# Patient Record
Sex: MALE | Race: WHITE | NOT HISPANIC OR LATINO | Employment: OTHER | URBAN - METROPOLITAN AREA
[De-identification: names, ages, dates, MRNs, and addresses within clinical notes are randomized per-mention and may not be internally consistent; named-entity substitution may affect disease eponyms.]

---

## 2017-04-27 ENCOUNTER — HOSPITAL ENCOUNTER (OUTPATIENT)
Dept: LAB | Facility: MEDICAL CENTER | Age: 62
End: 2017-04-27
Attending: FAMILY MEDICINE
Payer: COMMERCIAL

## 2017-04-27 DIAGNOSIS — E78.2 MIXED HYPERLIPIDEMIA: ICD-10-CM

## 2017-04-27 LAB
ALBUMIN SERPL BCP-MCNC: 4.3 G/DL (ref 3.2–4.9)
ALBUMIN/GLOB SERPL: 1.7 G/DL
ALP SERPL-CCNC: 72 U/L (ref 30–99)
ALT SERPL-CCNC: 24 U/L (ref 2–50)
ANION GAP SERPL CALC-SCNC: 7 MMOL/L (ref 0–11.9)
AST SERPL-CCNC: 21 U/L (ref 12–45)
BILIRUB SERPL-MCNC: 1.1 MG/DL (ref 0.1–1.5)
BUN SERPL-MCNC: 21 MG/DL (ref 8–22)
CALCIUM SERPL-MCNC: 9.2 MG/DL (ref 8.5–10.5)
CHLORIDE SERPL-SCNC: 108 MMOL/L (ref 96–112)
CHOLEST SERPL-MCNC: 247 MG/DL (ref 100–199)
CO2 SERPL-SCNC: 23 MMOL/L (ref 20–33)
CREAT SERPL-MCNC: 0.99 MG/DL (ref 0.5–1.4)
GFR SERPL CREATININE-BSD FRML MDRD: >60 ML/MIN/1.73 M 2
GLOBULIN SER CALC-MCNC: 2.6 G/DL (ref 1.9–3.5)
GLUCOSE SERPL-MCNC: 98 MG/DL (ref 65–99)
HDLC SERPL-MCNC: 45 MG/DL
LDLC SERPL CALC-MCNC: 174 MG/DL
POTASSIUM SERPL-SCNC: 4.1 MMOL/L (ref 3.6–5.5)
PROT SERPL-MCNC: 6.9 G/DL (ref 6–8.2)
SODIUM SERPL-SCNC: 138 MMOL/L (ref 135–145)
TRIGL SERPL-MCNC: 141 MG/DL (ref 0–149)

## 2017-04-27 PROCEDURE — 80053 COMPREHEN METABOLIC PANEL: CPT

## 2017-04-27 PROCEDURE — 36415 COLL VENOUS BLD VENIPUNCTURE: CPT

## 2017-04-27 PROCEDURE — 80061 LIPID PANEL: CPT

## 2017-04-28 ENCOUNTER — TELEPHONE (OUTPATIENT)
Dept: MEDICAL GROUP | Facility: PHYSICIAN GROUP | Age: 62
End: 2017-04-28

## 2017-04-28 NOTE — TELEPHONE ENCOUNTER
Phone Number Called: [phone] (home)     Message: Unable to LVM - mail box full.     Left Message for patient to call back: no

## 2017-04-28 NOTE — TELEPHONE ENCOUNTER
----- Message from Nikole Iniguez M.D. sent at 4/27/2017  8:16 PM PDT -----  Please let patient know that his cholesterol is slightly elevated, but improved from his last blood draw. We can discuss it in-depth at his follow-up appointment.  Nikole Iniguez M.D.

## 2017-05-02 ENCOUNTER — OFFICE VISIT (OUTPATIENT)
Dept: CARDIOLOGY | Facility: MEDICAL CENTER | Age: 62
End: 2017-05-02
Payer: COMMERCIAL

## 2017-05-02 ENCOUNTER — TELEPHONE (OUTPATIENT)
Dept: CARDIOLOGY | Facility: MEDICAL CENTER | Age: 62
End: 2017-05-02

## 2017-05-02 VITALS
HEIGHT: 66 IN | WEIGHT: 193 LBS | SYSTOLIC BLOOD PRESSURE: 120 MMHG | HEART RATE: 78 BPM | OXYGEN SATURATION: 96 % | DIASTOLIC BLOOD PRESSURE: 80 MMHG | BODY MASS INDEX: 31.02 KG/M2

## 2017-05-02 DIAGNOSIS — Z95.3 S/P AORTIC VALVE REPLACEMENT WITH BIOPROSTHETIC VALVE: ICD-10-CM

## 2017-05-02 DIAGNOSIS — Z95.1 POSTSURGICAL AORTOCORONARY BYPASS STATUS: ICD-10-CM

## 2017-05-02 DIAGNOSIS — I25.718 ATHEROSCLEROSIS OF AUTOLOGOUS VEIN CORONARY ARTERY BYPASS GRAFT WITH OTHER FORMS OF ANGINA PECTORIS (HCC): Chronic | ICD-10-CM

## 2017-05-02 DIAGNOSIS — Z78.9 STATIN INTOLERANCE: ICD-10-CM

## 2017-05-02 DIAGNOSIS — E78.2 MIXED HYPERLIPIDEMIA: ICD-10-CM

## 2017-05-02 DIAGNOSIS — I25.708 CORONARY ARTERY DISEASE OF BYPASS GRAFT OF NATIVE HEART WITH STABLE ANGINA PECTORIS (HCC): Primary | ICD-10-CM

## 2017-05-02 DIAGNOSIS — Z95.1 HX OF CABG: ICD-10-CM

## 2017-05-02 PROCEDURE — G8432 DEP SCR NOT DOC, RNG: HCPCS | Performed by: INTERNAL MEDICINE

## 2017-05-02 PROCEDURE — G8419 CALC BMI OUT NRM PARAM NOF/U: HCPCS | Performed by: INTERNAL MEDICINE

## 2017-05-02 PROCEDURE — 99214 OFFICE O/P EST MOD 30 MIN: CPT | Performed by: INTERNAL MEDICINE

## 2017-05-02 PROCEDURE — G8598 ASA/ANTIPLAT THER USED: HCPCS | Performed by: INTERNAL MEDICINE

## 2017-05-02 PROCEDURE — 3017F COLORECTAL CA SCREEN DOC REV: CPT | Mod: 8P | Performed by: INTERNAL MEDICINE

## 2017-05-02 PROCEDURE — 1036F TOBACCO NON-USER: CPT | Performed by: INTERNAL MEDICINE

## 2017-05-02 ASSESSMENT — ENCOUNTER SYMPTOMS
CLAUDICATION: 0
PALPITATIONS: 0
ORTHOPNEA: 0
PND: 0

## 2017-05-02 NOTE — PROGRESS NOTES
Subjective:   Abimael Hamilton is a 61 -year-old man with a history of 5 vessel CABG in 1998, and re-do 3-V CABG in 12/2005 as well as bioprosthetic AVR at that time. He has gout, hypertension, dyslipidemia, obesity, and intolerance to statins.    Again, he is doing very well from a cardiovascular perspective with very infrequent episodes of angina in conjunction with a high level of moderate intensity physical activity at the gym. He tells me that he infrequently has midsternal chest discomfort and requires a single nitroglycerin with immediate relief of his pain. He is doing well with his medications, and has no complaints.    He reviews with me again his severe myalgias with multiple statins, and is absolutely against reinitiation of one. He is also quite leery about the possibility of a PCSK-9 inhibitor for the same reason. He asked for some patient information today.    Past Medical History   Diagnosis Date   • Coronary atherosclerosis of autologous vein bypass graft 11/14/2011   • Benign hypertensive heart disease without heart failure 11/14/2011   • Obesity 11/14/2011   • Muscle cramps 10/4/2011   • Status post cardiac revascularization with bypass aortocoronary anastomosis of five coronary vessels 7/26/2016     5-V CABG done in 1998 (done in [place] at [facility]), patent LIMA to LAD, occluded SVG-OM, occluded SVG-RCA by cardiac catheterization 11/15/2007 with other vein grafts not identified at that time, poor targets for revascularization and declined repeat CABG in 2007 by [facility]. No ischemic was identified by MPI 11/17/07 with an EF of 50%.     • Statin intolerance 7/26/2016   • S/P aortic valve replacement with bioprosthetic valve 7/26/2016     #23 Pe[place]aloza Magna AVR (bioprosthetic)    • History of pancreatitis    • Gout    • Postsurgical aortocoronary bypass status 7/26/2016     Status post redo 3-V CABG in [place] 12/2015: SVG-acute marginal, SVG sequential to LAD, and OM      Past Surgical History    Procedure Laterality Date   • Multiple coronary artery bypass     • Laminotomy       Diskectomy L4-L5, L5-S1     Family History   Problem Relation Age of Onset   • Heart Attack Father      MI and CABG, unknown age   • Cancer Mother      Breast   • Heart Disease Brother    • Stroke Neg Hx    • Diabetes Neg Hx    • Lung Disease Neg Hx      History   Smoking status   • Former Smoker   • Quit date: 01/01/1992   Smokeless tobacco   • Never Used     Allergies   Allergen Reactions   • Gemfibrozil    • Lipitor [Atorvastatin Calcium]    • Tricor      Outpatient Encounter Prescriptions as of 5/2/2017   Medication Sig Dispense Refill   • nitroglycerin (NITROSTAT) 0.4 MG SL Tab Place 1 Tab under tongue as needed for Chest Pain. 25 Tab 0   • magnesium oxide (MAG-OX) 400 MG Tab Take 400 mg by mouth every day.     • aspirin 81 MG EC tablet Take 1 Tab by mouth every day. 30 Tab 11   • clopidogrel (PLAVIX) 75 MG Tab Take 1 Tab by mouth every day. 30 Tab 11   • lisinopril (PRINIVIL) 5 MG Tab Take 1 Tab by mouth every day. 30 Tab 11   • metoprolol (LOPRESSOR) 50 MG Tab Take 1 Tab by mouth 2 times a day. (Patient taking differently: Take 50 mg by mouth every day.) 60 Tab 11   • Red Yeast Rice 600 MG Cap Take 1 Cap by mouth every day. 90 Cap 3   • allopurinol (ZYLOPRIM) 100 MG TABS Take 1 Tab by mouth every day. 90 Tab 3   • Glucosamine-Chondroit-Vit C-Mn (GLUCOSAMINE CHONDROITIN COMPLX) CAPS Take 2 Caps by mouth every day.     • [DISCONTINUED] neomycin sulf/polymyx B sulf/HC soln (CORTISPORIN HC SOL) 3.5-90439-9 Solution Place 3 Drops in ear 3 times a day. Administer drops to both ears. (Patient not taking: Reported on 5/2/2017) 1 Bottle 0   • [DISCONTINUED] ciprofloxacin/dexamethasone (CIPRODEX) 0.3-0.1 % Suspension Place 4 Drops in ear 2 times a day. (Patient not taking: Reported on 5/2/2017) 1 Bottle 0   • [DISCONTINUED] indomethacin (INDOCIN) 50 MG Cap Take 1 Cap by mouth 2 times a day as needed. (Patient not taking: Reported on  "5/2/2017) 30 Cap 3   • Omega-3 Fatty Acids (OMEGA-3 FISH OIL PO) Take  by mouth.       No facility-administered encounter medications on file as of 5/2/2017.     Review of Systems   Cardiovascular: Positive for chest pain (infrequent angina). Negative for palpitations, orthopnea, claudication, leg swelling and PND.   Musculoskeletal: Positive for joint pain (with gout).   All other systems reviewed and are negative.       Objective:   /80 mmHg  Pulse 78  Ht 1.676 m (5' 6\")  Wt 87.544 kg (193 lb)  BMI 31.17 kg/m2  SpO2 96%    Physical Exam   Constitutional: He is oriented to person, place, and time. He appears well-developed and well-nourished. No distress.   Pleasant, reasonably forward, obese, middle-aged man in no distress   HENT:   Head: Normocephalic and atraumatic.   Eyes: Conjunctivae and EOM are normal. Pupils are equal, round, and reactive to light. No scleral icterus.   Neck: Neck supple. No JVD present. No tracheal deviation present.   Cardiovascular: Normal rate, regular rhythm, S2 normal and intact distal pulses.  Exam reveals no gallop and no friction rub.    Murmur heard.   Crescendo decrescendo systolic murmur is present with a grade of 2/6   Pulses:       Dorsalis pedis pulses are 2+ on the right side, and 2+ on the left side.       No carotid bruits   Pulmonary/Chest: Effort normal and breath sounds normal. No stridor. No respiratory distress. He has no wheezes. He has no rales.   Abdominal: Soft. Bowel sounds are normal. He exhibits no distension.   Musculoskeletal: He exhibits no edema.   Neurological: He is alert and oriented to person, place, and time.   Skin: Skin is warm and dry. No rash noted. He is not diaphoretic. No erythema. No pallor.   Tattoos noted on his forearms including Nicole    Psychiatric: He has a normal mood and affect. Judgment and thought content normal.   Vitals reviewed.       7/29/2016 4/27/2017    Cholesterol,Tot 271 (H) 247 (H)   Triglycerides 433 (H) 141 " "  HDL 28 (L) 45   LDL Comment 174 (H)     Echocardiogram, 8/4/2016:  \"CONCLUSIONS  Normal left ventricular size and systolic function, EF 60%.  Mild concentric left ventricular hypertrophy.  Mild mitral regurgitation.  Normally functioning bovine bioprosthetic aortic valve.   Normal right sided pressures.  No prior studies for comparison\"    Assessment:     1. Coronary artery disease of bypass graft of native heart with stable angina pectoris (CMS-[facility])     2. S/P aortic valve replacement with bioprosthetic valve     3. Postsurgical aortocoronary bypass status     4. Mixed hyperlipidemia     5. Statin intolerance         Medical Decision Making:  Today's Assessment / Status / Plan:     Medical Decision Making:    He is doing well today from a cardiovascular perspective. His blood pressure is 120/80 mmHg on metoprolol tartrate, and lisinopril. He remains on aspirin and Plavix as well as red yeast rice and fish oil supplementation. Despite his lipid treatment, his LDL is still extremely high in the setting of 2 prior bypass surgeries at 174 mg/dL just about a week ago at this point. I spoke with him again about PCSK-9 inhibitors, and highly recommended that he initiate one. He is willing to try it, and our office will get that approved. I encouraged him in continued weight loss and lifestyle modification, which she is doing wonderfully with at the present time.    Alejandro Fuentes MD  Cardiologist, [facility] [place]     "

## 2017-05-02 NOTE — MR AVS SNAPSHOT
"Abimael Hamilton   2017 1:00 PM   Office Visit   MRN: [id number]    Department:  [facility]   Dept Phone:  [phone]    Description:  Male : 1955   Provider:  Alejandro Fuentes M.D.           Reason for Visit     Follow-Up           Allergies as of 2017     Allergen Noted Reactions    Gemfibrozil 2008       Lipitor [Atorvastatin Calcium] 2008       Tricor 2008         You were diagnosed with     Coronary artery disease of bypass graft of native heart with stable angina pectoris (CMS-[facility])   [4887035]  -  Primary     S/P aortic valve replacement with bioprosthetic valve   [288193]       Postsurgical aortocoronary bypass status   [V45.81.ICD-9-CM]       Mixed hyperlipidemia   [272.2.ICD-9-CM]       Statin intolerance   [109065]         Vital Signs     Blood Pressure Pulse Height Weight Body Mass Index Oxygen Saturation    120/80 mmHg 78 1.676 m (5' 6\") 87.544 kg (193 lb) 31.17 kg/m2 96%    Smoking Status                   Former Smoker           Basic Information     Date Of Birth Sex Race Ethnicity Preferred Language    1955 Male White Non- English      Your appointments     May 03, 2017  1:00 PM   Established Patient with Nikole Iniguez M.D.   [facility] [place] (--)    [address]  [address]  [place] [id number] [phone]           You will be receiving a confirmation call a few days before your appointment from our automated call confirmation system.            2017  1:30 PM   FOLLOW UP with Alejandro Fuentes M.D.   [facility] (--)    [address] [place] [address] [address]  [place] [address] [phone]              Problem List              ICD-10-CM Priority Class Noted - Resolved    CAD (coronary artery disease) I25.10   2012 - Present    Hyperlipidemia E78.5   2012 - Present    Coronary atherosclerosis of autologous vein bypass graft (Chronic) I25.810 High  2011 - Present    Benign " hypertensive heart disease without heart failure (Chronic) I11.9 High  11/14/2011 - Present    Obesity (Chronic) E66.9 High  11/14/2011 - Present    Status post cardiac revascularization with bypass aortocoronary anastomosis of five coronary vessels Z95.1   7/26/2016 - Present    Postsurgical aortocoronary bypass status Z95.1   7/26/2016 - Present    Statin intolerance Z78.9   7/26/2016 - Present    S/P aortic valve replacement with bioprosthetic valve Z95.4   7/26/2016 - Present    Gout M10.9   Unknown - Present    History of pancreatitis Z87.19   Unknown - Present    Prediabetes R73.03   9/15/2016 - Present    Enlarging skin lesion L98.9   9/15/2016 - Present      Health Maintenance        Date Due Completion Dates    COLONOSCOPY 12/5/2006 ---    IMM ZOSTER VACCINE 12/5/2016 ---    IMM DTaP/Tdap/Td Vaccine (1 - Tdap) 11/15/2017 (Originally 12/5/1975) ---            Current Immunizations     No immunizations on file.      Below and/or attached are the medications your provider expects you to take. Review all of your home medications and newly ordered medications with your provider and/or pharmacist. Follow medication instructions as directed by your provider and/or pharmacist. Please keep your medication list with you and share with your provider. Update the information when medications are discontinued, doses are changed, or new medications (including over-the-counter products) are added; and carry medication information at all times in the event of emergency situations     Allergies:  GEMFIBROZIL - (reactions not documented)     LIPITOR - (reactions not documented)     TRICOR - (reactions not documented)               Medications  Valid as of: May 02, 2017 -  1:15 PM    Generic Name Brand Name Tablet Size Instructions for use    Allopurinol (Tab) ZYLOPRIM 100 MG Take 1 Tab by mouth every day.        Aspirin (Tablet Delayed Response) aspirin 81 MG Take 1 Tab by mouth every day.        Clopidogrel Bisulfate (Tab)  PLAVIX 75 MG Take 1 Tab by mouth every day.        Glucosamine-Chondroit-Vit C-Mn (Cap) GLUCOSAMINE CHONDROITIN COMPLX  Take 2 Caps by mouth every day.        Lisinopril (Tab) PRINIVIL 5 MG Take 1 Tab by mouth every day.        Magnesium Oxide (Tab) MAG- MG Take 400 mg by mouth every day.        Metoprolol Tartrate (Tab) LOPRESSOR 50 MG Take 1 Tab by mouth 2 times a day.        Nitroglycerin (SL Tab) NITROSTAT 0.4 MG Place 1 Tab under tongue as needed for Chest Pain.        Omega-3 Fatty Acids   Take  by mouth.        Red Yeast Rice Extract (Cap) Red Yeast Rice 600 MG Take 1 Cap by mouth every day.        .                 Medicines prescribed today were sent to:     [id number] [address]    Phone: [phone] Fax: [phone]    Open 24 Hours?: No      Medication refill instructions:       If your prescription bottle indicates you have medication refills left, it is not necessary to call your provider’s office. Please contact your pharmacy and they will refill your medication.    If your prescription bottle indicates you do not have any refills left, you may request refills at any time through one of the following ways: The online [employer] system (except Urgent Care), by calling your provider’s office, or by asking your pharmacy to contact your provider’s office with a refill request. Medication refills are processed only during regular business hours and may not be available until the next business day. Your provider may request additional information or to have a follow-up visit with you prior to refilling your medication.   *Please Note: Medication refills are assigned a new Rx number when refilled electronically. Your pharmacy may indicate that no refills were authorized even though a new prescription for the same medication is available at the pharmacy. Please request the medicine by name with the pharmacy before  contacting your provider for a refill.           Baitianshi Access Code: [email or web address]  Expires: 5/8/2017 11:26 AM    Baitianshi  A secure, online tool to manage your health information     [employer]’s Baitianshi® is a secure, online tool that connects you to your personalized health information from the privacy of your home -- day or night - making it very easy for you to manage your healthcare. Once the activation process is completed, you can even access your medical information using the Baitianshi riri, which is available for free in the Apple Riri store or Google Play store.     Baitianshi provides the following levels of access (as shown below):   My Chart Features   [facility] Primary Care Doctor [facility]  Specialists [facility]  Urgent  Care Non-[facility]  Primary Care  Doctor   Email your healthcare team securely and privately 24/7 X X X    Manage appointments: schedule your next appointment; view details of past/upcoming appointments X      Request prescription refills. X      View recent personal medical records, including lab and immunizations X X X X   View health record, including health history, allergies, medications X X X X   Read reports about your outpatient visits, procedures, consult and ER notes X X X X   See your discharge summary, which is a recap of your hospital and/or ER visit that includes your diagnosis, lab results, and care plan. X X       How to register for Baitianshi:  1. Go to  [email or web address].  2. Click on the Sign Up Now box, which takes you to the New Member Sign Up page. You will need to provide the following information:  a. Enter your Baitianshi Access Code exactly as it appears at the top of this page. (You will not need to use this code after you’ve completed the sign-up process. If you do not sign up before the expiration date, you must request a new code.)   b. Enter your date of birth.   c. Enter your home email address.   d. Click Submit, and follow the next screen’s  instructions.  3. Create a [employer] ID. This will be your [employer] login ID and cannot be changed, so think of one that is secure and easy to remember.  4. Create a [employer] password. You can change your password at any time.  5. Enter your Password Reset Question and Answer. This can be used at a later time if you forget your password.   6. Enter your e-mail address. This allows you to receive e-mail notifications when new information is available in [employer].  7. Click Sign Up. You can now view your health information.    For assistance activating your [employer] account, call [phone]

## 2017-05-02 NOTE — TELEPHONE ENCOUNTER
Prescription for Repatha 140 mg SQ every 14 days sent to [facility].    Repatha Ready Privacy Notice and Authorization form mailed to patient for signature.    IAN RN

## 2017-05-02 NOTE — PROGRESS NOTES
Name:          Abimael Hamilton   YOB: 1955  Date:     5/2/2017      Nikole Iniguez M.D.  [address] [facility] [address]  [place] [address]     Alejandro Fuentes MD  [address], [facility] [address]  [place] [address]  Phone: [phone]  Back Line: [phone]  Fax: [phone]  E-mail: [email or web address]   Dear Dr. Iniguez,    We had the pleasure of seeing your patient, Abimael Hamilton, in Cardiology Clinic at [facility] today.    As you know, he is a 61-year-old man with a history of 5 vessel CABG in 1998, and re-do 3-V CABG in 12/2005 as well as bioprosthetic AVR at that time. He has gout, hypertension, dyslipidemia, obesity, and intolerance to statins.    He is doing well today from a cardiovascular perspective. His blood pressure is 120/80 mmHg on metoprolol tartrate, and lisinopril. He remains on aspirin and Plavix as well as red yeast rice and fish oil supplementation. Despite his lipid treatment, his LDL is still extremely high in the setting of 2 prior bypass surgeries at 174 mg/dL just about a week ago at this point. I spoke with him again about PCSK-9 inhibitors, and highly recommended that he initiate one. He is willing to try it, and our office will get that approved. I encouraged him in continued weight loss and lifestyle modification, which she is doing wonderfully with at the present time.    We will have the patient follow-up in 6 months.    Thank you for the referral and please do not hesitate to contact me at any time. My contact information is listed above.    This note was dictated using Dragon speech recognition software.     A full note including my physical examination and a full list of rectified medications is available in our medical record, and can be faxed as well.    Alejandro Fuentes MD  Cardiologist  [facility]

## 2017-05-02 NOTE — Clinical Note
Name:          Abimael Hamilton   YOB: 1955  Date:     5/2/2017      Nikole Iniguez M.D.  [address] [facility] [address]  [place] [address]     Alejandro Fuentes MD  [address], [facility] [address]  [place] [address]  Phone: [phone]  Back Line: [phone]  Fax: [phone]  E-mail: [email or web address]   Dear Dr. Iniguez,    We had the pleasure of seeing your patient, Abimael Hamilton, in Cardiology Clinic at [facility] [place] and Vascular today.    As you know, he is a 61-year-old man with a history of 5 vessel CABG in 1998, and re-do 3-V CABG in 12/2005 as well as bioprosthetic AVR at that time. He has gout, hypertension, dyslipidemia, obesity, and intolerance to statins.    He is doing well today from a cardiovascular perspective. His blood pressure is 120/80 mmHg on metoprolol tartrate, and lisinopril. He remains on aspirin and Plavix as well as red yeast rice and fish oil supplementation. Despite his lipid treatment, his LDL is still extremely high in the setting of 2 prior bypass surgeries at 174 mg/dL just about a week ago at this point. I spoke with him again about PCSK-9 inhibitors, and highly recommended that he initiate one. He is willing to try it, and our office will get that approved. I encouraged him in continued weight loss and lifestyle modification, which she is doing wonderfully with at the present time.    We will have the patient follow-up in 6 months.    Thank you for the referral and please do not hesitate to contact me at any time. My contact information is listed above.    This note was dictated using Dragon speech recognition software.     A full note including my physical examination and a full list of rectified medications is available in our medical record, and can be faxed as well.    Alejandro Fuentes MD  Cardiologist  [facility]

## 2017-05-03 ENCOUNTER — APPOINTMENT (OUTPATIENT)
Dept: MEDICAL GROUP | Facility: PHYSICIAN GROUP | Age: 62
End: 2017-05-03
Payer: COMMERCIAL

## 2017-05-03 DIAGNOSIS — Z95.1 HX OF CABG: ICD-10-CM

## 2017-05-03 DIAGNOSIS — Z78.9 STATIN INTOLERANCE: ICD-10-CM

## 2017-05-03 DIAGNOSIS — I25.718 ATHEROSCLEROSIS OF AUTOLOGOUS VEIN CORONARY ARTERY BYPASS GRAFT WITH OTHER FORMS OF ANGINA PECTORIS (HCC): Chronic | ICD-10-CM

## 2017-05-25 ENCOUNTER — OFFICE VISIT (OUTPATIENT)
Dept: MEDICAL GROUP | Facility: LAB | Age: 62
End: 2017-05-25
Payer: COMMERCIAL

## 2017-05-25 VITALS
HEART RATE: 79 BPM | HEIGHT: 66 IN | DIASTOLIC BLOOD PRESSURE: 92 MMHG | TEMPERATURE: 97.2 F | BODY MASS INDEX: 30.75 KG/M2 | OXYGEN SATURATION: 95 % | RESPIRATION RATE: 16 BRPM | SYSTOLIC BLOOD PRESSURE: 113 MMHG | WEIGHT: 191.36 LBS

## 2017-05-25 DIAGNOSIS — E78.2 MIXED HYPERLIPIDEMIA: ICD-10-CM

## 2017-05-25 DIAGNOSIS — Z13.1 SCREENING FOR DIABETES MELLITUS: ICD-10-CM

## 2017-05-25 DIAGNOSIS — R73.03 PREDIABETES: ICD-10-CM

## 2017-05-25 DIAGNOSIS — Z00.00 HEALTH MAINTENANCE EXAMINATION: ICD-10-CM

## 2017-05-25 DIAGNOSIS — E66.9 OBESITY (BMI 30-39.9): ICD-10-CM

## 2017-05-25 PROCEDURE — G8432 DEP SCR NOT DOC, RNG: HCPCS | Performed by: FAMILY MEDICINE

## 2017-05-25 PROCEDURE — G8419 CALC BMI OUT NRM PARAM NOF/U: HCPCS | Performed by: FAMILY MEDICINE

## 2017-05-25 PROCEDURE — 1036F TOBACCO NON-USER: CPT | Performed by: FAMILY MEDICINE

## 2017-05-25 PROCEDURE — 3017F COLORECTAL CA SCREEN DOC REV: CPT | Mod: 8P | Performed by: FAMILY MEDICINE

## 2017-05-25 PROCEDURE — 99214 OFFICE O/P EST MOD 30 MIN: CPT | Performed by: FAMILY MEDICINE

## 2017-05-25 PROCEDURE — G8598 ASA/ANTIPLAT THER USED: HCPCS | Performed by: FAMILY MEDICINE

## 2017-05-25 NOTE — MR AVS SNAPSHOT
"Abimael Hamilton   2017 7:40 AM   Office Visit   MRN: [id number]    Department:  [facility]   Dept Phone:  [phone]    Description:  Male : 1955   Provider:  Martha Madrid M.D.           Reason for Visit     Establish Care           Allergies as of 2017     Allergen Noted Reactions    Gemfibrozil 2008       Lipitor [Atorvastatin Calcium] 2008       Tricor 2008         You were diagnosed with     Obesity (BMI 30-39.9)   [553094]       Mixed hyperlipidemia   [272.2.ICD-9-CM]       Prediabetes   [945852]       Health maintenance examination   [115363]       Screening for diabetes mellitus   [V77.1.ICD-9-CM]         Vital Signs     Blood Pressure Pulse Temperature Respirations Height Weight    113/92 mmHg 79 36.2 °C (97.2 °F) 16 1.676 m (5' 5.98\") 86.8 kg (191 lb 5.8 oz)    Body Mass Index Oxygen Saturation Smoking Status             30.90 kg/m2 95% Former Smoker         Basic Information     Date Of Birth Sex Race Ethnicity Preferred Language    1955 Male White Non- English      Your appointments     2017  1:30 PM   FOLLOW UP with Alejandro Fuentes M.D.   [facility] (--)    [address] [place] [address] [address]  [place] [address] [phone]            2017  7:00 AM   ANNUAL EXAM PREVENTATIVE with Martha Madrid M.D.   [facility] (--)    [address]  [place] [address] [phone]              Problem List              ICD-10-CM Priority Class Noted - Resolved    CAD (coronary artery disease) I25.10   2012 - Present    Hyperlipidemia E78.5   2012 - Present    Coronary atherosclerosis of autologous vein bypass graft I25.810 High  2011 - Present    Benign hypertensive heart disease without heart failure I11.9 High  2011 - Present    Status post cardiac revascularization with bypass aortocoronary anastomosis of five " coronary vessels Z95.1   7/26/2016 - Present    Postsurgical aortocoronary bypass status Z95.1   7/26/2016 - Present    Statin intolerance Z78.9   7/26/2016 - Present    S/P aortic valve replacement with bioprosthetic valve Z95.4   7/26/2016 - Present    Gout M10.9   Unknown - Present    History of pancreatitis Z87.19   Unknown - Present    Prediabetes R73.03   9/15/2016 - Present    Enlarging skin lesion L98.9   9/15/2016 - Present    Obesity (BMI 30-39.9) E66.9   5/25/2017 - Present      Health Maintenance        Date Due Completion Dates    COLONOSCOPY 12/5/2006 ---    IMM DTaP/Tdap/Td Vaccine (1 - Tdap) 11/15/2017 (Originally 12/5/1975) ---    IMM ZOSTER VACCINE 5/25/2027 (Originally 12/5/2016) ---            Current Immunizations     No immunizations on file.      Below and/or attached are the medications your provider expects you to take. Review all of your home medications and newly ordered medications with your provider and/or pharmacist. Follow medication instructions as directed by your provider and/or pharmacist. Please keep your medication list with you and share with your provider. Update the information when medications are discontinued, doses are changed, or new medications (including over-the-counter products) are added; and carry medication information at all times in the event of emergency situations     Allergies:  GEMFIBROZIL - (reactions not documented)     LIPITOR - (reactions not documented)     TRICOR - (reactions not documented)               Medications  Valid as of: May 25, 2017 -  8:05 AM    Generic Name Brand Name Tablet Size Instructions for use    Allopurinol (Tab) ZYLOPRIM 100 MG Take 1 Tab by mouth every day.        Aspirin (Tablet Delayed Response) aspirin 81 MG Take 1 Tab by mouth every day.        Clopidogrel Bisulfate (Tab) PLAVIX 75 MG Take 1 Tab by mouth every day.        Evolocumab (Solution Auto-injector) Evolocumab (REPATHA) 140 MG/ML Inject 1 Each as instructed every 14  days.        Glucosamine-Chondroit-Vit C-Mn (Cap) GLUCOSAMINE CHONDROITIN COMPLX  Take 2 Caps by mouth every day.        Lisinopril (Tab) PRINIVIL 5 MG Take 1 Tab by mouth every day.        Magnesium Oxide (Tab) MAG- MG Take 400 mg by mouth every day.        Metoprolol Tartrate (Tab) LOPRESSOR 50 MG Take 1 Tab by mouth 2 times a day.        Nitroglycerin (SL Tab) NITROSTAT 0.4 MG Place 1 Tab under tongue as needed for Chest Pain.        Omega-3 Fatty Acids   Take  by mouth.        Red Yeast Rice Extract (Cap) Red Yeast Rice 600 MG Take 1 Cap by mouth every day.        .                 Medicines prescribed today were sent to:     [id number] - [facility] [address] [address]    Phone: [phone] Fax: [phone]    Open 24 Hours?: No    [facility] - [place], [place] - [address]     [address]  [address] [place] [address]    Phone: [phone] Fax: [phone]    Open 24 Hours?: No      Medication refill instructions:       If your prescription bottle indicates you have medication refills left, it is not necessary to call your provider’s office. Please contact your pharmacy and they will refill your medication.    If your prescription bottle indicates you do not have any refills left, you may request refills at any time through one of the following ways: The online [employer] system (except Urgent Care), by calling your provider’s office, or by asking your pharmacy to contact your provider’s office with a refill request. Medication refills are processed only during regular business hours and may not be available until the next business day. Your provider may request additional information or to have a follow-up visit with you prior to refilling your medication.   *Please Note: Medication refills are assigned a new Rx number when refilled electronically. Your pharmacy may indicate that no refills were authorized even  though a new prescription for the same medication is available at the pharmacy. Please request the medicine by name with the pharmacy before contacting your provider for a refill.        Your To Do List     Future Labs/Procedures Complete By Expires    COMP METABOLIC PANEL  As directed 5/26/2018    HEMOGLOBIN A1C  As directed 5/26/2018    LIPID PROFILE  As directed 5/26/2018         MyChart Access Code: Activation code not generated  Current [employer] Status: Active

## 2017-05-25 NOTE — PROGRESS NOTES
Catherine Hamilton is a 61 y.o. male here for   Chief Complaint   Patient presents with   • Establish Care       HPI:  Catherine is a very pleasant 61 y.o. male. He is here today to establish care. His previous PCP was Dr. Iniguez and she moved too far away for him to continue to see her. He lives in [place]. His wife is Vonda Longo. Mixed hyperlipidemia  This is chronic. Unable to take any medications due to side effects including multiple statins and TriCor. He is currently going to hopefully be starting a trial with his cardiologist for the injectable Repatha.  Denies stroke symptoms. He does take an occasional nitroglycerin for chest pain. He does have coronary artery disease and this had multiple bypass grafts. Also taking a daily ASA. Last lipid panel 4/27/17 and was reviewed with the patient.     Results for CATHERINE HAMILTON (MRN [id number]) as of 5/25/2017 10:14   Ref. Range 4/27/2017 08:16   Cholesterol,Tot Latest Ref Range: 100-199 mg/dL 247 (H)   Triglycerides Latest Ref Range: 0-149 mg/dL 141   HDL Latest Ref Range: >=40 mg/dL 45   LDL Latest Ref Range: <100 mg/dL 174 (H)       2. Prediabetes  This is chronic. His fasting glucose went from one 10/2/98 with the last labs. His previous hemoglobin A1c 6 months ago was 5.7%. He does been on a significant weight loss journey and has lost 130 pounds within the last 2 years. Over the last 6 months he has lost 20 pounds. He states that h his goal weight is 175. He has improved his diet and limits his carbohydrates.    3. Obesity (BMI 30-39.9)  This is chronic. Patient has lost a significant amount of weight as above 130 pounds total. He continues to exercise about 5 hours per day now that he has retired/disabled.     4. Health maintenance examination   Colonoscopy done about 5 years ago at digestive [facility]  Declines any vaccinations    Current medicines (including changes today)  Current Outpatient Prescriptions   Medication Sig Dispense Refill   • nitroglycerin  (NITROSTAT) 0.4 MG SL Tab Place 1 Tab under tongue as needed for Chest Pain. 25 Tab 0   • magnesium oxide (MAG-OX) 400 MG Tab Take 400 mg by mouth every day.     • aspirin 81 MG EC tablet Take 1 Tab by mouth every day. 30 Tab 11   • clopidogrel (PLAVIX) 75 MG Tab Take 1 Tab by mouth every day. 30 Tab 11   • metoprolol (LOPRESSOR) 50 MG Tab Take 1 Tab by mouth 2 times a day. (Patient taking differently: Take 50 mg by mouth every day.) 60 Tab 11   • Red Yeast Rice 600 MG Cap Take 1 Cap by mouth every day. 90 Cap 3   • allopurinol (ZYLOPRIM) 100 MG TABS Take 1 Tab by mouth every day. 90 Tab 3   • Glucosamine-Chondroit-Vit C-Mn (GLUCOSAMINE CHONDROITIN COMPLX) CAPS Take 2 Caps by mouth every day.     • Evolocumab, REPATHA, 140 MG/ML Solution Auto-injector Inject 1 Each as instructed every 14 days. 6 PEN 3   • Omega-3 Fatty Acids (OMEGA-3 FISH OIL PO) Take  by mouth.     • lisinopril (PRINIVIL) 5 MG Tab Take 1 Tab by mouth every day. 30 Tab 11     No current facility-administered medications for this visit.     He  has a past medical history of Coronary atherosclerosis of autologous vein bypass graft (11/14/2011); Benign hypertensive heart disease without heart failure (11/14/2011); Obesity (11/14/2011); Muscle cramps (10/4/2011); Status post cardiac revascularization with bypass aortocoronary anastomosis of five coronary vessels (7/26/2016); Statin intolerance (7/26/2016); S/P aortic valve replacement with bioprosthetic valve (7/26/2016); History of pancreatitis; Gout; and Postsurgical aortocoronary bypass status (7/26/2016).  He  has past surgical history that includes multiple coronary artery bypass; laminotomy; and biopsy general.  Social History   Substance Use Topics   • Smoking status: Former Smoker     Quit date: 01/01/1992   • Smokeless tobacco: Never Used   • Alcohol Use: Yes      Comment: RARE     Social History     Social History Narrative     Family History   Problem Relation Age of Onset   • Heart Attack  "Father      MI and CABG, unknown age   • Cancer Mother      Breast   • Heart Disease Brother    • Stroke Neg Hx    • Diabetes Neg Hx    • Lung Disease Neg Hx      Family Status   Relation Status Death Age   • Father     • Mother Alive    • Sister Alive    • Brother Alive          ROS  Positive for occasional chest pain, shortness of breath, hand and ankle swelling, joint pain  All other systems reviewed and are negative     Objective:     Blood pressure 113/92, pulse 79, temperature 36.2 °C (97.2 °F), resp. rate 16, height 1.676 m (5' 5.98\"), weight 86.8 kg (191 lb 5.8 oz), SpO2 95 %. Body mass index is 30.9 kg/(m^2).  Physical Exam:    Constitutional: Alert, no distress.  Skin: Warm, dry, good turgor, no rashes in visible areas.  Eye: Equal, round and reactive, conjunctiva clear, lids normal.  ENMT: Lips without lesions, good dentition, oropharynx clear. TM's pearly gray with normal light reflexes bilaterally  Neck: Trachea midline, no masses, no thyromegaly. No cervical or supraclavicular lymphadenopathy.  Respiratory: Unlabored respiratory effort, lungs clear to auscultation bilaterally, no wheezes, no ronchi.  Cardiovascular: Normal S1, S2, RRR, 2/6 murmur, no edema.  Abdomen: Soft, non-tender, no masses, no hepatosplenomegaly.  Psych: Alert and oriented x3, normal affect and mood.      Assessment and Plan:   The following treatment plan was discussed    1. Mixed hyperlipidemia  Chronic, uncontrolled  Discussed LDL goal given his coronary artery disease  We are hopeful that he will be able to do Repatha injections   - LIPID PROFILE; Future    2. Prediabetes  Chronic, improving with weight loss  Labs reviewed  Recheck in 6 months  - HEMOGLOBIN A1C; Future    3. Obesity (BMI 30-39.9)  Chronic, improving significantly   counseled and encouragement given  - Patient identified as having weight management issue.  Appropriate orders and counseling given.    4. Health maintenance examination  Patient will " schedule his Medicare annual in 6 months with labs prior to this  Records requested from [facility] for colonoscopy  Declines all vaccines  - LIPID PROFILE; Future  - HEMOGLOBIN A1C; Future  - COMP METABOLIC PANEL; Future    5. Screening for diabetes mellitus  - HEMOGLOBIN A1C; Future      Records requested.  Followup: Return in about 6 months (around 11/25/2017) for medicare annual .         This note was created using voice recognition software. I have made every reasonable attempt to correct errors, however, I do anticipate some grammatical errors.

## 2017-06-07 ENCOUNTER — TELEPHONE (OUTPATIENT)
Dept: MEDICAL GROUP | Facility: LAB | Age: 62
End: 2017-06-07

## 2017-06-07 ENCOUNTER — HOSPITAL ENCOUNTER (OUTPATIENT)
Dept: RADIOLOGY | Facility: MEDICAL CENTER | Age: 62
End: 2017-06-07
Attending: FAMILY MEDICINE
Payer: COMMERCIAL

## 2017-06-07 ENCOUNTER — OFFICE VISIT (OUTPATIENT)
Dept: MEDICAL GROUP | Facility: LAB | Age: 62
End: 2017-06-07
Payer: COMMERCIAL

## 2017-06-07 VITALS
TEMPERATURE: 97.3 F | RESPIRATION RATE: 16 BRPM | OXYGEN SATURATION: 100 % | DIASTOLIC BLOOD PRESSURE: 96 MMHG | WEIGHT: 193.56 LBS | HEART RATE: 63 BPM | SYSTOLIC BLOOD PRESSURE: 134 MMHG | BODY MASS INDEX: 31.11 KG/M2 | HEIGHT: 66 IN

## 2017-06-07 DIAGNOSIS — M25.511 ACUTE PAIN OF RIGHT SHOULDER: ICD-10-CM

## 2017-06-07 DIAGNOSIS — M79.672 LEFT FOOT PAIN: ICD-10-CM

## 2017-06-07 DIAGNOSIS — Z23 NEED FOR VACCINATION: ICD-10-CM

## 2017-06-07 PROCEDURE — G8432 DEP SCR NOT DOC, RNG: HCPCS | Performed by: FAMILY MEDICINE

## 2017-06-07 PROCEDURE — 99214 OFFICE O/P EST MOD 30 MIN: CPT | Mod: 25 | Performed by: FAMILY MEDICINE

## 2017-06-07 PROCEDURE — 90715 TDAP VACCINE 7 YRS/> IM: CPT | Performed by: FAMILY MEDICINE

## 2017-06-07 PROCEDURE — 73630 X-RAY EXAM OF FOOT: CPT | Mod: LT

## 2017-06-07 PROCEDURE — G8419 CALC BMI OUT NRM PARAM NOF/U: HCPCS | Performed by: FAMILY MEDICINE

## 2017-06-07 PROCEDURE — 73030 X-RAY EXAM OF SHOULDER: CPT | Mod: RT

## 2017-06-07 PROCEDURE — 1036F TOBACCO NON-USER: CPT | Performed by: FAMILY MEDICINE

## 2017-06-07 PROCEDURE — 3017F COLORECTAL CA SCREEN DOC REV: CPT | Mod: 8P | Performed by: FAMILY MEDICINE

## 2017-06-07 PROCEDURE — G8598 ASA/ANTIPLAT THER USED: HCPCS | Performed by: FAMILY MEDICINE

## 2017-06-07 NOTE — TELEPHONE ENCOUNTER
Patient's insurance is requesting a [id number] to be able to cover patient's MRI of the right shoulder. The phone number for the [id number] is [phone] and the case number is [id number]

## 2017-06-07 NOTE — PROGRESS NOTES
Subjective:   Abimael Hamilton is a 61 y.o. male here today for   Chief Complaint   Patient presents with   • Immunizations     TDAP   • Shoulder Pain   • Leg Pain       1. Acute pain of right shoulder  This is a new problem. Patient had a fall 9 days ago off of a 10 foot cyanide tank. He landed on the right side. He had immediate pain in his right shoulder and leg. He did not hear feel any pop. He was unable to raise his arm past his shoulder after the injury and is still unable to do so. It is extremely painful when he tries to raise his arm above his head or hold it anything including something as small as a fork. He cannot shake any previous hand because of the pain. He is not taking anything for pain.    2. Left foot pain  This is a new problem. It started about 3 days ago. He did take a fall 9 days ago but did not notice pain at that time. He noticed a new bony growth on his lateral side of his foot. It is worse when he is walking on it. He is still able to exercise regularly. He denies any redness or swelling. It is tender to the touch.    3. Need for vaccination  Patient cut himself on the tank when he fell. He has not had a tetanus booster.      Current medicines (including changes today)  Current Outpatient Prescriptions   Medication Sig Dispense Refill   • magnesium oxide (MAG-OX) 400 MG Tab Take 400 mg by mouth every day.     • aspirin 81 MG EC tablet Take 1 Tab by mouth every day. 30 Tab 11   • clopidogrel (PLAVIX) 75 MG Tab Take 1 Tab by mouth every day. 30 Tab 11   • lisinopril (PRINIVIL) 5 MG Tab Take 1 Tab by mouth every day. 30 Tab 11   • metoprolol (LOPRESSOR) 50 MG Tab Take 1 Tab by mouth 2 times a day. (Patient taking differently: Take 50 mg by mouth every day.) 60 Tab 11   • allopurinol (ZYLOPRIM) 100 MG TABS Take 1 Tab by mouth every day. 90 Tab 3   • Glucosamine-Chondroit-Vit C-Mn (GLUCOSAMINE CHONDROITIN COMPLX) CAPS Take 2 Caps by mouth every day.     • Evolocumab, REPATHA, 140 MG/ML  "Solution Auto-injector Inject 1 Each as instructed every 14 days. 6 PEN 3   • nitroglycerin (NITROSTAT) 0.4 MG SL Tab Place 1 Tab under tongue as needed for Chest Pain. 25 Tab 0   • Omega-3 Fatty Acids (OMEGA-3 FISH OIL PO) Take  by mouth.     • Red Yeast Rice 600 MG Cap Take 1 Cap by mouth every day. 90 Cap 3     No current facility-administered medications for this visit.     He  has a past medical history of Coronary atherosclerosis of autologous vein bypass graft (11/14/2011); Benign hypertensive heart disease without heart failure (11/14/2011); Obesity (11/14/2011); Muscle cramps (10/4/2011); Status post cardiac revascularization with bypass aortocoronary anastomosis of five coronary vessels (7/26/2016); Statin intolerance (7/26/2016); S/P aortic valve replacement with bioprosthetic valve (7/26/2016); History of pancreatitis; Gout; and Postsurgical aortocoronary bypass status (7/26/2016).    ROS   No fevers  No bowel changes  No LE edema       Objective:     Blood pressure 134/96, pulse 63, temperature 36.3 °C (97.3 °F), resp. rate 16, height 1.676 m (5' 5.98\"), weight 87.8 kg (193 lb 9 oz), SpO2 100 %. Body mass index is 31.26 kg/(m^2).   Physical Exam:  General: No acute distress, WN/WD  HEENT: NC/AT, lids normal without lesions, good dentition  Neck: Trachea midline  Cardiovascular: Regular Rate  Pulmonary: No respiratory distress  Skin: No rashes noted  Neurologic: CN II-XII grossly intact, normal gait  Psych: Alert and oriented x 3, normal insight and judgement  MSK: There is significant ecchymosis on the lateral side of the right leg and thigh thorax.   Neck exam: No spinal tenderness to palpation. Normal flexion, extension and lateral rotation ROM.   Shoulder/arm exam: No deformity, erythema, edema or ecchymosis. Tenderness to palpation in the anterior aspect of the shoulder. ROM limited. Pain with range of motion in all planes. Worse with external rotation, empty can, anterior flexion  Elbow/forearm: " Tenderness to palpation: none. Full ROM.  5/5 strength throughout bilaterally. 2+ DTR biceps and triceps bilaterally.   Foot: Left foot with a prominent bony mass palpated with slight tenderness to palpation without any erythema or edema   Assessment and Plan:   The following treatment plan was discussed    1. Acute pain of right shoulder  New, secondary to a fall from 10 feet  X-ray ordered to rule out fracture or dislocation  If no etiology for pain found, we will need to proceed with MRI if this does not improve  Physical therapy ordered  - DX-SHOULDER 2+ RIGHT; Future    2. Left foot pain  New, etiology unclear, there is a prominent bony change on the left lateral foot  X-ray ordered for further evaluation  - DX-FOOT-COMPLETE 3+ LEFT; Future    3. Need for vaccination  - TDAP VACCINE =>8YO IM      Followup: Return if symptoms worsen or fail to improve.       This note was created using voice recognition software. I have made every reasonable attempt to correct errors, however, I do anticipate some grammatical errors.

## 2017-06-07 NOTE — MR AVS SNAPSHOT
"Abimael Hamilton   2017 7:00 AM   Office Visit   MRN: [id number]    Department:  [facility]   Dept Phone:  [phone]    Description:  Male : 1955   Provider:  Martha Madrid M.D.           Reason for Visit     Immunizations TDAP    Shoulder Pain     Leg Pain           Allergies as of 2017     Allergen Noted Reactions    Gemfibrozil 2008       Lipitor [Atorvastatin Calcium] 2008       Tricor 2008         You were diagnosed with     Acute pain of right shoulder   [6545848]       Left foot pain   [663043]       Need for vaccination   [501155]         Vital Signs     Blood Pressure Pulse Temperature Respirations Height Weight    134/96 mmHg 63 36.3 °C (97.3 °F) 16 1.676 m (5' 5.98\") 87.8 kg (193 lb 9 oz)    Body Mass Index Oxygen Saturation Smoking Status             31.26 kg/m2 100% Former Smoker         Basic Information     Date Of Birth Sex Race Ethnicity Preferred Language    1955 Male White Non- English      Your appointments     2017  1:30 PM   FOLLOW UP with Alejandro Fuentes M.D.   [facility] (--)    [address] [place] [address] [address]  [place] [id number] [phone]            2017  7:00 AM   ANNUAL EXAM PREVENTATIVE with Martha Madrid M.D.   [facility] (--)    [address] [facility]  [address]  [place] [id number] [phone]              Problem List              ICD-10-CM Priority Class Noted - Resolved    CAD (coronary artery disease) I25.10   2012 - Present    Hyperlipidemia E78.5   2012 - Present    Coronary atherosclerosis of autologous vein bypass graft I25.810 High  2011 - Present    Benign hypertensive heart disease without heart failure I11.9 High  2011 - Present    Status post cardiac revascularization with bypass aortocoronary anastomosis of five coronary vessels Z95.1   2016 - Present    Postsurgical aortocoronary " bypass status Z95.1   7/26/2016 - Present    Statin intolerance Z78.9   7/26/2016 - Present    S/P aortic valve replacement with bioprosthetic valve Z95.4   7/26/2016 - Present    Gout M10.9   Unknown - Present    History of pancreatitis Z87.19   Unknown - Present    Prediabetes R73.03   9/15/2016 - Present    Obesity (BMI 30-39.9) E66.9   5/25/2017 - Present      Health Maintenance        Date Due Completion Dates    COLONOSCOPY 12/5/2006 ---    IMM DTaP/Tdap/Td Vaccine (1 - Tdap) 11/15/2017 (Originally 12/5/1975) ---    IMM ZOSTER VACCINE 5/25/2027 (Originally 12/5/2016) ---            Current Immunizations     Tdap Vaccine  Incomplete      Below and/or attached are the medications your provider expects you to take. Review all of your home medications and newly ordered medications with your provider and/or pharmacist. Follow medication instructions as directed by your provider and/or pharmacist. Please keep your medication list with you and share with your provider. Update the information when medications are discontinued, doses are changed, or new medications (including over-the-counter products) are added; and carry medication information at all times in the event of emergency situations     Allergies:  GEMFIBROZIL - (reactions not documented)     LIPITOR - (reactions not documented)     TRICOR - (reactions not documented)               Medications  Valid as of: June 07, 2017 -  7:23 AM    Generic Name Brand Name Tablet Size Instructions for use    Allopurinol (Tab) ZYLOPRIM 100 MG Take 1 Tab by mouth every day.        Aspirin (Tablet Delayed Response) aspirin 81 MG Take 1 Tab by mouth every day.        Clopidogrel Bisulfate (Tab) PLAVIX 75 MG Take 1 Tab by mouth every day.        Evolocumab (Solution Auto-injector) Evolocumab (REPATHA) 140 MG/ML Inject 1 Each as instructed every 14 days.        Glucosamine-Chondroit-Vit C-Mn (Cap) GLUCOSAMINE CHONDROITIN COMPLX  Take 2 Caps by mouth every day.        Lisinopril  (Tab) PRINIVIL 5 MG Take 1 Tab by mouth every day.        Magnesium Oxide (Tab) MAG- MG Take 400 mg by mouth every day.        Metoprolol Tartrate (Tab) LOPRESSOR 50 MG Take 1 Tab by mouth 2 times a day.        Nitroglycerin (SL Tab) NITROSTAT 0.4 MG Place 1 Tab under tongue as needed for Chest Pain.        Omega-3 Fatty Acids   Take  by mouth.        Red Yeast Rice Extract (Cap) Red Yeast Rice 600 MG Take 1 Cap by mouth every day.        .                 Medicines prescribed today were sent to:     [id number] - [place], [place] - [address] [address]    Phone: [phone] Fax: [phone]    Open 24 Hours?: No    [facility] - [place], [place] - [address]    [address] [place] [address]    Phone: [phone] Fax: [phone]    Open 24 Hours?: No      Medication refill instructions:       If your prescription bottle indicates you have medication refills left, it is not necessary to call your provider’s office. Please contact your pharmacy and they will refill your medication.    If your prescription bottle indicates you do not have any refills left, you may request refills at any time through one of the following ways: The online [employer] system (except Urgent Care), by calling your provider’s office, or by asking your pharmacy to contact your provider’s office with a refill request. Medication refills are processed only during regular business hours and may not be available until the next business day. Your provider may request additional information or to have a follow-up visit with you prior to refilling your medication.   *Please Note: Medication refills are assigned a new Rx number when refilled electronically. Your pharmacy may indicate that no refills were authorized even though a new prescription for the same medication is available at the pharmacy. Please request the medicine by name with the pharmacy  before contacting your provider for a refill.        Your To Do List     Future Labs/Procedures Complete By Expires    DX-FOOT-COMPLETE 3+ LEFT  As directed 12/8/2017    DX-SHOULDER 2+ RIGHT  As directed 12/8/2017         PreDx [employer] Access Code: Activation code not generated  Current Gravity [facility] Status: Active

## 2017-06-09 NOTE — TELEPHONE ENCOUNTER
Was able to discuss case with Dr. Davis today.   Approval number is [id number] good for 45 days    Please inform imaging and the patient     Thank you!    Martha Madrid M.D.

## 2017-06-09 NOTE — TELEPHONE ENCOUNTER
I have called several times on 6/7, 6/8, and on 6/9. All on hold for > 20 minutes without talking to a peer. Made an appointment for today at 3:00 and they did not call. Again, attempted to call.     Martha Madrid M.D.

## 2017-06-12 ENCOUNTER — APPOINTMENT (OUTPATIENT)
Dept: RADIOLOGY | Facility: MEDICAL CENTER | Age: 62
End: 2017-06-12
Attending: FAMILY MEDICINE
Payer: COMMERCIAL

## 2017-06-12 DIAGNOSIS — M25.511 ACUTE PAIN OF RIGHT SHOULDER: ICD-10-CM

## 2017-06-12 PROCEDURE — 73221 MRI JOINT UPR EXTREM W/O DYE: CPT | Mod: RT

## 2017-06-13 DIAGNOSIS — M75.121 COMPLETE TEAR OF RIGHT ROTATOR CUFF: ICD-10-CM

## 2017-07-14 ENCOUNTER — TELEPHONE (OUTPATIENT)
Dept: CARDIOLOGY | Facility: MEDICAL CENTER | Age: 62
End: 2017-07-14

## 2017-07-14 NOTE — Clinical Note
PROCEDURE/SURGERY CLEARANCE FORM      Encounter Date: 7/14/2017    Patient: Abimael Hamilton  YOB: 1955    CARDIOLOGIST:  Alejandro Fuentes M.D.    REFERRING DOCTOR:  Mg Campos M.D.    PATIENT DOES NOT HAVE A PPM OR AICD    The above patient is cleared from a cardiology standpoint at moderate risk  to have the following procedure/surgery:  right shoulder arthroscopy, labral debridement, subacromial decompression, rotator cuff repair, biceps tenodesis/open subpectoral tenodesis                           MD Signature   Alejandro Fuentes M.D.

## 2017-07-14 NOTE — TELEPHONE ENCOUNTER
Received a clearance request from [facility] for the patient to have a right shoulder arthroscopy, labral debridement, subacromial decompression, rotator cuff repair, biceps tenodesis/open subpectoral tenodesis with Dr. Mg Campos.     Forwarded to Dr. Alejandro Fuentes, please advise. Thank you.    IAN HASSAN

## 2017-07-19 NOTE — TELEPHONE ENCOUNTER
Clearance letter faxed to [facility] at fax #[phone].    Notified patient of clearance via [employer].    IAN HASSAN

## 2017-08-10 DIAGNOSIS — I10 ESSENTIAL HYPERTENSION: ICD-10-CM

## 2017-08-10 DIAGNOSIS — I25.10 CORONARY ARTERY DISEASE DUE TO CALCIFIED CORONARY LESION: ICD-10-CM

## 2017-08-10 DIAGNOSIS — I25.84 CORONARY ARTERY DISEASE DUE TO CALCIFIED CORONARY LESION: ICD-10-CM

## 2017-08-14 RX ORDER — LISINOPRIL 5 MG/1
TABLET ORAL
Qty: 30 TAB | Refills: 11 | Status: SHIPPED | OUTPATIENT
Start: 2017-08-14 | End: 2018-09-13

## 2017-08-14 RX ORDER — CLOPIDOGREL BISULFATE 75 MG/1
TABLET ORAL
Qty: 30 TAB | Refills: 11 | Status: SHIPPED | OUTPATIENT
Start: 2017-08-14 | End: 2018-09-11 | Stop reason: SDUPTHER

## 2017-08-14 RX ORDER — METOPROLOL TARTRATE 50 MG/1
TABLET, FILM COATED ORAL
Qty: 60 TAB | Refills: 11 | Status: SHIPPED | OUTPATIENT
Start: 2017-08-14 | End: 2017-11-07

## 2017-09-01 DIAGNOSIS — Z01.810 PRE-OPERATIVE CARDIOVASCULAR EXAMINATION: ICD-10-CM

## 2017-09-01 DIAGNOSIS — Z01.812 PRE-OPERATIVE LABORATORY EXAMINATION: ICD-10-CM

## 2017-09-01 LAB
ALBUMIN SERPL BCP-MCNC: 3.4 G/DL (ref 3.2–4.9)
ALBUMIN/GLOB SERPL: 1 G/DL
ALP SERPL-CCNC: 71 U/L (ref 30–99)
ALT SERPL-CCNC: 27 U/L (ref 2–50)
ANION GAP SERPL CALC-SCNC: 9 MMOL/L (ref 0–11.9)
AST SERPL-CCNC: 30 U/L (ref 12–45)
BILIRUB SERPL-MCNC: 0.6 MG/DL (ref 0.1–1.5)
BUN SERPL-MCNC: 20 MG/DL (ref 8–22)
CALCIUM SERPL-MCNC: 9.4 MG/DL (ref 8.5–10.5)
CHLORIDE SERPL-SCNC: 106 MMOL/L (ref 96–112)
CO2 SERPL-SCNC: 26 MMOL/L (ref 20–33)
CREAT SERPL-MCNC: 1.1 MG/DL (ref 0.5–1.4)
EKG IMPRESSION: NORMAL
ERYTHROCYTE [DISTWIDTH] IN BLOOD BY AUTOMATED COUNT: 45.5 FL (ref 35.9–50)
GFR SERPL CREATININE-BSD FRML MDRD: >60 ML/MIN/1.73 M 2
GLOBULIN SER CALC-MCNC: 3.5 G/DL (ref 1.9–3.5)
GLUCOSE SERPL-MCNC: 117 MG/DL (ref 65–99)
HCT VFR BLD AUTO: 42 % (ref 42–52)
HGB BLD-MCNC: 14.5 G/DL (ref 14–18)
MCH RBC QN AUTO: 31.8 PG (ref 27–33)
MCHC RBC AUTO-ENTMCNC: 34.5 G/DL (ref 33.7–35.3)
MCV RBC AUTO: 92.1 FL (ref 81.4–97.8)
PLATELET # BLD AUTO: 172 K/UL (ref 164–446)
PMV BLD AUTO: 9.6 FL (ref 9–12.9)
POTASSIUM SERPL-SCNC: 3.9 MMOL/L (ref 3.6–5.5)
PROT SERPL-MCNC: 6.9 G/DL (ref 6–8.2)
RBC # BLD AUTO: 4.56 M/UL (ref 4.7–6.1)
SODIUM SERPL-SCNC: 141 MMOL/L (ref 135–145)
WBC # BLD AUTO: 8.5 K/UL (ref 4.8–10.8)

## 2017-09-01 PROCEDURE — 93010 ELECTROCARDIOGRAM REPORT: CPT | Performed by: INTERNAL MEDICINE

## 2017-09-01 PROCEDURE — 93005 ELECTROCARDIOGRAM TRACING: CPT

## 2017-09-01 PROCEDURE — 85027 COMPLETE CBC AUTOMATED: CPT

## 2017-09-01 PROCEDURE — 36415 COLL VENOUS BLD VENIPUNCTURE: CPT

## 2017-09-01 PROCEDURE — 80053 COMPREHEN METABOLIC PANEL: CPT

## 2017-09-01 RX ORDER — INDOMETHACIN 50 MG/1
50 CAPSULE ORAL 3 TIMES DAILY
COMMUNITY
End: 2018-11-14 | Stop reason: SDUPTHER

## 2017-09-01 NOTE — OR NURSING
PreAdmit Appointment: Patient given Preparing for your procedure handout. Patient instructed to continue regularly prescribed medications through day before surgery. Instructed to take the following medications the day of surgery with a sip of water per Anesthesia protocol: Metoprolol.

## 2017-09-01 NOTE — OR NURSING
Reviewed patient's medical history with Dr Mccord, based upon information available, Dr Mccord requested CMP added to labs.

## 2017-09-05 ENCOUNTER — HOSPITAL ENCOUNTER (OUTPATIENT)
Facility: MEDICAL CENTER | Age: 62
End: 2017-09-05
Attending: ORTHOPAEDIC SURGERY | Admitting: ORTHOPAEDIC SURGERY
Payer: COMMERCIAL

## 2017-09-05 VITALS
HEIGHT: 66 IN | OXYGEN SATURATION: 94 % | DIASTOLIC BLOOD PRESSURE: 93 MMHG | TEMPERATURE: 96.8 F | HEART RATE: 70 BPM | SYSTOLIC BLOOD PRESSURE: 184 MMHG | WEIGHT: 211.64 LBS | RESPIRATION RATE: 16 BRPM | BODY MASS INDEX: 34.01 KG/M2

## 2017-09-05 PROCEDURE — 502000 HCHG MISC OR IMPLANTS RC 0278: Performed by: ORTHOPAEDIC SURGERY

## 2017-09-05 PROCEDURE — 160029 HCHG SURGERY MINUTES - 1ST 30 MINS LEVEL 4: Performed by: ORTHOPAEDIC SURGERY

## 2017-09-05 PROCEDURE — 700101 HCHG RX REV CODE 250

## 2017-09-05 PROCEDURE — 700111 HCHG RX REV CODE 636 W/ 250 OVERRIDE (IP)

## 2017-09-05 PROCEDURE — 160048 HCHG OR STATISTICAL LEVEL 1-5: Performed by: ORTHOPAEDIC SURGERY

## 2017-09-05 PROCEDURE — 500151 HCHG CANNULA, THRDED 8.4: Performed by: ORTHOPAEDIC SURGERY

## 2017-09-05 PROCEDURE — 160036 HCHG PACU - EA ADDL 30 MINS PHASE I: Performed by: ORTHOPAEDIC SURGERY

## 2017-09-05 PROCEDURE — 160047 HCHG PACU  - EA ADDL 30 MINS PHASE II: Performed by: ORTHOPAEDIC SURGERY

## 2017-09-05 PROCEDURE — 502581 HCHG PACK, SHOULDER ARTHROSCOPY: Performed by: ORTHOPAEDIC SURGERY

## 2017-09-05 PROCEDURE — 700105 HCHG RX REV CODE 258: Performed by: ORTHOPAEDIC SURGERY

## 2017-09-05 PROCEDURE — 500028 HCHG ARTHROWAND TURBOVAC 3.5/90 SUCT.: Performed by: ORTHOPAEDIC SURGERY

## 2017-09-05 PROCEDURE — 160046 HCHG PACU - 1ST 60 MINS PHASE II: Performed by: ORTHOPAEDIC SURGERY

## 2017-09-05 PROCEDURE — 160035 HCHG PACU - 1ST 60 MINS PHASE I: Performed by: ORTHOPAEDIC SURGERY

## 2017-09-05 PROCEDURE — 700102 HCHG RX REV CODE 250 W/ 637 OVERRIDE(OP)

## 2017-09-05 PROCEDURE — 160041 HCHG SURGERY MINUTES - EA ADDL 1 MIN LEVEL 4: Performed by: ORTHOPAEDIC SURGERY

## 2017-09-05 PROCEDURE — 160009 HCHG ANES TIME/MIN: Performed by: ORTHOPAEDIC SURGERY

## 2017-09-05 PROCEDURE — 160025 RECOVERY II MINUTES (STATS): Performed by: ORTHOPAEDIC SURGERY

## 2017-09-05 PROCEDURE — 160002 HCHG RECOVERY MINUTES (STAT): Performed by: ORTHOPAEDIC SURGERY

## 2017-09-05 PROCEDURE — 501336 HCHG SHAVER: Performed by: ORTHOPAEDIC SURGERY

## 2017-09-05 PROCEDURE — 502240 HCHG MISC OR SUPPLY RC 0272: Performed by: ORTHOPAEDIC SURGERY

## 2017-09-05 PROCEDURE — 160022 HCHG BLOCK: Performed by: ORTHOPAEDIC SURGERY

## 2017-09-05 PROCEDURE — 500123 HCHG BOVIE, CONTROL W/BLADE: Performed by: ORTHOPAEDIC SURGERY

## 2017-09-05 PROCEDURE — A9270 NON-COVERED ITEM OR SERVICE: HCPCS

## 2017-09-05 PROCEDURE — 501838 HCHG SUTURE GENERAL: Performed by: ORTHOPAEDIC SURGERY

## 2017-09-05 PROCEDURE — A6402 STERILE GAUZE <= 16 SQ IN: HCPCS | Performed by: ORTHOPAEDIC SURGERY

## 2017-09-05 DEVICE — SUTURE ANCHOR HEALICOIL REGENESORB 5.5MM: Type: IMPLANTABLE DEVICE | Status: FUNCTIONAL

## 2017-09-05 DEVICE — SUTURE ANCHOR 2.8MM: Type: IMPLANTABLE DEVICE | Status: FUNCTIONAL

## 2017-09-05 RX ORDER — ONDANSETRON 2 MG/ML
INJECTION INTRAMUSCULAR; INTRAVENOUS
Status: COMPLETED
Start: 2017-09-05 | End: 2017-09-05

## 2017-09-05 RX ORDER — OXYCODONE HCL 5 MG/5 ML
SOLUTION, ORAL ORAL
Status: COMPLETED
Start: 2017-09-05 | End: 2017-09-05

## 2017-09-05 RX ORDER — EPINEPHRINE 1 MG/ML(1)
AMPUL (ML) INJECTION
Status: DISCONTINUED | OUTPATIENT
Start: 2017-09-05 | End: 2017-09-05 | Stop reason: HOSPADM

## 2017-09-05 RX ORDER — LIDOCAINE HYDROCHLORIDE AND EPINEPHRINE 10; 10 MG/ML; UG/ML
INJECTION, SOLUTION INFILTRATION; PERINEURAL
Status: DISCONTINUED | OUTPATIENT
Start: 2017-09-05 | End: 2017-09-05 | Stop reason: HOSPADM

## 2017-09-05 RX ORDER — SODIUM CHLORIDE, SODIUM LACTATE, POTASSIUM CHLORIDE, CALCIUM CHLORIDE 600; 310; 30; 20 MG/100ML; MG/100ML; MG/100ML; MG/100ML
1000 INJECTION, SOLUTION INTRAVENOUS
Status: DISCONTINUED | OUTPATIENT
Start: 2017-09-05 | End: 2017-09-05 | Stop reason: HOSPADM

## 2017-09-05 RX ORDER — GABAPENTIN 300 MG/1
CAPSULE ORAL
Status: COMPLETED
Start: 2017-09-05 | End: 2017-09-05

## 2017-09-05 RX ORDER — ACETAMINOPHEN 500 MG
TABLET ORAL
Status: COMPLETED
Start: 2017-09-05 | End: 2017-09-05

## 2017-09-05 RX ORDER — CELECOXIB 200 MG/1
CAPSULE ORAL
Status: COMPLETED
Start: 2017-09-05 | End: 2017-09-05

## 2017-09-05 RX ORDER — LIDOCAINE HYDROCHLORIDE 10 MG/ML
INJECTION, SOLUTION INFILTRATION; PERINEURAL
Status: COMPLETED
Start: 2017-09-05 | End: 2017-09-05

## 2017-09-05 RX ADMIN — GABAPENTIN 600 MG: 300 CAPSULE ORAL at 12:20

## 2017-09-05 RX ADMIN — ONDANSETRON 4 MG: 2 INJECTION INTRAMUSCULAR; INTRAVENOUS at 17:11

## 2017-09-05 RX ADMIN — LIDOCAINE HYDROCHLORIDE 0.1 ML: 10 INJECTION, SOLUTION INFILTRATION; PERINEURAL at 12:20

## 2017-09-05 RX ADMIN — OXYCODONE HYDROCHLORIDE: 5 SOLUTION ORAL at 16:48

## 2017-09-05 RX ADMIN — SODIUM CHLORIDE, POTASSIUM CHLORIDE, SODIUM LACTATE AND CALCIUM CHLORIDE 1000 ML: 600; 310; 30; 20 INJECTION, SOLUTION INTRAVENOUS at 12:20

## 2017-09-05 RX ADMIN — ACETAMINOPHEN 1000 MG: 500 TABLET, COATED ORAL at 12:20

## 2017-09-05 RX ADMIN — CELECOXIB 400 MG: 200 CAPSULE ORAL at 12:20

## 2017-09-05 ASSESSMENT — PAIN SCALES - GENERAL
PAINLEVEL_OUTOF10: 6
PAINLEVEL_OUTOF10: ASSUMED PAIN PRESENT
PAINLEVEL_OUTOF10: ASSUMED PAIN PRESENT
PAINLEVEL_OUTOF10: 8
PAINLEVEL_OUTOF10: 0
PAINLEVEL_OUTOF10: 8

## 2017-09-05 NOTE — OP REPORT
DATE OF SERVICE:  09/05/2017    SURGEON:  Mg Campos MD    ASSISTANT:  Nina Morales PA-C    ANESTHESIA:  General anesthesia with single shot interscalene block.    PREOPERATIVE DIAGNOSES:  1.  Right shoulder large full-thickness rotator cuff tear.  2.  Right shoulder synovitis.  3.  Right shoulder labral tear.  4.  Right shoulder biceps tenosynovitis and intraarticular tearing.  5.  Right shoulder subacromial impingement/bursitis.    POSTOPERATIVE DIAGNOSES:  1.  Right shoulder large full-thickness rotator cuff tear.  2.  Right shoulder synovitis.  3.  Right shoulder labral tear.  4.  Right shoulder biceps tenosynovitis and intraarticular tearing.  5.  Right shoulder subacromial impingement/bursitis.    PROCEDURES PERFORMED:  1.  Right shoulder arthroscopy.  2.  Right shoulder synovectomy.  3.  Right shoulder labral debridement.  4.  Right shoulder biceps tenotomy and open subpectoral tenodesis.  5.  Right shoulder subacromial decompression.  6.  Right shoulder rotator cuff repair.    INDICATIONS:  Treat right shoulder rotator cuff injury, improve pain and   improve function.    IMPLANTS:  1.  Smith and Nephew 5.5 mm triple loaded Healicoil anchor x4.  2.  Garner and Nephew 2.8 mm Q-Fix x1.    HISTORY OF PRESENT ILLNESS:  The patient is a very pleasant and active   61-year-old male who experienced a fall off a ladder directly onto his right   shoulder approximately 4 months ago.  The patient immediately had significant   pain and weakness and an MRI demonstrated a large full-thickness tear of the   rotator cuff.  As a result, we discussed surgical intervention.  The patient   has been n.p.o. since midnight.  He is medically cleared for surgery by the   anesthesia team.    INFORMED CONSENT:  The patient was informed of the risks, benefits, and   alternatives of planned operation.  The risks include but not limited to   bleeding, infection, neurovascular damage, recurrent rotator cuff tear,    osteoarthritis/cartilage damage, pain, stiffness, DVT, PE, MI, stroke, and   death.  Advanced directives were reviewed.  After answering all questions, the   patient elected to proceed with the planned operation and an informed consent   form was signed.    DESCRIPTION OF PROCEDURE:  The patient was identified in the preoperative   holding area.  The correct procedural side and site were identified and   marked.  The patient was then brought to the operating room and transferred to   the operating room table.  He received a single shot interscalene block by   the anesthesia team followed by general anesthesia.    Examination of the right shoulder demonstrated no overlying skin lesions,   abrasions, or lacerations.  Passive forward flexion, abduction was to 160.    With the arm at the side, passive external rotation was to 50 degrees.  With   the arm at 90 degrees of abduction, passive external rotation was at 90   degrees and passive internal rotation was at 25 degrees.    The patient was then carefully placed in the beachchair position with all bony   prominences being well padded.  The right shoulder was then cleaned with   several alcohol-soaked gauzes and the subacromial space injected with 30 mL of   1% lidocaine with epinephrine.  The right upper extremity was then prepped   and draped in the normal standard sterile fashion.    A procedural pause was then performed by the operating room team.  The   procedure, the patient's identity, the operative side, surgical site, and the   procedure were verified.  The patient was given IV antibiotics prior to   incision.    I then began with a diagnostic arthroscopy via standard posterior and anterior   portals.  There was extensive diffuse synovitis throughout the glenohumeral   joint.  There was some pretty significant tearing of the superior and anterior   labrum.  No obvious tears of the posterior and inferior labrum.  No obvious   loose bodies or soft tissue  debris within the inferior axillary recess.    Examination of the biceps demonstrated extensive erythema along its superior   border as well as evidence of intraarticular tearing.  There was just some   intermittent grade II changes over the glenoid, but no significant cartilage   pathology of the humeral head.  Examination of the subscapularis demonstrated   a little bit of free edge tearing along its superior border, but its insertion   of the lesser tuberosity appeared to be intact.  Examination of the articular   side of the rotator cuff demonstrated a large full-thickness tear of the   entire supraspinatus and the superior fibers of the infraspinatus.    Examination of the subacromial space demonstrated some extensive inflammation   and synovitis of the subacromial bursa.  There was a very prominent   anterolateral acromial spur.  Examination of the bursa of the rotator cuff   demonstrated a large full-thickness tear of the supraspinatus and superior   fibers of the infraspinatus.  The tear measured about 3 cm in the   anterior/posterior direction and retraction to the glenoid phase.    Given the findings at the time of the diagnostic arthroscopy, I elected to   proceed with the planned operation consisting of a right shoulder arthroscopy,   synovectomy, labral debridement, biceps tenotomy, open subpectoral tenodesis,   subacromial decompression and rotator cuff repair.    I first turned my attention to the synovitis of the glenohumeral joint.  This   was debrided with the use of the oscillating suction shaver device and   electrocautery wand.  I then debrided the free edge tearing of the superior   and anterior labrum.  A tenotomy of the biceps was then performed at the   insertion onto the superior labrum and the tendon was allowed to retract   distally out of the joint.  The remaining stump of tissue was then debrided.    All instruments were then removed.  A 2.5 cm incision was made over the   anteromedial  aspect of the proximal humerus.  Dissection was carried down to   the subcutaneous tissues and the overlying fascia was incised.  The biceps   tendon was identified and retracted out of the wound.  The proximal tendon was   then excised leaving about 1.5 cm of tendon in place.  A single 2.8 mm Q-Fix   Smith and Nephew anchor was then placed onto the anterior aspect of the   proximal humerus immediately posterior to the pectoralis major tendon.  The   biceps was then secured resulting in recreation of the normal length-tension   relationship.  The wound was then copiously irrigated and meticulous   hemostasis obtained.  All gloves were then changed.    I then placed the trocar and cannula back into the posterior portal into the   subacromial space, followed by the arthroscope.  Once in the subacromial   space, an accessory portal off the lateral aspect of the acromion was   immediately created under arthroscopic visualization.  A full bursectomy was   then performed.  The undersurface of the acromion was identified and   anterolateral acromioplasty was performed utilizing a cutting block technique.    I then turned my attention to the rotator cuff tear.  I first debrided the   tuberosity of all remaining soft tissue.  I then created a more anterolateral   portal and placed an 8.25 mm cannula.  I then percutaneously placed four 5.5   mm triple loaded Healicoil Garner and Nephew anchors onto the lateral aspect of   the greater tuberosity.  All sutures were then passed through the rotator   cuff, immediately lateral to the myotendinous junction.  An additional 5 holes   were then punched into the medial aspect of the tuberosity with the use of   the awl in hopes of improving biological healing.  The sutures were then   sequentially tied resulting in excellent fixation of the rotator cuff back to   its native footprint on the greater tuberosity.  The oscillating suction   shaver device was then placed back into the  glenohumeral joint and subacromial   space, removing the potential soft tissue or bony debris.  Meticulous   hemostasis obtained.  All instruments were then removed.    All incisions were then copiously irrigated.  The portal incisions were closed   with simple 3-0 Prolene suture followed by Steri-Strips.  The tenodesis wound   was then closed in a layered fashion with interrupted 2-0 Monocryl, followed   by running 3-0 Prolene and Steri-Strips.  Xeroform was placed over all   incisions followed by sterile compressive dressing.  The patient was then   placed in a well-padded abduction sling.    Needle and sponge counts were correct at the end of the procedure by the   circulating nurse.  The patient tolerated the procedure well with no obvious   intraoperative complications.  The patient was then transferred off the   operating room table onto the regular hospital bed and was extubated by the   anesthesia team.    ESTIMATED BLOOD LOSS:  5 mL    COMPLICATIONS:  None.    TOURNIQUET TIME:  None.    SPECIMENS:  None.    WOUND TYPE:  Type 1 clean.    POSTOPERATIVE PLAN:  The patient will be attended by the postoperative unit.    I expect he will be discharged from the hospital later this afternoon once   mobilizing safely and tolerating all medications.  The patient will remain   nonweightbearing with the right upper extremity for the next 6 weeks.  We will   initiate physical therapy at that time.  No indication for pharmacological   DVT prophylaxis.  The patient will restart his Plavix in approximately 3-4   days.       ____________________________________     MD NEO Marquez / SYDNEY    DD:  09/05/2017 15:37:12  DT:  09/05/2017 16:45:03    D#:  [id number]  Job#:  [id number]

## 2017-09-05 NOTE — OR NURSING
1537: To PACU post right shoulder arthroscopy w/ open biceps tenodesis w/ interscalene and suprascapular blocks. OPA in place, breathing w/o issue. Strong pulse noted.  1557: OPA dc'd, breathing is spontaneous and unlabored. Able to sense touch to, and move fingers. Denies pain or nausea.  1633: Report given to CLARK Richardson RN.

## 2017-09-05 NOTE — OR NURSING
1633: Received report from Lazarus, assumed care of patient.  Patient states on a scale of 1-10 he's at an 8, but states this is a tolerable level.    1645: Patient given IS and instructed on use.  Patient's O2 sitting between 90-93%  1655: Called report to MO Warren.  1657: Patient's pain tolerable, meets criteria for transfer to stage two.

## 2017-09-06 NOTE — OR NURSING
1657- Pt to stage 2, dressed with assist and up to recliner.  Tolerated well. R shoulder dressing cdi, immobilizer in place.  CMS intact to R hand/fingers.  1700- Pt c/o nausea. Plan to medicate- see mar.  1717- nausea starting to resolve, tolerating ice chips. Wife to stage 2.  1730- DC instructions given to pt and wife, verbalized understanding.  Pt states still a little nauseated, would like to rest a little longer.  1820- Pt DC'd home to wife via w/c to private vehicle.

## 2017-09-06 NOTE — DISCHARGE INSTRUCTIONS
ACTIVITY: Rest and take it easy for the first 24 hours.  A responsible adult is recommended to remain with you during that time.  It is normal to feel sleepy.  We encourage you to not do anything that requires balance, judgment or coordination.    MILD FLU-LIKE SYMPTOMS ARE NORMAL. YOU MAY EXPERIENCE GENERALIZED MUSCLE ACHES, THROAT IRRITATION, HEADACHE AND/OR SOME NAUSEA.    FOR 24 HOURS DO NOT:  Drive, operate machinery or run household appliances.  Drink beer or alcoholic beverages.   Make important decisions or sign legal documents.    SPECIAL INSTRUCTIONS: Follow Dr. Campos's printed instructions.    DIET: To avoid nausea, slowly advance diet as tolerated, avoiding spicy or greasy foods for the first day.  Add more substantial food to your diet according to your physician's instructions.    INCREASE FLUIDS AND FIBER TO AVOID CONSTIPATION.    SURGICAL DRESSING/BATHING: Follow Dr. Campos's printed instructions.    FOLLOW-UP APPOINTMENT:  A follow-up appointment should be arranged with your doctor in 7-10 days; call to schedule.    You should CALL YOUR PHYSICIAN if you develop:  Fever greater than 101 degrees F.  Pain not relieved by medication, or persistent nausea or vomiting.  Excessive bleeding (blood soaking through dressing) or unexpected drainage from the wound.  Extreme redness or swelling around the incision site, drainage of pus or foul smelling drainage.  Inability to urinate or empty your bladder within 8 hours.    You should call 911 if you develop problems with breathing or chest pain.    If you are unable to contact your doctor or surgical center, you should go to the nearest emergency room or urgent care center.      Physician's telephone #: Dr. Campos [phone]    If any questions arise, call your doctor.    If your doctor is not available, please feel free to call the Surgical Center at [phone].  The Center is open Monday through Friday from 7AM to 7PM.      A registered  nurse may call you a few days after your surgery to see how you are doing after your procedure.    MEDICATIONS: Resume taking daily medication.  Take prescribed pain medication with food.  If no medication is prescribed, you may take non-aspirin pain medication if needed.  PAIN MEDICATION CAN BE VERY CONSTIPATING.  Take a stool softener or laxative such as senokot, pericolace, or milk of magnesia if needed.    Prescription given pre op.      Last pain medication given at _____4:48pm (Oxycodone 5mg).    If your physician has prescribed pain medication that includes Acetaminophen (Tylenol), do not take additional Acetaminophen (Tylenol) while taking the prescribed medication.    Peripheral Nerve Block Discharge Instructions from Same Day Surgery and Inpatient :    What to Expect - Upper Extremity  · You may experience numbness and weakness in shoulder, arm and hand  on the same side as your surgery  · This is normal. For some people, this may be an unpleasant sensation. Be very careful with your numb limb  · Ask for help when you need it  Shoulder Surgery Side Effects  · In addition to numbness and weakness you may experience other symptoms  · Other nerves that are close to those nerves injected can also be affected by local anesthesia  · You may experience a hoarseness in your voice  · Your breathing may feel different  · You may also notice drooping of your eyelid, pupil constriction, and decreased sweating, on the side of your surgery  · All of these side effects are normal and will resolve when the local anesthetic wears off   Prevent Injury  · Protect the limb like a baby  · Beware of exposing your limb to extreme heat or cold or trauma  · The limb may be injured without you noticing because it is numb  · Keep the limb elevated whenever possible  · Do not sleep on the limb  · Change the position of the limb regularly  · Avoid putting pressure on your surgical limb  Pain Control  · The initial block on the day of  "surgery will make your extremity feel \"numb\"  · Any consecutive injection including prior to discharge from the hospital will make your extremity feel \"numb\"  · You may feel an aching or burning when the local anesthesia starts to wear off  · Take pain pills as prescribed by your surgeon  · Call your surgeon or anesthesiologist if you do not have adequate pain control      Depression / Suicide Risk    As you are discharged from this Critical access hospital facility, it is important to learn how to keep safe from harming yourself.    Recognize the warning signs:  · Abrupt changes in personality, positive or negative- including increase in energy   · Giving away possessions  · Change in eating patterns- significant weight changes-  positive or negative  · Change in sleeping patterns- unable to sleep or sleeping all the time   · Unwillingness or inability to communicate  · Depression  · Unusual sadness, discouragement and loneliness  · Talk of wanting to die  · Neglect of personal appearance   · Rebelliousness- reckless behavior  · Withdrawal from people/activities they love  · Confusion- inability to concentrate     If you or a loved one observes any of these behaviors or has concerns about self-harm, here's what you can do:  · Talk about it- your feelings and reasons for harming yourself  · Remove any means that you might use to hurt yourself (examples: pills, rope, extension cords, firearm)  · Get professional help from the community (Mental Health, Substance Abuse, psychological counseling)  · Do not be alone:Call your Safe Contact- someone whom you trust who will be there for you.  · Call your local CRISIS HOTLINE 172-1114 or 203-970-5531  · Call your local Children's Mobile Crisis Response Team [place] (897) 692-9645 or www.Validic  · Call the toll free National Suicide Prevention Hotlines   · National Suicide Prevention Lifeline 726-946-JPJN (5012)  National Hope Line Network 800-SUICIDE (216-3892)    ·   "

## 2017-09-11 ENCOUNTER — APPOINTMENT (OUTPATIENT)
Dept: MEDICAL GROUP | Facility: LAB | Age: 62
End: 2017-09-11
Payer: COMMERCIAL

## 2017-11-07 ENCOUNTER — OFFICE VISIT (OUTPATIENT)
Dept: CARDIOLOGY | Facility: MEDICAL CENTER | Age: 62
End: 2017-11-07
Payer: COMMERCIAL

## 2017-11-07 ENCOUNTER — TELEPHONE (OUTPATIENT)
Dept: CARDIOLOGY | Facility: MEDICAL CENTER | Age: 62
End: 2017-11-07

## 2017-11-07 VITALS
BODY MASS INDEX: 36 KG/M2 | WEIGHT: 224 LBS | HEIGHT: 66 IN | DIASTOLIC BLOOD PRESSURE: 80 MMHG | OXYGEN SATURATION: 94 % | HEART RATE: 84 BPM | SYSTOLIC BLOOD PRESSURE: 110 MMHG

## 2017-11-07 DIAGNOSIS — Z78.9 STATIN INTOLERANCE: ICD-10-CM

## 2017-11-07 DIAGNOSIS — Z95.1 HX OF CABG: ICD-10-CM

## 2017-11-07 DIAGNOSIS — Z95.3 S/P AORTIC VALVE REPLACEMENT WITH BIOPROSTHETIC VALVE: ICD-10-CM

## 2017-11-07 DIAGNOSIS — E78.5 DYSLIPIDEMIA: Primary | ICD-10-CM

## 2017-11-07 DIAGNOSIS — E78.5 DYSLIPIDEMIA: ICD-10-CM

## 2017-11-07 DIAGNOSIS — E66.9 OBESITY (BMI 30-39.9): ICD-10-CM

## 2017-11-07 DIAGNOSIS — I25.10 CORONARY ARTERY DISEASE INVOLVING NATIVE CORONARY ARTERY OF NATIVE HEART WITHOUT ANGINA PECTORIS: ICD-10-CM

## 2017-11-07 DIAGNOSIS — I25.810 CORONARY ATHEROSCLEROSIS OF AUTOLOGOUS VEIN BYPASS GRAFT WITHOUT ANGINA: ICD-10-CM

## 2017-11-07 PROCEDURE — 99214 OFFICE O/P EST MOD 30 MIN: CPT | Performed by: INTERNAL MEDICINE

## 2017-11-07 RX ORDER — METOPROLOL SUCCINATE 50 MG/1
50 TABLET, EXTENDED RELEASE ORAL DAILY
Qty: 90 TAB | Refills: 3 | Status: SHIPPED | OUTPATIENT
Start: 2017-11-07 | End: 2018-12-05 | Stop reason: SDUPTHER

## 2017-11-07 ASSESSMENT — ENCOUNTER SYMPTOMS: CARDIOVASCULAR NEGATIVE: 1

## 2017-11-07 NOTE — LETTER
Name:          Abimael Hamilton   YOB: 1955  Date:     11/7/2017      Martha Madrid M.D.  [address]  [place] [address]     Alejandro Fuentes MD  [address]  [place] [address]  Phone: [phone]  Back Line: [phone]  Fax: [phone]  E-mail: [email or web address]   Dear Dr. Madrid,    We had the pleasure of seeing your patient, Abimael Hamilton, in Cardiology Clinic at [facility] today.    As you know, he is a 61-year-old man with a history of 5 vessel CABG in 1998, and re-do 3-V CABG in 12/2005 as well as bioprosthetic AVR at that time. He has gout, hypertension, dyslipidemia, obesity, and intolerance to statins.    He continues to do well from a cardiovascular perspective with no complaints though his lipids are quite abnormal. He for some reason was denied upon our previous request for initiation of a PCSK-9 inhibitor. Our office has initiated the approval process again as I think he is an excellent candidate for one of those medications. His blood pressure otherwise is reasonably controlled, and his lifestyle modification has been disrupted by some pain and loss of range of motion in the aftermath of rotator cuff surgery with that being reevaluated by orthopedics.    Return in about 1 year (around 11/7/2018).    Thank you for the referral and please do not hesitate to contact me at any time. My contact information is listed above.    This note was dictated using Dragon speech recognition software.     A full note including my physical examination and a full list of rectified medications is available in our medical record, and can be faxed as well.    Alejandro Fuentes MD  Cardiologist  [facility]

## 2017-11-07 NOTE — TELEPHONE ENCOUNTER
Received instructions from Dr. Alejandro Fuentes to prescribe Repatha for the patient.    Rx called in to [facility].    IAN HASSAN

## 2017-11-07 NOTE — PROGRESS NOTES
Subjective:   Abimael Hamilton is a 61 -year-old man with a history of 5 vessel CABG in 1998, and re-do 3-V CABG in 12/2005 as well as bioprosthetic AVR at that time. He has gout, hypertension, dyslipidemia, obesity, and intolerance to statins.    He is doing well with no cardiovascular complaints.    He has had a bit of an unfortunate postsurgical course after rotator cuff repair with some recent weakness and loss of range of motion accompanied by a bit of pain. His initial surgery was successful without complication especially from a cardiovascular perspective.    He was denied for reasons that are unclear to me clearance for his PCSK-9 inhibitor. He again details with me severe myalgias that he describes as paralysis from atorvastatin with similar symptoms that were a bit more delayed after previously having trialed lovastatin. He is absolutely against given the severity of his former symptoms initiation of any statin.    Past Medical History:   Diagnosis Date   • Arthritis    • Benign hypertensive heart disease without heart failure 11/14/2011   • CAD (coronary artery disease)    • Coronary atherosclerosis of autologous vein bypass graft 11/14/2011   • Gout    • Heart valve disease    • High cholesterol    • History of pancreatitis    • Hypertension    • Muscle cramps 10/4/2011   • Myocardial infarct     Multiple MI's, 1998, 2015   • Obesity 11/14/2011   • Pain     Joints   • Postsurgical aortocoronary bypass status 7/26/2016    Status post redo 3-V CABG in [place] 12/2015: SVG-acute marginal, SVG sequential to LAD, and OM    • Renal disorder     Hx of Acute renal failure r/t medication/statins   • S/P aortic valve replacement with bioprosthetic valve 7/26/2016    #23 [place] Magna AVR (bioprosthetic)    • Statin intolerance 7/26/2016   • Status post cardiac revascularization with bypass aortocoronary anastomosis of five coronary vessels 7/26/2016    5-V CABG done in 1998 (done in [place] at [facility]), patent LIMA  to LAD, occluded SVG-OM, occluded SVG-RCA by cardiac catheterization 11/15/2007 with other vein grafts not identified at that time, poor targets for revascularization and declined repeat CABG in 2007 by [facility]. No ischemic was identified by MPI 11/17/07 with an EF of 50%.       Past Surgical History:   Procedure Laterality Date   • ARTHROSCOPIC LABRAL DEBRIDEMENT Right 9/5/2017    Procedure: ARTHROSCOPIC LABRAL DEBRIDEMENT;  Surgeon: Mg Cmapos M.D.;  Location: [facility];  Service: Orthopedics   • BIOPSY GENERAL      buttock lesion   • LAMINOTOMY  12/2004    Diskectomy L4-L5, L5-S1   • MULTIPLE CORONARY ARTERY BYPASS  11/11/1998    5 way bypass   • MULTIPLE CORONARY ARTERY BYPASS  12/08/2015    3 way bypass & Bovine valve   • SHOULDER ARTHROSCOPY W/ BICIPITAL TENODESIS REPAIR Right 9/5/2017    Procedure: SHOULDER ARTHROSCOPY W/ BICIPITAL TENODESIS REPAIR;  Surgeon: Mg Campos M.D.;  Location: [facility];  Service: Orthopedics   • SHOULDER ARTHROSCOPY W/ ROTATOR CUFF REPAIR Right 9/5/2017    Procedure: SHOULDER ARTHROSCOPY W/ ROTATOR CUFF REPAIR;  Surgeon: Mg Campos M.D.;  Location: [facility];  Service: Orthopedics   • SHOULDER DECOMPRESSION ARTHROSCOPIC Right 9/5/2017    Procedure: SHOULDER DECOMPRESSION ARTHROSCOPIC SUBACROMIAL;  Surgeon: Mg Campos M.D.;  Location: [facility];  Service: Orthopedics   • SYNOVECTOMY Right 9/5/2017    Procedure: SYNOVECTOMY;  Surgeon: Mg Campos M.D.;  Location: [facility];  Service: Orthopedics     Family History   Problem Relation Age of Onset   • Heart Attack Father      MI and CABG, unknown age   • Cancer Mother      Breast   • Heart Disease Brother    • Stroke Neg Hx    • Diabetes Neg Hx    • Lung Disease Neg Hx      History   Smoking Status   • Former Smoker   • Packs/day: 3.00   • Years: 20.00   • Types: Cigarettes   • Quit date: 1/1/1992  "  Smokeless Tobacco   • Never Used     Allergies   Allergen Reactions   • Gemfibrozil    • Lipitor [Atorvastatin Calcium] Unspecified     Severe Muscle cramps, dehydration   • Tricor Unspecified     Dehydration, severe muscle cramps     Outpatient Encounter Prescriptions as of 11/7/2017   Medication Sig Dispense Refill   • metoprolol SR (TOPROL XL) 50 MG TABLET SR 24 HR Take 1 Tab by mouth every day. 90 Tab 3   • indomethacin (INDOCIN) 50 MG Cap Take 50 mg by mouth 3 times a day.     • clopidogrel (PLAVIX) 75 MG Tab TAKE ONE TABLET BY MOUTH ONE TIME DAILY 30 Tab 11   • lisinopril (PRINIVIL) 5 MG Tab TAKE ONE TABLET BY MOUTH ONE TIME DAILY 30 Tab 11   • magnesium oxide (MAG-OX) 400 MG Tab Take 400 mg by mouth every day.     • aspirin 81 MG EC tablet Take 1 Tab by mouth every day. 30 Tab 11   • Red Yeast Rice 600 MG Cap Take 1 Cap by mouth every day. 90 Cap 3   • allopurinol (ZYLOPRIM) 100 MG TABS Take 1 Tab by mouth every day. 90 Tab 3   • Glucosamine-Chondroit-Vit C-Mn (GLUCOSAMINE CHONDROITIN COMPLX) CAPS Take 2 Caps by mouth every day.     • [DISCONTINUED] metoprolol (LOPRESSOR) 50 MG Tab TAKE ONE TABLET BY MOUTH TWICE DAILY (Patient taking differently: one tablet daily) 60 Tab 11   • [DISCONTINUED] Evolocumab, REPATHA, 140 MG/ML Solution Auto-injector Inject 1 Each as instructed every 14 days. 6 PEN 3   • nitroglycerin (NITROSTAT) 0.4 MG SL Tab Place 1 Tab under tongue as needed for Chest Pain. 25 Tab 0   • Omega-3 Fatty Acids (OMEGA-3 FISH OIL PO) Take  by mouth.       No facility-administered encounter medications on file as of 11/7/2017.      Review of Systems   Cardiovascular: Negative.         Infrequent angina, took one nitroglycerin since our last appointment   Musculoskeletal: Positive for joint pain (with gout).   All other systems reviewed and are negative.       Objective:   /80   Pulse 84   Ht 1.676 m (5' 6\")   Wt 101.6 kg (224 lb)   SpO2 94%   BMI 36.15 kg/m²     Physical Exam "   Constitutional: He is oriented to person, place, and time. He appears well-developed and well-nourished. No distress.   Pleasant, reasonably forward, obese, middle-aged man in no distress   HENT:   Head: Normocephalic and atraumatic.   Eyes: Conjunctivae and EOM are normal. Pupils are equal, round, and reactive to light. No scleral icterus.   Neck: Neck supple. No JVD present. No tracheal deviation present.   Cardiovascular: Normal rate, regular rhythm, S2 normal and intact distal pulses.  Exam reveals no gallop and no friction rub.    Murmur heard.   Crescendo decrescendo systolic murmur is present with a grade of 2/6   Pulses:       Dorsalis pedis pulses are 2+ on the right side, and 2+ on the left side.       No carotid bruits   Pulmonary/Chest: Effort normal and breath sounds normal. No stridor. No respiratory distress. He has no wheezes. He has no rales.   Abdominal: Soft. Bowel sounds are normal. He exhibits no distension.   Musculoskeletal: He exhibits no edema.   Neurological: He is alert and oriented to person, place, and time.   Skin: Skin is warm and dry. No rash noted. He is not diaphoretic. No erythema. No pallor.   Tattoos noted on his forearms including Nicole    Psychiatric: He has a normal mood and affect. Judgment and thought content normal.   Vitals reviewed.    Lab Results   Component Value Date/Time    WBC 8.5 09/01/2017 04:37 PM    RBC 4.56 (L) 09/01/2017 04:37 PM    HEMOGLOBIN 14.5 09/01/2017 04:37 PM    HEMATOCRIT 42.0 09/01/2017 04:37 PM    MCV 92.1 09/01/2017 04:37 PM    MCH 31.8 09/01/2017 04:37 PM    MCHC 34.5 09/01/2017 04:37 PM    MPV 9.6 09/01/2017 04:37 PM        Lab Results   Component Value Date/Time    SODIUM 141 09/01/2017 04:37 PM    POTASSIUM 3.9 09/01/2017 04:37 PM    CHLORIDE 106 09/01/2017 04:37 PM    CO2 26 09/01/2017 04:37 PM    GLUCOSE 117 (H) 09/01/2017 04:37 PM    BUN 20 09/01/2017 04:37 PM    CREATININE 1.10 09/01/2017 04:37 PM    BUNCREATRAT 16 07/29/2016 10:22 AM  "       Lab Results   Component Value Date/Time    ASTSGOT 30 09/01/2017 04:37 PM    ALTSGPT 27 09/01/2017 04:37 PM        Lab Results   Component Value Date/Time    CHOLSTRLTOT 247 (H) 04/27/2017 08:16 AM     (H) 04/27/2017 08:16 AM    HDL 45 04/27/2017 08:16 AM    TRIGLYCERIDE 141 04/27/2017 08:16 AM       Echocardiogram, 8/4/2016:  \"CONCLUSIONS  Normal left ventricular size and systolic function, EF 60%.  Mild concentric left ventricular hypertrophy.  Mild mitral regurgitation.  Normally functioning bovine bioprosthetic aortic valve.   Normal right sided pressures.  No prior studies for comparison\"    Assessment:     1. Dyslipidemia     2. Coronary atherosclerosis of autologous vein bypass graft without angina  metoprolol SR (TOPROL XL) 50 MG TABLET SR 24 HR   3. S/P aortic valve replacement with bioprosthetic valve     4. Obesity (BMI 30-39.9)     5. Statin intolerance         Medical Decision Making:  Today's Assessment / Status / Plan:     He continues to do well from a cardiovascular perspective with no complaints though his lipids are quite abnormal. He for some reason was denied upon our previous request for initiation of a PCSK-9 inhibitor. Our office has initiated the approval process again as I think he is an excellent candidate for one of those medications. His blood pressure otherwise is reasonably controlled, and his lifestyle modification has been disrupted by some pain and loss of range of motion in the aftermath of rotator cuff surgery with that being reevaluated by orthopedics.    Alejandro Fuentes MD  Cardiologist, [facility] [place]     Return in about 1 year (around 11/7/2018).    "

## 2017-11-08 NOTE — TELEPHONE ENCOUNTER
PAR pending and in process:  CATHERINE MORELOS Long: [id number] - PA Case ID: [id number] - Rx #: [id number]   Status  Sent to [facility]  DrugRepatha SureClick 140MG/ML auto-injectors  FormCaremark Electronic PA Form (NCPDP)  Original Claim Info75 For help: [id number]

## 2017-11-15 ENCOUNTER — TELEPHONE (OUTPATIENT)
Dept: CARDIOLOGY | Facility: MEDICAL CENTER | Age: 62
End: 2017-11-15

## 2017-11-15 DIAGNOSIS — E78.5 DYSLIPIDEMIA: ICD-10-CM

## 2017-11-15 NOTE — TELEPHONE ENCOUNTER
----- Message from Kay Howard, Med Ass't sent at 11/15/2017  2:15 PM PST -----  Regarding: updated LDL labs  Working on an appeal for the patient's Repatha  Please order updated labs, the one on file is too old  Thank you

## 2017-11-21 ENCOUNTER — HOSPITAL ENCOUNTER (OUTPATIENT)
Dept: LAB | Facility: MEDICAL CENTER | Age: 62
End: 2017-11-21
Attending: FAMILY MEDICINE
Payer: COMMERCIAL

## 2017-11-21 DIAGNOSIS — E78.2 MIXED HYPERLIPIDEMIA: ICD-10-CM

## 2017-11-21 DIAGNOSIS — Z13.1 SCREENING FOR DIABETES MELLITUS: ICD-10-CM

## 2017-11-21 DIAGNOSIS — R73.03 PREDIABETES: ICD-10-CM

## 2017-11-21 DIAGNOSIS — Z00.00 HEALTH MAINTENANCE EXAMINATION: ICD-10-CM

## 2017-11-21 LAB
ALBUMIN SERPL BCP-MCNC: 4.3 G/DL (ref 3.2–4.9)
ALBUMIN/GLOB SERPL: 1.4 G/DL
ALP SERPL-CCNC: 73 U/L (ref 30–99)
ALT SERPL-CCNC: 40 U/L (ref 2–50)
ANION GAP SERPL CALC-SCNC: 10 MMOL/L (ref 0–11.9)
AST SERPL-CCNC: 44 U/L (ref 12–45)
BILIRUB SERPL-MCNC: 1 MG/DL (ref 0.1–1.5)
BUN SERPL-MCNC: 18 MG/DL (ref 8–22)
CALCIUM SERPL-MCNC: 9.7 MG/DL (ref 8.5–10.5)
CHLORIDE SERPL-SCNC: 106 MMOL/L (ref 96–112)
CHOLEST SERPL-MCNC: 237 MG/DL (ref 100–199)
CO2 SERPL-SCNC: 23 MMOL/L (ref 20–33)
CREAT SERPL-MCNC: 0.91 MG/DL (ref 0.5–1.4)
EST. AVERAGE GLUCOSE BLD GHB EST-MCNC: 123 MG/DL
GFR SERPL CREATININE-BSD FRML MDRD: >60 ML/MIN/1.73 M 2
GLOBULIN SER CALC-MCNC: 3.1 G/DL (ref 1.9–3.5)
GLUCOSE SERPL-MCNC: 111 MG/DL (ref 65–99)
HBA1C MFR BLD: 5.9 % (ref 0–5.6)
HDLC SERPL-MCNC: 35 MG/DL
LDLC SERPL CALC-MCNC: 151 MG/DL
POTASSIUM SERPL-SCNC: 4.2 MMOL/L (ref 3.6–5.5)
PROT SERPL-MCNC: 7.4 G/DL (ref 6–8.2)
SODIUM SERPL-SCNC: 139 MMOL/L (ref 135–145)
TRIGL SERPL-MCNC: 255 MG/DL (ref 0–149)

## 2017-11-21 PROCEDURE — 83036 HEMOGLOBIN GLYCOSYLATED A1C: CPT

## 2017-11-21 PROCEDURE — 80053 COMPREHEN METABOLIC PANEL: CPT

## 2017-11-21 PROCEDURE — 36415 COLL VENOUS BLD VENIPUNCTURE: CPT

## 2017-11-21 PROCEDURE — 80061 LIPID PANEL: CPT

## 2017-11-28 ENCOUNTER — OFFICE VISIT (OUTPATIENT)
Dept: MEDICAL GROUP | Facility: LAB | Age: 62
End: 2017-11-28
Payer: COMMERCIAL

## 2017-11-28 VITALS
DIASTOLIC BLOOD PRESSURE: 84 MMHG | HEART RATE: 70 BPM | HEIGHT: 66 IN | WEIGHT: 223 LBS | OXYGEN SATURATION: 96 % | SYSTOLIC BLOOD PRESSURE: 124 MMHG | TEMPERATURE: 98.2 F | RESPIRATION RATE: 16 BRPM | BODY MASS INDEX: 35.84 KG/M2

## 2017-11-28 DIAGNOSIS — M75.121 COMPLETE TEAR OF RIGHT ROTATOR CUFF: ICD-10-CM

## 2017-11-28 DIAGNOSIS — Z95.1 POSTSURGICAL AORTOCORONARY BYPASS STATUS: ICD-10-CM

## 2017-11-28 DIAGNOSIS — I25.10 CORONARY ARTERY DISEASE INVOLVING NATIVE CORONARY ARTERY OF NATIVE HEART WITHOUT ANGINA PECTORIS: ICD-10-CM

## 2017-11-28 DIAGNOSIS — I25.810 CORONARY ATHEROSCLEROSIS OF AUTOLOGOUS VEIN BYPASS GRAFT WITHOUT ANGINA: ICD-10-CM

## 2017-11-28 DIAGNOSIS — E78.5 DYSLIPIDEMIA: ICD-10-CM

## 2017-11-28 DIAGNOSIS — Z95.3 S/P AORTIC VALVE REPLACEMENT WITH BIOPROSTHETIC VALVE: ICD-10-CM

## 2017-11-28 DIAGNOSIS — Z78.9 STATIN INTOLERANCE: ICD-10-CM

## 2017-11-28 DIAGNOSIS — I11.9 BENIGN HYPERTENSIVE HEART DISEASE WITHOUT HEART FAILURE: ICD-10-CM

## 2017-11-28 DIAGNOSIS — R73.03 PREDIABETES: ICD-10-CM

## 2017-11-28 DIAGNOSIS — Z12.11 SCREENING FOR MALIGNANT NEOPLASM OF COLON: ICD-10-CM

## 2017-11-28 DIAGNOSIS — Z87.19 HISTORY OF PANCREATITIS: ICD-10-CM

## 2017-11-28 DIAGNOSIS — E66.9 OBESITY (BMI 30-39.9): ICD-10-CM

## 2017-11-28 DIAGNOSIS — Z00.00 MEDICARE ANNUAL WELLNESS VISIT, INITIAL: ICD-10-CM

## 2017-11-28 DIAGNOSIS — Z95.1 STATUS POST CARDIAC REVASCULARIZATION WITH BYPASS AORTOCORONARY ANASTOMOSIS OF FIVE CORONARY VESSELS: ICD-10-CM

## 2017-11-28 DIAGNOSIS — M1A.9XX0 CHRONIC GOUT WITHOUT TOPHUS, UNSPECIFIED CAUSE, UNSPECIFIED SITE: ICD-10-CM

## 2017-11-28 PROCEDURE — G0439 PPPS, SUBSEQ VISIT: HCPCS | Performed by: FAMILY MEDICINE

## 2017-11-28 ASSESSMENT — PATIENT HEALTH QUESTIONNAIRE - PHQ9: CLINICAL INTERPRETATION OF PHQ2 SCORE: 0

## 2017-11-28 NOTE — PROGRESS NOTES
Chief Complaint   Patient presents with   • Annual Exam         HPI:  Abimael is a 61 y.o. here for Medicare Annual Wellness Visit     Patient Active Problem List    Diagnosis Date Noted   • Coronary atherosclerosis of autologous vein bypass graft 11/14/2011     Priority: High   • Benign hypertensive heart disease without heart failure 11/14/2011     Priority: High   • Complete tear of right rotator cuff 06/13/2017   • Obesity (BMI 30-39.9) 05/25/2017   • Prediabetes 09/15/2016   • Gout    • History of pancreatitis    • Status post cardiac revascularization with bypass aortocoronary anastomosis of five coronary vessels 07/26/2016   • Postsurgical aortocoronary bypass status 07/26/2016   • Statin intolerance 07/26/2016   • S/P aortic valve replacement with bioprosthetic valve 07/26/2016   • CAD (coronary artery disease) 07/30/2012   • Dyslipidemia 07/30/2012       Current medicines including changes today:   Current Outpatient Prescriptions   Medication Sig Dispense Refill   • metoprolol SR (TOPROL XL) 50 MG TABLET SR 24 HR Take 1 Tab by mouth every day. 90 Tab 3   • indomethacin (INDOCIN) 50 MG Cap Take 50 mg by mouth 3 times a day.     • clopidogrel (PLAVIX) 75 MG Tab TAKE ONE TABLET BY MOUTH ONE TIME DAILY 30 Tab 11   • lisinopril (PRINIVIL) 5 MG Tab TAKE ONE TABLET BY MOUTH ONE TIME DAILY 30 Tab 11   • Omega-3 Fatty Acids (OMEGA-3 FISH OIL PO) Take  by mouth.     • magnesium oxide (MAG-OX) 400 MG Tab Take 400 mg by mouth every day.     • aspirin 81 MG EC tablet Take 1 Tab by mouth every day. 30 Tab 11   • Red Yeast Rice 600 MG Cap Take 1 Cap by mouth every day. 90 Cap 3   • allopurinol (ZYLOPRIM) 100 MG TABS Take 1 Tab by mouth every day. 90 Tab 3   • Glucosamine-Chondroit-Vit C-Mn (GLUCOSAMINE CHONDROITIN COMPLX) CAPS Take 2 Caps by mouth every day.     • nitroglycerin (NITROSTAT) 0.4 MG SL Tab Place 1 Tab under tongue as needed for Chest Pain. 25 Tab 0     No current facility-administered medications for this  visit.         The patient reports adherence to this regimen   Current supplements as per medication list.   Chronic narcotic pain medicines: no     Allergies: Gemfibrozil; Lipitor [atorvastatin calcium]; and Tricor    Current social contact/activities: , motorcycle   Exercise: 1.5 hrs of cardio at the gym at least 5 days a week    He  reports that he quit smoking about 25 years ago. His smoking use included Cigarettes. He has a 60.00 pack-year smoking history. He has never used smokeless tobacco. He reports that he drinks alcohol. He reports that he uses drugs, including Marijuana and Inhaled.  Counseling given: Not Answered        DPA/Advanced directive: No    ROS:    Gait: Uses no assistive device   Ostomy: no   Other tubes: no   Amputations: no   Chronic oxygen use no   Last eye exam yearly   : Denies incontinence.     Screening:  Depression Screening    Little interest or pleasure in doing things?  0 - not at all  Feeling down, depressed , or hopeless? 0 - not at all  Patient Health Questionnaire Score: 0     If depressive symptoms identified deferred to follow up visit unless specifically addressed in assessment and plan.    Interpretation of PHQ-9 Total Score   Score Severity   1-4 No Depression   5-9 Mild Depression   10-14 Moderate Depression   15-19 Moderately Severe Depression   20-27 Severe Depression    Screening for Cognitive Impairment    Three Minute Recall (apple, watch, abel)  3/3    Draw clock face with all 12 numbers set to the hand to show 10 minutes past 11 o'clock  1    Cognitive concerns identified deferred for follow up unless specifically addressed in assessment and plan.    Fall Risk Assessment    Has the patient had two or more falls in the last year or any fall with injury in the last year?  Yes    Safety Assessment    Throw rugs on floor.  Yes  Handrails on all stairs.  No  Good lighting in all hallways.  Yes  Difficulty hearing.  No  Patient counseled about all safety risks  that were identified.    Functional Assessment ADLs    Are there any barriers preventing you from cooking for yourself or meeting nutritional needs?  No.    Are there any barriers preventing you from driving safely or obtaining transportation?  No.    Are there any barriers preventing you from using a telephone or calling for help?  No.    Are there any barriers preventing you from shopping?  No.    Are there any barriers preventing you from taking care of your own finances?  No.    Are there any barriers preventing you from managing your medications?  No.    Are currently engaging any exercise or physical activity?  Yes.       Health Maintenance Summary                Annual Wellness Visit Overdue 1955     COLONOSCOPY Overdue 12/5/2006     IMM INFLUENZA Overdue 9/1/2017     IMM ZOSTER VACCINE Postponed 5/25/2027 Originally 12/5/2016. Patient Refused    IMM DTaP/Tdap/Td Vaccine Next Due 6/7/2027      Done 6/7/2017 Imm Admin: Tdap Vaccine          Patient Care Team:  Martha Madrid M.D. as PCP - General (Family Medicine)  Alejandro Fuentes M.D. as Consulting Physician (Cardiology)  Lloyd Stallworth M.D. as Consulting Physician (Surgery)  Mg Campos M.D. as Consulting Physician (Orthopaedics)      Social History   Substance Use Topics   • Smoking status: Former Smoker     Packs/day: 3.00     Years: 20.00     Types: Cigarettes     Quit date: 1/1/1992   • Smokeless tobacco: Never Used   • Alcohol use Yes      Comment: 4 per month     Family History   Problem Relation Age of Onset   • Heart Attack Father      MI and CABG, unknown age   • Cancer Mother      Breast   • Heart Disease Brother    • Stroke Neg Hx    • Diabetes Neg Hx    • Lung Disease Neg Hx      He  has a past medical history of Arthritis; Benign hypertensive heart disease without heart failure (11/14/2011); CAD (coronary artery disease); Coronary atherosclerosis of autologous vein bypass graft (11/14/2011); Gout; Heart valve disease;  "High cholesterol; History of pancreatitis; Hypertension; Muscle cramps (10/4/2011); Myocardial infarct; Obesity (11/14/2011); Pain; Postsurgical aortocoronary bypass status (7/26/2016); Renal disorder; S/P aortic valve replacement with bioprosthetic valve (7/26/2016); Statin intolerance (7/26/2016); and Status post cardiac revascularization with bypass aortocoronary anastomosis of five coronary vessels (7/26/2016).   Past Surgical History:   Procedure Laterality Date   • SHOULDER DECOMPRESSION ARTHROSCOPIC Right 9/5/2017    Procedure: SHOULDER DECOMPRESSION ARTHROSCOPIC SUBACROMIAL;  Surgeon: Mg Campos M.D.;  Location: [facility];  Service: Orthopedics   • ARTHROSCOPIC LABRAL DEBRIDEMENT Right 9/5/2017    Procedure: ARTHROSCOPIC LABRAL DEBRIDEMENT;  Surgeon: Mg Campos M.D.;  Location: [facility];  Service: Orthopedics   • SHOULDER ARTHROSCOPY W/ ROTATOR CUFF REPAIR Right 9/5/2017    Procedure: SHOULDER ARTHROSCOPY W/ ROTATOR CUFF REPAIR;  Surgeon: Mg Campos M.D.;  Location: [facility];  Service: Orthopedics   • SHOULDER ARTHROSCOPY W/ BICIPITAL TENODESIS REPAIR Right 9/5/2017    Procedure: SHOULDER ARTHROSCOPY W/ BICIPITAL TENODESIS REPAIR;  Surgeon: Mg Campos M.D.;  Location: [facility];  Service: Orthopedics   • SYNOVECTOMY Right 9/5/2017    Procedure: SYNOVECTOMY;  Surgeon: Mg Campos M.D.;  Location: [facility];  Service: Orthopedics   • MULTIPLE CORONARY ARTERY BYPASS  12/08/2015    3 way bypass & Bovine valve   • LAMINOTOMY  12/2004    Diskectomy L4-L5, L5-S1   • MULTIPLE CORONARY ARTERY BYPASS  11/11/1998    5 way bypass   • BIOPSY GENERAL      buttock lesion           Exam:     Blood pressure 124/84, pulse 70, temperature 36.8 °C (98.2 °F), resp. rate 16, height 1.676 m (5' 6\"), weight 101.2 kg (223 lb), SpO2 96 %. Body mass index is 35.99 kg/m².    Hearing good.    Dentition " good  Alert, oriented in no acute distress.  Eye contact is good, speech goal directed, affect calm  CV: RRR, murmur present    Assessment and Plan. The following treatment and monitoring plan is recommended:     1. Medicare annual wellness visit, initial  HRA reviewed  Advanced directive paperwork given  Records requested [facility]  Fit test given  Declines flu shot  - Annual Wellness Visit - Includes PPPS Subsequent ()    2. Complete tear of right rotator cuff  This is chronic.   Patient currently seeing Dr. Campos   Surgery in September, re-injured and plans for repeat surgery  - Annual Wellness Visit - Includes PPPS Subsequent ()    3. Coronary atherosclerosis of autologous vein bypass graft without angina  4. Benign hypertensive heart disease without heart failure  5. Postsurgical aortocoronary bypass status  6. S/P aortic valve replacement with bioprosthetic valve  7. Status post cardiac revascularization with bypass aortocoronary anastomosis of five coronary vessels  8. Coronary artery disease involving native coronary artery of native heart without angina pectoris  Chronic, follows with Dr. Fuentes. He is status post 2 open heart surgeries. He is on aspirin, Plavix. Not on a statin due to intolerance. No chest pain  - Annual Wellness Visit - Includes PPPS Subsequent ()    9. Dyslipidemia  10. Statin intolerance  Chronic, labs reviewed with the patient today. Not currently on a statin. He is not on injectables either because he declines this.  - Annual Wellness Visit - Includes PPPS Subsequent ()    11. Chronic gout without tophus, unspecified cause, unspecified site  Chronic, last flare was in September. He has had a 40 pound weight gain  - Annual Wellness Visit - Includes PPPS Subsequent ()    12. Obesity (BMI 30-39.9)  Chronic,    worsening, 40 pound weight gain   Counseled on diet and exercise  - Patient identified as having weight  management issue.  Appropriate orders and counseling given.  - Annual Wellness Visit - Includes PPPS Subsequent ()    13. History of pancreatitis  Chronic, patient is no longer drinking alcohol, no recent flares  - Annual Wellness Visit - Includes PPPS Subsequent ()    14. Prediabetes  Chronic, most recent hemoglobin A1c 5.9%. Counseled on diet and exercise and weight loss. We will have him return in 6 months and recheck a hemoglobin A1c at that time  - Annual Wellness Visit - Includes PPPS Subsequent ()    15. Screening for malignant neoplasm of colon  [facility]  - Annual Wellness Visit - Includes PPPS Subsequent ()  - OCCULT BLOOD FECES IMMUNOASSAY (FIT); Future        Services needed: None  Health Care Screening recommendations as per orders if indicated.  Referrals offered: PT/OT/Nutrition counseling/Behavioral Health/Smoking cessation as per orders if indicated.    Discussion today about general wellness and lifestyle habits:    · Prevent falls and reduce trip hazards; Cautioned about securing or removing rugs.  · Have a working fire alarm and carbon monoxide detector;   · Engage in regular physical activity and social activities        Follow-up: Return in about 6 months (around 5/28/2018) for prediabetes.  5 YEAR PLAN:  Flu vaccine advised every year.  Colon cancer screening per GI recommendation .

## 2017-11-29 ENCOUNTER — TELEPHONE (OUTPATIENT)
Dept: CARDIOLOGY | Facility: MEDICAL CENTER | Age: 62
End: 2017-11-29

## 2017-11-29 NOTE — TELEPHONE ENCOUNTER
S/w pt, discussed lab results per Dr Fuentes, pt verbalizes understanding    FYI to Kay as she is working to appeal on pt's Repatha

## 2017-11-29 NOTE — TELEPHONE ENCOUNTER
----- Message from Courtney Stallworth R.N. sent at 11/29/2017  9:45 AM PST -----      ----- Message -----  From: Alejandro Fuentes M.D.  Sent: 11/29/2017   8:31 AM  To: Perla Townsend R.N.    Renal function is normal as are electrolytes. Cholesterol continues to be quite elevated. I believe we are looking into getting him approved for PCSK-9 inhibitor. If not, we should.    TABITHA RAMIREZ

## 2018-03-09 ENCOUNTER — HOSPITAL ENCOUNTER (OUTPATIENT)
Facility: MEDICAL CENTER | Age: 63
End: 2018-03-09
Attending: NURSE PRACTITIONER
Payer: COMMERCIAL

## 2018-03-09 ENCOUNTER — OFFICE VISIT (OUTPATIENT)
Dept: MEDICAL GROUP | Facility: PHYSICIAN GROUP | Age: 63
End: 2018-03-09
Payer: COMMERCIAL

## 2018-03-09 VITALS
SYSTOLIC BLOOD PRESSURE: 130 MMHG | RESPIRATION RATE: 16 BRPM | HEART RATE: 68 BPM | DIASTOLIC BLOOD PRESSURE: 84 MMHG | BODY MASS INDEX: 36.87 KG/M2 | HEIGHT: 66 IN | TEMPERATURE: 97.8 F | OXYGEN SATURATION: 98 % | WEIGHT: 229.4 LBS

## 2018-03-09 DIAGNOSIS — R10.9 ACUTE LEFT FLANK PAIN: ICD-10-CM

## 2018-03-09 DIAGNOSIS — R31.0 GROSS HEMATURIA: ICD-10-CM

## 2018-03-09 LAB
APPEARANCE UR: CLEAR
BILIRUB UR STRIP-MCNC: NEGATIVE MG/DL
COLOR UR AUTO: NORMAL
GLUCOSE UR STRIP.AUTO-MCNC: NEGATIVE MG/DL
KETONES UR STRIP.AUTO-MCNC: NORMAL MG/DL
LEUKOCYTE ESTERASE UR QL STRIP.AUTO: NEGATIVE
NITRITE UR QL STRIP.AUTO: NEGATIVE
PH UR STRIP.AUTO: 5 [PH] (ref 5–8)
PROT UR QL STRIP: NORMAL MG/DL
RBC UR QL AUTO: NORMAL
SP GR UR STRIP.AUTO: 1.02
UROBILINOGEN UR STRIP-MCNC: NEGATIVE MG/DL

## 2018-03-09 PROCEDURE — 87086 URINE CULTURE/COLONY COUNT: CPT

## 2018-03-09 PROCEDURE — 99214 OFFICE O/P EST MOD 30 MIN: CPT | Performed by: NURSE PRACTITIONER

## 2018-03-09 PROCEDURE — 81002 URINALYSIS NONAUTO W/O SCOPE: CPT | Performed by: NURSE PRACTITIONER

## 2018-03-09 ASSESSMENT — ENCOUNTER SYMPTOMS
NAUSEA: 0
NUMBNESS: 0
VOMITING: 0
WHEEZING: 0
SHORTNESS OF BREATH: 0
TINGLING: 0
COUGH: 0
FEVER: 0
BLOOD IN STOOL: 0
FLANK PAIN: 1
SENSORY CHANGE: 0
PALPITATIONS: 0
BACK PAIN: 1
ABDOMINAL PAIN: 0
DIZZINESS: 0
CHILLS: 0
BOWEL INCONTINENCE: 0
WEIGHT LOSS: 0

## 2018-03-09 NOTE — PROGRESS NOTES
Subjective:   Abimael Hamilton is a 62 y.o. male here today for back pain and blood in urine.    Dysuria    This is a new problem. The current episode started yesterday. The problem occurs intermittently. The problem has been waxing and waning. The patient is experiencing no pain. There has been no fever. Associated symptoms include flank pain and hematuria. Pertinent negatives include no chills, discharge, frequency, hesitancy, nausea, urgency or vomiting. There is no history of kidney stones or a urological procedure.   Back Pain   This is a new problem. Episode onset:  Reports increase in pain over the past week. He does have chronic low back pain for over the past 10 years. The problem has been gradually worsening since onset. Pain location: Right flank pain. Quality: Throbbing, aching. The pain radiates to the left thigh. Pain severity now: Moderate to severe. The symptoms are aggravated by position. Pertinent negatives include no abdominal pain, bladder incontinence, bowel incontinence, chest pain, dysuria, fever, numbness, tingling or weight loss. Risk factors include obesity, lack of exercise and sedentary lifestyle. Treatments tried: Marijuana, ice/heat, and opioid. The treatment provided mild relief.     Patient does have extensive smoking history for 20 years of approx 3 ppd.    Current medicines (including changes today)  Current Outpatient Prescriptions   Medication Sig Dispense Refill   • metoprolol SR (TOPROL XL) 50 MG TABLET SR 24 HR Take 1 Tab by mouth every day. 90 Tab 3   • indomethacin (INDOCIN) 50 MG Cap Take 50 mg by mouth 3 times a day.     • clopidogrel (PLAVIX) 75 MG Tab TAKE ONE TABLET BY MOUTH ONE TIME DAILY 30 Tab 11   • lisinopril (PRINIVIL) 5 MG Tab TAKE ONE TABLET BY MOUTH ONE TIME DAILY 30 Tab 11   • nitroglycerin (NITROSTAT) 0.4 MG SL Tab Place 1 Tab under tongue as needed for Chest Pain. 25 Tab 0   • Omega-3 Fatty Acids (OMEGA-3 FISH OIL PO) Take  by mouth.     • magnesium oxide  (MAG-OX) 400 MG Tab Take 400 mg by mouth every day.     • aspirin 81 MG EC tablet Take 1 Tab by mouth every day. 30 Tab 11   • Red Yeast Rice 600 MG Cap Take 1 Cap by mouth every day. 90 Cap 3   • allopurinol (ZYLOPRIM) 100 MG TABS Take 1 Tab by mouth every day. 90 Tab 3   • Glucosamine-Chondroit-Vit C-Mn (GLUCOSAMINE CHONDROITIN COMPLX) CAPS Take 2 Caps by mouth every day.       No current facility-administered medications for this visit.      He  has a past medical history of Arthritis; Benign hypertensive heart disease without heart failure (11/14/2011); CAD (coronary artery disease); Coronary atherosclerosis of autologous vein bypass graft (11/14/2011); Gout; Heart valve disease; High cholesterol; History of pancreatitis; Hypertension; Muscle cramps (10/4/2011); Myocardial infarct; Obesity (11/14/2011); Pain; Postsurgical aortocoronary bypass status (7/26/2016); Renal disorder; S/P aortic valve replacement with bioprosthetic valve (7/26/2016); Statin intolerance (7/26/2016); and Status post cardiac revascularization with bypass aortocoronary anastomosis of five coronary vessels (7/26/2016).    Social History     Social History   • Marital status:      Spouse name: N/A   • Number of children: N/A   • Years of education: N/A     Occupational History   • Not on file.     Social History Main Topics   • Smoking status: Former Smoker     Packs/day: 3.00     Years: 20.00     Types: Cigarettes     Quit date: 1/1/1992   • Smokeless tobacco: Never Used   • Alcohol use Yes      Comment: 4 per month   • Drug use: Yes     Types: Marijuana, Inhaled      Comment: Marijuana- Daily (4 times per day)   • Sexual activity: Yes     Partners: Female     Other Topics Concern   • Not on file     Social History Narrative   • No narrative on file       Review of Systems   Constitutional: Negative for chills, fever, malaise/fatigue and weight loss.   Respiratory: Negative for cough, shortness of breath and wheezing.   "  Cardiovascular: Negative for chest pain, palpitations and leg swelling.   Gastrointestinal: Negative for abdominal pain, blood in stool, bowel incontinence, nausea and vomiting.   Genitourinary: Positive for flank pain and hematuria. Negative for bladder incontinence, dysuria, frequency, hesitancy and urgency.   Musculoskeletal: Positive for back pain.   Neurological: Negative for dizziness, tingling, sensory change and numbness.        Objective:     Blood pressure 130/84, pulse 68, temperature 36.6 °C (97.8 °F), resp. rate 16, height 1.676 m (5' 6\"), weight 104.1 kg (229 lb 6.4 oz), SpO2 98 %. Body mass index is 37.03 kg/m².     Physical Exam:  Constitutional: Oriented to person, place, and time and well-developed, well-nourished, and in no distress.   HENT:   Head: Normocephalic and atraumatic.   Eyes: Conjunctivae and EOM are normal. Pupils are equal, round, and reactive to light.   Neck: Normal range of motion. Neck supple.   Cardiovascular: Normal rate, regular rhythm, normal heart sounds. Radial and pedal pulses intact. Exam reveals no friction rub. No murmur heard.  Pulmonary/Chest: Effort normal and breath sounds normal. No respiratory distress or use of accessory muscles. No wheezes, rhonchi, or rales.   Abdominal: Soft. Bowel sounds are normal. Exhibits no distension and no mass. There is no tenderness. No hepatosplenomegaly.    Musculoskeletal: Full range of motion. No deformity or swelling of joints. R CVA tenderness.  Neurological: Alert and oriented to person, place, and time. Gait normal.   Skin: Skin is warm and dry. No cyanosis. No edema.  Psychiatric: Mood, memory, affect and judgment normal.       Assessment and Plan:   The following treatment plan was discussed    1. Acute left flank pain  UA shows microscopic hematuria, trace protein, trace ketones, and increased specific gravity. Based on patient's extensive smoking history, order for abdominal CT to r/o renal calculus and bladder cancer. " Advised to increase fluid intake and take OTC ibuprofen as needed for pain.  - CT-ABDOMEN W/O; Future    2. Gross hematuria  - CT-ABDOMEN W/O; Future  - POCT Urinalysis  - URINE CULTURE(NEW); Future    Followup: Return in about 1 week (around 3/16/2018) for For follow-up on flank pain/hematuria.

## 2018-03-10 DIAGNOSIS — R31.0 GROSS HEMATURIA: ICD-10-CM

## 2018-03-11 ENCOUNTER — HOSPITAL ENCOUNTER (OUTPATIENT)
Dept: RADIOLOGY | Facility: MEDICAL CENTER | Age: 63
End: 2018-03-11
Attending: NURSE PRACTITIONER
Payer: COMMERCIAL

## 2018-03-11 DIAGNOSIS — R31.0 GROSS HEMATURIA: ICD-10-CM

## 2018-03-11 DIAGNOSIS — R10.9 ACUTE LEFT FLANK PAIN: ICD-10-CM

## 2018-03-11 PROCEDURE — 74176 CT ABD & PELVIS W/O CONTRAST: CPT

## 2018-03-12 DIAGNOSIS — N42.0 PROSTATE CALCULUS: ICD-10-CM

## 2018-03-12 LAB
BACTERIA UR CULT: NORMAL
SIGNIFICANT IND 70042: NORMAL
SITE SITE: NORMAL
SOURCE SOURCE: NORMAL

## 2018-03-29 ENCOUNTER — HOSPITAL ENCOUNTER (OUTPATIENT)
Dept: LAB | Facility: MEDICAL CENTER | Age: 63
End: 2018-03-29
Attending: UROLOGY
Payer: COMMERCIAL

## 2018-03-29 LAB — PSA SERPL-MCNC: 2.29 NG/ML (ref 0–4)

## 2018-03-29 PROCEDURE — 84153 ASSAY OF PSA TOTAL: CPT | Mod: GZ

## 2018-03-29 PROCEDURE — 36415 COLL VENOUS BLD VENIPUNCTURE: CPT | Mod: GZ

## 2018-06-06 DIAGNOSIS — R00.2 PALPITATIONS: ICD-10-CM

## 2018-06-06 RX ORDER — ALLOPURINOL 100 MG/1
100 TABLET ORAL DAILY
Qty: 90 TAB | Refills: 2 | Status: SHIPPED | OUTPATIENT
Start: 2018-06-06 | End: 2019-04-22 | Stop reason: SDUPTHER

## 2018-08-20 ENCOUNTER — APPOINTMENT (OUTPATIENT)
Dept: RADIOLOGY | Facility: MEDICAL CENTER | Age: 63
End: 2018-08-20
Attending: EMERGENCY MEDICINE
Payer: COMMERCIAL

## 2018-08-20 ENCOUNTER — APPOINTMENT (OUTPATIENT)
Dept: RADIOLOGY | Facility: MEDICAL CENTER | Age: 63
End: 2018-08-20
Payer: COMMERCIAL

## 2018-08-20 ENCOUNTER — HOSPITAL ENCOUNTER (EMERGENCY)
Facility: MEDICAL CENTER | Age: 63
End: 2018-08-21
Attending: EMERGENCY MEDICINE
Payer: COMMERCIAL

## 2018-08-20 DIAGNOSIS — R07.9 CHEST PAIN IN ADULT: ICD-10-CM

## 2018-08-20 DIAGNOSIS — L03.115 CELLULITIS OF RIGHT LEG WITHOUT FOOT: ICD-10-CM

## 2018-08-20 LAB
ALBUMIN SERPL BCP-MCNC: 4.2 G/DL (ref 3.2–4.9)
ALBUMIN/GLOB SERPL: 1.3 G/DL
ALP SERPL-CCNC: 66 U/L (ref 30–99)
ALT SERPL-CCNC: 39 U/L (ref 2–50)
ANION GAP SERPL CALC-SCNC: 7 MMOL/L (ref 0–11.9)
APTT PPP: 31 SEC (ref 24.7–36)
AST SERPL-CCNC: 37 U/L (ref 12–45)
BASOPHILS # BLD AUTO: 0.2 % (ref 0–1.8)
BASOPHILS # BLD: 0.02 K/UL (ref 0–0.12)
BILIRUB SERPL-MCNC: 1 MG/DL (ref 0.1–1.5)
BNP SERPL-MCNC: 59 PG/ML (ref 0–100)
BUN SERPL-MCNC: 15 MG/DL (ref 8–22)
CALCIUM SERPL-MCNC: 8.8 MG/DL (ref 8.4–10.2)
CHLORIDE SERPL-SCNC: 108 MMOL/L (ref 96–112)
CO2 SERPL-SCNC: 20 MMOL/L (ref 20–33)
CREAT SERPL-MCNC: 1.12 MG/DL (ref 0.5–1.4)
DEPRECATED D DIMER PPP IA-ACNC: 772 NG/ML(D-DU)
EKG IMPRESSION: NORMAL
EOSINOPHIL # BLD AUTO: 0.07 K/UL (ref 0–0.51)
EOSINOPHIL NFR BLD: 0.7 % (ref 0–6.9)
ERYTHROCYTE [DISTWIDTH] IN BLOOD BY AUTOMATED COUNT: 45.6 FL (ref 35.9–50)
GLOBULIN SER CALC-MCNC: 3.2 G/DL (ref 1.9–3.5)
GLUCOSE SERPL-MCNC: 113 MG/DL (ref 65–99)
HCT VFR BLD AUTO: 43.4 % (ref 42–52)
HGB BLD-MCNC: 15.1 G/DL (ref 14–18)
IMM GRANULOCYTES # BLD AUTO: 0.03 K/UL (ref 0–0.11)
IMM GRANULOCYTES NFR BLD AUTO: 0.3 % (ref 0–0.9)
INR PPP: 1.13 (ref 0.87–1.13)
LIPASE SERPL-CCNC: 35 U/L (ref 7–58)
LYMPHOCYTES # BLD AUTO: 1.36 K/UL (ref 1–4.8)
LYMPHOCYTES NFR BLD: 13.1 % (ref 22–41)
MCH RBC QN AUTO: 32.4 PG (ref 27–33)
MCHC RBC AUTO-ENTMCNC: 34.8 G/DL (ref 33.7–35.3)
MCV RBC AUTO: 93.1 FL (ref 81.4–97.8)
MONOCYTES # BLD AUTO: 0.71 K/UL (ref 0–0.85)
MONOCYTES NFR BLD AUTO: 6.8 % (ref 0–13.4)
NEUTROPHILS # BLD AUTO: 8.23 K/UL (ref 1.82–7.42)
NEUTROPHILS NFR BLD: 78.9 % (ref 44–72)
NRBC # BLD AUTO: 0 K/UL
NRBC BLD-RTO: 0 /100 WBC
PLATELET # BLD AUTO: 173 K/UL (ref 164–446)
PMV BLD AUTO: 9.4 FL (ref 9–12.9)
POTASSIUM SERPL-SCNC: 3.9 MMOL/L (ref 3.6–5.5)
PROT SERPL-MCNC: 7.4 G/DL (ref 6–8.2)
PROTHROMBIN TIME: 14.4 SEC (ref 12–14.6)
RBC # BLD AUTO: 4.66 M/UL (ref 4.7–6.1)
SODIUM SERPL-SCNC: 135 MMOL/L (ref 135–145)
TROPONIN I SERPL-MCNC: <0.02 NG/ML (ref 0–0.04)
WBC # BLD AUTO: 10.4 K/UL (ref 4.8–10.8)

## 2018-08-20 PROCEDURE — 93005 ELECTROCARDIOGRAM TRACING: CPT

## 2018-08-20 PROCEDURE — 85730 THROMBOPLASTIN TIME PARTIAL: CPT

## 2018-08-20 PROCEDURE — 80053 COMPREHEN METABOLIC PANEL: CPT

## 2018-08-20 PROCEDURE — 99285 EMERGENCY DEPT VISIT HI MDM: CPT

## 2018-08-20 PROCEDURE — 83690 ASSAY OF LIPASE: CPT

## 2018-08-20 PROCEDURE — 85025 COMPLETE CBC W/AUTO DIFF WBC: CPT

## 2018-08-20 PROCEDURE — 85379 FIBRIN DEGRADATION QUANT: CPT

## 2018-08-20 PROCEDURE — 93005 ELECTROCARDIOGRAM TRACING: CPT | Performed by: EMERGENCY MEDICINE

## 2018-08-20 PROCEDURE — 36415 COLL VENOUS BLD VENIPUNCTURE: CPT

## 2018-08-20 PROCEDURE — 71045 X-RAY EXAM CHEST 1 VIEW: CPT

## 2018-08-20 PROCEDURE — 84484 ASSAY OF TROPONIN QUANT: CPT

## 2018-08-20 PROCEDURE — 85610 PROTHROMBIN TIME: CPT

## 2018-08-20 PROCEDURE — 83880 ASSAY OF NATRIURETIC PEPTIDE: CPT

## 2018-08-20 PROCEDURE — 93971 EXTREMITY STUDY: CPT | Mod: RT

## 2018-08-20 ASSESSMENT — PAIN SCALES - GENERAL: PAINLEVEL_OUTOF10: 5

## 2018-08-21 VITALS
HEART RATE: 80 BPM | TEMPERATURE: 98 F | SYSTOLIC BLOOD PRESSURE: 139 MMHG | OXYGEN SATURATION: 96 % | RESPIRATION RATE: 18 BRPM | HEIGHT: 66 IN | WEIGHT: 233.25 LBS | DIASTOLIC BLOOD PRESSURE: 85 MMHG | BODY MASS INDEX: 37.49 KG/M2

## 2018-08-21 DIAGNOSIS — I25.810 CORONARY ATHEROSCLEROSIS OF AUTOLOGOUS VEIN BYPASS GRAFT WITHOUT ANGINA: Chronic | ICD-10-CM

## 2018-08-21 LAB
LACTATE BLD-SCNC: 1.1 MMOL/L (ref 0.5–2)
TROPONIN I SERPL-MCNC: <0.02 NG/ML (ref 0–0.04)

## 2018-08-21 PROCEDURE — 83605 ASSAY OF LACTIC ACID: CPT

## 2018-08-21 PROCEDURE — 700102 HCHG RX REV CODE 250 W/ 637 OVERRIDE(OP): Performed by: EMERGENCY MEDICINE

## 2018-08-21 PROCEDURE — 87040 BLOOD CULTURE FOR BACTERIA: CPT | Mod: 91

## 2018-08-21 PROCEDURE — A9270 NON-COVERED ITEM OR SERVICE: HCPCS | Performed by: EMERGENCY MEDICINE

## 2018-08-21 PROCEDURE — 700117 HCHG RX CONTRAST REV CODE 255: Performed by: EMERGENCY MEDICINE

## 2018-08-21 PROCEDURE — 84484 ASSAY OF TROPONIN QUANT: CPT

## 2018-08-21 PROCEDURE — 71275 CT ANGIOGRAPHY CHEST: CPT

## 2018-08-21 PROCEDURE — 93005 ELECTROCARDIOGRAM TRACING: CPT | Performed by: EMERGENCY MEDICINE

## 2018-08-21 RX ORDER — CEPHALEXIN 250 MG/1
500 CAPSULE ORAL ONCE
Status: COMPLETED | OUTPATIENT
Start: 2018-08-21 | End: 2018-08-21

## 2018-08-21 RX ORDER — SULFAMETHOXAZOLE AND TRIMETHOPRIM 800; 160 MG/1; MG/1
1 TABLET ORAL ONCE
Status: COMPLETED | OUTPATIENT
Start: 2018-08-21 | End: 2018-08-21

## 2018-08-21 RX ORDER — CEPHALEXIN 500 MG/1
500 CAPSULE ORAL 4 TIMES DAILY
Qty: 40 CAP | Refills: 0 | Status: SHIPPED | OUTPATIENT
Start: 2018-08-21 | End: 2018-08-31

## 2018-08-21 RX ORDER — SULFAMETHOXAZOLE AND TRIMETHOPRIM 800; 160 MG/1; MG/1
1 TABLET ORAL 2 TIMES DAILY
Qty: 20 TAB | Refills: 0 | Status: SHIPPED | OUTPATIENT
Start: 2018-08-21 | End: 2018-08-31

## 2018-08-21 RX ORDER — NITROGLYCERIN 0.4 MG/1
0.4 TABLET SUBLINGUAL PRN
Qty: 30 TAB | Refills: 5 | Status: SHIPPED | OUTPATIENT
Start: 2018-08-21 | End: 2020-09-28 | Stop reason: SDUPTHER

## 2018-08-21 RX ADMIN — CEPHALEXIN 500 MG: 250 CAPSULE ORAL at 01:14

## 2018-08-21 RX ADMIN — SULFAMETHOXAZOLE AND TRIMETHOPRIM 1 TABLET: 800; 160 TABLET ORAL at 01:14

## 2018-08-21 RX ADMIN — IOHEXOL 90 ML: 350 INJECTION, SOLUTION INTRAVENOUS at 01:18

## 2018-08-21 NOTE — ED PROVIDER NOTES
ED Provider Note    CHIEF COMPLAINT  Chief Complaint   Patient presents with   • Chest Pain   • Rash     R leg        HPI    Primary care provider: Martha Madrid M.D.   History obtained from: Patient and wife  History limited by: None     Abimael Hamilton is a 62 y.o. male who presents to the ED complaining of moderate to severe sharp pain across his chest since yesterday morning that has been constant and no improvement despite using his nitroglycerin.  He denies radiation of this pain or pain anywhere else.  He reports some associated nausea without vomiting.  He has had diarrhea as well.  He also reports some cough and shortness of breath.  No known fever.  He denies any dysuria.  He also noticed a rash on his right lower leg today without any known injury/trauma/inciting event.  He has not noticed anything that has helped the symptoms or made it worse.      REVIEW OF SYSTEMS  Please see HPI for pertinent positives/negatives.  All other systems reviewed and are negative.     PAST MEDICAL HISTORY  Past Medical History:   Diagnosis Date   • Status post cardiac revascularization with bypass aortocoronary anastomosis of five coronary vessels 7/26/2016    5-V CABG done in 1998 (done in [place] at [facility]), patent LIMA to LAD, occluded SVG-OM, occluded SVG-RCA by cardiac catheterization 11/15/2007 with other vein grafts not identified at that time, poor targets for revascularization and declined repeat CABG in 2007 by [facility]. No ischemic was identified by MPI 11/17/07 with an EF of 50%.     • Statin intolerance 7/26/2016   • S/P aortic valve replacement with bioprosthetic valve 7/26/2016    #23 [place] Magna AVR (bioprosthetic)    • Postsurgical aortocoronary bypass status 7/26/2016    Status post redo 3-V CABG in [place] 12/2015: SVG-acute marginal, SVG sequential to LAD, and OM    • Coronary atherosclerosis of autologous vein bypass graft 11/14/2011   • Benign hypertensive heart disease without heart failure  11/14/2011   • Obesity 11/14/2011   • Muscle cramps 10/4/2011   • Arthritis    • CAD (coronary artery disease)    • Gout    • Heart valve disease    • High cholesterol    • History of pancreatitis    • Hypertension    • Myocardial infarct (HCC)     Multiple MI's, 1998, 2015   • Pain     Joints   • Renal disorder     Hx of Acute renal failure r/t medication/statins        SURGICAL HISTORY  Past Surgical History:   Procedure Laterality Date   • SHOULDER DECOMPRESSION ARTHROSCOPIC Right 9/5/2017    Procedure: SHOULDER DECOMPRESSION ARTHROSCOPIC SUBACROMIAL;  Surgeon: Mg Campos M.D.;  Location: [facility];  Service: Orthopedics   • ARTHROSCOPIC LABRAL DEBRIDEMENT Right 9/5/2017    Procedure: ARTHROSCOPIC LABRAL DEBRIDEMENT;  Surgeon: Mg Campos M.D.;  Location: [facility];  Service: Orthopedics   • SHOULDER ARTHROSCOPY W/ ROTATOR CUFF REPAIR Right 9/5/2017    Procedure: SHOULDER ARTHROSCOPY W/ ROTATOR CUFF REPAIR;  Surgeon: Mg Campos M.D.;  Location: [facility];  Service: Orthopedics   • SHOULDER ARTHROSCOPY W/ BICIPITAL TENODESIS REPAIR Right 9/5/2017    Procedure: SHOULDER ARTHROSCOPY W/ BICIPITAL TENODESIS REPAIR;  Surgeon: Mg Campos M.D.;  Location: [facility];  Service: Orthopedics   • SYNOVECTOMY Right 9/5/2017    Procedure: SYNOVECTOMY;  Surgeon: Mg Campos M.D.;  Location: [facility];  Service: Orthopedics   • MULTIPLE CORONARY ARTERY BYPASS  12/08/2015    3 way bypass & Bovine valve   • LAMINOTOMY  12/2004    Diskectomy L4-L5, L5-S1   • MULTIPLE CORONARY ARTERY BYPASS  11/11/1998    5 way bypass   • BIOPSY GENERAL      buttock lesion        SOCIAL HISTORY  Social History     Social History Main Topics   • Smoking status: Former Smoker     Packs/day: 3.00     Years: 20.00     Types: Cigarettes     Quit date: 1/1/1992   • Smokeless tobacco: Never Used   • Alcohol use Yes       "Comment: 4 per month   • Drug use: Yes     Types: Marijuana, Inhaled      Comment: Marijuana- Daily (4 times per day)   • Sexual activity: Yes     Partners: Female        FAMILY HISTORY  Family History   Problem Relation Age of Onset   • Heart Attack Father         MI and CABG, unknown age   • Cancer Mother         Breast   • Heart Disease Brother    • Stroke Neg Hx    • Diabetes Neg Hx    • Lung Disease Neg Hx         CURRENT MEDICATIONS  Home Medications     Reviewed by Ashley Mixon R.N. (Registered Nurse) on 08/20/18 at 2216  Med List Status: Complete   Medication Last Dose Status   allopurinol (ZYLOPRIM) 100 MG Tab 8/19/2018 Active   aspirin 81 MG EC tablet 8/20/2018 Active   clopidogrel (PLAVIX) 75 MG Tab 8/20/2018 Active   Glucosamine-Chondroit-Vit C-Mn (GLUCOSAMINE CHONDROITIN COMPLX) CAPS 8/19/2018 Active   indomethacin (INDOCIN) 50 MG Cap 3/9/2018 Active   lisinopril (PRINIVIL) 5 MG Tab 8/20/2018 Active   magnesium oxide (MAG-OX) 400 MG Tab 8/19/2018 Active   metoprolol SR (TOPROL XL) 50 MG TABLET SR 24 HR 8/20/2018 Active   nitroglycerin (NITROSTAT) 0.4 MG SL Tab 8/20/2018 Active   Omega-3 Fatty Acids (OMEGA-3 FISH OIL PO) 8/19/2018 Active   Red Yeast Rice 600 MG Cap 8/19/2018 Active                 ALLERGIES  Allergies   Allergen Reactions   • Gemfibrozil    • Lipitor [Atorvastatin Calcium] Unspecified     Severe Muscle cramps, dehydration   • Tricor Unspecified     Dehydration, severe muscle cramps        PHYSICAL EXAM  VITAL SIGNS: /85   Pulse 80   Temp 36.7 °C (98 °F)   Resp 18   Ht 1.676 m (5' 6\")   Wt 105.8 kg (233 lb 4 oz)   SpO2 96%   BMI 37.65 kg/m²  @SAMUEL[[id number]::@     Pulse ox interpretation: 97% I interpret this pulse ox as normal     Constitutional: Well developed, well nourished, alert in no apparent distress, nontoxic appearance    HENT: No external signs of trauma, normocephalic, oropharynx moist and clear, nose normal    Eyes: PERRL, conjunctiva without erythema, no " discharge, no icterus    Neck: Soft and supple, trachea midline, no stridor, no tenderness, no LAD, no JVD, good ROM    Cardiovascular: Regular rate and rhythm, no murmurs/rubs/gallops, strong distal pulses and good perfusion    Thorax & Lungs: No respiratory distress, CTAB, no chest tenderness    Abdomen: Soft, nontender, nondistended, no guarding, no rebound, normal BS   Back: No CVAT    Extremities: No cyanosis, no edema, no gross deformity, good ROM, no tenderness, intact distal pulses with brisk cap refill    Skin: Warm, dry, no pallor/cyanosis, scattered areas of erythematous macular rash on right lower leg without warmth/swelling/crepitus/fluctuance/palpable cords  Lymphatic: No lymphadenopathy noted    Neuro: A/O times 3, no focal deficits noted    Psychiatric: Cooperative, slightly anxious, normal judgement        DIAGNOSTIC STUDIES / PROCEDURES    EKG  12 Lead EKG obtained at 2043 and interpreted by me:   Rate: 89   Rhythm: Sinus rhythm   Ectopy: None  Intervals: Normal   Axis: Normal   Q Waves: None   QRS: Normal   ST segments: Minimal ST depression inferior and lateral leads  T Waves: Normal    Clinical Impression: Sinus rhythm with minimal ST depression inferior and lateral leads     EKG  12 Lead EKG obtained at 0118 and interpreted by me:   Rate: 78   Rhythm: Sinus rhythm   Ectopy: None  Intervals: Normal   Axis: Normal   Q Waves: None   QRS: Normal   ST segments: Minimal ST depression inferior and lateral leads  T Waves: Normal    Clinical Impression: Sinus rhythm with minimal ST depression inferior and lateral leads  Compared to earlier EKG without significant change        LABS  All labs reviewed by me.     Results for orders placed or performed during the hospital encounter of 08/20/18   Troponin   Result Value Ref Range    Troponin I <0.02 0.00 - 0.04 ng/mL   Btype Natriuretic Peptide   Result Value Ref Range    B Natriuretic Peptide 59 0 - 100 pg/mL   CBC with Differential   Result Value Ref  Range    WBC 10.4 4.8 - 10.8 K/uL    RBC 4.66 (L) 4.70 - 6.10 M/uL    Hemoglobin 15.1 14.0 - 18.0 g/dL    Hematocrit 43.4 42.0 - 52.0 %    MCV 93.1 81.4 - 97.8 fL    MCH 32.4 27.0 - 33.0 pg    MCHC 34.8 33.7 - 35.3 g/dL    RDW 45.6 35.9 - 50.0 fL    Platelet Count 173 164 - 446 K/uL    MPV 9.4 9.0 - 12.9 fL    Neutrophils-Polys 78.90 (H) 44.00 - 72.00 %    Lymphocytes 13.10 (L) 22.00 - 41.00 %    Monocytes 6.80 0.00 - 13.40 %    Eosinophils 0.70 0.00 - 6.90 %    Basophils 0.20 0.00 - 1.80 %    Immature Granulocytes 0.30 0.00 - 0.90 %    Nucleated RBC 0.00 /100 WBC    Neutrophils (Absolute) 8.23 (H) 1.82 - 7.42 K/uL    Lymphs (Absolute) 1.36 1.00 - 4.80 K/uL    Monos (Absolute) 0.71 0.00 - 0.85 K/uL    Eos (Absolute) 0.07 0.00 - 0.51 K/uL    Baso (Absolute) 0.02 0.00 - 0.12 K/uL    Immature Granulocytes (abs) 0.03 0.00 - 0.11 K/uL    NRBC (Absolute) 0.00 K/uL   Complete Metabolic Panel (CMP)   Result Value Ref Range    Sodium 135 135 - 145 mmol/L    Potassium 3.9 3.6 - 5.5 mmol/L    Chloride 108 96 - 112 mmol/L    Co2 20 20 - 33 mmol/L    Anion Gap 7.0 0.0 - 11.9    Glucose 113 (H) 65 - 99 mg/dL    Bun 15 8 - 22 mg/dL    Creatinine 1.12 0.50 - 1.40 mg/dL    Calcium 8.8 8.4 - 10.2 mg/dL    AST(SGOT) 37 12 - 45 U/L    ALT(SGPT) 39 2 - 50 U/L    Alkaline Phosphatase 66 30 - 99 U/L    Total Bilirubin 1.0 0.1 - 1.5 mg/dL    Albumin 4.2 3.2 - 4.9 g/dL    Total Protein 7.4 6.0 - 8.2 g/dL    Globulin 3.2 1.9 - 3.5 g/dL    A-G Ratio 1.3 g/dL   Prothrombin Time   Result Value Ref Range    PT 14.4 12.0 - 14.6 sec    INR 1.13 0.87 - 1.13   APTT   Result Value Ref Range    APTT 31.0 24.7 - 36.0 sec   Lipase   Result Value Ref Range    Lipase 35 7 - 58 U/L   ESTIMATED GFR   Result Value Ref Range    GFR If African American >60 >60 mL/min/1.73 m 2    GFR If Non African American >60 >60 mL/min/1.73 m 2   D-DIMER   Result Value Ref Range    D-Dimer Screen 772 (H) <250 ng/mL(D-DU)   LACTIC ACID   Result Value Ref Range    Lactic Acid  1.1 0.5 - 2.0 mmol/L   TROPONIN   Result Value Ref Range    Troponin I <0.02 0.00 - 0.04 ng/mL   EKG (ER)   Result Value Ref Range    Report       [facility] Emergency Dept.    Test Date:  2018  Pt Name:    CATHERINE MORELOS                   Department: [facility]  MRN:        [id number]                      Room:  Gender:     Male                         Technician: CORDELL  :        [id number]                   Requested By:ER TRIAGE PROTOCOL  Order #:    [id number]                    Reading MD:    Measurements  Intervals                                Axis  Rate:       89                           P:          54  IN:         175                          QRS:        67  QRSD:       103                          T:          72  QT:         348  QTc:        424    Interpretive Statements  Sinus rhythm  Probable left atrial enlargement  Compared to ECG 2017 16:16:21  No significant changes          RADIOLOGY  The radiologist's interpretation of all radiological studies have been reviewed by me.     CT-CTA CHEST PULMONARY ARTERY W/ RECONS   Final Result         1.  The pulmonary arteries are suboptimally opacified limiting evaluation, no large central pulmonary embolus is appreciated. Additional imaging would be required for definitive exclusion of pulmonary embolism   2.  Hepatomegaly and diffuse hepatic steatosis.   3.  Right nephrolithiasis      US-EXTREMITY VENOUS UNILATERAL-LOWER RIGHT   Final Result         1.  No evidence of right lower extremity deep venous thrombosis.   2.  Right inguinal adenopathy with overall benign morphology      DX-CHEST-LIMITED (1 VIEW)   Final Result         1.  No acute cardiopulmonary disease.             COURSE & MEDICAL DECISION MAKING  Nursing notes, VS, PMSFHx reviewed in chart.     Review of past medical records shows the patient had outpatient visits.      Differential diagnoses considered include but are not limited to: AMI, dissection, PE, pneumothorax,  pneumomediastinum, CHF/pulm edema, pericardial effusion/tamponade, pericarditis, pneumonia, pleural effusion, pleurisy, costochondritis, mediastinitis, esophageal spasm, GERD, gastritis, PUD, hiatal hernia, muscle strain, neuropathy      Pt risk-stratified as moderate risk for MACE in the next 6 weeks by HEART Score: 4    HISTORY  Highly suspicious +2  Moderately suspicious +1  Slightly suspicious 0    EKG  Significant ST depression +2  Nonspecific repolarization disturbance +1  Normal 0    AGE  ? 65 +2  45-65 +1  < 45 0    RISK FACTORS  Hypercholesterolemia, HTN, DM, Cigarette smoking, positive family history, obesity  ? 3 risk factors or history of atherosclerotic disease +2  1-2 risk factors +1  No risk factors known 0    TROPONIN  ? 3× normal limit +2  1-3× normal limit +1  ? normal limit 0         Patient presents with wife to the ED with above complaint.  EKG ×2 with minimal ST depression in inferior and lateral leads but no other acute findings.  Troponin ×2 was negative.  Chest x-ray without evidence of acute abnormality.  Right lower extremity ultrasound with findings as above.  Labs were essentially unremarkable except for elevated d-dimer so patient subsequently underwent CT chest with findings as above.  Findings discussed with the patient and his wife.  Patient noted to be in no acute distress and nontoxic in appearance and hemodynamically stable except for mildly elevated blood pressure for which he will need outpatient follow-up for further management.  Low clinical suspicion for ACS given the length of time of his constant chest pain along with negative troponin.  He appears to be developing cellulitis of his right lower leg and was started on Bactrim and Keflex and will be discharged with prescriptions for both.  Low clinical suspicion for necrotizing fasciitis or compartment syndrome.  Discussed with patient importance of outpatient follow-up for further management and reevaluation.  Return to ED  precautions were given.  Patient and his wife verbalized understanding and agreed with plan of care with no further questions or concerns.      The patient is referred to a primary physician for blood pressure management, diabetic screening, and for all other preventative health concerns.       FINAL IMPRESSION  1. Chest pain in adult    2. Cellulitis of right leg without foot           DISPOSITION  Patient will be discharged home in stable condition.       FOLLOW UP  Martha Madrid M.D.  [address]  [facility] [id number] [phone]    Call today      [facility], Emergency Dept  [address]  [facility] [id number] [phone]    If symptoms worsen         OUTPATIENT MEDICATIONS  Discharge Medication List as of 8/21/2018  1:44 AM      START taking these medications    Details   sulfamethoxazole-trimethoprim (BACTRIM DS) 800-160 MG tablet Take 1 Tab by mouth 2 times a day for 10 days., Disp-20 Tab, R-0, Print Rx Paper      cephALEXin (KEFLEX) 500 MG Cap Take 1 Cap by mouth 4 times a day for 10 days., Disp-40 Cap, R-0, Print Rx Paper                Electronically signed by: Trev Millard, 8/20/2018 10:06 PM      Portions of this record were made with voice recognition software.  Despite my review, spelling/grammar/context errors may still remain.  Interpretation of this chart should be taken in this context.

## 2018-08-21 NOTE — ED NOTES
Rounded on patient.  Oriented to the room and call button. Patient assessed, chart reviewed. Call light within reach, bed in lowest position. Patient updated on plan of care, no additional needs at this time. Will continue to monitor. [place] fall assessment completed.  Patient is a low risk for fall.  No interventions needed at this time.  Will continue to assess risk and monitor for safety.

## 2018-08-21 NOTE — ED NOTES
Patient c/o CP since 4pm yesterday as well as reddened R leg that started this morning. Patient has significant cardiac Hx. Patient took 81mg ASA and 1 dose nitro PTA without relief.

## 2018-08-21 NOTE — DISCHARGE INSTRUCTIONS
Nonspecific Chest Pain  Chest pain can be caused by many different conditions. There is always a chance that your pain could be related to something serious, such as a heart attack or a blood clot in your lungs. Chest pain can also be caused by conditions that are not life-threatening. If you have chest pain, it is very important to follow up with your health care provider.  What are the causes?  Causes of this condition include:  · Heartburn.  · Pneumonia or bronchitis.  · Anxiety or stress.  · Inflammation around your heart (pericarditis) or lung (pleuritis or pleurisy).  · A blood clot in your lung.  · A collapsed lung (pneumothorax). This can develop suddenly on its own (spontaneous pneumothorax) or from trauma to the chest.  · Shingles infection (varicella-zoster virus).  · Heart attack.  · Damage to the bones, muscles, and cartilage that make up your chest wall. This can include:  ¨ Bruised bones due to injury.  ¨ Strained muscles or cartilage due to frequent or repeated coughing or overwork.  ¨ Fracture to one or more ribs.  ¨ Sore cartilage due to inflammation (costochondritis).  What increases the risk?  Risk factors for this condition may include:  · Activities that increase your risk for trauma or injury to your chest.  · Respiratory infections or conditions that cause frequent coughing.  · Medical conditions or overeating that can cause heartburn.  · Heart disease or family history of heart disease.  · Conditions or health behaviors that increase your risk of developing a blood clot.  · Having had chicken pox (varicella zoster).  What are the signs or symptoms?  Chest pain can feel like:  · Burning or tingling on the surface of your chest or deep in your chest.  · Crushing, pressure, aching, or squeezing pain.  · Dull or sharp pain that is worse when you move, cough, or take a deep breath.  · Pain that is also felt in your back, neck, shoulder, or arm, or pain that spreads to any of these areas.  Your  chest pain may come and go, or it may stay constant.  How is this diagnosed?  Lab tests or other studies may be needed to find the cause of your pain. Your health care provider may have you take a test called an ECG (electrocardiogram). An ECG records your heartbeat patterns at the time the test is performed. You may also have other tests, such as:  · Transthoracic echocardiogram (TTE). In this test, sound waves are used to create a picture of the heart structures and to look at how blood flows through your heart.  · Transesophageal echocardiogram (LIDIA). This is a more advanced imaging test that takes images from inside your body. It allows your health care provider to see your heart in finer detail.  · Cardiac monitoring. This allows your health care provider to monitor your heart rate and rhythm in real time.  · Holter monitor. This is a portable device that records your heartbeat and can help to diagnose abnormal heartbeats. It allows your health care provider to track your heart activity for several days, if needed.  · Stress tests. These can be done through exercise or by taking medicine that makes your heart beat more quickly.  · Blood tests.  · Other imaging tests.  How is this treated?  Treatment depends on what is causing your chest pain. Treatment may include:  · Medicines. These may include:  ¨ Acid blockers for heartburn.  ¨ Anti-inflammatory medicine.  ¨ Pain medicine for inflammatory conditions.  ¨ Antibiotic medicine, if an infection is present.  ¨ Medicines to dissolve blood clots.  ¨ Medicines to treat coronary artery disease (CAD).  · Supportive care for conditions that do not require medicines. This may include:  ¨ Resting.  ¨ Applying heat or cold packs to injured areas.  ¨ Limiting activities until pain decreases.  Follow these instructions at home:  Medicines  · If you were prescribed an antibiotic, take it as told by your health care provider. Do not stop taking the antibiotic even if you  start to feel better.  · Take over-the-counter and prescription medicines only as told by your health care provider.  Lifestyle  · Do not use any products that contain nicotine or tobacco, such as cigarettes and e-cigarettes. If you need help quitting, ask your health care provider.  · Do not drink alcohol.  · Make lifestyle changes as directed by your health care provider. These may include:  ¨ Getting regular exercise. Ask your health care provider to suggest some activities that are safe for you.  ¨ Eating a heart-healthy diet. A registered dietitian can help you to learn healthy eating options.  ¨ Maintaining a healthy weight.  ¨ Managing diabetes, if necessary.  ¨ Reducing stress, such as with yoga or relaxation techniques.  General instructions  · Avoid any activities that bring on chest pain.  · If heartburn is the cause for your chest pain, raise (elevate) the head of your bed about 6 inches (15 cm) by putting blocks under the legs. Sleeping with more pillows does not effectively relieve heartburn because it only changes the position of your head.  · Keep all follow-up visits as told by your health care provider. This is important. This includes any further testing if your chest pain does not go away.  Contact a health care provider if:  · Your chest pain does not go away.  · You have a rash with blisters on your chest.  · You have a fever.  · You have chills.  Get help right away if:  · Your chest pain is worse.  · You have a cough that gets worse, or you cough up blood.  · You have severe pain in your abdomen.  · You have severe weakness.  · You faint.  · You have sudden, unexplained chest discomfort.  · You have sudden, unexplained discomfort in your arms, back, neck, or jaw.  · You have shortness of breath at any time.  · You suddenly start to sweat, or your skin gets clammy.  · You feel nauseous or you vomit.  · You suddenly feel light-headed or dizzy.  · Your heart begins to beat quickly, or it feels  like it is skipping beats.  These symptoms may represent a serious problem that is an emergency. Do not wait to see if the symptoms will go away. Get medical help right away. Call your local emergency services (911 in the [place] Do not drive yourself to the hospital.   This information is not intended to replace advice given to you by your health care provider. Make sure you discuss any questions you have with your health care provider.  Document Released: 09/27/2006 Document Revised: 09/11/2017 Document Reviewed: 09/11/2017  [employer] Interactive Patient Education © 2017 [employer]      Cellulitis, Adult  Cellulitis is a skin infection. The infected area is usually red and tender. This condition occurs most often in the arms and lower legs. The infection can travel to the muscles, blood, and underlying tissue and become serious. It is very important to get treated for this condition.  What are the causes?  Cellulitis is caused by bacteria. The bacteria enter through a break in the skin, such as a cut, burn, insect bite, open sore, or crack.  What increases the risk?  This condition is more likely to occur in people who:  · Have a weak defense system (immune system).  · Have open wounds on the skin such as cuts, burns, bites, and scrapes. Bacteria can enter the body through these open wounds.  · Are older.  · Have diabetes.  · Have a type of long-lasting (chronic) liver disease (cirrhosis) or kidney disease.  · Use IV drugs.  What are the signs or symptoms?  Symptoms of this condition include:  · Redness, streaking, or spotting on the skin.  · Swollen area of the skin.  · Tenderness or pain when an area of the skin is touched.  · Warm skin.  · Fever.  · Chills.  · Blisters.  How is this diagnosed?  This condition is diagnosed based on a medical history and physical exam. You may also have tests, including:  · Blood tests.  · Lab tests.  · Imaging tests.  How is this treated?  Treatment for this condition may  include:  · Medicines, such as antibiotic medicines or antihistamines.  · Supportive care, such as rest and application of cold or warm cloths (cold or warm compresses) to the skin.  · Hospital care, if the condition is severe.  The infection usually gets better within 1-2 days of treatment.  Follow these instructions at home:  · Take over-the-counter and prescription medicines only as told by your health care provider.  · If you were prescribed an antibiotic medicine, take it as told by your health care provider. Do not stop taking the antibiotic even if you start to feel better.  · Drink enough fluid to keep your urine clear or pale yellow.  · Do not touch or rub the infected area.  · Raise (elevate) the infected area above the level of your heart while you are sitting or lying down.  · Apply warm or cold compresses to the affected area as told by your health care provider.  · Keep all follow-up visits as told by your health care provider. This is important. These visits let your health care provider make sure a more serious infection is not developing.  Contact a health care provider if:  · You have a fever.  · Your symptoms do not improve within 1-2 days of starting treatment.  · Your bone or joint underneath the infected area becomes painful after the skin has healed.  · Your infection returns in the same area or another area.  · You notice a swollen bump in the infected area.  · You develop new symptoms.  · You have a general ill feeling (malaise) with muscle aches and pains.  Get help right away if:  · Your symptoms get worse.  · You feel very sleepy.  · You develop vomiting or diarrhea that persists.  · You notice red streaks coming from the infected area.  · Your red area gets larger or turns dark in color.  This information is not intended to replace advice given to you by your health care provider. Make sure you discuss any questions you have with your health care provider.  Document Released: 09/27/2006  Document Revised: 04/27/2017 Document Reviewed: 10/26/2016  [employer] Interactive Patient Education © 2017 [employer]

## 2018-08-21 NOTE — ED NOTES
Discharge information reviewed in detail. Patient verbalized understanding of discharge instructions to follow up with PCP today and to return to ER if condition worsens.    Patient educated on two new prescriptions. Wife to drive home.   Patient ambulated out of ER in a steady gait.

## 2018-08-22 ENCOUNTER — OFFICE VISIT (OUTPATIENT)
Dept: MEDICAL GROUP | Facility: LAB | Age: 63
End: 2018-08-22
Payer: COMMERCIAL

## 2018-08-22 VITALS
OXYGEN SATURATION: 96 % | TEMPERATURE: 97.8 F | BODY MASS INDEX: 36.8 KG/M2 | DIASTOLIC BLOOD PRESSURE: 78 MMHG | HEART RATE: 80 BPM | WEIGHT: 229 LBS | HEIGHT: 66 IN | SYSTOLIC BLOOD PRESSURE: 116 MMHG | RESPIRATION RATE: 16 BRPM

## 2018-08-22 DIAGNOSIS — L03.115 CELLULITIS OF RIGHT LOWER EXTREMITY: ICD-10-CM

## 2018-08-22 DIAGNOSIS — E78.5 DYSLIPIDEMIA: ICD-10-CM

## 2018-08-22 DIAGNOSIS — R73.03 PREDIABETES: ICD-10-CM

## 2018-08-22 DIAGNOSIS — K76.0 FATTY LIVER: ICD-10-CM

## 2018-08-22 DIAGNOSIS — I25.708 CORONARY ARTERY DISEASE OF BYPASS GRAFT OF NATIVE HEART WITH STABLE ANGINA PECTORIS (HCC): ICD-10-CM

## 2018-08-22 DIAGNOSIS — E66.9 OBESITY (BMI 30-39.9): ICD-10-CM

## 2018-08-22 DIAGNOSIS — R11.2 NAUSEA AND VOMITING, INTRACTABILITY OF VOMITING NOT SPECIFIED, UNSPECIFIED VOMITING TYPE: ICD-10-CM

## 2018-08-22 PROCEDURE — 99214 OFFICE O/P EST MOD 30 MIN: CPT | Performed by: FAMILY MEDICINE

## 2018-08-22 NOTE — PROGRESS NOTES
Subjective:   Abimael Hamilton is a 62 y.o. male here today for   Chief Complaint   Patient presents with   • ED Follow-Up     Cellulitis       1. Cellulitis of right lower extremity  New   Seen in ED 8/20  Was sick with chills, n/v, CP, R leg redness   Patient did not notice a injury to the leg when he looks back on it he had been wrestling with the dogs and had scratches on the lower extremity.  He does have scratches on the leg today.  He was started on Keflex and Bactrim.  His chills and body aches have gone away.  The redness of the leg is improving.  The swelling of the leg persists.  He has been on this medication for 1-1/2 days.  In the ER, they did testing including blood cultures which remain negative, lower extremity ultrasound which was negative for DVT but did show lymphadenopathy in the right groin    2. Obesity (BMI 30-39.9)  Chronic, 50 pound weight gain over the last 1 and half years after his shoulder injury.  He is actively going to be working on weight loss although he has not been exercising at all recently.    3. Nausea and vomiting, intractability of vomiting not specified, unspecified vomiting type  This is a new problem and he was seen in the ER for this.  Labs were normal.  This resolved after starting treatment for cellulitis    4. Prediabetes  This is a chronic problem.  Patient has had elevated hemoglobin A1c in the past.  He has gained weight.  He did have a goal of getting off of all of his blood pressure medications but knows that he cannot do this if his weight is up and if he has prediabetes.  He is motivated to start his weight loss journey again    5. Dyslipidemia  This is chronic.  Unable to tolerate statins in the past.  He is currently on red yeast rice.  Labs last done 9 months ago    6. Fatty liver  This is a new problem.  Found on CT scan.  Patient has had weight gain and elevated fasting glucose.  He takes an minimal alcoholic beverages, generally about 2 beers per month.   He did have some right upper quadrant pain prior to his hospitalization but this has resolved.  No abdominal bloating, no hematemesis.  He has had right-sided lower extremity edema secondary to cellulitis    Current medicines (including changes today)  Current Outpatient Prescriptions   Medication Sig Dispense Refill   • sulfamethoxazole-trimethoprim (BACTRIM DS) 800-160 MG tablet Take 1 Tab by mouth 2 times a day for 10 days. 20 Tab 0   • cephALEXin (KEFLEX) 500 MG Cap Take 1 Cap by mouth 4 times a day for 10 days. 40 Cap 0   • allopurinol (ZYLOPRIM) 100 MG Tab Take 1 Tab by mouth every day. 90 Tab 2   • metoprolol SR (TOPROL XL) 50 MG TABLET SR 24 HR Take 1 Tab by mouth every day. 90 Tab 3   • clopidogrel (PLAVIX) 75 MG Tab TAKE ONE TABLET BY MOUTH ONE TIME DAILY 30 Tab 11   • lisinopril (PRINIVIL) 5 MG Tab TAKE ONE TABLET BY MOUTH ONE TIME DAILY 30 Tab 11   • Omega-3 Fatty Acids (OMEGA-3 FISH OIL PO) Take  by mouth.     • magnesium oxide (MAG-OX) 400 MG Tab Take 400 mg by mouth every day.     • aspirin 81 MG EC tablet Take 1 Tab by mouth every day. 30 Tab 11   • Red Yeast Rice 600 MG Cap Take 1 Cap by mouth every day. 90 Cap 3   • Glucosamine-Chondroit-Vit C-Mn (GLUCOSAMINE CHONDROITIN COMPLX) CAPS Take 2 Caps by mouth every day.     • nitroglycerin (NITROSTAT) 0.4 MG SL Tab Place 1 Tab under tongue as needed for Chest Pain. 30 Tab 5   • indomethacin (INDOCIN) 50 MG Cap Take 50 mg by mouth 3 times a day.       No current facility-administered medications for this visit.      He  has a past medical history of Arthritis; Benign hypertensive heart disease without heart failure (11/14/2011); CAD (coronary artery disease); Coronary atherosclerosis of autologous vein bypass graft (11/14/2011); Gout; Heart valve disease; High cholesterol; History of pancreatitis; Hypertension; Muscle cramps (10/4/2011); Myocardial infarct (HCC); Obesity (11/14/2011); Pain; Postsurgical aortocoronary bypass status (7/26/2016); Renal  "disorder; S/P aortic valve replacement with bioprosthetic valve (7/26/2016); Statin intolerance (7/26/2016); and Status post cardiac revascularization with bypass aortocoronary anastomosis of five coronary vessels (7/26/2016).    ROS   See HPI for pertinent positives and negatives       Objective:     Blood pressure 116/78, pulse 80, temperature 36.6 °C (97.8 °F), resp. rate 16, height 1.676 m (5' 6\"), weight 103.9 kg (229 lb), SpO2 96 %. Body mass index is 36.96 kg/m².   Physical Exam:  Constitutional: Alert, no distress.  Obese  Skin: Warm, dry, good turgor, no rashes in visible areas.  Eye: Equal, round and reactive, conjunctiva clear, lids normal.  ENMT: Lips without lesions, good dentition, oropharynx clear.  Neck: Trachea midline, no masses, no thyromegaly. No cervical or supraclavicular lymphadenopathy  Respiratory: Unlabored respiratory effort, lungs clear to auscultation, no wheezes, no ronchi.  Cardiovascular: Normal S1, S2, RRR, no murmur, no edema.  Abdomen: Soft, non-tender, no masses, no hepatosplenomegaly.  Psych: Alert and oriented x3, normal affect and mood.  Extremities: The right lower extremity is edematous to the knee.  There is erythema and a lacy pattern from the ankle to the mid shin and a small patch on the interior thigh    Assessment and Plan:   The following treatment plan was discussed    1. Cellulitis of right lower extremity  New, improving with Keflex and Bactrim.  Discussed importance of finishing all antibiotics.  He will watch the erythema and let us know if he has any worsening of the erythema, new fevers or chills, body aches.  ER precautions given.  Avoid hot tubs and soaking    2. Obesity (BMI 30-39.9)  Chronic, worsened over the last year.  Discussed importance of weight loss and diet  - Patient identified as having weight management issue.  Appropriate orders and counseling given.  - LIPID PROFILE; Future  - COMP METABOLIC PANEL; Future  - HEMOGLOBIN A1C; Future    3. Nausea " and vomiting, intractability of vomiting not specified, unspecified vomiting type  New, evaluated in the ER.  No significant laboratory abnormalities.  Now resolved    4. Prediabetes  Chronic, recheck labs with A1c.  Discussed importance of weight loss  - LIPID PROFILE; Future  - COMP METABOLIC PANEL; Future  - HEMOGLOBIN A1C; Future    5. Dyslipidemia  Chronic, stable on red yeast rice, recheck labs  - LIPID PROFILE; Future  - COMP METABOLIC PANEL; Future  - HEMOGLOBIN A1C; Future    6. Fatty liver  New, found on CT scan.  Discussed importance of dietary modifications and weight loss.  He is not drinking alcohol in excess.  LFTs within normal limits.  We will monitor this closely    7. Coronary artery disease of bypass graft of native heart with stable angina pectoris (HCC)  Chronic, has an appointment with cardiology in 2 months.  Labs ordered for prior to this appointment        Followup: Return in about 6 months (around 2/22/2019) for Medicare Annual.       This note was created using voice recognition software. I have made every reasonable attempt to correct errors, however, I do anticipate some grammatical errors.

## 2018-08-26 LAB
BACTERIA BLD CULT: NORMAL
BACTERIA BLD CULT: NORMAL
SIGNIFICANT IND 70042: NORMAL
SIGNIFICANT IND 70042: NORMAL
SITE SITE: NORMAL
SITE SITE: NORMAL
SOURCE SOURCE: NORMAL
SOURCE SOURCE: NORMAL

## 2018-08-28 ENCOUNTER — TELEPHONE (OUTPATIENT)
Dept: MEDICAL GROUP | Facility: LAB | Age: 63
End: 2018-08-28

## 2018-08-28 NOTE — TELEPHONE ENCOUNTER
Can you please call Abimael and see how he's doing with the bleeding. Is he having any pain? Make sure he's taking the prilosec twice a day. We could see him this afternoon, there are lots of openings.     Martha Madrid M.D.

## 2018-08-30 LAB — EKG IMPRESSION: NORMAL

## 2018-08-31 NOTE — TELEPHONE ENCOUNTER
I spoke to Ana, she states Abimael is doing a lot better. He is no longer bleeding and his stools are back to normal.  He is taking the Prilosec only once a day because they went off of the directions on the box.    She will let us know if they need anything.

## 2018-09-04 ENCOUNTER — TELEPHONE (OUTPATIENT)
Dept: CARDIOLOGY | Facility: MEDICAL CENTER | Age: 63
End: 2018-09-04

## 2018-09-04 NOTE — TELEPHONE ENCOUNTER
Pt stating he is having CP on and off and taking nitro for it. He is wanting to get in to see Dr. Fuentes sooner. Advised pt that if he is having chest pain he should go ER to be evaluated. Pt refusing to go to the ER and wants to get in to see someone sooner than his October appointment with Dr. Fuentes. Informed pt again that he should be evaluated at the ER, pt still refusing, and transferred him to the schedulers to see if he could get in to see someone sooner.    NICANOR SANCHEZ

## 2018-09-04 NOTE — TELEPHONE ENCOUNTER
Abimael Fuentes M.D. 1 hour ago (12:02 PM)         I've been taking nitro more frequently now due to frequent chest pains over the last month. Two weeks ago I was in ER where they did a CT scan and ultrasound on right leg looking for clots.     Have appointment scheduled for October 18th but would like to get in sooner if I could to discuss other tests for possible blockages.  Having same symptoms as before my last surgery. Thank you.      ====================================  Attempted to call pt to discuss CP. No answer, voicemail left.

## 2018-09-10 NOTE — TELEPHONE ENCOUNTER
Discussed w/ Dr Fuentes, he agreed w/ above recommendations, informed him that pt is scheduled to see RO on 9/13/18

## 2018-09-11 DIAGNOSIS — I10 ESSENTIAL HYPERTENSION: ICD-10-CM

## 2018-09-11 DIAGNOSIS — I25.10 CORONARY ARTERY DISEASE DUE TO CALCIFIED CORONARY LESION: ICD-10-CM

## 2018-09-11 DIAGNOSIS — I25.84 CORONARY ARTERY DISEASE DUE TO CALCIFIED CORONARY LESION: ICD-10-CM

## 2018-09-13 ENCOUNTER — OFFICE VISIT (OUTPATIENT)
Dept: CARDIOLOGY | Facility: MEDICAL CENTER | Age: 63
End: 2018-09-13
Payer: COMMERCIAL

## 2018-09-13 VITALS
HEIGHT: 66 IN | DIASTOLIC BLOOD PRESSURE: 82 MMHG | WEIGHT: 246 LBS | SYSTOLIC BLOOD PRESSURE: 136 MMHG | OXYGEN SATURATION: 96 % | HEART RATE: 72 BPM | BODY MASS INDEX: 39.53 KG/M2

## 2018-09-13 DIAGNOSIS — K76.0 FATTY LIVER: ICD-10-CM

## 2018-09-13 DIAGNOSIS — E66.9 OBESITY (BMI 30-39.9): ICD-10-CM

## 2018-09-13 DIAGNOSIS — E78.5 DYSLIPIDEMIA: ICD-10-CM

## 2018-09-13 DIAGNOSIS — Z95.1 STATUS POST CARDIAC REVASCULARIZATION WITH BYPASS AORTOCORONARY ANASTOMOSIS OF FIVE CORONARY VESSELS: ICD-10-CM

## 2018-09-13 DIAGNOSIS — Z95.3 S/P AORTIC VALVE REPLACEMENT WITH BIOPROSTHETIC VALVE: ICD-10-CM

## 2018-09-13 DIAGNOSIS — Z78.9 STATIN INTOLERANCE: ICD-10-CM

## 2018-09-13 DIAGNOSIS — R73.03 PREDIABETES: ICD-10-CM

## 2018-09-13 DIAGNOSIS — I25.810 CORONARY ATHEROSCLEROSIS OF AUTOLOGOUS VEIN BYPASS GRAFT WITHOUT ANGINA: ICD-10-CM

## 2018-09-13 DIAGNOSIS — Z95.1 POSTSURGICAL AORTOCORONARY BYPASS STATUS: ICD-10-CM

## 2018-09-13 DIAGNOSIS — I25.10 CORONARY ARTERY DISEASE INVOLVING NATIVE CORONARY ARTERY OF NATIVE HEART WITHOUT ANGINA PECTORIS: ICD-10-CM

## 2018-09-13 PROCEDURE — 99214 OFFICE O/P EST MOD 30 MIN: CPT | Performed by: INTERNAL MEDICINE

## 2018-09-13 RX ORDER — LOVASTATIN 10 MG/1
10 TABLET ORAL EVERY EVENING
Qty: 30 TAB | Refills: 11 | Status: SHIPPED | OUTPATIENT
Start: 2018-09-13 | End: 2018-10-18

## 2018-09-13 RX ORDER — ISOSORBIDE MONONITRATE 30 MG/1
30 TABLET, EXTENDED RELEASE ORAL EVERY MORNING
Qty: 30 TAB | Refills: 11 | Status: SHIPPED | OUTPATIENT
Start: 2018-09-13 | End: 2018-12-04

## 2018-09-13 RX ORDER — LISINOPRIL 10 MG/1
10 TABLET ORAL DAILY
Qty: 30 TAB | Refills: 11 | Status: SHIPPED | OUTPATIENT
Start: 2018-09-13 | End: 2019-02-18

## 2018-09-13 ASSESSMENT — ENCOUNTER SYMPTOMS
STRIDOR: 0
COUGH: 0
HEMOPTYSIS: 0
CONSTITUTIONAL NEGATIVE: 1
LOSS OF CONSCIOUSNESS: 0
ORTHOPNEA: 0
BRUISES/BLEEDS EASILY: 0
MUSCULOSKELETAL NEGATIVE: 1
FEVER: 0
NEUROLOGICAL NEGATIVE: 1
GASTROINTESTINAL NEGATIVE: 1
DIZZINESS: 0
SORE THROAT: 0
WEAKNESS: 0
SPUTUM PRODUCTION: 0
CLAUDICATION: 0
WHEEZING: 0
PND: 0
CHILLS: 0
RESPIRATORY NEGATIVE: 1
PALPITATIONS: 0
SHORTNESS OF BREATH: 0
EYES NEGATIVE: 1

## 2018-09-13 NOTE — LETTER
[facility]   [address], [facility] [address] [facility] [address]  [place] [address]  Phone: [phone]  Fax: [phone]              Abimael Hamilton  1955    Encounter Date: 2018    Bethel Dozier M.D.          PROGRESS NOTE:  No chief complaint on file.      Subjective:   Abimael Hamilton is a 62 y.o. male who presents today as a follow-up for his multivessel coronary disease as well as his chest pain aortic valve replacement and hyperlipidemia.  Since he was last seen he was in the ER for chest pain.  He said that he is having progressive angina.  He has elevated cholesterol with an LDL of 151.  He has his first bypass surgery in his 30s.  His brother recently  of an MI.  He has 2 children.  His dad had heart failure in his 60s.  His blood pressures been running elevated at home with blood pressures over 150.  He has been popping nitroglycerin pills as he says.  He has been intolerant to statin medications in the past but does take red rice yeast with no issues.    Past Medical History:   Diagnosis Date   • Arthritis    • Benign hypertensive heart disease without heart failure 2011   • CAD (coronary artery disease)    • Coronary atherosclerosis of autologous vein bypass graft 2011   • Gout    • Heart valve disease    • High cholesterol    • History of pancreatitis    • Hypertension    • Muscle cramps 10/4/2011   • Myocardial infarct (HCC)     Multiple MI's, ,    • Obesity 2011   • Pain     Joints   • Postsurgical aortocoronary bypass status 2016    Status post redo 3-V CABG in [place] 2015: SVG-acute marginal, SVG sequential to LAD, and OM    • Renal disorder     Hx of Acute renal failure r/t medication/statins   • S/P aortic valve replacement with bioprosthetic valve 2016    #23 Peñaloza Magna AVR (bioprosthetic)    • Statin intolerance 2016   • Status post cardiac revascularization with bypass aortocoronary anastomosis of  five coronary vessels 7/26/2016    5-V CABG done in 1998 (done in [place] at [facility]), patent LIMA to LAD, occluded SVG-OM, occluded SVG-RCA by cardiac catheterization 11/15/2007 with other vein grafts not identified at that time, poor targets for revascularization and declined repeat CABG in 2007 by Darlin. No ischemic was identified by MPI 11/17/07 with an EF of 50%.       Past Surgical History:   Procedure Laterality Date   • SHOULDER DECOMPRESSION ARTHROSCOPIC Right 9/5/2017    Procedure: SHOULDER DECOMPRESSION ARTHROSCOPIC SUBACROMIAL;  Surgeon: Mg Campos M.D.;  Location: [facility];  Service: Orthopedics   • ARTHROSCOPIC LABRAL DEBRIDEMENT Right 9/5/2017    Procedure: ARTHROSCOPIC LABRAL DEBRIDEMENT;  Surgeon: Mg Campos M.D.;  Location: [facility];  Service: Orthopedics   • SHOULDER ARTHROSCOPY W/ ROTATOR CUFF REPAIR Right 9/5/2017    Procedure: SHOULDER ARTHROSCOPY W/ ROTATOR CUFF REPAIR;  Surgeon: Mg Campos M.D.;  Location: [facility];  Service: Orthopedics   • SHOULDER ARTHROSCOPY W/ BICIPITAL TENODESIS REPAIR Right 9/5/2017    Procedure: SHOULDER ARTHROSCOPY W/ BICIPITAL TENODESIS REPAIR;  Surgeon: Mg Campos M.D.;  Location: [facility];  Service: Orthopedics   • SYNOVECTOMY Right 9/5/2017    Procedure: SYNOVECTOMY;  Surgeon: Mg Campos M.D.;  Location: [facility];  Service: Orthopedics   • MULTIPLE CORONARY ARTERY BYPASS  12/08/2015    3 way bypass & Bovine valve   • LAMINOTOMY  12/2004    Diskectomy L4-L5, L5-S1   • MULTIPLE CORONARY ARTERY BYPASS  11/11/1998    5 way bypass   • BIOPSY GENERAL      buttock lesion     Family History   Problem Relation Age of Onset   • Heart Attack Father         MI and CABG, unknown age   • Cancer Mother         Breast   • Heart Disease Brother    • Stroke Neg Hx    • Diabetes Neg Hx    • Lung Disease Neg Hx      Social History     Social  History   • Marital status:      Spouse name: N/A   • Number of children: N/A   • Years of education: N/A     Occupational History   • Not on file.     Social History Main Topics   • Smoking status: Former Smoker     Packs/day: 3.00     Years: 20.00     Types: Cigarettes     Quit date: 1/1/1992   • Smokeless tobacco: Never Used   • Alcohol use Yes      Comment: 4 per month   • Drug use: Yes     Types: Marijuana, Inhaled      Comment: Marijuana- Daily (4 times per day)   • Sexual activity: Yes     Partners: Female     Other Topics Concern   • Not on file     Social History Narrative   • No narrative on file     Allergies   Allergen Reactions   • Gemfibrozil    • Lipitor [Atorvastatin Calcium] Unspecified     Severe Muscle cramps, dehydration   • Tricor Unspecified     Dehydration, severe muscle cramps     Outpatient Encounter Prescriptions as of 9/13/2018   Medication Sig Dispense Refill   • lisinopril (PRINIVIL) 10 MG Tab Take 1 Tab by mouth every day. 30 Tab 11   • isosorbide mononitrate SR (IMDUR) 30 MG TABLET SR 24 HR Take 1 Tab by mouth every morning. 30 Tab 11   • lovastatin (MEVACOR) 10 MG tablet Take 1 Tab by mouth every evening. 30 Tab 11   • nitroglycerin (NITROSTAT) 0.4 MG SL Tab Place 1 Tab under tongue as needed for Chest Pain. 30 Tab 5   • allopurinol (ZYLOPRIM) 100 MG Tab Take 1 Tab by mouth every day. 90 Tab 2   • metoprolol SR (TOPROL XL) 50 MG TABLET SR 24 HR Take 1 Tab by mouth every day. 90 Tab 3   • indomethacin (INDOCIN) 50 MG Cap Take 50 mg by mouth 3 times a day.     • clopidogrel (PLAVIX) 75 MG Tab TAKE ONE TABLET BY MOUTH ONE TIME DAILY 30 Tab 11   • Omega-3 Fatty Acids (OMEGA-3 FISH OIL PO) Take  by mouth.     • magnesium oxide (MAG-OX) 400 MG Tab Take 400 mg by mouth every day.     • aspirin 81 MG EC tablet Take 1 Tab by mouth every day. 30 Tab 11   • Glucosamine-Chondroit-Vit C-Mn (GLUCOSAMINE CHONDROITIN COMPLX) CAPS Take 2 Caps by mouth every day.     • [DISCONTINUED] lisinopril  "(PRINIVIL) 5 MG Tab TAKE ONE TABLET BY MOUTH ONE TIME DAILY 30 Tab 11   • [DISCONTINUED] Red Yeast Rice 600 MG Cap Take 1 Cap by mouth every day. 90 Cap 3     No facility-administered encounter medications on file as of 9/13/2018.      Review of Systems   Constitutional: Negative.  Negative for chills, fever and malaise/fatigue.   HENT: Negative.  Negative for sore throat.    Eyes: Negative.    Respiratory: Negative.  Negative for cough, hemoptysis, sputum production, shortness of breath, wheezing and stridor.    Cardiovascular: Positive for chest pain. Negative for palpitations, orthopnea, claudication, leg swelling and PND.   Gastrointestinal: Negative.    Genitourinary: Negative.    Musculoskeletal: Negative.    Skin: Negative.    Neurological: Negative.  Negative for dizziness, loss of consciousness and weakness.   Endo/Heme/Allergies: Negative.  Does not bruise/bleed easily.   All other systems reviewed and are negative.       Objective:   /82   Pulse 72   Ht 1.676 m (5' 6\")   Wt 111.6 kg (246 lb)   SpO2 96%   BMI 39.71 kg/m²      Physical Exam   Constitutional: He appears well-developed and well-nourished. No distress.   HENT:   Head: Normocephalic and atraumatic.   Right Ear: External ear normal.   Left Ear: External ear normal.   Nose: Nose normal.   Mouth/Throat: No oropharyngeal exudate.   Eyes: Pupils are equal, round, and reactive to light. Conjunctivae and EOM are normal. Right eye exhibits no discharge. Left eye exhibits no discharge. No scleral icterus.   Neck: Neck supple. No JVD present.   Cardiovascular: Normal rate, regular rhythm and intact distal pulses.  Exam reveals no gallop and no friction rub.    No murmur heard.  Pulmonary/Chest: Effort normal. No stridor. No respiratory distress. He has no wheezes. He has no rales. He exhibits no tenderness.   Abdominal: Soft. He exhibits no distension. There is no guarding.   Musculoskeletal: Normal range of motion. He exhibits no edema, " tenderness or deformity.   Neurological: He is alert. He has normal reflexes. He displays normal reflexes. No cranial nerve deficit. He exhibits normal muscle tone. Coordination normal.   Skin: Skin is warm and dry. No rash noted. He is not diaphoretic. No erythema. No pallor.   Psychiatric: He has a normal mood and affect. His behavior is normal. Judgment and thought content normal.   Nursing note and vitals reviewed.      Assessment:     1. Coronary artery disease involving native coronary artery of native heart without angina pectoris  lisinopril (PRINIVIL) 10 MG Tab    isosorbide mononitrate SR (IMDUR) 30 MG TABLET SR 24 HR   2. Coronary atherosclerosis of autologous vein bypass graft without angina  lisinopril (PRINIVIL) 10 MG Tab    ECHOCARDIOGRAM COMP W/O CONT   3. Dyslipidemia  LIPID PANEL    ECHOCARDIOGRAM COMP W/O CONT    lovastatin (MEVACOR) 10 MG tablet   4. Fatty liver     5. Postsurgical aortocoronary bypass status     6. Prediabetes     7. S/P aortic valve replacement with bioprosthetic valve     8. Statin intolerance     9. Status post cardiac revascularization with bypass aortocoronary anastomosis of five coronary vessels     10. Obesity (BMI 30-39.9)         Medical Decision Making:  Today's Assessment / Status / Plan:     62-year-old male with premature coronary disease with multivessel disease as well as angina.  We did discuss the indication for a cardiac catheterization but given that he has had 2 bypass surgeries the likelihood of us finding intravesical lesion is relatively small.  We will consider that however in the future.  For now will because he is on red rice yeast and tolerating this I will start him on the lowest dose of lovastatin.  I will also add on Imdur 30 and increase his lisinopril to 10.  I had like to check his lipids.  We will also check an echocardiogram.  Here he has a follow-up in 4 weeks.    1. CAD s/p CABG    - cont asa    - cont clopidogrel 75    - stop red rice  yeast    - start lovastatin 10    2. Chronic angina    - start imdur 30      3. HTN    - increase lisinopril to 10    - cont metop XL 50    4. HLD    - per above    - consider genetic testing    - lovastatin per above    - consider Lp(a) testing    5. Aortic Valve    - TTE  Thank for you allowing me to take part in your patient's care, please call should you have any questions or would like to discuss this patient.      Martha Madrid M.D.  [id number] [address]  VIA In Basket

## 2018-09-14 RX ORDER — LISINOPRIL 5 MG/1
TABLET ORAL
Qty: 90 TAB | Refills: 0 | Status: SHIPPED | OUTPATIENT
Start: 2018-09-14 | End: 2018-09-14

## 2018-09-14 RX ORDER — CLOPIDOGREL BISULFATE 75 MG/1
TABLET ORAL
Qty: 90 TAB | Refills: 0 | Status: SHIPPED | OUTPATIENT
Start: 2018-09-14 | End: 2018-12-27 | Stop reason: SDUPTHER

## 2018-09-21 ENCOUNTER — HOSPITAL ENCOUNTER (OUTPATIENT)
Dept: CARDIOLOGY | Facility: MEDICAL CENTER | Age: 63
End: 2018-09-21
Attending: INTERNAL MEDICINE
Payer: COMMERCIAL

## 2018-09-21 DIAGNOSIS — I25.810 CORONARY ATHEROSCLEROSIS OF AUTOLOGOUS VEIN BYPASS GRAFT WITHOUT ANGINA: ICD-10-CM

## 2018-09-21 DIAGNOSIS — E78.5 DYSLIPIDEMIA: ICD-10-CM

## 2018-09-21 LAB
LV EJECT FRACT  99904: 65
LV EJECT FRACT MOD 2C 99903: 66.84
LV EJECT FRACT MOD 4C 99902: 69.78
LV EJECT FRACT MOD BP 99901: 69.01

## 2018-09-21 PROCEDURE — 93306 TTE W/DOPPLER COMPLETE: CPT

## 2018-09-27 ENCOUNTER — TELEPHONE (OUTPATIENT)
Dept: CARDIOLOGY | Facility: MEDICAL CENTER | Age: 63
End: 2018-09-27

## 2018-10-15 ENCOUNTER — HOSPITAL ENCOUNTER (OUTPATIENT)
Dept: LAB | Facility: MEDICAL CENTER | Age: 63
End: 2018-10-15
Attending: FAMILY MEDICINE
Payer: COMMERCIAL

## 2018-10-15 DIAGNOSIS — E78.5 DYSLIPIDEMIA: ICD-10-CM

## 2018-10-15 DIAGNOSIS — R73.03 PREDIABETES: ICD-10-CM

## 2018-10-15 DIAGNOSIS — E66.9 OBESITY (BMI 30-39.9): ICD-10-CM

## 2018-10-15 LAB
EST. AVERAGE GLUCOSE BLD GHB EST-MCNC: 131 MG/DL
HBA1C MFR BLD: 6.2 % (ref 0–5.6)

## 2018-10-15 PROCEDURE — 80053 COMPREHEN METABOLIC PANEL: CPT

## 2018-10-15 PROCEDURE — 80061 LIPID PANEL: CPT

## 2018-10-15 PROCEDURE — 36415 COLL VENOUS BLD VENIPUNCTURE: CPT

## 2018-10-15 PROCEDURE — 83036 HEMOGLOBIN GLYCOSYLATED A1C: CPT

## 2018-10-16 LAB
ALBUMIN SERPL BCP-MCNC: 5 G/DL (ref 3.2–4.9)
ALBUMIN/GLOB SERPL: 1.6 G/DL
ALP SERPL-CCNC: 68 U/L (ref 30–99)
ALT SERPL-CCNC: 32 U/L (ref 2–50)
ANION GAP SERPL CALC-SCNC: 6 MMOL/L (ref 0–11.9)
AST SERPL-CCNC: 36 U/L (ref 12–45)
BILIRUB SERPL-MCNC: 0.6 MG/DL (ref 0.1–1.5)
BUN SERPL-MCNC: 29 MG/DL (ref 8–22)
CALCIUM SERPL-MCNC: 9.7 MG/DL (ref 8.5–10.5)
CHLORIDE SERPL-SCNC: 107 MMOL/L (ref 96–112)
CHOLEST SERPL-MCNC: 284 MG/DL (ref 100–199)
CO2 SERPL-SCNC: 22 MMOL/L (ref 20–33)
CREAT SERPL-MCNC: 1.38 MG/DL (ref 0.5–1.4)
FASTING STATUS PATIENT QL REPORTED: NORMAL
GLOBULIN SER CALC-MCNC: 3.1 G/DL (ref 1.9–3.5)
GLUCOSE SERPL-MCNC: 118 MG/DL (ref 65–99)
HDLC SERPL-MCNC: 31 MG/DL
LDLC SERPL CALC-MCNC: 184 MG/DL
POTASSIUM SERPL-SCNC: 4.8 MMOL/L (ref 3.6–5.5)
PROT SERPL-MCNC: 8.1 G/DL (ref 6–8.2)
SODIUM SERPL-SCNC: 135 MMOL/L (ref 135–145)
TRIGL SERPL-MCNC: 345 MG/DL (ref 0–149)

## 2018-10-18 ENCOUNTER — OFFICE VISIT (OUTPATIENT)
Dept: CARDIOLOGY | Facility: MEDICAL CENTER | Age: 63
End: 2018-10-18
Payer: COMMERCIAL

## 2018-10-18 VITALS
HEART RATE: 76 BPM | BODY MASS INDEX: 38.41 KG/M2 | HEIGHT: 66 IN | OXYGEN SATURATION: 96 % | WEIGHT: 239 LBS | SYSTOLIC BLOOD PRESSURE: 120 MMHG | DIASTOLIC BLOOD PRESSURE: 70 MMHG

## 2018-10-18 DIAGNOSIS — Z95.1 STATUS POST CARDIAC REVASCULARIZATION WITH BYPASS AORTOCORONARY ANASTOMOSIS OF FIVE CORONARY VESSELS: ICD-10-CM

## 2018-10-18 DIAGNOSIS — Z78.9 STATIN INTOLERANCE: ICD-10-CM

## 2018-10-18 DIAGNOSIS — I20.89 EFFORT ANGINA: Primary | ICD-10-CM

## 2018-10-18 DIAGNOSIS — E78.5 DYSLIPIDEMIA: ICD-10-CM

## 2018-10-18 DIAGNOSIS — I25.810 CORONARY ATHEROSCLEROSIS OF AUTOLOGOUS VEIN BYPASS GRAFT WITHOUT ANGINA: ICD-10-CM

## 2018-10-18 DIAGNOSIS — Z95.3 S/P AORTIC VALVE REPLACEMENT WITH BIOPROSTHETIC VALVE: ICD-10-CM

## 2018-10-18 DIAGNOSIS — I25.10 CORONARY ARTERY DISEASE INVOLVING NATIVE CORONARY ARTERY OF NATIVE HEART WITHOUT ANGINA PECTORIS: ICD-10-CM

## 2018-10-18 PROCEDURE — 99214 OFFICE O/P EST MOD 30 MIN: CPT | Performed by: INTERNAL MEDICINE

## 2018-10-18 NOTE — PROGRESS NOTES
Subjective:   Abimael Hamilton is a 62 -year-old man with a history of 5 vessel CABG in 1998, and re-do 3-V CABG in 12/2005 as well as bioprosthetic AVR at that time. He has gout, hypertension, dyslipidemia, obesity, and intolerance to statins.    He tells me today about some midsternal chest discomfort that is exactly the same in nature as his previous angina prior to bypass surgeries.  He has been taking quite a bit more frequently sublingual nitroglycerin related to that.  This all started after his right lower extremity cellulitis that he was treated in the emergency room an outpatient for on 8/20/2018.    He also tells me about getting through his 2 rotator cuff surgeries and having torn a screw in his right shoulder related to a fall.  That has now resolved and he is through rotator cuff rehabilitation back in the gym and eating better as well.    Past Medical History:   Diagnosis Date   • Arthritis    • Benign hypertensive heart disease without heart failure 11/14/2011   • CAD (coronary artery disease)    • Coronary atherosclerosis of autologous vein bypass graft 11/14/2011   • Gout    • Heart valve disease    • High cholesterol    • History of pancreatitis    • Hypertension    • Muscle cramps 10/4/2011   • Myocardial infarct (HCC)     Multiple MI's, 1998, 2015   • Obesity 11/14/2011   • Pain     Joints   • Postsurgical aortocoronary bypass status 7/26/2016    Status post redo 3-V CABG in [place] 12/2015: SVG-acute marginal, SVG sequential to LAD, and OM    • Renal disorder     Hx of Acute renal failure r/t medication/statins   • S/P aortic valve replacement with bioprosthetic valve 7/26/2016    #23 Peñaloza Magna AVR (bioprosthetic)    • Statin intolerance 7/26/2016   • Status post cardiac revascularization with bypass aortocoronary anastomosis of five coronary vessels 7/26/2016    5-V CABG done in 1998 (done in [place] at [facility]), patent LIMA to LAD, occluded SVG-OM, occluded SVG-RCA by cardiac  catheterization 11/15/2007 with other vein grafts not identified at that time, poor targets for revascularization and declined repeat CABG in 2007 by Darlin. No ischemic was identified by MPI 11/17/07 with an EF of 50%.       Past Surgical History:   Procedure Laterality Date   • SHOULDER DECOMPRESSION ARTHROSCOPIC Right 9/5/2017    Procedure: SHOULDER DECOMPRESSION ARTHROSCOPIC SUBACROMIAL;  Surgeon: Mg Campos M.D.;  Location: [facility];  Service: Orthopedics   • ARTHROSCOPIC LABRAL DEBRIDEMENT Right 9/5/2017    Procedure: ARTHROSCOPIC LABRAL DEBRIDEMENT;  Surgeon: Mg Campos M.D.;  Location: [facility];  Service: Orthopedics   • SHOULDER ARTHROSCOPY W/ ROTATOR CUFF REPAIR Right 9/5/2017    Procedure: SHOULDER ARTHROSCOPY W/ ROTATOR CUFF REPAIR;  Surgeon: Mg Campos M.D.;  Location: [facility];  Service: Orthopedics   • SHOULDER ARTHROSCOPY W/ BICIPITAL TENODESIS REPAIR Right 9/5/2017    Procedure: SHOULDER ARTHROSCOPY W/ BICIPITAL TENODESIS REPAIR;  Surgeon: gM Campos M.D.;  Location: [facility];  Service: Orthopedics   • SYNOVECTOMY Right 9/5/2017    Procedure: SYNOVECTOMY;  Surgeon: Mg Campos M.D.;  Location: [facility];  Service: Orthopedics   • MULTIPLE CORONARY ARTERY BYPASS  12/08/2015    3 way bypass & Bovine valve   • LAMINOTOMY  12/2004    Diskectomy L4-L5, L5-S1   • MULTIPLE CORONARY ARTERY BYPASS  11/11/1998    5 way bypass   • BIOPSY GENERAL      buttock lesion     Family History   Problem Relation Age of Onset   • Heart Attack Father         MI and CABG, unknown age   • Cancer Mother         Breast   • Heart Disease Brother    • Stroke Neg Hx    • Diabetes Neg Hx    • Lung Disease Neg Hx      History   Smoking Status   • Former Smoker   • Packs/day: 3.00   • Years: 20.00   • Types: Cigarettes   • Quit date: 1/1/1992   Smokeless Tobacco   • Never Used     Allergies    "  Allergen Reactions   • Gemfibrozil    • Lipitor [Atorvastatin Calcium] Unspecified     Severe Muscle cramps, dehydration   • Lovastatin      Dehydration, severe muscle cramps   • Tricor Unspecified     Dehydration, severe muscle cramps     Outpatient Encounter Prescriptions as of 10/18/2018   Medication Sig Dispense Refill   • clopidogrel (PLAVIX) 75 MG Tab TAKE ONE TABLET BY MOUTH ONE TIME DAILY 90 Tab 0   • lisinopril (PRINIVIL) 10 MG Tab Take 1 Tab by mouth every day. 30 Tab 11   • isosorbide mononitrate SR (IMDUR) 30 MG TABLET SR 24 HR Take 1 Tab by mouth every morning. 30 Tab 11   • nitroglycerin (NITROSTAT) 0.4 MG SL Tab Place 1 Tab under tongue as needed for Chest Pain. 30 Tab 5   • allopurinol (ZYLOPRIM) 100 MG Tab Take 1 Tab by mouth every day. 90 Tab 2   • metoprolol SR (TOPROL XL) 50 MG TABLET SR 24 HR Take 1 Tab by mouth every day. 90 Tab 3   • indomethacin (INDOCIN) 50 MG Cap Take 50 mg by mouth 3 times a day.     • Omega-3 Fatty Acids (OMEGA-3 FISH OIL PO) Take  by mouth.     • magnesium oxide (MAG-OX) 400 MG Tab Take 400 mg by mouth every day.     • aspirin 81 MG EC tablet Take 1 Tab by mouth every day. 30 Tab 11   • Glucosamine-Chondroit-Vit C-Mn (GLUCOSAMINE CHONDROITIN COMPLX) CAPS Take 2 Caps by mouth every day.     • [DISCONTINUED] lovastatin (MEVACOR) 10 MG tablet Take 1 Tab by mouth every evening. (Patient not taking: Reported on 10/18/2018) 30 Tab 11     No facility-administered encounter medications on file as of 10/18/2018.      Review of Systems   Cardiovascular: Positive for chest pain.        Infrequent angina, took one nitroglycerin since our last appointment   Musculoskeletal: Positive for joint pain (with gout).   All other systems reviewed and are negative.       Objective:   /70 (BP Location: Right arm, Patient Position: Sitting)   Pulse 76   Ht 1.676 m (5' 6\")   Wt 108.4 kg (239 lb)   SpO2 96%   BMI 38.58 kg/m²     Physical Exam   Constitutional: He is oriented to " person, place, and time. He appears well-developed and well-nourished. No distress.   Pleasant, reasonably forward, obese, middle-aged man in no distress.  Physical examination is unchanged except as specified from a previous exam on 11/7/2017.   HENT:   Head: Normocephalic and atraumatic.   Eyes: Pupils are equal, round, and reactive to light. Conjunctivae and EOM are normal. No scleral icterus.   Neck: Neck supple. No JVD present. No tracheal deviation present.   Cardiovascular: Normal rate, regular rhythm, S2 normal and intact distal pulses.  Exam reveals no gallop and no friction rub.    Murmur heard.   Crescendo decrescendo systolic murmur is present with a grade of 2/6   Pulses:       Dorsalis pedis pulses are 2+ on the right side, and 2+ on the left side.       No carotid bruits   Pulmonary/Chest: Effort normal and breath sounds normal. No stridor. No respiratory distress. He has no wheezes. He has no rales.   Abdominal: Soft. Bowel sounds are normal. He exhibits no distension.   Musculoskeletal: He exhibits no edema (No significant lower extremity edema bilaterally).   Neurological: He is alert and oriented to person, place, and time.   Skin: Skin is warm and dry. No rash noted. He is not diaphoretic. No erythema. No pallor.   Tattoos noted on his forearms including Nicole    Psychiatric: He has a normal mood and affect. Judgment and thought content normal.   Vitals reviewed.    Lab Results   Component Value Date/Time    WBC 10.4 08/20/2018 08:53 PM    RBC 4.66 (L) 08/20/2018 08:53 PM    HEMOGLOBIN 15.1 08/20/2018 08:53 PM    HEMATOCRIT 43.4 08/20/2018 08:53 PM    MCV 93.1 08/20/2018 08:53 PM    MCH 32.4 08/20/2018 08:53 PM    MCHC 34.8 08/20/2018 08:53 PM    MPV 9.4 08/20/2018 08:53 PM        Lab Results   Component Value Date/Time    SODIUM 135 10/15/2018 08:56 AM    POTASSIUM 4.8 10/15/2018 08:56 AM    CHLORIDE 107 10/15/2018 08:56 AM    CO2 22 10/15/2018 08:56 AM    GLUCOSE 118 (H) 10/15/2018 08:56 AM  "   BUN 29 (H) 10/15/2018 08:56 AM    CREATININE 1.38 10/15/2018 08:56 AM    BUNCREATRAT 16 07/29/2016 10:22 AM        Lab Results   Component Value Date/Time    ASTSGOT 36 10/15/2018 08:56 AM    ALTSGPT 32 10/15/2018 08:56 AM        Lab Results   Component Value Date/Time    CHOLSTRLTOT 284 (H) 10/15/2018 08:56 AM     (H) 10/15/2018 08:56 AM    HDL 31 (A) 10/15/2018 08:56 AM    TRIGLYCERIDE 345 (H) 10/15/2018 08:56 AM       Echocardiogram, 8/4/2016:  \"CONCLUSIONS  Normal left ventricular size and systolic function, EF 60%.  Mild concentric left ventricular hypertrophy.  Mild mitral regurgitation.  Normally functioning bovine bioprosthetic aortic valve.   Normal right sided pressures.  No prior studies for comparison\"    Assessment:     1. Effort angina (HCC)  NM-CARDIAC PET   2. Coronary atherosclerosis of autologous vein bypass graft without angina  REFERRAL TO LIPID CLINIC   3. Coronary artery disease involving native coronary artery of native heart without angina pectoris  REFERRAL TO LIPID CLINIC    NM-CARDIAC PET   4. Status post cardiac revascularization with bypass aortocoronary anastomosis of five coronary vessels  REFERRAL TO LIPID CLINIC    NM-CARDIAC PET   5. Dyslipidemia     6. S/P aortic valve replacement with bioprosthetic valve         Medical Decision Making:  Today's Assessment / Status / Plan:     He describes to me today chest discomfort that is very concerning for progression of his multivessel coronary artery disease related to which and in conjunction with his body habitus (central obesity) I have ordered PET myocardial perfusion imaging as I do not believe suspect will be of adequate sensitivity.    His lipids are again extraordinarily elevated and my partner had seen him recently attempting lovastatin, which he had an almost immediate reaction to.  At this point, our office has tried on multiple occasions to get approved a PCSK-9 inhibitor.  I have referred him to the lipid clinic with " an LDL goal of hopefully less than 70 mg/dL if achievable.    Otherwise, his blood pressure is well controlled and I have not changed his medical regimen including an ACE inhibitor, beta-blocker, fish oil, and dual antiplatelet therapy.    Alejandro Fuentes MD  Cardiologist, [facility] [place]     Return in about 3 months (around 1/18/2019).    Physical Exam   Constitutional: He is oriented to person, place, and time. He appears well-developed and well-nourished. No distress.   Pleasant, reasonably forward, obese, middle-aged man in no distress.  Physical examination is unchanged except as specified from a previous exam on 11/7/2017.   HENT:   Head: Normocephalic and atraumatic.   Eyes: Pupils are equal, round, and reactive to light. Conjunctivae and EOM are normal. No scleral icterus.   Neck: Neck supple. No JVD present. No tracheal deviation present.   Cardiovascular: Normal rate, regular rhythm, S2 normal and intact distal pulses.  Exam reveals no gallop and no friction rub.    Murmur heard.   Crescendo decrescendo systolic murmur is present with a grade of 2/6   Pulses:       Dorsalis pedis pulses are 2+ on the right side, and 2+ on the left side.       No carotid bruits   Pulmonary/Chest: Effort normal and breath sounds normal. No stridor. No respiratory distress. He has no wheezes. He has no rales.   Abdominal: Soft. Bowel sounds are normal. He exhibits no distension.   Musculoskeletal: He exhibits no edema (No significant lower extremity edema bilaterally).   Neurological: He is alert and oriented to person, place, and time.   Skin: Skin is warm and dry. No rash noted. He is not diaphoretic. No erythema. No pallor.   Tattoos noted on his forearms including Nicole    Psychiatric: He has a normal mood and affect. Judgment and thought content normal.   Vitals reviewed.

## 2018-10-18 NOTE — LETTER
Name:          Abimael Hamilton   YOB: 1955  Date:     10/18/2018      Martha Madrid M.D.  [address]  [facility] [address]     Alejandro Fuentes MD  [address]  [place] [address]  Phone: [phone]  Back Line: [phone]  Fax: [phone]  E-mail: [email or web address]   Dear Dr. Madrid,    We had the pleasure of seeing your patient, Abimael Hamilton, in Cardiology Clinic at [facility] today.    As you know, he is a 62-year-old man with a history of 5 vessel CABG in 1998, and re-do 3-V CABG in 12/2005 as well as bioprosthetic AVR at that time. He has gout, hypertension, dyslipidemia, obesity, and intolerance to statins.    He describes to me today chest discomfort that is very concerning for progression of his multivessel coronary artery disease related to which and in conjunction with his body habitus (central obesity) I have ordered PET myocardial perfusion imaging as I do not believe suspect will be of adequate sensitivity.    His lipids are again extraordinarily elevated and my partner had seen him recently attempting lovastatin, which he had an almost immediate reaction to.  At this point, our office has tried on multiple occasions to get approved a PCSK-9 inhibitor.  I have referred him to the lipid clinic with an LDL goal of hopefully less than 70 mg/dL if achievable.    Otherwise, his blood pressure is well controlled and I have not changed his medical regimen including an ACE inhibitor, beta-blocker, fish oil, and dual antiplatelet therapy.    Return in about 3 months (around 1/18/2019).    Thank you for the referral and please do not hesitate to contact me at any time. My contact information is listed above.    This note was dictated using Dragon speech recognition software.     A full note including my physical examination and a full list of rectified medications is available in our medical record, and can be faxed as well.    Alejandro  MD Alfredo  Cardiologist  [facility]

## 2018-11-02 ENCOUNTER — HOSPITAL ENCOUNTER (OUTPATIENT)
Dept: RADIOLOGY | Facility: MEDICAL CENTER | Age: 63
End: 2018-11-02
Attending: INTERNAL MEDICINE
Payer: COMMERCIAL

## 2018-11-02 DIAGNOSIS — Z95.1 STATUS POST CARDIAC REVASCULARIZATION WITH BYPASS AORTOCORONARY ANASTOMOSIS OF FIVE CORONARY VESSELS: ICD-10-CM

## 2018-11-02 DIAGNOSIS — I20.89 EFFORT ANGINA: ICD-10-CM

## 2018-11-02 DIAGNOSIS — I25.10 CORONARY ARTERY DISEASE INVOLVING NATIVE CORONARY ARTERY OF NATIVE HEART WITHOUT ANGINA PECTORIS: ICD-10-CM

## 2018-11-07 ENCOUNTER — HOSPITAL ENCOUNTER (OUTPATIENT)
Dept: RADIOLOGY | Facility: MEDICAL CENTER | Age: 63
End: 2018-11-07
Attending: INTERNAL MEDICINE
Payer: COMMERCIAL

## 2018-11-07 PROCEDURE — 93018 CV STRESS TEST I&R ONLY: CPT | Performed by: INTERNAL MEDICINE

## 2018-11-07 PROCEDURE — 78492 MYOCRD IMG PET MLT RST&STRS: CPT | Mod: 26 | Performed by: INTERNAL MEDICINE

## 2018-11-08 NOTE — PROCEDURES
REFERRING PHYSICIAN:  Alejandro Fuentes MD    FAMILY PHYSICIAN:  Dr. Madrid.    AGE:  62.    GENDER:  Male.   HEIGHT:  66 inches.    WEIGHT:  239 pounds.    INDICATION:  Coronary artery disease and chest discomfort.    PROCEDURE:  The patient reviewed and signed the acknowledgement for testing   form.  The patient was in a fasting state and was properly prepared for   testing.  An intravenous line was inserted and a flush of normal saline   followed to insure line patency.    A transmission scan was acquired for attenuation correction using the internal   Germanium sources.  The patient was then administered 25.0 mCi of   Rubidium-82.  Approximately 90 seconds after the infusion, resting imagine   were obtained with ECG-gating.  Following the resting series, the patient   administered 60 mg of dipyridamole over four minutes.  The blood pressure,   heart rate and ECG were monitored and recorded.  After the dipyridamole   infusion was completed, another transmission scan for attenuation correction   was obtained.  The patient was then administered 25.1 mCi of Rubidium-82.    Approximately 90 seconds after the infusion, Peak stress images were obtained   with ECG-gating.    CLINICAL RESPONSE:  Resting blood pressure was 156/79 with a heart rate of 71.    Immediately post-dipyridamole infusion the blood pressure was 134/76 with a   heart rate of 80.  After a recovery period the blood pressure was 147/69 with   a heart rate of 80.    The patient experienced knee pain, dyspnea and flushing during testing.    Aminophylline ____ mg was administered following the scan.    ELECTROCARDIOGRAPHIC FINDINGS:  Patient's resting electrocardiogram revealed a   normal sinus rhythm and a nonspecific IVCD.  There was approximately 1 mm   baseline ST segment depression inferiorly.  Patient developed no chest   discomfort or ischemic EKG changes during dipyridamole stress.    SCINTOGRAPHIC FINDINGS:   Post Stress Images: There is a  moderate to severe reduction perfusion in   the basilar to mid inferior segment.  In addition, this defect was small to   moderate in size.  There is also a moderate sized defect in the proximal to   mid high lateral segment.  No other perfusion abnormalities were noted.     Rest Images: there was essentially complete resolution of both perfusion   abnormalities noted.  TID was also noted.    GATED WALL MOTION FINDINGS:  The resting ejection fraction was 45%.  It davdi   appropriately to 51% during dipyridamole stress.  There was hypokinesis of the   basilar inferior segment.  No definite hypokinesis was noted in the lateral   segment.    IMPRESSION:  1.  Dipyridamole stress negative for chest discomfort and negative for   ischemic EKG changes.  2.  PET perfusion images are suggestive of ischemia in the proximal to mid   inferior segment and in the proximal mid bilateral segment.  There is no   definite infarction.  In addition TID was present which could be indicative of   multivessel disease, possibly more severe than indicated by the perfusion   study.  3.  The patient's resting ejection fraction was mildly reduced and david   appropriately during dipyridamole stress.  There was basilar to mid inferior   hypokinesis noted.       ____________________________________     MD PAN COLON / SYDNEY    DD:  11/07/2018 15:08:47  DT:  11/07/2018 16:10:30    D#:  [id number]  Job#:  [id number]

## 2018-11-09 ENCOUNTER — TELEPHONE (OUTPATIENT)
Dept: CARDIOLOGY | Facility: MEDICAL CENTER | Age: 63
End: 2018-11-09

## 2018-11-09 NOTE — TELEPHONE ENCOUNTER
Abimael Fuentes M.D. 2 hours ago (10:55 AM)         Would like to get results from pet scan/ stress test. They didn't supply that information on [facility]. Thanks!             Discussed Cardiac PET result w/ Dr Fuentes, per Dr Fuentes, please informed pt that Cardiac PET is abnormal, please schedule pt for Cardiac Cath.     Called pt, discussed Cardiac PET result and Dr Fuentes recommendations, pt would like to set up appt w/ Dr Fuentes first before proceeding w/ Angiogram, scheduled pt to see Dr Fuentes on 11/13/18 at 0930am.     FYI to Dr Fuentes

## 2018-11-13 ENCOUNTER — TELEPHONE (OUTPATIENT)
Dept: CARDIOLOGY | Facility: MEDICAL CENTER | Age: 63
End: 2018-11-13

## 2018-11-13 ENCOUNTER — OFFICE VISIT (OUTPATIENT)
Dept: CARDIOLOGY | Facility: MEDICAL CENTER | Age: 63
End: 2018-11-13
Payer: COMMERCIAL

## 2018-11-13 VITALS
BODY MASS INDEX: 37.56 KG/M2 | SYSTOLIC BLOOD PRESSURE: 144 MMHG | HEIGHT: 66 IN | WEIGHT: 233.69 LBS | DIASTOLIC BLOOD PRESSURE: 92 MMHG | HEART RATE: 80 BPM | OXYGEN SATURATION: 95 %

## 2018-11-13 DIAGNOSIS — Z78.9 STATIN INTOLERANCE: ICD-10-CM

## 2018-11-13 DIAGNOSIS — I20.89 EFFORT ANGINA: ICD-10-CM

## 2018-11-13 DIAGNOSIS — I25.118 CORONARY ARTERY DISEASE OF NATIVE ARTERY OF NATIVE HEART WITH STABLE ANGINA PECTORIS (HCC): ICD-10-CM

## 2018-11-13 DIAGNOSIS — Z95.3 S/P AORTIC VALVE REPLACEMENT WITH BIOPROSTHETIC VALVE: ICD-10-CM

## 2018-11-13 DIAGNOSIS — R93.1 ABNORMAL NUCLEAR CARDIAC IMAGING TEST: Primary | ICD-10-CM

## 2018-11-13 PROCEDURE — 99213 OFFICE O/P EST LOW 20 MIN: CPT | Performed by: INTERNAL MEDICINE

## 2018-11-13 NOTE — TELEPHONE ENCOUNTER
Patient is scheduled on 11-16-18 for a C w/poss with Dr. Powers. No meds to stop. Patient was told to check in at 6:00am for a 7:30 procedure. H&P was done on 11-13-18 by Dr. ANGELIC Fuentes. Pre admit is scheduled on 11-15-18 at 7:15.

## 2018-11-13 NOTE — PROGRESS NOTES
Subjective:   Abimael Hamilton is a 62 -year-old man with a history of 5 vessel CABG in 1998, and re-do 3-V CABG in 12/2005 as well as bioprosthetic AVR at that time. He has gout, hypertension, dyslipidemia, obesity, and intolerance to statins.    He comes in to review the results of his abnormal PET nuclear stress test showing inferior ischemia and transient ischemic dilation.  He tells me that he continues to have episodes of angina, about 3 times requiring nitroglycerin since his last follow-up with me.  His last episode was approximately 1 week ago, and he has not had rest pain.    He has no other complaints of side effects with his cardiovascular regimen.    He reminds me about his reaction to ranolazine with what he believes was renal failure resulting from it.    He comes in with his wife who I had not previously met.  They are both very pleasant.    Past Medical History:   Diagnosis Date   • Arthritis    • Benign hypertensive heart disease without heart failure 11/14/2011   • CAD (coronary artery disease)    • Coronary atherosclerosis of autologous vein bypass graft 11/14/2011   • Gout    • Heart valve disease    • High cholesterol    • History of pancreatitis    • Hypertension    • Muscle cramps 10/4/2011   • Myocardial infarct (HCC)     Multiple MI's, 1998, 2015   • Obesity 11/14/2011   • Pain     Joints   • Postsurgical aortocoronary bypass status 7/26/2016    Status post redo 3-V CABG in [place] 12/2015: SVG-acute marginal, SVG sequential to LAD, and OM    • Renal disorder     Hx of Acute renal failure r/t medication/statins   • S/P aortic valve replacement with bioprosthetic valve 7/26/2016    #23 [place] Magna AVR (bioprosthetic)    • Statin intolerance 7/26/2016   • Status post cardiac revascularization with bypass aortocoronary anastomosis of five coronary vessels 7/26/2016    5-V CABG done in 1998 (done in [place] at [facility]), patent LIMA to LAD, occluded SVG-OM, occluded SVG-RCA by cardiac  catheterization 11/15/2007 with other vein grafts not identified at that time, poor targets for revascularization and declined repeat CABG in 2007 by [facility]. No ischemic was identified by MPI 11/17/07 with an EF of 50%.       Past Surgical History:   Procedure Laterality Date   • SHOULDER DECOMPRESSION ARTHROSCOPIC Right 9/5/2017    Procedure: SHOULDER DECOMPRESSION ARTHROSCOPIC SUBACROMIAL;  Surgeon: Mg Campos M.D.;  Location: [facility];  Service: Orthopedics   • ARTHROSCOPIC LABRAL DEBRIDEMENT Right 9/5/2017    Procedure: ARTHROSCOPIC LABRAL DEBRIDEMENT;  Surgeon: Mg Campos M.D.;  Location: [facility];  Service: Orthopedics   • SHOULDER ARTHROSCOPY W/ ROTATOR CUFF REPAIR Right 9/5/2017    Procedure: SHOULDER ARTHROSCOPY W/ ROTATOR CUFF REPAIR;  Surgeon: Mg Campos M.D.;  Location: [facility];  Service: Orthopedics   • SHOULDER ARTHROSCOPY W/ BICIPITAL TENODESIS REPAIR Right 9/5/2017    Procedure: SHOULDER ARTHROSCOPY W/ BICIPITAL TENODESIS REPAIR;  Surgeon: Mg Campos M.D.;  Location: [facility];  Service: Orthopedics   • SYNOVECTOMY Right 9/5/2017    Procedure: SYNOVECTOMY;  Surgeon: Mg Campos M.D.;  Location: [facility];  Service: Orthopedics   • MULTIPLE CORONARY ARTERY BYPASS  12/08/2015    3 way bypass & Bovine valve   • LAMINOTOMY  12/2004    Diskectomy L4-L5, L5-S1   • MULTIPLE CORONARY ARTERY BYPASS  11/11/1998    5 way bypass   • BIOPSY GENERAL      buttock lesion     Family History   Problem Relation Age of Onset   • Heart Attack Father         MI and CABG, unknown age   • Cancer Mother         Breast   • Heart Disease Brother    • Stroke Neg Hx    • Diabetes Neg Hx    • Lung Disease Neg Hx      History   Smoking Status   • Former Smoker   • Packs/day: 3.00   • Years: 20.00   • Types: Cigarettes   • Quit date: 1/1/1992   Smokeless Tobacco   • Never Used     Allergies    "  Allergen Reactions   • Gemfibrozil    • Lipitor [Atorvastatin Calcium] Unspecified     Severe Muscle cramps, dehydration   • Lovastatin      Dehydration, severe muscle cramps   • Tricor Unspecified     Dehydration, severe muscle cramps     Outpatient Encounter Prescriptions as of 11/13/2018   Medication Sig Dispense Refill   • clopidogrel (PLAVIX) 75 MG Tab TAKE ONE TABLET BY MOUTH ONE TIME DAILY 90 Tab 0   • lisinopril (PRINIVIL) 10 MG Tab Take 1 Tab by mouth every day. 30 Tab 11   • isosorbide mononitrate SR (IMDUR) 30 MG TABLET SR 24 HR Take 1 Tab by mouth every morning. 30 Tab 11   • nitroglycerin (NITROSTAT) 0.4 MG SL Tab Place 1 Tab under tongue as needed for Chest Pain. 30 Tab 5   • allopurinol (ZYLOPRIM) 100 MG Tab Take 1 Tab by mouth every day. 90 Tab 2   • metoprolol SR (TOPROL XL) 50 MG TABLET SR 24 HR Take 1 Tab by mouth every day. 90 Tab 3   • indomethacin (INDOCIN) 50 MG Cap Take 50 mg by mouth 3 times a day.     • Omega-3 Fatty Acids (OMEGA-3 FISH OIL PO) Take  by mouth.     • magnesium oxide (MAG-OX) 400 MG Tab Take 400 mg by mouth every day.     • aspirin 81 MG EC tablet Take 1 Tab by mouth every day. 30 Tab 11   • Glucosamine-Chondroit-Vit C-Mn (GLUCOSAMINE CHONDROITIN COMPLX) CAPS Take 2 Caps by mouth every day.       No facility-administered encounter medications on file as of 11/13/2018.      Review of Systems   Cardiovascular: Positive for chest pain.        Infrequent angina, took one nitroglycerin since our last appointment   Musculoskeletal: Positive for joint pain (with gout).   All other systems reviewed and are negative.       Objective:   /92 (BP Location: Right arm, Patient Position: Sitting, BP Cuff Size: Adult)   Pulse 80   Ht 1.676 m (5' 6\")   Wt 106 kg (233 lb 11 oz)   SpO2 95%   BMI 37.72 kg/m²      Physical Exam   Constitutional: He is oriented to person, place, and time. He appears well-developed and well-nourished. No distress.   Pleasant, reasonably forward, obese, " middle-aged man in no distress.  Physical examination is unchanged except as specified from a previous exam on 11/7/2017.   HENT:   Head: Normocephalic and atraumatic.   Eyes: Pupils are equal, round, and reactive to light. Conjunctivae and EOM are normal. No scleral icterus.   Neck: Neck supple. No JVD present. No tracheal deviation present.   Cardiovascular: Normal rate, regular rhythm, S2 normal and intact distal pulses.  Exam reveals no gallop and no friction rub.    Murmur heard.   Crescendo decrescendo systolic murmur is present with a grade of 2/6   Pulses:       Dorsalis pedis pulses are 2+ on the right side, and 2+ on the left side.       No carotid bruits   Pulmonary/Chest: Effort normal and breath sounds normal. No stridor. No respiratory distress. He has no wheezes. He has no rales.   Abdominal: Soft. Bowel sounds are normal. He exhibits no distension.   Musculoskeletal: He exhibits no edema (No significant lower extremity edema bilaterally).   Neurological: He is alert and oriented to person, place, and time.   Skin: Skin is warm and dry. No rash noted. He is not diaphoretic. No erythema. No pallor.   Tattoos noted on his forearms including Nicole    Psychiatric: He has a normal mood and affect. Judgment and thought content normal.   Vitals reviewed.    Lab Results   Component Value Date/Time    WBC 10.4 08/20/2018 08:53 PM    RBC 4.66 (L) 08/20/2018 08:53 PM    HEMOGLOBIN 15.1 08/20/2018 08:53 PM    HEMATOCRIT 43.4 08/20/2018 08:53 PM    MCV 93.1 08/20/2018 08:53 PM    MCH 32.4 08/20/2018 08:53 PM    MCHC 34.8 08/20/2018 08:53 PM    MPV 9.4 08/20/2018 08:53 PM        Lab Results   Component Value Date/Time    SODIUM 135 10/15/2018 08:56 AM    POTASSIUM 4.8 10/15/2018 08:56 AM    CHLORIDE 107 10/15/2018 08:56 AM    CO2 22 10/15/2018 08:56 AM    GLUCOSE 118 (H) 10/15/2018 08:56 AM    BUN 29 (H) 10/15/2018 08:56 AM    CREATININE 1.38 10/15/2018 08:56 AM    BUNCREATRAT 16 07/29/2016 10:22 AM        Lab  "Results   Component Value Date/Time    ASTSGOT 36 10/15/2018 08:56 AM    ALTSGPT 32 10/15/2018 08:56 AM        Lab Results   Component Value Date/Time    CHOLSTRLTOT 284 (H) 10/15/2018 08:56 AM     (H) 10/15/2018 08:56 AM    HDL 31 (A) 10/15/2018 08:56 AM    TRIGLYCERIDE 345 (H) 10/15/2018 08:56 AM       Echocardiogram, 8/4/2016:  \"CONCLUSIONS  Normal left ventricular size and systolic function, EF 60%.  Mild concentric left ventricular hypertrophy.  Mild mitral regurgitation.  Normally functioning bovine bioprosthetic aortic valve.   Normal right sided pressures.  No prior studies for comparison\"    Echocardiogram, 9/21/2018, images and report reviewed, my interpretation: Normal left ventricular ejection fraction (measured at 70%) with normal regional wall motion.  Mildly stenotic bioprosthetic aortic valve with a mean gradient measured at 27 mm a radiant measured at 48 mmHg (V-max 3.5 m/s) without perivalvular leak.  Left atrium is mildly dilated with a volume index measured at 39 mL/meter squared    Myocardial perfusion imaging, 11/7/2018:  \"SCINTOGRAPHIC FINDINGS:   Post Stress Images: There is a moderate to severe reduction perfusion in the basilar to mid inferior segment.  In addition, this defect was small to moderate in size.  There is also a moderate sized defect in the proximal to mid high lateral segment.  No other perfusion abnormalities were noted.      Rest Images: there was essentially complete resolution of both perfusion   abnormalities noted.  TID was also noted.     GATED WALL MOTION FINDINGS:  The resting ejection fraction was 45%.  It david   appropriately to 51% during dipyridamole stress.  There was hypokinesis of the basilar inferior segment.  No definite hypokinesis was noted in the lateral segment.     IMPRESSION:  1.  Dipyridamole stress negative for chest discomfort and negative for   ischemic EKG changes.  2.  PET perfusion images are suggestive of ischemia in the proximal to mid   " "  inferior segment and in the proximal mid bilateral segment.  There is no definite infarction.  In addition TID was present which could be indicative of   multivessel disease, possibly more severe than indicated by the perfusion study.  3.  The patient's resting ejection fraction was mildly reduced and david appropriately during dipyridamole stress.  There was basilar to mid inferior hypokinesis noted\"    Assessment:     1. Abnormal nuclear cardiac imaging test     2. Coronary artery disease of native artery of native heart with stable angina pectoris (HCC)     3. Statin intolerance     4. S/P aortic valve replacement with bioprosthetic valve     5. Effort angina (HCC)         Medical Decision Making:  Today's Assessment / Status / Plan:     He continues to have episodes of effort angina, and I reviewed with him the results of his PET myocardial perfusion imaging showing inferior ischemia and transient ischemic dilation related which I have recommended cardiac catheterization.  I reviewed the risk, benefits, and alternatives to that test, which he has had several times in the past.  He accepted those, and we will proceed with that.    Alejandro Fuentes MD  Cardiologist, [facility] [place]     Follow-up as scheduled in January with me    Physical Exam   Constitutional: He is oriented to person, place, and time. He appears well-developed and well-nourished. No distress.   Pleasant, reasonably forward, obese, middle-aged man in no distress.  Physical examination is unchanged except as specified from a previous exam on 11/7/2017.   HENT:   Head: Normocephalic and atraumatic.   Eyes: Pupils are equal, round, and reactive to light. Conjunctivae and EOM are normal. No scleral icterus.   Neck: Neck supple. No JVD present. No tracheal deviation present.   Cardiovascular: Normal rate, regular rhythm, S2 normal and intact distal pulses.  Exam reveals no gallop and no friction rub.    Murmur heard.   Crescendo " decrescendo systolic murmur is present with a grade of 2/6   Pulses:       Dorsalis pedis pulses are 2+ on the right side, and 2+ on the left side.       No carotid bruits   Pulmonary/Chest: Effort normal and breath sounds normal. No stridor. No respiratory distress. He has no wheezes. He has no rales.   Abdominal: Soft. Bowel sounds are normal. He exhibits no distension.   Musculoskeletal: He exhibits no edema (No significant lower extremity edema bilaterally).   Neurological: He is alert and oriented to person, place, and time.   Skin: Skin is warm and dry. No rash noted. He is not diaphoretic. No erythema. No pallor.   Tattoos noted on his forearms including Nicole    Psychiatric: He has a normal mood and affect. Judgment and thought content normal.   Vitals reviewed.

## 2018-11-13 NOTE — LETTER
Name:          Abimael Hamilton   YOB: 1955  Date:     11/13/2018      Martha Madrid M.D.  [address]  [place] [address]     Alejandro Fuentes MD  [address]  [place] [address]  Phone: [phone]  Back Line: [phone]  Fax: [phone]  E-mail: [email or web address]   Dear Dr. Madrid,    We had the pleasure of seeing your patient, Abimael Hamilton, in Cardiology Clinic at [facility] today.    As you know, he is a 62-year-old man with a history of 5 vessel CABG in 1998, and re-do 3-V CABG in 12/2005 as well as bioprosthetic AVR at that time. He has gout, hypertension, dyslipidemia, obesity, and intolerance to statins.    He continues to have episodes of effort angina, and I reviewed with him the results of his PET myocardial perfusion imaging showing inferior ischemia and transient ischemic dilation related which I have recommended cardiac catheterization.  I reviewed the risk, benefits, and alternatives to that test, which he has had several times in the past.  He accepted those, and we will proceed with that.    Return to clinic 1/21/2019.    Thank you for the referral and please do not hesitate to contact me at any time. My contact information is listed above.    This note was dictated using Dragon speech recognition software.     A full note including my physical examination and a full list of rectified medications is available in our medical record, and can be faxed as well.    Alejandro Fuentes MD  Cardiologist  [facility]

## 2018-11-15 ENCOUNTER — HOSPITAL ENCOUNTER (OUTPATIENT)
Dept: RADIOLOGY | Facility: MEDICAL CENTER | Age: 63
End: 2018-11-15
Attending: INTERNAL MEDICINE | Admitting: INTERNAL MEDICINE
Payer: COMMERCIAL

## 2018-11-15 DIAGNOSIS — Z01.810 PRE-OPERATIVE CARDIOVASCULAR EXAMINATION: ICD-10-CM

## 2018-11-15 DIAGNOSIS — Z01.811 PRE-OPERATIVE RESPIRATORY EXAMINATION: ICD-10-CM

## 2018-11-15 DIAGNOSIS — Z01.812 PRE-OPERATIVE LABORATORY EXAMINATION: ICD-10-CM

## 2018-11-15 LAB
ALBUMIN SERPL BCP-MCNC: 4.5 G/DL (ref 3.2–4.9)
ALBUMIN/GLOB SERPL: 1.7 G/DL
ALP SERPL-CCNC: 69 U/L (ref 30–99)
ALT SERPL-CCNC: 40 U/L (ref 2–50)
ANION GAP SERPL CALC-SCNC: 11 MMOL/L (ref 0–11.9)
APTT PPP: 30.3 SEC (ref 24.7–36)
AST SERPL-CCNC: 35 U/L (ref 12–45)
BILIRUB SERPL-MCNC: 0.5 MG/DL (ref 0.1–1.5)
BUN SERPL-MCNC: 16 MG/DL (ref 8–22)
CALCIUM SERPL-MCNC: 9.7 MG/DL (ref 8.5–10.5)
CHLORIDE SERPL-SCNC: 107 MMOL/L (ref 96–112)
CO2 SERPL-SCNC: 21 MMOL/L (ref 20–33)
CREAT SERPL-MCNC: 1.17 MG/DL (ref 0.5–1.4)
EKG IMPRESSION: NORMAL
ERYTHROCYTE [DISTWIDTH] IN BLOOD BY AUTOMATED COUNT: 45.4 FL (ref 35.9–50)
GLOBULIN SER CALC-MCNC: 2.7 G/DL (ref 1.9–3.5)
GLUCOSE SERPL-MCNC: 118 MG/DL (ref 65–99)
HCT VFR BLD AUTO: 46.9 % (ref 42–52)
HGB BLD-MCNC: 14.9 G/DL (ref 14–18)
INR PPP: 1 (ref 0.87–1.13)
MCH RBC QN AUTO: 28.9 PG (ref 27–33)
MCHC RBC AUTO-ENTMCNC: 31.8 G/DL (ref 33.7–35.3)
MCV RBC AUTO: 90.9 FL (ref 81.4–97.8)
PLATELET # BLD AUTO: 255 K/UL (ref 164–446)
PMV BLD AUTO: 10 FL (ref 9–12.9)
POTASSIUM SERPL-SCNC: 4.3 MMOL/L (ref 3.6–5.5)
PROT SERPL-MCNC: 7.2 G/DL (ref 6–8.2)
PROTHROMBIN TIME: 13.3 SEC (ref 12–14.6)
RBC # BLD AUTO: 5.16 M/UL (ref 4.7–6.1)
SODIUM SERPL-SCNC: 139 MMOL/L (ref 135–145)
WBC # BLD AUTO: 8.2 K/UL (ref 4.8–10.8)

## 2018-11-15 PROCEDURE — 80053 COMPREHEN METABOLIC PANEL: CPT

## 2018-11-15 PROCEDURE — 85610 PROTHROMBIN TIME: CPT

## 2018-11-15 PROCEDURE — 85730 THROMBOPLASTIN TIME PARTIAL: CPT

## 2018-11-15 PROCEDURE — 85027 COMPLETE CBC AUTOMATED: CPT

## 2018-11-15 PROCEDURE — 71046 X-RAY EXAM CHEST 2 VIEWS: CPT

## 2018-11-15 PROCEDURE — 36415 COLL VENOUS BLD VENIPUNCTURE: CPT

## 2018-11-15 PROCEDURE — 93005 ELECTROCARDIOGRAM TRACING: CPT

## 2018-11-15 PROCEDURE — 93010 ELECTROCARDIOGRAM REPORT: CPT | Performed by: INTERNAL MEDICINE

## 2018-11-16 ENCOUNTER — HOSPITAL ENCOUNTER (OUTPATIENT)
Facility: MEDICAL CENTER | Age: 63
End: 2018-11-16
Attending: INTERNAL MEDICINE | Admitting: INTERNAL MEDICINE
Payer: COMMERCIAL

## 2018-11-16 VITALS
HEIGHT: 66 IN | OXYGEN SATURATION: 97 % | RESPIRATION RATE: 14 BRPM | SYSTOLIC BLOOD PRESSURE: 150 MMHG | BODY MASS INDEX: 38.12 KG/M2 | WEIGHT: 237.22 LBS | DIASTOLIC BLOOD PRESSURE: 88 MMHG | TEMPERATURE: 97.5 F | HEART RATE: 69 BPM

## 2018-11-16 PROCEDURE — 700111 HCHG RX REV CODE 636 W/ 250 OVERRIDE (IP)

## 2018-11-16 PROCEDURE — 93455 CORONARY ART/GRFT ANGIO S&I: CPT | Mod: 26 | Performed by: INTERNAL MEDICINE

## 2018-11-16 PROCEDURE — 99152 MOD SED SAME PHYS/QHP 5/>YRS: CPT

## 2018-11-16 PROCEDURE — 700102 HCHG RX REV CODE 250 W/ 637 OVERRIDE(OP)

## 2018-11-16 PROCEDURE — 160002 HCHG RECOVERY MINUTES (STAT)

## 2018-11-16 PROCEDURE — 93567 NJX CAR CTH SPRVLV AORTGRPHY: CPT | Performed by: INTERNAL MEDICINE

## 2018-11-16 PROCEDURE — C1894 INTRO/SHEATH, NON-LASER: HCPCS

## 2018-11-16 PROCEDURE — C1760 CLOSURE DEV, VASC: HCPCS

## 2018-11-16 PROCEDURE — 304952 HCHG R 2 PADS

## 2018-11-16 PROCEDURE — 99153 MOD SED SAME PHYS/QHP EA: CPT

## 2018-11-16 PROCEDURE — 360979 HCHG DIAGNOSTIC CATH

## 2018-11-16 PROCEDURE — 307093 HCHG TR BAND RADIAL

## 2018-11-16 PROCEDURE — 93455 CORONARY ART/GRFT ANGIO S&I: CPT

## 2018-11-16 PROCEDURE — 93567 NJX CAR CTH SPRVLV AORTGRPHY: CPT

## 2018-11-16 PROCEDURE — 700101 HCHG RX REV CODE 250

## 2018-11-16 PROCEDURE — 700117 HCHG RX CONTRAST REV CODE 255: Performed by: INTERNAL MEDICINE

## 2018-11-16 PROCEDURE — 99152 MOD SED SAME PHYS/QHP 5/>YRS: CPT | Performed by: INTERNAL MEDICINE

## 2018-11-16 PROCEDURE — C1769 GUIDE WIRE: HCPCS

## 2018-11-16 PROCEDURE — A9270 NON-COVERED ITEM OR SERVICE: HCPCS

## 2018-11-16 RX ORDER — VERAPAMIL HYDROCHLORIDE 2.5 MG/ML
INJECTION, SOLUTION INTRAVENOUS
Status: COMPLETED
Start: 2018-11-16 | End: 2018-11-16

## 2018-11-16 RX ORDER — MIDAZOLAM HYDROCHLORIDE 1 MG/ML
INJECTION INTRAMUSCULAR; INTRAVENOUS
Status: COMPLETED
Start: 2018-11-16 | End: 2018-11-16

## 2018-11-16 RX ORDER — SODIUM CHLORIDE 9 MG/ML
INJECTION, SOLUTION INTRAVENOUS CONTINUOUS
Status: DISCONTINUED | OUTPATIENT
Start: 2018-11-16 | End: 2018-11-16

## 2018-11-16 RX ORDER — ACETAMINOPHEN 325 MG/1
TABLET ORAL
Status: COMPLETED
Start: 2018-11-16 | End: 2018-11-16

## 2018-11-16 RX ORDER — HEPARIN SODIUM,PORCINE 1000/ML
VIAL (ML) INJECTION
Status: COMPLETED
Start: 2018-11-16 | End: 2018-11-16

## 2018-11-16 RX ORDER — LIDOCAINE HYDROCHLORIDE 20 MG/ML
INJECTION, SOLUTION INFILTRATION; PERINEURAL
Status: COMPLETED
Start: 2018-11-16 | End: 2018-11-16

## 2018-11-16 RX ORDER — SODIUM CHLORIDE 9 MG/ML
INJECTION, SOLUTION INTRAVENOUS CONTINUOUS
Status: DISCONTINUED | OUTPATIENT
Start: 2018-11-16 | End: 2018-11-16 | Stop reason: HOSPADM

## 2018-11-16 RX ADMIN — ACETAMINOPHEN 650 MG: 325 TABLET, FILM COATED ORAL at 11:00

## 2018-11-16 RX ADMIN — HEPARIN SODIUM: 1000 INJECTION, SOLUTION INTRAVENOUS; SUBCUTANEOUS at 09:11

## 2018-11-16 RX ADMIN — NITROGLYCERIN 10 ML: 20 INJECTION INTRAVENOUS at 09:11

## 2018-11-16 RX ADMIN — VERAPAMIL HYDROCHLORIDE 2.5 MG: 2.5 INJECTION, SOLUTION INTRAVENOUS at 09:11

## 2018-11-16 RX ADMIN — LIDOCAINE HYDROCHLORIDE: 20 INJECTION, SOLUTION INFILTRATION; PERINEURAL at 09:11

## 2018-11-16 RX ADMIN — SODIUM CHLORIDE: 9 INJECTION, SOLUTION INTRAVENOUS at 06:30

## 2018-11-16 RX ADMIN — HEPARIN SODIUM: 200 INJECTION, SOLUTION INTRAVENOUS at 08:00

## 2018-11-16 RX ADMIN — MIDAZOLAM HYDROCHLORIDE 2 MG: 1 INJECTION, SOLUTION INTRAMUSCULAR; INTRAVENOUS at 09:23

## 2018-11-16 RX ADMIN — FENTANYL CITRATE 100 MCG: 50 INJECTION, SOLUTION INTRAMUSCULAR; INTRAVENOUS at 09:11

## 2018-11-16 RX ADMIN — IOHEXOL 311 ML: 350 INJECTION, SOLUTION INTRAVENOUS at 09:52

## 2018-11-16 RX ADMIN — MIDAZOLAM HYDROCHLORIDE 2 MG: 1 INJECTION, SOLUTION INTRAMUSCULAR; INTRAVENOUS at 09:10

## 2018-11-16 ASSESSMENT — PAIN SCALES - GENERAL
PAINLEVEL_OUTOF10: 0

## 2018-11-16 NOTE — DISCHARGE INSTRUCTIONS
ACTIVITY: Rest and take it easy for the first 24 hours.  A responsible adult is recommended to remain with you during that time.  It is normal to feel sleepy.  We encourage you to not do anything that requires balance, judgment or coordination.    MILD FLU-LIKE SYMPTOMS ARE NORMAL. YOU MAY EXPERIENCE GENERALIZED MUSCLE ACHES, THROAT IRRITATION, HEADACHE AND/OR SOME NAUSEA.    FOR 24 HOURS DO NOT:  Drive, operate machinery or run household appliances.  Drink beer or alcoholic beverages.   Make important decisions or sign legal documents.      DIET: To avoid nausea, slowly advance diet as tolerated, avoiding spicy or greasy foods for the first day.  Add more substantial food to your diet according to your physician's instructions.  Babies can be fed formula or breast milk as soon as they are hungry.  INCREASE FLUIDS AND FIBER TO AVOID CONSTIPATION.    SURGICAL DRESSING/BATHING:     Keep the dressing clean and dry for 24 hours.  May remove dressing tomorrow  and can shower.  Do not submerge site under water for 1 week.  No heavy lifting and limit movement for 2 days.    FOLLOW-UP APPOINTMENT:  A follow-up appointment should be arranged with your doctor; call to schedule.    You should CALL YOUR PHYSICIAN if you develop:  Fever greater than 101 degrees F.  Pain not relieved by medication, or persistent nausea or vomiting.  Excessive bleeding (blood soaking through dressing) or unexpected drainage from the wound.  Extreme redness or swelling around the incision site, drainage of pus or foul smelling drainage.  Inability to urinate or empty your bladder within 8 hours.  Problems with breathing or chest pain.    You should call 911 if you develop problems with breathing or chest pain.  If you are unable to contact your doctor or surgical center, you should go to the nearest emergency room or urgent care center.      Physician's telephone #: [phone]    If any questions arise, call your doctor.  If your doctor is not  available, please feel free to call the Surgical Center at [phone]  The Center is open Monday through Friday from 7AM to 7PM.  You can also call the HEALTH HOTLINE open 24 hours/day, 7 days/week and speak to a nurse at [phone], or toll free at [phone].    A registered nurse may call you a few days after your surgery to see how you are doing after your procedure.    MEDICATIONS: Resume taking daily medication.  Take prescribed pain medication with food.  If no medication is prescribed, you may take non-aspirin pain medication if needed.  PAIN MEDICATION CAN BE VERY CONSTIPATING.  Take a stool softener or laxative such as senokot, pericolace, or milk of magnesia if needed.      If your physician has prescribed pain medication that includes Acetaminophen (Tylenol), do not take additional Acetaminophen (Tylenol) while taking the prescribed medication.    Depression / Suicide Risk    As you are discharged from this [facility] facility, it is important to learn how to keep safe from harming yourself.    Recognize the warning signs:  · Abrupt changes in personality, positive or negative- including increase in energy   · Giving away possessions  · Change in eating patterns- significant weight changes-  positive or negative  · Change in sleeping patterns- unable to sleep or sleeping all the time   · Unwillingness or inability to communicate  · Depression  · Unusual sadness, discouragement and loneliness  · Talk of wanting to die  · Neglect of personal appearance   · Rebelliousness- reckless behavior  · Withdrawal from people/activities they love  · Confusion- inability to concentrate     If you or a loved one observes any of these behaviors or has concerns about self-harm, here's what you can do:  · Talk about it- your feelings and reasons for harming yourself  · Remove any means that you might use to hurt yourself (examples: pills, rope, extension cords, firearm)  · Get professional help from the  community (Mental Health, Substance Abuse, psychological counseling)  · Do not be alone:Call your Safe Contact- someone whom you trust who will be there for you.  · Call your local CRISIS HOTLINE 743-5051 or 049-123-2651  · Call your local Children's Mobile Crisis Response Team [place] (226) 405-8380 or www.[employer]  · Call the toll free National Suicide Prevention Hotlines   · National Suicide Prevention Lifeline 036-337-KHPE (1978)  · [place] [facility] Network 800-SUICIDE (130-7650)              Radial Catherization Discharge Instructions      · Do not subject hand/arm to any forceful movements for 24 hours    i.e. supporting weight when rising from the chair or bed.   · Do not drive a car for 24 hours  · You may remove the dressing tomorrow  · You may shower on the day following your procedure.  Do not take a tub bath or submerge the puncture site in water for 3 days following the procedure.  · No Lifting more than 3-5 pounds with affected wrist for 5 days  · Follow up with  in 2-4 weeks.  · Increase fluids for 2 days post procedure.  · Continue all previous medications unless otherwise instructed.    If bleeding should occur following discharge:  · Sit down and apply firm pressure to site with your fingers for 10 minutes  · If the bleeding stops, continue to sit quietly, keeping your wrist straight for 2 hours.  Notify physician as soon as possible ( [phone])  · If bleeding does not stop after 10 minutes, or if there is a large amount of bleeding or spurting, call 911 immediately.  Do not drive yourself to the hospital.                Post Angiogram Groin Care Instructions     INSTRUCTIONS  3. Examine (look and feel) the site of your incision site TODAY so you can recognize changes that should be called to your doctor (see below).  4. Avoid straining either by lifting or pulling objects for 4-5 days. Avoid lifting over 5 pounds.   5. For at least 72 hours, if you should sneeze or cough,  "please hold pressure over your groin area.  6. If you should begin to have oozing from the catheterization site, please hold firm pressure and call your doctor's office immediately.  7. If profuse bleeding occurs from the catheterization site, hold firm pressure and call \"[phone]\" immediately for assistance.  8. Remove bandage after 24 hours.     ACTIVITY  3. Limit activity as instructed by your doctor.  4. No driving or very limited driving with frequent stops for one week.   5. If you must take a long car ride, stop every hour and walk around the car.   6. Warm showers or baths are permitted after the bandage is removed. Avoid hot showers, baths, hot tubs, and swimming for one week.    PLEASE CALL YOUR DOCTOR IF:  2. Temperature elevation occurs.  3. Catheterization site becomes reddened or begins to drain.   4. Bruising appears to be new or not resolving. The bruise may move down your leg. This is normal.  5. The small round lump in the groin increases in size.  6. Any leg numbness, aching, or discomfort (immediately).  7. Increasing discomfort in the leg at the insertion site.  8. Chest pains, even if relieved by Nitroglycerin.    MISCELLANEOUS INSTRUCTIONS  2. Bruising may occur as a result of heart catheterization. Some of the discoloration may travel down the leg, going from blue to green in color.  3. A small round lump under the catheterization site will remain for up to six weeks.  4. If any questions arise call your physician's office. You can also call the [employer] HOTLINE open 24 hours/day, 7 days/week and speak to a nurse at [phone], or toll free at [phone].   5. You should call 911 if you develop problems with breathing or chest pain.      I acknowledge receipt and understanding of these Home Care instructions.    "

## 2018-11-16 NOTE — OR NURSING
1016: Patient from cath lab to PPU via [facility] s/p L heart cath with no intervention. Patient is awake and on bedrest/flat X 4 hours. VSS. LUE anf RLE CMS intact. R groin incision site soft and dressing clean, dry and intact. L radial TR band intact.  Vascular access care management per MD order (see assessment). No c/o pain or nausea at this time. Will monitor closely. Vital signs per MD order.   1035: VSS. Wife at bedside. Patient tolerating PO intake well.   1200: VSS. Patient sleeping.  1230: Dr. Powers at bedside to update patient.    1300: DC instructions given. Questions answered. Wife at bedside. R groin and R radial sites soft,CDI. No c/o pain or nausea. Patient wide awake. VSS. Patient met criteria for discharge.

## 2018-11-16 NOTE — PROCEDURES
REFERRING PHYSICIAN: Dr. Alejandro Fuentes    PREOPERATIVE DIAGNOSIS:  1.  Angina  2.  Positive stress test for inferior ischemia  3.  CAD status post CABG in the 90s and redo in 2015  4.  Bioprosthetic AVR 2015  5.  Poorly controlled hypertension  6.  Morbid obesity    POSTOPERATIVE DIAGNOSIS:  1.  Multivessel coronary artery disease  2.  Occluded vein grafts x6  3.  LIMA to LAD with focal 70-80% distal anastomotic stenosis  4.  Severe native coronary artery disease not amenable to PCI        PROCEDURE PERFORMED:  Selective coronary angiography of the native vessels  Selective bypass graft angiography  Selective LIMA angiography  Ascending aortogram  Supervision of moderate sedation    DESCRIPTION OF PROCEDURE:  The risks and benefits of cardiac catheterization as well as the procedure itself, rationale and appropriateness were discussed with the patient today. Complications including but not limited to death, stroke, MI, urgent bypass surgery, contrast nephropathy, vascular complications, bleeding and infection were explained to the patient. The potential outcomes associated with the procedure (possible PCI, possible CABG, possible medical Rx only) were also discussed at length. The patient agreed to proceed.    The patient was transported to the catheterization laboratory in the fasting state. The right radial area and right groin were prepped and draped in the usual fashion. Right left area was entered with a single through and through puncture under direct ultrasound guidance and a 6F glide sheath was placed. An intra-arterial cocktail of heparin, verapamil and nitroglycerine was administered. Over a wire, a JR4 6 Surinamese diagnostic catheter was passed to engage the LIMA to LAD.  Contrast was administered and multiple images obtained.  During catheter exchange the patient was not cooperative and contaminated the left radial access site.  Therefore this route was abandoned in favor of the right femoral access.  After  fluoroscopic localization the right femoral artery was entered with a single front wall puncture and a 6 Salvadorean SideArm sheath was placed.  JL 4.06 Salvadorean diagnostic was used to engage the left coronary followed by a JR4 used to engage the RCA.  This was then used to engage stumps of multiple grafts.  There were no obvious graft markers left from either surgery.  Following this a LCB catheter was used to attempt to engage further grafts.  Following this a 6 Salvadorean AL 2 was used to engage the grafts as was a LIMA catheter.  A 6 Salvadorean pigtail catheter was used in the aortic root for 20 cc/s for 40 cc for and a sending and arch aortogram.  This did not demonstrate patency of any other grafts.  Due to contrast and radiation use further attempts to localize any grafts were terminated.  All catheters and guidewires were removed and a TR band was applied to achieve patent hemostasis.  The right femoral artery was fluoroscopically evaluated and angiographically assessed and found to be suitable for closure.  A 6 Salvadorean Angio-Seal device was deployed in usual fashion without difficulty.  Patient left the cath lab in stable condition.      Moderate sedation directly monitored by me during the case while supervising the administration of the sedation medication by an independent trained RN to assist in the monitoring of the patient's level of consciousness and physiological status. I, the supervising physician was present the entire time from beginning of medication administration until the end of the procedure from 8:57 AM until 9:53 AM. For detailed administration records please see the moderate sedation documentation in the median tab.      FINDINGS:  I. HEMODYNAMICS:    Ao: 148/98 mmHg     II. CORONARY ANGIOGRAPHY:  Left Main: Large caliber tapering to a distal 40% stenosis.  The LCx is .  Left Anterior Descending: Chronic totally occluded in its midportion.  Proximal there is a high-grade diffuse disease supplying a  diffusely diseased diagonal.  No retrograde filling.  Left Circumflex: Chronic totally occluded at its takeoff from the left main.  Fills via retrograde collateralization through a previously placed jump graft from the LAD via the LIMA..  Also receives right to left collaterals from a proximal epicardial RCA branch.  Right Coronary: Chronic totally occluded in its proximal portion.  Supplying proximal right to left collaterals as above.  Vein grafts: All vein grafts occluded  LIMA to LAD: Large caliber with heavy tortuosity in its mid and distal portion.  The distal anastomotic site is notable for a concentric 70-80% focal stenosis with a more proximal 50% stenosis related to an acute bend in the vessel itself.  There is WESLEY-3 flow to the distal LAD and retrograde filling of a distal vein graft jump graft retrograde into the circumflex.  The aorta ostial portion of this vein graft is occluded.      COMPLICATIONS: none apparent    CONCLUSIONS:  1.  Multivessel coronary artery disease  2.  Occluded vein grafts x6  3.  LIMA to LAD with focal 70-80% distal anastomotic stenosis  4.  Severe native coronary artery disease not amenable to PCI  5.  Poorly controlled hypertension    RECOMMENDATIONS:  His FORD to LAD supplies both the distal LAD territory as well as via retrograde partially occluded jump graft the circumflex territory and is essentially his last remaining vessel.  There is WESLEY-3 flow and he is not maximally managed medically, in addition he has  of the RCA which was dominant and the vein graft is occluded and this is his only ischemic territory on noninvasive imaging.    1.  Medical therapy  2. Consideration of high risk last vessel PCI LIMA to LAD anastomosis should he prove to not be a redo surgical candidate (CT surgical consultation prior to any potential PCI would be required) and not be well managed with better medical therapy.  3. Weight loss

## 2018-11-19 RX ORDER — INDOMETHACIN 50 MG/1
CAPSULE ORAL
Qty: 30 CAP | Refills: 2 | Status: SHIPPED | OUTPATIENT
Start: 2018-11-19 | End: 2019-07-23

## 2018-11-21 ENCOUNTER — TELEPHONE (OUTPATIENT)
Dept: CARDIOLOGY | Facility: MEDICAL CENTER | Age: 63
End: 2018-11-21

## 2018-11-21 DIAGNOSIS — I11.9 BENIGN HYPERTENSIVE HEART DISEASE WITHOUT HEART FAILURE: ICD-10-CM

## 2018-11-21 DIAGNOSIS — Z78.9 STATIN INTOLERANCE: ICD-10-CM

## 2018-11-21 DIAGNOSIS — Z95.1 POSTSURGICAL AORTOCORONARY BYPASS STATUS: ICD-10-CM

## 2018-11-21 DIAGNOSIS — R93.1 ABNORMAL NUCLEAR CARDIAC IMAGING TEST: ICD-10-CM

## 2018-11-21 DIAGNOSIS — E78.5 DYSLIPIDEMIA: ICD-10-CM

## 2018-11-21 DIAGNOSIS — I25.118 CORONARY ARTERY DISEASE OF NATIVE ARTERY OF NATIVE HEART WITH STABLE ANGINA PECTORIS (HCC): ICD-10-CM

## 2018-11-21 DIAGNOSIS — Z95.1 STATUS POST CARDIAC REVASCULARIZATION WITH BYPASS AORTOCORONARY ANASTOMOSIS OF FIVE CORONARY VESSELS: ICD-10-CM

## 2018-11-21 NOTE — TELEPHONE ENCOUNTER
Concerns relayed to MD.  Per MD, he is recommending CT surgical consult, referral to lipid clinic, and to schedule patient with MD December 4th when MD is ADR at 1300.    Notified patient through [employer].  Referral to CT surgery and lipid clinic placed.  Task sent to schedulers to schedule patient with MD.    -----------------------------------  Abimael Fuentes M.D. 2 days ago        Results of angiogram not good. Was told my cardiologist was going to contact/ call me today. Didn't happen. What's up?

## 2018-11-26 ENCOUNTER — NON-PROVIDER VISIT (OUTPATIENT)
Dept: VASCULAR LAB | Facility: MEDICAL CENTER | Age: 63
End: 2018-11-26
Attending: INTERNAL MEDICINE
Payer: COMMERCIAL

## 2018-11-26 PROCEDURE — 99213 OFFICE O/P EST LOW 20 MIN: CPT | Performed by: PHARMACIST

## 2018-11-26 NOTE — PROGRESS NOTES
Date of Service: 18    Abimael Hamilton has been referred for evaluation and management of dyslipidemia    Referral Source: Dr. Alejandro Fuentes    [facility]  History of ASCVD: Patient has history of CAD status post CABG in the  then re-done in  along with unstable angina  Age at Initial Diagnosis:     Current Prescription Lipid Lowering Medications - including dose:   Statin: None  Non-Statin: None  Current Lipid Lowering and Related Supplements:   Red Yeast Rice and Fish Oil  Any Current Side Effects Potentially Related to Lipid Lowering therapy?   No  Current Adherence to Lipid Lowering Therapies   Complete  Previously Attempted Interventions for Lipids - including outcome  Statin: Patient has been on atorvastatin and lovastatin    Outcome: Severe muscle aches, dehydration, and kidney failure per patient  Non-Statin: Gemfibrozil and Fenofibrate   Outcome: Severe muscle cramps and dehydration  Any Previous History of Statin Intolerance?   Yes, Details: Severe muscle cramps, dehydration, and kidney failure per patient  Baseline Lipids Prior to Treatment:   Unknown or unavailable  Other Pertinent History: Per most recent cardiac note, patient has 6 occluded vein grafts and consistent angina. Patient also has bioprosthetic AV which he is on clopidogrel and ASA for.  History of other CV risk factors: Patient's father and brother  of cardiac complications at the age of 68 and 58.  PAST MEDICAL HISTORY:  has a past medical history of Arthritis; Benign hypertensive heart disease without heart failure (2011); CAD (coronary artery disease); Congestive heart failure (HCC); Coronary atherosclerosis of autologous vein bypass graft (2011); Gout; Heart valve disease; High cholesterol; History of pancreatitis; Hypertension; Muscle cramps (10/4/2011); Myocardial infarct (HCC); Obesity (2011); Pain; Postsurgical aortocoronary bypass status (2016); Renal disorder; S/P aortic valve replacement  with bioprosthetic valve (7/26/2016); Statin intolerance (7/26/2016); and Status post cardiac revascularization with bypass aortocoronary anastomosis of five coronary vessels (7/26/2016).  PAST SURGICAL HISTORY:  has a past surgical history that includes laminotomy (12/2004); biopsy general; multiple coronary artery bypass (11/11/1998); multiple coronary artery bypass (12/08/2015); shoulder decompression arthroscopic (Right, 9/5/2017); arthroscopic labral debridement (Right, 9/5/2017); shoulder arthroscopy w/ rotator cuff repair (Right, 9/5/2017); shoulder arthroscopy w/ bicipital tenodesis repair (Right, 9/5/2017); and synovectomy (Right, 9/5/2017).  CURRENT MEDICATIONS:   Current Outpatient Prescriptions:   •  indomethacin, TAKE ONE CAPSULE BY MOUTH TWICE DAILY AS NEEDED  •  clopidogrel, TAKE ONE TABLET BY MOUTH ONE TIME DAILY, 11/16/2018 at Unknown time  •  lisinopril, 10 mg, Oral, DAILY, 11/16/2018 at Unknown time  •  isosorbide mononitrate SR, 30 mg, Oral, QAM, 11/16/2018 at Unknown time  •  nitroglycerin, 0.4 mg, Sublingual, PRN, 11/2/2018  •  allopurinol, 100 mg, Oral, DAILY, 11/16/2018 at Unknown time  •  metoprolol SR, 50 mg, Oral, DAILY, 11/16/2018 at Unknown time  •  Omega-3 Fatty Acids (OMEGA-3 FISH OIL PO), Take  by mouth., 11/16/2018 at Unknown time  •  magnesium oxide, 400 mg, Oral, DAILY, 11/16/2018 at Unknown time  •  aspirin, 81 mg, Oral, DAILY, 11/16/2018 at Unknown time  •  GLUCOSAMINE CHONDROITIN COMPLX, 2 Cap, Oral, DAILY, 11/16/2018 at Unknown time  ALLERGIES: Gemfibrozil; Lipitor [atorvastatin calcium]; Lovastatin; and Tricor    SOCIAL HISTORY   History   Smoking Status   • Former Smoker   • Packs/day: 3.00   • Years: 20.00   • Types: Cigarettes   • Quit date: 1/1/1992   Smokeless Tobacco   • Never Used     Change in weight:  Per patient, weighed approx. 300 lbs prior to losing weight recently. Pt was around 180 lbs but pt fell causing him to gain some weight back due to being immobile. Pt is  now back on track with weight loss and a workout plan.  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 [employer]    ROS  Physical Exam    DATA REVIEW:  Most Recent Lipid Panel:   Lab Results   Component Value Date    CHOLSTRLTOT 284 (H) 10/15/2018    TRIGLYCERIDE 345 (H) 10/15/2018    HDL 31 (A) 10/15/2018     (H) 10/15/2018       Other Pertinent Blood Work:   Lab Results   Component Value Date    SODIUM 139 11/15/2018    POTASSIUM 4.3 11/15/2018    CHLORIDE 107 11/15/2018    CO2 21 11/15/2018    ANION 11.0 11/15/2018    GLUCOSE 118 (H) 11/15/2018    BUN 16 11/15/2018    CREATININE 1.17 11/15/2018    CALCIUM 9.7 11/15/2018    ASTSGOT 35 11/15/2018    ALTSGPT 40 11/15/2018    ALKPHOSPHAT 69 11/15/2018    TBILIRUBIN 0.5 11/15/2018    ALBUMIN 4.5 11/15/2018    AGRATIO 1.7 11/15/2018       Other: NA    ASSESSMENT AND PLAN  Patient Type, check all that apply:   Secondary Prevention  Established Atherosclerotic Cardiovascular Disease (ASCVD)  Yes, Details: history of CABG  Other Established (non-atherosclerotic) Vascular Disease, if Present:  HTN  Evidence of Heterozygous Familial Hypercholesterolemia (FH):   Possible  ACC/AHA Indication for Statin Therapy, emily all that apply:  Established ASCVD: Indication for High intensity statin   Calculated Risk for ASCVD, if applicable    N/A  Other Significant Risk Markers, if any, emily all that apply   Family history of premature ASCVD in first degree relative  National Lipid Association (NLA) Goal  LDL-C:   <70 mg/dL  Lifestyle Recommendations From Today’s Visit:    None  Statin Therapy Recommendations from Today’s Visit:   None, patient denied wanting to retrial any statin therapy.  Non-Statin Medications Recommendations from Today’s Visit:   None  Indication for PCSK9 Inhibitor, if  applicable:  ASCVD with suboptimal control of LDL-C despite maximally tolerated statin  Supplements Recommended at this visit: Patient is to continue with red yeast rice and fish oil  Recommendations for Other Cardiovascular Risk Factors, emily all that apply: Unable to assess blood pressure at visit today, blood pressure cuff was not functioning properly    Studies Ordered at Todays Visit: None  Blood Work Ordered At Today’s visit: None   Follow-Up: 2 weeks    Patient has significant history in regards to his cardiac health. Patient's father and brother  from premature ASCVD. Patient is intolerant to statins and will not retry any more statins. Per patient, he would be willing to try a PCSK9i if insurance will cover the medication/if it is affordable. Placed prescription for Repatha to see if the medication will be covered then will have the patient return in 2 weeks to see if Repatha will be affordable. If not, will need to consider other options for the patient.     Saira Baum, PharmD     Agree with above.  Patient with clear indication for PC SK 9 inhibitor given ASCVD with severe elevation in LDL C and inability to tolerate statins.  He is well above the threshold for initiation of PC SK 9 inhibitor based upon most recent ACC AHA guidelines.  Given his baseline lipid panel he may actually need the addition of ezetimibe and/or prescription strength omega-3's in addition.    CC:  Alejandro Fuentes M.D.  ÁNGEL Madrid MD

## 2018-12-04 ENCOUNTER — OFFICE VISIT (OUTPATIENT)
Dept: CARDIOLOGY | Facility: MEDICAL CENTER | Age: 63
End: 2018-12-04
Payer: COMMERCIAL

## 2018-12-04 VITALS
DIASTOLIC BLOOD PRESSURE: 52 MMHG | SYSTOLIC BLOOD PRESSURE: 140 MMHG | HEART RATE: 75 BPM | HEIGHT: 66 IN | WEIGHT: 230.38 LBS | BODY MASS INDEX: 37.03 KG/M2 | OXYGEN SATURATION: 98 %

## 2018-12-04 DIAGNOSIS — E78.5 DYSLIPIDEMIA: ICD-10-CM

## 2018-12-04 DIAGNOSIS — I25.718 ATHEROSCLEROSIS OF AUTOLOGOUS VEIN CORONARY ARTERY BYPASS GRAFT WITH STABLE ANGINA PECTORIS (HCC): Primary | ICD-10-CM

## 2018-12-04 DIAGNOSIS — I25.118 CORONARY ARTERY DISEASE OF NATIVE ARTERY OF NATIVE HEART WITH STABLE ANGINA PECTORIS (HCC): ICD-10-CM

## 2018-12-04 DIAGNOSIS — Z78.9 STATIN INTOLERANCE: ICD-10-CM

## 2018-12-04 DIAGNOSIS — Z95.3 S/P AORTIC VALVE REPLACEMENT WITH BIOPROSTHETIC VALVE: ICD-10-CM

## 2018-12-04 PROCEDURE — 99214 OFFICE O/P EST MOD 30 MIN: CPT | Performed by: INTERNAL MEDICINE

## 2018-12-04 RX ORDER — ISOSORBIDE MONONITRATE 60 MG/1
60 TABLET, EXTENDED RELEASE ORAL EVERY MORNING
Qty: 90 TAB | Refills: 3 | Status: SHIPPED | OUTPATIENT
Start: 2018-12-04 | End: 2019-01-21

## 2018-12-04 NOTE — LETTER
Name:          Abimael Hamilton   YOB: 1955  Date:     12/04/2018      Martha Madrid M.D.  [address]  [place] [address]     Alejandro Fuentes MD  [address]  [place] [address]  Phone: [phone]  Back Line: [phone]  Fax: [phone]  E-mail: [email or web address]   Dear Dr. Madrid,    We had the pleasure of seeing your patient, Abimael Hamilton, in Cardiology Clinic at [facility] today.    As you know, he is a 63-year-old man with a history of 5 vessel CABG in 1998, and re-do 3-V CABG in 12/2005 as well as bioprosthetic AVR at that time. All grafts are occluded with the exception of his LIMA-LAD which fills his circumflex distribution via a jump graft. His native coronary arteries are severely diseased as well. He has a 4.5 cm thoracic aortic aneurysm, gout, hypertension, dyslipidemia, obesity, and intolerance to statins.    He comes in today to review the results of his cardiac catheterization that showed occlusion of his vein grafts (all of them, x6) with high-grade disease of his LIMA-LAD for which stenting was appropriately deferred as stent failure would be catastrophic.  His angina is reasonably controlled with increase of his isosorbide to 60 mg p.o. daily in addition to his other medical regimen.    I discussed with him that it will be critical to better control his cholesterol in conjunction with his other lifestyle modification.  The lipid clinic continues to work on getting him approved for a PC SK 9 and inhibitor.    I did refer him to cardiothoracic surgery for evaluation though surgery is also a high risk proposition.    Return in about 3 months (around 3/4/2019).    Thank you for the referral and please do not hesitate to contact me at any time. My contact information is listed above.    This note was dictated using Dragon speech recognition software.     A full note including my physical examination and a full list of  rectified medications is available in our medical record, and can be faxed as well.    Alejandro Fuentes MD  Cardiologist  [facility]

## 2018-12-06 ENCOUNTER — TELEPHONE (OUTPATIENT)
Dept: CARDIOLOGY | Facility: MEDICAL CENTER | Age: 63
End: 2018-12-06

## 2018-12-06 DIAGNOSIS — I25.118 CORONARY ARTERY DISEASE OF NATIVE ARTERY OF NATIVE HEART WITH STABLE ANGINA PECTORIS (HCC): ICD-10-CM

## 2018-12-06 DIAGNOSIS — E78.49 OTHER HYPERLIPIDEMIA: ICD-10-CM

## 2018-12-06 NOTE — TELEPHONE ENCOUNTER
Per Dr Fuentes since Repatha already lowered the cost, please initiate Repatha 140mg/ml every 2 weeks for pt.     Upon chart review, Dr Bloch already sent Rx for Repatha for pt as pt is referred to Lipid clinic.     Dr Fuentes notified

## 2018-12-06 NOTE — PROGRESS NOTES
Subjective:   Abimael Hamilton is a 62 -year-old man with a history of 5 vessel CABG in 1998, and re-do 3-V CABG in 12/2005 as well as bioprosthetic AVR at that time. All grafts are occluded with the exception of his LIMA-LAD which fills his circumflex distribution via a jump graft. His native coronary arteries are severely diseased as well. He has a 4.5 cm thoracic aortic aneurysm, gout, hypertension, dyslipidemia, obesity, and intolerance to statins.    He comes in today in the aftermath of his cardiac catheterization that showed severe native coronary disease, and obliteration of his vein grafts x6 with his heart essentially living off a LIMA.  The LIMA to the LAD that backfills his circumflex OM system has a moderate stenosis around an area of tortuosity.  The interventional cardiologist to a done that case deferred smartly intervention given the potential for catastrophic outcome with stent failure recommending surgical evaluation.    He tells me that he still has infrequent episodes of anginal chest discomfort requiring nitroglycerin despite increase of his isosorbide dinitrate from previously 30 mg p.o. daily now to 60 mg p.o. daily.    He has no other cardiovascular complaints and comes in with his wife as usual.  Both are very pleasant, she is somewhat tearful related to his fairly poor prognosis overall.    Past Medical History:   Diagnosis Date   • Arthritis    • Benign hypertensive heart disease without heart failure 11/14/2011   • CAD (coronary artery disease)    • Congestive heart failure (HCC)    • Coronary atherosclerosis of autologous vein bypass graft 11/14/2011   • Gout    • Heart valve disease    • High cholesterol    • History of pancreatitis    • Hypertension    • Muscle cramps 10/4/2011   • Myocardial infarct (HCC)     Multiple MI's, 1998, 2015   • Obesity 11/14/2011   • Pain     Joints   • Postsurgical aortocoronary bypass status 7/26/2016    Status post redo 3-V CABG in [place] 12/2015:  SVG-acute marginal, SVG sequential to LAD, and OM    • Renal disorder     Hx of Acute renal failure r/t medication/statins   • S/P aortic valve replacement with bioprosthetic valve 7/26/2016    #23 [place] Magna AVR (bioprosthetic)    • Statin intolerance 7/26/2016   • Status post cardiac revascularization with bypass aortocoronary anastomosis of five coronary vessels 7/26/2016    5-V CABG done in 1998 (done in [place] at [facility]), patent LIMA to LAD, occluded SVG-OM, occluded SVG-RCA by cardiac catheterization 11/15/2007 with other vein grafts not identified at that time, poor targets for revascularization and declined repeat CABG in 2007 by [facility]. No ischemic was identified by MPI 11/17/07 with an EF of 50%.       Past Surgical History:   Procedure Laterality Date   • SHOULDER DECOMPRESSION ARTHROSCOPIC Right 9/5/2017    Procedure: SHOULDER DECOMPRESSION ARTHROSCOPIC SUBACROMIAL;  Surgeon: Mg Campos M.D.;  Location: [facility];  Service: Orthopedics   • ARTHROSCOPIC LABRAL DEBRIDEMENT Right 9/5/2017    Procedure: ARTHROSCOPIC LABRAL DEBRIDEMENT;  Surgeon: Mg Campos M.D.;  Location: [facility];  Service: Orthopedics   • SHOULDER ARTHROSCOPY W/ ROTATOR CUFF REPAIR Right 9/5/2017    Procedure: SHOULDER ARTHROSCOPY W/ ROTATOR CUFF REPAIR;  Surgeon: Mg Campos M.D.;  Location: [facility];  Service: Orthopedics   • SHOULDER ARTHROSCOPY W/ BICIPITAL TENODESIS REPAIR Right 9/5/2017    Procedure: SHOULDER ARTHROSCOPY W/ BICIPITAL TENODESIS REPAIR;  Surgeon: Mg Campos M.D.;  Location: [facility];  Service: Orthopedics   • SYNOVECTOMY Right 9/5/2017    Procedure: SYNOVECTOMY;  Surgeon: Mg Campos M.D.;  Location: [facility];  Service: Orthopedics   • MULTIPLE CORONARY ARTERY BYPASS  12/08/2015    3 way bypass & Bovine valve   • LAMINOTOMY  12/2004    Diskectomy L4-L5, L5-S1   • MULTIPLE CORONARY  ARTERY BYPASS  11/11/1998    5 way bypass   • BIOPSY GENERAL      buttock lesion     Family History   Problem Relation Age of Onset   • Heart Attack Father         MI and CABG, unknown age   • Cancer Mother         Breast   • Heart Disease Brother    • Stroke Neg Hx    • Diabetes Neg Hx    • Lung Disease Neg Hx      History   Smoking Status   • Former Smoker   • Packs/day: 3.00   • Years: 20.00   • Types: Cigarettes   • Quit date: 1/1/1992   Smokeless Tobacco   • Never Used     Allergies   Allergen Reactions   • Gemfibrozil    • Lipitor [Atorvastatin Calcium] Unspecified     Severe Muscle cramps, dehydration   • Lovastatin      Dehydration, severe muscle cramps   • Tricor Unspecified     Dehydration, severe muscle cramps     Outpatient Encounter Prescriptions as of 12/4/2018   Medication Sig Dispense Refill   • isosorbide mononitrate SR (IMDUR) 60 MG TABLET SR 24 HR Take 1 Tab by mouth every morning. 90 Tab 3   • indomethacin (INDOCIN) 50 MG Cap TAKE ONE CAPSULE BY MOUTH TWICE DAILY AS NEEDED 30 Cap 2   • clopidogrel (PLAVIX) 75 MG Tab TAKE ONE TABLET BY MOUTH ONE TIME DAILY 90 Tab 0   • lisinopril (PRINIVIL) 10 MG Tab Take 1 Tab by mouth every day. 30 Tab 11   • nitroglycerin (NITROSTAT) 0.4 MG SL Tab Place 1 Tab under tongue as needed for Chest Pain. 30 Tab 5   • allopurinol (ZYLOPRIM) 100 MG Tab Take 1 Tab by mouth every day. 90 Tab 2   • [DISCONTINUED] metoprolol SR (TOPROL XL) 50 MG TABLET SR 24 HR Take 1 Tab by mouth every day. 90 Tab 3   • Omega-3 Fatty Acids (OMEGA-3 FISH OIL PO) Take  by mouth.     • magnesium oxide (MAG-OX) 400 MG Tab Take 400 mg by mouth every day.     • aspirin 81 MG EC tablet Take 1 Tab by mouth every day. 30 Tab 11   • Glucosamine-Chondroit-Vit C-Mn (GLUCOSAMINE CHONDROITIN COMPLX) CAPS Take 2 Caps by mouth every day.     • Evolocumab, REPATHA, 140 MG/ML Solution Auto-injector Inject 1 Each as instructed every 14 days. 6 PEN 0   • [DISCONTINUED] isosorbide mononitrate SR (IMDUR) 30 MG  "TABLET SR 24 HR Take 1 Tab by mouth every morning. (Patient taking differently: Take 30 mg by mouth 2 Times a Day.) 30 Tab 11     No facility-administered encounter medications on file as of 12/4/2018.      Review of Systems   Cardiovascular: Positive for chest pain.        Infrequent angina, took one nitroglycerin since our last appointment   Musculoskeletal: Positive for joint pain (with gout).   All other systems reviewed and are negative.       Objective:   /52 (BP Location: Left arm, Patient Position: Sitting, BP Cuff Size: Adult)   Pulse 75   Ht 1.676 m (5' 6\")   Wt 104.5 kg (230 lb 6.1 oz)   SpO2 98%   BMI 37.18 kg/m²     Physical Exam   Constitutional: He is oriented to person, place, and time. He appears well-developed and well-nourished. No distress.   Pleasant, reasonably forward, obese, middle-aged man in no distress.  Physical examination is unchanged except as specified from a previous exam on 11/13/2017.   HENT:   Head: Normocephalic and atraumatic.   Eyes: Pupils are equal, round, and reactive to light. Conjunctivae and EOM are normal. No scleral icterus.   Neck: Neck supple. No JVD present. No tracheal deviation present.   Cardiovascular: Normal rate, regular rhythm, S2 normal and intact distal pulses.  Exam reveals no gallop and no friction rub.    Murmur heard.   Crescendo decrescendo systolic murmur is present with a grade of 2/6   Pulses:       Dorsalis pedis pulses are 2+ on the right side, and 2+ on the left side.       No carotid bruits   Pulmonary/Chest: Effort normal and breath sounds normal. No stridor. No respiratory distress. He has no wheezes. He has no rales.   Abdominal: Soft. Bowel sounds are normal. He exhibits no distension.   Musculoskeletal: He exhibits no edema (No significant lower extremity edema bilaterally).   Neurological: He is alert and oriented to person, place, and time.   Skin: Skin is warm and dry. No rash noted. He is not diaphoretic. No erythema. No " "pallor.   Tattoos noted on his forearms including Nicole    Psychiatric: He has a normal mood and affect. Judgment and thought content normal.   Vitals reviewed.    Lab Results   Component Value Date/Time    WBC 8.2 11/15/2018 07:58 AM    RBC 5.16 11/15/2018 07:58 AM    HEMOGLOBIN 14.9 11/15/2018 07:58 AM    HEMATOCRIT 46.9 11/15/2018 07:58 AM    MCV 90.9 11/15/2018 07:58 AM    MCH 28.9 11/15/2018 07:58 AM    MCHC 31.8 (L) 11/15/2018 07:58 AM    MPV 10.0 11/15/2018 07:58 AM        Lab Results   Component Value Date/Time    SODIUM 139 11/15/2018 07:58 AM    POTASSIUM 4.3 11/15/2018 07:58 AM    CHLORIDE 107 11/15/2018 07:58 AM    CO2 21 11/15/2018 07:58 AM    GLUCOSE 118 (H) 11/15/2018 07:58 AM    BUN 16 11/15/2018 07:58 AM    CREATININE 1.17 11/15/2018 07:58 AM    BUNCREATRAT 16 07/29/2016 10:22 AM        Lab Results   Component Value Date/Time    ASTSGOT 35 11/15/2018 07:58 AM    ALTSGPT 40 11/15/2018 07:58 AM        Lab Results   Component Value Date/Time    CHOLSTRLTOT 284 (H) 10/15/2018 08:56 AM     (H) 10/15/2018 08:56 AM    HDL 31 (A) 10/15/2018 08:56 AM    TRIGLYCERIDE 345 (H) 10/15/2018 08:56 AM       Echocardiogram, 8/4/2016:  \"CONCLUSIONS  Normal left ventricular size and systolic function, EF 60%.  Mild concentric left ventricular hypertrophy.  Mild mitral regurgitation.  Normally functioning bovine bioprosthetic aortic valve.   Normal right sided pressures.  No prior studies for comparison\"    Echocardiogram, 9/21/2018, images and report reviewed, my interpretation: Normal left ventricular ejection fraction (measured at 70%) with normal regional wall motion.  Mildly stenotic bioprosthetic aortic valve with a mean gradient measured at 27 mm a radiant measured at 48 mmHg (V-max 3.5 m/s) without perivalvular leak.  Left atrium is mildly dilated with a volume index measured at 39 mL/meter squared    Myocardial perfusion imaging, 11/7/2018:  \"SCINTOGRAPHIC FINDINGS:   Post Stress Images: There is a " "moderate to severe reduction perfusion in the basilar to mid inferior segment.  In addition, this defect was small to moderate in size.  There is also a moderate sized defect in the proximal to mid high lateral segment.  No other perfusion abnormalities were noted.      Rest Images: there was essentially complete resolution of both perfusion   abnormalities noted.  TID was also noted.     GATED WALL MOTION FINDINGS:  The resting ejection fraction was 45%.  It david appropriately to 51% during dipyridamole stress.  There was hypokinesis of the basilar inferior segment.  No definite hypokinesis was noted in the lateral segment.     IMPRESSION:  1.  Dipyridamole stress negative for chest discomfort and negative for   ischemic EKG changes.  2.  PET perfusion images are suggestive of ischemia in the proximal to mid inferior segment and in the proximal mid bilateral segment.  There is no definite infarction.  In addition TID was present which could be indicative of   multivessel disease, possibly more severe than indicated by the perfusion study.  3.  The patient's resting ejection fraction was mildly reduced and david appropriately during dipyridamole stress.  There was basilar to mid inferior hypokinesis noted\"    Cardiac catheterization, 11/16/2018:  \"CONCLUSIONS:  1.  Multivessel coronary artery disease  2.  Occluded vein grafts x6   3.  LIMA to LAD with focal 70-80% distal anastomotic stenosis 4.  Severe native coronary artery disease not amenable to PCI 5.  Poorly controlled hypertension     RECOMMENDATIONS:  His FORD to LAD supplies both the distal LAD territory as well as via retrograde partially occluded jump graft the circumflex territory and is essentially his last remaining vessel.  There is WESLEY-3 flow and he is not maximally managed medically, in addition he has  of the RCA which was dominant and the vein graft is occluded and this is his only ischemic territory on noninvasive imaging.     1.  Medical " "therapy  2. Consideration of high risk last vessel PCI LIMA to LAD anastomosis should he prove to not be a redo surgical candidate (CT surgical consultation prior to any potential PCI would be required) and not be well managed with better medical therapy.  3. Weight loss\"    Assessment:     1. Atherosclerosis of autologous vein coronary artery bypass graft with stable angina pectoris (HCC)     2. Coronary artery disease of native artery of native heart with stable angina pectoris (HCC)  isosorbide mononitrate SR (IMDUR) 60 MG TABLET SR 24 HR    REFERRAL TO CARDIOTHORACIC SURGERY   3. Dyslipidemia     4. S/P aortic valve replacement with bioprosthetic valve     5. Statin intolerance         Medical Decision Making:  Today's Assessment / Status / Plan:     He comes in today to review the results of his cardiac catheterization that showed occlusion of his vein grafts (all of them, x6) with high-grade disease of his LIMA-LAD for which stenting was appropriately deferred as stent failure would be catastrophic.  His angina is reasonably controlled with increase of his isosorbide to 60 mg p.o. daily in addition to his other medical regimen.    I discussed with him that it will be critical to better control his cholesterol in conjunction with his other lifestyle modification.  The lipid clinic continues to work on getting him approved for a PC SK 9 and inhibitor.    I did refer him to cardiothoracic surgery for evaluation though surgery is also a high risk proposition.    Alejandro Fuentes MD  Cardiologist, [facility] [place]     Return in about 3 months (around 3/4/2019).      Physical Exam   Constitutional: He is oriented to person, place, and time. He appears well-developed and well-nourished. No distress.   Pleasant, reasonably forward, obese, middle-aged man in no distress.  Physical examination is unchanged except as specified from a previous exam on 11/13/2017.   HENT:   Head: Normocephalic and " atraumatic.   Eyes: Pupils are equal, round, and reactive to light. Conjunctivae and EOM are normal. No scleral icterus.   Neck: Neck supple. No JVD present. No tracheal deviation present.   Cardiovascular: Normal rate, regular rhythm, S2 normal and intact distal pulses.  Exam reveals no gallop and no friction rub.    Murmur heard.   Crescendo decrescendo systolic murmur is present with a grade of 2/6   Pulses:       Dorsalis pedis pulses are 2+ on the right side, and 2+ on the left side.       No carotid bruits   Pulmonary/Chest: Effort normal and breath sounds normal. No stridor. No respiratory distress. He has no wheezes. He has no rales.   Abdominal: Soft. Bowel sounds are normal. He exhibits no distension.   Musculoskeletal: He exhibits no edema (No significant lower extremity edema bilaterally).   Neurological: He is alert and oriented to person, place, and time.   Skin: Skin is warm and dry. No rash noted. He is not diaphoretic. No erythema. No pallor.   Tattoos noted on his forearms including Nicole    Psychiatric: He has a normal mood and affect. Judgment and thought content normal.   Vitals reviewed.

## 2018-12-06 NOTE — TELEPHONE ENCOUNTER
Per Dr Fuentes, please call pt and make sure he is getting the Repatha.     Attempted to call pt, no answer, left vm to call back

## 2018-12-07 NOTE — TELEPHONE ENCOUNTER
Pt called back, pt reports Dr Bloch has not send Rx for Repatha, he confirmed he has an upcoming appt w/ Lipid Clinic on Monday 12/10/18, informed pt that will just send the Repatha Rx then to initiate process, pt verbalizes understanding. Pt also reports [facility] has not contacted him yet for CTS consult. Informed pt to call them to follow up, he requested for me to send the phone number on AskYou.     Called [facility], they confirmed that pt is already scheduled w/ Dr Saeed on 12/11/18, they will contact pt and confirm, pt notified on AskYou about this information.       NICANOR to Kay

## 2018-12-10 ENCOUNTER — TELEPHONE (OUTPATIENT)
Dept: CARDIOLOGY | Facility: MEDICAL CENTER | Age: 63
End: 2018-12-10

## 2018-12-10 ENCOUNTER — NON-PROVIDER VISIT (OUTPATIENT)
Dept: VASCULAR LAB | Facility: MEDICAL CENTER | Age: 63
End: 2018-12-10
Attending: INTERNAL MEDICINE
Payer: COMMERCIAL

## 2018-12-10 PROCEDURE — 99212 OFFICE O/P EST SF 10 MIN: CPT

## 2018-12-10 NOTE — PROGRESS NOTES
Date of Service: 18     Abimael Hamilton has been referred for evaluation and management of dyslipidemia     Referral Source: Dr. Alejandro Fuentes     HPI  History of ASCVD: Patient has history of CAD status post CABG in the  then re-done in  along with unstable angina  Age at Initial Diagnosis:     Current Prescription Lipid Lowering Medications - including dose:   Statin: None  Non-Statin: None  Current Lipid Lowering and Related Supplements:   Red Yeast Rice and Fish Oil  Any Current Side Effects Potentially Related to Lipid Lowering therapy?   No  Current Adherence to Lipid Lowering Therapies   Complete  Previously Attempted Interventions for Lipids - including outcome  Statin: Patient has been on atorvastatin and lovastatin               Outcome: Severe muscle aches, dehydration, and kidney failure per patient  Non-Statin: Gemfibrozil and Fenofibrate              Outcome: Severe muscle cramps and dehydration  Any Previous History of Statin Intolerance?   Yes, Details: Severe muscle cramps, dehydration, and kidney failure per patient  Baseline Lipids Prior to Treatment:   Unknown or unavailable  Other Pertinent History: Per most recent cardiac note, patient has 6 occluded vein grafts and consistent angina. Patient also has bioprosthetic AV which he is on clopidogrel and ASA for.  History of other CV risk factors: Patient's father and brother  of cardiac complications at the age of 68 and 58.  PAST MEDICAL HISTORY:  has a past medical history of Arthritis; Benign hypertensive heart disease without heart failure (2011); CAD (coronary artery disease); Congestive heart failure (HCC); Coronary atherosclerosis of autologous vein bypass graft (2011); Gout; Heart valve disease; High cholesterol; History of pancreatitis; Hypertension; Muscle cramps (10/4/2011); Myocardial infarct (HCC); Obesity (2011); Pain; Postsurgical aortocoronary bypass status (2016); Renal disorder; S/P  aortic valve replacement with bioprosthetic valve (7/26/2016); Statin intolerance (7/26/2016); and Status post cardiac revascularization with bypass aortocoronary anastomosis of five coronary vessels (7/26/2016).  PAST SURGICAL HISTORY:  has a past surgical history that includes laminotomy (12/2004); biopsy general; multiple coronary artery bypass (11/11/1998); multiple coronary artery bypass (12/08/2015); shoulder decompression arthroscopic (Right, 9/5/2017); arthroscopic labral debridement (Right, 9/5/2017); shoulder arthroscopy w/ rotator cuff repair (Right, 9/5/2017); shoulder arthroscopy w/ bicipital tenodesis repair (Right, 9/5/2017); and synovectomy (Right, 9/5/2017).  CURRENT MEDICATIONS:     Current Outpatient Prescriptions:      •  indomethacin, TAKE ONE CAPSULE BY MOUTH TWICE DAILY AS NEEDED  •  clopidogrel, TAKE ONE TABLET BY MOUTH ONE TIME DAILY, 11/16/2018 at Unknown time  •  lisinopril, 10 mg, Oral, DAILY, 11/16/2018 at Unknown time  •  isosorbide mononitrate SR, 60 mg, Oral, QAM, 11/16/2018 at Unknown time  •  nitroglycerin, 0.4 mg, Sublingual, PRN, 11/2/2018  •  allopurinol, 100 mg, Oral, DAILY, 11/16/2018 at Unknown time  •  metoprolol SR, 50 mg, Oral, DAILY, 11/16/2018 at Unknown time  •  Omega-3 Fatty Acids (OMEGA-3 FISH OIL PO), Take  by mouth., 11/16/2018 at Unknown time  •  magnesium oxide, 400 mg, Oral, DAILY, 11/16/2018 at Unknown time  •  aspirin, 81 mg, Oral, DAILY, 11/16/2018 at Unknown time  •  GLUCOSAMINE CHONDROITIN COMPLX, 2 Cap, Oral, DAILY, 11/16/2018 at Unknown time     ALLERGIES: Gemfibrozil; Lipitor [atorvastatin calcium]; Lovastatin; and Tricor     SOCIAL HISTORY        History   Smoking Status   • Former Smoker   • Packs/day: 3.00   • Years: 20.00   • Types: Cigarettes   • Quit date: 1/1/1992   Smokeless Tobacco   • Never Used      Change in weight:  Per patient, weighed approx. 300 lbs prior to losing weight recently. Pt was around 180 lbs but pt fell causing him to gain some  "weight back due to being immobile. Pt is now back on track with weight loss and a workout plan. Pt reports losing 15 lbs since June.  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 Pt current reports \"horrible\" diet consisting of sweets and fried food.      ROS  Physical Exam     DATA REVIEW:  Most Recent Lipid Panel:         Lab Results   Component Value Date     CHOLSTRLTOT 284 (H) 10/15/2018     TRIGLYCERIDE 345 (H) 10/15/2018     HDL 31 (A) 10/15/2018      (H) 10/15/2018         Other Pertinent Blood Work:         Lab Results   Component Value Date     SODIUM 139 11/15/2018     POTASSIUM 4.3 11/15/2018     CHLORIDE 107 11/15/2018     CO2 21 11/15/2018     ANION 11.0 11/15/2018     GLUCOSE 118 (H) 11/15/2018     BUN 16 11/15/2018     CREATININE 1.17 11/15/2018     CALCIUM 9.7 11/15/2018     ASTSGOT 35 11/15/2018     ALTSGPT 40 11/15/2018     ALKPHOSPHAT 69 11/15/2018     TBILIRUBIN 0.5 11/15/2018     ALBUMIN 4.5 11/15/2018     AGRATIO 1.7 11/15/2018        Other: NA     ASSESSMENT AND PLAN  Patient Type, check all that apply:   Secondary Prevention  Established Atherosclerotic Cardiovascular Disease (ASCVD)  Yes, Details: history of CABG  Other Established (non-atherosclerotic) Vascular Disease, if Present:  HTN  Evidence of Heterozygous Familial Hypercholesterolemia (FH):   Possible  ACC/AHA Indication for Statin Therapy, emily all that apply:  Established ASCVD: Indication for High intensity statin   Calculated Risk for ASCVD, if applicable    N/A  Other Significant Risk Markers, if any, emily all that apply   Family history of premature ASCVD in first degree relative  National Lipid Association (NLA) Goal  LDL-C:   <70 mg/dL  Lifestyle Recommendations From Today’s Visit:    None  Statin Therapy Recommendations from " Today’s Visit:   None, patient denied wanting to retrial any statin therapy.  Non-Statin Medications Recommendations from Today’s Visit:   None  Indication for PCSK9 Inhibitor, if applicable:  ASCVD with suboptimal control of LDL-C despite maximally tolerated statin  Supplements Recommended at this visit: Patient is to continue with red yeast rice and fish oil  Recommendations for Other Cardiovascular Risk Factors, emily all that apply: Unable to assess blood pressure at visit today, blood pressure cuff was not functioning properly     Studies Ordered at Todays Visit: None  Blood Work Ordered At Today’s visit: None   Follow-Up: 2 weeks    Pt has had significant history with regards to own cardiac health and first degree relatives on father's side. Pt reported intolerance to multiple statins and refused additional statin trials. Unfortunately, pt's PA was not processed since last visit, re-initiated PA process. Pt will likely receive call from specialty pharmacy within next 10-1 business days. In the meanwhile, has provided pt with samples for 4 weeks of Repatha. Advised pt heavily on changing diet habits, but pt ambivalent but did commit to setting date to start eating healthier for 1/1/19. Will follow up in 2 weeks due to pt's self-reported extreme sensitivity to the side effects of medications.      Lilliam Mir, PharmD

## 2018-12-11 ENCOUNTER — ANTICOAGULATION VISIT (OUTPATIENT)
Dept: VASCULAR LAB | Facility: MEDICAL CENTER | Age: 63
End: 2018-12-11
Attending: INTERNAL MEDICINE
Payer: COMMERCIAL

## 2018-12-11 ENCOUNTER — TELEPHONE (OUTPATIENT)
Dept: VASCULAR LAB | Facility: MEDICAL CENTER | Age: 63
End: 2018-12-11

## 2018-12-11 DIAGNOSIS — E78.49 OTHER HYPERLIPIDEMIA: ICD-10-CM

## 2018-12-11 NOTE — PROGRESS NOTES
[facility]    Pt brought in his Repatha pen complaining it had issues.  When I deployed the medication onto a piece of paper, the contents did not eject and the needle failed to retract.  Appears this was a faulty pen. Pt does have one pen left at home and is willing to try the remaining pen.    Adonay Fields, PharmD

## 2018-12-12 ENCOUNTER — TELEPHONE (OUTPATIENT)
Dept: VASCULAR LAB | Facility: MEDICAL CENTER | Age: 63
End: 2018-12-12

## 2018-12-12 NOTE — TELEPHONE ENCOUNTER
Received a letter from [employer] stating that the patients Repatha PA has been denied as Prlauent is the preferred agent. The letter also states that there is a 1 year approval that has been provided for Praluent.  Called in Praluent to [employer]. Letter scanned in media

## 2018-12-12 NOTE — TELEPHONE ENCOUNTER
PA paperwork completed for Repatha; to MA to send with documentation.    ARGELIA Mitchell  [facility]

## 2018-12-18 ENCOUNTER — TELEPHONE (OUTPATIENT)
Dept: VASCULAR LAB | Facility: MEDICAL CENTER | Age: 63
End: 2018-12-18

## 2018-12-18 DIAGNOSIS — E78.5 DYSLIPIDEMIA: ICD-10-CM

## 2018-12-18 DIAGNOSIS — I25.718 ATHEROSCLEROSIS OF AUTOLOGOUS VEIN CORONARY ARTERY BYPASS GRAFT WITH STABLE ANGINA PECTORIS (HCC): ICD-10-CM

## 2018-12-21 ENCOUNTER — NON-PROVIDER VISIT (OUTPATIENT)
Dept: VASCULAR LAB | Facility: MEDICAL CENTER | Age: 63
End: 2018-12-21
Attending: INTERNAL MEDICINE
Payer: COMMERCIAL

## 2018-12-21 DIAGNOSIS — I25.118 CORONARY ARTERY DISEASE OF NATIVE ARTERY OF NATIVE HEART WITH STABLE ANGINA PECTORIS (HCC): ICD-10-CM

## 2018-12-21 PROCEDURE — 99211 OFF/OP EST MAY X REQ PHY/QHP: CPT

## 2018-12-21 NOTE — PROGRESS NOTES
Date of Service: 2018     Abimael Hamilton has been referred for evaluation and management of dyslipidemia     Referral Source: Dr. Alejandro Fuentes     HPI  History of ASCVD: Patient has history of CAD status post CABG in the  then re-done in  along with unstable angina  Age at Initial Diagnosis:     Current Prescription Lipid Lowering Medications - including dose:   Statin: None  Non-Statin: None  Current Lipid Lowering and Related Supplements:   Red Yeast Rice and Fish Oil  Any Current Side Effects Potentially Related to Lipid Lowering therapy?   No  Current Adherence to Lipid Lowering Therapies   Complete  Previously Attempted Interventions for Lipids - including outcome  Statin: Patient has been on atorvastatin and lovastatin               Outcome: Severe muscle aches, dehydration, and kidney failure per patient  Non-Statin: Gemfibrozil and Fenofibrate              Outcome: Severe muscle cramps and dehydration  Any Previous History of Statin Intolerance?   Yes, Details: Severe muscle cramps, dehydration, and kidney failure per patient  Baseline Lipids Prior to Treatment:   Unknown or unavailable  Other Pertinent History: Per most recent cardiac note, patient has 6 occluded vein grafts and consistent angina. Patient also has bioprosthetic AV which he is on clopidogrel and ASA for.  History of other CV risk factors: Patient's father and brother  of cardiac complications at the age of 68 and 58.  PAST MEDICAL HISTORY:  has a past medical history of Arthritis; Benign hypertensive heart disease without heart failure (2011); CAD (coronary artery disease); Congestive heart failure (HCC); Coronary atherosclerosis of autologous vein bypass graft (2011); Gout; Heart valve disease; High cholesterol; History of pancreatitis; Hypertension; Muscle cramps (10/4/2011); Myocardial infarct (HCC); Obesity (2011); Pain; Postsurgical aortocoronary bypass status (2016); Renal disorder; S/P  aortic valve replacement with bioprosthetic valve (7/26/2016); Statin intolerance (7/26/2016); and Status post cardiac revascularization with bypass aortocoronary anastomosis of five coronary vessels (7/26/2016).  PAST SURGICAL HISTORY:  has a past surgical history that includes laminotomy (12/2004); biopsy general; multiple coronary artery bypass (11/11/1998); multiple coronary artery bypass (12/08/2015); shoulder decompression arthroscopic (Right, 9/5/2017); arthroscopic labral debridement (Right, 9/5/2017); shoulder arthroscopy w/ rotator cuff repair (Right, 9/5/2017); shoulder arthroscopy w/ bicipital tenodesis repair (Right, 9/5/2017); and synovectomy (Right, 9/5/2017).  CURRENT MEDICATIONS:      Current Outpatient Prescriptions:       •  indomethacin, TAKE ONE CAPSULE BY MOUTH TWICE DAILY AS NEEDED  •  clopidogrel, TAKE ONE TABLET BY MOUTH ONE TIME DAILY, 11/16/2018 at Unknown time  •  lisinopril, 10 mg, Oral, DAILY, 11/16/2018 at Unknown time  •  isosorbide mononitrate SR, 60 mg, Oral, QAM, 11/16/2018 at Unknown time  •  nitroglycerin, 0.4 mg, Sublingual, PRN, 11/2/2018  •  allopurinol, 100 mg, Oral, DAILY, 11/16/2018 at Unknown time  •  metoprolol SR, 50 mg, Oral, DAILY, 11/16/2018 at Unknown time  •  Omega-3 Fatty Acids (OMEGA-3 FISH OIL PO), Take  by mouth., 11/16/2018 at Unknown time  •  magnesium oxide, 400 mg, Oral, DAILY, 11/16/2018 at Unknown time  •  aspirin, 81 mg, Oral, DAILY, 11/16/2018 at Unknown time  •  GLUCOSAMINE CHONDROITIN COMPLX, 2 Cap, Oral, DAILY, 11/16/2018 at Unknown time      ALLERGIES: Gemfibrozil; Lipitor [atorvastatin calcium]; Lovastatin; and Tricor     SOCIAL HISTORY           History   Smoking Status   • Former Smoker   • Packs/day: 3.00   • Years: 20.00   • Types: Cigarettes   • Quit date: 1/1/1992   Smokeless Tobacco   • Never Used      Change in weight:Stable since last appt  Exercise habits: moderate regular exercise program, in cooperating cardio and resistance  training  Diet: Pt admits to eating unhealthy but willing to change in the new year   ROS  Physical Exam     DATA REVIEW:  Most Recent Lipid Panel:             Lab Results   Component Value Date     CHOLSTRLTOT 284 (H) 10/15/2018     TRIGLYCERIDE 345 (H) 10/15/2018     HDL 31 (A) 10/15/2018      (H) 10/15/2018         Other Pertinent Blood Work:             Lab Results   Component Value Date     SODIUM 139 11/15/2018     POTASSIUM 4.3 11/15/2018     CHLORIDE 107 11/15/2018     CO2 21 11/15/2018     ANION 11.0 11/15/2018     GLUCOSE 118 (H) 11/15/2018     BUN 16 11/15/2018     CREATININE 1.17 11/15/2018     CALCIUM 9.7 11/15/2018     ASTSGOT 35 11/15/2018     ALTSGPT 40 11/15/2018     ALKPHOSPHAT 69 11/15/2018     TBILIRUBIN 0.5 11/15/2018     ALBUMIN 4.5 11/15/2018     AGRATIO 1.7 11/15/2018         Other: NA     ASSESSMENT AND PLAN  Patient Type, check all that apply:   Secondary Prevention  Established Atherosclerotic Cardiovascular Disease (ASCVD)  Yes, Details: history of CABG  Other Established (non-atherosclerotic) Vascular Disease, if Present:  HTN  Evidence of Heterozygous Familial Hypercholesterolemia (FH):   Possible  ACC/AHA Indication for Statin Therapy, emily all that apply:  Established ASCVD: Indication for High intensity statin   Calculated Risk for ASCVD, if applicable    N/A  Other Significant Risk Markers, if any, emily all that apply   Family history of premature ASCVD in first degree relative  National Lipid Association (NLA) Goal  LDL-C:   <70 mg/dL  Lifestyle Recommendations From Today’s Visit:    None  Statin Therapy Recommendations from Today’s Visit:   None, patient denied wanting to retrial any statin therapy.  Non-Statin Medications Recommendations from Today’s Visit:   None  Indication for PCSK9 Inhibitor, if applicable:  ASCVD with suboptimal control of LDL-C despite maximally tolerated statin  Supplements Recommended at this visit: Patient is to continue with red yeast rice and  fish oil    Pt has received only one dose of Repatha 140 mg due to faulty pen from one of the samples provided. Pt tolerated Repatha well without any side effects  Pt ins will only cover Praluent. PA has been approved for Praluent and Rx sent to pharmacy. At this time next dose to be administrated in 4 days. Only available samples are Praluent 150 mg. Samples were provided to pt to last 4 weeks, I explained to pt that he will be receiving a higher dose he understands and knows what side effects to watch for.    Pt to follow up in 2 weeks prior to the second Praluent administration.        Blood Work Ordered At Today’s visit: Lipid Panel to be completed in 4 weeks  Follow-Up: 2 weeks    Laci Biu, Pharm.D

## 2018-12-27 DIAGNOSIS — I25.84 CORONARY ARTERY DISEASE DUE TO CALCIFIED CORONARY LESION: ICD-10-CM

## 2018-12-27 DIAGNOSIS — I25.10 CORONARY ARTERY DISEASE DUE TO CALCIFIED CORONARY LESION: ICD-10-CM

## 2018-12-28 RX ORDER — CLOPIDOGREL BISULFATE 75 MG/1
TABLET ORAL
Qty: 90 TAB | Refills: 3 | Status: SHIPPED | OUTPATIENT
Start: 2018-12-28 | End: 2020-01-27 | Stop reason: SDUPTHER

## 2019-01-04 ENCOUNTER — NON-PROVIDER VISIT (OUTPATIENT)
Dept: VASCULAR LAB | Facility: MEDICAL CENTER | Age: 64
End: 2019-01-04
Attending: INTERNAL MEDICINE
Payer: COMMERCIAL

## 2019-01-04 PROCEDURE — 99211 OFF/OP EST MAY X REQ PHY/QHP: CPT

## 2019-01-04 NOTE — PROGRESS NOTES
Date of Service: 2019     Abimael Hamilton has been referred for evaluation and management of dyslipidemia     Referral Source: Dr. Alejandro Fuentes     HPI  History of ASCVD: Patient has history of CAD status post CABG in the  then re-done in  along with unstable angina  Age at Initial Diagnosis:     Current Prescription Lipid Lowering Medications - including dose:   Statin: None  Non-Statin: None  Current Lipid Lowering and Related Supplements:   Red Yeast Rice and Fish Oil  Any Current Side Effects Potentially Related to Lipid Lowering therapy?   No  Current Adherence to Lipid Lowering Therapies   Complete  Previously Attempted Interventions for Lipids - including outcome  Statin: Patient has been on atorvastatin and lovastatin               Outcome: Severe muscle aches, dehydration, and kidney failure per patient  Non-Statin: Gemfibrozil and Fenofibrate              Outcome: Severe muscle cramps and dehydration  Any Previous History of Statin Intolerance?   Yes, Details: Severe muscle cramps, dehydration, and kidney failure per patient  Baseline Lipids Prior to Treatment:   Unknown or unavailable  Other Pertinent History: Per most recent cardiac note, patient has 6 occluded vein grafts and consistent angina. Patient also has bioprosthetic AV which he is on clopidogrel and ASA for.  History of other CV risk factors: Patient's father and brother  of cardiac complications at the age of 69 and 58.  PAST MEDICAL HISTORY:  has a past medical history of Arthritis; Benign hypertensive heart disease without heart failure (2011); CAD (coronary artery disease); Congestive heart failure (HCC); Coronary atherosclerosis of autologous vein bypass graft (2011); Gout; Heart valve disease; High cholesterol; History of pancreatitis; Hypertension; Muscle cramps (10/4/2011); Myocardial infarct (HCC); Obesity (2011); Pain; Postsurgical aortocoronary bypass status (2016); Renal disorder; S/P  aortic valve replacement with bioprosthetic valve (7/26/2016); Statin intolerance (7/26/2016); and Status post cardiac revascularization with bypass aortocoronary anastomosis of five coronary vessels (7/26/2016).  PAST SURGICAL HISTORY:  has a past surgical history that includes laminotomy (12/2004); biopsy general; multiple coronary artery bypass (11/11/1998); multiple coronary artery bypass (12/08/2015); shoulder decompression arthroscopic (Right, 9/5/2017); arthroscopic labral debridement (Right, 9/5/2017); shoulder arthroscopy w/ rotator cuff repair (Right, 9/5/2017); shoulder arthroscopy w/ bicipital tenodesis repair (Right, 9/5/2017); and synovectomy (Right, 9/5/2017).  CURRENT MEDICATIONS:      Current Outpatient Prescriptions:       •  indomethacin, TAKE ONE CAPSULE BY MOUTH TWICE DAILY AS NEEDED  •  clopidogrel, TAKE ONE TABLET BY MOUTH ONE TIME DAILY, 11/16/2018 at Unknown time  •  lisinopril, 10 mg, Oral, DAILY, 11/16/2018 at Unknown time  •  isosorbide mononitrate SR, 60 mg, Oral, QAM, 11/16/2018 at Unknown time  •  nitroglycerin, 0.4 mg, Sublingual, PRN, 11/2/2018  •  allopurinol, 100 mg, Oral, DAILY, 11/16/2018 at Unknown time  •  metoprolol SR, 50 mg, Oral, DAILY, 11/16/2018 at Unknown time  •  Omega-3 Fatty Acids (OMEGA-3 FISH OIL PO), Take  by mouth., 11/16/2018 at Unknown time  •  magnesium oxide, 400 mg, Oral, DAILY, 11/16/2018 at Unknown time  •  aspirin, 81 mg, Oral, DAILY, 11/16/2018 at Unknown time  •  GLUCOSAMINE CHONDROITIN COMPLX, 2 Cap, Oral, DAILY, 11/16/2018 at Unknown time      ALLERGIES: Gemfibrozil; Lipitor [atorvastatin calcium]; Lovastatin; and Tricor     SOCIAL HISTORY           History   Smoking Status   • Former Smoker   • Packs/day: 3.00   • Years: 20.00   • Types: Cigarettes   • Quit date: 1/1/1992   Smokeless Tobacco   • Never Used      Change in weight:Stable since last appt  Exercise habits: moderate regular exercise program, in cooperating cardio and resistance  training  Diet: Pt admits to eating unhealthy but willing to change in the new year   ROS  Physical Exam     DATA REVIEW:  Most Recent Lipid Panel:             Lab Results   Component Value Date     CHOLSTRLTOT 284 (H) 10/15/2018     TRIGLYCERIDE 345 (H) 10/15/2018     HDL 31 (A) 10/15/2018      (H) 10/15/2018         Other Pertinent Blood Work:             Lab Results   Component Value Date     SODIUM 139 11/15/2018     POTASSIUM 4.3 11/15/2018     CHLORIDE 107 11/15/2018     CO2 21 11/15/2018     ANION 11.0 11/15/2018     GLUCOSE 118 (H) 11/15/2018     BUN 16 11/15/2018     CREATININE 1.17 11/15/2018     CALCIUM 9.7 11/15/2018     ASTSGOT 35 11/15/2018     ALTSGPT 40 11/15/2018     ALKPHOSPHAT 69 11/15/2018     TBILIRUBIN 0.5 11/15/2018     ALBUMIN 4.5 11/15/2018     AGRATIO 1.7 11/15/2018         Other: NA     ASSESSMENT AND PLAN  Patient Type, check all that apply:   Secondary Prevention  Established Atherosclerotic Cardiovascular Disease (ASCVD)  Yes, Details: history of CABG  Other Established (non-atherosclerotic) Vascular Disease, if Present:  HTN  Evidence of Heterozygous Familial Hypercholesterolemia (FH):   Possible  ACC/AHA Indication for Statin Therapy, emily all that apply:  Established ASCVD: Indication for High intensity statin   Calculated Risk for ASCVD, if applicable    N/A  Other Significant Risk Markers, if any, meily all that apply   Family history of premature ASCVD in first degree relative  National Lipid Association (NLA) Goal  LDL-C:   <70 mg/dL  Lifestyle Recommendations From Today’s Visit:    None  Statin Therapy Recommendations from Today’s Visit:   None, patient denied wanting to retrial any statin therapy.  Non-Statin Medications Recommendations from Today’s Visit:   None  Indication for PCSK9 Inhibitor, if applicable:  ASCVD with suboptimal control of LDL-C despite maximally tolerated statin  Supplements Recommended at this visit: Patient is to continue with red yeast rice and  fish oil     Pt tolerated sample of Praluent 150 mg very well denied any side effects. He now has received his delivery of Praluent 75 mg. Pt next dose is scheduled for 01/08/2019. Pt instructed to resume at Praluent 75 mg every 2 weeks.   Pt to repeat lipid panel for ~ six weeks after initiation of PCSK9-I (about 2-3 weeks from now)     Pt to follow up in 4 weeks after completion of Lipid Panel     Follow-Up: 4 weeks     Laci Bui, Pharm.D

## 2019-01-14 ENCOUNTER — TELEPHONE (OUTPATIENT)
Dept: VASCULAR LAB | Facility: MEDICAL CENTER | Age: 64
End: 2019-01-14

## 2019-01-14 NOTE — TELEPHONE ENCOUNTER
Received indication from [employer] that initial prior authorization for repatha was denied.  Will talk to medical assistant about obtaining details and working on an appeal    Michael J Bloch, MD  Certified Clinical Lipid Specialist  Medial Director, [facility]

## 2019-01-16 DIAGNOSIS — I25.718 ATHEROSCLEROSIS OF AUTOLOGOUS VEIN CORONARY ARTERY BYPASS GRAFT WITH STABLE ANGINA PECTORIS (HCC): ICD-10-CM

## 2019-01-16 DIAGNOSIS — E78.5 DYSLIPIDEMIA: ICD-10-CM

## 2019-01-18 ENCOUNTER — HOSPITAL ENCOUNTER (OUTPATIENT)
Dept: LAB | Facility: MEDICAL CENTER | Age: 64
End: 2019-01-18
Attending: INTERNAL MEDICINE
Payer: COMMERCIAL

## 2019-01-18 LAB
CHOLEST SERPL-MCNC: 145 MG/DL (ref 100–199)
FASTING STATUS PATIENT QL REPORTED: NORMAL
HDLC SERPL-MCNC: 32 MG/DL
LDLC SERPL CALC-MCNC: 71 MG/DL
TRIGL SERPL-MCNC: 210 MG/DL (ref 0–149)

## 2019-01-18 PROCEDURE — 80061 LIPID PANEL: CPT

## 2019-01-18 PROCEDURE — 36415 COLL VENOUS BLD VENIPUNCTURE: CPT

## 2019-01-21 ENCOUNTER — OFFICE VISIT (OUTPATIENT)
Dept: CARDIOLOGY | Facility: MEDICAL CENTER | Age: 64
End: 2019-01-21
Payer: COMMERCIAL

## 2019-01-21 VITALS
SYSTOLIC BLOOD PRESSURE: 126 MMHG | DIASTOLIC BLOOD PRESSURE: 82 MMHG | HEIGHT: 66 IN | HEART RATE: 80 BPM | WEIGHT: 237 LBS | OXYGEN SATURATION: 93 % | BODY MASS INDEX: 38.09 KG/M2

## 2019-01-21 DIAGNOSIS — Z78.9 STATIN INTOLERANCE: ICD-10-CM

## 2019-01-21 DIAGNOSIS — I25.718 ATHEROSCLEROSIS OF AUTOLOGOUS VEIN CORONARY ARTERY BYPASS GRAFT WITH STABLE ANGINA PECTORIS (HCC): ICD-10-CM

## 2019-01-21 DIAGNOSIS — I20.89 EFFORT ANGINA: ICD-10-CM

## 2019-01-21 DIAGNOSIS — E78.5 DYSLIPIDEMIA: ICD-10-CM

## 2019-01-21 DIAGNOSIS — Z95.3 S/P AORTIC VALVE REPLACEMENT WITH BIOPROSTHETIC VALVE: ICD-10-CM

## 2019-01-21 DIAGNOSIS — Z95.1 POSTSURGICAL AORTOCORONARY BYPASS STATUS: ICD-10-CM

## 2019-01-21 DIAGNOSIS — R93.1 ABNORMAL NUCLEAR CARDIAC IMAGING TEST: ICD-10-CM

## 2019-01-21 PROCEDURE — 99214 OFFICE O/P EST MOD 30 MIN: CPT | Performed by: INTERNAL MEDICINE

## 2019-01-21 RX ORDER — ISOSORBIDE MONONITRATE 120 MG/1
120 TABLET, EXTENDED RELEASE ORAL EVERY MORNING
Qty: 90 TAB | Refills: 3 | Status: SHIPPED | OUTPATIENT
Start: 2019-01-21 | End: 2020-01-27 | Stop reason: SDUPTHER

## 2019-01-21 NOTE — PROGRESS NOTES
Subjective:   Abimael Hamilton is a 63 -year-old man with a history of 5 vessel CABG in 1998, and re-do 3-V CABG in 12/2005 as well as bioprosthetic AVR at that time. All grafts are occluded with the exception of his LIMA-LAD which fills his circumflex distribution via a jump graft. His native coronary arteries are severely diseased as well. He has a 4.5 cm thoracic aortic aneurysm, gout, hypertension, dyslipidemia, obesity, and intolerance to statins.    He is doing much better today, and to the lipid clinic been initiated on a PC SK 9 inhibitor, which in conjunction with increased physical activity and improvement in his diet has had a remarkable change in his LDL is reflected below with an LDL down from the 180s to about 71 mg/dL.    He does show me that his heart rate gets up to the 160s mmHg with gym-based exercise that he is doing several days per week though those episodes of course are accompanied by anginal chest discomfort related to which he is taking about 6 nitroglycerin on an average week on top of Imdur, which I had increased from 30-60 mg p.o. daily at our last visit.  He does have headaches as the nitroglycerin effect subsides.    Past Medical History:   Diagnosis Date   • Arthritis    • Benign hypertensive heart disease without heart failure 11/14/2011   • CAD (coronary artery disease)    • Congestive heart failure (HCC)    • Coronary atherosclerosis of autologous vein bypass graft 11/14/2011   • Gout    • Heart valve disease    • High cholesterol    • History of pancreatitis    • Hypertension    • Muscle cramps 10/4/2011   • Myocardial infarct (HCC)     Multiple MI's, 1998, 2015   • Obesity 11/14/2011   • Pain     Joints   • Postsurgical aortocoronary bypass status 7/26/2016    Status post redo 3-V CABG in [place] 12/2015: SVG-acute marginal, SVG sequential to LAD, and OM    • Renal disorder     Hx of Acute renal failure r/t medication/statins   • S/P aortic valve replacement with bioprosthetic  valve 7/26/2016    #23 Peñaloza Magna AVR (bioprosthetic)    • Statin intolerance 7/26/2016   • Status post cardiac revascularization with bypass aortocoronary anastomosis of five coronary vessels 7/26/2016    5-V CABG done in 1998 (done in [place] at [facility]), patent LIMA to LAD, occluded SVG-OM, occluded SVG-RCA by cardiac catheterization 11/15/2007 with other vein grafts not identified at that time, poor targets for revascularization and declined repeat CABG in 2007 by [facility]. No ischemic was identified by MPI 11/17/07 with an EF of 50%.       Past Surgical History:   Procedure Laterality Date   • SHOULDER DECOMPRESSION ARTHROSCOPIC Right 9/5/2017    Procedure: SHOULDER DECOMPRESSION ARTHROSCOPIC SUBACROMIAL;  Surgeon: Mg Campos M.D.;  Location: [facility];  Service: Orthopedics   • ARTHROSCOPIC LABRAL DEBRIDEMENT Right 9/5/2017    Procedure: ARTHROSCOPIC LABRAL DEBRIDEMENT;  Surgeon: Mg Campos M.D.;  Location: [facility];  Service: Orthopedics   • SHOULDER ARTHROSCOPY W/ ROTATOR CUFF REPAIR Right 9/5/2017    Procedure: SHOULDER ARTHROSCOPY W/ ROTATOR CUFF REPAIR;  Surgeon: Mg Campos M.D.;  Location: [facility];  Service: Orthopedics   • SHOULDER ARTHROSCOPY W/ BICIPITAL TENODESIS REPAIR Right 9/5/2017    Procedure: SHOULDER ARTHROSCOPY W/ BICIPITAL TENODESIS REPAIR;  Surgeon: Mg Campos M.D.;  Location: [facility];  Service: Orthopedics   • SYNOVECTOMY Right 9/5/2017    Procedure: SYNOVECTOMY;  Surgeon: Mg Campos M.D.;  Location: [facility];  Service: Orthopedics   • MULTIPLE CORONARY ARTERY BYPASS  12/08/2015    3 way bypass & Bovine valve   • LAMINOTOMY  12/2004    Diskectomy L4-L5, L5-S1   • MULTIPLE CORONARY ARTERY BYPASS  11/11/1998    5 way bypass   • BIOPSY GENERAL      buttock lesion     Family History   Problem Relation Age of Onset   • Heart Attack Father         MI and  CABG, unknown age   • Cancer Mother         Breast   • Heart Disease Brother    • Stroke Neg Hx    • Diabetes Neg Hx    • Lung Disease Neg Hx      History   Smoking Status   • Former Smoker   • Packs/day: 3.00   • Years: 20.00   • Types: Cigarettes   • Quit date: 1/1/1992   Smokeless Tobacco   • Never Used     Allergies   Allergen Reactions   • Gemfibrozil    • Lipitor [Atorvastatin Calcium] Unspecified     Severe Muscle cramps, dehydration   • Lovastatin      Dehydration, severe muscle cramps   • Tricor Unspecified     Dehydration, severe muscle cramps     Outpatient Encounter Prescriptions as of 1/21/2019   Medication Sig Dispense Refill   • PRALUENT 75 MG/ML Solution Pen-injector 75 mg by Injection route 1 time daily as needed.     • isosorbide mononitrate (IMDUR) 120 MG CR tablet Take 1 Tab by mouth every morning. 90 Tab 3   • clopidogrel (PLAVIX) 75 MG Tab TAKE ONE TABLET BY MOUTH ONE TIME DAILY 90 Tab 3   • metoprolol SR (TOPROL XL) 50 MG TABLET SR 24 HR Take 1 Tab by mouth every day. 90 Tab 3   • indomethacin (INDOCIN) 50 MG Cap TAKE ONE CAPSULE BY MOUTH TWICE DAILY AS NEEDED 30 Cap 2   • lisinopril (PRINIVIL) 10 MG Tab Take 1 Tab by mouth every day. 30 Tab 11   • allopurinol (ZYLOPRIM) 100 MG Tab Take 1 Tab by mouth every day. 90 Tab 2   • Omega-3 Fatty Acids (OMEGA-3 FISH OIL PO) Take  by mouth.     • magnesium oxide (MAG-OX) 400 MG Tab Take 400 mg by mouth every day.     • aspirin 81 MG EC tablet Take 1 Tab by mouth every day. 30 Tab 11   • Glucosamine-Chondroit-Vit C-Mn (GLUCOSAMINE CHONDROITIN COMPLX) CAPS Take 2 Caps by mouth every day.     • [DISCONTINUED] Evolocumab, REPATHA, 140 MG/ML Solution Auto-injector Inject 1 Each as instructed every 14 days. (Patient not taking: Reported on 1/21/2019) 2 PEN 11   • [DISCONTINUED] isosorbide mononitrate SR (IMDUR) 60 MG TABLET SR 24 HR Take 1 Tab by mouth every morning. 90 Tab 3   • nitroglycerin (NITROSTAT) 0.4 MG SL Tab Place 1 Tab under tongue as needed for  "Chest Pain. 30 Tab 5     No facility-administered encounter medications on file as of 1/21/2019.      Review of Systems   Cardiovascular: Positive for chest pain.        Infrequent angina, took one nitroglycerin since our last appointment   Musculoskeletal: Positive for joint pain (with gout).   All other systems reviewed and are negative.       Objective:   /82 (BP Location: Left arm, Patient Position: Sitting, BP Cuff Size: Adult)   Pulse 80   Ht 1.676 m (5' 6\")   Wt 107.5 kg (237 lb)   SpO2 93%   BMI 38.25 kg/m²     Physical Exam   Constitutional: He is oriented to person, place, and time. He appears well-developed and well-nourished. No distress.   Pleasant, reasonably forward, obese, middle-aged man in no distress.  Physical examination is unchanged except as specified from a previous exam on 11/13/2017.   HENT:   Head: Normocephalic and atraumatic.   Eyes: Pupils are equal, round, and reactive to light. Conjunctivae and EOM are normal. No scleral icterus.   Neck: Neck supple. No JVD present. No tracheal deviation present.   Cardiovascular: Normal rate, regular rhythm, S2 normal and intact distal pulses.  Exam reveals no gallop and no friction rub.    Murmur heard.   Crescendo decrescendo systolic murmur is present with a grade of 2/6   Pulses:       Dorsalis pedis pulses are 2+ on the right side, and 2+ on the left side.       No carotid bruits   Pulmonary/Chest: Effort normal and breath sounds normal. No stridor. No respiratory distress. He has no wheezes. He has no rales.   Abdominal: Soft. Bowel sounds are normal. He exhibits no distension.   Musculoskeletal: He exhibits no edema (No significant lower extremity edema bilaterally).   Neurological: He is alert and oriented to person, place, and time.   Skin: Skin is warm and dry. No rash noted. He is not diaphoretic. No erythema. No pallor.   Tattoos noted on his forearms including Nicole    Psychiatric: He has a normal mood and affect. Judgment " "and thought content normal.   Nursing note and vitals reviewed.    Lab Results   Component Value Date/Time    WBC 8.2 11/15/2018 07:58 AM    RBC 5.16 11/15/2018 07:58 AM    HEMOGLOBIN 14.9 11/15/2018 07:58 AM    HEMATOCRIT 46.9 11/15/2018 07:58 AM    MCV 90.9 11/15/2018 07:58 AM    MCH 28.9 11/15/2018 07:58 AM    MCHC 31.8 (L) 11/15/2018 07:58 AM    MPV 10.0 11/15/2018 07:58 AM        Lab Results   Component Value Date/Time    SODIUM 139 11/15/2018 07:58 AM    POTASSIUM 4.3 11/15/2018 07:58 AM    CHLORIDE 107 11/15/2018 07:58 AM    CO2 21 11/15/2018 07:58 AM    GLUCOSE 118 (H) 11/15/2018 07:58 AM    BUN 16 11/15/2018 07:58 AM    CREATININE 1.17 11/15/2018 07:58 AM    BUNCREATRAT 16 07/29/2016 10:22 AM        Lab Results   Component Value Date/Time    ASTSGOT 35 11/15/2018 07:58 AM    ALTSGPT 40 11/15/2018 07:58 AM        Lab Results   Component Value Date/Time    CHOLSTRLTOT 145 01/18/2019 06:38 AM    LDL 71 01/18/2019 06:38 AM    HDL 32 (A) 01/18/2019 06:38 AM    TRIGLYCERIDE 210 (H) 01/18/2019 06:38 AM       Echocardiogram, 8/4/2016:  \"CONCLUSIONS  Normal left ventricular size and systolic function, EF 60%.  Mild concentric left ventricular hypertrophy.  Mild mitral regurgitation.  Normally functioning bovine bioprosthetic aortic valve.   Normal right sided pressures.  No prior studies for comparison\"    Echocardiogram, 9/21/2018, images and report reviewed, my interpretation: Normal left ventricular ejection fraction (measured at 70%) with normal regional wall motion.  Mildly stenotic bioprosthetic aortic valve with a mean gradient measured at 27 mm a radiant measured at 48 mmHg (V-max 3.5 m/s) without perivalvular leak.  Left atrium is mildly dilated with a volume index measured at 39 mL/meter squared    Myocardial perfusion imaging, 11/7/2018:  \"SCINTOGRAPHIC FINDINGS:   Post Stress Images: There is a moderate to severe reduction perfusion in the basilar to mid inferior segment.  In addition, this defect was " "small to moderate in size.  There is also a moderate sized defect in the proximal to mid high lateral segment.  No other perfusion abnormalities were noted.      Rest Images: there was essentially complete resolution of both perfusion   abnormalities noted.  TID was also noted.     GATED WALL MOTION FINDINGS:  The resting ejection fraction was 45%.  It david appropriately to 51% during dipyridamole stress.  There was hypokinesis of the basilar inferior segment.  No definite hypokinesis was noted in the lateral segment.     IMPRESSION:  1.  Dipyridamole stress negative for chest discomfort and negative for   ischemic EKG changes.  2.  PET perfusion images are suggestive of ischemia in the proximal to mid inferior segment and in the proximal mid bilateral segment.  There is no definite infarction.  In addition TID was present which could be indicative of   multivessel disease, possibly more severe than indicated by the perfusion study.  3.  The patient's resting ejection fraction was mildly reduced and david appropriately during dipyridamole stress.  There was basilar to mid inferior hypokinesis noted\"    Cardiac catheterization, 11/16/2018:  \"CONCLUSIONS:  1.  Multivessel coronary artery disease  2.  Occluded vein grafts x6   3.  LIMA to LAD with focal 70-80% distal anastomotic stenosis 4.  Severe native coronary artery disease not amenable to PCI 5.  Poorly controlled hypertension     RECOMMENDATIONS:  His FORD to LAD supplies both the distal LAD territory as well as via retrograde partially occluded jump graft the circumflex territory and is essentially his last remaining vessel.  There is WESLEY-3 flow and he is not maximally managed medically, in addition he has  of the RCA which was dominant and the vein graft is occluded and this is his only ischemic territory on noninvasive imaging.     1.  Medical therapy  2. Consideration of high risk last vessel PCI LIMA to LAD anastomosis should he prove to not be a redo " "surgical candidate (CT surgical consultation prior to any potential PCI would be required) and not be well managed with better medical therapy.  3. Weight loss\"    Assessment:     1. Atherosclerosis of autologous vein coronary artery bypass graft with stable angina pectoris (HCC)  isosorbide mononitrate (IMDUR) 120 MG CR tablet   2. Dyslipidemia  isosorbide mononitrate (IMDUR) 120 MG CR tablet   3. Effort angina (HCC)  isosorbide mononitrate (IMDUR) 120 MG CR tablet   4. Statin intolerance     5. S/P aortic valve replacement with bioprosthetic valve     6. Postsurgical aortocoronary bypass status     7. Abnormal nuclear cardiac imaging test         Medical Decision Making:  Today's Assessment / Status / Plan:     He is doing much better with a marked improvement in his LDL on juggling Praluent and Repatha with ultimately Praluent approved at 75 mg subcutaneous every 2 weeks for the treatment of his lipids with again multiple severe statin intolerances and very difficult to manage multivessel coronary artery disease with again his heart muscle essentially only with a LIMA-LAD as his functional vessel supplying all vascular territories.  He is staying active in the gym requiring quite a bit of sublingual nitroglycerin in association with his angina despite going up on his long-acting nitrates related to which I again increased his long-acting nitrates changing Imdur from previously 60 mg p.o. daily to now 120 mg p.o. daily.  I gave him quite a bit of encouragement regarding his diet changes and the effect of those as well as probably went on his LDL.  I will target an LDL of around 70 mg/dL though favoring lower if achievable on again Praluent.    Alejandro Fuentes MD  Cardiologist, [facility] [place]     Return in about 3 months (around 4/21/2019).      Physical Exam   Constitutional: He is oriented to person, place, and time. He appears well-developed and well-nourished. No distress.   Pleasant, " reasonably forward, obese, middle-aged man in no distress.  Physical examination is unchanged except as specified from a previous exam on 11/13/2017.   HENT:   Head: Normocephalic and atraumatic.   Eyes: Pupils are equal, round, and reactive to light. Conjunctivae and EOM are normal. No scleral icterus.   Neck: Neck supple. No JVD present. No tracheal deviation present.   Cardiovascular: Normal rate, regular rhythm, S2 normal and intact distal pulses.  Exam reveals no gallop and no friction rub.    Murmur heard.   Crescendo decrescendo systolic murmur is present with a grade of 2/6   Pulses:       Dorsalis pedis pulses are 2+ on the right side, and 2+ on the left side.       No carotid bruits   Pulmonary/Chest: Effort normal and breath sounds normal. No stridor. No respiratory distress. He has no wheezes. He has no rales.   Abdominal: Soft. Bowel sounds are normal. He exhibits no distension.   Musculoskeletal: He exhibits no edema (No significant lower extremity edema bilaterally).   Neurological: He is alert and oriented to person, place, and time.   Skin: Skin is warm and dry. No rash noted. He is not diaphoretic. No erythema. No pallor.   Tattoos noted on his forearms including Nicole    Psychiatric: He has a normal mood and affect. Judgment and thought content normal.   Nursing note and vitals reviewed.

## 2019-01-21 NOTE — LETTER
Name:          Abimael Hamilton   YOB: 1955  Date:     01/21/2019      Martha Madrid M.D.  [address]  [place] [address]     Alejandro Fuentes MD  [address]  [place] [address]  Phone: [phone]  Back Line: [phone]  Fax: [phone]  E-mail: [email or web address]   Dear Dr. Madrid,    We had the pleasure of seeing your patient, Abimael Hamilton, in Cardiology Clinic at [facility] today.    As you know, he is a 63-year-old man with a history of 5 vessel CABG in 1998, and re-do 3-V CABG in 12/2005 as well as bioprosthetic AVR at that time. All grafts are occluded with the exception of his LIMA-LAD which fills his circumflex distribution via a jump graft by cardiac catheterization 11/2018. His native coronary arteries are severely diseased as well. He has a 4.5 cm thoracic aortic aneurysm, gout, hypertension, dyslipidemia, obesity, and intolerance to statins.    He is doing much better with a marked improvement in his LDL on juggling Praluent and Repatha with ultimately Praluent approved at 75 mg subcutaneous every 2 weeks for the treatment of his lipids with again multiple severe statin intolerances and very difficult to manage multivessel coronary artery disease with again his heart muscle essentially only with a LIMA-LAD as his functional vessel supplying all vascular territories.  He is staying active in the gym requiring quite a bit of sublingual nitroglycerin in association with his angina despite going up on his long-acting nitrates related to which I again increased his long-acting nitrates changing Imdur from previously 60 mg p.o. daily to now 120 mg p.o. daily.  I gave him quite a bit of encouragement regarding his diet changes and the effect of those as well as probably went on his LDL.  I will target an LDL of around 70 mg/dL though favoring lower if achievable on again Praluent.    Return in about 3 months (around  4/21/2019).    Thank you for the referral and please do not hesitate to contact me at any time. My contact information is listed above.    This note was dictated using Dragon speech recognition software.     A full note including my physical examination and a full list of rectified medications is available in our medical record, and can be faxed as well.    Alejandro Fuentes MD  Cardiologist  [facility]

## 2019-01-25 ENCOUNTER — TELEPHONE (OUTPATIENT)
Dept: VASCULAR LAB | Facility: MEDICAL CENTER | Age: 64
End: 2019-01-25

## 2019-01-25 DIAGNOSIS — I25.718 ATHEROSCLEROSIS OF AUTOLOGOUS VEIN CORONARY ARTERY BYPASS GRAFT WITH STABLE ANGINA PECTORIS (HCC): ICD-10-CM

## 2019-01-25 DIAGNOSIS — E78.5 DYSLIPIDEMIA: ICD-10-CM

## 2019-01-25 NOTE — TELEPHONE ENCOUNTER
PASS paperwork for Praluent completed; to MA for submission.        ARGELIA Mitchell  [facility]

## 2019-01-31 ENCOUNTER — TELEPHONE (OUTPATIENT)
Dept: VASCULAR LAB | Facility: MEDICAL CENTER | Age: 64
End: 2019-01-31

## 2019-01-31 DIAGNOSIS — I25.718 ATHEROSCLEROSIS OF AUTOLOGOUS VEIN CORONARY ARTERY BYPASS GRAFT WITH STABLE ANGINA PECTORIS (HCC): ICD-10-CM

## 2019-01-31 DIAGNOSIS — E78.5 DYSLIPIDEMIA: ICD-10-CM

## 2019-02-01 ENCOUNTER — NON-PROVIDER VISIT (OUTPATIENT)
Dept: VASCULAR LAB | Facility: MEDICAL CENTER | Age: 64
End: 2019-02-01
Attending: INTERNAL MEDICINE
Payer: COMMERCIAL

## 2019-02-01 DIAGNOSIS — E78.5 HYPERLIPIDEMIA, UNSPECIFIED HYPERLIPIDEMIA TYPE: ICD-10-CM

## 2019-02-01 PROCEDURE — 99212 OFFICE O/P EST SF 10 MIN: CPT

## 2019-02-01 NOTE — PROGRESS NOTES
Date of Service: 2019     Abimael Hamilton has been referred for evaluation and management of dyslipidemia     Referral Source: Dr. Alejandro Fuentes     HPI  History of ASCVD: Patient has history of CAD status post CABG in the  then re-done in  along with unstable angina  Age at Initial Diagnosis:     Current Prescription Lipid Lowering Medications - including dose:   Statin: None  Non-Statin: None  Current Lipid Lowering and Related Supplements:   Red Yeast Rice and Fish Oil  Any Current Side Effects Potentially Related to Lipid Lowering therapy?   No  Current Adherence to Lipid Lowering Therapies   Complete  Previously Attempted Interventions for Lipids - including outcome  Statin: Patient has been on atorvastatin and lovastatin               Outcome: Severe muscle aches, dehydration, and kidney failure per patient  Non-Statin: Gemfibrozil and Fenofibrate              Outcome: Severe muscle cramps and dehydration  Any Previous History of Statin Intolerance?   Yes, Details: Severe muscle cramps, dehydration, and kidney failure per patient  Baseline Lipids Prior to Treatment:   Unknown or unavailable  Other Pertinent History: Per most recent cardiac note, patient has 6 occluded vein grafts and consistent angina. Patient also has bioprosthetic AV which he is on clopidogrel and ASA for.  History of other CV risk factors: Patient's father and brother  of cardiac complications at the age of 68 and 58.  PAST MEDICAL HISTORY:  has a past medical history of Arthritis; Benign hypertensive heart disease without heart failure (2011); CAD (coronary artery disease); Congestive heart failure (HCC); Coronary atherosclerosis of autologous vein bypass graft (2011); Gout; Heart valve disease; High cholesterol; History of pancreatitis; Hypertension; Muscle cramps (10/4/2011); Myocardial infarct (HCC); Obesity (2011); Pain; Postsurgical aortocoronary bypass status (2016); Renal disorder; S/P  aortic valve replacement with bioprosthetic valve (7/26/2016); Statin intolerance (7/26/2016); and Status post cardiac revascularization with bypass aortocoronary anastomosis of five coronary vessels (7/26/2016).  PAST SURGICAL HISTORY:  has a past surgical history that includes laminotomy (12/2004); biopsy general; multiple coronary artery bypass (11/11/1998); multiple coronary artery bypass (12/08/2015); shoulder decompression arthroscopic (Right, 9/5/2017); arthroscopic labral debridement (Right, 9/5/2017); shoulder arthroscopy w/ rotator cuff repair (Right, 9/5/2017); shoulder arthroscopy w/ bicipital tenodesis repair (Right, 9/5/2017); and synovectomy (Right, 9/5/2017).  CURRENT MEDICATIONS:      Current Outpatient Prescriptions:       •  indomethacin, TAKE ONE CAPSULE BY MOUTH TWICE DAILY AS NEEDED  •  clopidogrel, TAKE ONE TABLET BY MOUTH ONE TIME DAILY, 11/16/2018 at Unknown time  •  lisinopril, 10 mg, Oral, DAILY, 11/16/2018 at Unknown time  •  isosorbide mononitrate SR, 60 mg, Oral, QAM, 11/16/2018 at Unknown time  •  nitroglycerin, 0.4 mg, Sublingual, PRN, 11/2/2018  •  allopurinol, 100 mg, Oral, DAILY, 11/16/2018 at Unknown time  •  metoprolol SR, 50 mg, Oral, DAILY, 11/16/2018 at Unknown time  •  Omega-3 Fatty Acids (OMEGA-3 FISH OIL PO), Take  by mouth., 11/16/2018 at Unknown time  •  magnesium oxide, 400 mg, Oral, DAILY, 11/16/2018 at Unknown time  •  aspirin, 81 mg, Oral, DAILY, 11/16/2018 at Unknown time  •  GLUCOSAMINE CHONDROITIN COMPLX, 2 Cap, Oral, DAILY, 11/16/2018 at Unknown time      ALLERGIES: Gemfibrozil; Lipitor [atorvastatin calcium]; Lovastatin; and Tricor     SOCIAL HISTORY           History   Smoking Status   • Former Smoker   • Packs/day: 3.00   • Years: 20.00   • Types: Cigarettes   • Quit date: 1/1/1992   Smokeless Tobacco   • Never Used      Change in weight: Patient reports losing ~20 lbs over the last month  Exercise habits: moderate regular exercise program, incooperating cardio  and resistance training  Diet: Pt admits to eating much healthier, choosing lean meats, lots of veggies and nuts and yogurt for snacks.  ROS  Physical Exam     DATA REVIEW:  Most Recent Lipid Panel:   Lab Results   Component Value Date/Time    CHOLSTRLTOT 145 01/18/2019 06:38 AM    LDL 71 01/18/2019 06:38 AM    HDL 32 (A) 01/18/2019 06:38 AM    TRIGLYCERIDE 210 (H) 01/18/2019 06:38 AM       Lab Results   Component Value Date/Time    SODIUM 139 11/15/2018 07:58 AM    POTASSIUM 4.3 11/15/2018 07:58 AM    CHLORIDE 107 11/15/2018 07:58 AM    CO2 21 11/15/2018 07:58 AM    GLUCOSE 118 (H) 11/15/2018 07:58 AM    BUN 16 11/15/2018 07:58 AM    CREATININE 1.17 11/15/2018 07:58 AM    BUNCREATRAT 16 07/29/2016 10:22 AM     Lab Results   Component Value Date/Time    ALKPHOSPHAT 69 11/15/2018 07:58 AM    ASTSGOT 35 11/15/2018 07:58 AM    ALTSGPT 40 11/15/2018 07:58 AM    TBILIRUBIN 0.5 11/15/2018 07:58 AM       Other: NA     ASSESSMENT AND PLAN  Patient Type, check all that apply:   Secondary Prevention  Established Atherosclerotic Cardiovascular Disease (ASCVD)  Yes, Details: history of CABG  Other Established (non-atherosclerotic) Vascular Disease, if Present:  HTN  Evidence of Heterozygous Familial Hypercholesterolemia (FH):   Possible  ACC/AHA Indication for Statin Therapy, emily all that apply:  Established ASCVD: Indication for High intensity statin   Calculated Risk for ASCVD, if applicable    N/A  Other Significant Risk Markers, if any, emily all that apply   Family history of premature ASCVD in first degree relative  National Lipid Association (NLA) Goal  LDL-C:   <70 mg/dL  Lifestyle Recommendations From Today’s Visit:    None  Statin Therapy Recommendations from Today’s Visit:   None, patient denied wanting to retrial any statin therapy.  Non-Statin Medications Recommendations from Today’s Visit:   None  Indication for PCSK9 Inhibitor, if applicable:  ASCVD with suboptimal control of LDL-C despite maximally tolerated  statin  Supplements Recommended at this visit: Patient is to continue with red yeast rice and fish oil    Patient has changed his diet quite a bit, continues to make healthy choices and has lost 21 lbs in the last month and feels so good. He is working out at the gym daily for about 3 hours about 6 days a week doing weight lifting, cardio and sauna. Patient will continue with the current therapy and will follow up again in 12 weeks.    Follow-Up: 12 weeks    Next appt: Friday May 1, 2019  7:30am    Cayetano ClineD

## 2019-02-11 ENCOUNTER — TELEPHONE (OUTPATIENT)
Dept: VASCULAR LAB | Facility: MEDICAL CENTER | Age: 64
End: 2019-02-11

## 2019-02-11 NOTE — TELEPHONE ENCOUNTER
Spoke with [facility] regarding patients Prajenna morel has been approved from 11/12/18 - 12/12/19. Case number [id number]. Will send PASS enrollment in

## 2019-02-16 ENCOUNTER — APPOINTMENT (OUTPATIENT)
Dept: RADIOLOGY | Facility: MEDICAL CENTER | Age: 64
End: 2019-02-16
Attending: EMERGENCY MEDICINE
Payer: COMMERCIAL

## 2019-02-16 ENCOUNTER — HOSPITAL ENCOUNTER (OUTPATIENT)
Facility: MEDICAL CENTER | Age: 64
End: 2019-02-18
Attending: EMERGENCY MEDICINE | Admitting: HOSPITALIST
Payer: COMMERCIAL

## 2019-02-16 ENCOUNTER — APPOINTMENT (OUTPATIENT)
Dept: RADIOLOGY | Facility: MEDICAL CENTER | Age: 64
End: 2019-02-16
Attending: HOSPITALIST
Payer: COMMERCIAL

## 2019-02-16 DIAGNOSIS — I15.9 SECONDARY HYPERTENSION: ICD-10-CM

## 2019-02-16 DIAGNOSIS — G43.919 INTRACTABLE MIGRAINE WITHOUT STATUS MIGRAINOSUS, UNSPECIFIED MIGRAINE TYPE: ICD-10-CM

## 2019-02-16 DIAGNOSIS — R51.9 INTRACTABLE HEADACHE, UNSPECIFIED CHRONICITY PATTERN, UNSPECIFIED HEADACHE TYPE: ICD-10-CM

## 2019-02-16 PROBLEM — G43.909 MIGRAINE: Status: ACTIVE | Noted: 2019-02-16

## 2019-02-16 LAB
ALBUMIN SERPL BCP-MCNC: 4.5 G/DL (ref 3.2–4.9)
ALBUMIN/GLOB SERPL: 1.7 G/DL
ALP SERPL-CCNC: 58 U/L (ref 30–99)
ALT SERPL-CCNC: 29 U/L (ref 2–50)
ANION GAP SERPL CALC-SCNC: 9 MMOL/L (ref 0–11.9)
AST SERPL-CCNC: 33 U/L (ref 12–45)
BASOPHILS # BLD AUTO: 0.2 % (ref 0–1.8)
BASOPHILS # BLD: 0.02 K/UL (ref 0–0.12)
BILIRUB SERPL-MCNC: 0.7 MG/DL (ref 0.1–1.5)
BNP SERPL-MCNC: 26 PG/ML (ref 0–100)
BUN SERPL-MCNC: 22 MG/DL (ref 8–22)
CALCIUM SERPL-MCNC: 9.3 MG/DL (ref 8.5–10.5)
CHLORIDE SERPL-SCNC: 108 MMOL/L (ref 96–112)
CO2 SERPL-SCNC: 21 MMOL/L (ref 20–33)
CREAT SERPL-MCNC: 1.18 MG/DL (ref 0.5–1.4)
EKG IMPRESSION: NORMAL
EOSINOPHIL # BLD AUTO: 0.07 K/UL (ref 0–0.51)
EOSINOPHIL NFR BLD: 0.7 % (ref 0–6.9)
ERYTHROCYTE [DISTWIDTH] IN BLOOD BY AUTOMATED COUNT: 53.6 FL (ref 35.9–50)
GLOBULIN SER CALC-MCNC: 2.6 G/DL (ref 1.9–3.5)
GLUCOSE SERPL-MCNC: 120 MG/DL (ref 65–99)
HCT VFR BLD AUTO: 49.9 % (ref 42–52)
HGB BLD-MCNC: 16.3 G/DL (ref 14–18)
IMM GRANULOCYTES # BLD AUTO: 0.03 K/UL (ref 0–0.11)
IMM GRANULOCYTES NFR BLD AUTO: 0.3 % (ref 0–0.9)
LYMPHOCYTES # BLD AUTO: 1.39 K/UL (ref 1–4.8)
LYMPHOCYTES NFR BLD: 14.3 % (ref 22–41)
MCH RBC QN AUTO: 30.5 PG (ref 27–33)
MCHC RBC AUTO-ENTMCNC: 32.7 G/DL (ref 33.7–35.3)
MCV RBC AUTO: 93.3 FL (ref 81.4–97.8)
MONOCYTES # BLD AUTO: 0.61 K/UL (ref 0–0.85)
MONOCYTES NFR BLD AUTO: 6.3 % (ref 0–13.4)
NEUTROPHILS # BLD AUTO: 7.58 K/UL (ref 1.82–7.42)
NEUTROPHILS NFR BLD: 78.2 % (ref 44–72)
NRBC # BLD AUTO: 0 K/UL
NRBC BLD-RTO: 0 /100 WBC
PLATELET # BLD AUTO: 182 K/UL (ref 164–446)
PMV BLD AUTO: 10 FL (ref 9–12.9)
POTASSIUM SERPL-SCNC: 4.6 MMOL/L (ref 3.6–5.5)
PROT SERPL-MCNC: 7.1 G/DL (ref 6–8.2)
RBC # BLD AUTO: 5.35 M/UL (ref 4.7–6.1)
SODIUM SERPL-SCNC: 138 MMOL/L (ref 135–145)
TROPONIN I SERPL-MCNC: <0.01 NG/ML (ref 0–0.04)
TSH SERPL DL<=0.005 MIU/L-ACNC: 1.96 UIU/ML (ref 0.38–5.33)
WBC # BLD AUTO: 9.7 K/UL (ref 4.8–10.8)

## 2019-02-16 PROCEDURE — 85025 COMPLETE CBC W/AUTO DIFF WBC: CPT

## 2019-02-16 PROCEDURE — 700105 HCHG RX REV CODE 258: Performed by: HOSPITALIST

## 2019-02-16 PROCEDURE — 99220 PR INITIAL OBSERVATION CARE,LEVL III: CPT | Performed by: HOSPITALIST

## 2019-02-16 PROCEDURE — G0378 HOSPITAL OBSERVATION PER HR: HCPCS

## 2019-02-16 PROCEDURE — A9270 NON-COVERED ITEM OR SERVICE: HCPCS | Performed by: EMERGENCY MEDICINE

## 2019-02-16 PROCEDURE — 70549 MR ANGIOGRAPH NECK W/O&W/DYE: CPT

## 2019-02-16 PROCEDURE — 71045 X-RAY EXAM CHEST 1 VIEW: CPT

## 2019-02-16 PROCEDURE — 83880 ASSAY OF NATRIURETIC PEPTIDE: CPT

## 2019-02-16 PROCEDURE — 70450 CT HEAD/BRAIN W/O DYE: CPT

## 2019-02-16 PROCEDURE — 700111 HCHG RX REV CODE 636 W/ 250 OVERRIDE (IP): Performed by: EMERGENCY MEDICINE

## 2019-02-16 PROCEDURE — 700102 HCHG RX REV CODE 250 W/ 637 OVERRIDE(OP): Performed by: EMERGENCY MEDICINE

## 2019-02-16 PROCEDURE — 700102 HCHG RX REV CODE 250 W/ 637 OVERRIDE(OP): Performed by: HOSPITALIST

## 2019-02-16 PROCEDURE — A9270 NON-COVERED ITEM OR SERVICE: HCPCS | Performed by: HOSPITALIST

## 2019-02-16 PROCEDURE — 700111 HCHG RX REV CODE 636 W/ 250 OVERRIDE (IP): Performed by: HOSPITALIST

## 2019-02-16 PROCEDURE — 80053 COMPREHEN METABOLIC PANEL: CPT

## 2019-02-16 PROCEDURE — 700117 HCHG RX CONTRAST REV CODE 255: Performed by: EMERGENCY MEDICINE

## 2019-02-16 PROCEDURE — A9585 GADOBUTROL INJECTION: HCPCS | Performed by: EMERGENCY MEDICINE

## 2019-02-16 PROCEDURE — 84484 ASSAY OF TROPONIN QUANT: CPT

## 2019-02-16 PROCEDURE — 70544 MR ANGIOGRAPHY HEAD W/O DYE: CPT

## 2019-02-16 PROCEDURE — 93005 ELECTROCARDIOGRAM TRACING: CPT | Performed by: EMERGENCY MEDICINE

## 2019-02-16 PROCEDURE — 96376 TX/PRO/DX INJ SAME DRUG ADON: CPT | Mod: XU

## 2019-02-16 PROCEDURE — 36415 COLL VENOUS BLD VENIPUNCTURE: CPT

## 2019-02-16 PROCEDURE — 84443 ASSAY THYROID STIM HORMONE: CPT

## 2019-02-16 PROCEDURE — 96374 THER/PROPH/DIAG INJ IV PUSH: CPT | Mod: XU

## 2019-02-16 PROCEDURE — 99285 EMERGENCY DEPT VISIT HI MDM: CPT

## 2019-02-16 RX ORDER — PROMETHAZINE HYDROCHLORIDE 12.5 MG/1
12.5-25 SUPPOSITORY RECTAL EVERY 4 HOURS PRN
Status: DISCONTINUED | OUTPATIENT
Start: 2019-02-16 | End: 2019-02-18 | Stop reason: HOSPADM

## 2019-02-16 RX ORDER — HYDROCODONE BITARTRATE AND ACETAMINOPHEN 5; 325 MG/1; MG/1
1 TABLET ORAL ONCE
Status: COMPLETED | OUTPATIENT
Start: 2019-02-16 | End: 2019-02-16

## 2019-02-16 RX ORDER — ACETAMINOPHEN 325 MG/1
650 TABLET ORAL EVERY 6 HOURS PRN
Status: DISCONTINUED | OUTPATIENT
Start: 2019-02-16 | End: 2019-02-18 | Stop reason: HOSPADM

## 2019-02-16 RX ORDER — ACETAMINOPHEN 325 MG/1
650 TABLET ORAL ONCE
Status: COMPLETED | OUTPATIENT
Start: 2019-02-16 | End: 2019-02-16

## 2019-02-16 RX ORDER — CLONIDINE HYDROCHLORIDE 0.1 MG/1
0.1 TABLET ORAL EVERY 6 HOURS PRN
Status: DISCONTINUED | OUTPATIENT
Start: 2019-02-16 | End: 2019-02-18 | Stop reason: HOSPADM

## 2019-02-16 RX ORDER — ONDANSETRON 2 MG/ML
4 INJECTION INTRAMUSCULAR; INTRAVENOUS EVERY 4 HOURS PRN
Status: DISCONTINUED | OUTPATIENT
Start: 2019-02-16 | End: 2019-02-18 | Stop reason: HOSPADM

## 2019-02-16 RX ORDER — ISOSORBIDE MONONITRATE 60 MG/1
120 TABLET, EXTENDED RELEASE ORAL EVERY MORNING
Status: DISCONTINUED | OUTPATIENT
Start: 2019-02-17 | End: 2019-02-18 | Stop reason: HOSPADM

## 2019-02-16 RX ORDER — SODIUM CHLORIDE 9 MG/ML
INJECTION, SOLUTION INTRAVENOUS CONTINUOUS
Status: ACTIVE | OUTPATIENT
Start: 2019-02-16 | End: 2019-02-17

## 2019-02-16 RX ORDER — UBIDECARENONE 50 MG
2400 CAPSULE ORAL DAILY
COMMUNITY
End: 2019-07-23

## 2019-02-16 RX ORDER — ALLOPURINOL 100 MG/1
100 TABLET ORAL DAILY
Status: DISCONTINUED | OUTPATIENT
Start: 2019-02-17 | End: 2019-02-18 | Stop reason: HOSPADM

## 2019-02-16 RX ORDER — ONDANSETRON 4 MG/1
4 TABLET, ORALLY DISINTEGRATING ORAL EVERY 4 HOURS PRN
Status: DISCONTINUED | OUTPATIENT
Start: 2019-02-16 | End: 2019-02-18 | Stop reason: HOSPADM

## 2019-02-16 RX ORDER — PROMETHAZINE HYDROCHLORIDE 25 MG/1
12.5-25 TABLET ORAL EVERY 4 HOURS PRN
Status: DISCONTINUED | OUTPATIENT
Start: 2019-02-16 | End: 2019-02-18 | Stop reason: HOSPADM

## 2019-02-16 RX ORDER — LORAZEPAM 2 MG/ML
INJECTION INTRAMUSCULAR
Status: DISPENSED
Start: 2019-02-16 | End: 2019-02-17

## 2019-02-16 RX ORDER — LISINOPRIL 10 MG/1
10 TABLET ORAL DAILY
Status: DISCONTINUED | OUTPATIENT
Start: 2019-02-17 | End: 2019-02-17

## 2019-02-16 RX ORDER — CLOPIDOGREL BISULFATE 75 MG/1
75 TABLET ORAL DAILY
Status: DISCONTINUED | OUTPATIENT
Start: 2019-02-17 | End: 2019-02-18 | Stop reason: HOSPADM

## 2019-02-16 RX ORDER — NITROGLYCERIN 0.4 MG/1
0.4 TABLET SUBLINGUAL PRN
Status: DISCONTINUED | OUTPATIENT
Start: 2019-02-16 | End: 2019-02-18 | Stop reason: HOSPADM

## 2019-02-16 RX ORDER — LORAZEPAM 2 MG/ML
2 INJECTION INTRAMUSCULAR ONCE
Status: COMPLETED | OUTPATIENT
Start: 2019-02-16 | End: 2019-02-16

## 2019-02-16 RX ORDER — BISACODYL 10 MG
10 SUPPOSITORY, RECTAL RECTAL
Status: DISCONTINUED | OUTPATIENT
Start: 2019-02-16 | End: 2019-02-18 | Stop reason: HOSPADM

## 2019-02-16 RX ORDER — GADOBUTROL 604.72 MG/ML
10 INJECTION INTRAVENOUS ONCE
Status: COMPLETED | OUTPATIENT
Start: 2019-02-16 | End: 2019-02-16

## 2019-02-16 RX ORDER — LORAZEPAM 2 MG/ML
0.5 INJECTION INTRAMUSCULAR ONCE
Status: COMPLETED | OUTPATIENT
Start: 2019-02-16 | End: 2019-02-16

## 2019-02-16 RX ORDER — POLYETHYLENE GLYCOL 3350 17 G/17G
1 POWDER, FOR SOLUTION ORAL
Status: DISCONTINUED | OUTPATIENT
Start: 2019-02-16 | End: 2019-02-18 | Stop reason: HOSPADM

## 2019-02-16 RX ORDER — AMOXICILLIN 250 MG
2 CAPSULE ORAL 2 TIMES DAILY
Status: DISCONTINUED | OUTPATIENT
Start: 2019-02-16 | End: 2019-02-18 | Stop reason: HOSPADM

## 2019-02-16 RX ORDER — METOPROLOL SUCCINATE 100 MG/1
50 TABLET, EXTENDED RELEASE ORAL DAILY
Status: DISCONTINUED | OUTPATIENT
Start: 2019-02-17 | End: 2019-02-18

## 2019-02-16 RX ADMIN — LORAZEPAM 2 MG: 2 INJECTION INTRAMUSCULAR; INTRAVENOUS at 15:10

## 2019-02-16 RX ADMIN — ACETAMINOPHEN 650 MG: 325 TABLET, FILM COATED ORAL at 14:02

## 2019-02-16 RX ADMIN — ACETAMINOPHEN 650 MG: 325 TABLET, FILM COATED ORAL at 09:52

## 2019-02-16 RX ADMIN — HYDROCODONE BITARTRATE AND ACETAMINOPHEN 1 TABLET: 5; 325 TABLET ORAL at 12:57

## 2019-02-16 RX ADMIN — LORAZEPAM 0.5 MG: 2 INJECTION INTRAMUSCULAR; INTRAVENOUS at 14:06

## 2019-02-16 RX ADMIN — SODIUM CHLORIDE: 9 INJECTION, SOLUTION INTRAVENOUS at 17:21

## 2019-02-16 RX ADMIN — GADOBUTROL 10 ML: 604.72 INJECTION INTRAVENOUS at 18:00

## 2019-02-16 RX ADMIN — LORAZEPAM 0.5 MG: 2 INJECTION INTRAMUSCULAR; INTRAVENOUS at 15:00

## 2019-02-16 ASSESSMENT — ENCOUNTER SYMPTOMS
PSYCHIATRIC NEGATIVE: 1
MUSCULOSKELETAL NEGATIVE: 1
CHILLS: 0
LOSS OF CONSCIOUSNESS: 0
HEMOPTYSIS: 0
VOMITING: 0
HEARTBURN: 0
CONSTIPATION: 0
COUGH: 0
BRUISES/BLEEDS EASILY: 0
DIARRHEA: 0
BLOOD IN STOOL: 0
PHOTOPHOBIA: 1
ABDOMINAL PAIN: 0
EYE PAIN: 1
HEADACHES: 1
PALPITATIONS: 0
DEPRESSION: 0
FOCAL WEAKNESS: 0
DIZZINESS: 0
SEIZURES: 0
GASTROINTESTINAL NEGATIVE: 1
SHORTNESS OF BREATH: 1
FEVER: 0
NAUSEA: 0
DIAPHORESIS: 1
DOUBLE VISION: 0
WHEEZING: 0
NERVOUS/ANXIOUS: 0

## 2019-02-16 ASSESSMENT — LIFESTYLE VARIABLES
EVER_SMOKED: YES
AVERAGE NUMBER OF DAYS PER WEEK YOU HAVE A DRINK CONTAINING ALCOHOL: 2
REASON UNABLE TO ASSESS: N
TOTAL SCORE: 1
EVER FELT BAD OR GUILTY ABOUT YOUR DRINKING: NO
HOW MANY TIMES IN THE PAST YEAR HAVE YOU HAD 5 OR MORE DRINKS IN A DAY: 0
SUBSTANCE_ABUSE: 0
ON A TYPICAL DAY WHEN YOU DRINK ALCOHOL HOW MANY DRINKS DO YOU HAVE: 1
HAVE YOU EVER FELT YOU SHOULD CUT DOWN ON YOUR DRINKING: NO
ALCOHOL_USE: YES
CONSUMPTION TOTAL: NEGATIVE
HAVE PEOPLE ANNOYED YOU BY CRITICIZING YOUR DRINKING: YES
TOTAL SCORE: 1
EVER HAD A DRINK FIRST THING IN THE MORNING TO STEADY YOUR NERVES TO GET RID OF A HANGOVER: NO
TOTAL SCORE: 1

## 2019-02-16 ASSESSMENT — PATIENT HEALTH QUESTIONNAIRE - PHQ9
1. LITTLE INTEREST OR PLEASURE IN DOING THINGS: NOT AT ALL
1. LITTLE INTEREST OR PLEASURE IN DOING THINGS: NOT AT ALL
2. FEELING DOWN, DEPRESSED, IRRITABLE, OR HOPELESS: NOT AT ALL
1. LITTLE INTEREST OR PLEASURE IN DOING THINGS: NOT AT ALL
2. FEELING DOWN, DEPRESSED, IRRITABLE, OR HOPELESS: NOT AT ALL
SUM OF ALL RESPONSES TO PHQ9 QUESTIONS 1 AND 2: 0
2. FEELING DOWN, DEPRESSED, IRRITABLE, OR HOPELESS: NOT AT ALL
SUM OF ALL RESPONSES TO PHQ9 QUESTIONS 1 AND 2: 0
SUM OF ALL RESPONSES TO PHQ9 QUESTIONS 1 AND 2: 0

## 2019-02-16 ASSESSMENT — PAIN DESCRIPTION - DESCRIPTORS: DESCRIPTORS: SHARP

## 2019-02-16 ASSESSMENT — COPD QUESTIONNAIRES
DO YOU EVER COUGH UP ANY MUCUS OR PHLEGM?: NO/ONLY WITH OCCASIONAL COLDS OR INFECTIONS
HAVE YOU SMOKED AT LEAST 100 CIGARETTES IN YOUR ENTIRE LIFE: NO/DON'T KNOW
COPD SCREENING SCORE: 2
DURING THE PAST 4 WEEKS HOW MUCH DID YOU FEEL SHORT OF BREATH: NONE/LITTLE OF THE TIME
IN THE PAST 12 MONTHS DO YOU DO LESS THAN YOU USED TO BECAUSE OF YOUR BREATHING PROBLEMS: DISAGREE/UNSURE

## 2019-02-16 ASSESSMENT — PAIN SCALES - WONG BAKER: WONGBAKER_NUMERICALRESPONSE: HURTS JUST A LITTLE BIT

## 2019-02-16 NOTE — ED PROVIDER NOTES
ED Provider Note    Chief Complaint:   Headache    HPI:  Abimael Hamilton is a 63 y.o. male who presents with chief complaint of headache.  He developed an acute onset generalized headache worse in the occipital area that began at 730 this morning while he was having a bowel movement.  Initially describes some associated preceding chest discomfort and shoulder discomfort, however those symptoms have completely resolved at this time.  On arrival to the emergency department he has photophobia, persistent occipital headache that is dull and severe.  States that earlier this morning his blood pressure was elevated with systolic pressure in the 180s, on arrival to the emergency department systolic blood pressure is 140.  He has had associated nausea without vomiting.  No associated fevers.    He has no low back pain, no vision changes.  He has no abdominal pain.  States he believes his headache was initially due to hypertension, however that is improved at this time.  He does have a significant history of coronary artery disease with history of CABG as documented below.  Additionally noted to have a history of aortic aneurysm.    Review of Systems:  See HPI for pertinent positives and negatives. All other systems negative.    Past Medical History:   has a past medical history of Arthritis; Benign hypertensive heart disease without heart failure (11/14/2011); CAD (coronary artery disease); Congestive heart failure (HCC); Coronary atherosclerosis of autologous vein bypass graft (11/14/2011); Gout; Heart valve disease; High cholesterol; History of pancreatitis; Hypertension; Muscle cramps (10/4/2011); Myocardial infarct (HCC); Obesity (11/14/2011); Pain; Postsurgical aortocoronary bypass status (7/26/2016); Renal disorder; S/P aortic valve replacement with bioprosthetic valve (7/26/2016); Statin intolerance (7/26/2016); and Status post cardiac revascularization with bypass aortocoronary anastomosis of five coronary vessels  (7/26/2016).    Social History:  Social History     Social History Main Topics   • Smoking status: Former Smoker     Packs/day: 3.00     Years: 20.00     Types: Cigarettes     Quit date: 1/1/1992   • Smokeless tobacco: Never Used   • Alcohol use Yes      Comment: 4 per month   • Drug use: Yes     Types: Marijuana, Inhaled      Comment: Marijuana- Daily (4 times per day)   • Sexual activity: Yes     Partners: Female       Surgical History:   has a past surgical history that includes laminotomy (12/2004); biopsy general; multiple coronary artery bypass (11/11/1998); multiple coronary artery bypass (12/08/2015); shoulder decompression arthroscopic (Right, 9/5/2017); arthroscopic labral debridement (Right, 9/5/2017); shoulder arthroscopy w/ rotator cuff repair (Right, 9/5/2017); shoulder arthroscopy w/ bicipital tenodesis repair (Right, 9/5/2017); and synovectomy (Right, 9/5/2017).    Current Medications:  Home Medications     Reviewed by Judith Peterson R.N. (Registered Nurse) on 02/16/19 at 0901  Med List Status: Partial   Medication Last Dose Status   allopurinol (ZYLOPRIM) 100 MG Tab 2/16/2019 Active   aspirin 81 MG EC tablet 2/16/2019 Active   clopidogrel (PLAVIX) 75 MG Tab 2/16/2019 Active   Glucosamine-Chondroit-Vit C-Mn (GLUCOSAMINE CHONDROITIN COMPLX) CAPS 2/16/2019 Active   indomethacin (INDOCIN) 50 MG Cap 2/16/2019 Active   isosorbide mononitrate (IMDUR) 120 MG CR tablet 2/16/2019 Active   lisinopril (PRINIVIL) 10 MG Tab 2/16/2019 Active   magnesium oxide (MAG-OX) 400 MG Tab 2/16/2019 Active   metoprolol SR (TOPROL XL) 50 MG TABLET SR 24 HR 2/16/2019 Active   nitroglycerin (NITROSTAT) 0.4 MG SL Tab  Active   Omega-3 Fatty Acids (OMEGA-3 FISH OIL PO) 2/16/2019 Active   PRALUENT 75 MG/ML Solution Pen-injector  Active   Red Yeast Rice 600 MG Tab 2/16/2019 Active                Allergies:  Allergies   Allergen Reactions   • Gemfibrozil    • Lipitor [Atorvastatin Calcium] Unspecified     Severe Muscle  "cramps, dehydration   • Lovastatin      Dehydration, severe muscle cramps   • Tricor Unspecified     Dehydration, severe muscle cramps       Physical Exam:  Vital Signs: /90   Pulse 68   Temp 36.5 °C (97.7 °F) (Temporal)   Ht 1.676 m (5' 6\")   Wt 108 kg (238 lb 1.6 oz)   SpO2 98%   BMI 38.43 kg/m²   Constitutional: Alert, no acute distress  HENT: Moist mucus membranes, normal posterior pharynx, no intraoral lesions  Eyes: Pupils equal and reactive, normal conjunctiva  Neck: Supple, normal range of motion, no stridor  Cardiovascular: Extremities are warm and well perfused, normal cardiac auscultation, however exam is limited by body habitus.  Pulmonary: No respiratory distress, normal work of breathing, no accessory muscule usage, breath sounds clear and equal bilaterally  Abdomen: Soft, non-distended, non-tender to palpation, no peritoneal signs  Skin: Warm, dry, no rashes or lesions  Musculoskeletal: Normal range of motion in all extremities, no swelling or deformity noted, no lower extremity edema  Neurologic: Alert, oriented, normal speech, normal motor function, 5 out of 5 upper and lower extremity strength, no tremor, no truncal ataxia  Psychiatric: Normal and appropriate mood and affect    Medical records reviewed for continuity of care.  Cardiology clinic note reviewed from 1/21/19.  Patient is followed by Dr. Alejandro Fuentes.  Noted to have a past medical history of 5 vessel CABG in 1998, redo three-vessel CABG in 2005 and bioprosthetic aortic valve replacement.  Nearly all grafts are occluded.  Native coronary arteries severely diseased as well.  Noted to have a 4.5 centimeters thoracic aortic aneurysm, gout, hypertension, dyslipidemia, and obesity.  He is currently taking Plavix.    EKG: Rate 66, normal sinus rhythm, no ST depression, slight ST elevation in V1 unchanged from prior EKG dated 11/15/18.  No T wave inversions, no ectopy.    Labs:  Labs Reviewed   CBC WITH DIFFERENTIAL - Abnormal; " Notable for the following:        Result Value    MCHC 32.7 (*)     RDW 53.6 (*)     Neutrophils-Polys 78.20 (*)     Lymphocytes 14.30 (*)     Neutrophils (Absolute) 7.58 (*)     All other components within normal limits   COMP METABOLIC PANEL - Abnormal; Notable for the following:     Glucose 120 (*)     All other components within normal limits   BTYPE NATRIURETIC PEPTIDE   TROPONIN   ESTIMATED GFR       Radiology:  DX-CHEST-PORTABLE (1 VIEW)   Final Result      No acute cardiopulmonary abnormality.      CT-HEAD W/O   Final Result      No CT evidence of acute infarct, hemorrhage or mass.           ED Medications Administered:  Medications   acetaminophen (TYLENOL) tablet 650 mg (650 mg Oral Given 2/16/19 4641)       Differential diagnosis:  Subarachnoid hemorrhage, hypertensive headache, ACS, heart failure    MDM:  History and physical exam as documented above.  Patient presents with an acute onset headache preceded by an episode of chest discomfort.  He states he was not very concerned about the chest discomfort and that has since resolved.  Due to sudden onset headache, I did have concern for subarachnoid hemorrhage.  He presented to the emergency department within 2 hours of headache onset, emergent CT of the head without contrast is negative for acute process.      On laboratory evaluation BNP and troponin are within normal limits, less concerning for cardiac ischemia or heart failure.  CMP is reassuring, he has no abnormalities on CBC, he is a normal white blood count, normal hemoglobin.  He has no fevers, doubt infectious etiology such as encephalitis or meningitis.  Chest x-ray is negative for acute process.    As noncontrasted CT was obtained within 3 hours of symptom onset, that should be highly sensitive for intracranial bleed.  This is negative, however I am still concerned that he may have a symptomatic aneurysm that has not ruptured given his extensive history of vascular disease.  He refuses CTA due  to severe adverse reaction to IV contrast.  For this reason, plan is for admission to hospitalist service for pain control and MRA to evaluate for symptomatic aneurysm.    On arrival to the emergency department he initially declined any pain medication.  States he does not like narcotic pain medications that he can tolerate them.  Headache was initially treated with Tylenol.  He is not a good candidate for NSAIDs given his extensive cardiovascular disease.  As headache was persistent, he agreed to treatment with hydrocodone.  He was observed in the emergency department, did not develop any new neurologic symptoms over that time.    Case discussed with Dr. Zhou, he kindly agrees to admit the patient to the CDU.    Disposition:  Admit to CDU in guarded condition    Final Impression:  1. Intractable headache, unspecified chronicity pattern, unspecified headache type    2. Secondary hypertension        Electronically signed by: Chante Hartmann, 2/16/2019 12:45 PM

## 2019-02-16 NOTE — ED TRIAGE NOTES
Pt. BIB by EMS. Pt. Stated he was using the restroom when he started having a headache and neck pain. Pt. Had N/V x1. Pt. Took 2, 500 mg Naproxen before EMS arrived. Pt. Given 4mg Zofran PTA.

## 2019-02-16 NOTE — ED NOTES
Pt medicated per MD order with ativan. Call placed to MRI to ask that they come back for the patient when they are able. MRI tech states they will be available in 15 minutes.

## 2019-02-16 NOTE — ED NOTES
"MRI called stating pt. Was claustrophobic and needed some medication. Dr. Hartmann prescribed Ativan 0.5mg and pt. Was wheeled back to [facility] without having MRA complete. When I walked in the room pt. Stated the MRA was giving him chest pain so he took his own Nitro x1 and now his headache was 9/10. Dr. Hartmann notified and pt. Given 650 mg of tylenol and prescribed Ativan per MAR. I advised pt. And his wife that he can not take any more home medication due to safety reasons to protect him. Pt. Stated that he was going to need more than \"that\" referring to 0.5 mg Ativan to do the MRI. Pt. BP after taking nitro was 123/80.   "

## 2019-02-16 NOTE — PROGRESS NOTES
Pt to be placed on T 210. Per MD, Winnie would like MRA completed prior to transferring to room. Prim RN updated.

## 2019-02-17 ENCOUNTER — PATIENT OUTREACH (OUTPATIENT)
Dept: HEALTH INFORMATION MANAGEMENT | Facility: OTHER | Age: 64
End: 2019-02-17

## 2019-02-17 ENCOUNTER — APPOINTMENT (OUTPATIENT)
Dept: RADIOLOGY | Facility: MEDICAL CENTER | Age: 64
End: 2019-02-17
Attending: SURGERY
Payer: COMMERCIAL

## 2019-02-17 LAB
CHOLEST SERPL-MCNC: 112 MG/DL (ref 100–199)
HDLC SERPL-MCNC: 27 MG/DL
LDLC SERPL CALC-MCNC: 37 MG/DL
TRIGL SERPL-MCNC: 238 MG/DL (ref 0–149)

## 2019-02-17 PROCEDURE — 80061 LIPID PANEL: CPT

## 2019-02-17 PROCEDURE — 700111 HCHG RX REV CODE 636 W/ 250 OVERRIDE (IP): Performed by: HOSPITALIST

## 2019-02-17 PROCEDURE — 96376 TX/PRO/DX INJ SAME DRUG ADON: CPT | Mod: XU

## 2019-02-17 PROCEDURE — A9270 NON-COVERED ITEM OR SERVICE: HCPCS | Performed by: HOSPITALIST

## 2019-02-17 PROCEDURE — 99226 PR SUBSEQUENT OBSERVATION CARE,LEVEL III: CPT | Performed by: HOSPITALIST

## 2019-02-17 PROCEDURE — 96375 TX/PRO/DX INJ NEW DRUG ADDON: CPT | Mod: XU

## 2019-02-17 PROCEDURE — 93880 EXTRACRANIAL BILAT STUDY: CPT | Mod: 26 | Performed by: SURGERY

## 2019-02-17 PROCEDURE — G0378 HOSPITAL OBSERVATION PER HR: HCPCS

## 2019-02-17 PROCEDURE — 700102 HCHG RX REV CODE 250 W/ 637 OVERRIDE(OP): Performed by: HOSPITALIST

## 2019-02-17 PROCEDURE — 93880 EXTRACRANIAL BILAT STUDY: CPT

## 2019-02-17 RX ORDER — CLONIDINE HYDROCHLORIDE 0.1 MG/1
0.2 TABLET ORAL ONCE
Status: COMPLETED | OUTPATIENT
Start: 2019-02-17 | End: 2019-02-17

## 2019-02-17 RX ORDER — LISINOPRIL 20 MG/1
20 TABLET ORAL DAILY
Status: DISCONTINUED | OUTPATIENT
Start: 2019-02-18 | End: 2019-02-18 | Stop reason: HOSPADM

## 2019-02-17 RX ORDER — KETOROLAC TROMETHAMINE 30 MG/ML
30 INJECTION, SOLUTION INTRAMUSCULAR; INTRAVENOUS ONCE
Status: COMPLETED | OUTPATIENT
Start: 2019-02-17 | End: 2019-02-17

## 2019-02-17 RX ADMIN — METOPROLOL SUCCINATE 50 MG: 100 TABLET, EXTENDED RELEASE ORAL at 04:58

## 2019-02-17 RX ADMIN — NITROGLYCERIN 0.4 MG: 0.4 TABLET SUBLINGUAL at 08:46

## 2019-02-17 RX ADMIN — ALLOPURINOL 100 MG: 100 TABLET ORAL at 04:59

## 2019-02-17 RX ADMIN — ISOSORBIDE MONONITRATE 120 MG: 60 TABLET, EXTENDED RELEASE ORAL at 04:59

## 2019-02-17 RX ADMIN — FENTANYL CITRATE 25 MCG: 50 INJECTION, SOLUTION INTRAMUSCULAR; INTRAVENOUS at 08:33

## 2019-02-17 RX ADMIN — FENTANYL CITRATE 50 MCG: 50 INJECTION, SOLUTION INTRAMUSCULAR; INTRAVENOUS at 12:15

## 2019-02-17 RX ADMIN — CLONIDINE HYDROCHLORIDE 0.1 MG: 0.1 TABLET ORAL at 13:29

## 2019-02-17 RX ADMIN — LISINOPRIL 10 MG: 10 TABLET ORAL at 04:59

## 2019-02-17 RX ADMIN — ACETAMINOPHEN 650 MG: 325 TABLET, FILM COATED ORAL at 02:02

## 2019-02-17 RX ADMIN — FENTANYL CITRATE 50 MCG: 50 INJECTION, SOLUTION INTRAMUSCULAR; INTRAVENOUS at 18:38

## 2019-02-17 RX ADMIN — SENNOSIDES AND DOCUSATE SODIUM 2 TABLET: 8.6; 5 TABLET ORAL at 18:00

## 2019-02-17 RX ADMIN — CLONIDINE HYDROCHLORIDE 0.1 MG: 0.1 TABLET ORAL at 12:16

## 2019-02-17 RX ADMIN — CLOPIDOGREL 75 MG: 75 TABLET, FILM COATED ORAL at 04:59

## 2019-02-17 RX ADMIN — KETOROLAC TROMETHAMINE 30 MG: 30 INJECTION, SOLUTION INTRAMUSCULAR at 02:17

## 2019-02-17 RX ADMIN — ASPIRIN 81 MG: 81 TABLET, COATED ORAL at 04:58

## 2019-02-17 ASSESSMENT — ENCOUNTER SYMPTOMS
INSOMNIA: 0
FOCAL WEAKNESS: 0
HEADACHES: 1
BLOOD IN STOOL: 0
DIAPHORESIS: 0
CHILLS: 0
DEPRESSION: 0
SENSORY CHANGE: 0
ORTHOPNEA: 0
SHORTNESS OF BREATH: 0
CONSTIPATION: 0
PND: 0
SPEECH CHANGE: 0
NERVOUS/ANXIOUS: 0
ABDOMINAL PAIN: 0
FEVER: 0
COUGH: 0
WHEEZING: 0
NAUSEA: 0
VOMITING: 0
DIARRHEA: 0
WEAKNESS: 0
PALPITATIONS: 0
BRUISES/BLEEDS EASILY: 0
DIZZINESS: 0

## 2019-02-17 ASSESSMENT — PATIENT HEALTH QUESTIONNAIRE - PHQ9
SUM OF ALL RESPONSES TO PHQ9 QUESTIONS 1 AND 2: 0
1. LITTLE INTEREST OR PLEASURE IN DOING THINGS: NOT AT ALL
2. FEELING DOWN, DEPRESSED, IRRITABLE, OR HOPELESS: NOT AT ALL

## 2019-02-17 NOTE — PROGRESS NOTES
Pt's blood pressure 149/89, physician updated.  Verbal order to hold clonidine 0.2, verbal order read back

## 2019-02-17 NOTE — PROGRESS NOTES
Assessment completed.  Pt A&Ox4.  Respirations even, unlabored on room air.  Pt reports headache decreased to 5/10, chest pain is 1/10, declines intervention.  Sinus rhythm noted on telemetry monitor.   Wife at bedside.  Call light and belongings within reach.  Pt and wife updated on POC, updated communication board.  Needs met, will continue to monitor.

## 2019-02-17 NOTE — H&P
Hospital Medicine History & Physical Note    Date of Service  2/16/2019    Primary Care Physician  Martha Madrid M.D.    Consultants  None    Code Status  Full code    Chief Complaint  Chest pain and headache    History of Presenting Illness  63 y.o. male who presented 2/16/2019 with initial onset of migraine around 730 this morning.  The patient was about to go to the gym when he had to have a bowel movement and when he went to the bathroom his headache suddenly intensified.  The patient's headache initially started out of 7 out of 10 in intensity but then during the day it kept going up to 10 out of 10.  The patient at this point had an MRA of the head as well as the neck and at this point does results are pending.  In the meantime I am going to try to control his blood pressure as well as his pain.  Patient is a result of his headache increased in his blood pressure and developed chest pain.  The patient does have a previous bypass grafts.  Out of which 7 at this point are occluded and one is working which has a 4.5 cm aneurysm.  The patient at this point will need to be optimized with medical management.    Review of Systems  Review of Systems   Constitutional: Positive for diaphoresis. Negative for chills and fever.   HENT: Negative.    Eyes: Positive for photophobia and pain. Negative for double vision.   Respiratory: Positive for shortness of breath. Negative for cough, hemoptysis and wheezing.    Cardiovascular: Positive for chest pain. Negative for palpitations and leg swelling.   Gastrointestinal: Negative.  Negative for abdominal pain, blood in stool, constipation, diarrhea, heartburn, nausea and vomiting.   Genitourinary: Negative.  Negative for frequency, hematuria and urgency.   Musculoskeletal: Negative.  Negative for joint pain.   Skin: Negative.  Negative for itching and rash.   Neurological: Positive for headaches. Negative for dizziness, focal weakness, seizures and loss of consciousness.    Endo/Heme/Allergies: Negative.  Does not bruise/bleed easily.   Psychiatric/Behavioral: Negative.  Negative for depression, substance abuse and suicidal ideas. The patient is not nervous/anxious.    All other systems reviewed and are negative.      Past Medical History   has a past medical history of Arthritis; Benign hypertensive heart disease without heart failure (11/14/2011); CAD (coronary artery disease); Congestive heart failure (HCC); Coronary atherosclerosis of autologous vein bypass graft (11/14/2011); Gout; Heart valve disease; High cholesterol; History of pancreatitis; Hypertension; Muscle cramps (10/4/2011); Myocardial infarct (HCC); Obesity (11/14/2011); Pain; Postsurgical aortocoronary bypass status (7/26/2016); Renal disorder; S/P aortic valve replacement with bioprosthetic valve (7/26/2016); Statin intolerance (7/26/2016); and Status post cardiac revascularization with bypass aortocoronary anastomosis of five coronary vessels (7/26/2016).    Surgical History   has a past surgical history that includes laminotomy (12/2004); biopsy general; multiple coronary artery bypass (11/11/1998); multiple coronary artery bypass (12/08/2015); shoulder decompression arthroscopic (Right, 9/5/2017); arthroscopic labral debridement (Right, 9/5/2017); shoulder arthroscopy w/ rotator cuff repair (Right, 9/5/2017); shoulder arthroscopy w/ bicipital tenodesis repair (Right, 9/5/2017); and synovectomy (Right, 9/5/2017).     Family History  family history includes Cancer in his mother; Heart Attack in his father; Heart Disease in his brother.     Social History   reports that he quit smoking about 27 years ago. His smoking use included Cigarettes. He has a 60.00 pack-year smoking history. He has never used smokeless tobacco. He reports that he drinks alcohol. He reports that he uses drugs, including Marijuana and Inhaled.    Allergies  Allergies   Allergen Reactions   • Gemfibrozil    • Lipitor [Atorvastatin Calcium]  Unspecified     Severe Muscle cramps, dehydration   • Lovastatin      Dehydration, severe muscle cramps   • Tricor Unspecified     Dehydration, severe muscle cramps       Medications  Prior to Admission Medications   Prescriptions Last Dose Informant Patient Reported? Taking?   Glucosamine-Chondroit-Vit C-Mn (GLUCOSAMINE CHONDROITIN COMPLX) CAPS 2019 at 0700 Patient Yes No   Sig: Take 2 Caps by mouth every day.   Omega-3 Fatty Acids (OMEGA-3 FISH OIL PO) 2019 at 0700 Patient Yes No   Sig: Take  by mouth.   PRALUENT 75 MG/ML Solution Pen-injector 2019 at [facility] Patient No No   Si mg by Injection route every 14 days.   Red Yeast Rice 600 MG Tab 2019 at 0700 Patient Yes Yes   Sig: Take 2,400 mg by mouth every day.   allopurinol (ZYLOPRIM) 100 MG Tab 2019 at 0700 Patient No No   Sig: Take 1 Tab by mouth every day.   aspirin 81 MG EC tablet 2019 at 0700 Patient No No   Sig: Take 1 Tab by mouth every day.   clopidogrel (PLAVIX) 75 MG Tab 2019 at 0700 Patient No No   Sig: TAKE ONE TABLET BY MOUTH ONE TIME DAILY   indomethacin (INDOCIN) 50 MG Cap 2019 at 0700 Patient No No   Sig: TAKE ONE CAPSULE BY MOUTH TWICE DAILY AS NEEDED   isosorbide mononitrate (IMDUR) 120 MG CR tablet 2019 at 0700 Patient No No   Sig: Take 1 Tab by mouth every morning.   lisinopril (PRINIVIL) 10 MG Tab 2019 at 0700 Patient No No   Sig: Take 1 Tab by mouth every day.   magnesium oxide (MAG-OX) 400 MG Tab 2019 at 0700 Patient Yes No   Sig: Take 400 mg by mouth every day.   metoprolol SR (TOPROL XL) 50 MG TABLET SR 24 HR 2019 at 0700 Patient No No   Sig: Take 1 Tab by mouth every day.   nitroglycerin (NITROSTAT) 0.4 MG SL Tab prn at prn Patient No No   Sig: Place 1 Tab under tongue as needed for Chest Pain.      Facility-Administered Medications: None       Physical Exam  Temp:  [36.5 °C (97.7 °F)-36.7 °C (98.1 °F)] 36.7 °C (98.1 °F)  Pulse:  [63-68] 66  Resp:  [20] 20  BP: (142158)/(81-90)  158/81  SpO2:  [98 %] 98 %    Physical Exam   Constitutional: He is oriented to person, place, and time. He appears well-developed and well-nourished.   HENT:   Head: Normocephalic and atraumatic.   Right Ear: External ear normal.   Left Ear: External ear normal.   Nose: Nose normal.   Mouth/Throat: Oropharynx is clear and moist.   Eyes: Pupils are equal, round, and reactive to light. Conjunctivae and EOM are normal.   Neck: Normal range of motion. Neck supple. No JVD present. No thyromegaly present.   Cardiovascular: Normal rate and regular rhythm.    Murmur heard.  Pulmonary/Chest: Effort normal and breath sounds normal. He has no wheezes. He has no rales. He exhibits no tenderness.   Abdominal: Soft. Bowel sounds are normal. He exhibits no distension and no mass. There is no tenderness. There is no rebound and no guarding.   Musculoskeletal: Normal range of motion. He exhibits no edema or tenderness.   Lymphadenopathy:     He has no cervical adenopathy.   Neurological: He is alert and oriented to person, place, and time. He has normal reflexes. No cranial nerve deficit.   Skin: Skin is warm and dry. No rash noted. No erythema.   Psychiatric: He has a normal mood and affect. Judgment and thought content normal.   Nursing note and vitals reviewed.      Laboratory:  Recent Labs      02/16/19   1010   WBC  9.7   RBC  5.35   HEMOGLOBIN  16.3   HEMATOCRIT  49.9   MCV  93.3   MCH  30.5   MCHC  32.7*   RDW  53.6*   PLATELETCT  182   MPV  10.0     Recent Labs      02/16/19   1010   SODIUM  138   POTASSIUM  4.6   CHLORIDE  108   CO2  21   GLUCOSE  120*   BUN  22   CREATININE  1.18   CALCIUM  9.3     Recent Labs      02/16/19   1010   ALTSGPT  29   ASTSGOT  33   ALKPHOSPHAT  58   TBILIRUBIN  0.7   GLUCOSE  120*         Recent Labs      02/16/19   1010   BNPBTYPENAT  26         Recent Labs      02/16/19   1010   TROPONINI  <0.01       Urinalysis:    No results found     Imaging:  DX-CHEST-PORTABLE (1 VIEW)   Final Result       No acute cardiopulmonary abnormality.      CT-HEAD W/O   Final Result      No CT evidence of acute infarct, hemorrhage or mass.      MR-MRA HEAD-W/O    (Results Pending)   MR-MRA NECK-WITH & W/O AND SEQUENCES    (Results Pending)         Assessment/Plan:  I anticipate this patient is appropriate for observation status at this time.    * Migraine   Assessment & Plan    Patient developed a migraine around 730 this morning.  He went to the bathroom and was straining slightly to have a bowel movement and at that point his migraine just came on and to a 10 out of 10 in intensity over the course of the day.  With the pain his blood pressure escalated and at that point he started to also develop some chest pain.  At this point we are going to try to get him under control with some fentanyl.     Coronary artery disease of native artery of native heart with stable angina pectoris (HCC)- (present on admission)   Assessment & Plan    Chest discomfort.  Patient has had 8 bypasses out of which only one functions and it has a 4.5 cm aneurysm.  The patient's 7 other vessels at this point have completely occluded.  Continue at this point with nitroglycerin metoprolol XL lisinopril and isosorbide mononitrate 120 mg daily     Obesity (BMI 30-39.9)- (present on admission)   Assessment & Plan    Encourage weight loss management as an outpatient.     Gout- (present on admission)   Assessment & Plan    Continue with allopurinol     S/P aortic valve replacement with bioprosthetic valve- (present on admission)   Assessment & Plan    Stable     Dyslipidemia- (present on admission)   Assessment & Plan    Continue with low-fat low-cholesterol diet, unable to tolerate a statin due to intolerance.         VTE prophylaxis: Heparin

## 2019-02-17 NOTE — PROGRESS NOTES
Patient resting in bed resting with eyes closed. No distress noted. Call bell in reach of patient.

## 2019-02-17 NOTE — ASSESSMENT & PLAN NOTE
Currently requiring IV fentanyl for adequate pain management. Continue to monitor & treat as needed.

## 2019-02-17 NOTE — PROGRESS NOTES
Pt to the floor in [facility],on arrival pt sleepy but arousal,a&ox4,c/o mild HA and mild chest pain,on tele with SR,pt not in distress,pt seen by MD,poc explained.

## 2019-02-17 NOTE — PROGRESS NOTES
Patient c/o of headache 10/10. Dr. Dean notified and Toradol IV 30mg ordered x 1. Patient given medication. Will continue to monitor closely.

## 2019-02-18 ENCOUNTER — PATIENT OUTREACH (OUTPATIENT)
Dept: HEALTH INFORMATION MANAGEMENT | Facility: OTHER | Age: 64
End: 2019-02-18

## 2019-02-18 VITALS
BODY MASS INDEX: 36.03 KG/M2 | TEMPERATURE: 98 F | WEIGHT: 224.21 LBS | RESPIRATION RATE: 18 BRPM | HEART RATE: 63 BPM | SYSTOLIC BLOOD PRESSURE: 117 MMHG | OXYGEN SATURATION: 97 % | DIASTOLIC BLOOD PRESSURE: 76 MMHG | HEIGHT: 66 IN

## 2019-02-18 LAB
ANION GAP SERPL CALC-SCNC: 9 MMOL/L (ref 0–11.9)
BASOPHILS # BLD AUTO: 0.3 % (ref 0–1.8)
BASOPHILS # BLD: 0.02 K/UL (ref 0–0.12)
BUN SERPL-MCNC: 18 MG/DL (ref 8–22)
CALCIUM SERPL-MCNC: 9.3 MG/DL (ref 8.5–10.5)
CHLORIDE SERPL-SCNC: 108 MMOL/L (ref 96–112)
CO2 SERPL-SCNC: 22 MMOL/L (ref 20–33)
CREAT SERPL-MCNC: 1.05 MG/DL (ref 0.5–1.4)
CRP SERPL HS-MCNC: 0.49 MG/DL (ref 0–0.75)
EOSINOPHIL # BLD AUTO: 0.2 K/UL (ref 0–0.51)
EOSINOPHIL NFR BLD: 2.5 % (ref 0–6.9)
ERYTHROCYTE [DISTWIDTH] IN BLOOD BY AUTOMATED COUNT: 53.1 FL (ref 35.9–50)
ERYTHROCYTE [SEDIMENTATION RATE] IN BLOOD BY WESTERGREN METHOD: 7 MM/HOUR (ref 0–20)
GLUCOSE SERPL-MCNC: 131 MG/DL (ref 65–99)
HCT VFR BLD AUTO: 46.5 % (ref 42–52)
HGB BLD-MCNC: 15.5 G/DL (ref 14–18)
IMM GRANULOCYTES # BLD AUTO: 0.02 K/UL (ref 0–0.11)
IMM GRANULOCYTES NFR BLD AUTO: 0.3 % (ref 0–0.9)
LYMPHOCYTES # BLD AUTO: 2.01 K/UL (ref 1–4.8)
LYMPHOCYTES NFR BLD: 25.5 % (ref 22–41)
MAGNESIUM SERPL-MCNC: 2 MG/DL (ref 1.5–2.5)
MCH RBC QN AUTO: 31.1 PG (ref 27–33)
MCHC RBC AUTO-ENTMCNC: 33.3 G/DL (ref 33.7–35.3)
MCV RBC AUTO: 93.2 FL (ref 81.4–97.8)
MONOCYTES # BLD AUTO: 0.64 K/UL (ref 0–0.85)
MONOCYTES NFR BLD AUTO: 8.1 % (ref 0–13.4)
NEUTROPHILS # BLD AUTO: 4.99 K/UL (ref 1.82–7.42)
NEUTROPHILS NFR BLD: 63.3 % (ref 44–72)
NRBC # BLD AUTO: 0 K/UL
NRBC BLD-RTO: 0 /100 WBC
PLATELET # BLD AUTO: 172 K/UL (ref 164–446)
PMV BLD AUTO: 9.7 FL (ref 9–12.9)
POTASSIUM SERPL-SCNC: 4.1 MMOL/L (ref 3.6–5.5)
RBC # BLD AUTO: 4.99 M/UL (ref 4.7–6.1)
SODIUM SERPL-SCNC: 139 MMOL/L (ref 135–145)
WBC # BLD AUTO: 7.9 K/UL (ref 4.8–10.8)

## 2019-02-18 PROCEDURE — 80048 BASIC METABOLIC PNL TOTAL CA: CPT

## 2019-02-18 PROCEDURE — 83735 ASSAY OF MAGNESIUM: CPT

## 2019-02-18 PROCEDURE — 85652 RBC SED RATE AUTOMATED: CPT

## 2019-02-18 PROCEDURE — A9270 NON-COVERED ITEM OR SERVICE: HCPCS | Performed by: NURSE PRACTITIONER

## 2019-02-18 PROCEDURE — 86140 C-REACTIVE PROTEIN: CPT

## 2019-02-18 PROCEDURE — 96376 TX/PRO/DX INJ SAME DRUG ADON: CPT | Mod: XU

## 2019-02-18 PROCEDURE — 36415 COLL VENOUS BLD VENIPUNCTURE: CPT

## 2019-02-18 PROCEDURE — 700102 HCHG RX REV CODE 250 W/ 637 OVERRIDE(OP): Performed by: HOSPITALIST

## 2019-02-18 PROCEDURE — A9270 NON-COVERED ITEM OR SERVICE: HCPCS | Performed by: HOSPITALIST

## 2019-02-18 PROCEDURE — 99217 PR OBSERVATION CARE DISCHARGE: CPT | Performed by: HOSPITALIST

## 2019-02-18 PROCEDURE — 700111 HCHG RX REV CODE 636 W/ 250 OVERRIDE (IP): Performed by: HOSPITALIST

## 2019-02-18 PROCEDURE — G0378 HOSPITAL OBSERVATION PER HR: HCPCS

## 2019-02-18 PROCEDURE — 85025 COMPLETE CBC W/AUTO DIFF WBC: CPT

## 2019-02-18 PROCEDURE — 700102 HCHG RX REV CODE 250 W/ 637 OVERRIDE(OP): Performed by: NURSE PRACTITIONER

## 2019-02-18 RX ORDER — AMLODIPINE BESYLATE 5 MG/1
5 TABLET ORAL DAILY
Qty: 30 TAB | Refills: 0 | Status: SHIPPED | OUTPATIENT
Start: 2019-02-19 | End: 2019-03-19 | Stop reason: SDUPTHER

## 2019-02-18 RX ORDER — METOPROLOL SUCCINATE 25 MG/1
75 TABLET, EXTENDED RELEASE ORAL DAILY
Qty: 90 TAB | Refills: 0 | Status: SHIPPED | OUTPATIENT
Start: 2019-02-19 | End: 2019-05-06 | Stop reason: SDUPTHER

## 2019-02-18 RX ORDER — LISINOPRIL 20 MG/1
20 TABLET ORAL DAILY
Qty: 30 TAB | Refills: 0 | Status: SHIPPED | OUTPATIENT
Start: 2019-02-19 | End: 2019-03-19 | Stop reason: SDUPTHER

## 2019-02-18 RX ORDER — ONDANSETRON 4 MG/1
4 TABLET, ORALLY DISINTEGRATING ORAL EVERY 6 HOURS PRN
Qty: 12 TAB | Refills: 0 | Status: SHIPPED | OUTPATIENT
Start: 2019-02-18 | End: 2019-05-14

## 2019-02-18 RX ORDER — AMLODIPINE BESYLATE 5 MG/1
5 TABLET ORAL
Status: DISCONTINUED | OUTPATIENT
Start: 2019-02-18 | End: 2019-02-18 | Stop reason: HOSPADM

## 2019-02-18 RX ORDER — HYDROMORPHONE HYDROCHLORIDE 2 MG/1
1-2 TABLET ORAL EVERY 6 HOURS PRN
Qty: 8 TAB | Refills: 0 | Status: SHIPPED | OUTPATIENT
Start: 2019-02-18 | End: 2019-02-20

## 2019-02-18 RX ORDER — ACETAMINOPHEN 325 MG/1
650 TABLET ORAL EVERY 6 HOURS PRN
Qty: 30 TAB | Refills: 0 | COMMUNITY
Start: 2019-02-18 | End: 2019-07-23

## 2019-02-18 RX ORDER — FENTANYL CITRATE 100 UG/1
25-50 TABLET BUCCAL; SUBLINGUAL EVERY 6 HOURS PRN
Qty: 5 TAB | Refills: 0 | Status: SHIPPED | OUTPATIENT
Start: 2019-02-18 | End: 2019-02-18

## 2019-02-18 RX ADMIN — FENTANYL CITRATE 50 MCG: 50 INJECTION, SOLUTION INTRAMUSCULAR; INTRAVENOUS at 04:20

## 2019-02-18 RX ADMIN — METOPROLOL SUCCINATE 50 MG: 100 TABLET, EXTENDED RELEASE ORAL at 06:44

## 2019-02-18 RX ADMIN — ISOSORBIDE MONONITRATE 120 MG: 60 TABLET, EXTENDED RELEASE ORAL at 06:36

## 2019-02-18 RX ADMIN — LISINOPRIL 20 MG: 20 TABLET ORAL at 06:37

## 2019-02-18 RX ADMIN — FENTANYL CITRATE 25 MCG: 50 INJECTION, SOLUTION INTRAMUSCULAR; INTRAVENOUS at 09:35

## 2019-02-18 RX ADMIN — FENTANYL CITRATE 50 MCG: 50 INJECTION, SOLUTION INTRAMUSCULAR; INTRAVENOUS at 00:54

## 2019-02-18 RX ADMIN — ASPIRIN 81 MG: 81 TABLET, COATED ORAL at 06:36

## 2019-02-18 RX ADMIN — AMLODIPINE BESYLATE 5 MG: 5 TABLET ORAL at 09:31

## 2019-02-18 RX ADMIN — CLONIDINE HYDROCHLORIDE 0.1 MG: 0.1 TABLET ORAL at 03:54

## 2019-02-18 RX ADMIN — CLOPIDOGREL 75 MG: 75 TABLET, FILM COATED ORAL at 06:37

## 2019-02-18 NOTE — DISCHARGE INSTRUCTIONS
Ok to discharge once cardiology & vascular f/u appts are made. Please provide written information on cardiac diet. He should also take his BP & HR 1-2 times per day & record it in a log to discuss in follow up.   Discharge Instructions    Discharged to home by car with relative. Discharged via wheelchair, hospital escort: Yes.  Special equipment needed: Not Applicable    Be sure to schedule a follow-up appointment with your primary care doctor or any specialists as instructed.     Discharge Plan:   Diet Plan: Discussed  Activity Level: Discussed  Smoking Cessation Offered: Patient Counseled  Confirmed Follow up Appointment: Patient to Call and Schedule Appointment  Confirmed Symptoms Management: Discussed  Medication Reconciliation Updated: Yes  Pneumococcal Vaccine Administered/Refused: Not given - Patient refused pneumococcal vaccine  Influenza Vaccine Indication: Patient Refuses    I understand that a diet low in cholesterol, fat, and sodium is recommended for good health. Unless I have been given specific instructions below for another diet, I accept this instruction as my diet prescription.   Other diet: Heart healthy     Special Instructions: None    · Is patient discharged on Warfarin / Coumadin?   No     Depression / Suicide Risk    As you are discharged from this [facility] facility, it is important to learn how to keep safe from harming yourself.    Recognize the warning signs:  · Abrupt changes in personality, positive or negative- including increase in energy   · Giving away possessions  · Change in eating patterns- significant weight changes-  positive or negative  · Change in sleeping patterns- unable to sleep or sleeping all the time   · Unwillingness or inability to communicate  · Depression  · Unusual sadness, discouragement and loneliness  · Talk of wanting to die  · Neglect of personal appearance   · Rebelliousness- reckless behavior  · Withdrawal from people/activities they love  · Confusion-  inability to concentrate     If you or a loved one observes any of these behaviors or has concerns about self-harm, here's what you can do:  · Talk about it- your feelings and reasons for harming yourself  · Remove any means that you might use to hurt yourself (examples: pills, rope, extension cords, firearm)  · Get professional help from the community (Mental Health, Substance Abuse, psychological counseling)  · Do not be alone:Call your Safe Contact- someone whom you trust who will be there for you.  · Call your local CRISIS HOTLINE 863-4978 or 886-438-5102  · Call your local Children's Mobile Crisis Response Team [place] (619) 036-9755 or www.[employer]  · Call the toll free National Suicide Prevention Hotlines   · National Suicide Prevention Lifeline 412-453-PQJI (7326)  · National Hope Line Network 800-SUICIDE (289-6220)

## 2019-02-18 NOTE — DISCHARGE SUMMARY
Discharge Summary    CHIEF COMPLAINT ON ADMISSION  Chief Complaint   Patient presents with   • Headache   • Neck Pain     Reason for Admission  Chest pain & headache    Admission Date  2/16/2019    CODE STATUS  Full Code    HPI & HOSPITAL COURSE  Mr. Hamilton is a 63-year-old male who presented to the emergency department on 2/16/2019 with complaints of chest pain and headache.  He has an extensive cardiac history including aortic valve replacement, 4.5 cm thoracic aneurysm, and 2 CABG surgeries (1998 & 2015). Initial lab workup was largely unremarkable. His lipid panel showed an elevated triglyceride level of 210 and low HDL level of 32, but was otherwise unremarkable. He should continue his bimonthly praluent injections to keep control over this.  Troponins were negative, BNP levels were normal, thyroid function is intact.  Chest x-ray showed no acute cardiopulmonary abnormalities. A CT of his head was done and was negative for acute abnormalities. An MRA of the head was done and was normal. An MRA of the neck was also done and showed severe left ICA focal stenosis, right ICA tortuosity, and other abnormalities that were discussed with vascular surgeon Dr. Diop. She consulted on the patient and has recommended outpatient follow up in her office. His blood pressure has been elevated during his admission to the clinical decision unit so we have increased his metoprolol to 75 mg, doubled his lisinopril dose, and added amlodipine 5 mg. He has been advised to take his blood pressure and heart rate 1-2 times per day and record the readings in a journal to discuss in follow up. He strongly believes that his headaches are directly related to his hypertension because he has experienced this before. Nonetheless, Dr. Zhou has okayed the patient to receive at home fentanyl for his headache management. I was unable to prescribe a dose of 25-50 mcg of buccal fentanyl so the patient and his wife were educated to try and cut  the pill in half to limit the dose he is getting. The highest dose he received here was 50 mcg IV.    Therefore, he is discharged in fair and stable condition to home with close outpatient follow-up.    11:07-Addendum to original discharge: Called multiple pharmacies. They either do not have oral fentanyl or only have it available in a 28 pill box. Will try oral dilaudid instead. Patient and family educated to such. Their fentanyl prescription was shredded.    Discharge Date  2/18/2019    FOLLOW UP ITEMS POST DISCHARGE  PCP on Thursday 2/21/19 as previously scheduled.     Cardiology at first available (the patient wishes to get established with Emily Fuentes in anticipation of Alejandro Fuentes leaving the practice in June).    Vascular surgery Dr. Diop at her first available appt.    DISCHARGE DIAGNOSES  Principal Problem:    Migraine POA: Yes  Active Problems:    Coronary artery disease of native artery of native heart with stable angina pectoris (HCC) POA: Yes    Benign hypertensive heart disease without heart failure POA: Yes    Dyslipidemia POA: Yes    Statin intolerance POA: Yes    S/P aortic valve replacement with bioprosthetic valve POA: Yes      Overview: #23 [address] AVR (bioprosthetic)    Gout POA: Yes    Obesity (BMI 30-39.9) POA: Yes  Resolved Problems:    * No resolved hospital problems. *    FOLLOW UP  Future Appointments  Date Time Provider Department Center   2/21/2019 9:00 AM Martha Madrid M.D. [facility] None   3/6/2019 12:40 PM Ariadna Lott [facility] None   5/1/2019 7:30 AM V EXAM 5 [facility] None   5/14/2019 8:30 AM Alejandro Fuentes M.D. [facility] None   5/30/2019 1:30 PM Ana Fuentes M.D. [facility] None     1. Em Diop M.D..      Specialties:  Surgery, Radiology   Why:  Office to call to schedule appointment . If you do not hear from them please call to schedule. Thank you    Contact information   [address] [place] [id number] [phone]          MEDICATIONS ON  DISCHARGE     Medication List      START taking these medications      Instructions   acetaminophen 325 MG Tabs  Commonly known as:  TYLENOL   Take 2 Tabs by mouth every 6 hours as needed for Mild Pain.  Dose:  650 mg     amLODIPine 5 MG Tabs  Start taking on:  2/19/2019  Commonly known as:  NORVASC   Take 1 Tab by mouth every day.  Dose:  5 mg     HYDROmorphone 2 MG Tabs  Commonly known as:  DILAUDID   Take 0.5-1 Tabs by mouth every 6 hours as needed for Severe Pain for up to 2 days.  Dose:  1-2 mg     ondansetron 4 MG Tbdp  Commonly known as:  ZOFRAN ODT   Take 1 Tab by mouth every 6 hours as needed for Nausea.  Dose:  4 mg        CHANGE how you take these medications      Instructions   lisinopril 20 MG Tabs  Start taking on:  2/19/2019  What changed:  · medication strength  · how much to take  Commonly known as:  PRINIVIL   Take 1 Tab by mouth every day.  Dose:  20 mg     metoprolol SR 25 MG Tb24  Start taking on:  2/19/2019  What changed:  · medication strength  · how much to take  Commonly known as:  TOPROL XL   Take 3 Tabs by mouth every day.  Dose:  75 mg        CONTINUE taking these medications      Instructions   allopurinol 100 MG Tabs  Commonly known as:  ZYLOPRIM   Take 1 Tab by mouth every day.  Dose:  100 mg     aspirin 81 MG EC tablet   Take 1 Tab by mouth every day.  Dose:  81 mg     clopidogrel 75 MG Tabs  Commonly known as:  PLAVIX   TAKE ONE TABLET BY MOUTH ONE TIME DAILY     GLUCOSAMINE CHONDROITIN COMPLX Caps   Take 2 Caps by mouth every day.  Dose:  2 Cap     indomethacin 50 MG Caps  Commonly known as:  INDOCIN   TAKE ONE CAPSULE BY MOUTH TWICE DAILY AS NEEDED     isosorbide mononitrate 120 MG CR tablet  Commonly known as:  IMDUR   Take 1 Tab by mouth every morning.  Dose:  120 mg     magnesium oxide 400 MG Tabs  Commonly known as:  MAG-OX   Take 400 mg by mouth every day.  Dose:  400 mg     nitroglycerin 0.4 MG Subl  Commonly known as:  NITROSTAT   Place 1 Tab under tongue as needed for Chest  Pain.  Dose:  0.4 mg     OMEGA-3 FISH OIL PO   Take  by mouth.     PRALUENT 75 MG/ML Sopn  Generic drug:  Alirocumab   75 mg by Injection route every 14 days.  Dose:  75 mg     Red Yeast Rice 600 MG Tabs   Take 2,400 mg by mouth every day.  Dose:  2400 mg          Allergies  Allergies   Allergen Reactions   • Gemfibrozil    • Lipitor [Atorvastatin Calcium] Unspecified     Severe Muscle cramps, dehydration   • Lovastatin      Dehydration, severe muscle cramps   • Tricor Unspecified     Dehydration, severe muscle cramps     DIET  Orders Placed This Encounter   Procedures   • Diet Order Cardiac     Standing Status:   Standing     Number of Occurrences:   1     Order Specific Question:   Diet:     Answer:   Cardiac [6]     ACTIVITY  As tolerated.  Exercise encouraged.    CONSULTATIONS  Vascular surgery-Dr. Diop    PROCEDURES  None    LABORATORY  Lab Results   Component Value Date    SODIUM 139 02/18/2019    POTASSIUM 4.1 02/18/2019    CHLORIDE 108 02/18/2019    CO2 22 02/18/2019    GLUCOSE 131 (H) 02/18/2019    BUN 18 02/18/2019    CREATININE 1.05 02/18/2019      Lab Results   Component Value Date    WBC 7.9 02/18/2019    HEMOGLOBIN 15.5 02/18/2019    HEMATOCRIT 46.5 02/18/2019    PLATELETCT 172 02/18/2019      US-CAROTID DOPPLER BILAT   Final Result      MR-MRA NECK-WITH & W/O AND SEQUENCES   Final Result      1.  Dominant caliber right cervical vertebral artery which appears widely patent.   2.  Markedly hypoplastic left cervical vertebral artery, probably patent up to the level of the skull base. Distal intracranial V4 segment of the left vertebral artery is not visualized and may be atretic or occluded.   3.  Tandem focal moderate-high-grade stenoses in the distal V4 segment of the right vertebral artery which are not appreciated on the MRA [place] but best seen on the contrast infused MRA of the neck.   4.  Right cervical ICA moderate tortuosity.   5.  Left ICA origin high-grade focal stenosis. One  would expect stenosis of at least 80% for this lesion. Marked tortuosity of the left cervical ICA.      MR-MRA HEAD-W/O   Final Result      1.  Dominant caliber distal right vertebral artery with nonvisualization of the distal V4 segment left vertebral artery which may be occluded or atretic and may have terminated in a PICA.   2.  Diminutive vertebrobasilar system associated with normal variant carotid origin of the right posterior cerebral artery.   3.  Normal variant dominant right anterior cerebral artery A1 segment with hypoplastic left A1 segment.   4.  No evidence of aneurysm about [place].   5.  Other than the nonvisualized distal left vertebral artery, no evidence of vasospasm, stenosis, or occlusive lesion or elsewhere about [place].      DX-CHEST-PORTABLE (1 VIEW)   Final Result      No acute cardiopulmonary abnormality.      CT-HEAD W/O   Final Result      No CT evidence of acute infarct, hemorrhage or mass.        Total time of the discharge process exceeds 40 minutes.

## 2019-02-18 NOTE — PROGRESS NOTES
IV dc'd.  Discharge instructions given to patient; patient verbalizes understanding, all questions answered.  Copy of DC summary provided, signed copy in chart.  4 prescriptions electronically sent to pt's pharmacy; 1 prescription provided to patient, copies in chart.  Pt states personal belongings are in possession.  Pt escorted off unit by tech without incident.

## 2019-02-18 NOTE — PROGRESS NOTES
"Hospital Medicine Daily Progress Note    Date of Service  2/17/2019    Chief Complaint  Chest pain, headache    Hospital Course   Mr. Hamilton is a 63-year-old male who presented to the emergency department on 2/16/2019 with complaints of chest pain and headache.  He has an extensive cardiac history including AVR, 4.5 cm thoracic aneurysm, and 2 CABG surgeries (1998 & 2015).  Initial lab workup was largely unremarkable.  His lipid panel showed an elevated triglyceride level of 210 and low HDL level of 32, but was otherwise unremarkable.  Troponins were negative, BNP levels were normal, thyroid function is intact.  Chest x-ray showed no acute cardiopulmonary abnormalities.  A CT of his head was done and was negative for acute abnormalities.  An MRA of the head was done and was normal, though an MRA of the neck was also done and showed severe left ICA focal stenosis, right ICA tortuosity, and other abnormalities that were discussed with vascular surgeon Dr. Diop.  She will be consulting to make further recommendations given his findings.      Interval Problem Update  Still has a \"lingering\" headache.  Is getting IV fentanyl for that at this point.  Denies pain otherwise.  States he is very active and spends approximately 3-5 hours at the gym every day for the last 12 years.    Blood pressure has been on the high side.  Heart rate is stable, the patient is afebrile, and he is doing well on room air.    Consultants/Specialty  Vascular surgery-Dr. Diop    Code Status  Full code    Disposition  Monitor overnight pending evaluation by vascular surgery.    Review of Systems  Review of Systems   Constitutional: Negative for chills, diaphoresis, fever and malaise/fatigue.   HENT: Negative for congestion and nosebleeds.    Respiratory: Negative for cough, shortness of breath and wheezing.    Cardiovascular: Negative for chest pain, palpitations, orthopnea, leg swelling and PND.   Gastrointestinal: Negative for abdominal " pain, blood in stool, constipation, diarrhea, melena, nausea and vomiting.   Genitourinary: Negative for dysuria, frequency, hematuria and urgency.   Neurological: Positive for headaches. Negative for dizziness, sensory change, speech change, focal weakness and weakness.   Endo/Heme/Allergies: Does not bruise/bleed easily.   Psychiatric/Behavioral: Negative for depression. The patient is not nervous/anxious and does not have insomnia.    All other systems reviewed and are negative.     Physical Exam  Temp:  [36.3 °C (97.4 °F)-36.9 °C (98.5 °F)] 36.4 °C (97.5 °F)  Pulse:  [66-79] 70  Resp:  [16-20] 20  BP: (112-193)/() 149/89  SpO2:  [94 %-98 %] 95 %    Physical Exam   Constitutional: He is oriented to person, place, and time. He appears well-developed and well-nourished. He is active. No distress.   HENT:   Head: Normocephalic and atraumatic.   Right Ear: External ear normal.   Left Ear: External ear normal.   Nose: Nose normal.   Eyes: Pupils are equal, round, and reactive to light. Conjunctivae and EOM are normal. Right eye exhibits no discharge. Left eye exhibits no discharge. No scleral icterus.   Neck: Normal range of motion. Neck supple. No JVD present.   Cardiovascular: Normal rate, regular rhythm and intact distal pulses.  Exam reveals no gallop and no friction rub.    Murmur heard.  Pulmonary/Chest: Effort normal and breath sounds normal. No stridor. No respiratory distress. He has no decreased breath sounds. He has no wheezes. He has no rhonchi. He has no rales.   Abdominal: Soft. Bowel sounds are normal. He exhibits no distension. There is no tenderness. There is no rebound and no guarding.   Musculoskeletal: Normal range of motion. He exhibits no edema.   Neurological: He is alert and oriented to person, place, and time. He has normal strength. He displays no tremor. No cranial nerve deficit or sensory deficit. He displays no seizure activity. Coordination and gait normal. GCS eye subscore is 4.  GCS verbal subscore is 5. GCS motor subscore is 6.   Skin: Skin is warm and dry. No rash noted. He is not diaphoretic. No erythema. No pallor.   Psychiatric: He has a normal mood and affect. His speech is normal and behavior is normal. Judgment and thought content normal. Cognition and memory are normal.   Nursing note and vitals reviewed.    Fluids    Intake/Output Summary (Last 24 hours) at 02/17/19 1623  Last data filed at 02/16/19 1800   Gross per 24 hour   Intake              400 ml   Output              200 ml   Net              200 ml     Laboratory  Recent Labs      02/16/19   1010   WBC  9.7   RBC  5.35   HEMOGLOBIN  16.3   HEMATOCRIT  49.9   MCV  93.3   MCH  30.5   MCHC  32.7*   RDW  53.6*   PLATELETCT  182   MPV  10.0     Recent Labs      02/16/19   1010   SODIUM  138   POTASSIUM  4.6   CHLORIDE  108   CO2  21   GLUCOSE  120*   BUN  22   CREATININE  1.18   CALCIUM  9.3     Recent Labs      02/16/19   1010   BNPBTYPENAT  26     Recent Labs      02/17/19   0315   TRIGLYCERIDE  238*   HDL  27*   LDL  37     Imaging  MR-MRA NECK-WITH & W/O AND SEQUENCES   Final Result      1.  Dominant caliber right cervical vertebral artery which appears widely patent.   2.  Markedly hypoplastic left cervical vertebral artery, probably patent up to the level of the skull base. Distal intracranial V4 segment of the left vertebral artery is not visualized and may be atretic or occluded.   3.  Tandem focal moderate-high-grade stenoses in the distal V4 segment of the right vertebral artery which are not appreciated on the MRA [place] but best seen on the contrast infused MRA of the neck.   4.  Right cervical ICA moderate tortuosity.   5.  Left ICA origin high-grade focal stenosis. One would expect stenosis of at least 80% for this lesion. Marked tortuosity of the left cervical ICA.      MR-MRA HEAD-W/O   Final Result      1.  Dominant caliber distal right vertebral artery with nonvisualization of the distal V4 segment  left vertebral artery which may be occluded or atretic and may have terminated in a PICA.   2.  Diminutive vertebrobasilar system associated with normal variant carotid origin of the right posterior cerebral artery.   3.  Normal variant dominant right anterior cerebral artery A1 segment with hypoplastic left A1 segment.   4.  No evidence of aneurysm about [place].   5.  Other than the nonvisualized distal left vertebral artery, no evidence of vasospasm, stenosis, or occlusive lesion or elsewhere about [place].      DX-CHEST-PORTABLE (1 VIEW)   Final Result      No acute cardiopulmonary abnormality.      CT-HEAD W/O   Final Result      No CT evidence of acute infarct, hemorrhage or mass.      US-CAROTID DOPPLER BILAT    (Results Pending)      Assessment/Plan  * Migraine- (present on admission)   Assessment & Plan    Currently requiring IV fentanyl for adequate pain management. Continue to monitor & treat as needed.     Coronary artery disease of native artery of native heart with stable angina pectoris (HCC)- (present on admission)   Assessment & Plan    No chest discomfort at this time.   Resume imdur, toprol, lisinopril, and plavix.   Control HTN.      Obesity (BMI 30-39.9)- (present on admission)   Assessment & Plan    The patient has an elevated BMI level but is visibly very muscular and works out daily.      Gout- (present on admission)   Assessment & Plan    Stable. No evidence of flare. Continue home allopurinol.     S/P aortic valve replacement with bioprosthetic valve- (present on admission)   Assessment & Plan    Stable     Dyslipidemia- (present on admission)   Assessment & Plan    Continue with low-fat low-cholesterol diet, unable to tolerate a statin due to intolerance.        VTE prophylaxis: Ambulation, SCD's (also on plavix)

## 2019-02-18 NOTE — DISCHARGE PLANNING
Care Transition Team Assessment    Spoke with patient at bedside. Anticipate no needs @ present time. Spouse will be ride @ D/C.      Information Source  Orientation : Oriented x 4  Information Given By: Patient    Readmission Evaluation  Is this a readmission?: No    Interdisciplinary Discharge Planning  Does Admitting Nurse Feel This Could be a Complex Discharge?: No  Primary Care Physician: [facility]  Lives with - Patient's Self Care Capacity: Spouse  Patient or legal guardian wants to designate a caregiver (see row info): No  Support Systems: Spouse / Significant Other  Housing / Facility: [address]  Do You Take your Prescribed Medications Regularly: Yes  Able to Return to Previous ADL's: Yes  Mobility Issues: No  Prior Services: None  Patient Expects to be Discharged to:: Home  Assistance Needed: No  Durable Medical Equipment: Not Applicable    Discharge Preparedness  What are your discharge supports?: Spouse  Prior Functional Level: Ambulatory    Functional Assesment  Prior Functional Level: Ambulatory    Finances  Prescription Coverage: Yes    Anticipated Discharge Information  Anticipated discharge disposition: Home  Discharge Address: [address] [place] [address]  Discharge Contact Phone Number: [phone]

## 2019-02-18 NOTE — PROGRESS NOTES
Assessment completed.  Pt A&Ox4.  Pt c/o 4/10 headache, medicated per MAR.  Pt's blood pressure 117/76.  Sinus rhythm noted on telemetry monitor.  Pt updated on POC, communication board.  Needs met, will continue monitor.

## 2019-02-18 NOTE — CONSULTS
Vascular Consult Note:    Em Diop MD  Date & Time note created:    2/17/2019   7:30 PM     Referred by: Anton Zhou MD    Patient ID:   Name:             Abimael Hamilton   YOB: 1955  Age:                 63 y.o.  male   MRN:               [id number]                                                             Reason for Consult:       carotid and vertebral artery stenosis    History of Present Illness:    Abimael Hamilton is a 63year old man who presented with the abrupt onset of the worse headache of his life.  He was admitted and had another episode accompanied last night with nausea nd vomiting.  He has never had this type of headache in the past.      MRA of the head and neck was performed and I was asked to see him regarding several areas of concern.     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  He has a history of CAD and has several graft that are occluded, with one to the LAD remaining patent.  He has no history of focal extremity weakness, speech problems or sensory changes.     Review of Systems:      Constitutional: Denies fevers, denies weight changes  Eyes: Denies changes in vision, no eye pain  Ears/Nose/Throat/Mouth: Denies nasal congestion or sore throat   Cardiovascular: Denies chest pain or  palpitations   Respiratory: Denies shortness of breath , Denies cough  Gastrointestinal/Hepatic: Denies abdominal pain, nausea, vomiting, diarrhea, constipation or GI bleeding   Genitourinary: Denies dysuria or frequency  Musculoskeletal/Rheum: Denies  joint pain and swelling, no edema, no pain with walking  Skin: no rash, no recent history of open wounds  Neurological: Denies headache, confusion, memory loss or focal weakness/parasthesias  Psychiatric: Denies mood disorder   Endocrine: Denies thyroid problems  Heme/Oncology/Lymph Nodes: Denies enlarged lymph nodes, denies brusing or known bleeding disorder  All other systems were reviewed and are  negative (AMA/CMS criteria)                Past Medical History:   Past Medical History:   Diagnosis Date   • Arthritis    • Benign hypertensive heart disease without heart failure 11/14/2011   • CAD (coronary artery disease)    • Congestive heart failure (HCC)    • Coronary atherosclerosis of autologous vein bypass graft 11/14/2011   • Gout    • Heart valve disease    • High cholesterol    • History of pancreatitis    • Hypertension    • Muscle cramps 10/4/2011   • Myocardial infarct (HCC)     Multiple MI's, 1998, 2015   • Obesity 11/14/2011   • Pain     Joints   • Postsurgical aortocoronary bypass status 7/26/2016    Status post redo 3-V CABG in [place] 12/2015: SVG-acute marginal, SVG sequential to LAD, and OM    • Renal disorder     Hx of Acute renal failure r/t medication/statins   • S/P aortic valve replacement with bioprosthetic valve 7/26/2016    #23 [place] AVR (bioprosthetic)    • Statin intolerance 7/26/2016   • Status post cardiac revascularization with bypass aortocoronary anastomosis of five coronary vessels 7/26/2016    5-V CABG done in 1998 (done in [place] at [facility]), patent LIMA to LAD, occluded SVG-OM, occluded SVG-RCA by cardiac catheterization 11/15/2007 with other vein grafts not identified at that time, poor targets for revascularization and declined repeat CABG in 2007 by [facility]. No ischemic was identified by MPI 11/17/07 with an EF of 50%.         Past Surgical History:  Past Surgical History:   Procedure Laterality Date   • SHOULDER DECOMPRESSION ARTHROSCOPIC Right 9/5/2017    Procedure: SHOULDER DECOMPRESSION ARTHROSCOPIC SUBACROMIAL;  Surgeon: Mg Campos M.D.;  Location: SURGERY [facility];  Service: Orthopedics   • ARTHROSCOPIC LABRAL DEBRIDEMENT Right 9/5/2017    Procedure: ARTHROSCOPIC LABRAL DEBRIDEMENT;  Surgeon: Mg Campso M.D.;  Location: [facility];  Service: Orthopedics   • SHOULDER ARTHROSCOPY W/ ROTATOR CUFF REPAIR Right 9/5/2017     Procedure: SHOULDER ARTHROSCOPY W/ ROTATOR CUFF REPAIR;  Surgeon: Mg Campos M.D.;  Location: SURGERY [facility] [place] [facility];  Service: Orthopedics   • SHOULDER ARTHROSCOPY W/ BICIPITAL TENODESIS REPAIR Right 9/5/2017    Procedure: SHOULDER ARTHROSCOPY W/ BICIPITAL TENODESIS REPAIR;  Surgeon: Mg Campos M.D.;  Location: SURGERY [facility] [place] [facility];  Service: Orthopedics   • SYNOVECTOMY Right 9/5/2017    Procedure: SYNOVECTOMY;  Surgeon: Mg Campos M.D.;  Location: SURGERY [facility] [place] [facility];  Service: Orthopedics   • MULTIPLE CORONARY ARTERY BYPASS  12/08/2015    3 way bypass & Bovine valve   • LAMINOTOMY  12/2004    Diskectomy L4-L5, L5-S1   • MULTIPLE CORONARY ARTERY BYPASS  11/11/1998    5 way bypass   • BIOPSY GENERAL      buttock lesion       Current Outpatient Medications:  Current Facility-Administered Medications   Medication Dose Route Frequency Provider Last Rate Last Dose   • fentaNYL (SUBLIMAZE) injection 25-50 mcg  25-50 mcg Intravenous Q HOUR PRN Kasandra Zhou M.D.   50 mcg at 02/17/19 1838   • [START ON 2/18/2019] lisinopril (PRINIVIL) tablet 20 mg  20 mg Oral DAILY Melissa Tam, A.P.R.N.       • allopurinol (ZYLOPRIM) tablet 100 mg  100 mg Oral DAILY JIM NúñezDDottie   100 mg at 02/17/19 0459   • aspirin EC (ECOTRIN) tablet 81 mg  81 mg Oral DAILY BERTHA Núñez.DDottie   81 mg at 02/17/19 0458   • clopidogrel (PLAVIX) tablet 75 mg  75 mg Oral DAILY BERTHA Núñez.D.   75 mg at 02/17/19 0459   • isosorbide mononitrate SR (IMDUR) tablet 120 mg  120 mg Oral QAM JIM NúñezDDottie   120 mg at 02/17/19 0459   • metoprolol SR (TOPROL XL) tablet 50 mg  50 mg Oral DAILY BERTHA Núñez.D.   50 mg at 02/17/19 0458   • nitroglycerin (NITROSTAT) tablet 0.4 mg  0.4 mg Sublingual PRN JIM NúñezDDottie   0.4 mg at 02/17/19 0846   • senna-docusate (PERICOLACE or SENOKOT S) 8.6-50 MG per tablet 2 Tab  2 Tab Oral BID Kasandra Zhou M.D.   2 Tab at 02/17/19 1800    And   •  polyethylene glycol/lytes (MIRALAX) PACKET 1 Packet  1 Packet Oral QDAY PRN Kasandra Zhou M.D.        And   • magnesium hydroxide (MILK OF MAGNESIA) suspension 30 mL  30 mL Oral QDAY PRN Kasandra Zhou M.D.        And   • bisacodyl (DULCOLAX) suppository 10 mg  10 mg Rectal QDAY PRN Kasandra Zhou M.D.       • cloNIDine (CATAPRES) tablet 0.1 mg  0.1 mg Oral Q6HRS PRN Kasandra Zhou M.D.   0.1 mg at 02/17/19 1329   • ondansetron (ZOFRAN) syringe/vial injection 4 mg  4 mg Intravenous Q4HRS PRN Kasandra Zhou M.D.       • ondansetron (ZOFRAN ODT) dispertab 4 mg  4 mg Oral Q4HRS PRN Kasandra Zhou M.D.       • promethazine (PHENERGAN) tablet 12.5-25 mg  12.5-25 mg Oral Q4HRS PRN Kasandra Zhou M.D.       • promethazine (PHENERGAN) suppository 12.5-25 mg  12.5-25 mg Rectal Q4HRS PRN Kasandra Zhou M.D.       • prochlorperazine (COMPAZINE) injection 5-10 mg  5-10 mg Intravenous Q4HRS PRN Kasandra Zhou M.D.       • acetaminophen (TYLENOL) tablet 650 mg  650 mg Oral Q6HRS PRN Kasandra Zhou M.D.   650 mg at 02/17/19 0202       Medication Allergy:  Allergies   Allergen Reactions   • Gemfibrozil    • Lipitor [Atorvastatin Calcium] Unspecified     Severe Muscle cramps, dehydration   • Lovastatin      Dehydration, severe muscle cramps   • Tricor Unspecified     Dehydration, severe muscle cramps       Family History:  Family History   Problem Relation Age of Onset   • Heart Attack Father         MI and CABG, unknown age   • Cancer Mother         Breast   • Heart Disease Brother    • Stroke Neg Hx    • Diabetes Neg Hx    • Lung Disease Neg Hx        Social History:  Social History     Social History   • Marital status:      Spouse name: N/A   • Number of children: N/A   • Years of education: N/A     Occupational History   • Not on file.     Social History Main Topics   • Smoking status: Former Smoker     Packs/day: 3.00     Years: 20.00     Types: Cigarettes     Quit date: 1/1/1992   • Smokeless tobacco: Never Used   •  "Alcohol use Yes      Comment: 4 per month   • Drug use: Yes     Types: Marijuana, Inhaled      Comment: Marijuana- Daily (4 times per day)   • Sexual activity: Yes     Partners: Female     Other Topics Concern   • Not on file     Social History Narrative   • No narrative on file         Physical Exam:  Vitals/ General Appearance:   Weight/BMI: Body mass index is 36.19 kg/m².  Blood pressure 155/86, pulse 71, temperature 37.1 °C (98.8 °F), temperature source Temporal, resp. rate 20, height 1.676 m (5' 6\"), weight 101.7 kg (224 lb 3.3 oz), SpO2 95 %.  Vitals:    02/17/19 1200 02/17/19 1330 02/17/19 1359 02/17/19 1718   BP: (!) 173/101 (!) 193/95 149/89 155/86   Pulse: 70   71   Resp: 20   20   Temp: 36.4 °C (97.5 °F)   37.1 °C (98.8 °F)   TempSrc: Temporal   Temporal   SpO2: 95%   95%   Weight:       Height:         Oxygen Therapy:  Pulse Oximetry: 95 %, O2 (LPM): 0, O2 Delivery: None (Room Air)    Constitutional:   Well developed, Well nourished, No acute distress, obese  HENMT:  Normocephalic, Atraumatic, Oropharynx moist mucous membranes,   Eyes:  EOMI, Conjunctiva normal, No discharge.  Neck:  Normal range of motion, No cervical tenderness,  no JVD, no carotid bruits.  Cardiovascular:  Normal heart rate, Normal rhythm, valve murmur.   Extremities with intact distal pulses, no cyanosis, or edema.  Lungs:  Breath sounds clear to auscultation bilaterally,  no crackles, no wheezing.   Abdomen: Bowel sounds normal, Soft, No tenderness, No guarding, obese   Skin: Warm, Dry, No erythema, No rash, no induration.  Neurologic: Alert & oriented x 3, No focal deficits noted, cranial nerves II through X are grossly intact.  Psychiatric: Affect normal, Judgment normal, Mood normal.    MDM (Data Review):     Records reviewed and summarized in current documentation    Lab Data Review:  Recent Results (from the past 24 hour(s))   Lipid Profile (Lipid Panel) Fasting    Collection Time: 02/17/19  3:15 AM   Result Value Ref Range    " Cholesterol,Tot 112 100 - 199 mg/dL    Triglycerides 238 (H) 0 - 149 mg/dL    HDL 27 (A) >=40 mg/dL    LDL 37 <100 mg/dL       Imaging/Procedures Review:    MRA of the neck   Impression       1.  Dominant caliber right cervical vertebral artery which appears widely patent.  2.  Markedly hypoplastic left cervical vertebral artery, probably patent up to the level of the skull base. Distal intracranial V4 segment of the left vertebral artery is not visualized and may be atretic or occluded.  3.  Tandem focal moderate-high-grade stenoses in the distal V4 segment of the right vertebral artery which are not appreciated on the MRA [place] but best seen on the contrast infused MRA of the neck.  4.  Right cervical ICA moderate tortuosity.  5.  Left ICA origin high-grade focal stenosis. One would expect stenosis of at least 80% for this lesion. Marked tortuosity of the left cervical ICA.      Carotid Duplex   Report     Vascular Laboratory   CONCLUSIONS   No prior study is available for comparison..    Mild stenosis of the right internal carotid (< 50%).    Moderate stenosis of the left internal carotid (50-69%).    Antegrade flow, bilateral vertebral arteries.    Left vertebral waveform suggestive of distal stenosis    MDM (Assessment and Plan):     Patient Active Problem List    Diagnosis Date Noted   • Migraine 02/16/2019     Priority: High   • Coronary atherosclerosis of autologous vein bypass graft 11/14/2011     Priority: High   • Benign hypertensive heart disease without heart failure 11/14/2011     Priority: High   • Coronary artery disease of native artery of native heart with stable angina pectoris (HCC) 07/30/2012     Priority: Medium   • Obesity (BMI 30-39.9) 05/25/2017     Priority: Low   • Gout      Priority: Low   • S/P aortic valve replacement with bioprosthetic valve 07/26/2016     Priority: Low   • Dyslipidemia 07/30/2012     Priority: Low   • Abnormal nuclear cardiac imaging test 11/13/2018   •  Fatty liver 08/22/2018   • Complete tear of right rotator cuff 06/13/2017   • Prediabetes 09/15/2016   • History of pancreatitis    • Status post cardiac revascularization with bypass aortocoronary anastomosis of five coronary vessels 07/26/2016   • Postsurgical aortocoronary bypass status 07/26/2016   • Statin intolerance 07/26/2016     Impression:  This 63year old male has asymptomatic carotid stenosis.  There are vertebral abnormalities, but none that would explain his migraine headache.  He requires only surveillance for the carotid stenosis and his thoracic aneurysm, which given the proximal arch aneurysm could be followed by cardiology since he is followed regularly in that office. He could be discharged as long as his blood pressure and pain are controlled.      Plan:  I will make arrangements for follow-up in my office for his carotid stenosis.     Em Diop M.D.

## 2019-02-19 ENCOUNTER — ANTICOAGULATION MONITORING (OUTPATIENT)
Dept: VASCULAR LAB | Facility: MEDICAL CENTER | Age: 64
End: 2019-02-19

## 2019-02-19 NOTE — PROGRESS NOTES
Patient came in to receive sample of Praluent 75 mg. Spoke with [facility], patients enrollment form is still being reviewed.

## 2019-02-21 ENCOUNTER — OFFICE VISIT (OUTPATIENT)
Dept: MEDICAL GROUP | Facility: LAB | Age: 64
End: 2019-02-21
Payer: COMMERCIAL

## 2019-02-21 VITALS
RESPIRATION RATE: 14 BRPM | DIASTOLIC BLOOD PRESSURE: 70 MMHG | TEMPERATURE: 98.5 F | HEART RATE: 66 BPM | BODY MASS INDEX: 36.35 KG/M2 | SYSTOLIC BLOOD PRESSURE: 128 MMHG | HEIGHT: 66 IN | WEIGHT: 226.2 LBS | OXYGEN SATURATION: 97 %

## 2019-02-21 DIAGNOSIS — Z95.3 S/P AORTIC VALVE REPLACEMENT WITH BIOPROSTHETIC VALVE: ICD-10-CM

## 2019-02-21 DIAGNOSIS — Z87.19 HISTORY OF PANCREATITIS: ICD-10-CM

## 2019-02-21 DIAGNOSIS — R73.03 PREDIABETES: ICD-10-CM

## 2019-02-21 DIAGNOSIS — Z78.9 STATIN INTOLERANCE: ICD-10-CM

## 2019-02-21 DIAGNOSIS — M1A.9XX0 CHRONIC GOUT WITHOUT TOPHUS, UNSPECIFIED CAUSE, UNSPECIFIED SITE: ICD-10-CM

## 2019-02-21 DIAGNOSIS — I65.23 BILATERAL CAROTID ARTERY OCCLUSION: ICD-10-CM

## 2019-02-21 DIAGNOSIS — Z95.1 POSTSURGICAL AORTOCORONARY BYPASS STATUS: ICD-10-CM

## 2019-02-21 DIAGNOSIS — I25.718 ATHEROSCLEROSIS OF AUTOLOGOUS VEIN CORONARY ARTERY BYPASS GRAFT WITH STABLE ANGINA PECTORIS (HCC): ICD-10-CM

## 2019-02-21 DIAGNOSIS — I11.9 BENIGN HYPERTENSIVE HEART DISEASE WITHOUT HEART FAILURE: ICD-10-CM

## 2019-02-21 DIAGNOSIS — I25.118 CORONARY ARTERY DISEASE OF NATIVE ARTERY OF NATIVE HEART WITH STABLE ANGINA PECTORIS (HCC): ICD-10-CM

## 2019-02-21 DIAGNOSIS — G43.919 INTRACTABLE MIGRAINE WITHOUT STATUS MIGRAINOSUS, UNSPECIFIED MIGRAINE TYPE: ICD-10-CM

## 2019-02-21 DIAGNOSIS — Z95.1 STATUS POST CARDIAC REVASCULARIZATION WITH BYPASS AORTOCORONARY ANASTOMOSIS OF FIVE CORONARY VESSELS: ICD-10-CM

## 2019-02-21 DIAGNOSIS — E66.9 OBESITY (BMI 30-39.9): ICD-10-CM

## 2019-02-21 DIAGNOSIS — E78.5 DYSLIPIDEMIA: ICD-10-CM

## 2019-02-21 DIAGNOSIS — Z00.00 MEDICARE ANNUAL WELLNESS VISIT, SUBSEQUENT: ICD-10-CM

## 2019-02-21 PROBLEM — R93.1 ABNORMAL NUCLEAR CARDIAC IMAGING TEST: Status: RESOLVED | Noted: 2018-11-13 | Resolved: 2019-02-21

## 2019-02-21 PROBLEM — M75.121 COMPLETE TEAR OF RIGHT ROTATOR CUFF: Status: RESOLVED | Noted: 2017-06-13 | Resolved: 2019-02-21

## 2019-02-21 PROBLEM — I65.29 CAROTID ARTERY OCCLUSION: Status: ACTIVE | Noted: 2019-02-21

## 2019-02-21 PROBLEM — K76.0 FATTY LIVER: Status: RESOLVED | Noted: 2018-08-22 | Resolved: 2019-02-21

## 2019-02-21 PROCEDURE — G0439 PPPS, SUBSEQ VISIT: HCPCS | Performed by: FAMILY MEDICINE

## 2019-02-21 ASSESSMENT — ENCOUNTER SYMPTOMS: GENERAL WELL-BEING: GOOD

## 2019-02-21 ASSESSMENT — ACTIVITIES OF DAILY LIVING (ADL): BATHING_REQUIRES_ASSISTANCE: 0

## 2019-02-21 ASSESSMENT — PATIENT HEALTH QUESTIONNAIRE - PHQ9: CLINICAL INTERPRETATION OF PHQ2 SCORE: 0

## 2019-02-21 NOTE — PROGRESS NOTES
Chief Complaint   Patient presents with   • Annual Wellness Visit         HPI:  Abimael is a 63 y.o. here for Medicare Annual Wellness Visit    Patient Active Problem List    Diagnosis Date Noted   • Migraine 02/16/2019     Priority: High   • Coronary atherosclerosis of autologous vein bypass graft 11/14/2011     Priority: High   • Coronary artery disease of native artery of native heart with stable angina pectoris (HCC) 07/30/2012     Priority: Medium   • Benign hypertensive heart disease without heart failure 11/14/2011     Priority: Medium   • Obesity (BMI 30-39.9) 05/25/2017     Priority: Low   • Gout      Priority: Low   • Statin intolerance 07/26/2016     Priority: Low   • S/P aortic valve replacement with bioprosthetic valve 07/26/2016     Priority: Low   • Dyslipidemia 07/30/2012     Priority: Low   • Bilateral carotid artery occlusion 02/21/2019   • Prediabetes 09/15/2016   • History of pancreatitis    • Status post cardiac revascularization with bypass aortocoronary anastomosis of five coronary vessels 07/26/2016   • Postsurgical aortocoronary bypass status 07/26/2016       Current Outpatient Prescriptions   Medication Sig Dispense Refill   • lisinopril (PRINIVIL) 20 MG Tab Take 1 Tab by mouth every day. 30 Tab 0   • metoprolol SR (TOPROL XL) 25 MG TABLET SR 24 HR Take 3 Tabs by mouth every day. 90 Tab 0   • amLODIPine (NORVASC) 5 MG Tab Take 1 Tab by mouth every day. 30 Tab 0   • Red Yeast Rice 600 MG Tab Take 2,400 mg by mouth every day.     • PRALUENT 75 MG/ML Solution Pen-injector 75 mg by Injection route every 14 days. 6 PEN 3   • isosorbide mononitrate (IMDUR) 120 MG CR tablet Take 1 Tab by mouth every morning. 90 Tab 3   • clopidogrel (PLAVIX) 75 MG Tab TAKE ONE TABLET BY MOUTH ONE TIME DAILY 90 Tab 3   • indomethacin (INDOCIN) 50 MG Cap TAKE ONE CAPSULE BY MOUTH TWICE DAILY AS NEEDED 30 Cap 2   • allopurinol (ZYLOPRIM) 100 MG Tab Take 1 Tab by mouth every day. 90 Tab 2   • Omega-3 Fatty Acids (OMEGA-3  FISH OIL PO) Take  by mouth.     • magnesium oxide (MAG-OX) 400 MG Tab Take 400 mg by mouth every day.     • aspirin 81 MG EC tablet Take 1 Tab by mouth every day. 30 Tab 11   • Glucosamine-Chondroit-Vit C-Mn (GLUCOSAMINE CHONDROITIN COMPLX) CAPS Take 2 Caps by mouth every day.     • acetaminophen (TYLENOL) 325 MG Tab Take 2 Tabs by mouth every 6 hours as needed for Mild Pain. 30 Tab 0   • ondansetron (ZOFRAN ODT) 4 MG TABLET DISPERSIBLE Take 1 Tab by mouth every 6 hours as needed for Nausea. 12 Tab 0   • nitroglycerin (NITROSTAT) 0.4 MG SL Tab Place 1 Tab under tongue as needed for Chest Pain. 30 Tab 5     No current facility-administered medications for this visit.         Patient is taking medications as noted in medication list.  Current supplements as per medication list.     Allergies: Gemfibrozil; Lipitor [atorvastatin calcium]; Lovastatin; and Tricor    Current social contact/activities: riding Wan, outdoor stuff, fishing, camping    Is patient current with immunizations? No, due for FLU, PREVNAR (PCV13)  and SHINGRIX (Shingles). Patient is interested in receiving NONE today.    He  reports that he quit smoking about 27 years ago. His smoking use included Cigarettes. He has a 60.00 pack-year smoking history. He has never used smokeless tobacco. He reports that he drinks alcohol. He reports that he uses drugs, including Marijuana and Inhaled.  Counseling given: Yes        DPA/Advanced directive: declines    ROS:    Gait: Uses no assistive device   Ostomy: No   Other tubes: No   Amputations: No   Chronic oxygen use No   Last eye exam 3 months ago   Wears hearing aids: No   : Denies any urinary leakage during the last 6 months      Depression Screening    Little interest or pleasure in doing things?  0 - not at all  Feeling down, depressed, or hopeless? 0 - not at all  Patient Health Questionnaire Score: 0    If depressive symptoms identified deferred to follow up visit unless specifically addressed in  assessment and plan.    Interpretation of PHQ-9 Total Score   Score Severity   1-4 No Depression   5-9 Mild Depression   10-14 Moderate Depression   15-19 Moderately Severe Depression   20-27 Severe Depression    Screening for Cognitive Impairment    Three Minute Recall (leader, season, table)  1/3    Rikki clock face with all 12 numbers and set the hands to show 10 past 11.  Yes 5/5  If cognitive concerns identified, deferred for follow up unless specifically addressed in assessment and plan.    Fall Risk Assessment    Has the patient had two or more falls in the last year or any fall with injury in the last year?  No  If fall risk identified, deferred for follow up unless specifically addressed in assessment and plan.    Safety Assessment    Throw rugs on floor.  Yes  Handrails on all stairs.  Yes  Good lighting in all hallways.  Yes  Difficulty hearing.  Yes  Patient counseled about all safety risks that were identified.    Functional Assessment ADLs    Are there any barriers preventing you from cooking for yourself or meeting nutritional needs?  No.    Are there any barriers preventing you from driving safely or obtaining transportation?  No.    Are there any barriers preventing you from using a telephone or calling for help?  No.    Are there any barriers preventing you from shopping?  No.    Are there any barriers preventing you from taking care of your own finances?  No.    Are there any barriers preventing you from managing your medications?  No.    Are there any barriers preventing you from showering, bathing or dressing yourself?  No.    Are you currently engaging in any exercise or physical activity?  Yes.  4 hours at Gym doing cardio and weights. 6 days  What is your perception of your health?  Good.    Health Maintenance Summary                IMM PNEUMOCOCCAL 19-64 (ADULT) MEDIUM RISK SERIES Overdue 12/5/1975     IMM ZOSTER VACCINES Overdue 12/5/2006     IMM INFLUENZA Overdue 9/1/2018     COLONOSCOPY  Next Due 9/17/2019      Done 9/17/2009 REFERRAL TO GI FOR COLONOSCOPY    IMM DTaP/Tdap/Td Vaccine Next Due 6/7/2027      Done 6/7/2017 Imm Admin: Tdap Vaccine          Patient Care Team:  Martha Madrid M.D. as PCP - General (Family Medicine)  Alejandro Fuentes M.D. as Consulting Physician (Cardiology)    Social History   Substance Use Topics   • Smoking status: Former Smoker     Packs/day: 3.00     Years: 20.00     Types: Cigarettes     Quit date: 1/1/1992   • Smokeless tobacco: Never Used   • Alcohol use Yes      Comment: 4 per month     Family History   Problem Relation Age of Onset   • Heart Attack Father         MI and CABG, unknown age   • Cancer Mother         Breast   • Heart Disease Brother    • Stroke Neg Hx    • Diabetes Neg Hx    • Lung Disease Neg Hx      He  has a past medical history of Arthritis; Benign hypertensive heart disease without heart failure (11/14/2011); CAD (coronary artery disease); Congestive heart failure (HCC); Coronary atherosclerosis of autologous vein bypass graft (11/14/2011); Gout; Heart valve disease; High cholesterol; History of pancreatitis; Hypertension; Muscle cramps (10/4/2011); Myocardial infarct (HCC); Obesity (11/14/2011); Pain; Postsurgical aortocoronary bypass status (7/26/2016); Renal disorder; S/P aortic valve replacement with bioprosthetic valve (7/26/2016); Statin intolerance (7/26/2016); and Status post cardiac revascularization with bypass aortocoronary anastomosis of five coronary vessels (7/26/2016).   Past Surgical History:   Procedure Laterality Date   • SHOULDER DECOMPRESSION ARTHROSCOPIC Right 9/5/2017    Procedure: SHOULDER DECOMPRESSION ARTHROSCOPIC SUBACROMIAL;  Surgeon: Mg Campos M.D.;  Location: [facility];  Service: Orthopedics   • ARTHROSCOPIC LABRAL DEBRIDEMENT Right 9/5/2017    Procedure: ARTHROSCOPIC LABRAL DEBRIDEMENT;  Surgeon: Mg Campos M.D.;  Location: [facility];  Service:  "Orthopedics   • SHOULDER ARTHROSCOPY W/ ROTATOR CUFF REPAIR Right 9/5/2017    Procedure: SHOULDER ARTHROSCOPY W/ ROTATOR CUFF REPAIR;  Surgeon: Mg Campos M.D.;  Location: [facility];  Service: Orthopedics   • SHOULDER ARTHROSCOPY W/ BICIPITAL TENODESIS REPAIR Right 9/5/2017    Procedure: SHOULDER ARTHROSCOPY W/ BICIPITAL TENODESIS REPAIR;  Surgeon: Mg Campos M.D.;  Location: [facility];  Service: Orthopedics   • SYNOVECTOMY Right 9/5/2017    Procedure: SYNOVECTOMY;  Surgeon: Mg Campos M.D.;  Location: [facility];  Service: Orthopedics   • MULTIPLE CORONARY ARTERY BYPASS  12/08/2015    3 way bypass & Bovine valve   • LAMINOTOMY  12/2004    Diskectomy L4-L5, L5-S1   • MULTIPLE CORONARY ARTERY BYPASS  11/11/1998    5 way bypass   • BIOPSY GENERAL      buttock lesion           Exam:     Blood pressure 128/70, pulse 66, temperature 36.9 °C (98.5 °F), temperature source Temporal, resp. rate 14, height 1.676 m (5' 6\"), weight 102.6 kg (226 lb 3.2 oz), SpO2 97 %. Body mass index is 36.51 kg/m².    Hearing excellent.    Dentition good  Alert, oriented in no acute distress.  Eye contact is good, speech goal directed, affect calm  CV: RRR, murmur present    Assessment and Plan. The following treatment and monitoring plan is recommended:    1. Medicare annual wellness visit, subsequent  Age-appropriate counseling given, declines vaccinations.  Is exercising vigorously regularly.  Labs reviewed and up-to-date.  Most recent hospitalization reviewed.    2. Status post cardiac revascularization with bypass aortocoronary anastomosis of five coronary vessels  This is chronic, currently unstable but following closely with cardiology.  He has only a single vessel but is not occluded he has had a 5 vessel CABG in 1998, repeated in 2005 and a bioprosthetic AVR.  He has been resistant to multiple statins but is currently on Praluent injections.  He is also on " nitroglycerin which he uses during exercise, Imdur, Plavix, aspirin.  He is following with Dr. Fuentes and plans to transition to Dr. Emily Fuentes when he moves.  - Cardiac Rehab Consult    3. Postsurgical aortocoronary bypass status  This is chronic, currently unstable but following closely with cardiology.  He has only a single vessel but is not occluded he has had a 5 vessel CABG in 1998, repeated in 2005 and a bioprosthetic AVR.  He has been resistant to multiple statins but is currently on Praluent injections.  He is also on nitroglycerin which he uses during exercise, Imdur, Plavix, aspirin.  He is following with Dr. Fuentes and plans to transition to Dr. Emily Fuentes when he moves.  I have placed a referral to the cardiac rehab to see if he qualifies for this.  He is currently exercising vigorously 4-5 hours/day  - Cardiac Rehab Consult    4. Atherosclerosis of autologous vein coronary artery bypass graft with stable angina pectoris (HCC)  This is chronic, currently unstable but following closely with cardiology.  He has only a single vessel but is not occluded he has had a 5 vessel CABG in 1998, repeated in 2005 and a bioprosthetic AVR.  He has been resistant to multiple statins but is currently on Praluent injections.  He is also on nitroglycerin which he uses during exercise, Imdur, Plavix, aspirin.  He is following with Dr. Fuentes and plans to transition to Dr. Emily Fuentes when he moves.  I have placed a referral to the cardiac rehab to see if he qualifies for this.  He is currently exercising vigorously 4-5 hours/day  - Cardiac Rehab Consult    5. Statin intolerance  Chronic, now stable with much improved labs on Praluent injections.  Has failed multiple statins in the past    6. S/P aortic valve replacement with bioprosthetic valve  Chronic, currently stable, following with cardiology regularly    7. Coronary artery disease of native artery of native heart with stable angina pectoris  (HCC)  This is chronic, currently unstable but following closely with cardiology.  He has only a single vessel but is not occluded he has had a 5 vessel CABG in 1998, repeated in 2005 and a bioprosthetic AVR.  He has been resistant to multiple statins but is currently on Praluent injections.  He is also on nitroglycerin which he uses during exercise, Imdur, Plavix, aspirin.  He is following with Dr. Fuentes and plans to transition to Dr. Emily Fuentes when he moves.  I have placed a referral to the cardiac rehab to see if he qualifies for this.  He is currently exercising vigorously 4-5 hours/day  - Cardiac Rehab Consult    8. Prediabetes  Chronic, unstable with hemoglobin A1c of 6.1%.  Discussed importance of dietary modifications, weight loss.  He is actively working on this.  We are going to recheck labs in 2 or 3 months.  - Comp Metabolic Panel; Future  - HEMOGLOBIN A1C; Future    9. Obesity (BMI 30-39.9)  Chronic, improving with exercise.  Discussed importance of continued weight loss for his overall health.  - Comp Metabolic Panel; Future  - HEMOGLOBIN A1C; Future    10. Intractable migraine without status migrainosus, unspecified migraine type  This is a new problem, was hospitalized.  Now much better now that his blood pressure is under better control.  Not currently on medications for this.  Worsens with nitroglycerin.    11. History of pancreatitis  Chronic, now stable without any recurrences.    12. Chronic gout without tophus, unspecified cause, unspecified site  Chronic, currently stable on allopurinol 100 mg daily.  Weight loss recommended.    13. Dyslipidemia  Chronic, now stable on Praluent injections every 2 weeks.  Has failed statins in the past    14. Benign hypertensive heart disease without heart failure  Chronic, now stable but was hospitalized for very elevated blood pressure.  His medications were adjusted and he is currently now on metoprolol 75 mg daily, amlodipine 5 mg daily,  lisinopril 20 mg daily.  We will adjust as needed  - Cardiac Rehab Consult    15. Bilateral carotid artery occlusion  This is a new problem, found in the hospital.  He does have a follow-up appointment with Dr. Diop for this.  He is on Plavix and aspirin  - Cardiac Rehab Consult      Services suggested: No services needed at this time  Health Care Screening recommendations as per orders if indicated.  Referrals offered: PT/OT/Nutrition counseling/Behavioral Health/Smoking cessation as per orders if indicated.    Discussion today about general wellness and lifestyle habits:    · Prevent falls and reduce trip hazards; Cautioned about securing or removing rugs.  · Have a working fire alarm and carbon monoxide detector;   · Engage in regular physical activity and social activities       Follow-up: Return in about 3 months (around 5/21/2019) for weight .

## 2019-03-05 ENCOUNTER — ANTICOAGULATION MONITORING (OUTPATIENT)
Dept: VASCULAR LAB | Facility: MEDICAL CENTER | Age: 64
End: 2019-03-05

## 2019-03-05 NOTE — PROGRESS NOTES
Patient came in to receive a sample of Praluent to hold him over until he receives his shipment from the [facility].

## 2019-03-19 NOTE — TELEPHONE ENCOUNTER
Was the patient seen in the last year in this department? Yes lov 2/21/19    Does patient have an active prescription for medications requested? No     Received Request Via: Pharmacy

## 2019-03-20 RX ORDER — AMLODIPINE BESYLATE 5 MG/1
5 TABLET ORAL DAILY
Qty: 90 TAB | Refills: 3 | Status: SHIPPED | OUTPATIENT
Start: 2019-03-20 | End: 2020-04-17

## 2019-03-20 RX ORDER — LISINOPRIL 20 MG/1
20 TABLET ORAL DAILY
Qty: 90 TAB | Refills: 3 | Status: SHIPPED | OUTPATIENT
Start: 2019-03-20 | End: 2020-04-17

## 2019-04-02 ENCOUNTER — TELEPHONE (OUTPATIENT)
Dept: VASCULAR LAB | Facility: MEDICAL CENTER | Age: 64
End: 2019-04-02

## 2019-04-02 NOTE — TELEPHONE ENCOUNTER
Patient was able to get a co pay card for his Praluent.   Left message for patient to call back and let us know if he is receiving medication ok.

## 2019-04-03 ENCOUNTER — TELEPHONE (OUTPATIENT)
Dept: VASCULAR LAB | Facility: MEDICAL CENTER | Age: 64
End: 2019-04-03

## 2019-04-03 NOTE — TELEPHONE ENCOUNTER
Spoke with patient to follow up on Praluent, patient states that he has had great success with getting his meds through [facility] specialty pharmacy with a co pay of $0.

## 2019-04-22 DIAGNOSIS — R00.2 PALPITATIONS: ICD-10-CM

## 2019-04-23 RX ORDER — ALLOPURINOL 100 MG/1
TABLET ORAL
Qty: 90 TAB | Refills: 3 | Status: SHIPPED | OUTPATIENT
Start: 2019-04-23 | End: 2020-05-27

## 2019-04-23 NOTE — TELEPHONE ENCOUNTER
Was the patient seen in the last year in this department? Yes  2/21/19  Does patient have an active prescription for medications requested? No     Received Request Via: Pharmacy

## 2019-04-25 ENCOUNTER — OFFICE VISIT (OUTPATIENT)
Dept: MEDICAL GROUP | Facility: LAB | Age: 64
End: 2019-04-25
Payer: COMMERCIAL

## 2019-04-25 VITALS
OXYGEN SATURATION: 94 % | RESPIRATION RATE: 14 BRPM | SYSTOLIC BLOOD PRESSURE: 142 MMHG | DIASTOLIC BLOOD PRESSURE: 80 MMHG | HEIGHT: 65 IN | TEMPERATURE: 98.2 F | WEIGHT: 232.8 LBS | HEART RATE: 70 BPM | BODY MASS INDEX: 38.79 KG/M2

## 2019-04-25 DIAGNOSIS — I10 ESSENTIAL HYPERTENSION: ICD-10-CM

## 2019-04-25 DIAGNOSIS — I25.118 CORONARY ARTERY DISEASE OF NATIVE ARTERY OF NATIVE HEART WITH STABLE ANGINA PECTORIS (HCC): ICD-10-CM

## 2019-04-25 DIAGNOSIS — R73.03 PREDIABETES: ICD-10-CM

## 2019-04-25 DIAGNOSIS — E66.9 OBESITY (BMI 30-39.9): ICD-10-CM

## 2019-04-25 LAB
HBA1C MFR BLD: 5.9 % (ref 0–5.6)
INT CON NEG: NEGATIVE
INT CON POS: POSITIVE

## 2019-04-25 PROCEDURE — 83036 HEMOGLOBIN GLYCOSYLATED A1C: CPT | Performed by: FAMILY MEDICINE

## 2019-04-25 PROCEDURE — 99214 OFFICE O/P EST MOD 30 MIN: CPT | Performed by: FAMILY MEDICINE

## 2019-04-25 NOTE — PROGRESS NOTES
Subjective:   Abimael Hamilton is a 63 y.o. male here today for   Chief Complaint   Patient presents with   • Other     prediabetes follow-up        1. Prediabetes  Chronic  A1c today 5.9%  Currently not working on dietary modifications, a lot of fast food    Exercising at the gym 1 hr per day     2. Obesity (BMI 30-39.9)  Chronic  Has gained 6lbs since last visit  He is going to the gym regularly, working on weight loss     3. Coronary artery disease of native artery of native heart with stable angina pectoris (HCC)  This is chronic following closely with cardiology.  He has only a single vessel but is not occluded he has had a 5 vessel CABG in 1998, repeated in 2005 and a bioprosthetic AVR.  He has been resistant to multiple statins but is currently on Praluent injections q2w.  He is also on nitroglycerin which he uses during exercise, Imdur, Plavix, aspirin.  He is following with Dr. Fuentes and plans to transition to Dr. Emily Fuentes when he moves.    He does have intermittent chest pain   I had placed a referral to the cardiac rehab to see if he qualifies for this.  He is currently exercising vigorously 4-5 hours/day  Occasional LE edema especially when up on feet. Always goes away when elevates the legs     4. Hypertension  This is chronic. Stable. Monitoring BP at home occasionally. his home BP readings are on average 120-150/80-90. Currently taking amlodipine 5mg daily, lisinopril 20mg daily, metoprolol SR 75mg daily as directed. Also taking baby aspirin. Denies lightheadedness, vision changes, palpitations. Still having CP, h/a, LE edema     Current medicines (including changes today)  Current Outpatient Prescriptions   Medication Sig Dispense Refill   • allopurinol (ZYLOPRIM) 100 MG Tab TAKE ONE TABLET BY MOUTH ONE TIME DAILY 90 Tab 3   • lisinopril (PRINIVIL) 20 MG Tab Take 1 Tab by mouth every day. 90 Tab 3   • amLODIPine (NORVASC) 5 MG Tab Take 1 Tab by mouth every day. 90 Tab 3   • ondansetron  (ZOFRAN ODT) 4 MG TABLET DISPERSIBLE Take 1 Tab by mouth every 6 hours as needed for Nausea. 12 Tab 0   • metoprolol SR (TOPROL XL) 25 MG TABLET SR 24 HR Take 3 Tabs by mouth every day. 90 Tab 0   • Red Yeast Rice 600 MG Tab Take 2,400 mg by mouth every day.     • PRALUENT 75 MG/ML Solution Pen-injector 75 mg by Injection route every 14 days. 6 PEN 3   • isosorbide mononitrate (IMDUR) 120 MG CR tablet Take 1 Tab by mouth every morning. 90 Tab 3   • clopidogrel (PLAVIX) 75 MG Tab TAKE ONE TABLET BY MOUTH ONE TIME DAILY 90 Tab 3   • indomethacin (INDOCIN) 50 MG Cap TAKE ONE CAPSULE BY MOUTH TWICE DAILY AS NEEDED 30 Cap 2   • nitroglycerin (NITROSTAT) 0.4 MG SL Tab Place 1 Tab under tongue as needed for Chest Pain. 30 Tab 5   • Omega-3 Fatty Acids (OMEGA-3 FISH OIL PO) Take  by mouth.     • magnesium oxide (MAG-OX) 400 MG Tab Take 400 mg by mouth every day.     • aspirin 81 MG EC tablet Take 1 Tab by mouth every day. 30 Tab 11   • Glucosamine-Chondroit-Vit C-Mn (GLUCOSAMINE CHONDROITIN COMPLX) CAPS Take 2 Caps by mouth every day.     • acetaminophen (TYLENOL) 325 MG Tab Take 2 Tabs by mouth every 6 hours as needed for Mild Pain. 30 Tab 0     No current facility-administered medications for this visit.      He  has a past medical history of Arthritis; Benign hypertensive heart disease without heart failure (11/14/2011); CAD (coronary artery disease); Congestive heart failure (HCC); Coronary atherosclerosis of autologous vein bypass graft (11/14/2011); Essential hypertension (4/25/2019); Gout; Heart valve disease; High cholesterol; History of pancreatitis; Hypertension; Muscle cramps (10/4/2011); Myocardial infarct (HCC); Obesity (11/14/2011); Pain; Postsurgical aortocoronary bypass status (7/26/2016); Renal disorder; S/P aortic valve replacement with bioprosthetic valve (7/26/2016); Statin intolerance (7/26/2016); and Status post cardiac revascularization with bypass aortocoronary anastomosis of five coronary vessels  "(7/26/2016).    ROS   No fevers  No bowel changes  No LE edema       Objective:     /80 (BP Location: Left arm, Patient Position: Sitting, BP Cuff Size: Adult)   Pulse 70   Temp 36.8 °C (98.2 °F) (Temporal)   Resp 14   Ht 1.651 m (5' 5\")   Wt 105.6 kg (232 lb 12.8 oz)   SpO2 94%  Body mass index is 38.74 kg/m².   Physical Exam:  Constitutional: Alert, no distress.  Skin: Warm, dry, good turgor, no rashes in visible areas.  Eye: Equal, round and reactive, conjunctiva clear, lids normal.  ENMT: Lips without lesions, good dentition, oropharynx clear.  Neck: Trachea midline, no masses, no thyromegaly. No cervical or supraclavicular lymphadenopathy  Respiratory: Unlabored respiratory effort, lungs clear to auscultation, no wheezes, no ronchi.  Cardiovascular: Normal S1, S2, RRR, 2/6 murmur, no edema.  Psych: Alert and oriented x3, normal affect and mood.      Assessment and Plan:   The following treatment plan was discussed    1. Prediabetes  Chronic, stable with hemoglobin A1c of 5.9% today.  We had a long discussion about dietary modifications.  At this point patient is tired of cooking and has given up on working on his diet.  We did discuss that this can affect his cardiovascular function.  We will continue to follow up closely.  - POCT Hemoglobin A1C    2. Obesity (BMI 30-39.9)  Chronic, weight is up, discussed importance of dietary modifications.  He is having a harder time with exercise due to his chest pain.    3. Coronary artery disease of native artery of native heart with stable angina pectoris (HCC)  Chronic, unstable.  He is having chest pain regularly throughout the day for which he is on Imdur and nitro as needed.  We discussed dietary modifications and staying active.  I do had put a referral into cardiac rehab but this was too expensive.  He has a follow-up with cardiology in the next 2 weeks.      Followup: Return in about 6 months (around 10/25/2019) for prediabetes .       This note was " created using voice recognition software. I have made every reasonable attempt to correct errors, however, I do anticipate some grammatical errors.

## 2019-04-30 ENCOUNTER — HOSPITAL ENCOUNTER (OUTPATIENT)
Dept: LAB | Facility: MEDICAL CENTER | Age: 64
End: 2019-04-30
Attending: FAMILY MEDICINE
Payer: COMMERCIAL

## 2019-04-30 DIAGNOSIS — E66.9 OBESITY (BMI 30-39.9): ICD-10-CM

## 2019-04-30 DIAGNOSIS — R73.03 PREDIABETES: ICD-10-CM

## 2019-04-30 DIAGNOSIS — E78.5 HYPERLIPIDEMIA, UNSPECIFIED HYPERLIPIDEMIA TYPE: ICD-10-CM

## 2019-04-30 LAB
ALBUMIN SERPL BCP-MCNC: 4.4 G/DL (ref 3.2–4.9)
ALBUMIN/GLOB SERPL: 1.9 G/DL
ALP SERPL-CCNC: 58 U/L (ref 30–99)
ALT SERPL-CCNC: 33 U/L (ref 2–50)
ANION GAP SERPL CALC-SCNC: 8 MMOL/L (ref 0–11.9)
AST SERPL-CCNC: 29 U/L (ref 12–45)
BILIRUB SERPL-MCNC: 0.7 MG/DL (ref 0.1–1.5)
BUN SERPL-MCNC: 21 MG/DL (ref 8–22)
CALCIUM SERPL-MCNC: 8.9 MG/DL (ref 8.5–10.5)
CHLORIDE SERPL-SCNC: 107 MMOL/L (ref 96–112)
CHOLEST SERPL-MCNC: 174 MG/DL (ref 100–199)
CO2 SERPL-SCNC: 24 MMOL/L (ref 20–33)
CREAT SERPL-MCNC: 1.13 MG/DL (ref 0.5–1.4)
EST. AVERAGE GLUCOSE BLD GHB EST-MCNC: 131 MG/DL
FASTING STATUS PATIENT QL REPORTED: NORMAL
GLOBULIN SER CALC-MCNC: 2.3 G/DL (ref 1.9–3.5)
GLUCOSE SERPL-MCNC: 122 MG/DL (ref 65–99)
HBA1C MFR BLD: 6.2 % (ref 0–5.6)
HDLC SERPL-MCNC: 34 MG/DL
LDLC SERPL CALC-MCNC: 81 MG/DL
POTASSIUM SERPL-SCNC: 4.3 MMOL/L (ref 3.6–5.5)
PROT SERPL-MCNC: 6.7 G/DL (ref 6–8.2)
SODIUM SERPL-SCNC: 139 MMOL/L (ref 135–145)
TRIGL SERPL-MCNC: 296 MG/DL (ref 0–149)

## 2019-04-30 PROCEDURE — 80061 LIPID PANEL: CPT

## 2019-04-30 PROCEDURE — 36415 COLL VENOUS BLD VENIPUNCTURE: CPT

## 2019-04-30 PROCEDURE — 80053 COMPREHEN METABOLIC PANEL: CPT

## 2019-04-30 PROCEDURE — 83036 HEMOGLOBIN GLYCOSYLATED A1C: CPT | Mod: GA

## 2019-05-01 ENCOUNTER — NON-PROVIDER VISIT (OUTPATIENT)
Dept: VASCULAR LAB | Facility: MEDICAL CENTER | Age: 64
End: 2019-05-01
Attending: INTERNAL MEDICINE
Payer: COMMERCIAL

## 2019-05-01 PROCEDURE — 99212 OFFICE O/P EST SF 10 MIN: CPT | Performed by: PHARMACIST

## 2019-05-01 NOTE — Clinical Note
Bloch - not sure if you want to reach out to him regarding coming in for follow up. He was NOT open to the idea with me.

## 2019-05-01 NOTE — PROGRESS NOTES
"Lipid Clinic - FollowUp Visit  Date of Service: 05/01/19    Catherine Hamilton is here for follow up of dyslipidemia.    HPI  Pertinent Interval History since last visit:   Pt has just recently decided to resume his praluent, he was off for at least a couple weeks.   Due to all his underlying medical conditions pt \"gave up\" for awhile.  Current Prescription Lipid Lowering Medications - including dose:   Statin: None  Non-Statin: Praluent 75mg injected every 14 days, pt resumed it yesterday.   Current Lipid Lowering and Related Supplements:   red yeast rice and fish oil  Any Current Side Effects Potentially Related to Lipid Lowering therapy?   No  Current Adherence to Lipid Lowering Therapies:  Partial - see above  Any Previous History of Statin Intolerance?   Statin: Patient has been on atorvastatin and lovastatin               Outcome: Severe muscle aches, dehydration, and kidney failure per patient  Non-Statin: Gemfibrozil and Fenofibrate              Outcome: Severe muscle cramps and dehydration  Baseline Lipids Prior to Treatment:  Results for CATHERINE HAMILTON (MRN [id number]) as of 5/1/2019 07:25   Ref. Range 10/15/2018 08:56   Cholesterol,Tot Latest Ref Range: 100 - 199 mg/dL 284 (H)   Triglycerides Latest Ref Range: 0 - 149 mg/dL 345 (H)   HDL Latest Ref Range: >=40 mg/dL 31 (A)   LDL Latest Ref Range: <100 mg/dL 184 (H)       SOCIAL HISTORY  History   Smoking Status   • Former Smoker   • Packs/day: 3.00   • Years: 20.00   • Types: Cigarettes   • Quit date: 1/1/1992   Smokeless Tobacco   • Never Used      Change in weight: Stable  Exercise habits: has recently resumed going to the gym.    Diet: common adult    ROS  Physical Exam    DATA REVIEW  Most Recent Lipid Panel:   Lab Results   Component Value Date    CHOLSTRLTOT 174 04/30/2019    TRIGLYCERIDE 296 (H) 04/30/2019    HDL 34 (A) 04/30/2019    LDL 81 04/30/2019       Other Pertinent Blood Work:   Lab Results   Component Value Date    SODIUM 139 04/30/2019    " POTASSIUM 4.3 04/30/2019    CHLORIDE 107 04/30/2019    CO2 24 04/30/2019    ANION 8.0 04/30/2019    GLUCOSE 122 (H) 04/30/2019    BUN 21 04/30/2019    CREATININE 1.13 04/30/2019    CALCIUM 8.9 04/30/2019    ASTSGOT 29 04/30/2019    ALTSGPT 33 04/30/2019    ALKPHOSPHAT 58 04/30/2019    TBILIRUBIN 0.7 04/30/2019    ALBUMIN 4.4 04/30/2019    AGRATIO 1.9 04/30/2019    CREACTPROT 0.49 02/18/2019    TSHULTRASEN 1.960 02/16/2019       Other:  NA    Recent Imaging Studies:    None since last visit    ASSESSMENT AND PLAN  Patient Type, check all that apply:   Secondary Prevention  Established Atherosclerotic Cardiovascular Disease (ASCVD)  Yes, Details: hx of CABG  Other Established (non-atherosclerotic) Vascular Disease, if Present:    HTN, pre-DM2  Evidence of Heterozygous Familial Hypercholesterolemia (FH): Possible (If yes, how was diagnosis made)  ACC/AHA Indication for Statin Therapy, emily all that apply:   Established ASCVD: Indication for High intensity statin    Calculated Risk for ASCVD, if applicable:  N/A  Other Significant Risk Markers, if any, emily all that apply:  Family history of premature ASCVD in first degree relative  National Lipid Association (NLA) Goal (if applicable):  LDL-C:   <70 mg/dL  Lifestyle Recommendations From Today’s Visit:   Exercise: continue to go to the gym and exercise per tolerance, pt tries to go 1 hour 5-6 days a week  Eating Plan: Concentrate on  pt states that he's tired of cooking so has resolved to eating convient meals. He is not interested in discussing other options at this time.   Statin Recommendations from Today's Visit  None - can't tolerate  Non-Statin Medications Recommendations from Today’s Visit:   Continue Praluent 75mg injected every 14 days.   Indication for PCSK9 Inhibitor, if applicable:  ASCVD with suboptimal control of LDL-C despite maximally tolerated statin  Supplements Recommended at this visit:  Continue fish oil and red rice yeast  Recommendations for Other  "Cardiovascular Risk Factors, emily all that apply:   Hypertension- continue to gym and take medications. pt declined BP check today in office and Diabetes/Impaired Fasting Glucose- continue to gym, tried to discuss medicaiton options, but pt states \"I'm not starting any more medications\"      Pt came into appointment today to report that he is \"done seeing us\". States that he self stopped the Praluent for a few weeks due to giving up on his health, which explains why his latest LDL had increased to 81. States that he was advised that he only has months to live, and hence has made some decisions. He will continue Praluent 75mg injected every 14 days, which he injected yesterday.  As his previous LDL was below target when he was using the Praluent 75mg every 14 days consistently will has pt continue at this dose. Pt declined to book a return visit here in the clinic, states he is \"done\". Advised that it would be advised that he repeat his lipid panel labs in about 6 weeks when he is back on Praluent consistently, he plans to follow up with his cardiologist regarding this.     Studies Ordered at Todays Visit:  None   Blood Work Ordered At Today’s visit:   None  Follow-Up:   Pt declined.     Martita Maciel, PharmD    CC:  Martha Madrid M.D.  Alejandro Fuentes M.D.  Dr. Bloch    "

## 2019-05-06 RX ORDER — METOPROLOL SUCCINATE 25 MG/1
75 TABLET, EXTENDED RELEASE ORAL DAILY
Qty: 90 TAB | Refills: 4 | Status: SHIPPED | OUTPATIENT
Start: 2019-05-06 | End: 2019-11-14 | Stop reason: SDUPTHER

## 2019-05-14 ENCOUNTER — OFFICE VISIT (OUTPATIENT)
Dept: CARDIOLOGY | Facility: MEDICAL CENTER | Age: 64
End: 2019-05-14
Payer: COMMERCIAL

## 2019-05-14 VITALS
HEART RATE: 61 BPM | BODY MASS INDEX: 39.03 KG/M2 | OXYGEN SATURATION: 97 % | WEIGHT: 234.24 LBS | HEIGHT: 65 IN | SYSTOLIC BLOOD PRESSURE: 128 MMHG | DIASTOLIC BLOOD PRESSURE: 92 MMHG

## 2019-05-14 DIAGNOSIS — E78.5 DYSLIPIDEMIA: ICD-10-CM

## 2019-05-14 DIAGNOSIS — I25.118 CORONARY ARTERY DISEASE OF NATIVE ARTERY OF NATIVE HEART WITH STABLE ANGINA PECTORIS (HCC): Primary | ICD-10-CM

## 2019-05-14 DIAGNOSIS — G43.919 INTRACTABLE MIGRAINE WITHOUT STATUS MIGRAINOSUS, UNSPECIFIED MIGRAINE TYPE: ICD-10-CM

## 2019-05-14 DIAGNOSIS — I25.718 ATHEROSCLEROSIS OF AUTOLOGOUS VEIN CORONARY ARTERY BYPASS GRAFT WITH STABLE ANGINA PECTORIS (HCC): ICD-10-CM

## 2019-05-14 DIAGNOSIS — I10 ESSENTIAL HYPERTENSION: ICD-10-CM

## 2019-05-14 PROCEDURE — 99214 OFFICE O/P EST MOD 30 MIN: CPT | Performed by: INTERNAL MEDICINE

## 2019-05-14 ASSESSMENT — ENCOUNTER SYMPTOMS: CARDIOVASCULAR NEGATIVE: 1

## 2019-05-14 NOTE — PROGRESS NOTES
Subjective:   Abimael Hamilton is a 63 -year-old man followed in cardiology clinic for complex, multivessel coronary artery disease and difficult, statin intolerant dyslipidemia, status post bioprosthetic aortic valve replacement, and moderate carotid atherosclerosis (by ultrasound 2/17/2019).    Standpoint of his multivessel coronary artery disease, he has a history of 5 vessel CABG in 1998, and re-do 3-V CABG in 12/2005 as well as bioprosthetic AVR at that time. All grafts are occluded with the exception of his LIMA-LAD which fills his circumflex distribution via a jump graft by cardiac catheterization 11/2018. His native coronary arteries are severely diseased as well. He has a 4.5 cm thoracic aortic aneurysm, gout, hypertension, dyslipidemia, and obesity.    He is doing well today, and jovial as usual.  He had become for a bit frustrated and discouraged for a while related to which he sounded to have given up by a conversation he had with the lipid clinic.  However, turns out that his feelings surrounding that only lasted for a couple of days, and he has at this point resume his fish oil, Praluent, regular exercise and is getting back to a heart healthy diet.    He has no cardiovascular complaints or side effects with his medications and he is of course wonderfully surprised that there are no side effects associated with the Praluent.    Past Medical History:   Diagnosis Date   • Arthritis    • Benign hypertensive heart disease without heart failure 11/14/2011   • CAD (coronary artery disease)    • Congestive heart failure (HCC)    • Coronary atherosclerosis of autologous vein bypass graft 11/14/2011   • Essential hypertension 4/25/2019   • Gout    • Heart valve disease    • High cholesterol    • History of pancreatitis    • Hypertension    • Muscle cramps 10/4/2011   • Myocardial infarct (HCC)     Multiple MI's, 1998, 2015   • Obesity 11/14/2011   • Pain     Joints   • Postsurgical aortocoronary bypass status  7/26/2016    Status post redo 3-V CABG in [place] 12/2015: SVG-acute marginal, SVG sequential to LAD, and OM    • Renal disorder     Hx of Acute renal failure r/t medication/statins   • S/P aortic valve replacement with bioprosthetic valve 7/26/2016    #23 [place] Magna AVR (bioprosthetic)    • Statin intolerance 7/26/2016   • Status post cardiac revascularization with bypass aortocoronary anastomosis of five coronary vessels 7/26/2016    5-V CABG done in 1998 (done in [place] at [facility]), patent LIMA to LAD, occluded SVG-OM, occluded SVG-RCA by cardiac catheterization 11/15/2007 with other vein grafts not identified at that time, poor targets for revascularization and declined repeat CABG in 2007 by [facility]. No ischemic was identified by MPI 11/17/07 with an EF of 50%.       Past Surgical History:   Procedure Laterality Date   • SHOULDER DECOMPRESSION ARTHROSCOPIC Right 9/5/2017    Procedure: SHOULDER DECOMPRESSION ARTHROSCOPIC SUBACROMIAL;  Surgeon: Mg Campos M.D.;  Location: [facility];  Service: Orthopedics   • ARTHROSCOPIC LABRAL DEBRIDEMENT Right 9/5/2017    Procedure: ARTHROSCOPIC LABRAL DEBRIDEMENT;  Surgeon: Mg Campos M.D.;  Location: [facility];  Service: Orthopedics   • SHOULDER ARTHROSCOPY W/ ROTATOR CUFF REPAIR Right 9/5/2017    Procedure: SHOULDER ARTHROSCOPY W/ ROTATOR CUFF REPAIR;  Surgeon: Mg Campos M.D.;  Location: [facility];  Service: Orthopedics   • SHOULDER ARTHROSCOPY W/ BICIPITAL TENODESIS REPAIR Right 9/5/2017    Procedure: SHOULDER ARTHROSCOPY W/ BICIPITAL TENODESIS REPAIR;  Surgeon: Mg Campos M.D.;  Location: [facility];  Service: Orthopedics   • SYNOVECTOMY Right 9/5/2017    Procedure: SYNOVECTOMY;  Surgeon: Mg Campos M.D.;  Location: [facility];  Service: Orthopedics   • MULTIPLE CORONARY ARTERY BYPASS  12/08/2015    3 way bypass & Bovine valve   •  LAMINOTOMY  12/2004    Diskectomy L4-L5, L5-S1   • MULTIPLE CORONARY ARTERY BYPASS  11/11/1998    5 way bypass   • BIOPSY GENERAL      buttock lesion     Family History   Problem Relation Age of Onset   • Heart Attack Father         MI and CABG, unknown age   • Cancer Mother         Breast   • Heart Disease Brother    • Stroke Neg Hx    • Diabetes Neg Hx    • Lung Disease Neg Hx      History   Smoking Status   • Former Smoker   • Packs/day: 3.00   • Years: 20.00   • Types: Cigarettes   • Quit date: 1/1/1992   Smokeless Tobacco   • Never Used     Allergies   Allergen Reactions   • Gemfibrozil    • Lipitor [Atorvastatin Calcium] Unspecified     Severe Muscle cramps, dehydration   • Lovastatin      Dehydration, severe muscle cramps   • Tricor Unspecified     Dehydration, severe muscle cramps     Outpatient Encounter Prescriptions as of 5/14/2019   Medication Sig Dispense Refill   • metoprolol SR (TOPROL XL) 25 MG TABLET SR 24 HR Take 3 Tabs by mouth every day. 90 Tab 4   • allopurinol (ZYLOPRIM) 100 MG Tab TAKE ONE TABLET BY MOUTH ONE TIME DAILY 90 Tab 3   • lisinopril (PRINIVIL) 20 MG Tab Take 1 Tab by mouth every day. 90 Tab 3   • amLODIPine (NORVASC) 5 MG Tab Take 1 Tab by mouth every day. 90 Tab 3   • acetaminophen (TYLENOL) 325 MG Tab Take 2 Tabs by mouth every 6 hours as needed for Mild Pain. 30 Tab 0   • Red Yeast Rice 600 MG Tab Take 2,400 mg by mouth every day.     • PRALUENT 75 MG/ML Solution Pen-injector 75 mg by Injection route every 14 days. 6 PEN 3   • isosorbide mononitrate (IMDUR) 120 MG CR tablet Take 1 Tab by mouth every morning. 90 Tab 3   • clopidogrel (PLAVIX) 75 MG Tab TAKE ONE TABLET BY MOUTH ONE TIME DAILY 90 Tab 3   • indomethacin (INDOCIN) 50 MG Cap TAKE ONE CAPSULE BY MOUTH TWICE DAILY AS NEEDED 30 Cap 2   • nitroglycerin (NITROSTAT) 0.4 MG SL Tab Place 1 Tab under tongue as needed for Chest Pain. 30 Tab 5   • Omega-3 Fatty Acids (OMEGA-3 FISH OIL PO) Take  by mouth.     • magnesium oxide  "(MAG-OX) 400 MG Tab Take 400 mg by mouth every day.     • aspirin 81 MG EC tablet Take 1 Tab by mouth every day. 30 Tab 11   • Glucosamine-Chondroit-Vit C-Mn (GLUCOSAMINE CHONDROITIN COMPLX) CAPS Take 2 Caps by mouth every day.     • [DISCONTINUED] ondansetron (ZOFRAN ODT) 4 MG TABLET DISPERSIBLE Take 1 Tab by mouth every 6 hours as needed for Nausea. (Patient not taking: Reported on 5/14/2019) 12 Tab 0     No facility-administered encounter medications on file as of 5/14/2019.      Review of Systems   Cardiovascular: Negative.         Minimal, infrequent anginal chest discomfort   Musculoskeletal: Positive for joint pain (with gout).   All other systems reviewed and are negative.       Objective:   /92 (BP Location: Right arm, Patient Position: Sitting, BP Cuff Size: Adult)   Pulse 61   Ht 1.651 m (5' 5\")   Wt 106.3 kg (234 lb 3.8 oz)   SpO2 97%   BMI 38.98 kg/m²      Physical Exam   Constitutional: He is oriented to person, place, and time. He appears well-developed and well-nourished. No distress.   Pleasant, reasonably forward, obese, middle-aged man in no distress.  Physical examination is unchanged except as specified from a previous exam on 1/21/2019.   HENT:   Head: Normocephalic and atraumatic.   Eyes: Pupils are equal, round, and reactive to light. Conjunctivae and EOM are normal. No scleral icterus.   Neck: Neck supple. No JVD present. No tracheal deviation present.   Cardiovascular: Normal rate, regular rhythm, S2 normal and intact distal pulses.  Exam reveals no gallop and no friction rub.    Murmur heard.   Crescendo decrescendo systolic murmur is present with a grade of 2/6   Pulses:       Dorsalis pedis pulses are 2+ on the right side, and 2+ on the left side.       No carotid bruits   Pulmonary/Chest: Effort normal and breath sounds normal. No stridor. No respiratory distress. He has no wheezes. He has no rales.   Abdominal: Soft. Bowel sounds are normal. He exhibits no distension.   " "  Musculoskeletal: He exhibits no edema (No significant lower extremity edema bilaterally).   Neurological: He is alert and oriented to person, place, and time.   Skin: Skin is warm and dry. No rash noted. He is not diaphoretic. No erythema. No pallor.   Tattoos noted on his forearms including Nicole    Psychiatric: He has a normal mood and affect. Judgment and thought content normal.   Nursing note and vitals reviewed.    Lab Results   Component Value Date/Time    WBC 7.9 02/18/2019 01:09 AM    RBC 4.99 02/18/2019 01:09 AM    HEMOGLOBIN 15.5 02/18/2019 01:09 AM    HEMATOCRIT 46.5 02/18/2019 01:09 AM    MCV 93.2 02/18/2019 01:09 AM    MCH 31.1 02/18/2019 01:09 AM    MCHC 33.3 (L) 02/18/2019 01:09 AM    MPV 9.7 02/18/2019 01:09 AM        Lab Results   Component Value Date/Time    SODIUM 139 04/30/2019 08:46 AM    POTASSIUM 4.3 04/30/2019 08:46 AM    CHLORIDE 107 04/30/2019 08:46 AM    CO2 24 04/30/2019 08:46 AM    GLUCOSE 122 (H) 04/30/2019 08:46 AM    BUN 21 04/30/2019 08:46 AM    CREATININE 1.13 04/30/2019 08:46 AM    BUNCREATRAT 16 07/29/2016 10:22 AM        Lab Results   Component Value Date/Time    ASTSGOT 29 04/30/2019 08:46 AM    ALTSGPT 33 04/30/2019 08:46 AM        Lab Results   Component Value Date/Time    CHOLSTRLTOT 174 04/30/2019 08:46 AM    LDL 81 04/30/2019 08:46 AM    HDL 34 (A) 04/30/2019 08:46 AM    TRIGLYCERIDE 296 (H) 04/30/2019 08:46 AM          4/27/2017 08:16 11/21/2017 07:42 10/15/2018 08:56 1/18/2019 06:38 2/17/2019 03:15 4/30/2019 08:46    (H) 151 (H) 184 (H) 71 37 81     Echocardiogram, 8/4/2016:  \"CONCLUSIONS  Normal left ventricular size and systolic function, EF 60%.  Mild concentric left ventricular hypertrophy.  Mild mitral regurgitation.  Normally functioning bovine bioprosthetic aortic valve.   Normal right sided pressures.  No prior studies for comparison\"    Echocardiogram, 9/21/2018, images and report reviewed, my interpretation: Normal left ventricular ejection fraction " "(measured at 70%) with normal regional wall motion.  Mildly stenotic bioprosthetic aortic valve with a mean gradient measured at 27 mm a radiant measured at 48 mmHg (V-max 3.5 m/s) without perivalvular leak.  Left atrium is mildly dilated with a volume index measured at 39 mL/meter squared    Myocardial perfusion imaging, 11/7/2018:  \"SCINTOGRAPHIC FINDINGS:   Post Stress Images: There is a moderate to severe reduction perfusion in the basilar to mid inferior segment.  In addition, this defect was small to moderate in size.  There is also a moderate sized defect in the proximal to mid high lateral segment.  No other perfusion abnormalities were noted.      Rest Images: there was essentially complete resolution of both perfusion   abnormalities noted.  TID was also noted.     GATED WALL MOTION FINDINGS:  The resting ejection fraction was 45%.  It david appropriately to 51% during dipyridamole stress.  There was hypokinesis of the basilar inferior segment.  No definite hypokinesis was noted in the lateral segment.     IMPRESSION:  1.  Dipyridamole stress negative for chest discomfort and negative for   ischemic EKG changes.  2.  PET perfusion images are suggestive of ischemia in the proximal to mid inferior segment and in the proximal mid bilateral segment.  There is no definite infarction.  In addition TID was present which could be indicative of   multivessel disease, possibly more severe than indicated by the perfusion study.  3.  The patient's resting ejection fraction was mildly reduced and david appropriately during dipyridamole stress.  There was basilar to mid inferior hypokinesis noted\"    Cardiac catheterization, 11/16/2018:  \"CONCLUSIONS:  1.  Multivessel coronary artery disease  2.  Occluded vein grafts x6   3.  LIMA to LAD with focal 70-80% distal anastomotic stenosis 4.  Severe native coronary artery disease not amenable to PCI 5.  Poorly controlled hypertension     RECOMMENDATIONS:  His LIMA to LAD " "supplies both the distal LAD territory as well as via retrograde partially occluded jump graft the circumflex territory and is essentially his last remaining vessel.  There is WESLEY-3 flow and he is not maximally managed medically, in addition he has  of the RCA which was dominant and the vein graft is occluded and this is his only ischemic territory on noninvasive imaging.     1.  Medical therapy  2. Consideration of high risk last vessel PCI LIMA to LAD anastomosis should he prove to not be a redo surgical candidate (CT surgical consultation prior to any potential PCI would be required) and not be well managed with better medical therapy.  3. Weight loss\"    Assessment:     1. Coronary artery disease of native artery of native heart with stable angina pectoris (HCC)  Lipid Profile   2. Dyslipidemia  Lipid Profile   3. Essential hypertension     4. Atherosclerosis of autologous vein coronary artery bypass graft with stable angina pectoris (HCC)  Lipid Profile   5. Intractable migraine without status migrainosus, unspecified migraine type  REFERRAL TO NEUROLOGY       Medical Decision Making:  Today's Assessment / Status / Plan:     He is doing much better today, and has wonderful control of his cholesterol with a combination of Praluent and fish oil.  I encouraged him telling him that his prognosis could be very good with continued long-term excellent cholesterol control in addition to diet and exercise to reverse his very significant poly-vascular disease.  I made no changes to his medical regimen today nor ordered additional testing.    Alejandro Fuentes MD  Cardiologist, [facility] [place]     Return in about 6 months (around 11/14/2019).    Physical Exam   Constitutional: He is oriented to person, place, and time. He appears well-developed and well-nourished. No distress.   Pleasant, reasonably forward, obese, middle-aged man in no distress.  Physical examination is unchanged except as specified " from a previous exam on 1/21/2019.   HENT:   Head: Normocephalic and atraumatic.   Eyes: Pupils are equal, round, and reactive to light. Conjunctivae and EOM are normal. No scleral icterus.   Neck: Neck supple. No JVD present. No tracheal deviation present.   Cardiovascular: Normal rate, regular rhythm, S2 normal and intact distal pulses.  Exam reveals no gallop and no friction rub.    Murmur heard.   Crescendo decrescendo systolic murmur is present with a grade of 2/6   Pulses:       Dorsalis pedis pulses are 2+ on the right side, and 2+ on the left side.       No carotid bruits   Pulmonary/Chest: Effort normal and breath sounds normal. No stridor. No respiratory distress. He has no wheezes. He has no rales.   Abdominal: Soft. Bowel sounds are normal. He exhibits no distension.   Musculoskeletal: He exhibits no edema (No significant lower extremity edema bilaterally).   Neurological: He is alert and oriented to person, place, and time.   Skin: Skin is warm and dry. No rash noted. He is not diaphoretic. No erythema. No pallor.   Tattoos noted on his forearms including Nicole    Psychiatric: He has a normal mood and affect. Judgment and thought content normal.   Nursing note and vitals reviewed.

## 2019-05-30 ENCOUNTER — OFFICE VISIT (OUTPATIENT)
Dept: CARDIOLOGY | Facility: MEDICAL CENTER | Age: 64
End: 2019-05-30
Payer: COMMERCIAL

## 2019-05-30 VITALS
BODY MASS INDEX: 38.99 KG/M2 | DIASTOLIC BLOOD PRESSURE: 70 MMHG | HEIGHT: 65 IN | SYSTOLIC BLOOD PRESSURE: 114 MMHG | HEART RATE: 70 BPM | WEIGHT: 234 LBS | OXYGEN SATURATION: 97 %

## 2019-05-30 DIAGNOSIS — Z95.3 S/P AORTIC VALVE REPLACEMENT WITH BIOPROSTHETIC VALVE: ICD-10-CM

## 2019-05-30 DIAGNOSIS — I25.118 CORONARY ARTERY DISEASE OF NATIVE ARTERY OF NATIVE HEART WITH STABLE ANGINA PECTORIS (HCC): ICD-10-CM

## 2019-05-30 DIAGNOSIS — I25.718 ATHEROSCLEROSIS OF AUTOLOGOUS VEIN CORONARY ARTERY BYPASS GRAFT WITH STABLE ANGINA PECTORIS (HCC): ICD-10-CM

## 2019-05-30 DIAGNOSIS — Z95.1 POSTSURGICAL AORTOCORONARY BYPASS STATUS: ICD-10-CM

## 2019-05-30 DIAGNOSIS — I11.9 BENIGN HYPERTENSIVE HEART DISEASE WITHOUT HEART FAILURE: ICD-10-CM

## 2019-05-30 DIAGNOSIS — Z95.1 STATUS POST CARDIAC REVASCULARIZATION WITH BYPASS AORTOCORONARY ANASTOMOSIS OF FIVE CORONARY VESSELS: ICD-10-CM

## 2019-05-30 DIAGNOSIS — I65.23 BILATERAL CAROTID ARTERY OCCLUSION: ICD-10-CM

## 2019-05-30 PROCEDURE — 99214 OFFICE O/P EST MOD 30 MIN: CPT | Performed by: INTERNAL MEDICINE

## 2019-05-30 ASSESSMENT — ENCOUNTER SYMPTOMS
HEARTBURN: 0
FEVER: 0
NAUSEA: 0
SINUS PAIN: 0
LOSS OF CONSCIOUSNESS: 0
ABDOMINAL PAIN: 0
PALPITATIONS: 0
NERVOUS/ANXIOUS: 0
PND: 0
WHEEZING: 0
EYE PAIN: 0
DEPRESSION: 0
EYE DISCHARGE: 0
INSOMNIA: 0
DIZZINESS: 0
MYALGIAS: 0
BLURRED VISION: 0
CHILLS: 0
COUGH: 0
BRUISES/BLEEDS EASILY: 0

## 2019-05-30 NOTE — PROGRESS NOTES
Chief Complaint   Patient presents with   • Coronary Artery Disease     follow up       Subjective:   Abimael Hamilton is a 63 y.o. male who presents today in follow-up in regards to his multivessel coronary artery disease with CABG times 5/19/1998 redo CABG times 3/2005 with concomitant bioprosthetic aortic valve replacement.  More more chest discomfort warranted an angiogram after a PET scan in 2018.  Angiogram showed native vessels had obliterative disease and vein grafts were occluded although the LIMA to the LAD was open.    Seeing me a few weeks after the other Dr. Fuentes to establish care.  Still going to the gym all the time no limitations.  Proud of himself and optimistic.  Compliant on his medications    Past Medical History:   Diagnosis Date   • Arthritis    • Benign hypertensive heart disease without heart failure 11/14/2011   • CAD (coronary artery disease)    • Congestive heart failure (HCC)    • Coronary atherosclerosis of autologous vein bypass graft 11/14/2011   • Essential hypertension 4/25/2019   • Gout    • Heart valve disease    • High cholesterol    • History of pancreatitis    • Hypertension    • Muscle cramps 10/4/2011   • Myocardial infarct (HCC)     Multiple MI's, 1998, 2015   • Obesity 11/14/2011   • Pain     Joints   • Postsurgical aortocoronary bypass status 7/26/2016    Status post redo 3-V CABG in [place] 12/2015: SVG-acute marginal, SVG sequential to LAD, and OM    • Renal disorder     Hx of Acute renal failure r/t medication/statins   • S/P aortic valve replacement with bioprosthetic valve 7/26/2016    #23 [place] Magna AVR (bioprosthetic)    • Statin intolerance 7/26/2016   • Status post cardiac revascularization with bypass aortocoronary anastomosis of five coronary vessels 7/26/2016    5-V CABG done in 1998 (done in [place] at [facility]), patent LIMA to LAD, occluded SVG-OM, occluded SVG-RCA by cardiac catheterization 11/15/2007 with other vein grafts not identified at that time,  poor targets for revascularization and declined repeat CABG in 2007 by [facility]. No ischemic was identified by MPI 11/17/07 with an EF of 50%.       Past Surgical History:   Procedure Laterality Date   • SHOULDER DECOMPRESSION ARTHROSCOPIC Right 9/5/2017    Procedure: SHOULDER DECOMPRESSION ARTHROSCOPIC SUBACROMIAL;  Surgeon: Mg Campos M.D.;  Location: [facility];  Service: Orthopedics   • ARTHROSCOPIC LABRAL DEBRIDEMENT Right 9/5/2017    Procedure: ARTHROSCOPIC LABRAL DEBRIDEMENT;  Surgeon: Mg Campos M.D.;  Location: [facility];  Service: Orthopedics   • SHOULDER ARTHROSCOPY W/ ROTATOR CUFF REPAIR Right 9/5/2017    Procedure: SHOULDER ARTHROSCOPY W/ ROTATOR CUFF REPAIR;  Surgeon: Mg Campos M.D.;  Location: [facility];  Service: Orthopedics   • SHOULDER ARTHROSCOPY W/ BICIPITAL TENODESIS REPAIR Right 9/5/2017    Procedure: SHOULDER ARTHROSCOPY W/ BICIPITAL TENODESIS REPAIR;  Surgeon: Mg Campos M.D.;  Location: [facility];  Service: Orthopedics   • SYNOVECTOMY Right 9/5/2017    Procedure: SYNOVECTOMY;  Surgeon: Mg Campos M.D.;  Location: [facility];  Service: Orthopedics   • MULTIPLE CORONARY ARTERY BYPASS  12/08/2015    3 way bypass & Bovine valve   • LAMINOTOMY  12/2004    Diskectomy L4-L5, L5-S1   • MULTIPLE CORONARY ARTERY BYPASS  11/11/1998    5 way bypass   • BIOPSY GENERAL      buttock lesion     Family History   Problem Relation Age of Onset   • Heart Attack Father         MI and CABG, unknown age   • Cancer Mother         Breast   • Heart Disease Brother    • Stroke Neg Hx    • Diabetes Neg Hx    • Lung Disease Neg Hx      Social History     Social History   • Marital status:      Spouse name: N/A   • Number of children: N/A   • Years of education: N/A     Occupational History   • Not on file.     Social History Main Topics   • Smoking status: Former Smoker      Packs/day: 3.00     Years: 20.00     Types: Cigarettes     Quit date: 1/1/1992   • Smokeless tobacco: Never Used   • Alcohol use Yes      Comment: 4 per month   • Drug use: Yes     Types: Marijuana, Inhaled      Comment: Marijuana- Daily (4 times per day)   • Sexual activity: Yes     Partners: Female     Other Topics Concern   • Not on file     Social History Narrative   • No narrative on file     Allergies   Allergen Reactions   • Gemfibrozil    • Lipitor [Atorvastatin Calcium] Unspecified     Severe Muscle cramps, dehydration   • Lovastatin      Dehydration, severe muscle cramps   • Tricor Unspecified     Dehydration, severe muscle cramps     Outpatient Encounter Prescriptions as of 5/30/2019   Medication Sig Dispense Refill   • metoprolol SR (TOPROL XL) 25 MG TABLET SR 24 HR Take 3 Tabs by mouth every day. 90 Tab 4   • allopurinol (ZYLOPRIM) 100 MG Tab TAKE ONE TABLET BY MOUTH ONE TIME DAILY 90 Tab 3   • lisinopril (PRINIVIL) 20 MG Tab Take 1 Tab by mouth every day. 90 Tab 3   • amLODIPine (NORVASC) 5 MG Tab Take 1 Tab by mouth every day. 90 Tab 3   • acetaminophen (TYLENOL) 325 MG Tab Take 2 Tabs by mouth every 6 hours as needed for Mild Pain. 30 Tab 0   • Red Yeast Rice 600 MG Tab Take 2,400 mg by mouth every day.     • PRALUENT 75 MG/ML Solution Pen-injector 75 mg by Injection route every 14 days. 6 PEN 3   • isosorbide mononitrate (IMDUR) 120 MG CR tablet Take 1 Tab by mouth every morning. 90 Tab 3   • clopidogrel (PLAVIX) 75 MG Tab TAKE ONE TABLET BY MOUTH ONE TIME DAILY 90 Tab 3   • indomethacin (INDOCIN) 50 MG Cap TAKE ONE CAPSULE BY MOUTH TWICE DAILY AS NEEDED 30 Cap 2   • nitroglycerin (NITROSTAT) 0.4 MG SL Tab Place 1 Tab under tongue as needed for Chest Pain. 30 Tab 5   • Omega-3 Fatty Acids (OMEGA-3 FISH OIL PO) Take  by mouth.     • magnesium oxide (MAG-OX) 400 MG Tab Take 400 mg by mouth every day.     • aspirin 81 MG EC tablet Take 1 Tab by mouth every day. 30 Tab 11   • Glucosamine-Chondroit-Vit  "C-Mn (GLUCOSAMINE CHONDROITIN COMPLX) CAPS Take 2 Caps by mouth every day.       No facility-administered encounter medications on file as of 5/30/2019.      Review of Systems   Constitutional: Negative for chills and fever.   HENT: Negative for congestion, hearing loss and sinus pain.    Eyes: Negative for blurred vision, pain and discharge.   Respiratory: Negative for cough and wheezing.    Cardiovascular: Negative for palpitations, leg swelling and PND.   Gastrointestinal: Negative for abdominal pain, heartburn and nausea.   Genitourinary: Negative for dysuria and urgency.   Musculoskeletal: Negative for joint pain and myalgias.   Skin: Negative for itching and rash.   Neurological: Negative for dizziness and loss of consciousness.   Endo/Heme/Allergies: Does not bruise/bleed easily.   Psychiatric/Behavioral: Negative for depression. The patient is not nervous/anxious and does not have insomnia.    All other systems reviewed and are negative.       Objective:   /70 (BP Location: Left arm, Patient Position: Sitting)   Pulse 70   Ht 1.651 m (5' 5\")   Wt 106.1 kg (234 lb)   SpO2 97%   BMI 38.94 kg/m²     Physical Exam   Constitutional: He is oriented to person, place, and time. No distress.   HENT:   Head: Normocephalic and atraumatic.   Eyes: Pupils are equal, round, and reactive to light.   Neck: No JVD present. No thyromegaly present.   Cardiovascular: Normal rate, regular rhythm and intact distal pulses.    No murmur heard.  Pulmonary/Chest: Breath sounds normal.   Abdominal: Bowel sounds are normal. He exhibits no distension.   Musculoskeletal: He exhibits no edema or tenderness.   Neurological: He is alert and oriented to person, place, and time.   Skin: Skin is warm and dry. He is not diaphoretic.   Psychiatric: He has a normal mood and affect. His behavior is normal.       Assessment:     1. Atherosclerosis of autologous vein coronary artery bypass graft with stable angina pectoris (HCC)     2. " Coronary artery disease of native artery of native heart with stable angina pectoris (HCC)     3. Benign hypertensive heart disease without heart failure     4. Status post cardiac revascularization with bypass aortocoronary anastomosis of five coronary vessels     5. Postsurgical aortocoronary bypass status     6. Bilateral carotid artery occlusion     7. S/P aortic valve replacement with bioprosthetic valve         Medical Decision Making:  Today's Assessment / Status / Plan:     Coronary disease  I took more than 45 minutes going over his chart and looking at his films as well as his PET scan today  Amazingly his PET from last November showed mild to moderate ischemia.  This is not high risk, we discussed this at length.  Ejection fraction 51%  Continue lipid therapy, statin intolerant.  Discussed  Labs per PCP including strict glucose tolerance.  He is working on weight loss  Aspirin    Carotid artery disease  Reviewed images  Repeat 2020  Meds as above  No claudication symptoms, discussed ABIs    Valvular heart disease  Echo as above  Repeat yearly or if symptomatic    RTC 6 months

## 2019-06-06 DIAGNOSIS — E78.5 DYSLIPIDEMIA: ICD-10-CM

## 2019-06-06 DIAGNOSIS — I25.718 ATHEROSCLEROSIS OF AUTOLOGOUS VEIN CORONARY ARTERY BYPASS GRAFT WITH STABLE ANGINA PECTORIS (HCC): ICD-10-CM

## 2019-07-22 ENCOUNTER — TELEPHONE (OUTPATIENT)
Dept: MEDICAL GROUP | Facility: LAB | Age: 64
End: 2019-07-22

## 2019-07-22 NOTE — TELEPHONE ENCOUNTER
----- Message from Dora Emmanuel M.D. sent at 7/22/2019  1:06 PM PDT -----  Regarding: FW: Non-Urgent Medical Question  Contact: [phone]  I guess thiago ontiveros called yet, needs to be seen in the ER  ----- Message -----  From: Kristen Rosenberg, Med Ass't  Sent: 7/22/2019  11:53 AM  To: Dora Emmanuel M.D.  Subject: FW: Non-Urgent Medical Question                  Please advise  ----- Message -----  From: Abimael Hamilton  Sent: 7/22/2019  11:43 AM  To: Antoni Vieirarra  Ma  Subject: Non-Urgent Medical Question                      /cracked or broke ribs 12 days ago. Didn't go to  at that time. Seems to be getting worse so I'd like to get x-rays. Hard to breathe at times and extremely painful. Wife says she sent message thru here but haven't gotten any response. Can I get in or do they do x-rays at urgent care?

## 2019-07-23 ENCOUNTER — APPOINTMENT (OUTPATIENT)
Dept: URGENT CARE | Facility: CLINIC | Age: 64
End: 2019-07-23
Payer: COMMERCIAL

## 2019-07-23 ENCOUNTER — APPOINTMENT (OUTPATIENT)
Dept: RADIOLOGY | Facility: MEDICAL CENTER | Age: 64
End: 2019-07-23
Attending: EMERGENCY MEDICINE
Payer: COMMERCIAL

## 2019-07-23 ENCOUNTER — HOSPITAL ENCOUNTER (EMERGENCY)
Facility: MEDICAL CENTER | Age: 64
End: 2019-07-23
Attending: EMERGENCY MEDICINE
Payer: COMMERCIAL

## 2019-07-23 VITALS
SYSTOLIC BLOOD PRESSURE: 155 MMHG | DIASTOLIC BLOOD PRESSURE: 90 MMHG | TEMPERATURE: 97.9 F | WEIGHT: 238.1 LBS | OXYGEN SATURATION: 98 % | BODY MASS INDEX: 38.27 KG/M2 | HEART RATE: 95 BPM | HEIGHT: 66 IN | RESPIRATION RATE: 18 BRPM

## 2019-07-23 DIAGNOSIS — S22.42XA CLOSED FRACTURE OF MULTIPLE RIBS OF LEFT SIDE, INITIAL ENCOUNTER: ICD-10-CM

## 2019-07-23 LAB — EKG IMPRESSION: NORMAL

## 2019-07-23 PROCEDURE — 99284 EMERGENCY DEPT VISIT MOD MDM: CPT

## 2019-07-23 PROCEDURE — 700111 HCHG RX REV CODE 636 W/ 250 OVERRIDE (IP): Performed by: EMERGENCY MEDICINE

## 2019-07-23 PROCEDURE — 71101 X-RAY EXAM UNILAT RIBS/CHEST: CPT | Mod: LT

## 2019-07-23 PROCEDURE — 93005 ELECTROCARDIOGRAM TRACING: CPT | Performed by: EMERGENCY MEDICINE

## 2019-07-23 PROCEDURE — 96372 THER/PROPH/DIAG INJ SC/IM: CPT

## 2019-07-23 RX ORDER — KETOROLAC TROMETHAMINE 10 MG/1
10 TABLET, FILM COATED ORAL EVERY 4 HOURS PRN
Qty: 30 TAB | Refills: 0 | Status: SHIPPED | OUTPATIENT
Start: 2019-07-23 | End: 2020-02-12

## 2019-07-23 RX ORDER — ACETAMINOPHEN 500 MG
1000 TABLET ORAL EVERY 6 HOURS PRN
Status: SHIPPED | COMMUNITY
End: 2022-01-01

## 2019-07-23 RX ORDER — KETOROLAC TROMETHAMINE 30 MG/ML
30 INJECTION, SOLUTION INTRAMUSCULAR; INTRAVENOUS ONCE
Status: COMPLETED | OUTPATIENT
Start: 2019-07-23 | End: 2019-07-23

## 2019-07-23 RX ADMIN — KETOROLAC TROMETHAMINE 30 MG: 30 INJECTION, SOLUTION INTRAMUSCULAR at 09:56

## 2019-07-23 NOTE — ED NOTES
"Chief Complaint   Patient presents with   • Chest Wall Pain     Two weeks ago pt, pt was hit by bull's horns through a fence. pt c/o pain to L rib. hx of CABG. c/o SOB. Room 97%     /101   Pulse 97   Temp 36.6 °C (97.9 °F) (Temporal)   Resp 20   Ht 1.676 m (5' 6\")   Wt 108 kg (238 lb 1.6 oz)   SpO2 97%   BMI 38.43 kg/m²     "

## 2019-07-23 NOTE — DISCHARGE INSTRUCTIONS
You have fractures of your left seventh and ninth ribs where they meet your breast bone.  There is no evidence of air outside your lung, bruised lung tissue, or other dangerous condition.  Please use the pain medication that we have prescribed, to make sure that you are able to continue taking deep breaths.  Rib fractures heal slowly on their own without surgery, but can cause problems like pneumonia if you are not taking deep breaths, which is why pain control is important.  In addition to the prescribed medication, you can take 1000 mg of Tylenol every 6 hours.  Please schedule a follow-up with your primary care doctor.  Return here for severe symptoms or worsening symptoms that cannot be managed by your regular doctor.

## 2019-07-23 NOTE — ED PROVIDER NOTES
ED Provider Note    Scribed for Asael Schaefer M.D. by Asael Schaefer. 7/23/2019  9:24 AM    CHIEF COMPLAINT  Chief Complaint   Patient presents with   • Chest Wall Pain     Two weeks ago pt, pt was hit by bull's horns through a fence. pt c/o pain to L rib. hx of CABG. c/o SOB. Room 97%       HPI  Abimael Hamilton is a 63 y.o. male who presents to the Emergency Room complaining of left chest wall pain.  He reports that 13 days ago, he was working with bowls, and the bowl broke through a wooden fence and ran into his left chest.  Since then, he has had persistent left-sided chest wall pain, which is worse with deep inspirations or laying on the left side.  He has not had exertional chest pain, vomiting, diaphoresis, or any radiating pain.  Pain is moderate, persistent, and isolated to the left chest wall in the anterior axillary line.  The patient is on Plavix because of a history of coronary artery disease.  He is calm, pleasant, cooperative, and presents with normal vital signs, without tachypnea or hypoxia.    REVIEW OF SYSTEMS  See HPI for further details.    PAST MEDICAL HISTORY   has a past medical history of Arthritis; Benign hypertensive heart disease without heart failure (11/14/2011); CAD (coronary artery disease); Congestive heart failure (HCC); Coronary atherosclerosis of autologous vein bypass graft (11/14/2011); Essential hypertension (4/25/2019); Gout; Heart valve disease; High cholesterol; History of pancreatitis; Hypertension; Muscle cramps (10/4/2011); Myocardial infarct (HCC); Obesity (11/14/2011); Pain; Postsurgical aortocoronary bypass status (7/26/2016); Renal disorder; S/P aortic valve replacement with bioprosthetic valve (7/26/2016); Statin intolerance (7/26/2016); and Status post cardiac revascularization with bypass aortocoronary anastomosis of five coronary vessels (7/26/2016).    SOCIAL HISTORY  Social History     Social History Main Topics   • Smoking status: Former Smoker      Packs/day: 3.00     Years: 20.00     Types: Cigarettes     Quit date: 1/1/1992   • Smokeless tobacco: Never Used   • Alcohol use Yes      Comment: 4 per month   • Drug use: Yes     Types: Marijuana, Inhaled      Comment: Marijuana- Daily (4 times per day)   • Sexual activity: Yes     Partners: Female       SURGICAL HISTORY   has a past surgical history that includes laminotomy (12/2004); biopsy general; multiple coronary artery bypass (11/11/1998); multiple coronary artery bypass (12/08/2015); shoulder decompression arthroscopic (Right, 9/5/2017); arthroscopic labral debridement (Right, 9/5/2017); shoulder arthroscopy w/ rotator cuff repair (Right, 9/5/2017); shoulder arthroscopy w/ bicipital tenodesis repair (Right, 9/5/2017); and synovectomy (Right, 9/5/2017).    CURRENT MEDICATIONS  Home Medications     Reviewed by Liz Narayanan (Pharmacy Tech) on 07/23/19 at 0945  Med List Status: Complete   Medication Last Dose Status   acetaminophen (TYLENOL) 500 MG Tab 7/22/2019 Active   Alirocumab (PRALUENT) 75 MG/ML Solution Pen-injector 7/9/2019 Active   allopurinol (ZYLOPRIM) 100 MG Tab 7/22/2019 Active   amLODIPine (NORVASC) 5 MG Tab 7/22/2019 Active   aspirin 81 MG EC tablet 7/22/2019 Active   clopidogrel (PLAVIX) 75 MG Tab 7/22/2019 Active   Glucosamine-Chondroit-Vit C-Mn (GLUCOSAMINE CHONDROITIN COMPLX) CAPS 7/22/2019 Active   isosorbide mononitrate (IMDUR) 120 MG CR tablet 7/22/2019 Active   lisinopril (PRINIVIL) 20 MG Tab 7/22/2019 Active   magnesium oxide (MAG-OX) 400 MG Tab 7/22/2019 Active   metoprolol SR (TOPROL XL) 25 MG TABLET SR 24 HR 7/22/2019 Active   nitroglycerin (NITROSTAT) 0.4 MG SL Tab > 1 week Active   Omega-3 Fatty Acids (OMEGA-3 FISH OIL PO) 7/22/2019 Active   Red Yeast Rice Extract (RED YEAST RICE PO) 7/22/2019 Active                ALLERGIES  Allergies   Allergen Reactions   • Gemfibrozil      Dehydration,   • Lipitor [Atorvastatin Calcium] Unspecified     Severe Muscle cramps,  "dehydration   • Lovastatin      Dehydration, severe muscle cramps   • Tricor Unspecified     Dehydration, severe muscle cramps       PHYSICAL EXAM  VITAL SIGNS: /90   Pulse 95   Temp 36.6 °C (97.9 °F) (Temporal)   Resp 18   Ht 1.676 m (5' 6\")   Wt 108 kg (238 lb 1.6 oz)   SpO2 98%   BMI 38.43 kg/m²   Pulse ox interpretation: I interpret this pulse ox as normal.  Constitutional: Alert in no apparent distress.  HENT: Normocephalic, Atraumatic, Bilateral external ears normal. Nose normal.   Eyes: Conjunctiva normal, non-icteric.   Heart: Regular rate and rythm, no murmurs.    Lungs: Clear to auscultation bilaterally.  Chest: Tenderness to the left chest wall in the anterior axillary line between ribs 4 and 7 approximately with a small, 3 x 3 cm bruise.  No crepitus  Skin: Warm, Dry, No erythema, No rash.   Neurologic: Alert, Grossly non-focal.   Psychiatric: Affect normal, Judgment normal, Mood normal, Appears appropriate and not intoxicated.     [id number] (WITH 1-VIEW CXR) LEFT   Final Result      Likely fractures of the costochondral junction of the left anterior seventh and ninth ribs.        EKG:   Interpreted by me    Rhythm:  Normal sinus rhythm   Rate: 68  Axis: normal  Intervals: normal  Ectopy: none  Conduction: normal  ST Segments: no acute change  T Waves: no acute change. Nonspecific flattening.  Q Waves: none  Interpretation: No evidence of ischemia    COURSE & MEDICAL DECISION MAKING  The patient's VS, Nurses notes reviewed. (See chart for details)    9:24 AM Patient seen and examined at bedside.  Differential includes but is not limited to rib fracture, pulmonary contusion, pneumothorax.  I have considered, but do not suspect a cardiac cause of the patient's symptoms, given the history and physical and the location and nature of the patient's pain, and the exacerbating factors of lying on the affected side and palpation of the affected side.    This patient's evaluation did not " suggest acute coronary syndrome.  Rather, and keeping with his concerned about rib fractures, he does have subacute rib fractures at the costochondral junction of ribs 7 and 9.  He is not interested in any narcotics.  We discussed supportive care measures with Tylenol and ibuprofen and ice, the natural history of rib fractures which should heal gradually on the precautions.     The patient will return for new or worsening symptoms and is stable at the time of discharge.    The patient is referred to a primary physician for blood pressure management, diabetic screening, and for all other preventative health concerns.      DISPOSITION:  Patient will be discharged home in stable condition.    FOLLOW UP:  Martha Madrid M.D.  [address] [address]  [phone]    Schedule an appointment as soon as possible for a visit         OUTPATIENT MEDICATIONS:  Discharge Medication List as of 7/23/2019 10:18 AM      START taking these medications    Details   ketorolac (TORADOL) 10 MG Tab Take 1 Tab by mouth every four hours as needed., Disp-30 Tab, R-0, Normal               FINAL IMPRESSION  1. Closed fracture of multiple ribs of left side, initial encounter

## 2019-09-17 ENCOUNTER — TELEPHONE (OUTPATIENT)
Dept: VASCULAR LAB | Facility: MEDICAL CENTER | Age: 64
End: 2019-09-17

## 2019-09-17 NOTE — TELEPHONE ENCOUNTER
"[facility]    Unable to reach pt or leave a VM.  Last note state he was \"done seeing the clinic\".  I would like to follow up with status of PCSK9 and willingness to follow with the clinic.    Follow up in 1-2 weeks.    Adonay Fields, PharmD   "

## 2019-10-02 ENCOUNTER — TELEPHONE (OUTPATIENT)
Dept: VASCULAR LAB | Facility: MEDICAL CENTER | Age: 64
End: 2019-10-02

## 2019-10-02 NOTE — TELEPHONE ENCOUNTER
"[facility]    Left  requesting a call back to follow up with status of PCSK9.  Our last visit with the pt, he reported he was \"done\" coming to the clinic.      Will reach out to the pt again in 1-2 months.    Adonay Fields, PharmD   "

## 2019-10-23 ENCOUNTER — HOSPITAL ENCOUNTER (OUTPATIENT)
Dept: LAB | Facility: MEDICAL CENTER | Age: 64
End: 2019-10-23
Attending: INTERNAL MEDICINE
Payer: COMMERCIAL

## 2019-10-23 DIAGNOSIS — I25.118 CORONARY ARTERY DISEASE OF NATIVE ARTERY OF NATIVE HEART WITH STABLE ANGINA PECTORIS (HCC): ICD-10-CM

## 2019-10-23 DIAGNOSIS — E78.5 DYSLIPIDEMIA: ICD-10-CM

## 2019-10-23 DIAGNOSIS — I25.718 ATHEROSCLEROSIS OF AUTOLOGOUS VEIN CORONARY ARTERY BYPASS GRAFT WITH STABLE ANGINA PECTORIS (HCC): ICD-10-CM

## 2019-10-23 LAB
CHOLEST SERPL-MCNC: 162 MG/DL (ref 100–199)
FASTING STATUS PATIENT QL REPORTED: NORMAL
HDLC SERPL-MCNC: 32 MG/DL
LDLC SERPL CALC-MCNC: 60 MG/DL
TRIGL SERPL-MCNC: 352 MG/DL (ref 0–149)

## 2019-10-23 PROCEDURE — 36415 COLL VENOUS BLD VENIPUNCTURE: CPT

## 2019-10-23 PROCEDURE — 80061 LIPID PANEL: CPT

## 2019-10-28 ENCOUNTER — TELEPHONE (OUTPATIENT)
Dept: CARDIOLOGY | Facility: MEDICAL CENTER | Age: 64
End: 2019-10-28

## 2019-10-28 NOTE — TELEPHONE ENCOUNTER
Result Notes for Lipid Profile   Notes recorded by Ana Fuentes M.D. on 10/26/2019 at 2:09 PM PDT  Lipids up slightly from last - will d/w him at next appt     Attempted to call pt, no answer, left vm to call back

## 2019-11-07 ENCOUNTER — HOSPITAL ENCOUNTER (EMERGENCY)
Facility: MEDICAL CENTER | Age: 64
End: 2019-11-07
Attending: EMERGENCY MEDICINE
Payer: COMMERCIAL

## 2019-11-07 VITALS
RESPIRATION RATE: 16 BRPM | HEART RATE: 74 BPM | BODY MASS INDEX: 36.96 KG/M2 | WEIGHT: 230 LBS | SYSTOLIC BLOOD PRESSURE: 108 MMHG | OXYGEN SATURATION: 96 % | DIASTOLIC BLOOD PRESSURE: 68 MMHG | HEIGHT: 66 IN | TEMPERATURE: 96.8 F

## 2019-11-07 DIAGNOSIS — B02.9 HERPES ZOSTER WITHOUT COMPLICATION: ICD-10-CM

## 2019-11-07 PROCEDURE — 99284 EMERGENCY DEPT VISIT MOD MDM: CPT

## 2019-11-07 PROCEDURE — 96372 THER/PROPH/DIAG INJ SC/IM: CPT

## 2019-11-07 PROCEDURE — A9270 NON-COVERED ITEM OR SERVICE: HCPCS | Performed by: EMERGENCY MEDICINE

## 2019-11-07 PROCEDURE — 700111 HCHG RX REV CODE 636 W/ 250 OVERRIDE (IP): Performed by: EMERGENCY MEDICINE

## 2019-11-07 PROCEDURE — 700102 HCHG RX REV CODE 250 W/ 637 OVERRIDE(OP): Performed by: EMERGENCY MEDICINE

## 2019-11-07 RX ORDER — VALACYCLOVIR HYDROCHLORIDE 500 MG/1
1000 TABLET, FILM COATED ORAL ONCE
Status: COMPLETED | OUTPATIENT
Start: 2019-11-07 | End: 2019-11-07

## 2019-11-07 RX ORDER — MORPHINE SULFATE 4 MG/ML
4 INJECTION, SOLUTION INTRAMUSCULAR; INTRAVENOUS ONCE
Status: COMPLETED | OUTPATIENT
Start: 2019-11-07 | End: 2019-11-07

## 2019-11-07 RX ORDER — GABAPENTIN 100 MG/1
100 CAPSULE ORAL ONCE
Status: COMPLETED | OUTPATIENT
Start: 2019-11-07 | End: 2019-11-07

## 2019-11-07 RX ORDER — GABAPENTIN 100 MG/1
100 CAPSULE ORAL 3 TIMES DAILY
Qty: 30 CAP | Refills: 0 | Status: SHIPPED | OUTPATIENT
Start: 2019-11-07 | End: 2019-11-12

## 2019-11-07 RX ORDER — KETOROLAC TROMETHAMINE 30 MG/ML
30 INJECTION, SOLUTION INTRAMUSCULAR; INTRAVENOUS ONCE
Status: COMPLETED | OUTPATIENT
Start: 2019-11-07 | End: 2019-11-07

## 2019-11-07 RX ORDER — OXYCODONE HYDROCHLORIDE AND ACETAMINOPHEN 5; 325 MG/1; MG/1
1-2 TABLET ORAL EVERY 4 HOURS PRN
Qty: 15 TAB | Refills: 0 | Status: SHIPPED | OUTPATIENT
Start: 2019-11-07 | End: 2019-11-07 | Stop reason: SDUPTHER

## 2019-11-07 RX ORDER — OXYCODONE HYDROCHLORIDE AND ACETAMINOPHEN 5; 325 MG/1; MG/1
1 TABLET ORAL ONCE
Status: COMPLETED | OUTPATIENT
Start: 2019-11-07 | End: 2019-11-07

## 2019-11-07 RX ORDER — OXYCODONE HYDROCHLORIDE AND ACETAMINOPHEN 5; 325 MG/1; MG/1
1-2 TABLET ORAL EVERY 4 HOURS PRN
Qty: 15 TAB | Refills: 0 | Status: SHIPPED | OUTPATIENT
Start: 2019-11-07 | End: 2019-11-10

## 2019-11-07 RX ORDER — VALACYCLOVIR HYDROCHLORIDE 1 G/1
1000 TABLET, FILM COATED ORAL 3 TIMES DAILY
Qty: 21 TAB | Refills: 0 | Status: SHIPPED | OUTPATIENT
Start: 2019-11-07 | End: 2019-11-14

## 2019-11-07 RX ADMIN — KETOROLAC TROMETHAMINE 30 MG: 30 INJECTION, SOLUTION INTRAMUSCULAR; INTRAVENOUS at 06:21

## 2019-11-07 RX ADMIN — VALACYCLOVIR HYDROCHLORIDE 1000 MG: 500 TABLET, FILM COATED ORAL at 06:21

## 2019-11-07 RX ADMIN — OXYCODONE HYDROCHLORIDE AND ACETAMINOPHEN 1 TABLET: 5; 325 TABLET ORAL at 06:21

## 2019-11-07 RX ADMIN — OXYCODONE HYDROCHLORIDE AND ACETAMINOPHEN 1 TABLET: 5; 325 TABLET ORAL at 08:41

## 2019-11-07 RX ADMIN — MORPHINE SULFATE 4 MG: 4 INJECTION INTRAVENOUS at 07:49

## 2019-11-07 RX ADMIN — GABAPENTIN 100 MG: 100 CAPSULE ORAL at 06:21

## 2019-11-07 ASSESSMENT — LIFESTYLE VARIABLES: DO YOU DRINK ALCOHOL: NO

## 2019-11-07 NOTE — ED NOTES
Patient brought back in wheel chair, states that he has had pain in the left leg that radiated down from his buttock through the back of his knee. Patient also co of red rash on lower leg that started a couple of days ago that feels like electrical pain. Patient moved to bed and placed in a gown. Ready for ERP.

## 2019-11-07 NOTE — ED TRIAGE NOTES
Back pain shooting down left leg, sharp in nature. Denies trauma. Rash on calf/ankle and foot.   Denies chest pain, sob, n/v, f/c.

## 2019-11-07 NOTE — ED PROVIDER NOTES
ED Provider Note    CHIEF COMPLAINT  Chief Complaint   Patient presents with   • Rash   • Back Pain       HPI  Abimael Hamilton is a 63 y.o. male who presents with left-sided low back and left leg pain.  Symptoms started about 2 days ago.  Gradually worsening.  Yesterday noticed rash.  Burning lancinating pain over the calf and lateral leg.  Pain radiates from here up into the back.  He has known past medical history of back problems including lumbar disc herniation requiring discectomy at L4-5 and L5-1.  He feels the pain is somewhat similar but different and he feels some knots in his low back.  His pain is severe.  Nothing seems to help it.  No bowel or bladder dysfunction.  No saddle anesthesia.  No numbness or weakness in the extremity other than a tingling sensation in the area of the rash.  He has not had a fever.  No injection drug use.  No abdominal pain.    REVIEW OF SYSTEMS  Pertinent negative: As above    PAST MEDICAL HISTORY  Past Medical History:   Diagnosis Date   • Arthritis    • Benign hypertensive heart disease without heart failure 11/14/2011   • CAD (coronary artery disease)    • Congestive heart failure (HCC)    • Coronary atherosclerosis of autologous vein bypass graft 11/14/2011   • Essential hypertension 4/25/2019   • Gout    • Heart valve disease    • High cholesterol    • History of pancreatitis    • Hypertension    • Muscle cramps 10/4/2011   • Myocardial infarct (HCC)     Multiple MI's, 1998, 2015   • Obesity 11/14/2011   • Pain     Joints   • Postsurgical aortocoronary bypass status 7/26/2016    Status post redo 3-V CABG in [place] 12/2015: SVG-acute marginal, SVG sequential to LAD, and OM    • Renal disorder     Hx of Acute renal failure r/t medication/statins   • S/P aortic valve replacement with bioprosthetic valve 7/26/2016    #23 Pe[place] Magna AVR (bioprosthetic)    • Statin intolerance 7/26/2016   • Status post cardiac revascularization with bypass aortocoronary anastomosis of  five coronary vessels 2016    5-V CABG done in  (done in [place] [facility]), patent LIMA to LAD, occluded SVG-OM, occluded SVG-RCA by cardiac catheterization 11/15/2007 with other vein grafts not identified at that time, poor targets for revascularization and declined repeat CABG in  by [facility]. No ischemic was identified by MPI 07 with an EF of 50%.         SOCIAL HISTORY  Social History     Tobacco Use   • Smoking status: Former Smoker     Packs/day: 3.00     Years: 20.00     Pack years: 60.00     Types: Cigarettes     Last attempt to quit: 1992     Years since quittin.8   • Smokeless tobacco: Never Used   Substance Use Topics   • Alcohol use: Yes     Comment: 4 per month   • Drug use: Yes     Types: Marijuana, Inhaled     Comment: Marijuana- Daily (4 times per day)       SURGICAL HISTORY  Past Surgical History:   Procedure Laterality Date   • SHOULDER DECOMPRESSION ARTHROSCOPIC Right 2017    Procedure: SHOULDER DECOMPRESSION ARTHROSCOPIC SUBACROMIAL;  Surgeon: Mg Campos M.D.;  Location: [facility];  Service: Orthopedics   • ARTHROSCOPIC LABRAL DEBRIDEMENT Right 2017    Procedure: ARTHROSCOPIC LABRAL DEBRIDEMENT;  Surgeon: Mg Campos M.D.;  Location: [facility];  Service: Orthopedics   • SHOULDER ARTHROSCOPY W/ ROTATOR CUFF REPAIR Right 2017    Procedure: SHOULDER ARTHROSCOPY W/ ROTATOR CUFF REPAIR;  Surgeon: Mg Campos M.D.;  Location: [facility];  Service: Orthopedics   • SHOULDER ARTHROSCOPY W/ BICIPITAL TENODESIS REPAIR Right 2017    Procedure: SHOULDER ARTHROSCOPY W/ BICIPITAL TENODESIS REPAIR;  Surgeon: Mg Campos M.D.;  Location: [facility];  Service: Orthopedics   • SYNOVECTOMY Right 2017    Procedure: SYNOVECTOMY;  Surgeon: Mg Campos M.D.;  Location: [facility];  Service: Orthopedics   • MULTIPLE CORONARY ARTERY BYPASS  2015  "   3 way bypass & Bovine valve   • LAMINOTOMY  12/2004    Diskectomy L4-L5, L5-S1   • MULTIPLE CORONARY ARTERY BYPASS  11/11/1998    5 way bypass   • BIOPSY GENERAL      buttock lesion       ALLERGIES  Allergies   Allergen Reactions   • Gemfibrozil      Dehydration,   • Lipitor [Atorvastatin Calcium] Unspecified     Severe Muscle cramps, dehydration   • Lovastatin      Dehydration, severe muscle cramps   • Tricor Unspecified     Dehydration, severe muscle cramps       PHYSICAL EXAM  VITAL SIGNS: /67   Pulse 74   Temp 36 °C (96.8 °F) (Temporal)   Resp (!) 22   Ht 1.676 m (5' 6\")   Wt 104.3 kg (230 lb)   SpO2 96%   BMI 37.12 kg/m²    Constitutional: Awake and alert. Nontoxic.  Obvious discomfort.  HENT:  Grossly normal  Eyes: Grossly normal  Neck: Normal range of motion  Cardiovascular: Normal heart rate.  Strong peripheral pulses.  Thorax & Lungs: No respiratory distress  Abdomen: Nontender  Skin: Vesicular rash lateral and anterior left lower leg consistent with shingles  Back: There is tenderness bilateral paraspinous musculature.  No focal midline tenderness  Extremities: Well perfused.  Rash as noted above.  Strong dorsalis pedis pulse  Psychiatric: He is very anxious  Neurologic: Awake alert oriented.  Good strength throughout the lower extremities although pain with movement.  Normal sensory.      COURSE & MEDICAL DECISION MAKING  Patient presents with back pain and vesicular rash in the L5 dermatome.  Findings most consistent with shingles.  He may have a combination of problems including exacerbation of his chronic back pain.  Patient does not have any other red flags.  No abdominal pain, neurologic symptoms, urinary retention, fevers, cellulitis, edema of the extremity etc.  He is treated symptomatically.    Patient had significant symptoms requiring multiple medications.  He was treated with Percocet x2, morphine, Valtrex, Neurontin.    Patient was observed in the ER.  He had persistent " discomfort, but did calm down.  We discussed the treatment measures.  I have prescribed Valtrex x1 week, Neurontin, few Percocet.  In addition can take ibuprofen.  Precaution patient to the return to the ER for intractable pain, fevers, swelling, urinary or bowel dysfunction, worsening rash or concern.    FINAL IMPRESSION  1.  Herpes zoster      Disposition: home in stable condition      This dictation was created using voice recognition software. The accuracy of the dictation is limited to the abilities of the software.  The nursing notes were reviewed and certain aspects of this information were incorporated into this note.      Electronically signed by: Wang Duran, 11/7/2019 8:02 AM

## 2019-11-08 ENCOUNTER — PATIENT MESSAGE (OUTPATIENT)
Dept: MEDICAL GROUP | Facility: LAB | Age: 64
End: 2019-11-08

## 2019-11-08 RX ORDER — ONDANSETRON 4 MG/1
4 TABLET, ORALLY DISINTEGRATING ORAL EVERY 6 HOURS PRN
Qty: 12 TAB | Refills: 0 | Status: SHIPPED | OUTPATIENT
Start: 2019-11-08 | End: 2019-11-12 | Stop reason: SDUPTHER

## 2019-11-08 NOTE — TELEPHONE ENCOUNTER
----- Message from Abimael Hamilton sent at 11/8/2019  8:46 AM PST -----  Regarding: Non-Urgent Medical Question  Contact: [phone]  Hi I was in the er yesterday and have shingles. They gave me some Percocet which can make me nauseas. I had nausea meds before and hoping I can have some called Into the pharmacy. Also I need an appointment for follow up.     Abimael

## 2019-11-12 ENCOUNTER — OFFICE VISIT (OUTPATIENT)
Dept: MEDICAL GROUP | Facility: LAB | Age: 64
End: 2019-11-12
Payer: COMMERCIAL

## 2019-11-12 VITALS
BODY MASS INDEX: 36.22 KG/M2 | SYSTOLIC BLOOD PRESSURE: 112 MMHG | RESPIRATION RATE: 16 BRPM | HEART RATE: 76 BPM | OXYGEN SATURATION: 95 % | WEIGHT: 225.4 LBS | HEIGHT: 66 IN | DIASTOLIC BLOOD PRESSURE: 58 MMHG | TEMPERATURE: 97.9 F

## 2019-11-12 DIAGNOSIS — I25.83 CORONARY ARTERY DISEASE DUE TO LIPID RICH PLAQUE: ICD-10-CM

## 2019-11-12 DIAGNOSIS — I25.10 CORONARY ARTERY DISEASE DUE TO LIPID RICH PLAQUE: ICD-10-CM

## 2019-11-12 DIAGNOSIS — B02.29 POST HERPETIC NEURALGIA: ICD-10-CM

## 2019-11-12 DIAGNOSIS — R11.0 NAUSEA: ICD-10-CM

## 2019-11-12 PROCEDURE — 99214 OFFICE O/P EST MOD 30 MIN: CPT | Performed by: INTERNAL MEDICINE

## 2019-11-12 RX ORDER — HYDROCODONE BITARTRATE AND ACETAMINOPHEN 10; 325 MG/1; MG/1
1 TABLET ORAL EVERY 6 HOURS PRN
Qty: 28 TAB | Refills: 0 | Status: SHIPPED | OUTPATIENT
Start: 2019-11-12 | End: 2019-11-19

## 2019-11-12 RX ORDER — GABAPENTIN 300 MG/1
300 CAPSULE ORAL 3 TIMES DAILY
Qty: 90 CAP | Refills: 0 | Status: SHIPPED
Start: 2019-11-12 | End: 2020-03-06

## 2019-11-12 RX ORDER — ONDANSETRON 4 MG/1
4 TABLET, ORALLY DISINTEGRATING ORAL EVERY 6 HOURS PRN
Qty: 12 TAB | Refills: 0 | Status: SHIPPED
Start: 2019-11-12 | End: 2020-03-16

## 2019-11-12 NOTE — PROGRESS NOTES
CC: Abimael Hamilton is a 63 y.o. male is suffering from   Chief Complaint   Patient presents with   • Herpes Zoster     ER visit for shingles rash on left leg         SUBJECTIVE:  1. Nausea  Patient is here for follow-up, states that he is having problems with nausea with the use of Percocet for what appears to be postherpetic neuralgia.    2. Post herpetic neuralgia  Patient with acute shingles, which is drying up, patient and I have discussed that he needs to increase his Neurontin from 100 mg 3 times a day up to 300 mg 3 times a day is to do 1 day of 300 mg twice daily    3. Coronary artery disease due to lipid rich plaque  Patient with an extensive coronary artery history, has been seen by lipid clinic,        Past social, family, history:   Social History     Tobacco Use   • Smoking status: Former Smoker     Packs/day: 3.00     Years: 20.00     Pack years: 60.00     Types: Cigarettes     Last attempt to quit: 1992     Years since quittin.8   • Smokeless tobacco: Never Used   Substance Use Topics   • Alcohol use: Yes     Comment: 4 per month   • Drug use: Yes     Types: Marijuana, Inhaled     Comment: Marijuana- Daily (4 times per day)         MEDICATIONS:    Current Outpatient Medications:   •  ondansetron (ZOFRAN ODT) 4 MG TABLET DISPERSIBLE, Take 1 Tab by mouth every 6 hours as needed for Nausea., Disp: 12 Tab, Rfl: 0  •  gabapentin (NEURONTIN) 300 MG Cap, Take 1 Cap by mouth 3 times a day. Take bid x one day then tid., Disp: 90 Cap, Rfl: 0  •  HYDROcodone/acetaminophen (NORCO)  MG Tab, Take 1 Tab by mouth every 6 hours as needed for up to 7 days., Disp: 28 Tab, Rfl: 0  •  valacyclovir (VALTREX) 1 GM Tab, Take 1 Tab by mouth 3 times a day for 7 days., Disp: 21 Tab, Rfl: 0  •  acetaminophen (TYLENOL) 500 MG Tab, Take 1,500 mg by mouth every 6 hours as needed for Moderate Pain., Disp: , Rfl:   •  Alirocumab (PRALUENT) 75 MG/ML Solution Pen-injector, Inject 75 mg as instructed every 14  days. On Tue, Disp: , Rfl:   •  Red Yeast Rice Extract (RED YEAST RICE PO), Take 1 Tab by mouth every day., Disp: , Rfl:   •  metoprolol SR (TOPROL XL) 25 MG TABLET SR 24 HR, Take 3 Tabs by mouth every day., Disp: 90 Tab, Rfl: 4  •  allopurinol (ZYLOPRIM) 100 MG Tab, TAKE ONE TABLET BY MOUTH ONE TIME DAILY, Disp: 90 Tab, Rfl: 3  •  lisinopril (PRINIVIL) 20 MG Tab, Take 1 Tab by mouth every day., Disp: 90 Tab, Rfl: 3  •  amLODIPine (NORVASC) 5 MG Tab, Take 1 Tab by mouth every day., Disp: 90 Tab, Rfl: 3  •  isosorbide mononitrate (IMDUR) 120 MG CR tablet, Take 1 Tab by mouth every morning., Disp: 90 Tab, Rfl: 3  •  clopidogrel (PLAVIX) 75 MG Tab, TAKE ONE TABLET BY MOUTH ONE TIME DAILY, Disp: 90 Tab, Rfl: 3  •  nitroglycerin (NITROSTAT) 0.4 MG SL Tab, Place 1 Tab under tongue as needed for Chest Pain., Disp: 30 Tab, Rfl: 5  •  Omega-3 Fatty Acids (OMEGA-3 FISH OIL PO), Take 1 Cap by mouth every day., Disp: , Rfl:   •  magnesium oxide (MAG-OX) 400 MG Tab, Take 400 mg by mouth every day., Disp: , Rfl:   •  aspirin 81 MG EC tablet, Take 1 Tab by mouth every day., Disp: 30 Tab, Rfl: 11  •  Glucosamine-Chondroit-Vit C-Mn (GLUCOSAMINE CHONDROITIN COMPLX) CAPS, Take 2 Caps by mouth every day., Disp: , Rfl:   •  ketorolac (TORADOL) 10 MG Tab, Take 1 Tab by mouth every four hours as needed. (Patient not taking: Reported on 11/12/2019), Disp: 30 Tab, Rfl: 0    PROBLEMS:  Patient Active Problem List    Diagnosis Date Noted   • Migraine 02/16/2019     Priority: High   • Coronary atherosclerosis of autologous vein bypass graft 11/14/2011     Priority: High   • Coronary artery disease of native artery of native heart with stable angina pectoris (HCC) 07/30/2012     Priority: Medium   • Benign hypertensive heart disease without heart failure 11/14/2011     Priority: Medium   • Obesity (BMI 30-39.9) 05/25/2017     Priority: Low   • Gout      Priority: Low   • Statin intolerance 07/26/2016     Priority: Low   • S/P aortic valve  "replacement with bioprosthetic valve 07/26/2016     Priority: Low   • Dyslipidemia 07/30/2012     Priority: Low   • Essential hypertension 04/25/2019   • Bilateral carotid artery occlusion 02/21/2019   • History of pancreatitis    • Status post cardiac revascularization with bypass aortocoronary anastomosis of five coronary vessels 07/26/2016   • Postsurgical aortocoronary bypass status 07/26/2016       REVIEW OF SYSTEMS:  Gen.:  No Nausea, Vomiting, fever, Chills.  Heart: No chest pain.  Lungs:  No shortness of Breath.  Psychological: Adan unusual Anxiety depression     PHYSICAL EXAM   Constitutional: Alert, cooperative, not in acute distress.  Cardiovascular:  Rate Rhythm is regular without murmurs rubs clicks.     Thorax & Lungs: Clear to auscultation, no wheezing, rhonchi, or rales  HENT: Normocephalic, Atraumatic.  Eyes: PERRLA, EOMI, Conjunctiva normal.   Neck: Trachia is midline no swelling of the thyroid.   Lymphatic: No lymphadenopathy noted.   Neurologic: Alert & oriented x 3, cranial nerves II through XII are intact, Normal motor function, Normal sensory function, shingles involving the left leg.   Psychiatric: Affect normal, Judgment normal, Mood normal.     VITAL SIGNS:/58   Pulse 76   Temp 36.6 °C (97.9 °F) (Temporal)   Resp 16   Ht 1.676 m (5' 6\")   Wt 102.2 kg (225 lb 6.4 oz)   SpO2 95%   BMI 36.38 kg/m²     Labs: Reviewed    Assessment:                                                     Plan:    1. Nausea  Patient's to start ondansetron.  Patient's to stop Percocet start hydrocodone  - ondansetron (ZOFRAN ODT) 4 MG TABLET DISPERSIBLE; Take 1 Tab by mouth every 6 hours as needed for Nausea.  Dispense: 12 Tab; Refill: 0    2. Post herpetic neuralgia  Patient with postherpetic neuralgia is to increase Neurontin from 100 mg 3 times a day up to 300 mg 3 times a day is to stop Percocet start hydrocodone  - gabapentin (NEURONTIN) 300 MG Cap; Take 1 Cap by mouth 3 times a day. Take bid x one " day then tid.  Dispense: 90 Cap; Refill: 0  - HYDROcodone/acetaminophen (NORCO)  MG Tab; Take 1 Tab by mouth every 6 hours as needed for up to 7 days.  Dispense: 28 Tab; Refill: 0    3. Coronary artery disease due to lipid rich plaque  Coronary artery disease.  Patient is at increased risk for recurrent myocardial infarction denies any chest pain or tightness at this time.  States that the nausea strictly from his taking Percocet.  Patient had blood work done through [facility] states he never received results will ask office staff to try and track down results.

## 2019-11-14 RX ORDER — METOPROLOL SUCCINATE 25 MG/1
TABLET, EXTENDED RELEASE ORAL
Qty: 90 TAB | Refills: 3 | Status: SHIPPED | OUTPATIENT
Start: 2019-11-14 | End: 2020-04-01

## 2019-11-14 NOTE — TELEPHONE ENCOUNTER
Was the patient seen in the last year in this department? Yes 11/12/19    Does patient have an active prescription for medications requested? No     Received Request Via: Pharmacy

## 2019-12-09 ENCOUNTER — TELEPHONE (OUTPATIENT)
Dept: MEDICAL GROUP | Facility: LAB | Age: 64
End: 2019-12-09

## 2019-12-12 ENCOUNTER — TELEPHONE (OUTPATIENT)
Dept: MEDICAL GROUP | Facility: LAB | Age: 64
End: 2019-12-12

## 2019-12-12 DIAGNOSIS — I25.83 CORONARY ARTERY DISEASE DUE TO LIPID RICH PLAQUE: ICD-10-CM

## 2019-12-12 DIAGNOSIS — I25.10 CORONARY ARTERY DISEASE DUE TO LIPID RICH PLAQUE: ICD-10-CM

## 2019-12-13 NOTE — TELEPHONE ENCOUNTER
----- Message from Dora Emmanuel M.D. sent at 12/12/2019  3:01 PM PST -----  Regarding: FW: Non-Urgent Medical Question  Contact: [phone]  Please pend cardiology referral  ----- Message -----  From: Tim Calvo, Med Ass't  Sent: 12/9/2019   3:52 PM PST  To: Martha Madrid M.D.  Subject: FW: Non-Urgent Medical Question                  I have pended a cardio referral with the note of no Dr. Dozier. Please advise  ----- Message -----  From: Abimael Hamilton  Sent: 12/9/2019   2:20 PM PST  To: Antoni Mccrary Ma  Subject: Non-Urgent Medical Question                      Looks like I've lost another DrDottie in the [facility] group. My appointment was cancelled and I've lost my second cardiologist in 8 months. How about a list or link to find one in your system. I'm a heart patient and all you can say is you need to find someone and reschedule. I've been waiting 6 months to see my cardiologist  How long is it going to take this time? Please give me information about finding a cardiologist and getting scheduled. Please don't say Dr. Dozier as he is the worst Dr. I've ever met. Worst bedside manner ever. Wouldn't even run test saying I was to far gone. Told me to worry about my kids heart disease and getting them tested. Please advise me on finding a cardiologist that plan's on sticking around. Thank you.

## 2019-12-20 ENCOUNTER — TELEPHONE (OUTPATIENT)
Dept: VASCULAR LAB | Facility: MEDICAL CENTER | Age: 64
End: 2019-12-20

## 2019-12-20 DIAGNOSIS — E78.5 DYSLIPIDEMIA: ICD-10-CM

## 2019-12-20 NOTE — TELEPHONE ENCOUNTER
[facility]    Still unable to reach pt concerning PCSK9.  Sent letter of compliance.    Adonay Fields, PharmD

## 2019-12-20 NOTE — LETTER
Abimael Hamilton  [address] [address]        Dear Abimael Hamilton ,    We have been unsuccessful in our attempts to contact you regarding your Lipid Clinic appointments. It is important we are able to monitor your medications for safety in efficacy.      Please reach out to us to make your next appointment as soon as you are able.  We are open Monday-Friday 8 am until 5 pm.  You may reach our Service at [phone].     To monitor you effectively, we need to be able to communicate with you.  This is a requirement to be followed by our Service.  If we are unable to contact you on three separate occasions, you will be discharged from the Lipid Clinic.     If you repeatedly fail to keep your lab appointments, you are at risk of being discharged from the Service.           Sincerely,           Tim Wilder, PharmD, [facility]  Pharmacy Clinical Supervisor  [facility]  Outpatient Anticoagulation Service

## 2019-12-20 NOTE — PROGRESS NOTES
[facility]    Pt called back and confirmed he is receiving praluent and tolerating it well.  Follow up appointment set for January.     Adonay Fields, PharmD

## 2019-12-23 RX ORDER — INDOMETHACIN 50 MG/1
CAPSULE ORAL
Qty: 30 CAP | Refills: 1 | Status: SHIPPED | OUTPATIENT
Start: 2019-12-23 | End: 2020-10-29 | Stop reason: SDUPTHER

## 2020-01-02 ENCOUNTER — TELEPHONE (OUTPATIENT)
Dept: VASCULAR LAB | Facility: MEDICAL CENTER | Age: 65
End: 2020-01-02

## 2020-01-02 NOTE — TELEPHONE ENCOUNTER
PA for Praluent done via phone and has been approved    PA #: [id number]    Approved from 12/3/19 - 1/1/2021

## 2020-01-03 ENCOUNTER — TELEPHONE (OUTPATIENT)
Dept: VASCULAR LAB | Facility: MEDICAL CENTER | Age: 65
End: 2020-01-03

## 2020-01-21 ENCOUNTER — HOSPITAL ENCOUNTER (OUTPATIENT)
Dept: LAB | Facility: MEDICAL CENTER | Age: 65
End: 2020-01-21
Attending: NURSE PRACTITIONER
Payer: COMMERCIAL

## 2020-01-21 DIAGNOSIS — E78.5 DYSLIPIDEMIA: ICD-10-CM

## 2020-01-21 LAB
ALBUMIN SERPL BCP-MCNC: 4.4 G/DL (ref 3.2–4.9)
ALBUMIN/GLOB SERPL: 1.3 G/DL
ALP SERPL-CCNC: 52 U/L (ref 30–99)
ALT SERPL-CCNC: 31 U/L (ref 2–50)
ANION GAP SERPL CALC-SCNC: 10 MMOL/L (ref 0–11.9)
AST SERPL-CCNC: 38 U/L (ref 12–45)
BILIRUB SERPL-MCNC: 0.9 MG/DL (ref 0.1–1.5)
BUN SERPL-MCNC: 22 MG/DL (ref 8–22)
CALCIUM SERPL-MCNC: 9.5 MG/DL (ref 8.5–10.5)
CHLORIDE SERPL-SCNC: 106 MMOL/L (ref 96–112)
CHOLEST SERPL-MCNC: 219 MG/DL (ref 100–199)
CO2 SERPL-SCNC: 22 MMOL/L (ref 20–33)
CREAT SERPL-MCNC: 1.25 MG/DL (ref 0.5–1.4)
FASTING STATUS PATIENT QL REPORTED: NORMAL
GLOBULIN SER CALC-MCNC: 3.3 G/DL (ref 1.9–3.5)
GLUCOSE SERPL-MCNC: 114 MG/DL (ref 65–99)
HDLC SERPL-MCNC: 33 MG/DL
LDLC SERPL CALC-MCNC: 142 MG/DL
POTASSIUM SERPL-SCNC: 4.5 MMOL/L (ref 3.6–5.5)
PROT SERPL-MCNC: 7.7 G/DL (ref 6–8.2)
SODIUM SERPL-SCNC: 138 MMOL/L (ref 135–145)
TRIGL SERPL-MCNC: 222 MG/DL (ref 0–149)

## 2020-01-21 PROCEDURE — 36415 COLL VENOUS BLD VENIPUNCTURE: CPT

## 2020-01-21 PROCEDURE — 80061 LIPID PANEL: CPT

## 2020-01-21 PROCEDURE — 80053 COMPREHEN METABOLIC PANEL: CPT

## 2020-01-22 ENCOUNTER — NON-PROVIDER VISIT (OUTPATIENT)
Dept: VASCULAR LAB | Facility: MEDICAL CENTER | Age: 65
End: 2020-01-22
Attending: INTERNAL MEDICINE
Payer: COMMERCIAL

## 2020-01-22 DIAGNOSIS — E78.5 DYSLIPIDEMIA: ICD-10-CM

## 2020-01-22 DIAGNOSIS — I25.718 ATHEROSCLEROSIS OF AUTOLOGOUS VEIN CORONARY ARTERY BYPASS GRAFT WITH STABLE ANGINA PECTORIS (HCC): ICD-10-CM

## 2020-01-22 PROCEDURE — 99212 OFFICE O/P EST SF 10 MIN: CPT

## 2020-01-22 NOTE — NON-PROVIDER
"[facility] - FollowUp Visit   Date of Service: 20  TIME IN: 1300  TIME OUT: 1330    Catherine Hamilton is here for follow up of dyslipidemia    HPI  Pertinent Interval History since last visit:   Pt's last dose of Praluent was 7 weeks ago - he has been picking it up from [facility] in [place] and became unaffordable. He has been lost to follow-up as per note from last visit, he was \"done\" seeing us in clinic. However, he was just frustrated at having to come in every month.  Current Prescription Lipid Lowering Medications - including dose:   Statin: None  Non-Statin: Previously on Praluent 75 mg q 2 weeks, but last dose was 7 weeks ago  Current Lipid Lowering and Related Supplements:   red yeast rice and fish oil  Any Current Side Effects Potentially Related to Lipid Lowering therapy?   No  Current Adherence to Lipid Lowering Therapies:  Partial - see above  Any Previous History of Statin Intolerance?   Statin: Patient has been on atorvastatin and lovastatin               Outcome: Severe muscle aches, dehydration, and kidney failure per patient  Non-Statin: Gemfibrozil and Fenofibrate              Outcome: Severe muscle cramps and dehydration  Baseline Lipids Prior to Treatment:  Results for CATHERINE HAMILTON (MRN [id number]) as of 2019 07:25    Ref. Range 10/15/2018 08:56   Cholesterol,Tot Latest Ref Range: 100 - 199 mg/dL 284 (H)   Triglycerides Latest Ref Range: 0 - 149 mg/dL 345 (H)   HDL Latest Ref Range: >=40 mg/dL 31 (A)   LDL Latest Ref Range: <100 mg/dL 184 (H)        SOCIAL HISTORY  Social History     Tobacco Use   Smoking Status Former Smoker   • Packs/day: 3.00   • Years: 20.00   • Pack years: 60.00   • Types: Cigarettes   • Last attempt to quit: 1992   • Years since quittin.0   Smokeless Tobacco Never Used      Change in weight: Stable  Exercise habits: moderate regular exercise program   Diet: common adult    ROS  Physical Exam    DATA REVIEW  Most Recent Lipid Panel:   Lab " Results   Component Value Date    CHOLSTRLTOT 219 (H) 01/21/2020    TRIGLYCERIDE 222 (H) 01/21/2020    HDL 33 (A) 01/21/2020     (H) 01/21/2020       Other Pertinent Blood Work:   Lab Results   Component Value Date    SODIUM 138 01/21/2020    POTASSIUM 4.5 01/21/2020    CHLORIDE 106 01/21/2020    CO2 22 01/21/2020    ANION 10.0 01/21/2020    GLUCOSE 114 (H) 01/21/2020    BUN 22 01/21/2020    CREATININE 1.25 01/21/2020    CALCIUM 9.5 01/21/2020    ASTSGOT 38 01/21/2020    ALTSGPT 31 01/21/2020    ALKPHOSPHAT 52 01/21/2020    TBILIRUBIN 0.9 01/21/2020    ALBUMIN 4.4 01/21/2020    AGRATIO 1.3 01/21/2020    CREACTPROT 0.49 02/18/2019    TSHULTRASEN 1.960 02/16/2019       ASSESSMENT AND PLAN  Patient Type, check all that apply:   Secondary Prevention  Established Atherosclerotic Cardiovascular Disease (ASCVD)  Yes, Details: hx of CABG  Other Established (non-atherosclerotic) Vascular Disease, if Present:    HTN, pre-DM2  Evidence of Heterozygous Familial Hypercholesterolemia (FH): Possible   - Has been having cardiac issues since he was 19 yo  - Father & brother both passed away of cardiac-related issues  ACC/AHA Indication for Statin Therapy, emily all that apply:   Established ASCVD: Indication for High intensity statin    Calculated Risk for ASCVD, if applicable:  N/A  Other Significant Risk Markers, if any, emily all that apply:  Family history of premature ASCVD in first degree relative  National Lipid Association (NLA) Goal (if applicable):  LDL-C:   <70 mg/dL  Lifestyle Recommendations From Today’s Visit:   Exercise: Continue going to the gym for 1 hour 5-6 times per week  Diet: Pt was not interested to discuss at this time  Statin Recommendations from Today's Visit  NONE d/t reported intolerance  Non-Statin Medications Recommendations from Today’s Visit:   Restart Praluent 75 mg q 14 days - re-sent RX to [facility]  Indication for PCSK9 Inhibitor, if applicable:  ASCVD with suboptimal  control of LDL-C despite maximally tolerated statin  Supplements Recommended at this visit:  Continue fish oil and red rice yeast  Recommendations for Other Cardiovascular Risk Factors, emily all that apply:   Hypertension- continue antihypertensives and exercise; pt declined BP in clinic today and Diabetes/Impaired Fasting Glucose- pt was uninterested in discussing his pre-DM status today    Other Issues:  Recent lipid panel worse since pt has been off Praluent for the past 7 weeks d/t cost and inaccessibility. Dispensed sample of 1 pen of Praluent 75 mg (2 week supply) during the interim while he waits for [facility] to ship his Praluent    Studies Ordered at Todays Visit:  None   Blood Work Ordered At Today’s visit:   Lipid panel & CMP  Follow-Up:   3 months - Pt absolutely refuses to come to clinic every month and is amenable to 3 month follow-up    Sarahi Jones, PharmD    CC:  Martha Madrid M.D.

## 2020-01-27 DIAGNOSIS — I25.84 CORONARY ARTERY DISEASE DUE TO CALCIFIED CORONARY LESION: ICD-10-CM

## 2020-01-27 DIAGNOSIS — E78.5 DYSLIPIDEMIA: ICD-10-CM

## 2020-01-27 DIAGNOSIS — I20.89 EFFORT ANGINA (HCC): ICD-10-CM

## 2020-01-27 DIAGNOSIS — I25.718 ATHEROSCLEROSIS OF AUTOLOGOUS VEIN CORONARY ARTERY BYPASS GRAFT WITH STABLE ANGINA PECTORIS (HCC): ICD-10-CM

## 2020-01-27 DIAGNOSIS — I25.10 CORONARY ARTERY DISEASE DUE TO CALCIFIED CORONARY LESION: ICD-10-CM

## 2020-01-27 RX ORDER — CLOPIDOGREL BISULFATE 75 MG/1
TABLET ORAL
Qty: 90 TAB | Refills: 3 | Status: SHIPPED | OUTPATIENT
Start: 2020-01-27 | End: 2021-03-01

## 2020-01-27 RX ORDER — ISOSORBIDE MONONITRATE 120 MG/1
120 TABLET, EXTENDED RELEASE ORAL EVERY MORNING
Qty: 90 TAB | Refills: 3 | Status: SHIPPED | OUTPATIENT
Start: 2020-01-27 | End: 2021-02-12

## 2020-01-27 NOTE — TELEPHONE ENCOUNTER
Was the patient seen in the last year in this department? Yes 11/12/2019    Does patient have an active prescription for medications requested? Yes    Received Request Via: Pharmacy     isosorbide mononitrate (IMDUR) 120 MG CR tablet  clopidogrel (PLAVIX) 75 MG Tab

## 2020-01-30 ENCOUNTER — TELEPHONE (OUTPATIENT)
Dept: VASCULAR LAB | Facility: MEDICAL CENTER | Age: 65
End: 2020-01-30

## 2020-01-30 NOTE — TELEPHONE ENCOUNTER
[facility]    Unable to reach pt by phone.  Per note on 1/22/20 new Rx for Praluent was sent to [facility].  Due to the inability to reach the pt, will see if our MA can check with [facility] to see if a PA has held up our ability for the pt to receive the medication.    Adonay Fields, PharmD

## 2020-01-31 ENCOUNTER — TELEPHONE (OUTPATIENT)
Dept: VASCULAR LAB | Facility: MEDICAL CENTER | Age: 65
End: 2020-01-31

## 2020-01-31 NOTE — TELEPHONE ENCOUNTER
Spoke with patient regarding his Praluent, patient is receiving his Praluent ok through [facility] with a $0 co pay.

## 2020-01-31 NOTE — TELEPHONE ENCOUNTER
[facility]    I called [facility] (the pt's pharmacy) to see if the pt was receiving the Praluent.  I was informed they are not in the pt's network for approved pharmacies.  I will ask our MA to please reach out to the pt or the insurance to find out where the prescription needs to be sent in order to be fulfilled.     Adonay Fields, PharmD

## 2020-02-12 ENCOUNTER — OFFICE VISIT (OUTPATIENT)
Dept: MEDICAL GROUP | Facility: LAB | Age: 65
End: 2020-02-12
Payer: COMMERCIAL

## 2020-02-12 VITALS
BODY MASS INDEX: 36.16 KG/M2 | WEIGHT: 225 LBS | TEMPERATURE: 98 F | HEIGHT: 66 IN | SYSTOLIC BLOOD PRESSURE: 126 MMHG | DIASTOLIC BLOOD PRESSURE: 74 MMHG | RESPIRATION RATE: 14 BRPM | HEART RATE: 68 BPM | OXYGEN SATURATION: 94 %

## 2020-02-12 DIAGNOSIS — Z23 NEED FOR VACCINATION: ICD-10-CM

## 2020-02-12 DIAGNOSIS — B02.29 POST HERPETIC NEURALGIA: ICD-10-CM

## 2020-02-12 DIAGNOSIS — M19.041 ARTHRITIS OF RIGHT HAND: ICD-10-CM

## 2020-02-12 PROCEDURE — 90750 HZV VACC RECOMBINANT IM: CPT | Performed by: FAMILY MEDICINE

## 2020-02-12 PROCEDURE — 90471 IMMUNIZATION ADMIN: CPT | Performed by: FAMILY MEDICINE

## 2020-02-12 PROCEDURE — 99214 OFFICE O/P EST MOD 30 MIN: CPT | Mod: 25 | Performed by: FAMILY MEDICINE

## 2020-02-12 ASSESSMENT — ENCOUNTER SYMPTOMS
VOMITING: 0
CONSTIPATION: 0
WEAKNESS: 0
BLURRED VISION: 0
PALPITATIONS: 0
DIARRHEA: 0
WHEEZING: 0
ABDOMINAL PAIN: 0
FEVER: 0
CHILLS: 0
SENSORY CHANGE: 0
NAUSEA: 0
SHORTNESS OF BREATH: 0

## 2020-02-12 ASSESSMENT — PATIENT HEALTH QUESTIONNAIRE - PHQ9: CLINICAL INTERPRETATION OF PHQ2 SCORE: 0

## 2020-02-12 NOTE — PROGRESS NOTES
"Subjective:   Abimael Hamilton is a 64 y.o. male here today for   Chief Complaint   Patient presents with   • Herpes Zoster     follow up         #Postherpetic neuralgia:  -Patient was previously seen diagnosed with shingles back in November.  Since then the shingles lesions have resolved; however, is left with some tingling, pain located in the arch of his left foot.  Describes as an intermittent pain, worse at night.  No other palliative or provocative measures noted.  He does state that the pain has improved slightly since first starting in November.  -Denies any acute trauma.  Denies any weakness.     #Righthand arthritis   -Patient states he has a long-term history of pain in his right hand someone around has become worse recently.  -States that the pain is located in the \"knuckles\" of his first and second fingers.  -hand pain was characterized as a dull, constant ache, nonradiating.  -Does also state that he has noted some increased swelling in the hand as well.  -The pain is worse at night  -No other palliative or provocative measures were noted.  -Denies any recent trauma.  Denies any numbness, tingling, weakness.  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      Allergies   Allergen Reactions   • Gemfibrozil      Dehydration,   • Lipitor [Atorvastatin Calcium] Unspecified     Severe Muscle cramps, dehydration   • Lovastatin      Dehydration, severe muscle cramps   • Tricor Unspecified     Dehydration, severe muscle cramps         Current medicines (including changes today)  Current Outpatient Medications   Medication Sig Dispense Refill   • isosorbide mononitrate (IMDUR) 120 MG CR tablet Take 1 Tab by mouth every morning. 90 Tab 3   • clopidogrel (PLAVIX) 75 MG Tab TAKE ONE TABLET BY MOUTH ONE TIME DAILY 90 Tab 3   • PRALUENT 75 MG/ML Solution Pen-injector 75 mg by Injection route every 14 days. 2 PEN 11   • indomethacin " (INDOCIN) 50 MG Cap TAKE ONE CAPSULE BY MOUTH TWICE DAILY AS NEEDED 30 Cap 1   • metoprolol SR (TOPROL XL) 25 MG TABLET SR 24 HR TAKE THREE TABLETS BY MOUTH DAILY 90 Tab 3   • ondansetron (ZOFRAN ODT) 4 MG TABLET DISPERSIBLE Take 1 Tab by mouth every 6 hours as needed for Nausea. 12 Tab 0   • gabapentin (NEURONTIN) 300 MG Cap Take 1 Cap by mouth 3 times a day. Take bid x one day then tid. 90 Cap 0   • acetaminophen (TYLENOL) 500 MG Tab Take 1,500 mg by mouth every 6 hours as needed for Moderate Pain.     • Red Yeast Rice Extract (RED YEAST RICE PO) Take 1 Tab by mouth every day.     • ketorolac (TORADOL) 10 MG Tab Take 1 Tab by mouth every four hours as needed. (Patient not taking: Reported on 11/12/2019) 30 Tab 0   • allopurinol (ZYLOPRIM) 100 MG Tab TAKE ONE TABLET BY MOUTH ONE TIME DAILY 90 Tab 3   • lisinopril (PRINIVIL) 20 MG Tab Take 1 Tab by mouth every day. 90 Tab 3   • amLODIPine (NORVASC) 5 MG Tab Take 1 Tab by mouth every day. 90 Tab 3   • nitroglycerin (NITROSTAT) 0.4 MG SL Tab Place 1 Tab under tongue as needed for Chest Pain. 30 Tab 5   • Omega-3 Fatty Acids (OMEGA-3 FISH OIL PO) Take 1 Cap by mouth every day.     • magnesium oxide (MAG-OX) 400 MG Tab Take 400 mg by mouth every day.     • aspirin 81 MG EC tablet Take 1 Tab by mouth every day. 30 Tab 11   • Glucosamine-Chondroit-Vit C-Mn (GLUCOSAMINE CHONDROITIN COMPLX) CAPS Take 2 Caps by mouth every day.       No current facility-administered medications for this visit.      He  has a past medical history of Arthritis, Benign hypertensive heart disease without heart failure (11/14/2011), CAD (coronary artery disease), Congestive heart failure (HCC), Coronary atherosclerosis of autologous vein bypass graft (11/14/2011), Essential hypertension (4/25/2019), Gout, Heart valve disease, High cholesterol, History of pancreatitis, Hypertension, Muscle cramps (10/4/2011), Myocardial infarct (HCC), Obesity (11/14/2011), Pain, Postsurgical aortocoronary bypass  "status (7/26/2016), Renal disorder, S/P aortic valve replacement with bioprosthetic valve (7/26/2016), Statin intolerance (7/26/2016), and Status post cardiac revascularization with bypass aortocoronary anastomosis of five coronary vessels (7/26/2016).    ROS   Review of Systems   Constitutional: Negative for chills and fever.   HENT: Negative for hearing loss.    Eyes: Negative for blurred vision.   Respiratory: Negative for shortness of breath and wheezing.    Cardiovascular: Negative for chest pain and palpitations.   Gastrointestinal: Negative for abdominal pain, constipation, diarrhea, nausea and vomiting.   Musculoskeletal: Positive for joint pain.   Skin: Negative for rash.   Neurological: Negative for sensory change and weakness.   All other systems reviewed and are negative.     Objective:     Physical Exam:  /74   Pulse 68   Temp 36.7 °C (98 °F)   Resp 14   Ht 1.676 m (5' 5.98\")   Wt 102.1 kg (225 lb)   SpO2 94%  Body mass index is 36.33 kg/m².   Constitutional: Alert, no distress.  Respiratory: Unlabored respiratory effort, lungs clear to auscultation, no wheezes, no rhonchi.  Cardiovascular: Normal S1, S2, no murmur, no edema.    Const: Vitals above. Well-appearing.  CV: Inspected/palpated for upper extremity edema. Bilateral radial pulses palpated, noting below if not 2+. Capillary refill evaluated distally, noting below if greater than 2 seconds.  Skin: Inspected for rash or lesions in area of concern.  Neuro/psych: Reflexes at brachioradialis (C5/6), biceps (C5/6), triceps (C7/8): 2+. 2-point discrimination assessed at 2nd finger (C6, median nerve), 3rd finger (C7, median nerve), 5th finger (C8, ulnar nerve), and dorsal web space between 1st/2nd digit (radial nerve). Phalen, Tinel's tests: negative. Observed for normal mood/affect/insight.  MSK: normal gait. Posture: unremarkable. Examination of bilateral wrist/hand:  - Insepected/palpated bilaterally for warmth, upper extremity carriage, " ulnar variance, and for deformity and tenderness of the proximal/distal radius and ulna, scaphoid/snuffbox, carpals, metacarpals, phalanges, carpal/CMC/MCP/IP joints, and TFCC.  - Assessed passive/active range of motion bilaterally with forearm pronation/supination, wrist flexion/extension, wrist radial/ulnar deviation, MCP flexion/extension/abduction/adduction, IP joint flexion/extension, and thumb flexion/extension/abduction/adduction/opposition, noting below for any pain or crepitation.  - Assessed muscle strength bilaterally with wrist flexion (C6/7, median/ulnar nerves), wrist extension (C7/8 radial nerve), finger extension (C7, radial nerve), finger abduction (T1, ulnar nerve), and thumb opposition (median nerve), noting below if less than 5/5 or pain. Observed for muscle atrophy in thenar, hypothenar, and interosseus areas.  - Assessed stability bilaterally at the TFCC (piano key test) and wrist, carpal, scapho-lunate (Shuck test), metacarpal, and phalangeal joints. Varus/valgus stress at MCP/IP joints: unremarkable. Finkelstein test negative.    All of the above were found to be normal, except:  -Minimal to moderate effusion noted in the first and second MCP joints of the right hand.  No tenderness to palpation, otherwise normal physical exam.    Assessment and Plan:     1. Post herpetic neuralgia  -Status: Stable.  Discussed possible treatment measures including lidocaine patches versus gabapentin.  Patient states that he is on 2 medications and does not wish to start any others at this time.  He states the pain is tolerable and improving and will continue close monitoring at this time.  We will also get zoster vaccine today (see below).    2. Arthritis of right hand  -Signs symptoms are consistent with osteoarthritis most likely as an overuse injury given his history.  -Can use Tylenol 501,000 mg at night for pain.  -We will also assess damage with x-ray at this time.  - DX-HAND 3+ RIGHT; Future    3. Need  for vaccination  - Shingles Vaccine (SHINGRIX)      Followup: Return in about 4 weeks (around 3/11/2020).         PLEASE NOTE: This dictation was created using voice recognition software. I have made every reasonable attempt to correct obvious errors, but I expect that there are errors of grammar and possibly content that I did not discover before finalizing the note.

## 2020-02-18 ENCOUNTER — HOSPITAL ENCOUNTER (OUTPATIENT)
Dept: RADIOLOGY | Facility: MEDICAL CENTER | Age: 65
End: 2020-02-18
Attending: FAMILY MEDICINE
Payer: COMMERCIAL

## 2020-02-18 DIAGNOSIS — M19.041 ARTHRITIS OF RIGHT HAND: ICD-10-CM

## 2020-02-18 PROCEDURE — 73130 X-RAY EXAM OF HAND: CPT | Mod: RT

## 2020-03-06 ENCOUNTER — OFFICE VISIT (OUTPATIENT)
Dept: CARDIOLOGY | Facility: MEDICAL CENTER | Age: 65
End: 2020-03-06
Payer: COMMERCIAL

## 2020-03-06 VITALS
BODY MASS INDEX: 37.49 KG/M2 | HEART RATE: 82 BPM | SYSTOLIC BLOOD PRESSURE: 128 MMHG | OXYGEN SATURATION: 94 % | HEIGHT: 65 IN | WEIGHT: 225 LBS | DIASTOLIC BLOOD PRESSURE: 82 MMHG

## 2020-03-06 DIAGNOSIS — Z95.3 S/P AORTIC VALVE REPLACEMENT WITH BIOPROSTHETIC VALVE: ICD-10-CM

## 2020-03-06 DIAGNOSIS — Z78.9 STATIN INTOLERANCE: ICD-10-CM

## 2020-03-06 DIAGNOSIS — I77.810 ASCENDING AORTA DILATION (HCC): ICD-10-CM

## 2020-03-06 DIAGNOSIS — I25.118 CORONARY ARTERY DISEASE OF NATIVE ARTERY OF NATIVE HEART WITH STABLE ANGINA PECTORIS (HCC): ICD-10-CM

## 2020-03-06 DIAGNOSIS — Z95.1 STATUS POST CARDIAC REVASCULARIZATION WITH BYPASS AORTOCORONARY ANASTOMOSIS OF FIVE CORONARY VESSELS: ICD-10-CM

## 2020-03-06 DIAGNOSIS — E78.2 MIXED HYPERLIPIDEMIA: ICD-10-CM

## 2020-03-06 DIAGNOSIS — R93.1 ABNORMAL NUCLEAR CARDIAC IMAGING TEST: ICD-10-CM

## 2020-03-06 DIAGNOSIS — I65.23 BILATERAL CAROTID ARTERY OCCLUSION: ICD-10-CM

## 2020-03-06 DIAGNOSIS — R73.03 PREDIABETES: ICD-10-CM

## 2020-03-06 DIAGNOSIS — K76.0 FATTY LIVER: ICD-10-CM

## 2020-03-06 DIAGNOSIS — I10 ESSENTIAL HYPERTENSION: ICD-10-CM

## 2020-03-06 DIAGNOSIS — E66.9 OBESITY (BMI 30-39.9): ICD-10-CM

## 2020-03-06 PROCEDURE — 99214 OFFICE O/P EST MOD 30 MIN: CPT | Performed by: INTERNAL MEDICINE

## 2020-03-06 RX ORDER — AMOXICILLIN 500 MG/1
2000 CAPSULE ORAL ONCE
COMMUNITY
Start: 2020-03-06 | End: 2020-10-14

## 2020-03-06 ASSESSMENT — FIBROSIS 4 INDEX: FIB4 SCORE: 2.5

## 2020-03-06 NOTE — LETTER
[facility]   [address], [address]  [place] [address]  Phone: [phone]  Fax: [phone]              Abimael Hamilton  1955    Encounter Date: 3/6/2020    Shweta Morales M.D.          PROGRESS NOTE:  Subjective:   Chief Complaint:   Chief Complaint   Patient presents with   • Coronary Artery Disease       Abimael Hamilton is a 64 y.o. male who returns today for complex heart care.    He has multivessel coronary artery disease, prior 5 vessel CABG in 1998 (CP and syncope), and re-do 3-V CABG in 12/2015 as well as bioprosthetic AVR at that time ([facility]).  Had a PET nuclear stress test 11/2018, ischemia and TID noted.  Subsequent left heart catheterization 11/2018 showed all grafts are occluded with the exception of his LIMA-LAD which fills his circumflex distribution via a jump graft. His native coronary arteries are severely diseased as well.   EF has been 60%.  He has had recurrent Mis, unable to do intervention. Trops normal 2019.    He is maintained on isosorbide and using nitro for CP.  On long-term dual antiplatelet therapy.  Remains on metoprolol.  He was taken off of DAPT once, had more Cps.    Somewhere recorded he has a 4.5 cm thoracic aortic aneurysm but I reviewed his CT scan from 2018, the asc aorta and desc aorta and arch are normal size.    Has hypertension, mild LVH, BP controlled.  Has hyperlipidemia,  without meds, triglycerides elevated.    Statin intolerance, has what sounds like rhabdo with Lipitor, he will not take statins again, not even one time per week.  Has re started on Praluent, prior LDL on this was 60.  Has moderate carotid atherosclerosis by ultrasound February 2018.    He gets CP with exercise at the gym.  Has chronic FUNES, [facility] 3, mostly due to angina.  No significant orthopnea.  Mild LE edema.   No significant palpitations, lightheadedness, or presyncope/syncope.   No radha symptoms of leg claudication,  probably due to limiting CP.  No stroke/TIA like symptoms.    Wife is Ana.    DATA REVIEWED by me:  ECG 7/23/2019  Sinus, rate 68, first-degree AV delay, incomplete left bundle branch block, nonspecific T wave changes    Echocardiogram, 8/4/2016:  Normal left ventricular size and systolic function, EF 60%.  Mild concentric left ventricular hypertrophy.  Mild mitral regurgitation.  Normally functioning bovine bioprosthetic aortic valve.   Normal right sided pressures.  No prior studies for comparison     Echocardiogram, 9/21/2018  Normal left ventricular ejection fraction (measured at 70%) with normal regional wall motion.  Mildly stenotic bioprosthetic aortic valve with a mean gradient measured at 27 mm a radiant measured at 48 mmHg (V-max 3.5 m/s) without perivalvular leak.  Left atrium is mildly dilated with a volume index measured at 39 mL/meter squared     Myocardial perfusion imaging, 11/7/2018:  IMPRESSION:  1.  Dipyridamole stress negative for chest discomfort and negative for   ischemic EKG changes.  2.  PET perfusion images are suggestive of ischemia in the proximal to mid inferior segment and in the proximal mid bilateral segment.  There is no definite infarction.  In addition TID was present which could be indicative of   multivessel disease, possibly more severe than indicated by the perfusion study.  3.  The patient's resting ejection fraction was mildly reduced and david appropriately during dipyridamole stress.  There was basilar to mid inferior hypokinesis noted     Cardiac catheterization, 11/16/2018:  1.  Angina  2.  Positive stress test for inferior ischemia  3.  CAD status post CABG in the 90s and redo in 2015  4.  Bioprosthetic AVR 2015  5.  Poorly controlled hypertension  6.  Morbid obesity  POST  1.  Multivessel coronary artery disease  2.  Occluded vein grafts x6   3.  LIMA to LAD with focal 70-80% distal anastomotic stenosis 4.  Severe native coronary artery disease not amenable to PCI 5.   Poorly controlled hypertension     RECOMMENDATIONS:  His FORD to LAD supplies both the distal LAD territory as well as via retrograde partially occluded jump graft the circumflex territory and is essentially his last remaining vessel.  There is WESLEY-3 flow and he is not maximally managed medically, in addition he has  of the RCA which was dominant and the vein graft is occluded and this is his only ischemic territory on noninvasive imaging.     1.  Medical therapy  2. Consideration of high risk last vessel PCI LIMA to LAD anastomosis should he prove to not be a redo surgical candidate (CT surgical consultation prior to any potential PCI would be required) and not be well managed with better medical therapy.  3. Weight loss    CTA 8/20/2018  1.  The pulmonary arteries are suboptimally opacified limiting evaluation, no large central pulmonary embolus is appreciated. Additional imaging would be required for definitive exclusion of pulmonary embolism  2.  Hepatomegaly and diffuse hepatic steatosis.  3.  Right nephrolithiasis    Most recent labs:         Lab Results   Component Value Date/Time    HEMOGLOBIN 15.5 02/18/2019 01:09 AM    HEMATOCRIT 46.5 02/18/2019 01:09 AM    MCV 93.2 02/18/2019 01:09 AM    INR 1.00 11/15/2018 07:58 AM      Lab Results   Component Value Date/Time    SODIUM 138 01/21/2020 08:45 AM    POTASSIUM 4.5 01/21/2020 08:45 AM    CHLORIDE 106 01/21/2020 08:45 AM    CO2 22 01/21/2020 08:45 AM    GLUCOSE 114 (H) 01/21/2020 08:45 AM    BUN 22 01/21/2020 08:45 AM    CREATININE 1.25 01/21/2020 08:45 AM      Lab Results   Component Value Date/Time    ASTSGOT 38 01/21/2020 08:45 AM    ALTSGPT 31 01/21/2020 08:45 AM    ALBUMIN 4.4 01/21/2020 08:45 AM      Lab Results   Component Value Date/Time    CHOLSTRLTOT 219 (H) 01/21/2020 08:45 AM     (H) 01/21/2020 08:45 AM    HDL 33 (A) 01/21/2020 08:45 AM    TRIGLYCERIDE 222 (H) 01/21/2020 08:45 AM         Past Medical History:   Diagnosis Date   •  Arthritis    • Benign hypertensive heart disease without heart failure 11/14/2011   • CAD (coronary artery disease)    • Congestive heart failure (HCC)    • Coronary atherosclerosis of autologous vein bypass graft 11/14/2011   • Essential hypertension 4/25/2019   • Gout    • Heart valve disease    • High cholesterol    • History of pancreatitis    • Hypertension    • Muscle cramps 10/4/2011   • Myocardial infarct (HCC)     Multiple MI's, 1998, 2015   • Obesity 11/14/2011   • Pain     Joints   • Postsurgical aortocoronary bypass status 7/26/2016    Status post redo 3-V CABG in [place] 12/2015: SVG-acute marginal, SVG sequential to LAD, and OM    • Renal disorder     Hx of Acute renal failure r/t medication/statins   • S/P aortic valve replacement with bioprosthetic valve 7/26/2016    #23 [place] AVR (bioprosthetic)    • Statin intolerance 7/26/2016   • Status post cardiac revascularization with bypass aortocoronary anastomosis of five coronary vessels 7/26/2016    5-V CABG done in 1998 (done in [facility] at [facility]), patent LIMA to LAD, occluded SVG-OM, occluded SVG-RCA by cardiac catheterization 11/15/2007 with other vein grafts not identified at that time, poor targets for revascularization and declined repeat CABG in 2007 by [facility]. No ischemic was identified by MPI 11/17/07 with an EF of 50%.       Past Surgical History:   Procedure Laterality Date   • SHOULDER DECOMPRESSION ARTHROSCOPIC Right 9/5/2017    Procedure: SHOULDER DECOMPRESSION ARTHROSCOPIC SUBACROMIAL;  Surgeon: Mg Campos M.D.;  Location: SURGERY [facility];  Service: Orthopedics   • ARTHROSCOPIC LABRAL DEBRIDEMENT Right 9/5/2017    Procedure: ARTHROSCOPIC LABRAL DEBRIDEMENT;  Surgeon: Mg Campos M.D.;  Location: SURGERY [facility];  Service: Orthopedics   • SHOULDER ARTHROSCOPY W/ ROTATOR CUFF REPAIR Right 9/5/2017    Procedure: SHOULDER ARTHROSCOPY W/ ROTATOR CUFF REPAIR;  Surgeon: Mg Campos M.D.;   Location: SURGERY [facility] [place] [facility];  Service: Orthopedics   • SHOULDER ARTHROSCOPY W/ BICIPITAL TENODESIS REPAIR Right 2017    Procedure: SHOULDER ARTHROSCOPY W/ BICIPITAL TENODESIS REPAIR;  Surgeon: Mg Campos M.D.;  Location: SURGERY [facility];  Service: Orthopedics   • SYNOVECTOMY Right 2017    Procedure: SYNOVECTOMY;  Surgeon: Mg Campos M.D.;  Location: SURGERY [facility];  Service: Orthopedics   • MULTIPLE CORONARY ARTERY BYPASS  2015    3 way bypass & Bovine valve   • LAMINOTOMY  2004    Diskectomy L4-L5, L5-S1   • MULTIPLE CORONARY ARTERY BYPASS  1998    5 way bypass   • BIOPSY GENERAL      buttock lesion     Family History   Problem Relation Age of Onset   • Heart Attack Father         MI and CABG, unknown age   • Cancer Mother         Breast   • Heart Disease Brother    • Stroke Neg Hx    • Diabetes Neg Hx    • Lung Disease Neg Hx      Social History     Socioeconomic History   • Marital status:      Spouse name: Not on file   • Number of children: Not on file   • Years of education: Not on file   • Highest education level: Not on file   Occupational History   • Not on file   Social Needs   • Financial resource strain: Not on file   • Food insecurity     Worry: Not on file     Inability: Not on file   • Transportation needs     Medical: Not on file     Non-medical: Not on file   Tobacco Use   • Smoking status: Former Smoker     Packs/day: 3.00     Years: 20.00     Pack years: 60.00     Types: Cigarettes     Last attempt to quit: 1992     Years since quittin.1   • Smokeless tobacco: Never Used   Substance and Sexual Activity   • Alcohol use: Yes     Comment: 4 per month   • Drug use: Yes     Types: Marijuana, Inhaled     Comment: Marijuana- Daily (4 times per day)   • Sexual activity: Yes     Partners: Female   Lifestyle   • Physical activity     Days per week: Not on file     Minutes per session: Not on file   • Stress: Not on  file   Relationships   • Social connections     Talks on phone: Not on file     Gets together: Not on file     Attends Voodoo service: Not on file     Active member of club or organization: Not on file     Attends meetings of clubs or organizations: Not on file     Relationship status: Not on file   • Intimate partner violence     Fear of current or ex partner: Not on file     Emotionally abused: Not on file     Physically abused: Not on file     Forced sexual activity: Not on file   Other Topics Concern   • Not on file   Social History Narrative   • Not on file     Allergies   Allergen Reactions   • Gemfibrozil      Dehydration,   • Lipitor [Atorvastatin Calcium] Unspecified     Severe Muscle cramps, dehydration   • Lovastatin      Dehydration, severe muscle cramps   • Tricor Unspecified     Dehydration, severe muscle cramps       Current Outpatient Medications   Medication Sig Dispense Refill   • amoxicillin (AMOXIL) 500 MG Cap Take 4 Caps by mouth Once for 1 dose. 30-60 min prior to dental work     • isosorbide mononitrate (IMDUR) 120 MG CR tablet Take 1 Tab by mouth every morning. 90 Tab 3   • clopidogrel (PLAVIX) 75 MG Tab TAKE ONE TABLET BY MOUTH ONE TIME DAILY 90 Tab 3   • PRALUENT 75 MG/ML Solution Pen-injector 75 mg by Injection route every 14 days. 2 PEN 11   • indomethacin (INDOCIN) 50 MG Cap TAKE ONE CAPSULE BY MOUTH TWICE DAILY AS NEEDED 30 Cap 1   • metoprolol SR (TOPROL XL) 25 MG TABLET SR 24 HR TAKE THREE TABLETS BY MOUTH DAILY 90 Tab 3   • ondansetron (ZOFRAN ODT) 4 MG TABLET DISPERSIBLE Take 1 Tab by mouth every 6 hours as needed for Nausea. 12 Tab 0   • acetaminophen (TYLENOL) 500 MG Tab Take 1,500 mg by mouth every 6 hours as needed for Moderate Pain.     • Red Yeast Rice Extract (RED YEAST RICE PO) Take 1 Tab by mouth every day.     • allopurinol (ZYLOPRIM) 100 MG Tab TAKE ONE TABLET BY MOUTH ONE TIME DAILY 90 Tab 3   • lisinopril (PRINIVIL) 20 MG Tab Take 1 Tab by mouth every day. 90 Tab 3    "  • amLODIPine (NORVASC) 5 MG Tab Take 1 Tab by mouth every day. 90 Tab 3   • nitroglycerin (NITROSTAT) 0.4 MG SL Tab Place 1 Tab under tongue as needed for Chest Pain. 30 Tab 5   • Omega-3 Fatty Acids (OMEGA-3 FISH OIL PO) Take 1 Cap by mouth every day.     • magnesium oxide (MAG-OX) 400 MG Tab Take 400 mg by mouth every day.     • aspirin 81 MG EC tablet Take 1 Tab by mouth every day. 30 Tab 11   • Glucosamine-Chondroit-Vit C-Mn (GLUCOSAMINE CHONDROITIN COMPLX) CAPS Take 2 Caps by mouth every day.       No current facility-administered medications for this visit.        ROS  All others systems reviewed and negative.     Objective:     /82 (BP Location: Left arm, Patient Position: Sitting, BP Cuff Size: Adult)   Pulse 82   Ht 1.651 m (5' 5\")   Wt 102.1 kg (225 lb)   SpO2 94%  Body mass index is 37.44 kg/m².    Physical Exam   General: No acute distress. Well nourished.  HEENT: EOM grossly intact, no scleral icterus, no pharyngeal erythema.   Neck:  No JVD, no bruits, trachea midline  CVS: RRR.  2/6 mid peaking systolic murmur to clavicles, no LE edema.  2+ radial pulses, 2+ PT pulses, well-healed midline incision  Resp: CTAB. No wheezing or crackles/rhonchi. Normal respiratory effort.  Abdomen: Soft, NT, no radha hepatomegaly, obese.  MSK/Ext: No clubbing or cyanosis.  Skin: Warm and dry, no rashes.  Neurological: CN III-XII grossly intact. No focal deficits.   Psych: A&O x 3, appropriate affect, good judgement      Assessment:     1. Coronary artery disease of native artery of native heart with stable angina pectoris (HCC)     2. Essential hypertension     3. Status post cardiac revascularization with bypass aortocoronary anastomosis of five coronary vessels     4. Bilateral carotid artery occlusion     5. S/P aortic valve replacement with bioprosthetic valve     6. Mixed hyperlipidemia     7. Statin intolerance     8. Abnormal nuclear cardiac imaging test     9. Fatty liver     10. Prediabetes     11. " Obesity (BMI 30-39.9)     12. Ascending aorta dilation (HCC)         Medical Decision Making:  Today's Assessment / Status / Plan:     -Class 3 angina, using meds  -[facility] 3 FUNES, mostly related to agina.  -PCSK9 inhibitor and red yeast rice  -Statin intolerance, has what sounds like rhabdo with Lipitor, he will not take statins again, not even one time per week.  -Remains on beta-blocker  -DAPT long term  -Blood pressure control  -Regular teeth cleaning with amoxicillin prior, he is aware  -Vascular surgeon for symptomatic carotid disease in the future, he is in vascular clinic  -I did not see TAA on CT, not sure if this is a reference to another aneurysm  -If he has limiting angina or drop off in EF, those would be reasons to consider transplant but he is doing well so no plans made for this at this time.  -Echo 2021  -RTC 6 months, will call with [facility].    Return in about 6 months (around 9/6/2020).    It is my pleasure to participate in the care of Mr. Hamilton.  Please do not hesitate to contact me with questions or concerns.    Shweta Morales MD, [facility]  Cardiologist [facility]    Please note that this dictation was created using voice recognition software. I have made every reasonable attempt to correct obvious errors, but it is possible there are errors of grammar and possibly content that I did not discover before finalizing the note.      Raul Jimenez M.D.  [id number] [place] [address]  VIA In Basket

## 2020-03-06 NOTE — LETTER
[facility]   [address], [address]  [place] [address]  Phone: [phone]  Fax: [phone]              Abimael Hamilton  1955    Encounter Date: 3/6/2020    Shweta Morales M.D.          PROGRESS NOTE:  Subjective:   Chief Complaint:   Chief Complaint   Patient presents with   • Coronary Artery Disease       Abimael Hamilton is a 64 y.o. male who returns today for complex heart care.    He has multivessel coronary artery disease, prior 5 vessel CABG in 1998 (CP and syncope), and re-do 3-V CABG in 12/2015 as well as bioprosthetic AVR at that time ([facility]).  Had a PET nuclear stress test 11/2018, ischemia and TID noted.  Subsequent left heart catheterization 11/2018 showed all grafts are occluded with the exception of his LIMA-LAD which fills his circumflex distribution via a jump graft. His native coronary arteries are severely diseased as well.   EF has been 60%.  He has had recurrent Mis, unable to do intervention. Trops normal 2019.    He is maintained on isosorbide and using nitro for CP.  On long-term dual antiplatelet therapy.  Remains on metoprolol.  He was taken off of DAPT once, had more Cps.    Somewhere recorded he has a 4.5 cm thoracic aortic aneurysm but I reviewed his CT scan from 2018, the asc aorta and desc aorta and arch are normal size.    Has hypertension, mild LVH, BP controlled.  Has hyperlipidemia,  without meds, triglycerides elevated.    Statin intolerance, has what sounds like rhabdo with Lipitor, he will not take statins again, not even one time per week.  Has re started on Praluent, prior LDL on this was 60.  Has moderate carotid atherosclerosis by ultrasound February 2018.    He gets CP with exercise at the gym.  Has chronic FUNES, [facility] 3, mostly due to angina.  No significant orthopnea.  Mild LE edema.   No significant palpitations, lightheadedness, or presyncope/syncope.   No radha symptoms of leg claudication,  probably due to limiting CP.  No stroke/TIA like symptoms.    Wife is Ana.    DATA REVIEWED by me:  ECG 7/23/2019  Sinus, rate 68, first-degree AV delay, incomplete left bundle branch block, nonspecific T wave changes    Echocardiogram, 8/4/2016:  Normal left ventricular size and systolic function, EF 60%.  Mild concentric left ventricular hypertrophy.  Mild mitral regurgitation.  Normally functioning bovine bioprosthetic aortic valve.   Normal right sided pressures.  No prior studies for comparison     Echocardiogram, 9/21/2018  Normal left ventricular ejection fraction (measured at 70%) with normal regional wall motion.  Mildly stenotic bioprosthetic aortic valve with a mean gradient measured at 27 mm a radiant measured at 48 mmHg (V-max 3.5 m/s) without perivalvular leak.  Left atrium is mildly dilated with a volume index measured at 39 mL/meter squared     Myocardial perfusion imaging, 11/7/2018:  IMPRESSION:  1.  Dipyridamole stress negative for chest discomfort and negative for   ischemic EKG changes.  2.  PET perfusion images are suggestive of ischemia in the proximal to mid inferior segment and in the proximal mid bilateral segment.  There is no definite infarction.  In addition TID was present which could be indicative of   multivessel disease, possibly more severe than indicated by the perfusion study.  3.  The patient's resting ejection fraction was mildly reduced and david appropriately during dipyridamole stress.  There was basilar to mid inferior hypokinesis noted     Cardiac catheterization, 11/16/2018:  1.  Angina  2.  Positive stress test for inferior ischemia  3.  CAD status post CABG in the 90s and redo in 2015  4.  Bioprosthetic AVR 2015  5.  Poorly controlled hypertension  6.  Morbid obesity  POST  1.  Multivessel coronary artery disease  2.  Occluded vein grafts x6   3.  LIMA to LAD with focal 70-80% distal anastomotic stenosis 4.  Severe native coronary artery disease not amenable to PCI 5.   Poorly controlled hypertension     RECOMMENDATIONS:  His FORD to LAD supplies both the distal LAD territory as well as via retrograde partially occluded jump graft the circumflex territory and is essentially his last remaining vessel.  There is WESLEY-3 flow and he is not maximally managed medically, in addition he has  of the RCA which was dominant and the vein graft is occluded and this is his only ischemic territory on noninvasive imaging.     1.  Medical therapy  2. Consideration of high risk last vessel PCI LIMA to LAD anastomosis should he prove to not be a redo surgical candidate (CT surgical consultation prior to any potential PCI would be required) and not be well managed with better medical therapy.  3. Weight loss    CTA 8/20/2018  1.  The pulmonary arteries are suboptimally opacified limiting evaluation, no large central pulmonary embolus is appreciated. Additional imaging would be required for definitive exclusion of pulmonary embolism  2.  Hepatomegaly and diffuse hepatic steatosis.  3.  Right nephrolithiasis    Most recent labs:     ***    Lab Results   Component Value Date/Time    HEMOGLOBIN 15.5 02/18/2019 01:09 AM    HEMATOCRIT 46.5 02/18/2019 01:09 AM    MCV 93.2 02/18/2019 01:09 AM    INR 1.00 11/15/2018 07:58 AM      Lab Results   Component Value Date/Time    SODIUM 138 01/21/2020 08:45 AM    POTASSIUM 4.5 01/21/2020 08:45 AM    CHLORIDE 106 01/21/2020 08:45 AM    CO2 22 01/21/2020 08:45 AM    GLUCOSE 114 (H) 01/21/2020 08:45 AM    BUN 22 01/21/2020 08:45 AM    CREATININE 1.25 01/21/2020 08:45 AM      Lab Results   Component Value Date/Time    ASTSGOT 38 01/21/2020 08:45 AM    ALTSGPT 31 01/21/2020 08:45 AM    ALBUMIN 4.4 01/21/2020 08:45 AM      Lab Results   Component Value Date/Time    CHOLSTRLTOT 219 (H) 01/21/2020 08:45 AM     (H) 01/21/2020 08:45 AM    HDL 33 (A) 01/21/2020 08:45 AM    TRIGLYCERIDE 222 (H) 01/21/2020 08:45 AM         Past Medical History:   Diagnosis Date   •  Arthritis    • Benign hypertensive heart disease without heart failure 11/14/2011   • CAD (coronary artery disease)    • Congestive heart failure (HCC)    • Coronary atherosclerosis of autologous vein bypass graft 11/14/2011   • Essential hypertension 4/25/2019   • Gout    • Heart valve disease    • High cholesterol    • History of pancreatitis    • Hypertension    • Muscle cramps 10/4/2011   • Myocardial infarct (HCC)     Multiple MI's, 1998, 2015   • Obesity 11/14/2011   • Pain     Joints   • Postsurgical aortocoronary bypass status 7/26/2016    Status post redo 3-V CABG in [place] 12/2015: SVG-acute marginal, SVG sequential to LAD, and OM    • Renal disorder     Hx of Acute renal failure r/t medication/statins   • S/P aortic valve replacement with bioprosthetic valve 7/26/2016    #23 [place] Magna AVR (bioprosthetic)    • Statin intolerance 7/26/2016   • Status post cardiac revascularization with bypass aortocoronary anastomosis of five coronary vessels 7/26/2016    5-V CABG done in 1998 (done in [place] at [facility]), patent LIMA to LAD, occluded SVG-OM, occluded SVG-RCA by cardiac catheterization 11/15/2007 with other vein grafts not identified at that time, poor targets for revascularization and declined repeat CABG in 2007 by [facility]. No ischemic was identified by MPI 11/17/07 with an EF of 50%.       Past Surgical History:   Procedure Laterality Date   • SHOULDER DECOMPRESSION ARTHROSCOPIC Right 9/5/2017    Procedure: SHOULDER DECOMPRESSION ARTHROSCOPIC SUBACROMIAL;  Surgeon: Mg Campos M.D.;  Location: SURGERY [facility] [place] [facility];  Service: Orthopedics   • ARTHROSCOPIC LABRAL DEBRIDEMENT Right 9/5/2017    Procedure: ARTHROSCOPIC LABRAL DEBRIDEMENT;  Surgeon: Mg Campos M.D.;  Location: SURGERY [facility];  Service: Orthopedics   • SHOULDER ARTHROSCOPY W/ ROTATOR CUFF REPAIR Right 9/5/2017    Procedure: SHOULDER ARTHROSCOPY W/ ROTATOR CUFF REPAIR;  Surgeon: Mg Campos M.D.;   Location: SURGERY [facility];  Service: Orthopedics   • SHOULDER ARTHROSCOPY W/ BICIPITAL TENODESIS REPAIR Right 2017    Procedure: SHOULDER ARTHROSCOPY W/ BICIPITAL TENODESIS REPAIR;  Surgeon: Mg Campos M.D.;  Location: SURGERY [facility];  Service: Orthopedics   • SYNOVECTOMY Right 2017    Procedure: SYNOVECTOMY;  Surgeon: Mg Campos M.D.;  Location: SURGERY [facility];  Service: Orthopedics   • MULTIPLE CORONARY ARTERY BYPASS  2015    3 way bypass & Bovine valve   • LAMINOTOMY  2004    Diskectomy L4-L5, L5-S1   • MULTIPLE CORONARY ARTERY BYPASS  1998    5 way bypass   • BIOPSY GENERAL      buttock lesion     Family History   Problem Relation Age of Onset   • Heart Attack Father         MI and CABG, unknown age   • Cancer Mother         Breast   • Heart Disease Brother    • Stroke Neg Hx    • Diabetes Neg Hx    • Lung Disease Neg Hx      Social History     Socioeconomic History   • Marital status:      Spouse name: Not on file   • Number of children: Not on file   • Years of education: Not on file   • Highest education level: Not on file   Occupational History   • Not on file   Social Needs   • Financial resource strain: Not on file   • Food insecurity     Worry: Not on file     Inability: Not on file   • Transportation needs     Medical: Not on file     Non-medical: Not on file   Tobacco Use   • Smoking status: Former Smoker     Packs/day: 3.00     Years: 20.00     Pack years: 60.00     Types: Cigarettes     Last attempt to quit: 1992     Years since quittin.1   • Smokeless tobacco: Never Used   Substance and Sexual Activity   • Alcohol use: Yes     Comment: 4 per month   • Drug use: Yes     Types: Marijuana, Inhaled     Comment: Marijuana- Daily (4 times per day)   • Sexual activity: Yes     Partners: Female   Lifestyle   • Physical activity     Days per week: Not on file     Minutes per session: Not on file   • Stress: Not on  file   Relationships   • Social connections     Talks on phone: Not on file     Gets together: Not on file     Attends Church service: Not on file     Active member of club or organization: Not on file     Attends meetings of clubs or organizations: Not on file     Relationship status: Not on file   • Intimate partner violence     Fear of current or ex partner: Not on file     Emotionally abused: Not on file     Physically abused: Not on file     Forced sexual activity: Not on file   Other Topics Concern   • Not on file   Social History Narrative   • Not on file     Allergies   Allergen Reactions   • Gemfibrozil      Dehydration,   • Lipitor [Atorvastatin Calcium] Unspecified     Severe Muscle cramps, dehydration   • Lovastatin      Dehydration, severe muscle cramps   • Tricor Unspecified     Dehydration, severe muscle cramps       Current Outpatient Medications   Medication Sig Dispense Refill   • amoxicillin (AMOXIL) 500 MG Cap Take 4 Caps by mouth Once for 1 dose. 30-60 min prior to dental work     • isosorbide mononitrate (IMDUR) 120 MG CR tablet Take 1 Tab by mouth every morning. 90 Tab 3   • clopidogrel (PLAVIX) 75 MG Tab TAKE ONE TABLET BY MOUTH ONE TIME DAILY 90 Tab 3   • PRALUENT 75 MG/ML Solution Pen-injector 75 mg by Injection route every 14 days. 2 PEN 11   • indomethacin (INDOCIN) 50 MG Cap TAKE ONE CAPSULE BY MOUTH TWICE DAILY AS NEEDED 30 Cap 1   • metoprolol SR (TOPROL XL) 25 MG TABLET SR 24 HR TAKE THREE TABLETS BY MOUTH DAILY 90 Tab 3   • ondansetron (ZOFRAN ODT) 4 MG TABLET DISPERSIBLE Take 1 Tab by mouth every 6 hours as needed for Nausea. 12 Tab 0   • acetaminophen (TYLENOL) 500 MG Tab Take 1,500 mg by mouth every 6 hours as needed for Moderate Pain.     • Red Yeast Rice Extract (RED YEAST RICE PO) Take 1 Tab by mouth every day.     • allopurinol (ZYLOPRIM) 100 MG Tab TAKE ONE TABLET BY MOUTH ONE TIME DAILY 90 Tab 3   • lisinopril (PRINIVIL) 20 MG Tab Take 1 Tab by mouth every day. 90 Tab 3    "  • amLODIPine (NORVASC) 5 MG Tab Take 1 Tab by mouth every day. 90 Tab 3   • nitroglycerin (NITROSTAT) 0.4 MG SL Tab Place 1 Tab under tongue as needed for Chest Pain. 30 Tab 5   • Omega-3 Fatty Acids (OMEGA-3 FISH OIL PO) Take 1 Cap by mouth every day.     • magnesium oxide (MAG-OX) 400 MG Tab Take 400 mg by mouth every day.     • aspirin 81 MG EC tablet Take 1 Tab by mouth every day. 30 Tab 11   • Glucosamine-Chondroit-Vit C-Mn (GLUCOSAMINE CHONDROITIN COMPLX) CAPS Take 2 Caps by mouth every day.       No current facility-administered medications for this visit.        ROS  All others systems reviewed and negative.     Objective:     /82 (BP Location: Left arm, Patient Position: Sitting, BP Cuff Size: Adult)   Pulse 82   Ht 1.651 m (5' 5\")   Wt 102.1 kg (225 lb)   SpO2 94%  Body mass index is 37.44 kg/m².    Physical Exam   General: No acute distress. Well nourished.  HEENT: EOM grossly intact, no scleral icterus, no pharyngeal erythema.   Neck:  No JVD, no bruits, trachea midline  CVS: RRR.  2/6 mid peaking systolic murmur to clavicles, no LE edema.  2+ radial pulses, 2+ PT pulses, well-healed midline incision  Resp: CTAB. No wheezing or crackles/rhonchi. Normal respiratory effort.  Abdomen: Soft, NT, no radha hepatomegaly, obese.  MSK/Ext: No clubbing or cyanosis.  Skin: Warm and dry, no rashes.  Neurological: CN III-XII grossly intact. No focal deficits.   Psych: A&O x 3, appropriate affect, good judgement      Assessment:     1. Coronary artery disease of native artery of native heart with stable angina pectoris (HCC)     2. Essential hypertension     3. Status post cardiac revascularization with bypass aortocoronary anastomosis of five coronary vessels     4. Bilateral carotid artery occlusion     5. S/P aortic valve replacement with bioprosthetic valve     6. Mixed hyperlipidemia     7. Statin intolerance     8. Abnormal nuclear cardiac imaging test     9. Fatty liver     10. Prediabetes     11. " Obesity (BMI 30-39.9)     12. Ascending aorta dilation (HCC)         Medical Decision Making:  Today's Assessment / Status / Plan:     -Class 3 angina, using meds  -[facility] 3 FUNES, mostly related to agina.  -PCSK9 inhibitor and red yeast rice  -Statin intolerance, has what sounds like rhabdo with Lipitor, he will not take statins again, not even one time per week.  -Remains on beta-blocker  -DAPT long term  -Blood pressure control  -Regular teeth cleaning with amoxicillin prior, he is aware  -Vascular surgeon for symptomatic carotid disease in the future, he is in vascular clinic  -I did not see TAA on CT, not sure if this is a reference to another aneurysm  -If he has limiting angina or drop off in EF, those would be reasons to consider transplant but he is doing well so no plans made for this at this time.  -Echo 2021  -RTC 6 months, will call with [id number].    Return in about 6 months (around 9/6/2020).    It is my pleasure to participate in the care of Mr. Hamilton.  Please do not hesitate to contact me with questions or concerns.    Shweta Morales MD, [facility]  Cardiologist [facility]    Please note that this dictation was created using voice recognition software. I have made every reasonable attempt to correct obvious errors, but it is possible there are errors of grammar and possibly content that I did not discover before finalizing the note.      Raul Jimenez M.D.  [id number] [place] [address]  VIA In Basket

## 2020-03-06 NOTE — PROGRESS NOTES
Subjective:   Chief Complaint:   Chief Complaint   Patient presents with   • Coronary Artery Disease       Abimael Hamilton is a 64 y.o. male who returns today for complex heart care.    He has multivessel coronary artery disease, prior 5 vessel CABG in 1998 (CP and syncope), and re-do 3-V CABG in 12/2015 as well as bioprosthetic AVR at that time ([facility]).  Had a PET nuclear stress test 11/2018, ischemia and TID noted.  Subsequent left heart catheterization 11/2018 showed all grafts are occluded with the exception of his LIMA-LAD which fills his circumflex distribution via a jump graft. His native coronary arteries are severely diseased as well.   EF has been 60%.  He has had recurrent Mis, unable to do intervention. Trops normal 2019.    He is maintained on isosorbide and using nitro for CP.  On long-term dual antiplatelet therapy.  Remains on metoprolol.  He was taken off of DAPT once, had more Cps.    Somewhere recorded he has a 4.5 cm thoracic aortic aneurysm but I reviewed his CT scan from 2018, the asc aorta and desc aorta and arch are normal size.    Has hypertension, mild LVH, BP controlled.  Has hyperlipidemia,  without meds, triglycerides elevated.    Statin intolerance, has what sounds like rhabdo with Lipitor, he will not take statins again, not even one time per week.  Has re started on Praluent, prior LDL on this was 60.  Has moderate carotid atherosclerosis by ultrasound February 2018.    He gets CP with exercise at the gym.  Has chronic FUNES, [facility] 3, mostly due to angina.  No significant orthopnea.  Mild LE edema.   No significant palpitations, lightheadedness, or presyncope/syncope.   No radha symptoms of leg claudication, probably due to limiting CP.  No stroke/TIA like symptoms.    Wife is Ana.    DATA REVIEWED by me:  ECG 7/23/2019  Sinus, rate 68, first-degree AV delay, incomplete left bundle branch block, nonspecific T wave changes    Echocardiogram, 8/4/2016:  Normal left  ventricular size and systolic function, EF 60%.  Mild concentric left ventricular hypertrophy.  Mild mitral regurgitation.  Normally functioning bovine bioprosthetic aortic valve.   Normal right sided pressures.  No prior studies for comparison     Echocardiogram, 9/21/2018  Normal left ventricular ejection fraction (measured at 70%) with normal regional wall motion.  Mildly stenotic bioprosthetic aortic valve with a mean gradient measured at 27 mm a radiant measured at 48 mmHg (V-max 3.5 m/s) without perivalvular leak.  Left atrium is mildly dilated with a volume index measured at 39 mL/meter squared     Myocardial perfusion imaging, 11/7/2018:  IMPRESSION:  1.  Dipyridamole stress negative for chest discomfort and negative for   ischemic EKG changes.  2.  PET perfusion images are suggestive of ischemia in the proximal to mid inferior segment and in the proximal mid bilateral segment.  There is no definite infarction.  In addition TID was present which could be indicative of   multivessel disease, possibly more severe than indicated by the perfusion study.  3.  The patient's resting ejection fraction was mildly reduced and david appropriately during dipyridamole stress.  There was basilar to mid inferior hypokinesis noted     Cardiac catheterization, 11/16/2018:  1.  Angina  2.  Positive stress test for inferior ischemia  3.  CAD status post CABG in the 90s and redo in 2015  4.  Bioprosthetic AVR 2015  5.  Poorly controlled hypertension  6.  Morbid obesity  POST  1.  Multivessel coronary artery disease  2.  Occluded vein grafts x6   3.  LIMA to LAD with focal 70-80% distal anastomotic stenosis 4.  Severe native coronary artery disease not amenable to PCI 5.  Poorly controlled hypertension     RECOMMENDATIONS:  His FORD to LAD supplies both the distal LAD territory as well as via retrograde partially occluded jump graft the circumflex territory and is essentially his last remaining vessel.  There is WESLEY-3 flow and  he is not maximally managed medically, in addition he has  of the RCA which was dominant and the vein graft is occluded and this is his only ischemic territory on noninvasive imaging.     1.  Medical therapy  2. Consideration of high risk last vessel PCI LIMA to LAD anastomosis should he prove to not be a redo surgical candidate (CT surgical consultation prior to any potential PCI would be required) and not be well managed with better medical therapy.  3. Weight loss    CTA 8/20/2018  1.  The pulmonary arteries are suboptimally opacified limiting evaluation, no large central pulmonary embolus is appreciated. Additional imaging would be required for definitive exclusion of pulmonary embolism  2.  Hepatomegaly and diffuse hepatic steatosis.  3.  Right nephrolithiasis    Most recent labs:         Lab Results   Component Value Date/Time    HEMOGLOBIN 15.5 02/18/2019 01:09 AM    HEMATOCRIT 46.5 02/18/2019 01:09 AM    MCV 93.2 02/18/2019 01:09 AM    INR 1.00 11/15/2018 07:58 AM      Lab Results   Component Value Date/Time    SODIUM 138 01/21/2020 08:45 AM    POTASSIUM 4.5 01/21/2020 08:45 AM    CHLORIDE 106 01/21/2020 08:45 AM    CO2 22 01/21/2020 08:45 AM    GLUCOSE 114 (H) 01/21/2020 08:45 AM    BUN 22 01/21/2020 08:45 AM    CREATININE 1.25 01/21/2020 08:45 AM      Lab Results   Component Value Date/Time    ASTSGOT 38 01/21/2020 08:45 AM    ALTSGPT 31 01/21/2020 08:45 AM    ALBUMIN 4.4 01/21/2020 08:45 AM      Lab Results   Component Value Date/Time    CHOLSTRLTOT 219 (H) 01/21/2020 08:45 AM     (H) 01/21/2020 08:45 AM    HDL 33 (A) 01/21/2020 08:45 AM    TRIGLYCERIDE 222 (H) 01/21/2020 08:45 AM         Past Medical History:   Diagnosis Date   • Arthritis    • Benign hypertensive heart disease without heart failure 11/14/2011   • CAD (coronary artery disease)    • Congestive heart failure (HCC)    • Coronary atherosclerosis of autologous vein bypass graft 11/14/2011   • Essential hypertension 4/25/2019   •  Gout    • Heart valve disease    • High cholesterol    • History of pancreatitis    • Hypertension    • Muscle cramps 10/4/2011   • Myocardial infarct (HCC)     Multiple MI's, 1998, 2015   • Obesity 11/14/2011   • Pain     Joints   • Postsurgical aortocoronary bypass status 7/26/2016    Status post redo 3-V CABG in [place] 12/2015: SVG-acute marginal, SVG sequential to LAD, and OM    • Renal disorder     Hx of Acute renal failure r/t medication/statins   • S/P aortic valve replacement with bioprosthetic valve 7/26/2016    #23 [place] Magna AVR (bioprosthetic)    • Statin intolerance 7/26/2016   • Status post cardiac revascularization with bypass aortocoronary anastomosis of five coronary vessels 7/26/2016    5-V CABG done in 1998 (done in [place] at [facility]), patent LIMA to LAD, occluded SVG-OM, occluded SVG-RCA by cardiac catheterization 11/15/2007 with other vein grafts not identified at that time, poor targets for revascularization and declined repeat CABG in 2007 by [facility]. No ischemic was identified by MPI 11/17/07 with an EF of 50%.       Past Surgical History:   Procedure Laterality Date   • SHOULDER DECOMPRESSION ARTHROSCOPIC Right 9/5/2017    Procedure: SHOULDER DECOMPRESSION ARTHROSCOPIC SUBACROMIAL;  Surgeon: Mg Campos M.D.;  Location: [facility];  Service: Orthopedics   • ARTHROSCOPIC LABRAL DEBRIDEMENT Right 9/5/2017    Procedure: ARTHROSCOPIC LABRAL DEBRIDEMENT;  Surgeon: Mg Campos M.D.;  Location: [facility];  Service: Orthopedics   • SHOULDER ARTHROSCOPY W/ ROTATOR CUFF REPAIR Right 9/5/2017    Procedure: SHOULDER ARTHROSCOPY W/ ROTATOR CUFF REPAIR;  Surgeon: Mg Campos M.D.;  Location: [facility];  Service: Orthopedics   • SHOULDER ARTHROSCOPY W/ BICIPITAL TENODESIS REPAIR Right 9/5/2017    Procedure: SHOULDER ARTHROSCOPY W/ BICIPITAL TENODESIS REPAIR;  Surgeon: Mg Campos M.D.;  Location: [facility]  ORS;  Service: Orthopedics   • SYNOVECTOMY Right 2017    Procedure: SYNOVECTOMY;  Surgeon: Mg Campos M.D.;  Location: SURGERY [facility];  Service: Orthopedics   • MULTIPLE CORONARY ARTERY BYPASS  2015    3 way bypass & Bovine valve   • LAMINOTOMY  2004    Diskectomy L4-L5, L5-S1   • MULTIPLE CORONARY ARTERY BYPASS  1998    5 way bypass   • BIOPSY GENERAL      buttock lesion     Family History   Problem Relation Age of Onset   • Heart Attack Father         MI and CABG, unknown age   • Cancer Mother         Breast   • Heart Disease Brother    • Stroke Neg Hx    • Diabetes Neg Hx    • Lung Disease Neg Hx      Social History     Socioeconomic History   • Marital status:      Spouse name: Not on file   • Number of children: Not on file   • Years of education: Not on file   • Highest education level: Not on file   Occupational History   • Not on file   Social Needs   • Financial resource strain: Not on file   • Food insecurity     Worry: Not on file     Inability: Not on file   • Transportation needs     Medical: Not on file     Non-medical: Not on file   Tobacco Use   • Smoking status: Former Smoker     Packs/day: 3.00     Years: 20.00     Pack years: 60.00     Types: Cigarettes     Last attempt to quit: 1992     Years since quittin.1   • Smokeless tobacco: Never Used   Substance and Sexual Activity   • Alcohol use: Yes     Comment: 4 per month   • Drug use: Yes     Types: Marijuana, Inhaled     Comment: Marijuana- Daily (4 times per day)   • Sexual activity: Yes     Partners: Female   Lifestyle   • Physical activity     Days per week: Not on file     Minutes per session: Not on file   • Stress: Not on file   Relationships   • Social connections     Talks on phone: Not on file     Gets together: Not on file     Attends Mu-ism service: Not on file     Active member of club or organization: Not on file     Attends meetings of clubs or organizations: Not on file      Relationship status: Not on file   • Intimate partner violence     Fear of current or ex partner: Not on file     Emotionally abused: Not on file     Physically abused: Not on file     Forced sexual activity: Not on file   Other Topics Concern   • Not on file   Social History Narrative   • Not on file     Allergies   Allergen Reactions   • Gemfibrozil      Dehydration,   • Lipitor [Atorvastatin Calcium] Unspecified     Severe Muscle cramps, dehydration   • Lovastatin      Dehydration, severe muscle cramps   • Tricor Unspecified     Dehydration, severe muscle cramps       Current Outpatient Medications   Medication Sig Dispense Refill   • amoxicillin (AMOXIL) 500 MG Cap Take 4 Caps by mouth Once for 1 dose. 30-60 min prior to dental work     • isosorbide mononitrate (IMDUR) 120 MG CR tablet Take 1 Tab by mouth every morning. 90 Tab 3   • clopidogrel (PLAVIX) 75 MG Tab TAKE ONE TABLET BY MOUTH ONE TIME DAILY 90 Tab 3   • PRALUENT 75 MG/ML Solution Pen-injector 75 mg by Injection route every 14 days. 2 PEN 11   • indomethacin (INDOCIN) 50 MG Cap TAKE ONE CAPSULE BY MOUTH TWICE DAILY AS NEEDED 30 Cap 1   • metoprolol SR (TOPROL XL) 25 MG TABLET SR 24 HR TAKE THREE TABLETS BY MOUTH DAILY 90 Tab 3   • ondansetron (ZOFRAN ODT) 4 MG TABLET DISPERSIBLE Take 1 Tab by mouth every 6 hours as needed for Nausea. 12 Tab 0   • acetaminophen (TYLENOL) 500 MG Tab Take 1,500 mg by mouth every 6 hours as needed for Moderate Pain.     • Red Yeast Rice Extract (RED YEAST RICE PO) Take 1 Tab by mouth every day.     • allopurinol (ZYLOPRIM) 100 MG Tab TAKE ONE TABLET BY MOUTH ONE TIME DAILY 90 Tab 3   • lisinopril (PRINIVIL) 20 MG Tab Take 1 Tab by mouth every day. 90 Tab 3   • amLODIPine (NORVASC) 5 MG Tab Take 1 Tab by mouth every day. 90 Tab 3   • nitroglycerin (NITROSTAT) 0.4 MG SL Tab Place 1 Tab under tongue as needed for Chest Pain. 30 Tab 5   • Omega-3 Fatty Acids (OMEGA-3 FISH OIL PO) Take 1 Cap by mouth every day.     • magnesium  "oxide (MAG-OX) 400 MG Tab Take 400 mg by mouth every day.     • aspirin 81 MG EC tablet Take 1 Tab by mouth every day. 30 Tab 11   • Glucosamine-Chondroit-Vit C-Mn (GLUCOSAMINE CHONDROITIN COMPLX) CAPS Take 2 Caps by mouth every day.       No current facility-administered medications for this visit.        ROS  All others systems reviewed and negative.     Objective:     /82 (BP Location: Left arm, Patient Position: Sitting, BP Cuff Size: Adult)   Pulse 82   Ht 1.651 m (5' 5\")   Wt 102.1 kg (225 lb)   SpO2 94%  Body mass index is 37.44 kg/m².    Physical Exam   General: No acute distress. Well nourished.  HEENT: EOM grossly intact, no scleral icterus, no pharyngeal erythema.   Neck:  No JVD, no bruits, trachea midline  CVS: RRR.  2/6 mid peaking systolic murmur to clavicles, no LE edema.  2+ radial pulses, 2+ PT pulses, well-healed midline incision  Resp: CTAB. No wheezing or crackles/rhonchi. Normal respiratory effort.  Abdomen: Soft, NT, no radha hepatomegaly, obese.  MSK/Ext: No clubbing or cyanosis.  Skin: Warm and dry, no rashes.  Neurological: CN III-XII grossly intact. No focal deficits.   Psych: A&O x 3, appropriate affect, good judgement      Assessment:     1. Coronary artery disease of native artery of native heart with stable angina pectoris (HCC)     2. Essential hypertension     3. Status post cardiac revascularization with bypass aortocoronary anastomosis of five coronary vessels     4. Bilateral carotid artery occlusion     5. S/P aortic valve replacement with bioprosthetic valve     6. Mixed hyperlipidemia     7. Statin intolerance     8. Abnormal nuclear cardiac imaging test     9. Fatty liver     10. Prediabetes     11. Obesity (BMI 30-39.9)     12. Ascending aorta dilation (HCC)         Medical Decision Making:  Today's Assessment / Status / Plan:     -Class 3 angina, using meds  -[facility] 3 FUNES, mostly related to agina.  -PCSK9 inhibitor and red yeast rice  -Statin intolerance, has what " sounds like rhabdo with Lipitor, he will not take statins again, not even one time per week.  -Remains on beta-blocker  -DAPT long term  -Blood pressure control  -Regular teeth cleaning with amoxicillin prior, he is aware  -Vascular surgeon for symptomatic carotid disease in the future, he is in vascular clinic  -I did not see TAA on CT, not sure if this is a reference to another aneurysm  -If he has limiting angina or drop off in EF, those would be reasons to consider transplant but he is doing well so no plans made for this at this time.  -Echo 2021  -RTC 6 months, will call with [employer].    Return in about 6 months (around 9/6/2020).    It is my pleasure to participate in the care of Mr. Hamilton.  Please do not hesitate to contact me with questions or concerns.    Shweta Morales MD, [facility]  Cardiologist [facility]    Please note that this dictation was created using voice recognition software. I have made every reasonable attempt to correct obvious errors, but it is possible there are errors of grammar and possibly content that I did not discover before finalizing the note.

## 2020-03-13 ENCOUNTER — TELEPHONE (OUTPATIENT)
Dept: MEDICAL GROUP | Facility: LAB | Age: 65
End: 2020-03-13

## 2020-03-13 NOTE — TELEPHONE ENCOUNTER
ANNUAL WELLNESS VISIT PRE-VISIT PLANNING WITHOUT OUTREACH    1.  Reviewed note from last office visit with PCP: YES    2.  If any orders were placed at last visit, do we have Results/Consult Notes?        •  Labs - Labs were not ordered at last office visit.  Note: If patient appointment is for lab review and patient did not complete labs, check with provider if OK to reschedule patient until labs completed.       •  Imaging - Imaging ordered, completed and results are in chart.       •  Referrals - No referrals were ordered at last office visit.    3.  Immunizations were updated in Epic using WebIZ?: Epic matches WebIZ       •  WebIZ Recommendations: FLU        •  Is patient due for Tdap? NO       •  Is patient due for Shingrix? NO     4.  Patient is due for the following Health Maintenance Topics:   Health Maintenance Due   Topic Date Due   • HEPATITIS C SCREENING  1955   • IMM INFLUENZA (1) 09/01/2019   • COLONOSCOPY  09/17/2019   • Annual Wellness Visit  02/22/2020       - Patient has completed TDAP Immunization(s) per WebIZ. Chart has been updated.    5.  Reviewed/Updated the following with patient:       •   Preferred Pharmacy? NO       •   Preferred Lab? NO       •   Preferred Communication? NO       •   Allergies? NO       •   Medications? NO       •   Social History? NO       •   Family History (document living status of immediate family members and if + hx of  cancer, diabetes, hypertension, hyperlipidemia, heart attack, stroke) NO    6.  Care Team Updated:       •   DME Company (gait device, O2, CPAP, etc.): NO       •   Other Specialists (eye doctor, derm, GYN, cardiology, endo, etc): NO    7. Orders for overdue Health Maintenance topics pended in Pre-Charting? NO    8.  Patient has the following Care Path diagnoses on Problem List:  NONE    9.  Patient was advised: “This is a free wellness visit. The provider will screen for medical conditions to help you stay healthy. If you have other concerns  to address you may be asked to discuss these at a separate visit or there may be an additional fee.”     10.  Patient was informed to arrive 15 min prior to their scheduled appointment and bring in their medication bottles.

## 2020-03-16 ENCOUNTER — OFFICE VISIT (OUTPATIENT)
Dept: MEDICAL GROUP | Facility: LAB | Age: 65
End: 2020-03-16
Payer: COMMERCIAL

## 2020-03-16 VITALS
WEIGHT: 223 LBS | DIASTOLIC BLOOD PRESSURE: 72 MMHG | HEIGHT: 66 IN | SYSTOLIC BLOOD PRESSURE: 114 MMHG | HEART RATE: 70 BPM | BODY MASS INDEX: 35.84 KG/M2 | TEMPERATURE: 99 F | RESPIRATION RATE: 13 BRPM | OXYGEN SATURATION: 95 %

## 2020-03-16 DIAGNOSIS — Z95.1 STATUS POST CARDIAC REVASCULARIZATION WITH BYPASS AORTOCORONARY ANASTOMOSIS OF FIVE CORONARY VESSELS: ICD-10-CM

## 2020-03-16 DIAGNOSIS — Z12.11 SPECIAL SCREENING FOR MALIGNANT NEOPLASMS, COLON: ICD-10-CM

## 2020-03-16 DIAGNOSIS — I25.718 ATHEROSCLEROSIS OF AUTOLOGOUS VEIN CORONARY ARTERY BYPASS GRAFT WITH STABLE ANGINA PECTORIS (HCC): ICD-10-CM

## 2020-03-16 DIAGNOSIS — Z95.1 POSTSURGICAL AORTOCORONARY BYPASS STATUS: ICD-10-CM

## 2020-03-16 DIAGNOSIS — Z00.00 MEDICARE ANNUAL WELLNESS VISIT, SUBSEQUENT: ICD-10-CM

## 2020-03-16 DIAGNOSIS — Z78.9 STATIN INTOLERANCE: ICD-10-CM

## 2020-03-16 DIAGNOSIS — I25.118 CORONARY ARTERY DISEASE OF NATIVE ARTERY OF NATIVE HEART WITH STABLE ANGINA PECTORIS (HCC): ICD-10-CM

## 2020-03-16 DIAGNOSIS — I65.23 BILATERAL CAROTID ARTERY OCCLUSION: ICD-10-CM

## 2020-03-16 DIAGNOSIS — M19.041 PRIMARY OSTEOARTHRITIS OF RIGHT HAND: ICD-10-CM

## 2020-03-16 DIAGNOSIS — R73.03 PREDIABETES: ICD-10-CM

## 2020-03-16 DIAGNOSIS — E66.9 OBESITY (BMI 30-39.9): ICD-10-CM

## 2020-03-16 DIAGNOSIS — M1A.9XX0 CHRONIC GOUT WITHOUT TOPHUS, UNSPECIFIED CAUSE, UNSPECIFIED SITE: ICD-10-CM

## 2020-03-16 DIAGNOSIS — I10 ESSENTIAL HYPERTENSION: ICD-10-CM

## 2020-03-16 DIAGNOSIS — E78.5 DYSLIPIDEMIA: ICD-10-CM

## 2020-03-16 DIAGNOSIS — Z95.3 S/P AORTIC VALVE REPLACEMENT WITH BIOPROSTHETIC VALVE: ICD-10-CM

## 2020-03-16 DIAGNOSIS — I11.9 BENIGN HYPERTENSIVE HEART DISEASE WITHOUT HEART FAILURE: ICD-10-CM

## 2020-03-16 PROBLEM — G43.909 MIGRAINE: Status: RESOLVED | Noted: 2019-02-16 | Resolved: 2020-03-16

## 2020-03-16 PROCEDURE — G0439 PPPS, SUBSEQ VISIT: HCPCS | Performed by: FAMILY MEDICINE

## 2020-03-16 ASSESSMENT — FIBROSIS 4 INDEX: FIB4 SCORE: 2.5

## 2020-03-16 ASSESSMENT — ENCOUNTER SYMPTOMS: GENERAL WELL-BEING: EXCELLENT

## 2020-03-16 ASSESSMENT — PATIENT HEALTH QUESTIONNAIRE - PHQ9: CLINICAL INTERPRETATION OF PHQ2 SCORE: 0

## 2020-03-16 ASSESSMENT — ACTIVITIES OF DAILY LIVING (ADL): BATHING_REQUIRES_ASSISTANCE: 0

## 2020-03-16 NOTE — PROGRESS NOTES
Chief Complaint   Patient presents with   • Annual Exam     [facility]         HPI:  Abimael is a 64 y.o. here for Medicare Annual Wellness Visit    No new questions or concerns at this time.    Patient Active Problem List    Diagnosis Date Noted   • Migraine 02/16/2019     Priority: High   • Coronary atherosclerosis of autologous vein bypass graft 11/14/2011     Priority: High   • Coronary artery disease of native artery of native heart with stable angina pectoris (HCC) 07/30/2012     Priority: Medium   • Benign hypertensive heart disease without heart failure 11/14/2011     Priority: Medium   • Obesity (BMI 30-39.9) 05/25/2017     Priority: Low   • Gout      Priority: Low   • Statin intolerance 07/26/2016     Priority: Low   • S/P aortic valve replacement with bioprosthetic valve 07/26/2016     Priority: Low   • Dyslipidemia 07/30/2012     Priority: Low   • Essential hypertension 04/25/2019   • Bilateral carotid artery occlusion 02/21/2019   • Prediabetes 09/15/2016   • Status post cardiac revascularization with bypass aortocoronary anastomosis of five coronary vessels 07/26/2016   • Postsurgical aortocoronary bypass status 07/26/2016       Current Outpatient Medications   Medication Sig Dispense Refill   • amoxicillin (AMOXIL) 500 MG Cap Take 4 Caps by mouth Once for 1 dose. 30-60 min prior to dental work     • isosorbide mononitrate (IMDUR) 120 MG CR tablet Take 1 Tab by mouth every morning. 90 Tab 3   • clopidogrel (PLAVIX) 75 MG Tab TAKE ONE TABLET BY MOUTH ONE TIME DAILY 90 Tab 3   • PRALUENT 75 MG/ML Solution Pen-injector 75 mg by Injection route every 14 days. 2 PEN 11   • indomethacin (INDOCIN) 50 MG Cap TAKE ONE CAPSULE BY MOUTH TWICE DAILY AS NEEDED 30 Cap 1   • metoprolol SR (TOPROL XL) 25 MG TABLET SR 24 HR TAKE THREE TABLETS BY MOUTH DAILY 90 Tab 3   • ondansetron (ZOFRAN ODT) 4 MG TABLET DISPERSIBLE Take 1 Tab by mouth every 6 hours as needed for Nausea. 12 Tab 0   • acetaminophen  (TYLENOL) 500 MG Tab Take 1,500 mg by mouth every 6 hours as needed for Moderate Pain.     • Red Yeast Rice Extract (RED YEAST RICE PO) Take 1 Tab by mouth every day.     • allopurinol (ZYLOPRIM) 100 MG Tab TAKE ONE TABLET BY MOUTH ONE TIME DAILY 90 Tab 3   • lisinopril (PRINIVIL) 20 MG Tab Take 1 Tab by mouth every day. 90 Tab 3   • amLODIPine (NORVASC) 5 MG Tab Take 1 Tab by mouth every day. 90 Tab 3   • nitroglycerin (NITROSTAT) 0.4 MG SL Tab Place 1 Tab under tongue as needed for Chest Pain. 30 Tab 5   • Omega-3 Fatty Acids (OMEGA-3 FISH OIL PO) Take 1 Cap by mouth every day.     • magnesium oxide (MAG-OX) 400 MG Tab Take 400 mg by mouth every day.     • aspirin 81 MG EC tablet Take 1 Tab by mouth every day. 30 Tab 11   • Glucosamine-Chondroit-Vit C-Mn (GLUCOSAMINE CHONDROITIN COMPLX) CAPS Take 2 Caps by mouth every day.       No current facility-administered medications for this visit.         Patient is taking medications as noted in medication list.  Current supplements as per medication list.     Allergies: Gemfibrozil; Lipitor [atorvastatin calcium]; Lovastatin; and Tricor     Current social contact/activities:  [id number], going to gym. Bull riding/working chutes     Is patient current with immunizations? Yes.    He  reports that he quit smoking about 28 years ago. His smoking use included cigarettes. He has a 60.00 pack-year smoking history. He has never used smokeless tobacco. He reports previous alcohol use. He reports current drug use. Drugs: Marijuana and Inhaled.  Counseling given: Not Answered        DPA/Advanced directive: Patient has Durable Power of , but it is not on file. Instructed to bring in a copy to scan into their chart.    ROS:    Gait: Uses no assistive device   Ostomy: No   Other tubes: No   Amputations: No   Chronic oxygen use No   Last eye exam : N/A  Wears hearing aids: No   : Denies any urinary leakage during the last 6 months      Screening:      Depression  Screening    Little interest or pleasure in doing things?  0 - not at all  Feeling down, depressed , or hopeless? 0 - not at all  Patient Health Questionnaire Score: 0    If depressive symptoms identified deferred to follow up visit unless specifically addressed in assesment and plan.      Interpretation of PHQ-9 Total Score   Score Severity   1-4 Minimal Depression   5-9 Mild Depression   10-14 Moderate Depression   15-19 Moderately Severe Depression   20-27 Severe Depression          Screening for Cognitive Impairment    Three Minute Recall (village, kitchen, baby)  3/3    Draw clock face with all 12 numbers and set the hands to show 10 past 10.  Yes    If cognitive concerns identified, deferred for follow up unless specifically addressed in assessment and plan.    Fall Risk Assessment    Has the patient had two or more falls in the last year or any fall with injury in the last year?  Yes  If fall risk identified, deferred for follow up unless specifically addressed in assessment and plan.      Safety Assessment    Throw rugs on floor.  Yes  Handrails on all stairs.  Yes  Good lighting in all hallways.  Yes  Difficulty hearing.  No  Patient counseled about all safety risks that were identified.    Functional Assessment ADLs    Are there any barriers preventing you from cooking for yourself or meeting nutritional needs?  No.    Are there any barriers preventing you from driving safely or obtaining transportation?  No.    Are there any barriers preventing you from using a telephone or calling for help?  No.    Are there any barriers preventing you from shopping?  No.    Are there any barriers preventing you from taking care of your own finances?  No.    Are there any barriers preventing you from managing your medications?    No.    Are there any barriers preventing you from showering, bathing or dressing yourself?  No.    Are you currently engaging in any exercise or physical activity?  Yes.     What is your  perception of your health?  Excellent.    Health Maintenance Summary                HEPATITIS C SCREENING Overdue 1956     IMM INFLUENZA Overdue 2019     COLONOSCOPY Overdue 2019      Done 2009 REFERRAL TO GI FOR COLONOSCOPY    Annual Wellness Visit Overdue 2020      Done 2019 Visit Dx: Medicare annual wellness visit, subsequent     Patient has more history with this topic...    IMM ZOSTER VACCINES Next Due 2020      Done 2020 Imm Admin: Recombinant Zoster Vaccine (RZV) (Shingrix)    IMM DTaP/Tdap/Td Vaccine Next Due 2027      Done 2017 Imm Admin: Tdap Vaccine          Patient Care Team:  Raul Jimenez M.D. as PCP - General (Family Medicine)  Shweta Morales M.D. (Cardiology)      Social History     Tobacco Use   • Smoking status: Former Smoker     Packs/day: 3.00     Years: 20.00     Pack years: 60.00     Types: Cigarettes     Last attempt to quit: 1992     Years since quittin.2   • Smokeless tobacco: Never Used   Substance Use Topics   • Alcohol use: Not Currently     Comment: 4 per month   • Drug use: Yes     Types: Marijuana, Inhaled     Comment: Marijuana- Daily (4 times per day)     Family History   Problem Relation Age of Onset   • Heart Attack Father         MI and CABG, unknown age   • Hyperlipidemia Father    • Cancer Mother         Breast   • Heart Disease Brother    • Arthritis Maternal Grandmother    • Stroke Neg Hx    • Diabetes Neg Hx    • Lung Disease Neg Hx      He  has a past medical history of Arthritis, Benign hypertensive heart disease without heart failure (2011), CAD (coronary artery disease), Congestive heart failure (HCC), Coronary atherosclerosis of autologous vein bypass graft (2011), Essential hypertension (2019), Gout, Heart valve disease, High cholesterol, History of pancreatitis, History of pancreatitis, Hypertension, Muscle cramps (10/4/2011), Myocardial infarct (HCC), Obesity (2011), Pain,  "Postsurgical aortocoronary bypass status (7/26/2016), Renal disorder, S/P aortic valve replacement with bioprosthetic valve (7/26/2016), Statin intolerance (7/26/2016), and Status post cardiac revascularization with bypass aortocoronary anastomosis of five coronary vessels (7/26/2016).   Past Surgical History:   Procedure Laterality Date   • SHOULDER DECOMPRESSION ARTHROSCOPIC Right 9/5/2017    Procedure: SHOULDER DECOMPRESSION ARTHROSCOPIC SUBACROMIAL;  Surgeon: Mg Campos M.D.;  Location: [facility];  Service: Orthopedics   • ARTHROSCOPIC LABRAL DEBRIDEMENT Right 9/5/2017    Procedure: ARTHROSCOPIC LABRAL DEBRIDEMENT;  Surgeon: Mg Campos M.D.;  Location: [facility];  Service: Orthopedics   • SHOULDER ARTHROSCOPY W/ ROTATOR CUFF REPAIR Right 9/5/2017    Procedure: SHOULDER ARTHROSCOPY W/ ROTATOR CUFF REPAIR;  Surgeon: Mg Campos M.D.;  Location: [facility];  Service: Orthopedics   • SHOULDER ARTHROSCOPY W/ BICIPITAL TENODESIS REPAIR Right 9/5/2017    Procedure: SHOULDER ARTHROSCOPY W/ BICIPITAL TENODESIS REPAIR;  Surgeon: Mg Campos M.D.;  Location: [facility];  Service: Orthopedics   • SYNOVECTOMY Right 9/5/2017    Procedure: SYNOVECTOMY;  Surgeon: Mg Campos M.D.;  Location: [facility];  Service: Orthopedics   • MULTIPLE CORONARY ARTERY BYPASS  12/08/2015    3 way bypass & Bovine valve   • LAMINOTOMY  12/2004    Diskectomy L4-L5, L5-S1   • MULTIPLE CORONARY ARTERY BYPASS  11/11/1998    5 way bypass   • BIOPSY GENERAL      buttock lesion   • EYE SURGERY     • MITRAL VALVE REPLACE           Exam:     /72 (BP Location: Right arm, Patient Position: Sitting, BP Cuff Size: Adult)   Pulse 70   Temp 37.2 °C (99 °F) (Temporal)   Resp 13   Ht 1.676 m (5' 6\")   Wt 101.2 kg (223 lb)   SpO2 95%  Body mass index is 35.99 kg/m².    Hearing excellent.    Dentition good  Alert, oriented in " no acute distress.  Eye contact is good, speech goal directed, affect calm      Assessment and Plan. The following treatment and monitoring plan is recommended:      1. Medicare annual wellness visit, subsequent  -Age-appropriate counseling given.  He is due for vaccination in August, second Shingrix vaccination.  Labs are up-to-date, completed 1/21/2020.  Most recent hospitalization reviewed.  - Subsequent Annual Wellness Visit - Includes PPPS ()    2. Coronary artery disease of native artery of native heart with stable angina pectoris (HCC)  --Chronic, currently unstable, closely followed by cardiology, Dr. Shweta Morales.  -History of 5 vessel CABG in 1998, repeat in 2005 with bioprosthetic aVR.  -Currently only has single vessel but not occluded.  -Been resistant multiple stents, currently on Praluent.  Also currently on nitroglycerin, Imdur, Plavix, aspirin.  -Following up closely with Dr. Shweta Morales.    3. Dyslipidemia  -Chronic condition.  -Status: Unstable.  Panel drawn on 1/21/2020 shows an elevated cholesterol of 219, .  Patient is currently on Praluent injections.  Following up with vascular medicine    4. Atherosclerosis of autologous vein coronary artery bypass graft with stable angina pectoris (HCC)  -Chronic, currently unstable, closely followed by cardiology, Dr. Shweta Morales.  -History of 5 vessel CABG in 1998, repeat in 2005 with bioprosthetic aVR.  -Currently only has single vessel but not occluded.  -Been resistant multiple stents, currently on Praluent.  Also currently on nitroglycerin, Imdur, Plavix, aspirin.  -Following up closely with Dr. Shweta Morales.    5. Benign hypertensive heart disease without heart failure  -Status: Stable.  -Approximate 1 year ago patient was hospitalized for elevated blood pressure, currently on medication: Metoprolol, amlodipine, lisinopril.  Follow blood pressures.  Today at 114/72, doing well with diet and exercise.  -Follow-up as needed.    6.  Status post cardiac revascularization with bypass aortocoronary anastomosis of five coronary vessels  -Chronic, currently unstable, closely followed by cardiology, Dr. Shweta Morales.  -History of 5 vessel CABG in 1998, repeat in 2005 with bioprosthetic aVR.  -Currently only has single vessel but not occluded.  -Been resistant multiple stents, currently on Praluent.  Also currently on nitroglycerin, Imdur, Plavix, aspirin.  -Following up closely with Dr. Shweta Morales.    7. Postsurgical aortocoronary bypass status  See above    8. Statin intolerance  -Chronic, unstable.  -Continue with Praluent injections.  -Continue to work with lipid clinic    9. S/P aortic valve replacement with bioprosthetic valve  -Chronic addition, currently stable, being followed closely by cardiology (Dr. Shweta Morales).    10. Chronic gout without tophus, unspecified cause, unspecified site  -Status: Stable.  No acute gout flareups for several years.  -We will continue the allopurinol.  -No medicine is needed.  -Follow-up as needed.    11. Prediabetes  -Patient has elevated A1c.  Working on diet, exercise.  Fasting blood glucose on January 2020 was continue to be elevated at 114.  -Continue to work on appropriate diet and exercise, follow-up as needed.    12. Obesity (BMI 30-39.9)  -Chronic, stable, slightly improving.  Has lost 1 pound in the last month.  -Continue with good diet, exercise plan.    13. Bilateral carotid artery occlusion  -This was found on carotid ultrasound completed at previous hospitalization approximately 1 year ago.  -Results show that right carotid artery shows plaque bifurcation extending to internal carotid, less than 50% stenosis.  Left carotid shows 50 to 69% stenosis  -We will continue to monitor.  Will most likely need follow-up with vascular medicine soon.    14. Essential hypertension  -Status: Stable.  Continue all medications.  Labs were checked, normal kidney function, normal potassium level.    15.  Primary osteoarthritis of right hand  -X-rays right hands are complete so months ago show arthritic changes.  Patient treating with topical creams, ibuprofen/Tylenol as needed.  -Status: Stable.  Will continue with conservative treatment as stated above.  Follow-up as needed.    16. Special screening for malignant neoplasms, colon  - REFERRAL TO GI FOR COLONOSCOPY      Services suggested: No services needed at this time  Health Care Screening recommendations as per orders if indicated.  Referrals offered: PT/OT/Nutrition counseling/Behavioral Health/Smoking cessation as per orders if indicated.    Discussion today about general wellness and lifestyle habits:    · Prevent falls and reduce trip hazards; Cautioned about securing or removing rugs.  · Have a working fire alarm and carbon monoxide detector;   · Engage in regular physical activity and social activities       Follow-up: Return in about 1 year (around 3/16/2021), or if symptoms worsen or fail to improve.

## 2020-04-15 ENCOUNTER — APPOINTMENT (OUTPATIENT)
Dept: VASCULAR LAB | Facility: MEDICAL CENTER | Age: 65
End: 2020-04-15
Attending: INTERNAL MEDICINE
Payer: COMMERCIAL

## 2020-04-17 RX ORDER — AMLODIPINE BESYLATE 5 MG/1
TABLET ORAL
Qty: 90 TAB | Refills: 2 | Status: SHIPPED | OUTPATIENT
Start: 2020-04-17 | End: 2021-03-16

## 2020-04-17 RX ORDER — LISINOPRIL 20 MG/1
TABLET ORAL
Qty: 90 TAB | Refills: 2 | Status: SHIPPED | OUTPATIENT
Start: 2020-04-17 | End: 2021-03-16

## 2020-04-17 NOTE — TELEPHONE ENCOUNTER
Received request via: Pharmacy    Was the patient seen in the last year in this department? Yes  3/16/20  Does the patient have an active prescription (recently filled or refills available) for medication(s) requested? No

## 2020-05-27 DIAGNOSIS — R00.2 PALPITATIONS: ICD-10-CM

## 2020-05-27 RX ORDER — ALLOPURINOL 100 MG/1
TABLET ORAL
Qty: 90 TAB | Refills: 2 | Status: SHIPPED | OUTPATIENT
Start: 2020-05-27 | End: 2021-04-27

## 2020-05-27 NOTE — TELEPHONE ENCOUNTER
Received request via: Pharmacy    Was the patient seen in the last year in this department? Yes3/16/20    Does the patient have an active prescription (recently filled or refills available) for medication(s) requested? No

## 2020-06-15 ENCOUNTER — HOSPITAL ENCOUNTER (OUTPATIENT)
Dept: LAB | Facility: MEDICAL CENTER | Age: 65
End: 2020-06-15
Attending: INTERNAL MEDICINE
Payer: COMMERCIAL

## 2020-06-15 DIAGNOSIS — I25.718 ATHEROSCLEROSIS OF AUTOLOGOUS VEIN CORONARY ARTERY BYPASS GRAFT WITH STABLE ANGINA PECTORIS (HCC): ICD-10-CM

## 2020-06-15 DIAGNOSIS — E78.5 DYSLIPIDEMIA: ICD-10-CM

## 2020-06-15 LAB
ALBUMIN SERPL BCP-MCNC: 4.2 G/DL (ref 3.2–4.9)
ALBUMIN/GLOB SERPL: 1.7 G/DL
ALP SERPL-CCNC: 64 U/L (ref 30–99)
ALT SERPL-CCNC: 34 U/L (ref 2–50)
ANION GAP SERPL CALC-SCNC: 14 MMOL/L (ref 7–16)
AST SERPL-CCNC: 31 U/L (ref 12–45)
BILIRUB SERPL-MCNC: 0.6 MG/DL (ref 0.1–1.5)
BUN SERPL-MCNC: 18 MG/DL (ref 8–22)
CALCIUM SERPL-MCNC: 9 MG/DL (ref 8.5–10.5)
CHLORIDE SERPL-SCNC: 103 MMOL/L (ref 96–112)
CHOLEST SERPL-MCNC: 167 MG/DL (ref 100–199)
CO2 SERPL-SCNC: 22 MMOL/L (ref 20–33)
CREAT SERPL-MCNC: 1.03 MG/DL (ref 0.5–1.4)
FASTING STATUS PATIENT QL REPORTED: NORMAL
GLOBULIN SER CALC-MCNC: 2.5 G/DL (ref 1.9–3.5)
GLUCOSE SERPL-MCNC: 109 MG/DL (ref 65–99)
HDLC SERPL-MCNC: 33 MG/DL
LDLC SERPL CALC-MCNC: 77 MG/DL
POTASSIUM SERPL-SCNC: 4.2 MMOL/L (ref 3.6–5.5)
PROT SERPL-MCNC: 6.7 G/DL (ref 6–8.2)
SODIUM SERPL-SCNC: 139 MMOL/L (ref 135–145)
TRIGL SERPL-MCNC: 283 MG/DL (ref 0–149)

## 2020-06-15 PROCEDURE — 80053 COMPREHEN METABOLIC PANEL: CPT

## 2020-06-15 PROCEDURE — 80061 LIPID PANEL: CPT

## 2020-06-15 PROCEDURE — 36415 COLL VENOUS BLD VENIPUNCTURE: CPT

## 2020-06-15 RX ORDER — ALIROCUMAB 75 MG/ML
INJECTION, SOLUTION SUBCUTANEOUS
Qty: 6 ML | Refills: 1 | Status: SHIPPED | OUTPATIENT
Start: 2020-06-15 | End: 2020-06-17

## 2020-06-17 ENCOUNTER — NON-PROVIDER VISIT (OUTPATIENT)
Dept: VASCULAR LAB | Facility: MEDICAL CENTER | Age: 65
End: 2020-06-17
Attending: INTERNAL MEDICINE
Payer: COMMERCIAL

## 2020-06-17 DIAGNOSIS — I25.118 CORONARY ARTERY DISEASE OF NATIVE ARTERY OF NATIVE HEART WITH STABLE ANGINA PECTORIS (HCC): ICD-10-CM

## 2020-06-17 DIAGNOSIS — E78.5 DYSLIPIDEMIA: ICD-10-CM

## 2020-06-17 PROCEDURE — 99212 OFFICE O/P EST SF 10 MIN: CPT | Performed by: PHARMACIST

## 2020-06-17 RX ORDER — ALIROCUMAB 150 MG/ML
150 INJECTION, SOLUTION SUBCUTANEOUS
Qty: 6 PEN | Refills: 1 | Status: SHIPPED | OUTPATIENT
Start: 2020-06-17 | End: 2020-12-09

## 2020-06-17 NOTE — PROGRESS NOTES
[facility] - FollowUp Visit  Date of Service: 20    Catherine Hamilton is here for follow up of dyslipidemia.    HPI  Pertinent Interval History since last visit:   None  Current Prescription Lipid Lowering Medications - including dose:   Statin: None  Non-Statin: Praluent 75mg every 14 days  Current Lipid Lowering and Related Supplements:   Red Yeast Rice  OTC [place] 3's  Any Current Side Effects Potentially Related to Lipid Lowering therapy?   No  Current Adherence to Lipid Lowering Therapies:  Complete  Any Previous History of Statin Intolerance?   Statin: Patient has been on atorvastatin and lovastatin               Outcome: Severe muscle aches, dehydration, and kidney failure per patient  Non-Statin: Gemfibrozil and Fenofibrate              Outcome: Severe muscle cramps and dehydration  Baseline Lipids Prior to Treatment:  Results for CATHERINE HAMILTON (MRN [id number]) as of 2019 07:25    Ref. Range 10/15/2018 08:56   Cholesterol,Tot Latest Ref Range: 100 - 199 mg/dL 284 (H)   Triglycerides Latest Ref Range: 0 - 149 mg/dL 345 (H)   HDL Latest Ref Range: >=40 mg/dL 31 (A)   LDL Latest Ref Range: <100 mg/dL 184 (H)         SOCIAL HISTORY  Social History     Tobacco Use   Smoking Status Former Smoker   • Packs/day: 3.00   • Years: 20.00   • Pack years: 60.00   • Types: Cigarettes   • Last attempt to quit: 1992   • Years since quittin.4   Smokeless Tobacco Never Used      Change in weight: Patient states he has gained ~10 lbs in the last few months  Exercise habits: sporadic irregular exercise - no gym access due to Covid19 restrictions, he has been doing minimal exercise at home.  Diet: common adult    ROS  Physical Exam    DATA REVIEW  Most Recent Lipid Panel:   Lab Results   Component Value Date    CHOLSTRLTOT 167 06/15/2020    TRIGLYCERIDE 283 (H) 06/15/2020    HDL 33 (A) 06/15/2020    LDL 77 06/15/2020       Other Pertinent Blood Work:   Lab Results   Component Value Date    SODIUM 139  06/15/2020    POTASSIUM 4.2 06/15/2020    CHLORIDE 103 06/15/2020    CO2 22 06/15/2020    ANION 14.0 06/15/2020    GLUCOSE 109 (H) 06/15/2020    BUN 18 06/15/2020    CREATININE 1.03 06/15/2020    CALCIUM 9.0 06/15/2020    ASTSGOT 31 06/15/2020    ALTSGPT 34 06/15/2020    ALKPHOSPHAT 64 06/15/2020    TBILIRUBIN 0.6 06/15/2020    ALBUMIN 4.2 06/15/2020    AGRATIO 1.7 06/15/2020    CREACTPROT 0.49 02/18/2019    TSHULTRASEN 1.960 02/16/2019       Other:  NA    Recent Imaging Studies:    None since last visit    ASSESSMENT AND PLAN  Patient Type, check all that apply:   Secondary Prevention  Established Atherosclerotic Cardiovascular Disease (ASCVD)  Yes, Details: hx of CABG  Other Established (non-atherosclerotic) Vascular Disease, if Present:    HTN, pre-DM2  Evidence of Heterozygous Familial Hypercholesterolemia (FH): Possible   - Has been having cardiac issues since he was 21 yo  - Father & brother both passed away of cardiac-related issues  ACC/AHA Indication for Statin Therapy, emily all that apply:   Established ASCVD: Indication for High intensity statin    Calculated Risk for ASCVD, if applicable:  N/A  Other Significant Risk Markers, if any, emily all that apply:  Family history of premature ASCVD in first degree relative  National Lipid Association (NLA) Goal (if applicable):  LDL-C:   <70 mg/dL  Lifestyle Recommendations From Today’s Visit:   Exercise: Highly encouraged patient to finds ways at home to supplement missed time at gym.  Finding creative ways to resistance train at home as well as incorporating mild cardio work would greatly benefit him.  Weight Management: Spoke at length about need to return to clean eating habits and recognizing health benefits of proper diet.  Statin Recommendations from Today's Visit  NONE - highly intolerant, will not rechallenge  Non-Statin Medications Recommendations from Today’s Visit:   NONE   Indication for PCSK9 Inhibitor, if applicable:  ASCVD with suboptimal control  "of LDL-C despite maximally tolerated statin - Increase Praluent to 150mg every 14 days.  Supplements Recommended at this visit:  Continue OTC [place]'s and Red Yeast Rice  Recommendations for Other Cardiovascular Risk Factors, emily all that apply:   Diabetes/Impaired Fasting Glucose- Discussed at length the need to address prediabetic state and it's influence on lipid profile (TG's) and overall cardiovascular health.  Patient is quite adament about not starting new medications at this time and would rather focus on diet.  Other Issues:  None    Patient slightly above goal for LDL-C on latest lipid panel.  Unsure if timing of labs are influence as he only gave four days between last injection and having labs drawn.  TG's continue to be elevated, as well at [facility].  Patient states that with current Covid restrictions he has stopped going to gym, abandoned healthy diet, and gained roughly 10 lbs.  Explained that despite restrictions, dietary choices and exercise should not be abandoned and are easily attainable.  Long discussion made regarding intervention for TG's and FBG, patient states he is not interested in any medications as anything new \"always puts me in the hospital\".  He would like to focus on diet and exercise, which I challenged him refocus on despite gym restrictions.  He is agreeable to increase to optimum dose of Praluent to ensure LDL-C at goal by next visit.  Samples given today.  He is also agreeable to further discussion and possible intervention for TG's and FBG at next meeting should these numbers remain high despite TLC.      Studies Ordered at Todays Visit:  None   Blood Work Ordered At Today’s visit:   Lipid Panel   CMP  A1c  Follow-Up:   4 months - soonest patient would like to return    Alex Bennett, PharmD    CC:  Raul Jimenez M.D.  Shweta Morales M.D.  Michael Bloch, M.D.    "

## 2020-06-18 NOTE — PROGRESS NOTES
Agree with above  Would recommend addition of vascepa if triglycerides/non-HDL remain elevated after increase in Praluent dose    Michael J Bloch, MD  Certified Clinical Lipid Specialist  Medial Director, [facility] Lipid [facility]

## 2020-07-01 ENCOUNTER — TELEPHONE (OUTPATIENT)
Dept: VASCULAR LAB | Facility: MEDICAL CENTER | Age: 65
End: 2020-07-01

## 2020-07-01 NOTE — TELEPHONE ENCOUNTER
LM with patient to inquire if he was able to obtain new strength of Praluent prescribed at last visit.  Asked that he call back to verify.  Alex Bennett, PharmD, BCACP

## 2020-07-24 ENCOUNTER — TELEPHONE (OUTPATIENT)
Dept: VASCULAR LAB | Facility: MEDICAL CENTER | Age: 65
End: 2020-07-24

## 2020-07-24 NOTE — TELEPHONE ENCOUNTER
[facility]    Left VM with pt requesting a call back to discuss if he was able to obtain new strength of Praluent prescribed at last visit.      Adonay Fields, PharmD

## 2020-09-02 RX ORDER — METOPROLOL SUCCINATE 25 MG/1
TABLET, EXTENDED RELEASE ORAL
Qty: 90 TAB | Refills: 2 | Status: SHIPPED
Start: 2020-09-02 | End: 2020-12-09

## 2020-09-28 DIAGNOSIS — I25.810 CORONARY ATHEROSCLEROSIS OF AUTOLOGOUS VEIN BYPASS GRAFT WITHOUT ANGINA: Chronic | ICD-10-CM

## 2020-09-28 RX ORDER — NITROGLYCERIN 0.4 MG/1
0.4 TABLET SUBLINGUAL PRN
Qty: 25 TAB | Refills: 1 | Status: SHIPPED | OUTPATIENT
Start: 2020-09-28 | End: 2021-06-08

## 2020-10-06 NOTE — PROGRESS NOTES
Subjective:   Chief Complaint:   Chief Complaint   Patient presents with   • Coronary Artery Disease       Abimael Hamilton is a 64 y.o. male who returns today for complex heart care.    He has multivessel coronary artery disease, prior 5 vessel CABG in 1998 (CP and syncope), and re-do 3-V CABG in 12/2015 as well as bioprosthetic AVR at that time ([facility]).  Had a PET nuclear stress test 11/2018, ischemia and TID noted.  Subsequent left heart catheterization 11/2018 showed all grafts are occluded with the exception of his LIMA-LAD which fills his circumflex distribution via a jump graft. His native coronary arteries are severely diseased as well.   EF has been 60%.  He has had recurrent Mis, unable to do intervention. Trops normal 2019.    He is maintained on isosorbide and using nitro for CP.  On long-term dual antiplatelet therapy.  Remains on metoprolol.  He was taken off of DAPT once, had more Cps.  Had episode of severe CP a few months ago, was on the ground, lasted for a while, 4 nitros.    Somewhere recorded he has a 4.5 cm thoracic aortic aneurysm but I reviewed his CT scan from 2018, the asc aorta and desc aorta and arch are normal size.    He had sx of CVA, arm pain in left arm, came up to his neck, then head.  Now when he eats spicy food, sweats only on the left side of the head.    Has hypertension, mild LVH, BP up a bit today.  Has hyperlipidemia,  without meds, triglycerides elevated.    Statin intolerance, has what sounds like rhabdo with Lipitor, he will not take statins again, not even one time per week.  Has re started on Praluent, prior LDL on this was 77.  Has moderate carotid atherosclerosis by ultrasound February 2018.    Has chronic FUNES, [facility] 3, mostly due to angina. Now feels like it is worse.  No significant orthopnea.  Mild LE edema, some venous insuff after veins removed for CABG.  No significant palpitations, lightheadedness, or presyncope/syncope.   No radha symptoms of leg  claudication, probably due to limiting CP.  No stroke/TIA like symptoms.    Wife is Ana, she is here today.    DATA REVIEWED by me:  ECG 7/23/2019  Sinus, rate 68, first-degree AV delay, incomplete left bundle branch block, nonspecific T wave changes       Echocardiogram, 9/21/2018  Normal left ventricular ejection fraction (measured at 70%) with normal regional wall motion.  Mildly stenotic bioprosthetic aortic valve with a mean gradient measured at 27 mm a radiant measured at 48 mmHg (V-max 3.5 m/s) without perivalvular leak.  Left atrium is mildly dilated with a volume index measured at 39 mL/meter squared    Echocardiogram, 8/4/2016:  Normal left ventricular size and systolic function, EF 60%.  Mild concentric left ventricular hypertrophy.  Mild mitral regurgitation.  Normally functioning bovine bioprosthetic aortic valve.   Normal right sided pressures.  No prior studies for comparison     Myocardial perfusion imaging, 11/7/2018:  IMPRESSION:  1.  Dipyridamole stress negative for chest discomfort and negative for   ischemic EKG changes.  2.  PET perfusion images are suggestive of ischemia in the proximal to mid inferior segment and in the proximal mid bilateral segment.  There is no definite infarction.  In addition TID was present which could be indicative of   multivessel disease, possibly more severe than indicated by the perfusion study.  3.  The patient's resting ejection fraction was mildly reduced and david appropriately during dipyridamole stress.  There was basilar to mid inferior hypokinesis noted     Cardiac catheterization, 11/16/2018:  1.  Angina  2.  Positive stress test for inferior ischemia  3.  CAD status post CABG in the 90s and redo in 2015  4.  Bioprosthetic AVR 2015  5.  Poorly controlled hypertension  6.  Morbid obesity  POST  1.  Multivessel coronary artery disease  2.  Occluded vein grafts x6   3.  LIMA to LAD with focal 70-80% distal anastomotic stenosis 4.  Severe native coronary  artery disease not amenable to PCI 5.  Poorly controlled hypertension     RECOMMENDATIONS:  His FORD to LAD supplies both the distal LAD territory as well as via retrograde partially occluded jump graft the circumflex territory and is essentially his last remaining vessel.  There is WESLEY-3 flow and he is not maximally managed medically, in addition he has  of the RCA which was dominant and the vein graft is occluded and this is his only ischemic territory on noninvasive imaging.     1.  Medical therapy  2. Consideration of high risk last vessel PCI LIMA to LAD anastomosis should he prove to not be a redo surgical candidate (CT surgical consultation prior to any potential PCI would be required) and not be well managed with better medical therapy.  3. Weight loss    CTA 8/20/2018  1.  The pulmonary arteries are suboptimally opacified limiting evaluation, no large central pulmonary embolus is appreciated. Additional imaging would be required for definitive exclusion of pulmonary embolism  2.  Hepatomegaly and diffuse hepatic steatosis.  3.  Right nephrolithiasis    Most recent labs:         Lab Results   Component Value Date/Time    HEMOGLOBIN 15.5 02/18/2019 01:09 AM    HEMATOCRIT 46.5 02/18/2019 01:09 AM    MCV 93.2 02/18/2019 01:09 AM    INR 1.00 11/15/2018 07:58 AM      Lab Results   Component Value Date/Time    SODIUM 139 06/15/2020 08:23 AM    POTASSIUM 4.2 06/15/2020 08:23 AM    CHLORIDE 103 06/15/2020 08:23 AM    CO2 22 06/15/2020 08:23 AM    GLUCOSE 109 (H) 06/15/2020 08:23 AM    BUN 18 06/15/2020 08:23 AM    CREATININE 1.03 06/15/2020 08:23 AM      Lab Results   Component Value Date/Time    ASTSGOT 31 06/15/2020 08:23 AM    ALTSGPT 34 06/15/2020 08:23 AM    ALBUMIN 4.2 06/15/2020 08:23 AM      Lab Results   Component Value Date/Time    CHOLSTRLTOT 167 06/15/2020 08:23 AM    LDL 77 06/15/2020 08:23 AM    HDL 33 (A) 06/15/2020 08:23 AM    TRIGLYCERIDE 283 (H) 06/15/2020 08:23 AM         Past Medical History:    Diagnosis Date   • Arthritis    • Benign hypertensive heart disease without heart failure 11/14/2011   • CAD (coronary artery disease)    • Congestive heart failure (HCC)    • Coronary atherosclerosis of autologous vein bypass graft 11/14/2011   • Essential hypertension 4/25/2019   • Gout    • Heart valve disease    • High cholesterol    • History of pancreatitis    • History of pancreatitis    • Hypertension    • Migraine 2/16/2019   • Muscle cramps 10/4/2011   • Myocardial infarct (HCC)     Multiple MI's, 1998, 2015   • Obesity 11/14/2011   • Pain     Joints   • Postsurgical aortocoronary bypass status 7/26/2016    Status post redo 3-V CABG in [place] 12/2015: SVG-acute marginal, SVG sequential to LAD, and OM    • Renal disorder     Hx of Acute renal failure r/t medication/statins   • S/P aortic valve replacement with bioprosthetic valve 7/26/2016    #23 [place] Magna AVR (bioprosthetic)    • Statin intolerance 7/26/2016   • Status post cardiac revascularization with bypass aortocoronary anastomosis of five coronary vessels 7/26/2016    5-V CABG done in 1998 (done in [place] at [facility]), patent LIMA to LAD, occluded SVG-OM, occluded SVG-RCA by cardiac catheterization 11/15/2007 with other vein grafts not identified at that time, poor targets for revascularization and declined repeat CABG in 2007 by [facility]. No ischemic was identified by MPI 11/17/07 with an EF of 50%.       Past Surgical History:   Procedure Laterality Date   • SHOULDER DECOMPRESSION ARTHROSCOPIC Right 9/5/2017    Procedure: SHOULDER DECOMPRESSION ARTHROSCOPIC SUBACROMIAL;  Surgeon: Mg Campos M.D.;  Location: SURGERY [facility];  Service: Orthopedics   • ARTHROSCOPIC LABRAL DEBRIDEMENT Right 9/5/2017    Procedure: ARTHROSCOPIC LABRAL DEBRIDEMENT;  Surgeon: Mg Campos M.D.;  Location: [facility];  Service: Orthopedics   • SHOULDER ARTHROSCOPY W/ ROTATOR CUFF REPAIR Right 9/5/2017    Procedure: SHOULDER  ARTHROSCOPY W/ ROTATOR CUFF REPAIR;  Surgeon: Mg Campos M.D.;  Location: SURGERY [facility];  Service: Orthopedics   • SHOULDER ARTHROSCOPY W/ BICIPITAL TENODESIS REPAIR Right 2017    Procedure: SHOULDER ARTHROSCOPY W/ BICIPITAL TENODESIS REPAIR;  Surgeon: Mg Campos M.D.;  Location: SURGERY [facility];  Service: Orthopedics   • SYNOVECTOMY Right 2017    Procedure: SYNOVECTOMY;  Surgeon: Mg Campos M.D.;  Location: SURGERY [facility];  Service: Orthopedics   • MULTIPLE CORONARY ARTERY BYPASS  2015    3 way bypass & Bovine valve   • LAMINOTOMY  2004    Diskectomy L4-L5, L5-S1   • MULTIPLE CORONARY ARTERY BYPASS  1998    5 way bypass   • BIOPSY GENERAL      buttock lesion   • EYE SURGERY     • MITRAL VALVE REPLACE       Family History   Problem Relation Age of Onset   • Heart Attack Father         MI and CABG, unknown age   • Hyperlipidemia Father    • Cancer Mother         Breast   • Heart Disease Brother    • Arthritis Maternal Grandmother    • Stroke Neg Hx    • Diabetes Neg Hx    • Lung Disease Neg Hx      Social History     Socioeconomic History   • Marital status:      Spouse name: Not on file   • Number of children: Not on file   • Years of education: Not on file   • Highest education level: Not on file   Occupational History   • Not on file   Social Needs   • Financial resource strain: Not on file   • Food insecurity     Worry: Not on file     Inability: Not on file   • Transportation needs     Medical: Not on file     Non-medical: Not on file   Tobacco Use   • Smoking status: Former Smoker     Packs/day: 3.00     Years: 20.00     Pack years: 60.00     Types: Cigarettes     Quit date: 1992     Years since quittin.7   • Smokeless tobacco: Never Used   Substance and Sexual Activity   • Alcohol use: Not Currently     Comment: 4 per month   • Drug use: Yes     Types: Marijuana, Inhaled     Comment: Marijuana- Daily (4  times per day)   • Sexual activity: Yes     Partners: Female   Lifestyle   • Physical activity     Days per week: Not on file     Minutes per session: Not on file   • Stress: Not on file   Relationships   • Social connections     Talks on phone: Not on file     Gets together: Not on file     Attends Yarsani service: Not on file     Active member of club or organization: Not on file     Attends meetings of clubs or organizations: Not on file     Relationship status: Not on file   • Intimate partner violence     Fear of current or ex partner: Not on file     Emotionally abused: Not on file     Physically abused: Not on file     Forced sexual activity: Not on file   Other Topics Concern   • Not on file   Social History Narrative   • Not on file     Allergies   Allergen Reactions   • Gemfibrozil      Dehydration,   • Lipitor [Atorvastatin Calcium] Unspecified     Severe Muscle cramps, dehydration   • Lovastatin      Dehydration, severe muscle cramps   • Tricor Unspecified     Dehydration, severe muscle cramps       Current Outpatient Medications   Medication Sig Dispense Refill   • nitroglycerin (NITROSTAT) 0.4 MG SL Tab Place 1 Tab under tongue as needed for Chest Pain (1 tab SL every 5 mins, max of 3 doses as needed for CP, if no relief call 911). 25 Tab 1   • metoprolol SR (TOPROL XL) 25 MG TABLET SR 24 HR TAKE THREE TABLETS BY MOUTH DAILY 90 Tab 2   • Alirocumab (PRALUENT) 150 MG/ML Solution Auto-injector Inject 150 mg as instructed every 14 days. 6 PEN 1   • allopurinol (ZYLOPRIM) 100 MG Tab TAKE ONE TABLET BY MOUTH ONE TIME DAILY 90 Tab 2   • lisinopril (PRINIVIL) 20 MG Tab TAKE ONE TABLET BY MOUTH ONE TIME DAILY 90 Tab 2   • amLODIPine (NORVASC) 5 MG Tab TAKE ONE TABLET BY MOUTH ONE TIME DAILY 90 Tab 2   • amoxicillin (AMOXIL) 500 MG Cap Take 4 Caps by mouth Once for 1 dose. 30-60 min prior to dental work     • isosorbide mononitrate (IMDUR) 120 MG CR tablet Take 1 Tab by mouth every morning. 90 Tab 3   •  "clopidogrel (PLAVIX) 75 MG Tab TAKE ONE TABLET BY MOUTH ONE TIME DAILY 90 Tab 3   • PRALUENT 75 MG/ML Solution Pen-injector 75 mg by Injection route every 14 days. 2 PEN 11   • indomethacin (INDOCIN) 50 MG Cap TAKE ONE CAPSULE BY MOUTH TWICE DAILY AS NEEDED 30 Cap 1   • acetaminophen (TYLENOL) 500 MG Tab Take 1,500 mg by mouth every 6 hours as needed for Moderate Pain.     • Red Yeast Rice Extract (RED YEAST RICE PO) Take 1 Tab by mouth every day.     • Omega-3 Fatty Acids (OMEGA-3 FISH OIL PO) Take 1 Cap by mouth every day.     • magnesium oxide (MAG-OX) 400 MG Tab Take 400 mg by mouth every day.     • aspirin 81 MG EC tablet Take 1 Tab by mouth every day. 30 Tab 11   • Glucosamine-Chondroit-Vit C-Mn (GLUCOSAMINE CHONDROITIN COMPLX) CAPS Take 2 Caps by mouth every day.       No current facility-administered medications for this visit.        ROS    All others systems reviewed and negative.     Objective:     /88 (BP Location: Right arm, Patient Position: Sitting, BP Cuff Size: Adult)   Pulse 72   Resp 20   Ht 1.676 m (5' 6\")   Wt 105.2 kg (232 lb)   SpO2 100%  Body mass index is 37.45 kg/m².    Physical Exam   General: No acute distress. Well nourished.  HEENT: EOM grossly intact, no scleral icterus, no pharyngeal erythema.   Neck:  No JVD, no bruits, trachea midline  CVS: RRR.  2/6 mid peaking systolic murmur to clavicles, no LE edema.  2+ radial pulses, 2+ PT pulses, well-healed midline incision  Resp: CTAB. No wheezing or crackles/rhonchi. Normal respiratory effort.  Abdomen: Soft, NT, no radha hepatomegaly, obese.  MSK/Ext: No clubbing or cyanosis.  Skin: Warm and dry, no rashes.  Neurological: CN III-XII grossly intact. No focal deficits.   Psych: A&O x 3, appropriate affect, good judgement    Physical exam performed today and unchanged, except what is noted, compared to 3-6-2020      Assessment:     1. Coronary artery disease of native artery of native heart with stable angina pectoris (HCC)     2. " Essential hypertension     3. Status post cardiac revascularization with bypass aortocoronary anastomosis of five coronary vessels     4. Bilateral carotid artery occlusion     5. S/P aortic valve replacement with bioprosthetic valve  EC-ECHOCARDIOGRAM COMPLETE W/O CONT   6. Mixed hyperlipidemia     7. Statin intolerance     8. Abnormal nuclear cardiac imaging test     9. Fatty liver     10. Prediabetes     11. Obesity (BMI 30-39.9)     12. Ascending aorta dilation (HCC)     13. Atherosclerosis of autologous vein coronary artery bypass graft with stable angina pectoris (HCC)     14. Benign hypertensive heart disease without heart failure     15. Postsurgical aortocoronary bypass status     16. Pure hypercholesterolemia     17. FUNES (dyspnea on exertion)  PULMONARY FUNCTION TESTS -Test requested: Complete Pulmonary Function Test (with bronchopulmonary challenge)    EC-ECHOCARDIOGRAM COMPLETE W/O CONT   18. Mild intermittent reactive airway disease without complication  PULMONARY FUNCTION TESTS -Test requested: Complete Pulmonary Function Test (with bronchopulmonary challenge)       Medical Decision Making:  Today's Assessment / Status / Plan:     -Class 3 angina, using meds, consider nitro patch or referral to [place] for trial, not wanting transplant.  -[facility] 3 FUNES, mostly related to again, but progressing, could be smoke, PFTs for poss need for inhaler  -Echo to look at valve and RVSP  -Consider central sleep study for fatigue.  -PCSK9 inhibitor and red yeast rice. Statin intolerance, has what sounds like rhabdo with Lipitor, he will not take statins again, not even one time per week.  -Remains on beta-blocker  -DAPT long term  -Blood pressure control, consider   -Regular teeth cleaning with amoxicillin prior, he is aware  -Vascular surgeon for symptomatic carotid disease in the future, he is in vascular clinic  -I did not see TAA on CT, not sure if this is a reference to another aneurysm  -Compression socks  -RTC  MARLY 4-6 weeks, MD 6 months, will call me for any changes.    Written instructions given today:    -Knee high compression socks, based on shoe size and amount of compression.  I recommend larger size to avoid toe compression.  Start with 15-20 mmHg of pressure, if too much, can go down to 8-15 mmHg.    There are also 20-30 mmHg but these tend to be very tight.  Can buy online.    -Echo- look at the heart valve    -PFTs- pulmonary function study    -Consider evaluation for central sleep apnea, would need referral to a sleep center    -You should always hear results of testing within 5 days with my interpretation, if you do not, send a [employer] message or call the office: [phone].    -Consider getting pulse oximeter at home, call pharmacy or [facility].      Return in about 4 weeks (around 11/4/2020).    It is my pleasure to participate in the care of Mr. Hamilton.  Please do not hesitate to contact me with questions or concerns.    Shweta Morales MD, [facility]  Cardiologist [facility]    Please note that this dictation was created using voice recognition software. I have made every reasonable attempt to correct obvious errors, but it is possible there are errors of grammar and possibly content that I did not discover before finalizing the note.

## 2020-10-07 ENCOUNTER — OFFICE VISIT (OUTPATIENT)
Dept: CARDIOLOGY | Facility: MEDICAL CENTER | Age: 65
End: 2020-10-07
Payer: COMMERCIAL

## 2020-10-07 VITALS
DIASTOLIC BLOOD PRESSURE: 88 MMHG | HEART RATE: 72 BPM | RESPIRATION RATE: 20 BRPM | BODY MASS INDEX: 37.28 KG/M2 | SYSTOLIC BLOOD PRESSURE: 138 MMHG | WEIGHT: 232 LBS | HEIGHT: 66 IN | OXYGEN SATURATION: 100 %

## 2020-10-07 DIAGNOSIS — I10 ESSENTIAL HYPERTENSION: ICD-10-CM

## 2020-10-07 DIAGNOSIS — J45.20 MILD INTERMITTENT REACTIVE AIRWAY DISEASE WITHOUT COMPLICATION: ICD-10-CM

## 2020-10-07 DIAGNOSIS — Z95.3 S/P AORTIC VALVE REPLACEMENT WITH BIOPROSTHETIC VALVE: ICD-10-CM

## 2020-10-07 DIAGNOSIS — I25.718 ATHEROSCLEROSIS OF AUTOLOGOUS VEIN CORONARY ARTERY BYPASS GRAFT WITH STABLE ANGINA PECTORIS (HCC): ICD-10-CM

## 2020-10-07 DIAGNOSIS — I65.23 BILATERAL CAROTID ARTERY OCCLUSION: ICD-10-CM

## 2020-10-07 DIAGNOSIS — E78.00 PURE HYPERCHOLESTEROLEMIA: ICD-10-CM

## 2020-10-07 DIAGNOSIS — E66.9 OBESITY (BMI 30-39.9): ICD-10-CM

## 2020-10-07 DIAGNOSIS — Z78.9 STATIN INTOLERANCE: ICD-10-CM

## 2020-10-07 DIAGNOSIS — Z95.1 POSTSURGICAL AORTOCORONARY BYPASS STATUS: ICD-10-CM

## 2020-10-07 DIAGNOSIS — I25.118 CORONARY ARTERY DISEASE OF NATIVE ARTERY OF NATIVE HEART WITH STABLE ANGINA PECTORIS (HCC): ICD-10-CM

## 2020-10-07 DIAGNOSIS — E78.2 MIXED HYPERLIPIDEMIA: ICD-10-CM

## 2020-10-07 DIAGNOSIS — I11.9 BENIGN HYPERTENSIVE HEART DISEASE WITHOUT HEART FAILURE: ICD-10-CM

## 2020-10-07 DIAGNOSIS — R93.1 ABNORMAL NUCLEAR CARDIAC IMAGING TEST: ICD-10-CM

## 2020-10-07 DIAGNOSIS — Z95.1 STATUS POST CARDIAC REVASCULARIZATION WITH BYPASS AORTOCORONARY ANASTOMOSIS OF FIVE CORONARY VESSELS: ICD-10-CM

## 2020-10-07 DIAGNOSIS — K76.0 FATTY LIVER: ICD-10-CM

## 2020-10-07 DIAGNOSIS — I77.810 ASCENDING AORTA DILATION (HCC): ICD-10-CM

## 2020-10-07 DIAGNOSIS — R06.09 DOE (DYSPNEA ON EXERTION): ICD-10-CM

## 2020-10-07 DIAGNOSIS — R73.03 PREDIABETES: ICD-10-CM

## 2020-10-07 PROCEDURE — 99214 OFFICE O/P EST MOD 30 MIN: CPT | Performed by: INTERNAL MEDICINE

## 2020-10-07 ASSESSMENT — FIBROSIS 4 INDEX: FIB4 SCORE: 1.95

## 2020-10-07 NOTE — PATIENT INSTRUCTIONS
-Knee high compression socks, based on shoe size and amount of compression.  I recommend larger size to avoid toe compression.  Start with 15-20 mmHg of pressure, if too much, can go down to 8-15 mmHg.    There are also 20-30 mmHg but these tend to be very tight.  Can buy online.    -Echo- look at the heart valve    -PFTs- pulmonary function study    -Consider evaluation for central sleep apnea, would need referral to a sleep center    -You should always hear results of testing within 5 days with my interpretation, if you do not, send a D[employer] message or call the office: [phone].    -Consider getting pulse oximeter at home, call pharmacy or [facility].    -Call late December or early January to make the 6 month appt with me.

## 2020-10-07 NOTE — LETTER
[facility]   [address], [address]  [place] [address]  Phone: [phone]  Fax: [phone]              Abimael Hamilton  1955    Encounter Date: 10/7/2020    Shweta Morales M.D.          PROGRESS NOTE:  Subjective:   Chief Complaint:   Chief Complaint   Patient presents with   • Coronary Artery Disease       Abimael Hamilton is a 64 y.o. male who returns today for complex heart care.    He has multivessel coronary artery disease, prior 5 vessel CABG in 1998 (CP and syncope), and re-do 3-V CABG in 12/2015 as well as bioprosthetic AVR at that time ([facility]).  Had a PET nuclear stress test 11/2018, ischemia and TID noted.  Subsequent left heart catheterization 11/2018 showed all grafts are occluded with the exception of his LIMA-LAD which fills his circumflex distribution via a jump graft. His native coronary arteries are severely diseased as well.   EF has been 60%.  He has had recurrent Mis, unable to do intervention. Trops normal 2019.    He is maintained on isosorbide and using nitro for CP.  On long-term dual antiplatelet therapy.  Remains on metoprolol.  He was taken off of DAPT once, had more Cps.  Had episode of severe CP a few months ago, was on the ground, lasted for a while, 4 nitros.    Somewhere recorded he has a 4.5 cm thoracic aortic aneurysm but I reviewed his CT scan from 2018, the asc aorta and desc aorta and arch are normal size.    He had sx of CVA, arm pain in left arm, came up to his neck, then head.  Now when he eats spicy food, sweats only on the left side of the head.    Has hypertension, mild LVH, BP up a bit today.  Has hyperlipidemia,  without meds, triglycerides elevated.    Statin intolerance, has what sounds like rhabdo with Lipitor, he will not take statins again, not even one time per week.  Has re started on Praluent, prior LDL on this was 77.  Has moderate carotid atherosclerosis by ultrasound February  2018.    Has chronic FUNES, [facility] 3, mostly due to angina. Now feels like it is worse.  No significant orthopnea.  Mild LE edema, some venous insuff after veins removed for CABG.  No significant palpitations, lightheadedness, or presyncope/syncope.   No radha symptoms of leg claudication, probably due to limiting CP.  No stroke/TIA like symptoms.    Wife is Ana, she is here today.    DATA REVIEWED by me:  ECG 7/23/2019  Sinus, rate 68, first-degree AV delay, incomplete left bundle branch block, nonspecific T wave changes       Echocardiogram, 9/21/2018  Normal left ventricular ejection fraction (measured at 70%) with normal regional wall motion.  Mildly stenotic bioprosthetic aortic valve with a mean gradient measured at 27 mm a radiant measured at 48 mmHg (V-max 3.5 m/s) without perivalvular leak.  Left atrium is mildly dilated with a volume index measured at 39 mL/meter squared    Echocardiogram, 8/4/2016:  Normal left ventricular size and systolic function, EF 60%.  Mild concentric left ventricular hypertrophy.  Mild mitral regurgitation.  Normally functioning bovine bioprosthetic aortic valve.   Normal right sided pressures.  No prior studies for comparison     Myocardial perfusion imaging, 11/7/2018:  IMPRESSION:  1.  Dipyridamole stress negative for chest discomfort and negative for   ischemic EKG changes.  2.  PET perfusion images are suggestive of ischemia in the proximal to mid inferior segment and in the proximal mid bilateral segment.  There is no definite infarction.  In addition TID was present which could be indicative of   multivessel disease, possibly more severe than indicated by the perfusion study.  3.  The patient's resting ejection fraction was mildly reduced and david appropriately during dipyridamole stress.  There was basilar to mid inferior hypokinesis noted     Cardiac catheterization, 11/16/2018:  1.  Angina  2.  Positive stress test for inferior ischemia  3.  CAD status post CABG in the 90s  and redo in 2015  4.  Bioprosthetic AVR 2015  5.  Poorly controlled hypertension  6.  Morbid obesity  POST  1.  Multivessel coronary artery disease  2.  Occluded vein grafts x6   3.  LIMA to LAD with focal 70-80% distal anastomotic stenosis 4.  Severe native coronary artery disease not amenable to PCI 5.  Poorly controlled hypertension     RECOMMENDATIONS:  His FORD to LAD supplies both the distal LAD territory as well as via retrograde partially occluded jump graft the circumflex territory and is essentially his last remaining vessel.  There is WESLEY-3 flow and he is not maximally managed medically, in addition he has  of the RCA which was dominant and the vein graft is occluded and this is his only ischemic territory on noninvasive imaging.     1.  Medical therapy  2. Consideration of high risk last vessel PCI LIMA to LAD anastomosis should he prove to not be a redo surgical candidate (CT surgical consultation prior to any potential PCI would be required) and not be well managed with better medical therapy.  3. Weight loss    CTA 8/20/2018  1.  The pulmonary arteries are suboptimally opacified limiting evaluation, no large central pulmonary embolus is appreciated. Additional imaging would be required for definitive exclusion of pulmonary embolism  2.  Hepatomegaly and diffuse hepatic steatosis.  3.  Right nephrolithiasis    Most recent labs:         Lab Results   Component Value Date/Time    HEMOGLOBIN 15.5 02/18/2019 01:09 AM    HEMATOCRIT 46.5 02/18/2019 01:09 AM    MCV 93.2 02/18/2019 01:09 AM    INR 1.00 11/15/2018 07:58 AM      Lab Results   Component Value Date/Time    SODIUM 139 06/15/2020 08:23 AM    POTASSIUM 4.2 06/15/2020 08:23 AM    CHLORIDE 103 06/15/2020 08:23 AM    CO2 22 06/15/2020 08:23 AM    GLUCOSE 109 (H) 06/15/2020 08:23 AM    BUN 18 06/15/2020 08:23 AM    CREATININE 1.03 06/15/2020 08:23 AM      Lab Results   Component Value Date/Time    ASTSGOT 31 06/15/2020 08:23 AM    ALTSGPT 34  06/15/2020 08:23 AM    ALBUMIN 4.2 06/15/2020 08:23 AM      Lab Results   Component Value Date/Time    CHOLSTRLTOT 167 06/15/2020 08:23 AM    LDL 77 06/15/2020 08:23 AM    HDL 33 (A) 06/15/2020 08:23 AM    TRIGLYCERIDE 283 (H) 06/15/2020 08:23 AM         Past Medical History:   Diagnosis Date   • Arthritis    • Benign hypertensive heart disease without heart failure 11/14/2011   • CAD (coronary artery disease)    • Congestive heart failure (HCC)    • Coronary atherosclerosis of autologous vein bypass graft 11/14/2011   • Essential hypertension 4/25/2019   • Gout    • Heart valve disease    • High cholesterol    • History of pancreatitis    • History of pancreatitis    • Hypertension    • Migraine 2/16/2019   • Muscle cramps 10/4/2011   • Myocardial infarct (HCC)     Multiple MI's, 1998, 2015   • Obesity 11/14/2011   • Pain     Joints   • Postsurgical aortocoronary bypass status 7/26/2016    Status post redo 3-V CABG in [place] 12/2015: SVG-acute marginal, SVG sequential to LAD, and OM    • Renal disorder     Hx of Acute renal failure r/t medication/statins   • S/P aortic valve replacement with bioprosthetic valve 7/26/2016    #23 Pe[place] Magna AVR (bioprosthetic)    • Statin intolerance 7/26/2016   • Status post cardiac revascularization with bypass aortocoronary anastomosis of five coronary vessels 7/26/2016    5-V CABG done in 1998 (done in [place] at [facility]), patent LIMA to LAD, occluded SVG-OM, occluded SVG-RCA by cardiac catheterization 11/15/2007 with other vein grafts not identified at that time, poor targets for revascularization and declined repeat CABG in 2007 by [facility]. No ischemic was identified by MPI 11/17/07 with an EF of 50%.       Past Surgical History:   Procedure Laterality Date   • SHOULDER DECOMPRESSION ARTHROSCOPIC Right 9/5/2017    Procedure: SHOULDER DECOMPRESSION ARTHROSCOPIC SUBACROMIAL;  Surgeon: Mg Campos M.D.;  Location: SURGERY [facility] [place] [facility];  Service: Orthopedics   •  ARTHROSCOPIC LABRAL DEBRIDEMENT Right 9/5/2017    Procedure: ARTHROSCOPIC LABRAL DEBRIDEMENT;  Surgeon: Mg Campos M.D.;  Location: SURGERY [facility] [place] [facility];  Service: Orthopedics   • SHOULDER ARTHROSCOPY W/ ROTATOR CUFF REPAIR Right 9/5/2017    Procedure: SHOULDER ARTHROSCOPY W/ ROTATOR CUFF REPAIR;  Surgeon: Mg Campos M.D.;  Location: SURGERY [facility] [place] [facility];  Service: Orthopedics   • SHOULDER ARTHROSCOPY W/ BICIPITAL TENODESIS REPAIR Right 9/5/2017    Procedure: SHOULDER ARTHROSCOPY W/ BICIPITAL TENODESIS REPAIR;  Surgeon: Mg Campos M.D.;  Location: [facility];  Service: Orthopedics   • SYNOVECTOMY Right 9/5/2017    Procedure: SYNOVECTOMY;  Surgeon: Mg Campos M.D.;  Location: [facility];  Service: Orthopedics   • MULTIPLE CORONARY ARTERY BYPASS  12/08/2015    3 way bypass & Bovine valve   • LAMINOTOMY  12/2004    Diskectomy L4-L5, L5-S1   • MULTIPLE CORONARY ARTERY BYPASS  11/11/1998    5 way bypass   • BIOPSY GENERAL      buttock lesion   • EYE SURGERY     • MITRAL VALVE REPLACE       Family History   Problem Relation Age of Onset   • Heart Attack Father         MI and CABG, unknown age   • Hyperlipidemia Father    • Cancer Mother         Breast   • Heart Disease Brother    • Arthritis Maternal Grandmother    • Stroke Neg Hx    • Diabetes Neg Hx    • Lung Disease Neg Hx      Social History     Socioeconomic History   • Marital status:      Spouse name: Not on file   • Number of children: Not on file   • Years of education: Not on file   • Highest education level: Not on file   Occupational History   • Not on file   Social Needs   • Financial resource strain: Not on file   • Food insecurity     Worry: Not on file     Inability: Not on file   • Transportation needs     Medical: Not on file     Non-medical: Not on file   Tobacco Use   • Smoking status: Former Smoker     Packs/day: 3.00     Years: 20.00     Pack years: 60.00      Types: Cigarettes     Quit date: 1992     Years since quittin.7   • Smokeless tobacco: Never Used   Substance and Sexual Activity   • Alcohol use: Not Currently     Comment: 4 per month   • Drug use: Yes     Types: Marijuana, Inhaled     Comment: Marijuana- Daily (4 times per day)   • Sexual activity: Yes     Partners: Female   Lifestyle   • Physical activity     Days per week: Not on file     Minutes per session: Not on file   • Stress: Not on file   Relationships   • Social connections     Talks on phone: Not on file     Gets together: Not on file     Attends Anabaptist service: Not on file     Active member of club or organization: Not on file     Attends meetings of clubs or organizations: Not on file     Relationship status: Not on file   • Intimate partner violence     Fear of current or ex partner: Not on file     Emotionally abused: Not on file     Physically abused: Not on file     Forced sexual activity: Not on file   Other Topics Concern   • Not on file   Social History Narrative   • Not on file     Allergies   Allergen Reactions   • Gemfibrozil      Dehydration,   • Lipitor [Atorvastatin Calcium] Unspecified     Severe Muscle cramps, dehydration   • Lovastatin      Dehydration, severe muscle cramps   • Tricor Unspecified     Dehydration, severe muscle cramps       Current Outpatient Medications   Medication Sig Dispense Refill   • nitroglycerin (NITROSTAT) 0.4 MG SL Tab Place 1 Tab under tongue as needed for Chest Pain (1 tab SL every 5 mins, max of 3 doses as needed for CP, if no relief call 911). 25 Tab 1   • metoprolol SR (TOPROL XL) 25 MG TABLET SR 24 HR TAKE THREE TABLETS BY MOUTH DAILY 90 Tab 2   • Alirocumab (PRALUENT) 150 MG/ML Solution Auto-injector Inject 150 mg as instructed every 14 days. 6 PEN 1   • allopurinol (ZYLOPRIM) 100 MG Tab TAKE ONE TABLET BY MOUTH ONE TIME DAILY 90 Tab 2   • lisinopril (PRINIVIL) 20 MG Tab TAKE ONE TABLET BY MOUTH ONE TIME DAILY 90 Tab 2   • amLODIPine  "(NORVASC) 5 MG Tab TAKE ONE TABLET BY MOUTH ONE TIME DAILY 90 Tab 2   • amoxicillin (AMOXIL) 500 MG Cap Take 4 Caps by mouth Once for 1 dose. 30-60 min prior to dental work     • isosorbide mononitrate (IMDUR) 120 MG CR tablet Take 1 Tab by mouth every morning. 90 Tab 3   • clopidogrel (PLAVIX) 75 MG Tab TAKE ONE TABLET BY MOUTH ONE TIME DAILY 90 Tab 3   • PRALUENT 75 MG/ML Solution Pen-injector 75 mg by Injection route every 14 days. 2 PEN 11   • indomethacin (INDOCIN) 50 MG Cap TAKE ONE CAPSULE BY MOUTH TWICE DAILY AS NEEDED 30 Cap 1   • acetaminophen (TYLENOL) 500 MG Tab Take 1,500 mg by mouth every 6 hours as needed for Moderate Pain.     • Red Yeast Rice Extract (RED YEAST RICE PO) Take 1 Tab by mouth every day.     • Omega-3 Fatty Acids (OMEGA-3 FISH OIL PO) Take 1 Cap by mouth every day.     • magnesium oxide (MAG-OX) 400 MG Tab Take 400 mg by mouth every day.     • aspirin 81 MG EC tablet Take 1 Tab by mouth every day. 30 Tab 11   • Glucosamine-Chondroit-Vit C-Mn (GLUCOSAMINE CHONDROITIN COMPLX) CAPS Take 2 Caps by mouth every day.       No current facility-administered medications for this visit.        ROS    All others systems reviewed and negative.     Objective:     /88 (BP Location: Right arm, Patient Position: Sitting, BP Cuff Size: Adult)   Pulse 72   Resp 20   Ht 1.676 m (5' 6\")   Wt 105.2 kg (232 lb)   SpO2 100%  Body mass index is 37.45 kg/m².    Physical Exam   General: No acute distress. Well nourished.  HEENT: EOM grossly intact, no scleral icterus, no pharyngeal erythema.   Neck:  No JVD, no bruits, trachea midline  CVS: RRR.  2/6 mid peaking systolic murmur to clavicles, no LE edema.  2+ radial pulses, 2+ PT pulses, well-healed midline incision  Resp: CTAB. No wheezing or crackles/rhonchi. Normal respiratory effort.  Abdomen: Soft, NT, no radha hepatomegaly, obese.  MSK/Ext: No clubbing or cyanosis.  Skin: Warm and dry, no rashes.  Neurological: CN III-XII grossly intact. No focal " deficits.   Psych: A&O x 3, appropriate affect, good judgement    Physical exam performed today and unchanged, except what is noted, compared to 3-6-2020      Assessment:     1. Coronary artery disease of native artery of native heart with stable angina pectoris (HCC)     2. Essential hypertension     3. Status post cardiac revascularization with bypass aortocoronary anastomosis of five coronary vessels     4. Bilateral carotid artery occlusion     5. S/P aortic valve replacement with bioprosthetic valve  EC-ECHOCARDIOGRAM COMPLETE W/O CONT   6. Mixed hyperlipidemia     7. Statin intolerance     8. Abnormal nuclear cardiac imaging test     9. Fatty liver     10. Prediabetes     11. Obesity (BMI 30-39.9)     12. Ascending aorta dilation (HCC)     13. Atherosclerosis of autologous vein coronary artery bypass graft with stable angina pectoris (HCC)     14. Benign hypertensive heart disease without heart failure     15. Postsurgical aortocoronary bypass status     16. Pure hypercholesterolemia     17. FUNES (dyspnea on exertion)  PULMONARY FUNCTION TESTS -Test requested: Complete Pulmonary Function Test (with bronchopulmonary challenge)    EC-ECHOCARDIOGRAM COMPLETE W/O CONT   18. Mild intermittent reactive airway disease without complication  PULMONARY FUNCTION TESTS -Test requested: Complete Pulmonary Function Test (with bronchopulmonary challenge)       Medical Decision Making:  Today's Assessment / Status / Plan:     -Class 3 angina, using meds, consider nitro patch or referral to [place] for trial, not wanting transplant.  -[facility] 3 FUNES, mostly related to again, but progressing, could be smoke, PFTs for poss need for inhaler  -Echo to look at valve and RVSP  -Consider central sleep study for fatigue.  -PCSK9 inhibitor and red yeast rice. Statin intolerance, has what sounds like rhabdo with Lipitor, he will not take statins again, not even one time per week.  -Remains on beta-blocker  -DAPT long term  -Blood pressure  control, consider   -Regular teeth cleaning with amoxicillin prior, he is aware  -Vascular surgeon for symptomatic carotid disease in the future, he is in vascular clinic  -I did not see TAA on CT, not sure if this is a reference to another aneurysm  -Compression socks  -RTC APSARY 4-6 weeks, MD 6 months, will call me for any changes.    Written instructions given today:    -Knee high compression socks, based on shoe size and amount of compression.  I recommend larger size to avoid toe compression.  Start with 15-20 mmHg of pressure, if too much, can go down to 8-15 mmHg.    There are also 20-30 mmHg but these tend to be very tight.  Can buy online.    -Echo- look at the heart valve    -PFTs- pulmonary function study    -Consider evaluation for central sleep apnea, would need referral to a sleep center    -You should always hear results of testing within 5 days with my interpretation, if you do not, send a [employer] message or call the office: [phone].    -Consider getting pulse oximeter at home, call pharmacy or [facility].      Return in about 4 weeks (around 11/4/2020).    It is my pleasure to participate in the care of Mr. Hamilton.  Please do not hesitate to contact me with questions or concerns.    Shweta Morales MD, [facility]  Cardiologist [facility]    Please note that this dictation was created using voice recognition software. I have made every reasonable attempt to correct obvious errors, but it is possible there are errors of grammar and possibly content that I did not discover before finalizing the note.      Raul Jimenez M.D.  [id number] [place] [address]  [employer]ia [employer]

## 2020-10-12 ENCOUNTER — HOSPITAL ENCOUNTER (OUTPATIENT)
Dept: LAB | Facility: MEDICAL CENTER | Age: 65
End: 2020-10-12
Attending: INTERNAL MEDICINE
Payer: COMMERCIAL

## 2020-10-12 DIAGNOSIS — E78.5 DYSLIPIDEMIA: ICD-10-CM

## 2020-10-12 LAB
ALBUMIN SERPL BCP-MCNC: 4.4 G/DL (ref 3.2–4.9)
ALBUMIN/GLOB SERPL: 1.7 G/DL
ALP SERPL-CCNC: 66 U/L (ref 30–99)
ALT SERPL-CCNC: 36 U/L (ref 2–50)
ANION GAP SERPL CALC-SCNC: 11 MMOL/L (ref 7–16)
AST SERPL-CCNC: 33 U/L (ref 12–45)
BILIRUB SERPL-MCNC: 0.6 MG/DL (ref 0.1–1.5)
BUN SERPL-MCNC: 24 MG/DL (ref 8–22)
CALCIUM SERPL-MCNC: 9.3 MG/DL (ref 8.5–10.5)
CHLORIDE SERPL-SCNC: 103 MMOL/L (ref 96–112)
CHOLEST SERPL-MCNC: 154 MG/DL (ref 100–199)
CO2 SERPL-SCNC: 23 MMOL/L (ref 20–33)
CREAT SERPL-MCNC: 1.35 MG/DL (ref 0.5–1.4)
EST. AVERAGE GLUCOSE BLD GHB EST-MCNC: 131 MG/DL
FASTING STATUS PATIENT QL REPORTED: NORMAL
GLOBULIN SER CALC-MCNC: 2.6 G/DL (ref 1.9–3.5)
GLUCOSE SERPL-MCNC: 124 MG/DL (ref 65–99)
HBA1C MFR BLD: 6.2 % (ref 0–5.6)
HDLC SERPL-MCNC: 34 MG/DL
LDLC SERPL CALC-MCNC: 61 MG/DL
POTASSIUM SERPL-SCNC: 4.5 MMOL/L (ref 3.6–5.5)
PROT SERPL-MCNC: 7 G/DL (ref 6–8.2)
SODIUM SERPL-SCNC: 137 MMOL/L (ref 135–145)
TRIGL SERPL-MCNC: 295 MG/DL (ref 0–149)

## 2020-10-12 PROCEDURE — 36415 COLL VENOUS BLD VENIPUNCTURE: CPT | Mod: GA

## 2020-10-12 PROCEDURE — 80061 LIPID PANEL: CPT

## 2020-10-12 PROCEDURE — 83036 HEMOGLOBIN GLYCOSYLATED A1C: CPT | Mod: GA

## 2020-10-12 PROCEDURE — 80053 COMPREHEN METABOLIC PANEL: CPT

## 2020-10-14 ENCOUNTER — NON-PROVIDER VISIT (OUTPATIENT)
Dept: VASCULAR LAB | Facility: MEDICAL CENTER | Age: 65
End: 2020-10-14
Attending: INTERNAL MEDICINE
Payer: COMMERCIAL

## 2020-10-14 ENCOUNTER — TELEPHONE (OUTPATIENT)
Dept: VASCULAR LAB | Facility: MEDICAL CENTER | Age: 65
End: 2020-10-14

## 2020-10-14 DIAGNOSIS — E78.1 HYPERTRIGLYCERIDEMIA: ICD-10-CM

## 2020-10-14 DIAGNOSIS — E78.5 DYSLIPIDEMIA: ICD-10-CM

## 2020-10-14 PROCEDURE — 99212 OFFICE O/P EST SF 10 MIN: CPT

## 2020-10-14 RX ORDER — ICOSAPENT ETHYL 1000 MG/1
2 CAPSULE ORAL 2 TIMES DAILY WITH MEALS
Qty: 180 CAP | Refills: 1 | Status: SHIPPED
Start: 2020-10-14 | End: 2021-02-10

## 2020-10-14 NOTE — NON-PROVIDER
"[facility] - FollowUp Visit   Date of Service: 10/14/2020  TIME IN: 0900  TIME OUT: 930    Catherine Hamilton is here for follow up of dyslipidemia    HPI  Pertinent Interval History since last visit:   Pt states he feels like had a \"heart attack\" and then a \"mini stroke\" while riding his motorcycle. Pt refuses to be evaluated in the ER. Pt also states cardiology recommended f/u with neurology but pt refused that. Advised pt that if he's having those symptoms, he needs to go to the ER.  Current Prescription Lipid Lowering Medications - including dose:   Statin: None  Non-Statin: Praluent 150 mg [id number]  Current Lipid Lowering and Related Supplements:   Red yeast rice and OTC Omega 3 FAs  Any Current Side Effects Potentially Related to Lipid Lowering therapy?   No  Current Adherence to Lipid Lowering Therapies:  Complete  Any Previous History of Statin Intolerance?   Statin: Patient has been on atorvastatin and lovastatin               Outcome: Severe muscle aches, dehydration, and kidney failure per patient  Non-Statin: Gemfibrozil and Fenofibrate              Outcome: Severe muscle cramps and dehydration  Baseline Lipids Prior to Treatment:  Results for CATHERINE HAMILTON (MRN [id number]) as of 2019 07:25    Ref. Range 10/15/2018 08:56   Cholesterol,Tot Latest Ref Range: 100 - 199 mg/dL 284 (H)   Triglycerides Latest Ref Range: 0 - 149 mg/dL 345 (H)   HDL Latest Ref Range: >=40 mg/dL 31 (A)   LDL Latest Ref Range: <100 mg/dL 184 (H)        SOCIAL HISTORY  Social History     Tobacco Use   Smoking Status Former Smoker   • Packs/day: 3.00   • Years: 20.00   • Pack years: 60.00   • Types: Cigarettes   • Quit date: 1992   • Years since quittin.8   Smokeless Tobacco Never Used      Change in weight: Stable  Exercise habits: pt started going back to the gym M- after it was reopened from COVID-19 restrictions   Diet: eating less red meat    DATA REVIEW  Most Recent Lipid Panel:   Lab Results   Component " Value Date    CHOLSTRLTOT 154 10/12/2020    TRIGLYCERIDE 295 (H) 10/12/2020    HDL 34 (A) 10/12/2020    LDL 61 10/12/2020       Other Pertinent Blood Work:   Lab Results   Component Value Date    SODIUM 137 10/12/2020    POTASSIUM 4.5 10/12/2020    CHLORIDE 103 10/12/2020    CO2 23 10/12/2020    ANION 11.0 10/12/2020    GLUCOSE 124 (H) 10/12/2020    BUN 24 (H) 10/12/2020    CREATININE 1.35 10/12/2020    CALCIUM 9.3 10/12/2020    ASTSGOT 33 10/12/2020    ALTSGPT 36 10/12/2020    ALKPHOSPHAT 66 10/12/2020    TBILIRUBIN 0.6 10/12/2020    ALBUMIN 4.4 10/12/2020    AGRATIO 1.7 10/12/2020    CREACTPROT 0.49 02/18/2019    TSHULTRASEN 1.960 02/16/2019       ASSESSMENT AND PLAN  Patient Type, check all that apply:   Secondary Prevention  Established Atherosclerotic Cardiovascular Disease (ASCVD)  Yes, Details: hx of CABG  Other Established (non-atherosclerotic) Vascular Disease, if Present:    HTN, pre-DM2  Evidence of Heterozygous Familial Hypercholesterolemia (FH): Possible   - Has been having cardiac issues since he was 19 yo  - Father & brother both passed away of cardiac-related issues  ACC/AHA Indication for Statin Therapy, emily all that apply:   Established ASCVD: Indication for High intensity statin    Calculated Risk for ASCVD, if applicable:  N/A  Other Significant Risk Markers, if any, emily all that apply:  Family history of premature ASCVD in first degree relative  National Lipid Association (NLA) Goal (if applicable):  LDL-C:   <70 mg/dL  Lifestyle Recommendations From Today’s Visit:   Exercise: Pt started to going back to the gym the past 4 weeks since it was re-opened; he has been going Monday through Friday, however he states it has been increasingly difficult d/t dyspnea on exertion. Encouraged pt to maintain current exercise habits as long as the gyms are open.  Diet: Pt has been eating less red meat and this is working for him for now. Discussed incorporating more vegetables and fiber.   Statin  "Recommendations from Today's Visit  NONE d/t reported intolerance  Non-Statin Medications Recommendations from Today’s Visit:   Continue Praluent 150 mg [id number]  START Vascepa 2g PO BID with meals - provided pt with 4 days worth of samples  · After visit, TOMER Miranda informed me she completed PA for Vascepa and copay should be $9. She lvm for pt to inform him.  STOP OTC Omega 3's  Indication for PCSK9 Inhibitor, if applicable:  ASCVD with suboptimal control of LDL-C despite maximally tolerated statin  Supplements Recommended at this visit:  Continue fish oil and red rice yeast  Recommendations for Other Cardiovascular Risk Factors, emily all that apply:   Hypertension- continue antihypertensives and exercise; pt declined BP in clinic today  Diabetes/Impaired Fasting Glucose- pt was uninterested in discussing his pre-DM status today    Pt was able to obtain Praluent 150 mg [id number] and reports no issues with it. His LDL is below goal (<70), however his TGs is still elevated. Pt amenable to starting Vascepa in place of OTC fish oil. PA was approved (see above). Pt to continue going to the gym and exercising as tolerated, as well as reducing red meat intake and increasing fiber intake.    Emphasized importance of seeking emergency attention if he continues to experiencing \"heart attack\" or \"mini stroke\" symptoms.     Studies Ordered at Todays Visit:  None   Blood Work Ordered At Today’s visit:   Lipid panel, direct LDL  Follow-Up:   4 months    Sarahi Jones, PharmD  "

## 2020-10-14 NOTE — TELEPHONE ENCOUNTER
PA for Vascepa done on cover my meds and approved, will scan letter once received.     Approved 10/14/20  Case ID: [id number]  Status: Approved  Start Date: 09/14/2020  Coverage End Date:10/14/2023

## 2020-10-29 ENCOUNTER — TELEPHONE (OUTPATIENT)
Dept: MEDICAL GROUP | Facility: LAB | Age: 65
End: 2020-10-29

## 2020-10-29 DIAGNOSIS — Z23 NEED FOR VACCINATION: ICD-10-CM

## 2020-10-29 RX ORDER — INDOMETHACIN 50 MG/1
50 CAPSULE ORAL 2 TIMES DAILY PRN
Qty: 30 CAP | Refills: 1 | Status: SHIPPED | OUTPATIENT
Start: 2020-10-29 | End: 2021-06-09

## 2020-10-29 NOTE — TELEPHONE ENCOUNTER
1. Caller Name: Abimael                        Patient is on the MA Schedule 11/2/2020 for Shingrix vaccine/injection.    SPECIFIC Action To Be Taken: Orders pending, please sign.

## 2020-11-02 ENCOUNTER — NON-PROVIDER VISIT (OUTPATIENT)
Dept: MEDICAL GROUP | Facility: LAB | Age: 65
End: 2020-11-02
Payer: COMMERCIAL

## 2020-11-02 DIAGNOSIS — Z23 NEED FOR VACCINATION: ICD-10-CM

## 2020-11-02 PROCEDURE — 90750 HZV VACC RECOMBINANT IM: CPT | Performed by: FAMILY MEDICINE

## 2020-11-02 PROCEDURE — 90471 IMMUNIZATION ADMIN: CPT | Performed by: FAMILY MEDICINE

## 2020-11-02 NOTE — PROGRESS NOTES
"Abimael Hamilton is a 64 y.o. male here for a non-provider visit for:   SHINGRIX (Shingles)    Reason for immunization: continue or complete series started at the office  Immunization records indicate need for vaccine: Yes, confirmed with Epic  Minimum interval has been met for this vaccine: Yes  ABN completed: Not Indicated    Order and dose verified by: ms CORNEJO Dated  10/30/2019 was given to patient: No  All IAC Questionnaire questions were answered \"No.\"    Patient tolerated injection and no adverse effects were observed or reported: Yes    Pt scheduled for next dose in series: No  "

## 2020-11-03 ENCOUNTER — TELEPHONE (OUTPATIENT)
Dept: CARDIOLOGY | Facility: MEDICAL CENTER | Age: 65
End: 2020-11-03

## 2020-11-03 NOTE — TELEPHONE ENCOUNTER
Chart Prep    Attempted to contact patient regarding status of PFT and to try and r/s ECHO so patient results are back in time for [facility]. Mail box is full. Unable to LVM

## 2020-11-03 NOTE — TELEPHONE ENCOUNTER
Name: Abimael Hamilton MRN: [id number]   Date: 10/28/2020 Status: No Show   Appt Time: 8:15 AM Length: 60   Visit Type: EC ECHO 60 [[id number]] Copay: $0.00   Provider: REGINA EXAM 9 ECHO Department: [place]CHOCARDIOLOGY [facility] [place]     ECHO PER LS

## 2020-12-02 ENCOUNTER — TELEPHONE (OUTPATIENT)
Dept: CARDIOLOGY | Facility: PHYSICIAN GROUP | Age: 65
End: 2020-12-02

## 2020-12-02 ENCOUNTER — HOSPITAL ENCOUNTER (OUTPATIENT)
Dept: CARDIOLOGY | Facility: MEDICAL CENTER | Age: 65
End: 2020-12-02
Attending: INTERNAL MEDICINE
Payer: COMMERCIAL

## 2020-12-02 DIAGNOSIS — R06.09 DOE (DYSPNEA ON EXERTION): ICD-10-CM

## 2020-12-02 DIAGNOSIS — Z95.3 S/P AORTIC VALVE REPLACEMENT WITH BIOPROSTHETIC VALVE: ICD-10-CM

## 2020-12-02 LAB
LV EJECT FRACT  99904: 65
LV EJECT FRACT MOD 2C 99903: 70.46
LV EJECT FRACT MOD 4C 99902: 65.28
LV EJECT FRACT MOD BP 99901: 67.12

## 2020-12-02 PROCEDURE — 93306 TTE W/DOPPLER COMPLETE: CPT | Mod: 26 | Performed by: INTERNAL MEDICINE

## 2020-12-02 PROCEDURE — 93306 TTE W/DOPPLER COMPLETE: CPT

## 2020-12-02 NOTE — TELEPHONE ENCOUNTER
Chart Prep    Attempted to call patient regarding PFT results, still do not see results in chart. Checking to see if PFT was completed. Unable to LVM, VM box is full.

## 2020-12-03 ENCOUNTER — TELEPHONE (OUTPATIENT)
Dept: CARDIOLOGY | Facility: MEDICAL CENTER | Age: 65
End: 2020-12-03

## 2020-12-03 NOTE — TELEPHONE ENCOUNTER
Returned Gabrielle's call. She states the order she received from us for pt's PFT states a bronchopulmonary challenge. Gabrielle wants to see if LS means a methacholine challenge, if she does then Gabrielle needs the order to say that, as it requires them to get it from the pharmacy and the pharmacy will not give it unless the order says it.       To MARLEE, please advise

## 2020-12-03 NOTE — TELEPHONE ENCOUNTER
MARLEE Anthony called from [facility] with questions on the referral and orders. Please call them back at [phone].    Thank you,  Cary CISNEROS

## 2020-12-08 ENCOUNTER — HOSPITAL ENCOUNTER (OUTPATIENT)
Dept: PULMONOLOGY | Facility: MEDICAL CENTER | Age: 65
End: 2020-12-08
Attending: INTERNAL MEDICINE
Payer: COMMERCIAL

## 2020-12-08 PROCEDURE — 94060 EVALUATION OF WHEEZING: CPT

## 2020-12-08 PROCEDURE — 94726 PLETHYSMOGRAPHY LUNG VOLUMES: CPT

## 2020-12-08 PROCEDURE — 94729 DIFFUSING CAPACITY: CPT

## 2020-12-08 RX ORDER — ALBUTEROL SULFATE 90 UG/1
2 AEROSOL, METERED RESPIRATORY (INHALATION)
Status: DISCONTINUED | OUTPATIENT
Start: 2020-12-08 | End: 2020-12-09 | Stop reason: HOSPADM

## 2020-12-08 ASSESSMENT — PULMONARY FUNCTION TESTS
FEV1_PERCENT_CHANGE: 2
FEV1/FVC_PERCENT_LLN: 64.79
FEV1: 2.69
FEV1/FVC: 76.33
FVC_LLN: 3.25
FEV1/FVC_PERCENT_CHANGE: 0
FEV1_PERCENT_PREDICTED: 86
FEV1/FVC_PREDICTED: 77.60
FVC_PERCENT_PREDICTED: 88
FEV1_PERCENT_CHANGE: 2
FEV1/FVC: 76.01
FEV1/FVC: 76.42
FVC: 3.52
FVC_PERCENT_PREDICTED: 90
FVC_LLN: 3.25
FEV1/FVC_PERCENT_PREDICTED: 99
FVC_PREDICTED: 3.89
FEV1/FVC_PERCENT_PREDICTED: 98
FEV1/FVC_PERCENT_LLN: 64.79
FEV1/FVC_PERCENT_PREDICTED: 97
FEV1_LLN: 2.52
FEV1/FVC_PERCENT_CHANGE: 100
FEV1_PREDICTED: 3.02
FEV1/FVC_PERCENT_PREDICTED: 98
FEV1: 2.61
FVC: 3.44
FEV1/FVC_PERCENT_PREDICTED: 78
FEV1_LLN: 2.52
FEV1_PERCENT_PREDICTED: 89
FEV1/FVC: 76

## 2020-12-09 ENCOUNTER — OFFICE VISIT (OUTPATIENT)
Dept: CARDIOLOGY | Facility: MEDICAL CENTER | Age: 65
End: 2020-12-09
Payer: COMMERCIAL

## 2020-12-09 VITALS
BODY MASS INDEX: 36.32 KG/M2 | HEART RATE: 66 BPM | DIASTOLIC BLOOD PRESSURE: 82 MMHG | WEIGHT: 226 LBS | SYSTOLIC BLOOD PRESSURE: 144 MMHG | HEIGHT: 66 IN | OXYGEN SATURATION: 98 %

## 2020-12-09 DIAGNOSIS — I25.718 ATHEROSCLEROSIS OF AUTOLOGOUS VEIN CORONARY ARTERY BYPASS GRAFT WITH STABLE ANGINA PECTORIS (HCC): ICD-10-CM

## 2020-12-09 DIAGNOSIS — Z95.3 S/P AORTIC VALVE REPLACEMENT WITH BIOPROSTHETIC VALVE: ICD-10-CM

## 2020-12-09 DIAGNOSIS — E78.5 DYSLIPIDEMIA: ICD-10-CM

## 2020-12-09 DIAGNOSIS — Z78.9 STATIN INTOLERANCE: ICD-10-CM

## 2020-12-09 DIAGNOSIS — I11.9 BENIGN HYPERTENSIVE HEART DISEASE WITHOUT HEART FAILURE: ICD-10-CM

## 2020-12-09 DIAGNOSIS — I20.89 EFFORT ANGINA (HCC): ICD-10-CM

## 2020-12-09 DIAGNOSIS — I65.23 BILATERAL CAROTID ARTERY OCCLUSION: ICD-10-CM

## 2020-12-09 DIAGNOSIS — Z95.1 STATUS POST CARDIAC REVASCULARIZATION WITH BYPASS AORTOCORONARY ANASTOMOSIS OF FIVE CORONARY VESSELS: ICD-10-CM

## 2020-12-09 PROCEDURE — 99214 OFFICE O/P EST MOD 30 MIN: CPT | Performed by: NURSE PRACTITIONER

## 2020-12-09 RX ORDER — METOPROLOL SUCCINATE 100 MG/1
100 TABLET, EXTENDED RELEASE ORAL DAILY
Qty: 30 TAB | Refills: 11 | Status: SHIPPED | OUTPATIENT
Start: 2020-12-09 | End: 2022-01-01

## 2020-12-09 RX ORDER — EVOLOCUMAB 140 MG/ML
1 INJECTION, SOLUTION SUBCUTANEOUS
Qty: 2 EACH | Refills: 11 | Status: SHIPPED
Start: 2020-12-09 | End: 2021-02-10

## 2020-12-09 ASSESSMENT — ENCOUNTER SYMPTOMS
HEADACHES: 0
ORTHOPNEA: 0
NAUSEA: 0
SHORTNESS OF BREATH: 1
HEMOPTYSIS: 0
SPUTUM PRODUCTION: 0
COUGH: 0
CHILLS: 0
PND: 0
FEVER: 0
DIZZINESS: 1
VOMITING: 0
CLAUDICATION: 0
WHEEZING: 0
PALPITATIONS: 0

## 2020-12-09 ASSESSMENT — FIBROSIS 4 INDEX: FIB4 SCORE: 2.05

## 2020-12-09 NOTE — PROGRESS NOTES
Chief Complaint   Patient presents with   • Coronary Artery Disease     4 Week Follow Up       Subjective:   Abimael Hamilton is a 65 y.o. male who presents today for complex heart care.  Patient was last seen by Dr. Morales on 10/7/2020.    Since then the patient had echocardiogram which showed his aortic valve is working appropriately and EF is 65%.  Pulmonary function tests were obtained and indicated FEV1 2.61 L, 86% predicted, FEV1/FVC 97% predicted, FEF 25-75% 82% predicted, and DLCO 112% predicted.  I have explained to the patient that he may have just a bit of reactive airways disease and should follow-up with primary care if he feels he requires an inhaler.  The patient is basically having chest pain daily.  He had to take 2 nitroglycerin yesterday.  His blood pressure still elevated at 144/82.  Patient is compliant with Praluent 150 mg every 14 days, amlodipine 5 mg daily, ASA 81 mg daily, clopidogrel 75 mg daily, isosorbide mononitrate 120 mg every morning, lisinopril 20 mg daily, metoprolol SR 75 mg daily and nitroglycerin as needed, which is on a regular basis.     He has multivessel coronary artery disease, prior 5 vessel CABG in 1998 (CP and syncope), and re-do 3-V CABG in 12/2015 as well as bioprosthetic AVR at that time ([facility]).  Had a PET nuclear stress test 11/2018, ischemia and TID noted.  Subsequent left heart catheterization 11/2018 showed all grafts are occluded with the exception of his LIMA-LAD which fills his circumflex distribution via a jump graft. His native coronary arteries are severely diseased as well.   EF has been 60%.  He has had recurrent Mis, unable to do intervention. Trops normal 2019.     He is maintained on isosorbide and using nitro for CP.  On long-term dual antiplatelet therapy.  Remains on metoprolol.  He was taken off of DAPT once, had more Cps.  Had episode of severe CP a few months ago, was on the ground, lasted for a while, 4 nitros.     Somewhere recorded  he has a 4.5 cm thoracic aortic aneurysm but I reviewed his CT scan from 2018, the asc aorta and desc aorta and arch are normal size.     He had sx of CVA, arm pain in left arm, came up to his neck, then head.  Now when he eats spicy food, sweats only on the left side of the head.     Has hypertension, mild LVH, BP up a bit today.  Has hyperlipidemia,  without meds, triglycerides elevated.    Statin intolerance, has what sounds like rhabdo with Lipitor, he will not take statins again, not even one time per week.  Has re started on Praluent, prior LDL on this was 77.  Has moderate carotid atherosclerosis by ultrasound February 2018.     Has chronic FUNES, [facility] 3, mostly due to angina. Now feels like it is worse.  No significant orthopnea.  Mild LE edema, some venous insuff after veins removed for CABG.  No significant palpitations, lightheadedness, or presyncope/syncope.   No radha symptoms of leg claudication, probably due to limiting CP.  No stroke/TIA like symptoms.     Wife is Ana, she is here today.    Past Medical History:   Diagnosis Date   • Arthritis    • Benign hypertensive heart disease without heart failure 11/14/2011   • CAD (coronary artery disease)    • Congestive heart failure (HCC)    • Coronary atherosclerosis of autologous vein bypass graft 11/14/2011   • Essential hypertension 4/25/2019   • Gout    • Heart valve disease    • High cholesterol    • History of pancreatitis    • History of pancreatitis    • Hypertension    • Migraine 2/16/2019   • Muscle cramps 10/4/2011   • Myocardial infarct (HCC)     Multiple MI's, 1998, 2015   • Obesity 11/14/2011   • Pain     Joints   • Postsurgical aortocoronary bypass status 7/26/2016    Status post redo 3-V CABG in [place] 12/2015: SVG-acute marginal, SVG sequential to LAD, and OM    • Renal disorder     Hx of Acute renal failure r/t medication/statins   • S/P aortic valve replacement with bioprosthetic valve 7/26/2016    #23 Pe[place]za Magna AVR  (bioprosthetic)    • Statin intolerance 7/26/2016   • Status post cardiac revascularization with bypass aortocoronary anastomosis of five coronary vessels 7/26/2016    5-V CABG done in 1998 (done in [place] at [facility]), patent LIMA to LAD, occluded SVG-OM, occluded SVG-RCA by cardiac catheterization 11/15/2007 with other vein grafts not identified at that time, poor targets for revascularization and declined repeat CABG in 2007 by [facility]. No ischemic was identified by MPI 11/17/07 with an EF of 50%.       Past Surgical History:   Procedure Laterality Date   • SHOULDER DECOMPRESSION ARTHROSCOPIC Right 9/5/2017    Procedure: SHOULDER DECOMPRESSION ARTHROSCOPIC SUBACROMIAL;  Surgeon: Mg Campos M.D.;  Location: [facility];  Service: Orthopedics   • ARTHROSCOPIC LABRAL DEBRIDEMENT Right 9/5/2017    Procedure: ARTHROSCOPIC LABRAL DEBRIDEMENT;  Surgeon: Mg Campos M.D.;  Location: [facility];  Service: Orthopedics   • SHOULDER ARTHROSCOPY W/ ROTATOR CUFF REPAIR Right 9/5/2017    Procedure: SHOULDER ARTHROSCOPY W/ ROTATOR CUFF REPAIR;  Surgeon: Mg Campos M.D.;  Location: [facility];  Service: Orthopedics   • SHOULDER ARTHROSCOPY W/ BICIPITAL TENODESIS REPAIR Right 9/5/2017    Procedure: SHOULDER ARTHROSCOPY W/ BICIPITAL TENODESIS REPAIR;  Surgeon: Mg Campos M.D.;  Location: [facility];  Service: Orthopedics   • SYNOVECTOMY Right 9/5/2017    Procedure: SYNOVECTOMY;  Surgeon: Mg Campos M.D.;  Location: [facility];  Service: Orthopedics   • MULTIPLE CORONARY ARTERY BYPASS  12/08/2015    3 way bypass & Bovine valve   • LAMINOTOMY  12/2004    Diskectomy L4-L5, L5-S1   • MULTIPLE CORONARY ARTERY BYPASS  11/11/1998    5 way bypass   • BIOPSY GENERAL      buttock lesion   • EYE SURGERY     • MITRAL VALVE REPLACE       Family History   Problem Relation Age of Onset   • Heart Attack Father         MI and  CABG, unknown age   • Hyperlipidemia Father    • Cancer Mother         Breast   • Heart Disease Brother    • Arthritis Maternal Grandmother    • Stroke Neg Hx    • Diabetes Neg Hx    • Lung Disease Neg Hx      Social History     Socioeconomic History   • Marital status:      Spouse name: Not on file   • Number of children: Not on file   • Years of education: Not on file   • Highest education level: Not on file   Occupational History   • Not on file   Social Needs   • Financial resource strain: Not on file   • Food insecurity     Worry: Not on file     Inability: Not on file   • Transportation needs     Medical: Not on file     Non-medical: Not on file   Tobacco Use   • Smoking status: Former Smoker     Packs/day: 3.00     Years: 20.00     Pack years: 60.00     Types: Cigarettes     Quit date: 1992     Years since quittin.9   • Smokeless tobacco: Never Used   Substance and Sexual Activity   • Alcohol use: Not Currently     Comment: 4 per month   • Drug use: Yes     Types: Marijuana, Inhaled     Comment: Marijuana- Daily (4 times per day)   • Sexual activity: Yes     Partners: Female   Lifestyle   • Physical activity     Days per week: Not on file     Minutes per session: Not on file   • Stress: Not on file   Relationships   • Social connections     Talks on phone: Not on file     Gets together: Not on file     Attends [employer] service: Not on file     Active member of club or organization: Not on file     Attends meetings of clubs or organizations: Not on file     Relationship status: Not on file   • Intimate partner violence     Fear of current or ex partner: Not on file     Emotionally abused: Not on file     Physically abused: Not on file     Forced sexual activity: Not on file   Other Topics Concern   • Not on file   Social History Narrative   • Not on file     Allergies   Allergen Reactions   • Gemfibrozil      Dehydration,   • Lipitor [Atorvastatin Calcium] Unspecified     Severe Muscle cramps,  dehydration   • Lovastatin      Dehydration, severe muscle cramps   • Tricor Unspecified     Dehydration, severe muscle cramps     Outpatient Encounter Medications as of 12/9/2020   Medication Sig Dispense Refill   • indomethacin (INDOCIN) 50 MG Cap Take 1 Cap by mouth 2 times a day as needed. 30 Cap 1   • Icosapent Ethyl (VASCEPA) 1 g Cap Take 2 g by mouth 2 times a day, with meals. 180 Cap 1   • nitroglycerin (NITROSTAT) 0.4 MG SL Tab Place 1 Tab under tongue as needed for Chest Pain (1 tab SL every 5 mins, max of 3 doses as needed for CP, if no relief call 911). 25 Tab 1   • metoprolol SR (TOPROL XL) 25 MG TABLET SR 24 HR TAKE THREE TABLETS BY MOUTH DAILY 90 Tab 2   • Alirocumab (PRALUENT) 150 MG/ML Solution Auto-injector Inject 150 mg as instructed every 14 days. 6 PEN 1   • allopurinol (ZYLOPRIM) 100 MG Tab TAKE ONE TABLET BY MOUTH ONE TIME DAILY 90 Tab 2   • lisinopril (PRINIVIL) 20 MG Tab TAKE ONE TABLET BY MOUTH ONE TIME DAILY 90 Tab 2   • amLODIPine (NORVASC) 5 MG Tab TAKE ONE TABLET BY MOUTH ONE TIME DAILY 90 Tab 2   • isosorbide mononitrate (IMDUR) 120 MG CR tablet Take 1 Tab by mouth every morning. 90 Tab 3   • clopidogrel (PLAVIX) 75 MG Tab TAKE ONE TABLET BY MOUTH ONE TIME DAILY 90 Tab 3   • acetaminophen (TYLENOL) 500 MG Tab Take 1,500 mg by mouth every 6 hours as needed for Moderate Pain.     • Red Yeast Rice Extract (RED YEAST RICE PO) Take 1 Tab by mouth every day.     • magnesium oxide (MAG-OX) 400 MG Tab Take 400 mg by mouth every day.     • aspirin 81 MG EC tablet Take 1 Tab by mouth every day. 30 Tab 11   • Glucosamine-Chondroit-Vit C-Mn (GLUCOSAMINE CHONDROITIN COMPLX) CAPS Take 2 Caps by mouth every day.       No facility-administered encounter medications on file as of 12/9/2020.      Review of Systems   Constitutional: Positive for malaise/fatigue. Negative for chills and fever.   Respiratory: Positive for shortness of breath. Negative for cough, hemoptysis, sputum production and wheezing.   "  Cardiovascular: Positive for chest pain and leg swelling. Negative for palpitations, orthopnea, claudication and PND.   Gastrointestinal: Negative for nausea and vomiting.   Neurological: Positive for dizziness. Negative for headaches.   All other systems reviewed and are negative.       Objective:   /82 (BP Location: Left arm, Patient Position: Sitting, BP Cuff Size: Adult)   Pulse 66   Ht 1.676 m (5' 6\")   Wt 102.5 kg (226 lb)   SpO2 98%   BMI 36.48 kg/m²     Physical Exam   Constitutional: He appears well-developed and well-nourished.   Eyes: EOM are normal.   Neck: Neck supple. No JVD present.   Cardiovascular: Normal rate, regular rhythm and normal heart sounds.   No murmur heard.  No edema   Pulmonary/Chest: Effort normal and breath sounds normal.   Abdominal: Soft.   Neurological:   CN II-XII grossly intact   Skin: Skin is warm and dry.   Psychiatric: He has a normal mood and affect. His behavior is normal. Judgment and thought content normal.   Nursing note and vitals reviewed.    ECG 7/23/2019  Sinus, rate 68, first-degree AV delay, incomplete left bundle branch block, nonspecific T wave changes     Echocardiogram, 9/21/2018  Normal left ventricular ejection fraction (measured at 70%) with normal regional wall motion.  Mildly stenotic bioprosthetic aortic valve with a mean gradient measured at 27 mm a radiant measured at 48 mmHg (V-max 3.5 m/s) without perivalvular leak.  Left atrium is mildly dilated with a volume index measured at 39 mL/meter squared     Echocardiogram, 8/4/2016:  Normal left ventricular size and systolic function, EF 60%.  Mild concentric left ventricular hypertrophy.  Mild mitral regurgitation.  Normally functioning bovine bioprosthetic aortic valve.   Normal right sided pressures.  No prior studies for comparison     Myocardial perfusion imaging, 11/7/2018:  IMPRESSION:  1.  Dipyridamole stress negative for chest discomfort and negative for   ischemic EKG changes.  2.  PET " perfusion images are suggestive of ischemia in the proximal to mid inferior segment and in the proximal mid bilateral segment.  There is no definite infarction.  In addition TID was present which could be indicative of   multivessel disease, possibly more severe than indicated by the perfusion study.  3.  The patient's resting ejection fraction was mildly reduced and david appropriately during dipyridamole stress.  There was basilar to mid inferior hypokinesis noted     Cardiac catheterization, 11/16/2018:  1.  Angina  2.  Positive stress test for inferior ischemia  3.  CAD status post CABG in the 90s and redo in 2015  4.  Bioprosthetic AVR 2015  5.  Poorly controlled hypertension  6.  Morbid obesity  POST  1.  Multivessel coronary artery disease  2.  Occluded vein grafts x6   3.  LIMA to LAD with focal 70-80% distal anastomotic stenosis 4.  Severe native coronary artery disease not amenable to PCI 5.  Poorly controlled hypertension  RECOMMENDATIONS:  His FORD to LAD supplies both the distal LAD territory as well as via retrograde partially occluded jump graft the circumflex territory and is essentially his last remaining vessel.  There is WESLEY-3 flow and he is not maximally managed medically, in addition he has  of the RCA which was dominant and the vein graft is occluded and this is his only ischemic territory on noninvasive imaging.  1.  Medical therapy  2. Consideration of high risk last vessel PCI LIMA to LAD anastomosis should he prove to not be a redo surgical candidate (CT surgical consultation prior to any potential PCI would be required) and not be well managed with better medical therapy.  3. Weight loss     CTA 8/20/2018  1.  The pulmonary arteries are suboptimally opacified limiting evaluation, no large central pulmonary embolus is appreciated. Additional imaging would be required for definitive exclusion of pulmonary embolism  2.  Hepatomegaly and diffuse hepatic steatosis.  3.  Right  nephrolithiasis    Echocardiogram 12/2/2020  CONCLUSIONS  Left ventricular ejection fraction is visually estimated to be 65%.  Mild concentric left ventricular hypertrophy.  Known bioprosthetic aortic valve replacement with mildly increased   gradient: Vmax is 3.1 m/s, MG 22 mmHg, ALIS 1.1 cm2.  Unable to estimate RVSP, normal estimated RAP.  Compare to prior echo on 9/21/18, no significant change, gradients   continue to be mildly elevated, were elevated on prior.    Pulmonary function tests 10/4/2020  FEV1 2.61 L, 86% predicted  FEV1/FVC 97% predicted  FEF 25-75% 82% predicted  % predicted  DLCO 112% predicted      Assessment:     1. Atherosclerosis of autologous vein coronary artery bypass graft with stable angina pectoris (HCC)     2. Benign hypertensive heart disease without heart failure     3. Status post cardiac revascularization with bypass aortocoronary anastomosis of five coronary vessels     4. Bilateral carotid artery occlusion     5. Statin intolerance     6. S/P aortic valve replacement with bioprosthetic valve     7. Dyslipidemia         Medical Decision Making:  Today's Assessment / Status / Plan:   Due to ongoing chest pain will increase metoprolol SR to 100 mg daily.  Consider increasing isosorbide mononitrate if chest pain continues.  Patient has a letter that states he will have to change from Praluent to Repatha so that has been ordered.  Will refer to [facility] for chronic angina and cardiovascular disease.  There may be a possible study that he could be enrolled in.      Follow-up visit in 3 months with Dr. Morales.    Please note that this dictation was created using voice recognition software.  I have made every reasonable attempt to correct obvious errors, but it is possible there are errors of grammar or possibly content that I did not discover before finalizing the note.

## 2020-12-14 NOTE — PROCEDURES
DATE OF SERVICE:  12/08/2020     PULMONARY FUNCTION TESTING REPORT     Please note that the patient gave good effort and the results of this test   meet the ATS standards for acceptability and repeatability.  The patient's   FEV1/FVC ratio was 76% predicted which is consistent with no evidence of any   obstructive lung disease.  The post-bronchodilator spirometry did not show any   significant bronchodilator response.  The total lung capacity is 110%   predicted which is normal.  The diffusion capacity is normal at 112%   predicted.     In summary, this pulmonary function testing revealed no evidence of   obstruction, no evidence of restriction, and a normal diffusion capacity.        ______________________________  MD HANS Sinha/ALLI/BIBIANA    DD:  12/13/2020 16:23  DT:  12/13/2020 16:43    Job#:  [id number]

## 2020-12-14 NOTE — PROCEDURES
DATE OF SERVICE:  12/09/2020     PULMONARY FUNCTION TESTING REPORT     Please note that the patient gave good effort and the results of this test   meet the ATS standards for acceptability and repeatability.  The patient's   FEV1/FVC ratio was 76% predicted which is consistent with no evidence of any   obstructive lung disease.  The post-bronchodilator spirometry did not show any   significant bronchodilator response.  The total lung capacity is 110%   predicted which is normal.  The diffusion capacity is normal at 112%   predicted.     In summary, this pulmonary function testing revealed no evidence of   obstruction, no evidence of restriction, and a normal diffusion capacity.        ______________________________  MD HANS Sinha/ALLI/BIBIANA    DD:  12/13/2020 16:23  DT:  12/13/2020 16:43    Job#:  [id number]

## 2020-12-16 ENCOUNTER — TELEPHONE (OUTPATIENT)
Dept: CARDIOLOGY | Facility: MEDICAL CENTER | Age: 65
End: 2020-12-16

## 2020-12-21 PROCEDURE — 94726 PLETHYSMOGRAPHY LUNG VOLUMES: CPT | Mod: 26 | Performed by: INTERNAL MEDICINE

## 2020-12-21 PROCEDURE — 94729 DIFFUSING CAPACITY: CPT | Mod: 26 | Performed by: INTERNAL MEDICINE

## 2020-12-21 PROCEDURE — 94060 EVALUATION OF WHEEZING: CPT | Mod: 26 | Performed by: INTERNAL MEDICINE

## 2020-12-29 ENCOUNTER — PATIENT MESSAGE (OUTPATIENT)
Dept: MEDICAL GROUP | Facility: LAB | Age: 65
End: 2020-12-29

## 2020-12-29 ENCOUNTER — APPOINTMENT (OUTPATIENT)
Dept: RADIOLOGY | Facility: MEDICAL CENTER | Age: 65
End: 2020-12-29
Attending: EMERGENCY MEDICINE
Payer: COMMERCIAL

## 2020-12-29 ENCOUNTER — HOSPITAL ENCOUNTER (EMERGENCY)
Facility: MEDICAL CENTER | Age: 65
End: 2020-12-29
Attending: EMERGENCY MEDICINE
Payer: COMMERCIAL

## 2020-12-29 VITALS
SYSTOLIC BLOOD PRESSURE: 173 MMHG | OXYGEN SATURATION: 95 % | HEIGHT: 66 IN | DIASTOLIC BLOOD PRESSURE: 95 MMHG | WEIGHT: 225.97 LBS | RESPIRATION RATE: 20 BRPM | BODY MASS INDEX: 36.32 KG/M2 | HEART RATE: 80 BPM | TEMPERATURE: 97.7 F

## 2020-12-29 DIAGNOSIS — M54.41 ACUTE BILATERAL LOW BACK PAIN WITH RIGHT-SIDED SCIATICA: ICD-10-CM

## 2020-12-29 DIAGNOSIS — M51.36 BULGE OF LUMBAR DISC WITHOUT MYELOPATHY: ICD-10-CM

## 2020-12-29 DIAGNOSIS — M48.061 SPINAL STENOSIS OF LUMBAR REGION, UNSPECIFIED WHETHER NEUROGENIC CLAUDICATION PRESENT: ICD-10-CM

## 2020-12-29 DIAGNOSIS — M54.42 ACUTE LEFT-SIDED LOW BACK PAIN WITH LEFT-SIDED SCIATICA: ICD-10-CM

## 2020-12-29 LAB
ALBUMIN SERPL BCP-MCNC: 4.3 G/DL (ref 3.2–4.9)
ALBUMIN/GLOB SERPL: 1.4 G/DL
ALP SERPL-CCNC: 71 U/L (ref 30–99)
ALT SERPL-CCNC: 22 U/L (ref 2–50)
ANION GAP SERPL CALC-SCNC: 13 MMOL/L (ref 7–16)
APPEARANCE UR: CLEAR
AST SERPL-CCNC: 23 U/L (ref 12–45)
BASOPHILS # BLD AUTO: 0.3 % (ref 0–1.8)
BASOPHILS # BLD: 0.03 K/UL (ref 0–0.12)
BILIRUB SERPL-MCNC: 0.6 MG/DL (ref 0.1–1.5)
BILIRUB UR QL STRIP.AUTO: NEGATIVE
BUN SERPL-MCNC: 14 MG/DL (ref 8–22)
CALCIUM SERPL-MCNC: 9.3 MG/DL (ref 8.4–10.2)
CHLORIDE SERPL-SCNC: 103 MMOL/L (ref 96–112)
CO2 SERPL-SCNC: 23 MMOL/L (ref 20–33)
COLOR UR: YELLOW
CREAT SERPL-MCNC: 0.99 MG/DL (ref 0.5–1.4)
EOSINOPHIL # BLD AUTO: 0.15 K/UL (ref 0–0.51)
EOSINOPHIL NFR BLD: 1.6 % (ref 0–6.9)
ERYTHROCYTE [DISTWIDTH] IN BLOOD BY AUTOMATED COUNT: 44.1 FL (ref 35.9–50)
GLOBULIN SER CALC-MCNC: 3.1 G/DL (ref 1.9–3.5)
GLUCOSE SERPL-MCNC: 108 MG/DL (ref 65–99)
GLUCOSE UR STRIP.AUTO-MCNC: NEGATIVE MG/DL
HCT VFR BLD AUTO: 50.6 % (ref 42–52)
HGB BLD-MCNC: 17.1 G/DL (ref 14–18)
IMM GRANULOCYTES # BLD AUTO: 0.02 K/UL (ref 0–0.11)
IMM GRANULOCYTES NFR BLD AUTO: 0.2 % (ref 0–0.9)
KETONES UR STRIP.AUTO-MCNC: NEGATIVE MG/DL
LEUKOCYTE ESTERASE UR QL STRIP.AUTO: NEGATIVE
LYMPHOCYTES # BLD AUTO: 1.81 K/UL (ref 1–4.8)
LYMPHOCYTES NFR BLD: 18.8 % (ref 22–41)
MCH RBC QN AUTO: 30.9 PG (ref 27–33)
MCHC RBC AUTO-ENTMCNC: 33.8 G/DL (ref 33.7–35.3)
MCV RBC AUTO: 91.5 FL (ref 81.4–97.8)
MICRO URNS: ABNORMAL
MONOCYTES # BLD AUTO: 0.63 K/UL (ref 0–0.85)
MONOCYTES NFR BLD AUTO: 6.6 % (ref 0–13.4)
MUCOUS THREADS #/AREA URNS HPF: ABNORMAL /HPF
NEUTROPHILS # BLD AUTO: 6.97 K/UL (ref 1.82–7.42)
NEUTROPHILS NFR BLD: 72.5 % (ref 44–72)
NITRITE UR QL STRIP.AUTO: NEGATIVE
NRBC # BLD AUTO: 0 K/UL
NRBC BLD-RTO: 0 /100 WBC
PH UR STRIP.AUTO: 7 [PH] (ref 5–8)
PLATELET # BLD AUTO: 203 K/UL (ref 164–446)
PMV BLD AUTO: 9.7 FL (ref 9–12.9)
POTASSIUM SERPL-SCNC: 4.4 MMOL/L (ref 3.6–5.5)
PROT SERPL-MCNC: 7.4 G/DL (ref 6–8.2)
PROT UR QL STRIP: NEGATIVE MG/DL
RBC # BLD AUTO: 5.53 M/UL (ref 4.7–6.1)
RBC # URNS HPF: ABNORMAL /HPF
RBC UR QL AUTO: ABNORMAL
SODIUM SERPL-SCNC: 139 MMOL/L (ref 135–145)
SP GR UR STRIP.AUTO: 1.02
WBC # BLD AUTO: 9.6 K/UL (ref 4.8–10.8)
WBC #/AREA URNS HPF: ABNORMAL /HPF

## 2020-12-29 PROCEDURE — 700111 HCHG RX REV CODE 636 W/ 250 OVERRIDE (IP): Performed by: EMERGENCY MEDICINE

## 2020-12-29 PROCEDURE — 96374 THER/PROPH/DIAG INJ IV PUSH: CPT

## 2020-12-29 PROCEDURE — 74176 CT ABD & PELVIS W/O CONTRAST: CPT

## 2020-12-29 PROCEDURE — 72148 MRI LUMBAR SPINE W/O DYE: CPT

## 2020-12-29 PROCEDURE — 85025 COMPLETE CBC W/AUTO DIFF WBC: CPT

## 2020-12-29 PROCEDURE — 80053 COMPREHEN METABOLIC PANEL: CPT

## 2020-12-29 PROCEDURE — 99284 EMERGENCY DEPT VISIT MOD MDM: CPT

## 2020-12-29 PROCEDURE — 700102 HCHG RX REV CODE 250 W/ 637 OVERRIDE(OP): Performed by: EMERGENCY MEDICINE

## 2020-12-29 PROCEDURE — A9270 NON-COVERED ITEM OR SERVICE: HCPCS | Performed by: EMERGENCY MEDICINE

## 2020-12-29 PROCEDURE — 96375 TX/PRO/DX INJ NEW DRUG ADDON: CPT

## 2020-12-29 PROCEDURE — 81001 URINALYSIS AUTO W/SCOPE: CPT

## 2020-12-29 RX ORDER — MORPHINE SULFATE 4 MG/ML
4 INJECTION, SOLUTION INTRAMUSCULAR; INTRAVENOUS ONCE
Status: DISCONTINUED | OUTPATIENT
Start: 2020-12-29 | End: 2020-12-29

## 2020-12-29 RX ORDER — LIDOCAINE 50 MG/G
1 PATCH TOPICAL EVERY 24 HOURS
Qty: 3 PATCH | Refills: 0 | Status: SHIPPED | OUTPATIENT
Start: 2020-12-29 | End: 2021-01-01

## 2020-12-29 RX ORDER — NAPROXEN 500 MG/1
1000 TABLET ORAL ONCE
Status: SHIPPED | COMMUNITY
End: 2021-04-16

## 2020-12-29 RX ORDER — HYDROMORPHONE HYDROCHLORIDE 1 MG/ML
0.5 INJECTION, SOLUTION INTRAMUSCULAR; INTRAVENOUS; SUBCUTANEOUS ONCE
Status: COMPLETED | OUTPATIENT
Start: 2020-12-29 | End: 2020-12-29

## 2020-12-29 RX ORDER — METHOCARBAMOL 750 MG/1
750 TABLET, FILM COATED ORAL 4 TIMES DAILY
Qty: 12 TAB | Refills: 0 | Status: SHIPPED | OUTPATIENT
Start: 2020-12-29 | End: 2021-01-01

## 2020-12-29 RX ORDER — OXYCODONE HYDROCHLORIDE AND ACETAMINOPHEN 5; 325 MG/1; MG/1
1 TABLET ORAL ONCE
Status: COMPLETED | OUTPATIENT
Start: 2020-12-29 | End: 2020-12-29

## 2020-12-29 RX ORDER — METHYLPREDNISOLONE 4 MG/1
TABLET ORAL
Qty: 1 EACH | Refills: 0 | Status: SHIPPED | OUTPATIENT
Start: 2020-12-29 | End: 2021-04-16

## 2020-12-29 RX ORDER — ALIROCUMAB 150 MG/ML
150 INJECTION, SOLUTION SUBCUTANEOUS
Status: SHIPPED | COMMUNITY
End: 2021-02-10 | Stop reason: SDUPTHER

## 2020-12-29 RX ORDER — OXYCODONE HYDROCHLORIDE 5 MG/1
10 TABLET ORAL EVERY 4 HOURS PRN
Status: SHIPPED | COMMUNITY
End: 2020-12-31

## 2020-12-29 RX ORDER — CHLORAL HYDRATE 500 MG
2000 CAPSULE ORAL DAILY
Status: SHIPPED | COMMUNITY
End: 2022-01-01

## 2020-12-29 RX ORDER — AMOXICILLIN 500 MG/1
2000 CAPSULE ORAL PRN
Status: SHIPPED | COMMUNITY
End: 2022-01-01

## 2020-12-29 RX ORDER — LORAZEPAM 2 MG/ML
0.5 INJECTION INTRAMUSCULAR ONCE
Status: COMPLETED | OUTPATIENT
Start: 2020-12-29 | End: 2020-12-29

## 2020-12-29 RX ORDER — ONDANSETRON 2 MG/ML
4 INJECTION INTRAMUSCULAR; INTRAVENOUS ONCE
Status: COMPLETED | OUTPATIENT
Start: 2020-12-29 | End: 2020-12-29

## 2020-12-29 RX ADMIN — ONDANSETRON 4 MG: 2 INJECTION INTRAMUSCULAR; INTRAVENOUS at 17:01

## 2020-12-29 RX ADMIN — HYDROMORPHONE HYDROCHLORIDE 0.5 MG: 1 INJECTION, SOLUTION INTRAMUSCULAR; INTRAVENOUS; SUBCUTANEOUS at 17:01

## 2020-12-29 RX ADMIN — OXYCODONE HYDROCHLORIDE AND ACETAMINOPHEN 1 TABLET: 5; 325 TABLET ORAL at 20:01

## 2020-12-29 RX ADMIN — LORAZEPAM 0.5 MG: 2 INJECTION INTRAMUSCULAR; INTRAVENOUS at 17:01

## 2020-12-29 ASSESSMENT — FIBROSIS 4 INDEX: FIB4 SCORE: 2.05

## 2020-12-29 NOTE — ED PROVIDER NOTES
"ED Provider Note  CHIEF COMPLAINT  Chief Complaint   Patient presents with   • Low Back Pain     for 2 weeks, increasing over the last 2 days. radiates down L leg.    • Nausea       HPI  Abimael Hamilton is a 65 y.o. male who presents to the ER with complaint of worsening low back pain over the last 2 weeks.  Since yesterday he is also had pain radiating down his left leg all the way to the lateral calf.  Patient has a history of chronic low back pain.  He had a discectomy in 2004.  He has had several epidurals as well, but none since the early 2000's.  He follows with a chiropractor.  He states that several times a year his back will flareup on him.  Otherwise he does however have a baseline low back pain.  He also has some \"lumps in his low back\" which he is asked physicians about.  He was told that these \"lumps\" are just \"fatty balls.\"  Patient has never had pain radiating down his leg.  He denies any numbness or tingling in his leg.  He says his leg feels a little weak.  He took some old pain medication that he had in a safe from 5 years ago.  The pain pills did not help him.  No loss of bowel or bladder control.  No numbness around his genital region.  No fevers or chills.  He is not immunocompromised in any way.  He says that the pain in his back feels similar to previous back pain, although significantly worse.  He said when it started 2 weeks ago he knew it was going to be worse than his normal low back flares.  Patient has chronic angina.  He says that his heart symptoms are the same as they always are.  Nothing new or different.    REVIEW OF SYSTEMS  See HPI for further details.  Positive for low back pain with radiating pain down the leg, chronic chest pain, weakness of the left leg.  Negative for fever, chills, hematuria, abdominal pain, nausea, vomiting, numbness/tingling of the leg, incontinence of bowel or bladder, saddle anesthesia.  All other systems are negative.    PAST MEDICAL HISTORY  Past " Medical History:   Diagnosis Date   • Arthritis    • Benign hypertensive heart disease without heart failure 11/14/2011   • CAD (coronary artery disease)    • Congestive heart failure (HCC)    • Coronary atherosclerosis of autologous vein bypass graft 11/14/2011   • Essential hypertension 4/25/2019   • Gout    • Heart valve disease    • High cholesterol    • History of pancreatitis    • History of pancreatitis    • Hypertension    • Migraine 2/16/2019   • Muscle cramps 10/4/2011   • Myocardial infarct (HCC)     Multiple MI's, 1998, 2015   • Obesity 11/14/2011   • Pain     Joints   • Postsurgical aortocoronary bypass status 7/26/2016    Status post redo 3-V CABG in [place] 12/2015: SVG-acute marginal, SVG sequential to LAD, and OM    • Renal disorder     Hx of Acute renal failure r/t medication/statins   • S/P aortic valve replacement with bioprosthetic valve 7/26/2016    #23 Pe[place]aloza Magna AVR (bioprosthetic)    • Statin intolerance 7/26/2016   • Status post cardiac revascularization with bypass aortocoronary anastomosis of five coronary vessels 7/26/2016    5-V CABG done in 1998 (done in [place] at [facility]), patent LIMA to LAD, occluded SVG-OM, occluded SVG-RCA by cardiac catheterization 11/15/2007 with other vein grafts not identified at that time, poor targets for revascularization and declined repeat CABG in 2007 by [facility]. No ischemic was identified by MPI 11/17/07 with an EF of 50%.         FAMILY HISTORY  Family History   Problem Relation Age of Onset   • Heart Attack Father         MI and CABG, unknown age   • Hyperlipidemia Father    • Cancer Mother         Breast   • Heart Disease Brother    • Arthritis Maternal Grandmother    • Stroke Neg Hx    • Diabetes Neg Hx    • Lung Disease Neg Hx        SOCIAL HISTORY  Social History     Socioeconomic History   • Marital status:      Spouse name: Not on file   • Number of children: Not on file   • Years of education: Not on file   • Highest education level: Not  on file   Occupational History   • Not on file   Social Needs   • Financial resource strain: Not on file   • Food insecurity     Worry: Not on file     Inability: Not on file   • Transportation needs     Medical: Not on file     Non-medical: Not on file   Tobacco Use   • Smoking status: Former Smoker     Packs/day: 3.00     Years: 20.00     Pack years: 60.00     Types: Cigarettes     Quit date: 1992     Years since quittin.0   • Smokeless tobacco: Never Used   Substance and Sexual Activity   • Alcohol use: Not Currently     Comment: 4 per month   • Drug use: Yes     Types: Marijuana, Inhaled     Comment: Marijuana- Daily (4 times per day)   • Sexual activity: Yes     Partners: Female   Lifestyle   • Physical activity     Days per week: Not on file     Minutes per session: Not on file   • Stress: Not on file   Relationships   • Social connections     Talks on phone: Not on file     Gets together: Not on file     Attends Protestant service: Not on file     Active member of club or organization: Not on file     Attends meetings of clubs or organizations: Not on file     Relationship status: Not on file   • Intimate partner violence     Fear of current or ex partner: Not on file     Emotionally abused: Not on file     Physically abused: Not on file     Forced sexual activity: Not on file   Other Topics Concern   • Not on file   Social History Narrative   • Not on file       SURGICAL HISTORY  Past Surgical History:   Procedure Laterality Date   • SHOULDER DECOMPRESSION ARTHROSCOPIC Right 2017    Procedure: SHOULDER DECOMPRESSION ARTHROSCOPIC SUBACROMIAL;  Surgeon: Mg Campos M.D.;  Location: [facility];  Service: Orthopedics   • ARTHROSCOPIC LABRAL DEBRIDEMENT Right 2017    Procedure: ARTHROSCOPIC LABRAL DEBRIDEMENT;  Surgeon: Mg Campos M.D.;  Location: [facility];  Service: Orthopedics   • SHOULDER ARTHROSCOPY W/ ROTATOR CUFF REPAIR Right 2017     "Procedure: SHOULDER ARTHROSCOPY W/ ROTATOR CUFF REPAIR;  Surgeon: Mg Campos M.D.;  Location: SUR[facility];  Service: Orthopedics   • SHOULDER ARTHROSCOPY W/ BICIPITAL TENODESIS REPAIR Right 9/5/2017    Procedure: SHOULDER ARTHROSCOPY W/ BICIPITAL TENODESIS REPAIR;  Surgeon: Mg Campos M.D.;  Location: SURGERY [facility];  Service: Orthopedics   • SYNOVECTOMY Right 9/5/2017    Procedure: SYNOVECTOMY;  Surgeon: Mg Campos M.D.;  Location: SURGERY [facility];  Service: Orthopedics   • MULTIPLE CORONARY ARTERY BYPASS  12/08/2015    3 way bypass & Bovine valve   • LAMINOTOMY  12/2004    Diskectomy L4-L5, L5-S1   • MULTIPLE CORONARY ARTERY BYPASS  11/11/1998    5 way bypass   • BIOPSY GENERAL      buttock lesion   • EYE SURGERY     • MITRAL VALVE REPLACE         CURRENT MEDICATIONS  Home Medications    **Home medications have not yet been reviewed for this encounter**         ALLERGIES  Allergies   Allergen Reactions   • Gemfibrozil      Dehydration,   • Lipitor [Atorvastatin Calcium] Unspecified     Severe Muscle cramps, dehydration   • Lovastatin      Dehydration, severe muscle cramps   • Tricor Unspecified     Dehydration, severe muscle cramps       PHYSICAL EXAM  VITAL SIGNS: /90   Pulse 68   Temp 36.5 °C (97.7 °F) (Oral)   Resp 18   Ht 1.676 m (5' 6\")   Wt 102.5 kg (225 lb 15.5 oz)   SpO2 99%   BMI 36.47 kg/m²      Constitutional: Well developed, well nourished; moderate distress here in the ER.  Lying on his right side on the gurney.  HENT: Normocephalic, atraumatic; Bilateral external ears normal; oropharyngeal examination deferred due to COVID-19 outbreak and lack of oropharyngeal complaint  Eyes: PERRL, EOMI, Conjunctiva normal. No discharge.   Neck:  Supple, nontender midline; No stridor; No nuchal rigidity.   Lymphatic: No cervical lymphadenopathy noted.   Cardiovascular: Regular rate and rhythm without murmurs, rubs, or gallop.   Thorax " & Lungs: No respiratory distress, decreased breath sounds bilaterally, but breath sounds are otherwise clear to auscultation bilaterally without wheezing, rales or rhonchi. Nontender chest wall. No crepitus or subcutaneous air  Abdomen: Soft, nontender, bowel sounds normal. No obvious masses; No pulsatile masses; no rebound, guarding, or peritoneal signs.   Skin: Good color; warm and dry without rash or petechia.  Back: There are few small lipomas just left of midline along the lumbar paraspinous muscle region.  There is tenderness along this region of the lumbar paraspinous muscles.  There is some mild tenderness in the lower lumbar spine, although most of the tenderness is in the left low paraspinous muscles, left sacrum, and left buttock.  Positive straight leg raise at 60 degrees on the left.  Negative straight leg raise on the right.  No CVA tenderness.   Extremities: Distal radial, dorsalis pedis, posterior tibial pulses are equal bilaterally; No edema; Nontender calves or saphenous, No cyanosis, No clubbing.   Musculoskeletal: Good range of motion in all major joints. No tenderness to palpation or major deformities noted.   Neurologic: Alert & oriented x 4, clear speech, sensation is intact to light touch.  Strength are decreased with testing of dorsiflexors on the left.  Plantar flexor strengths appear to be intact and 5 out of 5 however, patient gives way due to pain.        RADIOLOGY/PROCEDURES  Preliminary reading on the MRI of the lumbar spine shows severe canal stenosis at L3, L4 and L4, L5.  There is also an eccentric left disc protrusion at L2-L3.  There is no signs of epidural hematoma.  This preliminary read is per Dr Rucker.  Formal radiologic MRI read will be done in the morning    CT-RENAL COLIC EVALUATION(A/P W/O)   Final Result         1.  NO EVIDENCE OF HYDRONEPHROSIS OR HYDROURETER.      2.  1.2 CM CALCIFICATION IS IDENTIFIED IN THE UPPER POLE COLLECTING SYSTEM OF THE RIGHT KIDNEY.          MR-LUMBAR SPINE-W/O    (Results Pending)       COURSE & MEDICAL DECISION MAKING  Pertinent Labs & Imaging studies reviewed. (See chart for details)  Results for orders placed or performed during the hospital encounter of 12/29/20   CBC WITH DIFFERENTIAL   Result Value Ref Range    WBC 9.6 4.8 - 10.8 K/uL    RBC 5.53 4.70 - 6.10 M/uL    Hemoglobin 17.1 14.0 - 18.0 g/dL    Hematocrit 50.6 42.0 - 52.0 %    MCV 91.5 81.4 - 97.8 fL    MCH 30.9 27.0 - 33.0 pg    MCHC 33.8 33.7 - 35.3 g/dL    RDW 44.1 35.9 - 50.0 fL    Platelet Count 203 164 - 446 K/uL    MPV 9.7 9.0 - 12.9 fL    Neutrophils-Polys 72.50 (H) 44.00 - 72.00 %    Lymphocytes 18.80 (L) 22.00 - 41.00 %    Monocytes 6.60 0.00 - 13.40 %    Eosinophils 1.60 0.00 - 6.90 %    Basophils 0.30 0.00 - 1.80 %    Immature Granulocytes 0.20 0.00 - 0.90 %    Nucleated RBC 0.00 /100 WBC    Neutrophils (Absolute) 6.97 1.82 - 7.42 K/uL    Lymphs (Absolute) 1.81 1.00 - 4.80 K/uL    Monos (Absolute) 0.63 0.00 - 0.85 K/uL    Eos (Absolute) 0.15 0.00 - 0.51 K/uL    Baso (Absolute) 0.03 0.00 - 0.12 K/uL    Immature Granulocytes (abs) 0.02 0.00 - 0.11 K/uL    NRBC (Absolute) 0.00 K/uL   COMP METABOLIC PANEL   Result Value Ref Range    Sodium 139 135 - 145 mmol/L    Potassium 4.4 3.6 - 5.5 mmol/L    Chloride 103 96 - 112 mmol/L    Co2 23 20 - 33 mmol/L    Anion Gap 13.0 7.0 - 16.0    Glucose 108 (H) 65 - 99 mg/dL    Bun 14 8 - 22 mg/dL    Creatinine 0.99 0.50 - 1.40 mg/dL    Calcium 9.3 8.4 - 10.2 mg/dL    AST(SGOT) 23 12 - 45 U/L    ALT(SGPT) 22 2 - 50 U/L    Alkaline Phosphatase 71 30 - 99 U/L    Total Bilirubin 0.6 0.1 - 1.5 mg/dL    Albumin 4.3 3.2 - 4.9 g/dL    Total Protein 7.4 6.0 - 8.2 g/dL    Globulin 3.1 1.9 - 3.5 g/dL    A-G Ratio 1.4 g/dL   URINALYSIS (UA)    Specimen: Urine, Clean Catch   Result Value Ref Range    Color Yellow     Character Clear     Specific Gravity 1.020 <1.035    Ph 7.0 5.0 - 8.0    Glucose Negative Negative mg/dL    Ketones Negative Negative mg/dL     Protein Negative Negative mg/dL    Bilirubin Negative Negative    Nitrite Negative Negative    Leukocyte Esterase Negative Negative    Occult Blood Small (A) Negative    Micro Urine Req Microscopic    ESTIMATED GFR   Result Value Ref Range    GFR If African American >60 >60 mL/min/1.73 m 2    GFR If Non African American >60 >60 mL/min/1.73 m 2   URINE MICROSCOPIC (W/UA)   Result Value Ref Range    WBC 2-5 (A) /hpf    RBC 5-10 (A) /hpf    Mucous Threads Few /hpf       Patient presents to the ER with complaint of exacerbation of low back pain which began 2 weeks ago.  Yesterday he started noticing pain down his left leg.  He is never had pain down his left leg before.  No trauma or injury.  He said he felt a little weak in his left leg but he is able to walk.  He denies any numbness or tingling.  No loss of bowel or bladder control no saddle anesthesia.  No hematuria although he was found to have a little bit of blood in his urine here in the ER.  No abdominal pain.  The patient has significant cardiovascular disease.  Review of a CT scan from 2018 shows atherosclerosis of the aorta but no obvious AAA.  Patient was found to have some weakness with dorsiflexor testing of the left foot and great toe.  He also had some weakness with plantar flexor testing but I suspected this could have been more related to pain as he had good strength temporarily but then would give way due to pain.  He had normal sensation to light touch.  I elected to get an MRI scan of his low back as patient is on Plavix.  I suspected either a bulging disc.  I was less suspicious of epidural hematoma, but given his medication regimen of aspirin and Plavix, I wanted to be sure we did our due diligence and ruled this out as well.  The MRI scan was preliminary read by the radiologist.  There is no evidence for epidural hematoma.  The patient has no fever and is not immunocompromise.  At this time I do not think he has epidural abscess.  He was found  to have a large left eccentric left disc protrusion at L2-L3 with severe's canal stenosis at L3-L4 and L4-L5.  Patient is feeling better with Dilaudid and Ativan here in the ER.  He is resting much more comfortably.  I spoke with the neurosurgeon/spine surgeon on-call.  He believes that the patient symptoms are more likely related to the severe canal stenosis at L4-5 than they probably are related to this disc bulge.  He recommends discharge home with a prescription for Medrol Dosepak and he will see the patient in the office this coming week.  Patient did have some blood in his urine so I went ahead and did a CT scan without contrast of his abdomen pelvis.  There is no obstructing ureteral stone but he does have a 1.2 cm stone in the upper pole of the right kidney.  No left ureteral stone.  Additionally, I scrolled through the CT scan and saw that the aorta was normal in caliber.  Again, no evidence of AAA.  Patient has good pulses in his right leg.  There is no evidence for arterial compromise.  Patient will go home with Robaxin and Medrol Dosepak.  He has some left over narcotic prescriptions at home.  He is also going to go home with lidocaine patch and is to use ice on his back.  He is to call Dr. Fu's office tomorrow for appointment.      1915:  Discussed with Dr. Fu, neurosurgeon on call.  He says that it is fine to allow the patient to go home to follow-up with him in the office next week.  He would recommend a Medrol Dosepak.  Patient is to call tomorrow to schedule his appointment for next week.    I verified that the patient was wearing a mask and I was wearing appropriate PPE every time I entered the room. The patient's mask was on the patient at all times during my encounter except for a brief view of the oropharynx.    FINAL IMPRESSION  1. Spinal stenosis of lumbar region, unspecified whether neurogenic claudication present Acute    2. Bulge of lumbar disc without myelopathy Acute    3.  Acute left-sided low back pain with left-sided sciatica Acute         This dictation has been created using voice recognition software. The accuracy of the dictation is limited by the abilities of the software. I expect there may be some errors of grammar and possibly content. I made every attempt to manually correct the errors within my dictation. However, errors related to voice recognition software may still exist and should be interpreted within the appropriate context.    Electronically signed by: Sindhu Perez M.D., 12/29/2020 3:57 PM

## 2020-12-29 NOTE — ED TRIAGE NOTES
"Chief Complaint   Patient presents with   • Low Back Pain     for 2 weeks, increasing over the last 2 days. radiates down L leg.    • Nausea     /90   Pulse 68   Temp 36.5 °C (97.7 °F) (Oral)   Resp 18   Ht 1.676 m (5' 6\")   Wt 102.5 kg (225 lb 15.5 oz)   SpO2 99%   BMI 36.47 kg/m²     "

## 2020-12-30 NOTE — ED NOTES
Med rec updated and complete  Allergies reviewed  Pt asked me to call his wife (Ana) to verify all prescription medications.  Called pts wife (Ana) @ [phone] to verify all prescription medications  Pt reports no antibiotics in the last 2 weeks .

## 2020-12-30 NOTE — DISCHARGE INSTRUCTIONS
Follow-up with Dr. Fu, neurosurgeon, this coming week.  Please call tomorrow to schedule your follow-up appointment.    Use ice to help with the pain.  Avoid heat as that may make your back pain worse.    Return to the ER for any worsening back pain, changing back pain, worsening pain radiating down your leg, numbness/tingling to your leg, weakness of your leg, loss of bowel or bladder control, numbness around your genital area, fevers, chills, abdominal pain, rash, or for any concerns.

## 2020-12-30 NOTE — ED NOTES
ERP at bedside. Pt agrees with plan of care discussed by ERP. AIDET acknowledged with patient. IV established. Blood sent to lab. Medicinal interventions carried out per ERP orders. MRI questionnaire completed. Antony in low position, side rail up for pt safety.  Will continue to monitor.

## 2020-12-30 NOTE — ED NOTES
Patient is stable for discharge at this time, anticipatory guidance provided, close follow-up is encouraged, and ED return instructions have been detailed. Patient and family are both agreeable to the disposition and plan and discharged home in ambulatory state and in good condition.     Rx education provided, Pt verbalized understanding;

## 2020-12-31 RX ORDER — HYDROCODONE BITARTRATE AND ACETAMINOPHEN 10; 325 MG/1; MG/1
1 TABLET ORAL EVERY 6 HOURS PRN
Qty: 12 TAB | Refills: 0 | Status: SHIPPED | OUTPATIENT
Start: 2020-12-31 | End: 2021-01-03

## 2020-12-31 NOTE — TELEPHONE ENCOUNTER
I have messaged the patient and will prescribe meds to get him through the weekend. Thanks for your help!

## 2021-01-04 ENCOUNTER — TELEPHONE (OUTPATIENT)
Dept: CARDIOLOGY | Facility: MEDICAL CENTER | Age: 66
End: 2021-01-04

## 2021-01-04 NOTE — TELEPHONE ENCOUNTER
CATHERINE MORELOS (Long: [id number])   Rx #: [id number]   Repatha SureClick 140MG/ML auto-injectors   Form  [employer] Electronic PA Form   Created   2 hours ago   Sent to Plan   9 minutes ago   Plan Response   9 minutes ago   Submit Clinical Questions   1 minute ago   Determination   Favorable   less than a minute ago       Message from Plan  CaseId:[id number];Status:Approved;Review Type:Prior Auth;Coverage Start Date:12/05/2020;Coverage End Date:01/04/2022;

## 2021-01-07 ENCOUNTER — TELEPHONE (OUTPATIENT)
Dept: CARDIOLOGY | Facility: MEDICAL CENTER | Age: 66
End: 2021-01-07

## 2021-01-07 NOTE — TELEPHONE ENCOUNTER
ELLIOT Rose from [facility] is calling in regards to a STAT Clearance that was sent over this morning. Clearance is in medica. Rose states that the pt has surgery scheduled tomorrow 1/8 at 10:00am.  Please call rose if you have any questions at [phone]    Fax: [phone]    Thank you

## 2021-01-07 NOTE — TELEPHONE ENCOUNTER
"    Clearance request received from [facility] for pt's left L4/L5 and L5/S1 TFESI on 1/8/21. They are requesting that pt hold plavix for 3 days prior. Their note states that the pt \"hasn't taken his plavix for three days already and is in extreme pain so we want to do this epidural STAT\". Fax clearance response to [phone].     Pt has no hx of TIA/CVA   No CHADSVASC listed     To DB, please advise    "

## 2021-01-08 NOTE — TELEPHONE ENCOUNTER
NM: [facility]   PH: [phone]   PT NM: Abimael Hamilton    : 1955   RE: Stat clearance request. Scheduled  for 10am but he has a 3 hr drive    DISP HIST: 2021 05:05P NOHELIA p/disp  2021 05:06P DLR called bk line  no ans a/w greeting    --------------------------------------  Message History  Account: [id number]  Taken:  2021  5:05p NOHELIA  Serial#: [id number]

## 2021-01-08 NOTE — TELEPHONE ENCOUNTER
"Pt wife, Ana, called back concerned they are not going to get a call back tonight. The Pt is suffering and does not think the Pt can go through the weekend without this procedure. They are in a lot of pain and is not able to take medications. They stated that if we are unable to get the clearance for this patient they will call back and have \"not so nice words to say. They can be reached at [phone].    Thank you,  Cary CISNEROS   "

## 2021-01-08 NOTE — TELEPHONE ENCOUNTER
S/w DB and VR in person. They both state pt may proceed with his epidural injection today 1/8.     Note faxed to [facility], receipt confirmed.

## 2021-01-08 NOTE — TELEPHONE ENCOUNTER
This message is from yesterday evening. I have since faxed the clearance back and notified pt last night of clearance.

## 2021-01-29 ENCOUNTER — TELEPHONE (OUTPATIENT)
Dept: MEDICAL GROUP | Facility: LAB | Age: 66
End: 2021-01-29

## 2021-01-29 NOTE — TELEPHONE ENCOUNTER
"· Surgical/Dental Clearance paperwork received from [facility] requiring provider signature.     · All appropriate fields completed by Medical Assistant: No    · Paperwork placed in \"MA to Provider\" folder/basket. Awaiting provider completion/signature.  "

## 2021-02-04 NOTE — TELEPHONE ENCOUNTER
S/w DB in person and she states pt may proceed and hold ASA & Plavix for 3 days as requested. Clearance response faxed to [facility], receipt confirmed. Pt notified via [employer].

## 2021-02-10 ENCOUNTER — NON-PROVIDER VISIT (OUTPATIENT)
Dept: VASCULAR LAB | Facility: MEDICAL CENTER | Age: 66
End: 2021-02-10
Attending: INTERNAL MEDICINE
Payer: COMMERCIAL

## 2021-02-10 DIAGNOSIS — E78.5 DYSLIPIDEMIA: ICD-10-CM

## 2021-02-10 PROCEDURE — 99212 OFFICE O/P EST SF 10 MIN: CPT

## 2021-02-10 RX ORDER — EVOLOCUMAB 140 MG/ML
1 INJECTION, SOLUTION SUBCUTANEOUS
Qty: 2 EACH | Refills: 6 | Status: SHIPPED | OUTPATIENT
Start: 2021-02-10 | End: 2022-01-01

## 2021-02-10 NOTE — NON-PROVIDER
[facility] - FollowUp Visit   Date of Service: 10/14/2020    Abimael Hamilton is here for follow up of dyslipidemia    HPI  Pertinent Interval History since last visit:   Praluent no longer covered; insurance prefers Еленаa  Dharacepa was $100 - did not start because of cost.  Pt is now followed by [facility] and has a cardiac procedure (LIMA with Impella) on 4/  Current Prescription Lipid Lowering Medications - including dose:   Statin: None  Non-Statin: Praluent 150 mg [id number]  Current Lipid Lowering and Related Supplements:   Red yeast rice and OTC Omega 3 FAs  Any Current Side Effects Potentially Related to Lipid Lowering therapy?   No  Current Adherence to Lipid Lowering Therapies:  Complete  Any Previous History of Statin Intolerance?   Statin: Patient has been on atorvastatin and lovastatin               Outcome: Severe muscle aches, dehydration, and kidney failure per patient  Non-Statin: Gemfibrozil and Fenofibrate              Outcome: Severe muscle cramps and dehydration  Baseline Lipids Prior to Treatment:  Results for ABIMAEL HAMILTON (MRN [id number]) as of 2019 07:25    Ref. Range 10/15/2018 08:56   Cholesterol,Tot Latest Ref Range: 100 - 199 mg/dL 284 (H)   Triglycerides Latest Ref Range: 0 - 149 mg/dL 345 (H)   HDL Latest Ref Range: >=40 mg/dL 31 (A)   LDL Latest Ref Range: <100 mg/dL 184 (H)        SOCIAL HISTORY  Social History     Tobacco Use   Smoking Status Former Smoker   • Packs/day: 3.00   • Years: 20.00   • Pack years: 60.00   • Types: Cigarettes   • Quit date: 1992   • Years since quittin.1   Smokeless Tobacco Never Used      Change in weight: Stable  Exercise habits: pt started going back to the gym M- after it was reopened from COVID-19 restrictions   Diet: eating less red meat    DATA REVIEW  Most Recent Lipid Panel:   Lab Results   Component Value Date    CHOLSTRLTOT 154 10/12/2020    TRIGLYCERIDE 295 (H) 10/12/2020    HDL 34 (A) 10/12/2020    LDL 61  10/12/2020       Other Pertinent Blood Work:   Lab Results   Component Value Date    SODIUM 139 12/29/2020    POTASSIUM 4.4 12/29/2020    CHLORIDE 103 12/29/2020    CO2 23 12/29/2020    ANION 13.0 12/29/2020    GLUCOSE 108 (H) 12/29/2020    BUN 14 12/29/2020    CREATININE 0.99 12/29/2020    CALCIUM 9.3 12/29/2020    ASTSGOT 23 12/29/2020    ALTSGPT 22 12/29/2020    ALKPHOSPHAT 71 12/29/2020    TBILIRUBIN 0.6 12/29/2020    ALBUMIN 4.3 12/29/2020    AGRATIO 1.4 12/29/2020    CREACTPROT 0.49 02/18/2019    TSHULTRASEN 1.960 02/16/2019       ASSESSMENT AND PLAN  Patient Type, check all that apply:   Secondary Prevention  Established Atherosclerotic Cardiovascular Disease (ASCVD)  Yes, Details: hx of CABG  Other Established (non-atherosclerotic) Vascular Disease, if Present:    HTN, pre-DM2  Evidence of Heterozygous Familial Hypercholesterolemia (FH): Possible   - Has been having cardiac issues since he was 21 yo  - Father & brother both passed away of cardiac-related issues  ACC/AHA Indication for Statin Therapy, emily all that apply:   Established ASCVD: Indication for High intensity statin    Calculated Risk for ASCVD, if applicable:  N/A  Other Significant Risk Markers, if any, emily all that apply:  Family history of premature ASCVD in first degree relative  National Lipid Association (NLA) Goal (if applicable):  LDL-C:   <70 mg/dL  Lifestyle Recommendations From Today’s Visit:   Unable to talk about lifestyle at today's visit. He has been busy with cardiology appts between [facility] and [facility]. He learned today that he will be having a cardiac procedure at [facility] in April (per external chart review, the procedure will be LIMA with Impella device).   Statin Recommendations from Today's Visit  NONE d/t reported intolerance  Non-Statin Medications Recommendations from Today’s Visit:   Continue Praluent 150 mg [id number]  - pt has 2 syringes left; provided samples of 2 more syringes (LOT [id number], exp 03/31/2021)  Indication  for PCSK9 Inhibitor, if applicable:  ASCVD with suboptimal control of LDL-C despite maximally tolerated statin  Supplements Recommended at this visit:  Continue fish oil and red rice yeast  Recommendations for Other Cardiovascular Risk Factors, emily all that apply:   Hypertension- continue antihypertensives and exercise; pt declined BP in clinic today  Diabetes/Impaired Fasting Glucose- pt was uninterested in discussing his pre-DM status today    Pt was unable to p/u and start Vascepa because of cost. He has not gotten his lipid panel drawn.  Pt to get lipids drawn ASAP. Will also send RX for Repatha since his insurance changed formulary.    Studies Ordered at Todays Visit:  None   Blood Work Ordered At Today’s visit:   Lipid panel, direct LDL  Follow-Up:   4 months in June (after pt has procedure)    Sarahi Jones, SonD

## 2021-02-11 ENCOUNTER — TELEPHONE (OUTPATIENT)
Dept: VASCULAR LAB | Facility: MEDICAL CENTER | Age: 66
End: 2021-02-11

## 2021-02-11 DIAGNOSIS — I25.718 ATHEROSCLEROSIS OF AUTOLOGOUS VEIN CORONARY ARTERY BYPASS GRAFT WITH STABLE ANGINA PECTORIS (HCC): ICD-10-CM

## 2021-02-11 DIAGNOSIS — I20.89 EFFORT ANGINA (HCC): ICD-10-CM

## 2021-02-11 DIAGNOSIS — E78.5 DYSLIPIDEMIA: ICD-10-CM

## 2021-02-11 NOTE — TELEPHONE ENCOUNTER
The medication does not require a prior authorization at this time. I will release the Rx to [facility]. The Rx is RTS until 2/20/2021.

## 2021-02-12 RX ORDER — ISOSORBIDE MONONITRATE 120 MG/1
TABLET, EXTENDED RELEASE ORAL
Qty: 30 TABLET | Refills: 1 | Status: SHIPPED | OUTPATIENT
Start: 2021-02-12 | End: 2021-03-01 | Stop reason: SDUPTHER

## 2021-02-12 NOTE — TELEPHONE ENCOUNTER
Received request via: Pharmacy    Was the patient seen in the last year in this department? Yes    Does the patient have an active prescription (recently filled or refills available) for medication(s) requested? No     ISOSORB MONO 120 MG TAB KREM

## 2021-02-28 DIAGNOSIS — I25.10 CORONARY ARTERY DISEASE DUE TO CALCIFIED CORONARY LESION: ICD-10-CM

## 2021-02-28 DIAGNOSIS — I25.84 CORONARY ARTERY DISEASE DUE TO CALCIFIED CORONARY LESION: ICD-10-CM

## 2021-02-28 SDOH — ECONOMIC STABILITY: INCOME INSECURITY: IN THE LAST 12 MONTHS, WAS THERE A TIME WHEN YOU WERE NOT ABLE TO PAY THE MORTGAGE OR RENT ON TIME?: NO

## 2021-02-28 SDOH — ECONOMIC STABILITY: HOUSING INSECURITY: IN THE LAST 12 MONTHS, HOW MANY PLACES HAVE YOU LIVED?: 1

## 2021-02-28 SDOH — ECONOMIC STABILITY: TRANSPORTATION INSECURITY
IN THE PAST 12 MONTHS, HAS THE LACK OF TRANSPORTATION KEPT YOU FROM MEDICAL APPOINTMENTS OR FROM GETTING MEDICATIONS?: NO

## 2021-02-28 SDOH — ECONOMIC STABILITY: TRANSPORTATION INSECURITY
IN THE PAST 12 MONTHS, HAS LACK OF RELIABLE TRANSPORTATION KEPT YOU FROM MEDICAL APPOINTMENTS, MEETINGS, WORK OR FROM GETTING THINGS NEEDED FOR DAILY LIVING?: NO

## 2021-02-28 SDOH — ECONOMIC STABILITY: HOUSING INSECURITY
IN THE LAST 12 MONTHS, WAS THERE A TIME WHEN YOU DID NOT HAVE A STEADY PLACE TO SLEEP OR SLEPT IN A SHELTER (INCLUDING NOW)?: NO

## 2021-02-28 SDOH — HEALTH STABILITY: PHYSICAL HEALTH: ON AVERAGE, HOW MANY DAYS PER WEEK DO YOU ENGAGE IN MODERATE TO STRENUOUS EXERCISE (LIKE A BRISK WALK)?: 0 DAYS

## 2021-02-28 SDOH — HEALTH STABILITY: MENTAL HEALTH
STRESS IS WHEN SOMEONE FEELS TENSE, NERVOUS, ANXIOUS, OR CAN'T SLEEP AT NIGHT BECAUSE THEIR MIND IS TROUBLED. HOW STRESSED ARE YOU?: NOT AT ALL

## 2021-02-28 SDOH — HEALTH STABILITY: PHYSICAL HEALTH: ON AVERAGE, HOW MANY MINUTES DO YOU ENGAGE IN EXERCISE AT THIS LEVEL?: DECLINE

## 2021-02-28 ASSESSMENT — SOCIAL DETERMINANTS OF HEALTH (SDOH)
HOW OFTEN DO YOU GET TOGETHER WITH FRIENDS OR RELATIVES?: DECLINE
HOW OFTEN DO YOU ATTEND CHURCH OR RELIGIOUS SERVICES?: NEVER
WITHIN THE PAST 12 MONTHS, THE FOOD YOU BOUGHT JUST DIDN'T LAST AND YOU DIDN'T HAVE MONEY TO GET MORE: NEVER TRUE
HOW OFTEN DO YOU HAVE SIX OR MORE DRINKS ON ONE OCCASION: NEVER
HOW OFTEN DO YOU ATTENT MEETINGS OF THE CLUB OR ORGANIZATION YOU BELONG TO?: NEVER
HOW MANY DRINKS CONTAINING ALCOHOL DO YOU HAVE ON A TYPICAL DAY WHEN YOU ARE DRINKING: 1 OR 2
HOW OFTEN DO YOU HAVE A DRINK CONTAINING ALCOHOL: MONTHLY OR LESS
HOW HARD IS IT FOR YOU TO PAY FOR THE VERY BASICS LIKE FOOD, HOUSING, MEDICAL CARE, AND HEATING?: NOT VERY HARD
IN A TYPICAL WEEK, HOW MANY TIMES DO YOU TALK ON THE PHONE WITH FAMILY, FRIENDS, OR NEIGHBORS?: TWICE A WEEK
WITHIN THE PAST 12 MONTHS, YOU WORRIED THAT YOUR FOOD WOULD RUN OUT BEFORE YOU GOT THE MONEY TO BUY MORE: NEVER TRUE
DO YOU BELONG TO ANY CLUBS OR ORGANIZATIONS SUCH AS CHURCH GROUPS UNIONS, FRATERNAL OR ATHLETIC GROUPS, OR SCHOOL GROUPS?: NO

## 2021-03-01 ENCOUNTER — OFFICE VISIT (OUTPATIENT)
Dept: MEDICAL GROUP | Facility: LAB | Age: 66
End: 2021-03-01
Payer: COMMERCIAL

## 2021-03-01 VITALS
TEMPERATURE: 98.7 F | WEIGHT: 233 LBS | BODY MASS INDEX: 37.45 KG/M2 | SYSTOLIC BLOOD PRESSURE: 142 MMHG | OXYGEN SATURATION: 94 % | DIASTOLIC BLOOD PRESSURE: 88 MMHG | HEIGHT: 66 IN | RESPIRATION RATE: 13 BRPM | HEART RATE: 85 BPM

## 2021-03-01 DIAGNOSIS — I20.89 EFFORT ANGINA (HCC): ICD-10-CM

## 2021-03-01 DIAGNOSIS — I25.718 ATHEROSCLEROSIS OF AUTOLOGOUS VEIN CORONARY ARTERY BYPASS GRAFT WITH STABLE ANGINA PECTORIS (HCC): ICD-10-CM

## 2021-03-01 PROCEDURE — 99213 OFFICE O/P EST LOW 20 MIN: CPT | Performed by: FAMILY MEDICINE

## 2021-03-01 RX ORDER — RANOLAZINE 500 MG/1
500 TABLET, EXTENDED RELEASE ORAL
COMMUNITY
Start: 2021-01-11 | End: 2021-03-10

## 2021-03-01 RX ORDER — ISOSORBIDE MONONITRATE 120 MG/1
TABLET, EXTENDED RELEASE ORAL
Qty: 90 TABLET | Refills: 3 | Status: SHIPPED | OUTPATIENT
Start: 2021-03-01 | End: 2022-01-01

## 2021-03-01 RX ORDER — CLOPIDOGREL BISULFATE 75 MG/1
TABLET ORAL
Qty: 90 TABLET | Refills: 3 | Status: SHIPPED | OUTPATIENT
Start: 2021-03-01 | End: 2022-01-01

## 2021-03-01 RX ORDER — PANTOPRAZOLE SODIUM 40 MG/1
40 TABLET, DELAYED RELEASE ORAL DAILY
COMMUNITY
Start: 2021-01-22 | End: 2021-03-10

## 2021-03-01 ASSESSMENT — FIBROSIS 4 INDEX: FIB4 SCORE: 1.55

## 2021-03-01 ASSESSMENT — ENCOUNTER SYMPTOMS
VOMITING: 0
PALPITATIONS: 0
BLURRED VISION: 0
ABDOMINAL PAIN: 0
CHILLS: 0
NAUSEA: 0
FEVER: 0
HEADACHES: 0
WHEEZING: 0
DIZZINESS: 0
SHORTNESS OF BREATH: 0

## 2021-03-01 NOTE — PROGRESS NOTES
Subjective:   Abimael Hamilton is a 65 y.o. male here today for   Chief Complaint   Patient presents with   • Annual Exam   • Medication Refill       #Annual   -Reviewed all past medical history, family history, social history.  -Reviewed all screening/vaccinations: Discussed vaccinations include influenza, pneumococcal.  Patient declines at this time.  -Diet and Exercise:.  On improving diet and exercise regimen.  Patient has chronic back pain with stenosis which inhibits exercise regimen at this time.  -Tobacco, alcohol, recreational drug use: No change in chart    #CAD:  -Patient with an extensive cardiac history, status post 5 vessel CABG in 1998, redo three-vessel CABG in 2015, status post AVR 1998.  He is currently seen by both cardiology here in [facility] as well as at [facility].  Due to continuing chest pain and unstable angina patient plans to undergo cardiac catheterization at [facility] at the beginning of April.  He states he continues to have chest pain, using 2-3 nitros a day.  He continues to be on all medications at this time including amlodipine, Plavix, Imdur, lisinopril, Repatha, Metroprolol.  Requesting refill of Imdur at this time.  Will be following up with cardiologist next week.  Time.      Allergies   Allergen Reactions   • Gemfibrozil      Dehydration,Severe Muscle cramps   • Lipitor [Atorvastatin Calcium] Unspecified     Severe Muscle cramps, dehydration   • Lovastatin      Dehydration, severe muscle cramps   • Tricor Unspecified     Dehydration, severe muscle cramps         Current medicines (including changes today)  Current Outpatient Medications   Medication Sig Dispense Refill   • ranolazine (RANEXA) 500 MG TABLET SR 12 HR Take 500 mg by mouth.     • pantoprazole (PROTONIX) 40 MG Tablet Delayed Response Take 40 mg by mouth every day.     • isosorbide mononitrate (IMDUR) 120 MG CR tablet TAKE  ONE TABLET BY MOUTH EVERY MORNING 90 tablet 3   • clopidogrel (PLAVIX) 75 MG Tab TAKE ONE TABLET BY  MOUTH ONE TIME DAILY (Patient taking differently: Take 75 mg by mouth every day. TAKE ONE TABLET BY MOUTH ONE TIME DAILY) 90 Tab 3   • INDOMETHACIN PO Take 50 mg by mouth.     • Evolocumab, REPATHA, (REPATHA SURECLICK) 140 MG/ML Solution Auto-injector Inject 1 Each under the skin every 14 days. 2 Each 6   • Omega-3 Fatty Acids (FISH OIL) 1000 MG Cap capsule Take 2,000 mg by mouth every day.     • amoxicillin (AMOXIL) 500 MG Cap Take 2,000 mg by mouth one time. For dentist     • naproxen (NAPROSYN) 500 MG Tab Take 1,000 mg by mouth one time. Pts wife RX     • methylPREDNISolone (MEDROL DOSEPAK) 4 MG Tablet Therapy Pack Use as directed 1 Each 0   • metoprolol SR (TOPROL XL) 100 MG TABLET SR 24 HR Take 1 Tab by mouth every day. 30 Tab 11   • indomethacin (INDOCIN) 50 MG Cap Take 1 Cap by mouth 2 times a day as needed. (Patient not taking: Reported on 12/29/2020) 30 Cap 1   • nitroglycerin (NITROSTAT) 0.4 MG SL Tab Place 1 Tab under tongue as needed for Chest Pain (1 tab SL every 5 mins, max of 3 doses as needed for CP, if no relief call 911). 25 Tab 1   • allopurinol (ZYLOPRIM) 100 MG Tab TAKE ONE TABLET BY MOUTH ONE TIME DAILY (Patient taking differently: Take 100 mg by mouth every day.) 90 Tab 2   • lisinopril (PRINIVIL) 20 MG Tab TAKE ONE TABLET BY MOUTH ONE TIME DAILY (Patient taking differently: Take 20 mg by mouth every day.) 90 Tab 2   • amLODIPine (NORVASC) 5 MG Tab TAKE ONE TABLET BY MOUTH ONE TIME DAILY (Patient taking differently: Take 5 mg by mouth every day.) 90 Tab 2   • acetaminophen (TYLENOL) 500 MG Tab Take 1,000 mg by mouth every 6 hours as needed for Moderate Pain.     • Red Yeast Rice Extract (RED YEAST RICE PO) Take 2 Tabs by mouth every day.     • aspirin 81 MG EC tablet Take 1 Tab by mouth every day. 30 Tab 11   • Glucosamine-Chondroit-Vit C-Mn (GLUCOSAMINE CHONDROITIN COMPLX) CAPS Take 2 Caps by mouth every day.       No current facility-administered medications for this visit.     He  has a  "past medical history of Arthritis, Benign hypertensive heart disease without heart failure (11/14/2011), CAD (coronary artery disease), Congestive heart failure (HCC), Coronary atherosclerosis of autologous vein bypass graft (11/14/2011), Essential hypertension (4/25/2019), Gout, Heart valve disease, High cholesterol, History of pancreatitis, History of pancreatitis, Hypertension, Migraine (2/16/2019), Muscle cramps (10/4/2011), Myocardial infarct (HCC), Obesity (11/14/2011), Pain, Postsurgical aortocoronary bypass status (7/26/2016), Renal disorder, S/P aortic valve replacement with bioprosthetic valve (7/26/2016), Statin intolerance (7/26/2016), and Status post cardiac revascularization with bypass aortocoronary anastomosis of five coronary vessels (7/26/2016).    ROS   Review of Systems   Constitutional: Negative for chills and fever.   HENT: Negative for hearing loss.    Eyes: Negative for blurred vision.   Respiratory: Negative for shortness of breath and wheezing.    Cardiovascular: Positive for chest pain. Negative for palpitations.   Gastrointestinal: Negative for abdominal pain, nausea and vomiting.   Neurological: Negative for dizziness and headaches.          Objective:     Physical Exam:  /88   Pulse 85   Temp 37.1 °C (98.7 °F) (Temporal)   Resp 13   Ht 1.676 m (5' 5.98\")   Wt 106 kg (233 lb)   SpO2 94%  Body mass index is 37.63 kg/m².   Constitutional: Alert, no distress.  Skin: Warm, dry, good turgor, no rashes in visible areas.  Respiratory: Unlabored respiratory effort, lungs clear to auscultation, no wheezes, no rhonchi.  Cardiovascular: Normal S1, S2, no murmur, no edema.  Abdomen: Soft, non-tender, no masses, no hepatosplenomegaly.  Psych: Alert and oriented x3, normal affect and mood.    Assessment and Plan:     1. Atherosclerosis of autologous vein coronary artery bypass graft with stable angina pectoris (HCC)  -Status: Unstable.  Continuing unstable angina requiring catheterization " in 1 month.  We will follow-up with [facility] regarding testing.  Will refill Imdur at this time.   -Courage continuation of all activity and good diet plan.  Patient will follow up with me in 6 months.  - isosorbide mononitrate (IMDUR) 120 MG CR tablet; TAKE  ONE TABLET BY MOUTH EVERY MORNING  Dispense: 90 tablet; Refill: 3    2. Dyslipidemia  -Status: Stable.  We will continue with Repatha at this time.  Continue work on diet and exercise.  - isosorbide mononitrate (IMDUR) 120 MG CR tablet; TAKE  ONE TABLET BY MOUTH EVERY MORNING  Dispense: 90 tablet; Refill: 3    3. Effort angina (HCC)  -See above.  Will follow up with cardiologist both here in town at [facility].  Return precautions were given.  - isosorbide mononitrate (IMDUR) 120 MG CR tablet; TAKE  ONE TABLET BY MOUTH EVERY MORNING  Dispense: 90 tablet; Refill: 3      Followup: Return in about 6 months (around 9/1/2021) for Medicare AWV.         PLEASE NOTE: This dictation was created using voice recognition software. I have made every reasonable attempt to correct obvious errors, but I expect that there are errors of grammar and possibly content that I did not discover before finalizing the note.

## 2021-03-01 NOTE — TELEPHONE ENCOUNTER
Received request via: Pharmacy    Was the patient seen in the last year in this department? Yes  3/1/21  Does the patient have an active prescription (recently filled or refills available) for medication(s) requested? No

## 2021-03-01 NOTE — PROGRESS NOTES
Subjective:   Abimael Hamilton is a 65 y.o. male here today for   Chief Complaint   Patient presents with   • Annual Exam   • Medication Refill           ***    Allergies   Allergen Reactions   • Gemfibrozil      Dehydration,Severe Muscle cramps   • Lipitor [Atorvastatin Calcium] Unspecified     Severe Muscle cramps, dehydration   • Lovastatin      Dehydration, severe muscle cramps   • Tricor Unspecified     Dehydration, severe muscle cramps         Current medicines (including changes today)  Current Outpatient Medications   Medication Sig Dispense Refill   • ranolazine (RANEXA) 500 MG TABLET SR 12 HR Take 500 mg by mouth.     • pantoprazole (PROTONIX) 40 MG Tablet Delayed Response Take 40 mg by mouth every day.     • clopidogrel (PLAVIX) 75 MG Tab TAKE ONE TABLET BY MOUTH ONE TIME DAILY (Patient taking differently: Take 75 mg by mouth every day. TAKE ONE TABLET BY MOUTH ONE TIME DAILY) 90 Tab 3   • INDOMETHACIN PO Take 50 mg by mouth.     • isosorbide mononitrate (IMDUR) 120 MG CR tablet TAKE  ONE TABLET BY MOUTH EVERY MORNING  30 tablet 1   • Evolocumab, REPATHA, (REPATHA SURECLICK) 140 MG/ML Solution Auto-injector Inject 1 Each under the skin every 14 days. 2 Each 6   • Omega-3 Fatty Acids (FISH OIL) 1000 MG Cap capsule Take 2,000 mg by mouth every day.     • amoxicillin (AMOXIL) 500 MG Cap Take 2,000 mg by mouth one time. For dentist     • naproxen (NAPROSYN) 500 MG Tab Take 1,000 mg by mouth one time. Pts wife RX     • methylPREDNISolone (MEDROL DOSEPAK) 4 MG Tablet Therapy Pack Use as directed 1 Each 0   • metoprolol SR (TOPROL XL) 100 MG TABLET SR 24 HR Take 1 Tab by mouth every day. 30 Tab 11   • indomethacin (INDOCIN) 50 MG Cap Take 1 Cap by mouth 2 times a day as needed. (Patient not taking: Reported on 12/29/2020) 30 Cap 1   • nitroglycerin (NITROSTAT) 0.4 MG SL Tab Place 1 Tab under tongue as needed for Chest Pain (1 tab SL every 5 mins, max of 3 doses as needed for CP, if no relief call 911). 25 Tab 1  "  • allopurinol (ZYLOPRIM) 100 MG Tab TAKE ONE TABLET BY MOUTH ONE TIME DAILY (Patient taking differently: Take 100 mg by mouth every day.) 90 Tab 2   • lisinopril (PRINIVIL) 20 MG Tab TAKE ONE TABLET BY MOUTH ONE TIME DAILY (Patient taking differently: Take 20 mg by mouth every day.) 90 Tab 2   • amLODIPine (NORVASC) 5 MG Tab TAKE ONE TABLET BY MOUTH ONE TIME DAILY (Patient taking differently: Take 5 mg by mouth every day.) 90 Tab 2   • acetaminophen (TYLENOL) 500 MG Tab Take 1,000 mg by mouth every 6 hours as needed for Moderate Pain.     • Red Yeast Rice Extract (RED YEAST RICE PO) Take 2 Tabs by mouth every day.     • aspirin 81 MG EC tablet Take 1 Tab by mouth every day. 30 Tab 11   • Glucosamine-Chondroit-Vit C-Mn (GLUCOSAMINE CHONDROITIN COMPLX) CAPS Take 2 Caps by mouth every day.       No current facility-administered medications for this visit.     He  has a past medical history of Arthritis, Benign hypertensive heart disease without heart failure (11/14/2011), CAD (coronary artery disease), Congestive heart failure (HCC), Coronary atherosclerosis of autologous vein bypass graft (11/14/2011), Essential hypertension (4/25/2019), Gout, Heart valve disease, High cholesterol, History of pancreatitis, History of pancreatitis, Hypertension, Migraine (2/16/2019), Muscle cramps (10/4/2011), Myocardial infarct ([facility]), Obesity (11/14/2011), Pain, Postsurgical aortocoronary bypass status (7/26/2016), Renal disorder, S/P aortic valve replacement with bioprosthetic valve (7/26/2016), Statin intolerance (7/26/2016), and Status post cardiac revascularization with bypass aortocoronary anastomosis of five coronary vessels (7/26/2016).    ROS   ROS       Objective:     Physical Exam:  /88   Pulse 85   Temp 37.1 °C (98.7 °F) (Temporal)   Resp 13   Ht 1.676 m (5' 5.98\")   Wt 106 kg (233 lb)   SpO2 94%  Body mass index is 37.63 kg/m². ***  Constitutional: Alert, no distress.  Skin: Warm, dry, good turgor, no rashes " in visible areas.  Eye: Equal, round and reactive, conjunctiva clear, lids normal.  ENMT: TM's clear bilaterally, lips without lesions, good dentition, oropharynx clear.  Neck: Trachea midline, no masses, no thyromegaly. No cervical or supraclavicular lymphadenopathy.  Respiratory: Unlabored respiratory effort, lungs clear to auscultation, no wheezes, no rhonchi.  Cardiovascular: Normal S1, S2, no murmur, no edema.  Abdomen: Soft, non-tender, no masses, no hepatosplenomegaly.  Psych: Alert and oriented x3, normal affect and mood.    Assessment and Plan:     There are no diagnoses linked to this encounter.    Followup: No follow-ups on file.         PLEASE NOTE: This dictation was created using voice recognition software. I have made every reasonable attempt to correct obvious errors, but I expect that there are errors of grammar and possibly content that I did not discover before finalizing the note.

## 2021-03-10 ENCOUNTER — OFFICE VISIT (OUTPATIENT)
Dept: CARDIOLOGY | Facility: MEDICAL CENTER | Age: 66
End: 2021-03-10
Payer: COMMERCIAL

## 2021-03-10 VITALS
HEART RATE: 66 BPM | SYSTOLIC BLOOD PRESSURE: 118 MMHG | WEIGHT: 236 LBS | OXYGEN SATURATION: 97 % | DIASTOLIC BLOOD PRESSURE: 78 MMHG | BODY MASS INDEX: 37.93 KG/M2 | HEIGHT: 66 IN | RESPIRATION RATE: 12 BRPM

## 2021-03-10 DIAGNOSIS — Z95.3 S/P AORTIC VALVE REPLACEMENT WITH BIOPROSTHETIC VALVE: ICD-10-CM

## 2021-03-10 DIAGNOSIS — Z95.1 STATUS POST CARDIAC REVASCULARIZATION WITH BYPASS AORTOCORONARY ANASTOMOSIS OF FIVE CORONARY VESSELS: ICD-10-CM

## 2021-03-10 DIAGNOSIS — E78.5 DYSLIPIDEMIA: ICD-10-CM

## 2021-03-10 DIAGNOSIS — I25.118 CORONARY ARTERY DISEASE OF NATIVE ARTERY OF NATIVE HEART WITH STABLE ANGINA PECTORIS (HCC): ICD-10-CM

## 2021-03-10 DIAGNOSIS — I10 ESSENTIAL HYPERTENSION: ICD-10-CM

## 2021-03-10 DIAGNOSIS — I35.0 AORTIC VALVE STENOSIS, ETIOLOGY OF CARDIAC VALVE DISEASE UNSPECIFIED: ICD-10-CM

## 2021-03-10 DIAGNOSIS — I25.718 ATHEROSCLEROSIS OF AUTOLOGOUS VEIN CORONARY ARTERY BYPASS GRAFT WITH STABLE ANGINA PECTORIS (HCC): ICD-10-CM

## 2021-03-10 DIAGNOSIS — I65.23 BILATERAL CAROTID ARTERY OCCLUSION: ICD-10-CM

## 2021-03-10 PROCEDURE — 99214 OFFICE O/P EST MOD 30 MIN: CPT | Performed by: NURSE PRACTITIONER

## 2021-03-10 ASSESSMENT — ENCOUNTER SYMPTOMS
CLAUDICATION: 0
SPUTUM PRODUCTION: 0
NAUSEA: 0
FEVER: 0
SHORTNESS OF BREATH: 0
PALPITATIONS: 0
ORTHOPNEA: 0
CHILLS: 0
DIZZINESS: 0
HEMOPTYSIS: 0
HEADACHES: 0
WHEEZING: 0
COUGH: 0
PND: 0
VOMITING: 0

## 2021-03-10 ASSESSMENT — FIBROSIS 4 INDEX: FIB4 SCORE: 1.55

## 2021-03-10 NOTE — PROGRESS NOTES
Chief Complaint   Patient presents with   • Coronary Artery Disease     F/V Dx: Coronary artery disease of native artery of native heart with stable angina pectoris (HCC)   • Aortic Atherosclerosis     F/V Dx: Atherosclerosis of autologous vein coronary artery bypass graft with stable angina pectoris (HCC)   • Dyslipidemia       Subjective:   Abimael Hamilton is a 65 y.o. male who presents today for complex heart care.  Patient was last seen by Dr. Morales on 10/7/2020 and myself 12/9/2020.    Since 12/9/2020, the patient has been referred to [facility].  He is going to have cardiac catheterization April 2021.  The patient was restarted on clindamycin however has discontinued due to cost.  Unfortunately the patient continues to have chest pain on daily basis.     Since then the patient had echocardiogram which showed his aortic valve is working appropriately and EF is 65%.  Pulmonary function tests were obtained and indicated FEV1 2.61 L, 86% predicted, FEV1/FVC 97% predicted, FEF 25-75% 82% predicted, and DLCO 112% predicted.  I have explained to the patient that he may have just a bit of reactive airways disease and should follow-up with primary care if he feels he requires an inhaler.  The patient is basically having chest pain daily.  He had to take 2 nitroglycerin yesterday.  His blood pressure still elevated at 144/82.  Patient is compliant with Praluent 150 mg every 14 days, amlodipine 5 mg daily, ASA 81 mg daily, clopidogrel 75 mg daily, isosorbide mononitrate 120 mg every morning, lisinopril 20 mg daily, metoprolol SR 75 mg daily and nitroglycerin as needed, which is on a regular basis.     He has multivessel coronary artery disease, prior 5 vessel CABG in 1998 (CP and syncope), and re-do 3-V CABG in 12/2015 as well as bioprosthetic AVR at that time ([facility] [place] [facility]).  Had a PET nuclear stress test 11/2018, ischemia and TID noted.  Subsequent left heart catheterization 11/2018 showed all grafts are occluded  with the exception of his LIMA-LAD which fills his circumflex distribution via a jump graft. His native coronary arteries are severely diseased as well.   EF has been 60%.  He has had recurrent Mis, unable to do intervention. Trops normal 2019.     He is maintained on isosorbide and using nitro for CP.  On long-term dual antiplatelet therapy.  Remains on metoprolol.  He was taken off of DAPT once, had more Cps.  Had episode of severe CP a few months ago, was on the ground, lasted for a while, 4 nitros.     Somewhere recorded he has a 4.5 cm thoracic aortic aneurysm but I reviewed his CT scan from 2018, the asc aorta and desc aorta and arch are normal size.     He had sx of CVA, arm pain in left arm, came up to his neck, then head.  Now when he eats spicy food, sweats only on the left side of the head.     Has hypertension, mild LVH, BP up a bit today.  Has hyperlipidemia,  without meds, triglycerides elevated.    Statin intolerance, has what sounds like rhabdo with Lipitor, he will not take statins again, not even one time per week.  Has re started on Praluent, prior LDL on this was 77.  Has moderate carotid atherosclerosis by ultrasound February 2018.     Has chronic FUNES, [facility] 3, mostly due to angina. Now feels like it is worse.  No significant orthopnea.  Mild LE edema, some venous insuff after veins removed for CABG.  No significant palpitations, lightheadedness, or presyncope/syncope.   No radha symptoms of leg claudication, probably due to limiting CP.  No stroke/TIA like symptoms.     Wife is Ana.    Past Medical History:   Diagnosis Date   • Arthritis    • Benign hypertensive heart disease without heart failure 11/14/2011   • CAD (coronary artery disease)    • Congestive heart failure (HCC)    • Coronary atherosclerosis of autologous vein bypass graft 11/14/2011   • Essential hypertension 4/25/2019   • Gout    • Heart valve disease    • High cholesterol    • History of pancreatitis    • History of  pancreatitis    • Hypertension    • Migraine 2/16/2019   • Muscle cramps 10/4/2011   • Myocardial infarct (HCC)     Multiple MI's, 1998, 2015   • Obesity 11/14/2011   • Pain     Joints   • Postsurgical aortocoronary bypass status 7/26/2016    Status post redo 3-V CABG in [place] 12/2015: SVG-acute marginal, SVG sequential to LAD, and OM    • Renal disorder     Hx of Acute renal failure r/t medication/statins   • S/P aortic valve replacement with bioprosthetic valve 7/26/2016    #23 [place] AVR (bioprosthetic)    • Statin intolerance 7/26/2016   • Status post cardiac revascularization with bypass aortocoronary anastomosis of five coronary vessels 7/26/2016    5-V CABG done in 1998 (done in [place] at [facility]), patent LIMA to LAD, occluded SVG-OM, occluded SVG-RCA by cardiac catheterization 11/15/2007 with other vein grafts not identified at that time, poor targets for revascularization and declined repeat CABG in 2007 by [facility]. No ischemic was identified by MPI 11/17/07 with an EF of 50%.       Past Surgical History:   Procedure Laterality Date   • SHOULDER DECOMPRESSION ARTHROSCOPIC Right 9/5/2017    Procedure: SHOULDER DECOMPRESSION ARTHROSCOPIC SUBACROMIAL;  Surgeon: Mg Campos M.D.;  Location: [facility];  Service: Orthopedics   • ARTHROSCOPIC LABRAL DEBRIDEMENT Right 9/5/2017    Procedure: ARTHROSCOPIC LABRAL DEBRIDEMENT;  Surgeon: Mg Campos M.D.;  Location: [facility];  Service: Orthopedics   • SHOULDER ARTHROSCOPY W/ ROTATOR CUFF REPAIR Right 9/5/2017    Procedure: SHOULDER ARTHROSCOPY W/ ROTATOR CUFF REPAIR;  Surgeon: Mg Campos M.D.;  Location: [facility];  Service: Orthopedics   • SHOULDER ARTHROSCOPY W/ BICIPITAL TENODESIS REPAIR Right 9/5/2017    Procedure: SHOULDER ARTHROSCOPY W/ BICIPITAL TENODESIS REPAIR;  Surgeon: Mg Campos M.D.;  Location: [facility];  Service: Orthopedics   • SYNOVECTOMY  Right 2017    Procedure: SYNOVECTOMY;  Surgeon: Mg Campos M.D.;  Location: [facility];  Service: Orthopedics   • MULTIPLE CORONARY ARTERY BYPASS  2015    3 way bypass & Bovine valve   • LAMINOTOMY  2004    Diskectomy L4-L5, L5-S1   • MULTIPLE CORONARY ARTERY BYPASS  1998    5 way bypass   • BIOPSY GENERAL      buttock lesion   • EYE SURGERY     • MITRAL VALVE REPLACE       Family History   Problem Relation Age of Onset   • Heart Attack Father         MI and CABG, unknown age   • Hyperlipidemia Father    • Cancer Mother         Breast   • Heart Disease Brother    • Arthritis Maternal Grandmother    • Stroke Neg Hx    • Diabetes Neg Hx    • Lung Disease Neg Hx      Social History     Socioeconomic History   • Marital status:      Spouse name: Not on file   • Number of children: Not on file   • Years of education: Not on file   • Highest education level: GED or equivalent   Occupational History   • Not on file   Tobacco Use   • Smoking status: Former Smoker     Packs/day: 3.00     Years: 20.00     Pack years: 60.00     Types: Cigarettes     Quit date: 1992     Years since quittin.2   • Smokeless tobacco: Never Used   Substance and Sexual Activity   • Alcohol use: Not Currently     Comment: 4 per month   • Drug use: Yes     Types: Marijuana, Inhaled     Comment: Marijuana- Daily (4 times per day)   • Sexual activity: Yes     Partners: Female   Other Topics Concern   • Not on file   Social History Narrative   • Not on file     Social Determinants of Health     Financial Resource Strain: Low Risk    • Difficulty of Paying Living Expenses: Not very hard   Food Insecurity: No Food Insecurity   • Worried About Running Out of Food in the Last Year: Never true   • Ran Out of Food in the Last Year: Never true   Transportation Needs: No Transportation Needs   • Lack of Transportation (Medical): No   • Lack of Transportation (Non-Medical): No   Physical Activity:  Unknown   • Days of Exercise per Week: 0 days   • Minutes of Exercise per Session: Patient refused   Stress: No Stress Concern Present   • Feeling of Stress : Not at all   Social Connections: Unknown   • Frequency of Communication with Friends and Family: Twice a week   • Frequency of Social Gatherings with Friends and Family: Patient refused   • Attends [employer] Services: Never   • Active Member of Clubs or Organizations: No   • Attends Club or Organization Meetings: Never   • Marital Status:    Intimate Partner Violence:    • Fear of Current or Ex-Partner:    • Emotionally Abused:    • Physically Abused:    • Sexually Abused:      Allergies   Allergen Reactions   • Gemfibrozil      Dehydration,Severe Muscle cramps   • Lipitor [Atorvastatin Calcium] Unspecified     Severe Muscle cramps, dehydration   • Lovastatin      Dehydration, severe muscle cramps   • Tricor Unspecified     Dehydration, severe muscle cramps     Outpatient Encounter Medications as of 3/10/2021   Medication Sig Dispense Refill   • clopidogrel (PLAVIX) 75 MG Tab TAKE ONE TABLET BY MOUTH ONE TIME DAILY  90 tablet 3   • isosorbide mononitrate (IMDUR) 120 MG CR tablet TAKE  ONE TABLET BY MOUTH EVERY MORNING 90 tablet 3   • Evolocumab, REPATHA, (REPATHA SURECLICK) 140 MG/ML Solution Auto-injector Inject 1 Each under the skin every 14 days. 2 Each 6   • Omega-3 Fatty Acids (FISH OIL) 1000 MG Cap capsule Take 2,000 mg by mouth every day.     • amoxicillin (AMOXIL) 500 MG Cap Take 2,000 mg by mouth one time. For dentist     • naproxen (NAPROSYN) 500 MG Tab Take 1,000 mg by mouth one time. Pts wife RX     • metoprolol SR (TOPROL XL) 100 MG TABLET SR 24 HR Take 1 Tab by mouth every day. 30 Tab 11   • indomethacin (INDOCIN) 50 MG Cap Take 1 Cap by mouth 2 times a day as needed. 30 Cap 1   • nitroglycerin (NITROSTAT) 0.4 MG SL Tab Place 1 Tab under tongue as needed for Chest Pain (1 tab SL every 5 mins, max of 3 doses as needed for CP, if no relief  "call 911). 25 Tab 1   • allopurinol (ZYLOPRIM) 100 MG Tab TAKE ONE TABLET BY MOUTH ONE TIME DAILY (Patient taking differently: Take 100 mg by mouth every day.) 90 Tab 2   • lisinopril (PRINIVIL) 20 MG Tab TAKE ONE TABLET BY MOUTH ONE TIME DAILY (Patient taking differently: Take 20 mg by mouth every day.) 90 Tab 2   • amLODIPine (NORVASC) 5 MG Tab TAKE ONE TABLET BY MOUTH ONE TIME DAILY (Patient taking differently: Take 5 mg by mouth every day.) 90 Tab 2   • acetaminophen (TYLENOL) 500 MG Tab Take 1,000 mg by mouth every 6 hours as needed for Moderate Pain.     • Red Yeast Rice Extract (RED YEAST RICE PO) Take 2 Tabs by mouth every day.     • aspirin 81 MG EC tablet Take 1 Tab by mouth every day. 30 Tab 11   • Glucosamine-Chondroit-Vit C-Mn (GLUCOSAMINE CHONDROITIN COMPLX) CAPS Take 2 Caps by mouth every day.     • [DISCONTINUED] ranolazine (RANEXA) 500 MG TABLET SR 12 HR Take 500 mg by mouth.     • [DISCONTINUED] pantoprazole (PROTONIX) 40 MG Tablet Delayed Response Take 40 mg by mouth every day.     • [DISCONTINUED] INDOMETHACIN PO Take 50 mg by mouth.     • methylPREDNISolone (MEDROL DOSEPAK) 4 MG Tablet Therapy Pack Use as directed 1 Each 0     No facility-administered encounter medications on file as of 3/10/2021.     Review of Systems   Constitutional: Negative for chills and fever.   Respiratory: Negative for cough, hemoptysis, sputum production, shortness of breath and wheezing.    Cardiovascular: Negative for chest pain, palpitations, orthopnea, claudication, leg swelling and PND.   Gastrointestinal: Negative for nausea and vomiting.   Neurological: Negative for dizziness and headaches.   All other systems reviewed and are negative.       Objective:   /78 (BP Location: Left arm, Patient Position: Sitting, BP Cuff Size: Adult)   Pulse 66   Resp 12   Ht 1.676 m (5' 6\")   Wt 107 kg (236 lb)   SpO2 97%   BMI 38.09 kg/m²     Physical Exam   Constitutional: He appears well-developed and well-nourished. "   Eyes: EOM are normal.   Neck: No JVD present.   Cardiovascular: Normal rate, regular rhythm and normal heart sounds.   No murmur heard.  Pulmonary/Chest: Effort normal and breath sounds normal.   Abdominal: Soft.   Musculoskeletal:      Cervical back: Neck supple.   Neurological:   CN II-XII grossly intact   Skin: Skin is warm and dry.   Psychiatric: He has a normal mood and affect. His behavior is normal. Judgment and thought content normal.   Nursing note and vitals reviewed.    ECG 7/23/2019  Sinus, rate 68, first-degree AV delay, incomplete left bundle branch block, nonspecific T wave changes     Echocardiogram, 9/21/2018  Normal left ventricular ejection fraction (measured at 70%) with normal regional wall motion.  Mildly stenotic bioprosthetic aortic valve with a mean gradient measured at 27 mm a radiant measured at 48 mmHg (V-max 3.5 m/s) without perivalvular leak.  Left atrium is mildly dilated with a volume index measured at 39 mL/meter squared     Echocardiogram, 8/4/2016:  Normal left ventricular size and systolic function, EF 60%.  Mild concentric left ventricular hypertrophy.  Mild mitral regurgitation.  Normally functioning bovine bioprosthetic aortic valve.   Normal right sided pressures.  No prior studies for comparison     Myocardial perfusion imaging, 11/7/2018:  IMPRESSION:  1.  Dipyridamole stress negative for chest discomfort and negative for   ischemic EKG changes.  2.  PET perfusion images are suggestive of ischemia in the proximal to mid inferior segment and in the proximal mid bilateral segment.  There is no definite infarction.  In addition TID was present which could be indicative of   multivessel disease, possibly more severe than indicated by the perfusion study.  3.  The patient's resting ejection fraction was mildly reduced and david appropriately during dipyridamole stress.  There was basilar to mid inferior hypokinesis noted     Cardiac catheterization,  11/16/2018:  1.  Angina  2.  Positive stress test for inferior ischemia  3.  CAD status post CABG in the 90s and redo in 2015  4.  Bioprosthetic AVR 2015  5.  Poorly controlled hypertension  6.  Morbid obesity  POST  1.  Multivessel coronary artery disease  2.  Occluded vein grafts x6   3.  LIMA to LAD with focal 70-80% distal anastomotic stenosis 4.  Severe native coronary artery disease not amenable to PCI 5.  Poorly controlled hypertension  RECOMMENDATIONS:  His FORD to LAD supplies both the distal LAD territory as well as via retrograde partially occluded jump graft the circumflex territory and is essentially his last remaining vessel.  There is WESLEY-3 flow and he is not maximally managed medically, in addition he has  of the RCA which was dominant and the vein graft is occluded and this is his only ischemic territory on noninvasive imaging.  1.  Medical therapy  2. Consideration of high risk last vessel PCI LIMA to LAD anastomosis should he prove to not be a redo surgical candidate (CT surgical consultation prior to any potential PCI would be required) and not be well managed with better medical therapy.  3. Weight loss     CTA 8/20/2018  1.  The pulmonary arteries are suboptimally opacified limiting evaluation, no large central pulmonary embolus is appreciated. Additional imaging would be required for definitive exclusion of pulmonary embolism  2.  Hepatomegaly and diffuse hepatic steatosis.  3.  Right nephrolithiasis     Echocardiogram 12/2/2020  CONCLUSIONS  Left ventricular ejection fraction is visually estimated to be 65%.  Mild concentric left ventricular hypertrophy.  Known bioprosthetic aortic valve replacement with mildly increased   gradient: Vmax is 3.1 m/s, MG 22 mmHg, ALIS 1.1 cm2.  Unable to estimate RVSP, normal estimated RAP.  Compare to prior echo on 9/21/18, no significant change, gradients   continue to be mildly elevated, were elevated on prior.     Pulmonary function tests 10/4/2020  FEV1  2.61 L, 86% predicted  FEV1/FVC 97% predicted  FEF 25-75% 82% predicted  % predicted  DLCO 112% predicted    Assessment:     1. Aortic valve stenosis, etiology of cardiac valve disease unspecified     2. S/P aortic valve replacement with bioprosthetic valve     3. Coronary artery disease of native artery of native heart with stable angina pectoris (HCC)     4. Atherosclerosis of autologous vein coronary artery bypass graft with stable angina pectoris (HCC)     5. Bilateral carotid artery occlusion     6. Dyslipidemia     7. Essential hypertension     8. Status post cardiac revascularization with bypass aortocoronary anastomosis of five coronary vessels         Medical Decision Making:  Today's Assessment / Status / Plan:   Patient is scheduled for April for cardiac catheterization at [facility].  Patient will continue current medications.  He will follow-up as already scheduled in May with Dr. Morales.

## 2021-03-16 RX ORDER — LISINOPRIL 20 MG/1
TABLET ORAL
Qty: 90 TABLET | Refills: 2 | Status: SHIPPED | OUTPATIENT
Start: 2021-03-16 | End: 2022-01-01

## 2021-03-16 RX ORDER — AMLODIPINE BESYLATE 5 MG/1
TABLET ORAL
Qty: 90 TABLET | Refills: 2 | Status: SHIPPED | OUTPATIENT
Start: 2021-03-16 | End: 2022-01-01

## 2021-03-16 NOTE — TELEPHONE ENCOUNTER
Received request via: Pharmacy    Was the patient seen in the last year in this department? Yes  3/1/2021LOV  Does the patient have an active prescription (recently filled or refills available) for medication(s) requested? No

## 2021-04-12 ENCOUNTER — OFFICE VISIT (OUTPATIENT)
Dept: MEDICAL GROUP | Facility: LAB | Age: 66
End: 2021-04-12
Payer: COMMERCIAL

## 2021-04-12 VITALS
SYSTOLIC BLOOD PRESSURE: 116 MMHG | DIASTOLIC BLOOD PRESSURE: 72 MMHG | OXYGEN SATURATION: 96 % | RESPIRATION RATE: 13 BRPM | WEIGHT: 223 LBS | BODY MASS INDEX: 35.84 KG/M2 | HEART RATE: 83 BPM | HEIGHT: 66 IN | TEMPERATURE: 98 F

## 2021-04-12 DIAGNOSIS — Z12.12 SCREENING FOR COLORECTAL CANCER: ICD-10-CM

## 2021-04-12 DIAGNOSIS — M79.89 SWELLING OF EXTREMITY, RIGHT: ICD-10-CM

## 2021-04-12 DIAGNOSIS — Z12.11 SCREENING FOR COLORECTAL CANCER: ICD-10-CM

## 2021-04-12 PROCEDURE — 99214 OFFICE O/P EST MOD 30 MIN: CPT | Performed by: FAMILY MEDICINE

## 2021-04-12 RX ORDER — GABAPENTIN 300 MG/1
CAPSULE ORAL
COMMUNITY
Start: 2021-02-17 | End: 2021-04-16

## 2021-04-12 RX ORDER — HYDROCODONE BITARTRATE AND ACETAMINOPHEN 10; 325 MG/1; MG/1
TABLET ORAL PRN
COMMUNITY
Start: 2021-02-18 | End: 2022-01-01

## 2021-04-12 ASSESSMENT — FIBROSIS 4 INDEX: FIB4 SCORE: 1.55

## 2021-04-12 NOTE — PROGRESS NOTES
Subjective:   Abimael Hamilotn is a 65 y.o. male here today for   Chief Complaint   Patient presents with   • Hospital Follow-up       #Hospital follow-up:  -Patient is a history of hypertension, CAD status post CABG in 2015.  Prior catheterization completed in 2018.  Previous PCI shows occlusion of all grafts except for LIMA to LAD status post PCI completed on LIMA at [place] last week.  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  He states upon returning home he did have a day of excessive bleeding from the site but has stopped at this time.  He denies any other lower extremity swelling, chest pain, shortness of breath, pleuritic-like chest pain.  He is taking all medications as prescribed including aspirin, clopidogrel.  He denies any chest pain has not had to use any nitroglycerin since before PCI.  Patient is planning on undergoing cardiac rehab through [facility] as well as follow-up with cardiology in 4 days.    Allergies   Allergen Reactions   • Gemfibrozil      Dehydration,Severe Muscle cramps   • Lipitor [Atorvastatin Calcium] Unspecified     Severe Muscle cramps, dehydration   • Lovastatin      Dehydration, severe muscle cramps   • Tricor Unspecified     Dehydration, severe muscle cramps         Current medicines (including changes today)  Current Outpatient Medications   Medication Sig Dispense Refill   • HYDROcodone/acetaminophen (NORCO)  MG Tab      • gabapentin (NEURONTIN) 300 MG Cap      • amLODIPine (NORVASC) 5 MG Tab TAKE ONE TABLET BY MOUTH ONE TIME DAILY  90 tablet 2   • lisinopril (PRINIVIL) 20 MG Tab TAKE ONE TABLET BY MOUTH ONE TIME DAILY  90 tablet 2   • clopidogrel (PLAVIX) 75 MG Tab TAKE ONE TABLET BY MOUTH ONE TIME DAILY  90 tablet 3   • isosorbide mononitrate (IMDUR) 120 MG CR tablet TAKE  ONE TABLET BY MOUTH EVERY MORNING 90 tablet 3   • Evolocumab, REPATHA, (REPATHA  SURECLICK) 140 MG/ML Solution Auto-injector Inject 1 Each under the skin every 14 days. 2 Each 6   • Omega-3 Fatty Acids (FISH OIL) 1000 MG Cap capsule Take 2,000 mg by mouth every day.     • amoxicillin (AMOXIL) 500 MG Cap Take 2,000 mg by mouth one time. For dentist     • naproxen (NAPROSYN) 500 MG Tab Take 1,000 mg by mouth one time. Pts wife RX     • methylPREDNISolone (MEDROL DOSEPAK) 4 MG Tablet Therapy Pack Use as directed 1 Each 0   • metoprolol SR (TOPROL XL) 100 MG TABLET SR 24 HR Take 1 Tab by mouth every day. 30 Tab 11   • indomethacin (INDOCIN) 50 MG Cap Take 1 Cap by mouth 2 times a day as needed. 30 Cap 1   • nitroglycerin (NITROSTAT) 0.4 MG SL Tab Place 1 Tab under tongue as needed for Chest Pain (1 tab SL every 5 mins, max of 3 doses as needed for CP, if no relief call 911). 25 Tab 1   • allopurinol (ZYLOPRIM) 100 MG Tab TAKE ONE TABLET BY MOUTH ONE TIME DAILY (Patient taking differently: Take 100 mg by mouth every day.) 90 Tab 2   • acetaminophen (TYLENOL) 500 MG Tab Take 1,000 mg by mouth every 6 hours as needed for Moderate Pain.     • Red Yeast Rice Extract (RED YEAST RICE PO) Take 2 Tabs by mouth every day.     • aspirin 81 MG EC tablet Take 1 Tab by mouth every day. 30 Tab 11   • Glucosamine-Chondroit-Vit C-Mn (GLUCOSAMINE CHONDROITIN COMPLX) CAPS Take 2 Caps by mouth every day.       No current facility-administered medications for this visit.     He  has a past medical history of Arthritis, Benign hypertensive heart disease without heart failure (11/14/2011), CAD (coronary artery disease), Congestive heart failure (HCC), Coronary atherosclerosis of autologous vein bypass graft (11/14/2011), Essential hypertension (4/25/2019), Gout, Heart valve disease, High cholesterol, History of pancreatitis, History of pancreatitis, Hypertension, Migraine (2/16/2019), Muscle cramps (10/4/2011), Myocardial infarct (HCC), Obesity (11/14/2011), Pain, Postsurgical aortocoronary bypass status (7/26/2016),  "Renal disorder, S/P aortic valve replacement with bioprosthetic valve (7/26/2016), Statin intolerance (7/26/2016), and Status post cardiac revascularization with bypass aortocoronary anastomosis of five coronary vessels (7/26/2016).    ROS   -See Above.         Objective:     Physical Exam:  /72   Pulse 83   Temp 36.7 °C (98 °F) (Temporal)   Resp 13   Ht 1.676 m (5' 5.98\")   Wt 101 kg (223 lb)   SpO2 96%  Body mass index is 36.01 kg/m².   Constitutional: Alert, no distress.  Skin: Warm, dry, good turgor, no rashes in visible areas.  Eye: Equal, round and reactive, conjunctiva clear, lids normal.  Respiratory: Unlabored respiratory effort, lungs clear to auscultation, no wheezes, no rhonchi.  Cardiovascular: Normal S1, S2, no murmur, no edema.  Abdomen: Soft, non-tender, no masses, no hepatosplenomegaly.  MSK: Large area of ecchymosis at various stages of degree noted on the right inguinal/groin area.  A large palpable mass that is tender to touch, nonmobile, measuring 6 x 4 cm noted in said area.  Psych: Alert and oriented x3, normal affect and mood.    Assessment and Plan:     1. Swelling of extremity, right  -There is concern of possible postsurgical blood clot given some noncompliance with post procedure instructions as well as findings on physical exam.  Given concern for possible blood clot will order a stat ultrasound of the extremity at this time.  Very strict return precautions were given that if patient is experiencing any chest pain, shortness of breath, worsening symptoms, increased bleeding to follow-up the emergency department immediately.  -Patient will also follow-up with cardiologist in 4 days.  - US-EXTREMITY VENOUS LOWER UNILAT RIGHT; Future    2. Screening for colorectal cancer  - REFERRAL TO GI FOR COLONOSCOPY    Followup: Return if symptoms worsen or fail to improve.         PLEASE NOTE: This dictation was created using voice recognition software. I have made every reasonable attempt " to correct obvious errors, but I expect that there are errors of grammar and possibly content that I did not discover before finalizing the note.

## 2021-04-13 ENCOUNTER — HOSPITAL ENCOUNTER (OUTPATIENT)
Dept: RADIOLOGY | Facility: MEDICAL CENTER | Age: 66
End: 2021-04-13
Attending: FAMILY MEDICINE
Payer: COMMERCIAL

## 2021-04-13 DIAGNOSIS — M79.89 SWELLING OF EXTREMITY, RIGHT: ICD-10-CM

## 2021-04-13 PROCEDURE — 93971 EXTREMITY STUDY: CPT | Mod: RT

## 2021-04-16 ENCOUNTER — OFFICE VISIT (OUTPATIENT)
Dept: CARDIOLOGY | Facility: MEDICAL CENTER | Age: 66
End: 2021-04-16
Payer: COMMERCIAL

## 2021-04-16 VITALS
BODY MASS INDEX: 38.15 KG/M2 | DIASTOLIC BLOOD PRESSURE: 60 MMHG | SYSTOLIC BLOOD PRESSURE: 98 MMHG | OXYGEN SATURATION: 97 % | WEIGHT: 229 LBS | RESPIRATION RATE: 12 BRPM | HEART RATE: 84 BPM | HEIGHT: 65 IN

## 2021-04-16 DIAGNOSIS — I10 ESSENTIAL HYPERTENSION: ICD-10-CM

## 2021-04-16 DIAGNOSIS — I25.718 ATHEROSCLEROSIS OF AUTOLOGOUS VEIN CORONARY ARTERY BYPASS GRAFT WITH STABLE ANGINA PECTORIS (HCC): ICD-10-CM

## 2021-04-16 DIAGNOSIS — E78.5 DYSLIPIDEMIA: ICD-10-CM

## 2021-04-16 DIAGNOSIS — I35.0 AORTIC VALVE STENOSIS, ETIOLOGY OF CARDIAC VALVE DISEASE UNSPECIFIED: ICD-10-CM

## 2021-04-16 DIAGNOSIS — I25.118 CORONARY ARTERY DISEASE OF NATIVE ARTERY OF NATIVE HEART WITH STABLE ANGINA PECTORIS (HCC): ICD-10-CM

## 2021-04-16 DIAGNOSIS — Z78.9 STATIN INTOLERANCE: ICD-10-CM

## 2021-04-16 DIAGNOSIS — Z95.1 STATUS POST CARDIAC REVASCULARIZATION WITH BYPASS AORTOCORONARY ANASTOMOSIS OF FIVE CORONARY VESSELS: ICD-10-CM

## 2021-04-16 PROCEDURE — 99214 OFFICE O/P EST MOD 30 MIN: CPT | Performed by: NURSE PRACTITIONER

## 2021-04-16 ASSESSMENT — ENCOUNTER SYMPTOMS
PND: 0
CHILLS: 0
WHEEZING: 0
HEADACHES: 0
FEVER: 0
VOMITING: 0
PALPITATIONS: 0
SPUTUM PRODUCTION: 0
COUGH: 0
HEMOPTYSIS: 0
CLAUDICATION: 0
DIZZINESS: 0
NAUSEA: 0
ORTHOPNEA: 0
SHORTNESS OF BREATH: 0

## 2021-04-16 ASSESSMENT — FIBROSIS 4 INDEX: FIB4 SCORE: 1.55

## 2021-04-16 NOTE — PROGRESS NOTES
Chief Complaint   Patient presents with   • Coronary Artery Disease     F/V Dx: Coronary artery disease of native artery of native heart with stable angina pectoris (HCC)   • Hypertension     F/V Dx: Essential hypertension   • Aortic Stenosis       Subjective:   Abimael Hamilton is a 65 y.o. male who presents today for complex heart care.  Patient was last seen by Dr. Morales on 10/7/2020 and myself 3/10/2021.    Since 3/10/2021 the patient has been seen at [facility] and had cardiac catheterization 4/2/2021 with a 2.5 x 15 mm resolute MOE to LIMA-LAD with Impella support by Dr Yoselyn Arreaga and general cardiologist is Dr Wang Fernandez ([facility]).  All other grafts were occluded.  The patient had complications in the right groin with significant bleeding and hematoma.  Ultrasound of the right lower extremity shows no DVT and minimally complicated fluid collection in the right inguinal region likely postprocedural hematoma.  Patient reports that he has significant pain in the right groin.  Overall he is doing well cardiac catheterization.  He has not taken any nitro when he was taking typically 2 nitros per day.  His blood pressure today is 98/60.  He and his wife are concerned that this is too low.  He is not dizzy.  We have had a lengthy discussion regarding blood pressures.  He will continue to monitor his blood pressure, and if he should be dizzy associated with the lower blood pressure then will cut Imdur in half.  It was just increased to 120 mg daily due to his ongoing chest pain.     Since 12/9/2020, the patient has been referred to [facility].  He is going to have cardiac catheterization April 2021.  The patient was restarted on clindamycin however has discontinued due to cost.  Unfortunately the patient continues to have chest pain on daily basis.     Since then the patient had echocardiogram which showed his aortic valve is working appropriately and EF is 65%.  Pulmonary function tests were obtained and  indicated FEV1 2.61 L, 86% predicted, FEV1/FVC 97% predicted, FEF 25-75% 82% predicted, and DLCO 112% predicted.  I have explained to the patient that he may have just a bit of reactive airways disease and should follow-up with primary care if he feels he requires an inhaler.  The patient is basically having chest pain daily.  He had to take 2 nitroglycerin yesterday.  His blood pressure still elevated at 144/82.  Patient is compliant with Praluent 150 mg every 14 days, amlodipine 5 mg daily, ASA 81 mg daily, clopidogrel 75 mg daily, isosorbide mononitrate 120 mg every morning, lisinopril 20 mg daily, metoprolol SR 75 mg daily and nitroglycerin as needed, which is on a regular basis.     He has multivessel coronary artery disease, prior 5 vessel CABG in 1998 (CP and syncope), and re-do 3-V CABG in 12/2015 as well as bioprosthetic AVR at that time ([facility]).  Had a PET nuclear stress test 11/2018, ischemia and TID noted.  Subsequent left heart catheterization 11/2018 showed all grafts are occluded with the exception of his LIMA-LAD which fills his circumflex distribution via a jump graft. His native coronary arteries are severely diseased as well.   EF has been 60%.  He has had recurrent Mis, unable to do intervention. Trops normal 2019.     He is maintained on isosorbide and using nitro for CP.  On long-term dual antiplatelet therapy.  Remains on metoprolol.  He was taken off of DAPT once, had more Cps.  Had episode of severe CP a few months ago, was on the ground, lasted for a while, 4 nitros.     Somewhere recorded he has a 4.5 cm thoracic aortic aneurysm but I reviewed his CT scan from 2018, the asc aorta and desc aorta and arch are normal size.     He had sx of CVA, arm pain in left arm, came up to his neck, then head.  Now when he eats spicy food, sweats only on the left side of the head.     Has hypertension, mild LVH, BP up a bit today.  Has hyperlipidemia,  without meds, triglycerides elevated.     Statin intolerance, has what sounds like rhabdo with Lipitor, he will not take statins again, not even one time per week.  Has re started on Praluent, prior LDL on this was 77.  Has moderate carotid atherosclerosis by ultrasound February 2018.     Has chronic FUNES, [facility] 3, mostly due to angina. Now feels like it is worse.  No significant orthopnea.  Mild LE edema, some venous insuff after veins removed for CABG.  No significant palpitations, lightheadedness, or presyncope/syncope.   No radha symptoms of leg claudication, probably due to limiting CP.  No stroke/TIA like symptoms.    Past Medical History:   Diagnosis Date   • Arthritis    • Benign hypertensive heart disease without heart failure 11/14/2011   • CAD (coronary artery disease)    • Congestive heart failure (HCC)    • Coronary atherosclerosis of autologous vein bypass graft 11/14/2011   • Essential hypertension 4/25/2019   • Gout    • Heart valve disease    • High cholesterol    • History of pancreatitis    • History of pancreatitis    • Hypertension    • Migraine 2/16/2019   • Muscle cramps 10/4/2011   • Myocardial infarct (HCC)     Multiple MI's, 1998, 2015   • Obesity 11/14/2011   • Pain     Joints   • Postsurgical aortocoronary bypass status 7/26/2016    Status post redo 3-V CABG in [place] 12/2015: SVG-acute marginal, SVG sequential to LAD, and OM    • Renal disorder     Hx of Acute renal failure r/t medication/statins   • S/P aortic valve replacement with bioprosthetic valve 7/26/2016    #23 [place] Magna AVR (bioprosthetic)    • Statin intolerance 7/26/2016   • Status post cardiac revascularization with bypass aortocoronary anastomosis of five coronary vessels 7/26/2016    5-V CABG done in 1998 (done in [place] at [facility]), patent LIMA to LAD, occluded SVG-OM, occluded SVG-RCA by cardiac catheterization 11/15/2007 with other vein grafts not identified at that time, poor targets for revascularization and declined repeat CABG in 2007 by [facility]. No  ischemic was identified by MPI 11/17/07 with an EF of 50%.       Past Surgical History:   Procedure Laterality Date   • SHOULDER DECOMPRESSION ARTHROSCOPIC Right 9/5/2017    Procedure: SHOULDER DECOMPRESSION ARTHROSCOPIC SUBACROMIAL;  Surgeon: Mg Campos M.D.;  Location: [facility];  Service: Orthopedics   • ARTHROSCOPIC LABRAL DEBRIDEMENT Right 9/5/2017    Procedure: ARTHROSCOPIC LABRAL DEBRIDEMENT;  Surgeon: Mg Campos M.D.;  Location: [facility];  Service: Orthopedics   • SHOULDER ARTHROSCOPY W/ ROTATOR CUFF REPAIR Right 9/5/2017    Procedure: SHOULDER ARTHROSCOPY W/ ROTATOR CUFF REPAIR;  Surgeon: Mg Campos M.D.;  Location: [facility];  Service: Orthopedics   • SHOULDER ARTHROSCOPY W/ BICIPITAL TENODESIS REPAIR Right 9/5/2017    Procedure: SHOULDER ARTHROSCOPY W/ BICIPITAL TENODESIS REPAIR;  Surgeon: Mg Campos M.D.;  Location: [facility];  Service: Orthopedics   • SYNOVECTOMY Right 9/5/2017    Procedure: SYNOVECTOMY;  Surgeon: Mg Campos M.D.;  Location: [facility];  Service: Orthopedics   • MULTIPLE CORONARY ARTERY BYPASS  12/08/2015    3 way bypass & Bovine valve   • LAMINOTOMY  12/2004    Diskectomy L4-L5, L5-S1   • MULTIPLE CORONARY ARTERY BYPASS  11/11/1998    5 way bypass   • BIOPSY GENERAL      buttock lesion   • EYE SURGERY     • MITRAL VALVE REPLACE       Family History   Problem Relation Age of Onset   • Heart Attack Father         MI and CABG, unknown age   • Hyperlipidemia Father    • Cancer Mother         Breast   • Heart Disease Brother    • Arthritis Maternal Grandmother    • Stroke Neg Hx    • Diabetes Neg Hx    • Lung Disease Neg Hx      Social History     Socioeconomic History   • Marital status:      Spouse name: Not on file   • Number of children: Not on file   • Years of education: Not on file   • Highest education level: GED or equivalent   Occupational  History   • Not on file   Tobacco Use   • Smoking status: Former Smoker     Packs/day: 3.00     Years: 20.00     Pack years: 60.00     Types: Cigarettes     Quit date: 1992     Years since quittin.3   • Smokeless tobacco: Never Used   Substance and Sexual Activity   • Alcohol use: Not Currently     Comment: 4 per month   • Drug use: Yes     Types: Marijuana, Inhaled     Comment: Marijuana- Daily (4 times per day)   • Sexual activity: Yes     Partners: Female   Other Topics Concern   • Not on file   Social History Narrative   • Not on file     Social Determinants of Health     Financial Resource Strain: Low Risk    • Difficulty of Paying Living Expenses: Not very hard   Food Insecurity: No Food Insecurity   • Worried About Running Out of Food in the Last Year: Never true   • Ran Out of Food in the Last Year: Never true   Transportation Needs: No Transportation Needs   • Lack of Transportation (Medical): No   • Lack of Transportation (Non-Medical): No   Physical Activity: Unknown   • Days of Exercise per Week: 0 days   • Minutes of Exercise per Session: Patient refused   Stress: No Stress Concern Present   • Feeling of Stress : Not at all   Social Connections: Unknown   • Frequency of Communication with Friends and Family: Twice a week   • Frequency of Social Gatherings with Friends and Family: Patient refused   • Attends Sikh Services: Never   • Active Member of Clubs or Organizations: No   • Attends Club or Organization Meetings: Never   • Marital Status:    Intimate Partner Violence:    • Fear of Current or Ex-Partner:    • Emotionally Abused:    • Physically Abused:    • Sexually Abused:      Allergies   Allergen Reactions   • Gemfibrozil      Dehydration,Severe Muscle cramps   • Lipitor [Atorvastatin Calcium] Unspecified     Severe Muscle cramps, dehydration   • Lovastatin      Dehydration, severe muscle cramps   • Tricor Unspecified     Dehydration, severe muscle cramps     Outpatient  Encounter Medications as of 4/16/2021   Medication Sig Dispense Refill   • HYDROcodone/acetaminophen (NORCO)  MG Tab as needed. Twice a day as needed     • amLODIPine (NORVASC) 5 MG Tab TAKE ONE TABLET BY MOUTH ONE TIME DAILY  90 tablet 2   • lisinopril (PRINIVIL) 20 MG Tab TAKE ONE TABLET BY MOUTH ONE TIME DAILY  90 tablet 2   • clopidogrel (PLAVIX) 75 MG Tab TAKE ONE TABLET BY MOUTH ONE TIME DAILY  90 tablet 3   • isosorbide mononitrate (IMDUR) 120 MG CR tablet TAKE  ONE TABLET BY MOUTH EVERY MORNING 90 tablet 3   • Evolocumab, REPATHA, (REPATHA SURECLICK) 140 MG/ML Solution Auto-injector Inject 1 Each under the skin every 14 days. 2 Each 6   • Omega-3 Fatty Acids (FISH OIL) 1000 MG Cap capsule Take 2,000 mg by mouth every day.     • amoxicillin (AMOXIL) 500 MG Cap Take 2,000 mg by mouth as needed. For dentist      • metoprolol SR (TOPROL XL) 100 MG TABLET SR 24 HR Take 1 Tab by mouth every day. 30 Tab 11   • indomethacin (INDOCIN) 50 MG Cap Take 1 Cap by mouth 2 times a day as needed. 30 Cap 1   • nitroglycerin (NITROSTAT) 0.4 MG SL Tab Place 1 Tab under tongue as needed for Chest Pain (1 tab SL every 5 mins, max of 3 doses as needed for CP, if no relief call 911). 25 Tab 1   • allopurinol (ZYLOPRIM) 100 MG Tab TAKE ONE TABLET BY MOUTH ONE TIME DAILY (Patient taking differently: Take 100 mg by mouth every day.) 90 Tab 2   • acetaminophen (TYLENOL) 500 MG Tab Take 1,000 mg by mouth every 6 hours as needed for Moderate Pain.     • Red Yeast Rice Extract (RED YEAST RICE PO) Take 2 Tabs by mouth every day.     • aspirin 81 MG EC tablet Take 1 Tab by mouth every day. 30 Tab 11   • Glucosamine-Chondroit-Vit C-Mn (GLUCOSAMINE CHONDROITIN COMPLX) CAPS Take 2 Caps by mouth every day.     • [DISCONTINUED] gabapentin (NEURONTIN) 300 MG Cap      • [DISCONTINUED] naproxen (NAPROSYN) 500 MG Tab Take 1,000 mg by mouth one time. Pts wife RX     • [DISCONTINUED] methylPREDNISolone (MEDROL DOSEPAK) 4 MG Tablet Therapy Pack  "Use as directed (Patient not taking: Reported on 4/16/2021) 1 Each 0     No facility-administered encounter medications on file as of 4/16/2021.     Review of Systems   Constitutional: Negative for chills and fever.   Respiratory: Negative for cough, hemoptysis, sputum production, shortness of breath and wheezing.    Cardiovascular: Negative for chest pain, palpitations, orthopnea, claudication, leg swelling and PND.   Gastrointestinal: Negative for nausea and vomiting.   Musculoskeletal:        Right groin pain   Neurological: Negative for dizziness and headaches.   All other systems reviewed and are negative.       Objective:   BP (!) 98/60 (BP Location: Left arm, Patient Position: Sitting, BP Cuff Size: Adult)   Pulse 84   Resp 12   Ht 1.651 m (5' 5\")   Wt 104 kg (229 lb)   SpO2 97%   BMI 38.11 kg/m²     Physical Exam   Constitutional: He appears well-developed and well-nourished.   Eyes: EOM are normal.   Neck: No JVD present.   Cardiovascular: Normal rate, regular rhythm and normal heart sounds.   No murmur heard.  Pulmonary/Chest: Effort normal and breath sounds normal.   Abdominal: Soft.   Musculoskeletal:      Cervical back: Neck supple.      Comments: Right groin hematoma   Neurological:   CN II-XII grossly intact   Skin: Skin is warm and dry.   Psychiatric: He has a normal mood and affect. His behavior is normal. Judgment and thought content normal.   Nursing note and vitals reviewed.    ECG 7/23/2019  Sinus, rate 68, first-degree AV delay, incomplete left bundle branch block, nonspecific T wave changes     Echocardiogram, 9/21/2018  Normal left ventricular ejection fraction (measured at 70%) with normal regional wall motion.  Mildly stenotic bioprosthetic aortic valve with a mean gradient measured at 27 mm a radiant measured at 48 mmHg (V-max 3.5 m/s) without perivalvular leak.  Left atrium is mildly dilated with a volume index measured at 39 mL/meter squared     Echocardiogram, 8/4/2016:  Normal " left ventricular size and systolic function, EF 60%.  Mild concentric left ventricular hypertrophy.  Mild mitral regurgitation.  Normally functioning bovine bioprosthetic aortic valve.   Normal right sided pressures.  No prior studies for comparison     Myocardial perfusion imaging, 11/7/2018:  IMPRESSION:  1.  Dipyridamole stress negative for chest discomfort and negative for   ischemic EKG changes.  2.  PET perfusion images are suggestive of ischemia in the proximal to mid inferior segment and in the proximal mid bilateral segment.  There is no definite infarction.  In addition TID was present which could be indicative of   multivessel disease, possibly more severe than indicated by the perfusion study.  3.  The patient's resting ejection fraction was mildly reduced and david appropriately during dipyridamole stress.  There was basilar to mid inferior hypokinesis noted     Cardiac catheterization, 11/16/2018:  1.  Angina  2.  Positive stress test for inferior ischemia  3.  CAD status post CABG in the 90s and redo in 2015  4.  Bioprosthetic AVR 2015  5.  Poorly controlled hypertension  6.  Morbid obesity  POST  1.  Multivessel coronary artery disease  2.  Occluded vein grafts x6   3.  LIMA to LAD with focal 70-80% distal anastomotic stenosis 4.  Severe native coronary artery disease not amenable to PCI 5.  Poorly controlled hypertension  RECOMMENDATIONS:  His FORD to LAD supplies both the distal LAD territory as well as via retrograde partially occluded jump graft the circumflex territory and is essentially his last remaining vessel.  There is WESLEY-3 flow and he is not maximally managed medically, in addition he has  of the RCA which was dominant and the vein graft is occluded and this is his only ischemic territory on noninvasive imaging.  1.  Medical therapy  2. Consideration of high risk last vessel PCI LIMA to LAD anastomosis should he prove to not be a redo surgical candidate (CT surgical consultation prior  to any potential PCI would be required) and not be well managed with better medical therapy.  3. Weight loss    Cardiac catheterization 4/2/2021 at [facility]  Occlusion of all his grafts excepts for LIMA to LAD s/p PCI with 2.5x15mm resolute MOE and post-dilated with 2.75NC balloon to the anastomotic lesion        CTA 8/20/2018 chest  1.  The pulmonary arteries are suboptimally opacified limiting evaluation, no large central pulmonary embolus is appreciated. Additional imaging would be required for definitive exclusion of pulmonary embolism  2.  Hepatomegaly and diffuse hepatic steatosis.  3.  Right nephrolithiasis     Echocardiogram 12/2/2020  CONCLUSIONS  Left ventricular ejection fraction is visually estimated to be 65%.  Mild concentric left ventricular hypertrophy.  Known bioprosthetic aortic valve replacement with mildly increased   gradient: Vmax is 3.1 m/s, MG 22 mmHg, ALIS 1.1 cm2.  Unable to estimate RVSP, normal estimated RAP.  Compare to prior echo on 9/21/18, no significant change, gradients   continue to be mildly elevated, were elevated on prior.     Pulmonary function tests 10/4/2020  FEV1 2.61 L, 86% predicted  FEV1/FVC 97% predicted  FEF 25-75% 82% predicted  % predicted  DLCO 112% predicted    Assessment:     1. Coronary artery disease of native artery of native heart with stable angina pectoris (HCC)     2. Atherosclerosis of autologous vein coronary artery bypass graft with stable angina pectoris (HCC)     3. Dyslipidemia     4. Statin intolerance     5. Status post cardiac revascularization with bypass aortocoronary anastomosis of five coronary vessels     6. Aortic valve stenosis, etiology of cardiac valve disease unspecified     7. Essential hypertension         Medical Decision Making:  Today's Assessment / Status / Plan:   Plan cut Imdur to 60 mg if patient's blood pressure becomes low when he is symptomatic.  He is already scheduled to see Dr. Morales May 27 appointment.

## 2021-04-26 DIAGNOSIS — R00.2 PALPITATIONS: ICD-10-CM

## 2021-04-27 RX ORDER — ALLOPURINOL 100 MG/1
100 TABLET ORAL DAILY
Qty: 90 TABLET | Refills: 3 | Status: SHIPPED | OUTPATIENT
Start: 2021-04-27 | End: 2021-07-26

## 2021-04-27 NOTE — TELEPHONE ENCOUNTER
Received request via: Pharmacy    Was the patient seen in the last year in this department? Yes 4/12/2021    Does the patient have an active prescription (recently filled or refills available) for medication(s) requested? No

## 2021-06-08 DIAGNOSIS — I25.810 CORONARY ATHEROSCLEROSIS OF AUTOLOGOUS VEIN BYPASS GRAFT WITHOUT ANGINA: Chronic | ICD-10-CM

## 2021-06-08 RX ORDER — NITROGLYCERIN 0.4 MG/1
TABLET SUBLINGUAL
Qty: 25 TABLET | Refills: 1 | Status: SHIPPED | OUTPATIENT
Start: 2021-06-08 | End: 2022-01-01

## 2021-06-09 RX ORDER — INDOMETHACIN 50 MG/1
CAPSULE ORAL
Qty: 30 CAPSULE | Refills: 1 | Status: SHIPPED
Start: 2021-06-09 | End: 2022-01-01

## 2021-06-09 NOTE — TELEPHONE ENCOUNTER
Received request via: Pharmacy    Was the patient seen in the last year in this department? Yes  LOV:4/26/2021  Does the patient have an active prescription (recently filled or refills available) for medication(s) requested? No

## 2021-06-22 ENCOUNTER — NON-PROVIDER VISIT (OUTPATIENT)
Dept: VASCULAR LAB | Facility: MEDICAL CENTER | Age: 66
End: 2021-06-22
Attending: INTERNAL MEDICINE
Payer: COMMERCIAL

## 2021-06-22 PROCEDURE — 99212 OFFICE O/P EST SF 10 MIN: CPT

## 2021-06-22 NOTE — NON-PROVIDER
[facility] - FollowUp Visit  Date of Service: 21    Catherine Hamilton is here for follow up of dyslipidemia.    HPI  Pertinent Interval History since last visit:   Pt had cardiac catheterization 21 at [facility] with MOE - there were complications from procedure of bleeding and hematoma which have since resolved.  - Pt continues to follow up with [facility] provider as well as [facility]  - Pt had not had lipid panel performed since 10/2020    Current Prescription Lipid Lowering Medications - including dose:   Statin: None  Non-Statin: Repatha 140 mg every 14 days  Current Lipid Lowering and Related Supplements:   Red yeast rice; OTC omega-3 fish oils  Any Current Side Effects Potentially Related to Lipid Lowering therapy?   No  Current Adherence to Lipid Lowering Therapies:  Complete  Any Previous History of Statin Intolerance?   Yes, Details:   Patient has been on atorvastatin and lovastatin               Outcome: Severe muscle aches, dehydration, and kidney failure per patient  Non-Statin: Gemfibrozil and Fenofibrate              Outcome: Severe muscle cramps and dehydration  Baseline Lipids Prior to Treatment:  Results for CATHERINE HAMILTON (MRN [id number]) as of 2019 07:25    Ref. Range 10/15/2018 08:56   Cholesterol,Tot Latest Ref Range: 100 - 199 mg/dL 284 (H)   Triglycerides Latest Ref Range: 0 - 149 mg/dL 345 (H)   HDL Latest Ref Range: >=40 mg/dL 31 (A)   LDL Latest Ref Range: <100 mg/dL 184 (H)        SOCIAL HISTORY  Social History     Tobacco Use   Smoking Status Former Smoker   • Packs/day: 3.00   • Years: 20.00   • Pack years: 60.00   • Types: Cigarettes   • Quit date: 1992   • Years since quittin.4   Smokeless Tobacco Never Used      Change in weight: reports 18 lbs weight loss over past 6 weeks  Exercise habits: works out ~2 hrs for 5 days a week   Diet: 1600 calories/day - continues to eat red meat regularly    There were no vitals filed for this visit.   Physical  Exam    DATA REVIEW  Most Recent Lipid Panel:   Lab Results   Component Value Date    CHOLSTRLTOT 154 10/12/2020    TRIGLYCERIDE 295 (H) 10/12/2020    HDL 34 (A) 10/12/2020    LDL 61 10/12/2020       Other Pertinent Blood Work:   Lab Results   Component Value Date    SODIUM 139 12/29/2020    POTASSIUM 4.4 12/29/2020    CHLORIDE 103 12/29/2020    CO2 23 12/29/2020    ANION 13.0 12/29/2020    GLUCOSE 108 (H) 12/29/2020    BUN 14 12/29/2020    CREATININE 0.99 12/29/2020    CALCIUM 9.3 12/29/2020    ASTSGOT 23 12/29/2020    ALTSGPT 22 12/29/2020    ALKPHOSPHAT 71 12/29/2020    TBILIRUBIN 0.6 12/29/2020    ALBUMIN 4.3 12/29/2020    AGRATIO 1.4 12/29/2020       Other:  NA    Recent Imaging Studies:    Recent imaging studies, including US 4/13/2021, were available in EMR and reviewed with patient at today's visit - negative for DVT    ASSESSMENT AND PLAN  Patient Type, check all that apply:   Secondary Prevention  Established Atherosclerotic Cardiovascular Disease (ASCVD)  Yes, Details: h/o CABG, recent MOE  Other Established (non-atherosclerotic) Vascular Disease, if Present:    HTN; pre-DM2  Evidence of Heterozygous Familial Hypercholesterolemia (FH): Possible   - Has been having cardiac issues since he was 21 yo  - Father & brother both passed away of cardiac-related issues  ACC/AHA Indication for Statin Therapy, emily all that apply:   Established ASCVD: Indication for High intensity statin    Calculated Risk for ASCVD, if applicable:  N/A  Other Significant Risk Markers, if any, emily all that apply:  Family history of premature ASCVD in first degree relative  National Lipid Association (NLA) Goal (if applicable):  LDL-C:   <70 mg/dL  Lifestyle Recommendations From Today’s Visit:   Eating Plan: Concentrate on  continue lower calorie diet, but aim for fresh, whole foods and limit red meat  Exercise: continue current exercise regimen  Statin Recommendations from Today's Visit  None in s/o statin intolerant  Non-Statin  Medications Recommendations from Today’s Visit:   Continue Repatha 140mg every 14 days  Indication for PCSK9 Inhibitor, if applicable:  ASCVD with suboptimal control of LDL-C despite maximally tolerated statin  Supplements Recommended at this visit:  Continue omega-3 fish oils and red yeast rice  Recommendations for Other Cardiovascular Risk Factors, emily all that apply:   Hypertension- encouraged continued exercising and weight loss- limit red meats  Other Issues:  n/a    Pt did not have lipid labs performed from last visit. Pt instructed to have labs drawn this week and will follow up with pt accordingly.      Studies Ordered at Todays Visit:  None   Blood Work Ordered At Today’s visit:   None - Pending lipid profile; direct LDL. Pt instructed to have drawn ASAP (fasting)  Follow-Up:   4 months or sooner pending lab results    Kashif Aguero, PharmD    CC:  Russell T Stodtmeister, M.D. Michael Bloch, M.D.  Adonay Fields, SonD

## 2021-06-22 NOTE — Clinical Note
Pt seen for lipid follow up.   Pt had cardiac catheterization 4/2/21 at [place] with MOE - there were complications from procedure of bleeding and hematoma which have since resolved.  - Pt continues to follow up with [place] provider as well as [facility]  - Pt had not had lipid panel performed since 10/2020    Pt currently tolerating regimen of Repatha, red yeast rice, OTC fish oils with no complaint. Lifestyle habits were discussed with respect to limiting red meat though pt has no current intention on changing diet.    Pt did not have lipid labs performed from last visit. Pt instructed to have labs drawn this week and will follow up with pt accordingly.

## 2021-06-28 ENCOUNTER — HOSPITAL ENCOUNTER (OUTPATIENT)
Dept: LAB | Facility: MEDICAL CENTER | Age: 66
End: 2021-06-28
Payer: COMMERCIAL

## 2021-06-28 LAB
CHOLEST SERPL-MCNC: 162 MG/DL (ref 100–199)
FASTING STATUS PATIENT QL REPORTED: NORMAL
HDLC SERPL-MCNC: 30 MG/DL
LDLC SERPL CALC-MCNC: 63 MG/DL
TRIGL SERPL-MCNC: 344 MG/DL (ref 0–149)

## 2021-06-28 PROCEDURE — 36415 COLL VENOUS BLD VENIPUNCTURE: CPT

## 2021-06-28 PROCEDURE — 80061 LIPID PANEL: CPT

## 2021-09-01 ENCOUNTER — OFFICE VISIT (OUTPATIENT)
Dept: MEDICAL GROUP | Facility: LAB | Age: 66
End: 2021-09-01
Payer: COMMERCIAL

## 2021-09-01 VITALS
WEIGHT: 219 LBS | SYSTOLIC BLOOD PRESSURE: 148 MMHG | DIASTOLIC BLOOD PRESSURE: 92 MMHG | OXYGEN SATURATION: 94 % | TEMPERATURE: 98.4 F | BODY MASS INDEX: 36.49 KG/M2 | HEART RATE: 81 BPM | RESPIRATION RATE: 14 BRPM | HEIGHT: 65 IN

## 2021-09-01 DIAGNOSIS — M1A.9XX0 CHRONIC GOUT WITHOUT TOPHUS, UNSPECIFIED CAUSE, UNSPECIFIED SITE: ICD-10-CM

## 2021-09-01 DIAGNOSIS — I11.9 BENIGN HYPERTENSIVE HEART DISEASE WITHOUT HEART FAILURE: ICD-10-CM

## 2021-09-01 DIAGNOSIS — M48.061 SPINAL STENOSIS OF LUMBAR REGION, UNSPECIFIED WHETHER NEUROGENIC CLAUDICATION PRESENT: ICD-10-CM

## 2021-09-01 DIAGNOSIS — Z95.3 S/P AORTIC VALVE REPLACEMENT WITH BIOPROSTHETIC VALVE: ICD-10-CM

## 2021-09-01 DIAGNOSIS — Z00.00 ENCOUNTER FOR ANNUAL WELLNESS VISIT (AWV) IN MEDICARE PATIENT: ICD-10-CM

## 2021-09-01 DIAGNOSIS — H60.313 ACUTE DIFFUSE OTITIS EXTERNA OF BOTH EARS: ICD-10-CM

## 2021-09-01 DIAGNOSIS — I25.718 ATHEROSCLEROSIS OF AUTOLOGOUS VEIN CORONARY ARTERY BYPASS GRAFT WITH STABLE ANGINA PECTORIS (HCC): ICD-10-CM

## 2021-09-01 DIAGNOSIS — I65.23 BILATERAL CAROTID ARTERY OCCLUSION: ICD-10-CM

## 2021-09-01 DIAGNOSIS — E78.5 DYSLIPIDEMIA: ICD-10-CM

## 2021-09-01 DIAGNOSIS — R73.03 PREDIABETES: ICD-10-CM

## 2021-09-01 DIAGNOSIS — M19.041 PRIMARY OSTEOARTHRITIS OF RIGHT HAND: ICD-10-CM

## 2021-09-01 DIAGNOSIS — I25.118 CORONARY ARTERY DISEASE OF NATIVE ARTERY OF NATIVE HEART WITH STABLE ANGINA PECTORIS (HCC): ICD-10-CM

## 2021-09-01 PROBLEM — I10 ESSENTIAL HYPERTENSION: Status: RESOLVED | Noted: 2019-04-25 | Resolved: 2021-09-01

## 2021-09-01 PROCEDURE — G0439 PPPS, SUBSEQ VISIT: HCPCS | Performed by: FAMILY MEDICINE

## 2021-09-01 RX ORDER — ALLOPURINOL 100 MG/1
TABLET ORAL DAILY
COMMUNITY
Start: 2021-08-09 | End: 2022-01-01

## 2021-09-01 RX ORDER — EZETIMIBE 10 MG/1
10 TABLET ORAL DAILY
Qty: 90 TABLET | Refills: 0 | Status: SHIPPED | OUTPATIENT
Start: 2021-09-01 | End: 2021-11-30

## 2021-09-01 RX ORDER — LEVOFLOXACIN 500 MG/1
500 TABLET, FILM COATED ORAL DAILY
Qty: 7 TABLET | Refills: 0 | Status: SHIPPED | OUTPATIENT
Start: 2021-09-01 | End: 2021-09-08

## 2021-09-01 ASSESSMENT — PATIENT HEALTH QUESTIONNAIRE - PHQ9
CLINICAL INTERPRETATION OF PHQ2 SCORE: 2
SUM OF ALL RESPONSES TO PHQ QUESTIONS 1-9: 2
5. POOR APPETITE OR OVEREATING: 0 - NOT AT ALL

## 2021-09-01 ASSESSMENT — ACTIVITIES OF DAILY LIVING (ADL): BATHING_REQUIRES_ASSISTANCE: 0

## 2021-09-01 ASSESSMENT — FIBROSIS 4 INDEX: FIB4 SCORE: 1.55

## 2021-09-01 ASSESSMENT — ENCOUNTER SYMPTOMS: GENERAL WELL-BEING: FAIR

## 2021-09-01 NOTE — PROGRESS NOTES
Chief Complaint   Patient presents with   • Annual Exam     Medicare AWV    • Otalgia     X left ear, started right ear 2 months no over to left ear, unsrue if related to swimming at the gym, started to feel fuild leaking.        HPI:  Abimael is a 65 y.o. here for Medicare Annual Wellness Visit    #Ear pain:  -Patient states that for the last several months has been having significant pain in his ears.  He states that it started in his right ear with some significant pain, drainage from the ear.  He states that has resolved slightly; however, now experiencing similar symptoms in his left ear.  He states he has been having extreme pain, drainage and ear special laying on that side.  Is also noticed some difficulty hearing as well.  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  He is here today for further evaluation.  Denies any fevers, chills, chest pain, shortness of breath, sore throat, changes in vision, cough.    #Annual wellness visit:  -Patient doing well.  Has no questions or concerns at this time.  Is following up with all specialists.  Taking all medications prescribed.    Patient Active Problem List    Diagnosis Date Noted   • Primary osteoarthritis of right hand 03/16/2020   • Essential hypertension 04/25/2019   • Bilateral carotid artery occlusion 02/21/2019   • Obesity (BMI 30-39.9) 05/25/2017   • Prediabetes 09/15/2016   • Gout    • Status post cardiac revascularization with bypass aortocoronary anastomosis of five coronary vessels 07/26/2016   • Postsurgical aortocoronary bypass status 07/26/2016   • Statin intolerance 07/26/2016   • S/P aortic valve replacement with bioprosthetic valve 07/26/2016   • Aortic stenosis 12/01/2015   • Coronary artery disease of native artery of native heart with stable angina pectoris (HCC) 07/30/2012   • Dyslipidemia 07/30/2012   • Coronary atherosclerosis of autologous vein bypass graft 11/14/2011   • Benign  hypertensive heart disease without heart failure 11/14/2011       Current Outpatient Medications   Medication Sig Dispense Refill   • allopurinol (ZYLOPRIM) 100 MG Tab      • indomethacin (INDOCIN) 50 MG Cap TAKE ONE CAPSULE BY MOUTH TWICE DAILY AS NEEDED  30 capsule 1   • nitroglycerin (NITROSTAT) 0.4 MG SL Tab PLACE 1TAB UNDER TONGUE AS NEEDED FOR CHEST PAIN(1TAB EVERY 5 MINS,MAX 3DOSES AS NEEDED)IF NO RELIEF CALL 911 25 tablet 1   • HYDROcodone/acetaminophen (NORCO)  MG Tab as needed. Twice a day as needed     • amLODIPine (NORVASC) 5 MG Tab TAKE ONE TABLET BY MOUTH ONE TIME DAILY  90 tablet 2   • lisinopril (PRINIVIL) 20 MG Tab TAKE ONE TABLET BY MOUTH ONE TIME DAILY  90 tablet 2   • clopidogrel (PLAVIX) 75 MG Tab TAKE ONE TABLET BY MOUTH ONE TIME DAILY  90 tablet 3   • isosorbide mononitrate (IMDUR) 120 MG CR tablet TAKE  ONE TABLET BY MOUTH EVERY MORNING 90 tablet 3   • Evolocumab, REPATHA, (REPATHA SURECLICK) 140 MG/ML Solution Auto-injector Inject 1 Each under the skin every 14 days. 2 Each 6   • Omega-3 Fatty Acids (FISH OIL) 1000 MG Cap capsule Take 2,000 mg by mouth every day.     • amoxicillin (AMOXIL) 500 MG Cap Take 2,000 mg by mouth as needed. For dentist      • metoprolol SR (TOPROL XL) 100 MG TABLET SR 24 HR Take 1 Tab by mouth every day. 30 Tab 11   • acetaminophen (TYLENOL) 500 MG Tab Take 1,000 mg by mouth every 6 hours as needed for Moderate Pain.     • Red Yeast Rice Extract (RED YEAST RICE PO) Take 2 Tabs by mouth every day.     • aspirin 81 MG EC tablet Take 1 Tab by mouth every day. 30 Tab 11   • Glucosamine-Chondroit-Vit C-Mn (GLUCOSAMINE CHONDROITIN COMPLX) CAPS Take 2 Caps by mouth every day.       No current facility-administered medications for this visit.        Patient is taking medications as noted in medication list.  Current supplements as per medication list.     Allergies: Gemfibrozil, Lipitor [atorvastatin calcium], Lovastatin, and Tricor    Current social  contact/activities: Riding motorcycle, daily workout and pool.    Is patient current with immunizations? Yes.    He  reports that he quit smoking about 29 years ago. His smoking use included cigarettes. He has a 60.00 pack-year smoking history. He has never used smokeless tobacco. He reports previous alcohol use. He reports current drug use. Drugs: Marijuana and Inhaled.  Counseling given: Not Answered      DPA/Advanced directive: Patient does not have an Advanced Directive.  A packet and workshop information was given on Advanced Directives.    ROS:    Gait: Uses no assistive device   Ostomy: No   Other tubes: No   Amputations: No   Chronic oxygen use No   Last eye exam N/A   Wears hearing aids: No   : Denies any urinary leakage during the last 6 months      Screening:    Depression Screening  Little interest or pleasure in doing things?  1 - several days  Feeling down, depressed, or hopeless? 1 - several days  Trouble falling or staying asleep, or sleeping too much?  0 - not at all  Feeling tired or having little energy?  0 - not at all  Poor appetite or overeating?  0 - not at all  Feeling bad about yourself - or that you are a failure or have let yourself or your family down? 0 - not at all  Trouble concentrating on things, such as reading the newspaper or watching television? 0 - not at all  Moving or speaking so slowly that other people could have noticed.  Or the opposite - being so fidgety or restless that you have been moving around a lot more than usual?  0 - not at all  Thoughts that you would be better off dead, or of hurting yourself?  0 - not at all  Patient Health Questionnaire Score: 2    If depressive symptoms identified deferred to follow up visit unless specifically addressed in assessment and plan.    Interpretation of PHQ-9 Total Score   Score Severity   1-4 No Depression   5-9 Mild Depression   10-14 Moderate Depression   15-19 Moderately Severe Depression   20-27 Severe  Depression      Screening for Cognitive Impairment  Three Minute Recall (captain, hilary, cortes)  2/3    Draw clock face with all 12 numbers and set the hands to show 5 past 8.  Yes    If cognitive concerns identified, deferred for follow up unless specifically addressed in assessment and plan.    Fall Risk Assessment  Has the patient had two or more falls in the last year or any fall with injury in the last year?  No  If fall risk identified, deferred for follow up unless specifically addressed in assessment and plan.    Safety Assessment  Throw rugs on floor.  No  Handrails on all stairs.  Yes  Good lighting in all hallways.  Yes  Difficulty hearing.  No  Patient counseled about all safety risks that were identified.    Functional Assessment ADLs  Are there any barriers preventing you from cooking for yourself or meeting nutritional needs?  No.    Are there any barriers preventing you from driving safely or obtaining transportation?  No.    Are there any barriers preventing you from using a telephone or calling for help?  No.    Are there any barriers preventing you from shopping?  No.    Are there any barriers preventing you from taking care of your own finances?  No.    Are there any barriers preventing you from managing your medications?    No.    Are there any barriers preventing you from showering, bathing or dressing yourself?  No.    Are you currently engaging in any exercise or physical activity?  Yes.     What is your perception of your health?  Fair.    Health Maintenance Summary                HEPATITIS C SCREENING Overdue 1955     IMM PNEUMOCOCCAL VACCINE: 0-64 Years Overdue 12/5/1962     COLORECTAL CANCER SCREENING Overdue 9/17/2019     Annual Wellness Visit Overdue 3/17/2021      Done 3/16/2020 Visit Dx: Medicare annual wellness visit, subsequent     Patient has more history with this topic...    IMM INFLUENZA Overdue 9/1/2021     IMM DTaP/Tdap/Td Vaccine Next Due 6/7/2027      Done 6/7/2017  Imm Admin: Tdap Vaccine          Patient Care Team:  Raul Jimenez M.D. as PCP - General (Family Medicine)  Shweta Morales M.D. (Cardiology)    Social History     Tobacco Use   • Smoking status: Former Smoker     Packs/day: 3.00     Years: 20.00     Pack years: 60.00     Types: Cigarettes     Quit date: 1992     Years since quittin.6   • Smokeless tobacco: Never Used   Vaping Use   • Vaping Use: Never used   Substance Use Topics   • Alcohol use: Not Currently     Comment: 4 per month   • Drug use: Yes     Types: Marijuana, Inhaled     Comment: Marijuana- Daily (4 times per day)     Family History   Problem Relation Age of Onset   • Heart Attack Father         MI and CABG, unknown age   • Hyperlipidemia Father    • Cancer Mother         Breast   • Heart Disease Brother    • Arthritis Maternal Grandmother    • Stroke Neg Hx    • Diabetes Neg Hx    • Lung Disease Neg Hx      He  has a past medical history of Arthritis, Benign hypertensive heart disease without heart failure (2011), CAD (coronary artery disease), Congestive heart failure (HCC), Coronary atherosclerosis of autologous vein bypass graft (2011), Essential hypertension (2019), Gout, Heart valve disease, High cholesterol, History of pancreatitis, History of pancreatitis, Hypertension, Migraine (2019), Muscle cramps (10/4/2011), Myocardial infarct ([facility]), Obesity (2011), Pain, Postsurgical aortocoronary bypass status (2016), Renal disorder, S/P aortic valve replacement with bioprosthetic valve (2016), Statin intolerance (2016), and Status post cardiac revascularization with bypass aortocoronary anastomosis of five coronary vessels (2016).   Past Surgical History:   Procedure Laterality Date   • SHOULDER DECOMPRESSION ARTHROSCOPIC Right 2017    Procedure: SHOULDER DECOMPRESSION ARTHROSCOPIC SUBACROMIAL;  Surgeon: Mg Campos M.D.;  Location: SURGERY [facility];  Service:  "Orthopedics   • ARTHROSCOPIC LABRAL DEBRIDEMENT Right 9/5/2017    Procedure: ARTHROSCOPIC LABRAL DEBRIDEMENT;  Surgeon: Mg Campos M.D.;  Location: [facility];  Service: Orthopedics   • SHOULDER ARTHROSCOPY W/ ROTATOR CUFF REPAIR Right 9/5/2017    Procedure: SHOULDER ARTHROSCOPY W/ ROTATOR CUFF REPAIR;  Surgeon: Mg Campos M.D.;  Location: [facility];  Service: Orthopedics   • SHOULDER ARTHROSCOPY W/ BICIPITAL TENODESIS REPAIR Right 9/5/2017    Procedure: SHOULDER ARTHROSCOPY W/ BICIPITAL TENODESIS REPAIR;  Surgeon: Mg Campos M.D.;  Location: [facility];  Service: Orthopedics   • SYNOVECTOMY Right 9/5/2017    Procedure: SYNOVECTOMY;  Surgeon: Mg Campos M.D.;  Location: [facility];  Service: Orthopedics   • MULTIPLE CORONARY ARTERY BYPASS  12/08/2015    3 way bypass & Bovine valve   • LAMINOTOMY  12/2004    Diskectomy L4-L5, L5-S1   • MULTIPLE CORONARY ARTERY BYPASS  11/11/1998    5 way bypass   • BIOPSY GENERAL      buttock lesion   • EYE SURGERY     • MITRAL VALVE REPLACE           Exam:   /92   Pulse 81   Temp 36.9 °C (98.4 °F) (Temporal)   Resp 14   Ht 1.651 m (5' 5\")   Wt 99.3 kg (219 lb)   SpO2 94%  Body mass index is 36.44 kg/m².    Hearing excellent.    Dentition good  Alert, oriented in no acute distress.  Eye contact is good, speech goal directed, affect calm      Assessment and Plan. The following treatment and monitoring plan is recommended:      1. Encounter for annual wellness visit (AWV) in Medicare patient  -All questions concerns were answered at this time.  Reviewed all screenings, lab work, vaccinations.  Patient will be due for lab work in the next 6 months.  Given started on new medication (see below) we will follow-up in 6 months for repeat lab check.  - Subsequent Annual Wellness Visit - Includes PPPS ()    2. Benign hypertensive heart disease without heart failure  -This " problem is chronic, stable, and monitored appropriately by history/labs/imaging as needed, and is well-controlled on the current regimen.  Please continue current regimen.  We will continue with amlodipine, lisinopril, metoprolol.  Patient's blood pressure today was elevated; ever, did not take medication before appointment today.    3. Bilateral carotid artery occlusion  -This problem is chronic, stable, and monitored appropriately by history/labs/imaging as needed, and is well-controlled on the current regimen.  Please continue current regimen.  Reviewed findings from ultrasound completed on 2/7/2019 showing mild stenosis of right internal carotid (less than 50%) as well as moderate stenosis of the left internal carotid (50 to 69%).  Patient will continue follow-up with cardiologist on a regular basis.  No other questions or concerns at this time.    4. Coronary artery disease of native artery of native heart with stable angina pectoris (HCC)  -Of course patient has a significant cardiovascular history.  -Status post 5 vessel CABG in 1998 and a redo three-vessel CABG in 2015.  -Nuclear stress test completed in 2018, subsequent left heart catheterization showing all grafts are occluded with the exception of his LIMA-LAD which fills the circumflex distribution via jump graft.  His native artery are severely diseased as well.  At that catheterization in 2018 it did show an 80% narrowing at the anastomotic site of LIMA to LAD.   -Patient continues to be followed up regularly by cardiology in [place].   -At this time he is stable.  Continue all medications as listed above.  Continue to follow-up with cardiology at [place] on a regular basis.    5. Atherosclerosis of autologous vein coronary artery bypass graft with stable angina pectoris (HCC)  -Status: Stable.  See #4.    6. Dyslipidemia  -Patient is a chronic longstanding issue as far as taking statins.  He is currently on Repatha which is helped improve his  cholesterol significantly; however, his triglycerides remain elevated.  He is treating at this time with ready stress extract, Alfonso 3 fatty acids.  2 months ago his triglycerides remain in the high 300s.  He had tried previously treating with TriCor; however, significant side effects to the medication had him stop taking it.  -At this time we will attempt further treatment with Zetia.  Encourage patient continue with appropriate diet, exercise regimen.  We will recheck labs again in 6 months  - ezetimibe (ZETIA) 10 MG Tab; Take 1 Tablet by mouth every day for 90 days.  Dispense: 90 Tablet; Refill: 0    7. Chronic gout without tophus, unspecified cause, unspecified site  -This problem is chronic, stable, and monitored appropriately by history/labs/imaging as needed, and is well-controlled on the current regimen.  Please continue current regimen, Allopurinol, Indomethacin as needed.     8. S/P aortic valve replacement with bioprosthetic valve  -Valve replaced approximately 5 years ago secondary to aortic stenosis.  Is being followed by cardiology closely.  Last echocardiogram completed in December 2020 showing normal functioning bioprosthetic valve.    9. Prediabetes  -This problem is chronic, stable, and monitored appropriately by history/labs/imaging as needed, and is well-controlled on the current regimen.  Please continue current regimen. Continue with appropriate diet, exercise regimen.    10. Primary osteoarthritis of right hand  -This problem is chronic, stable, and monitored appropriately by history/labs/imaging as needed, and is well-controlled on the current regimen.  Please continue current regimen.     11. Spinal stenosis of lumbar region, unspecified whether neurogenic claudication present  -At this time the patient was working with [employer]; however, they refused surgery at this time given significant cardiac risk.  I will refer patient to neurosurgeon for further evaluation.  He is continuing pain  treatment at this time with a chronic pain specialist currently on Norco 10 mg daily  - REFERRAL TO NEUROSURGERY    12. Acute diffuse otitis externa of both ears  -Signs and symptoms at this time consistent with otitis externa bilaterally.  Given the severity and disease process we will treat with oral antibiotics including Levaquin 1 tablet for 7 days.  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  So his next week.  Patient will follow-up as needed.  - levoFLOXacin (LEVAQUIN) 500 MG tablet; Take 1 Tablet by mouth every day for 7 days.  Dispense: 7 Tablet; Refill: 0       Services suggested: No services needed at this time  Health Care Screening recommendations as per orders if indicated.  Referrals offered: PT/OT/Nutrition counseling/Behavioral Health/Smoking cessation as per orders if indicated.    Discussion today about general wellness and lifestyle habits:    · Prevent falls and reduce trip hazards; Cautioned about securing or removing rugs.  · Have a working fire alarm and carbon monoxide detector;   · Engage in regular physical activity and social activities     Follow-up: Return in about 6 months (around 3/1/2022).

## 2021-10-19 ENCOUNTER — NON-PROVIDER VISIT (OUTPATIENT)
Dept: VASCULAR LAB | Facility: MEDICAL CENTER | Age: 66
End: 2021-10-19
Attending: INTERNAL MEDICINE
Payer: COMMERCIAL

## 2021-10-19 DIAGNOSIS — R73.03 PREDIABETES: ICD-10-CM

## 2021-10-19 DIAGNOSIS — E78.5 DYSLIPIDEMIA: ICD-10-CM

## 2021-10-19 PROCEDURE — 99212 OFFICE O/P EST SF 10 MIN: CPT

## 2021-10-19 NOTE — NON-PROVIDER
[facility] - FollowUp Visit  Date of Service: 10/1921    Catherine Hamilton is here for follow up of dyslipidemia.    HPI  Pertinent Interval History since last visit:   - Pt continues to follow up with [place] provider as well as [facility]  - Patient was started on ezetimibe by PCP but patient did not pick it up and refuses to start any new medications at this time.     Current Prescription Lipid Lowering Medications - including dose:   Statin: None  Non-Statin: Repatha 140 mg every 14 days, Patient did not start Zetia 10mg due to fear of side effects.   Current Lipid Lowering and Related Supplements:   Red yeast rice; OTC omega-3 fish oils  Any Current Side Effects Potentially Related to Lipid Lowering therapy?   No  Current Adherence to Lipid Lowering Therapies:  Complete  Any Previous History of Statin Intolerance?   Yes, Details:   Patient has been on atorvastatin and lovastatin               Outcome: Severe muscle aches, dehydration, and kidney failure per patient  Non-Statin: Gemfibrozil and Fenofibrate              Outcome: Severe muscle cramps and dehydration  Baseline Lipids Prior to Treatment:  Results for CATHERINE HAMILTON (MRN [id number]) as of 2019 07:25    Ref. Range 10/15/2018 08:56   Cholesterol,Tot Latest Ref Range: 100 - 199 mg/dL 284 (H)   Triglycerides Latest Ref Range: 0 - 149 mg/dL 345 (H)   HDL Latest Ref Range: >=40 mg/dL 31 (A)   LDL Latest Ref Range: <100 mg/dL 184 (H)        SOCIAL HISTORY  Social History     Tobacco Use   Smoking Status Former Smoker   • Packs/day: 3.00   • Years: 20.00   • Pack years: 60.00   • Types: Cigarettes   • Quit date: 1992   • Years since quittin.8   Smokeless Tobacco Never Used      Change in weight: reports losing 10 pounds since his last visit.  Exercise habits: works out ~1.5 hrs for 5 days a week   Diet: 1600 calories/day - continues to eat red meat regularly    There were no vitals filed for this visit.   Physical Exam    DATA  REVIEW  Most Recent Lipid Panel:   Lab Results   Component Value Date    CHOLSTRLTOT 162 06/28/2021    TRIGLYCERIDE 344 (H) 06/28/2021    HDL 30 (A) 06/28/2021    LDL 63 06/28/2021       Other Pertinent Blood Work:   Lab Results   Component Value Date    SODIUM 139 12/29/2020    POTASSIUM 4.4 12/29/2020    CHLORIDE 103 12/29/2020    CO2 23 12/29/2020    ANION 13.0 12/29/2020    GLUCOSE 108 (H) 12/29/2020    BUN 14 12/29/2020    CREATININE 0.99 12/29/2020    CALCIUM 9.3 12/29/2020    ASTSGOT 23 12/29/2020    ALTSGPT 22 12/29/2020    ALKPHOSPHAT 71 12/29/2020    TBILIRUBIN 0.6 12/29/2020    ALBUMIN 4.3 12/29/2020    AGRATIO 1.4 12/29/2020       Other:  NA    Recent Imaging Studies:    Recent imaging studies, including US 4/13/2021, were available in EMR and reviewed with patient at today's visit - negative for DVT    ASSESSMENT AND PLAN  Patient Type, check all that apply:   Secondary Prevention  Established Atherosclerotic Cardiovascular Disease (ASCVD)  Yes, Details: h/o CABG, recent MOE  Other Established (non-atherosclerotic) Vascular Disease, if Present:    HTN; pre-DM2  Evidence of Heterozygous Familial Hypercholesterolemia (FH): Possible   - Has been having cardiac issues since he was 19 yo  - Father & brother both passed away of cardiac-related issues  ACC/AHA Indication for Statin Therapy, emily all that apply:   Established ASCVD: Indication for High intensity statin    Calculated Risk for ASCVD, if applicable:  N/A  Other Significant Risk Markers, if any, emily all that apply:  Family history of premature ASCVD in first degree relative  National Lipid Association (NLA) Goal (if applicable):  LDL-C:   <70 mg/dL  Lifestyle Recommendations From Today’s Visit:   Eating Plan: Concentrate on  continue lower calorie diet, but aim for fresh, whole foods and limit red meat  Exercise: continue current exercise regimen  Statin Recommendations from Today's Visit  None in s/o statin intolerant  Non-Statin Medications  Recommendations from Today’s Visit:   Continue Repatha 140mg every 14 days  Indication for PCSK9 Inhibitor, if applicable:  ASCVD with suboptimal control of LDL-C despite maximally tolerated statin  Supplements Recommended at this visit:  Continue omega-3 fish oils and red yeast rice  Recommendations for Other Cardiovascular Risk Factors, emily all that apply:   Hypertension- encouraged continued exercising and weight loss- limit red meats  Other Issues:  n/a    Patient's last LDL of 66 is at goal of <70mg/ml. Patient triglycerides remain elevated at 344 on 6/28/21. Patient was started on Ezetimibe by PCP which patient did not start. He refuses to start any new medications at this time. He reports that he was allergic to an ingredient in Tricor and that it was slowly poisoning him per a neurologist. Will continue current regimen per patient preference. Congratulated patient on his exercise regimen.     Patient is aware that he is prediabetic and this could be contributing to his elevated triglyceride count. Patient managing this with lifestyle management currently. Ordered repeat A1c.    Studies Ordered at Todays Visit:  None   Blood Work Ordered At Today’s visit:   CMP, Lipid panel, A1c  Follow-Up:   6 months per patient preference.     Edison Garcia, PharmD   Discussed with Jaime Ariza, SonD    CC:  Raul Jimenez M.D.  Michael Bloch, M.D.  Adonay Fields, SonD

## 2021-10-19 NOTE — Clinical Note
Patient did not start Zetia per your instructions. He refuses to take any other cholesterol medication at this time.

## 2021-10-19 NOTE — PROGRESS NOTES
Agree with above clinical pharmacist note  Would strongly recommend consideration of vascepa if trigs and nonHDL remain elevated.    Michael J Bloch, MD  Certified Clinical Lipid Specialist  Medial Director, [facility] Lipid [facility]

## 2021-11-11 DIAGNOSIS — E78.5 DYSLIPIDEMIA: ICD-10-CM

## 2021-11-17 ENCOUNTER — OFFICE VISIT (OUTPATIENT)
Dept: MEDICAL GROUP | Facility: LAB | Age: 66
End: 2021-11-17
Payer: COMMERCIAL

## 2021-11-17 VITALS
WEIGHT: 224.43 LBS | OXYGEN SATURATION: 95 % | RESPIRATION RATE: 14 BRPM | TEMPERATURE: 98 F | HEART RATE: 88 BPM | DIASTOLIC BLOOD PRESSURE: 98 MMHG | SYSTOLIC BLOOD PRESSURE: 120 MMHG | HEIGHT: 65 IN | BODY MASS INDEX: 37.39 KG/M2

## 2021-11-17 DIAGNOSIS — M79.641 RIGHT HAND PAIN: ICD-10-CM

## 2021-11-17 DIAGNOSIS — H69.92 DYSFUNCTION OF LEFT EUSTACHIAN TUBE: ICD-10-CM

## 2021-11-17 PROCEDURE — 99214 OFFICE O/P EST MOD 30 MIN: CPT | Performed by: FAMILY MEDICINE

## 2021-11-17 RX ORDER — OXYCODONE AND ACETAMINOPHEN 10; 325 MG/1; MG/1
TABLET ORAL
COMMUNITY
Start: 2021-11-11 | End: 2022-01-01

## 2021-11-17 ASSESSMENT — FIBROSIS 4 INDEX: FIB4 SCORE: 1.55

## 2021-11-17 NOTE — PROGRESS NOTES
Subjective:   Abimael Hamilton is a 65 y.o. male here today for   Chief Complaint   Patient presents with   • Otalgia   • Arthritis     Discuss cortisone shots      #Ear pain:  -For the last few days patient been having acute, intermittent pain in his left ear.  He describes as a sharp pain, occasionally radiating into his jaw.  Patient has a history of otitis externa secondary to regular pool use.  He denies any discharge from ear.  Denies any difficulty hearing or tinnitus.  Here today for further evaluation.    #Hand pain:  -For the past year patient's been having ongoing right hand pain.  He states the pain is constant pain, located across the MCP joints.  Patient states his also had swelling in the MCP joints of his index, middle, and ring finger.  Patient is also noticed decrease in  strength, increasing numbness, tingling in fingers as well.  Here today for further evaluation.      Allergies   Allergen Reactions   • Gemfibrozil      Dehydration,Severe Muscle cramps   • Lipitor [Atorvastatin Calcium] Unspecified     Severe Muscle cramps, dehydration   • Lovastatin      Dehydration, severe muscle cramps   • Tricor Unspecified     Dehydration, severe muscle cramps         Current medicines (including changes today)  Current Outpatient Medications   Medication Sig Dispense Refill   • oxyCODONE-acetaminophen (PERCOCET-10)  MG Tab      • allopurinol (ZYLOPRIM) 100 MG Tab every day.     • ezetimibe (ZETIA) 10 MG Tab Take 1 Tablet by mouth every day for 90 days. (Patient not taking: Reported on 10/19/2021) 90 Tablet 0   • indomethacin (INDOCIN) 50 MG Cap TAKE ONE CAPSULE BY MOUTH TWICE DAILY AS NEEDED  30 capsule 1   • nitroglycerin (NITROSTAT) 0.4 MG SL Tab PLACE 1TAB UNDER TONGUE AS NEEDED FOR CHEST PAIN(1TAB EVERY 5 MINS,MAX 3DOSES AS NEEDED)IF NO RELIEF CALL 911 25 tablet 1   • HYDROcodone/acetaminophen (NORCO)  MG Tab as needed. Twice a day as needed     • amLODIPine (NORVASC) 5 MG Tab TAKE ONE  TABLET BY MOUTH ONE TIME DAILY  90 tablet 2   • lisinopril (PRINIVIL) 20 MG Tab TAKE ONE TABLET BY MOUTH ONE TIME DAILY  90 tablet 2   • clopidogrel (PLAVIX) 75 MG Tab TAKE ONE TABLET BY MOUTH ONE TIME DAILY  90 tablet 3   • isosorbide mononitrate (IMDUR) 120 MG CR tablet TAKE  ONE TABLET BY MOUTH EVERY MORNING 90 tablet 3   • Evolocumab, REPATHA, (REPATHA SURECLICK) 140 MG/ML Solution Auto-injector Inject 1 Each under the skin every 14 days. 2 Each 6   • Omega-3 Fatty Acids (FISH OIL) 1000 MG Cap capsule Take 2,000 mg by mouth every day.     • amoxicillin (AMOXIL) 500 MG Cap Take 2,000 mg by mouth as needed. For dentist      • metoprolol SR (TOPROL XL) 100 MG TABLET SR 24 HR Take 1 Tab by mouth every day. 30 Tab 11   • acetaminophen (TYLENOL) 500 MG Tab Take 1,000 mg by mouth every 6 hours as needed for Moderate Pain.     • Red Yeast Rice Extract (RED YEAST RICE PO) Take 2 Tabs by mouth every day.     • aspirin 81 MG EC tablet Take 1 Tab by mouth every day. 30 Tab 11   • Glucosamine-Chondroit-Vit C-Mn (GLUCOSAMINE CHONDROITIN COMPLX) CAPS Take 2 Caps by mouth every day.       No current facility-administered medications for this visit.     He  has a past medical history of Aortic stenosis (12/1/2015), Arthritis, Benign hypertensive heart disease without heart failure (11/14/2011), CAD (coronary artery disease), Congestive heart failure (HCC), Coronary atherosclerosis of autologous vein bypass graft (11/14/2011), Essential hypertension (4/25/2019), Gout, Heart valve disease, High cholesterol, History of pancreatitis, History of pancreatitis, Hypertension, Migraine (2/16/2019), Muscle cramps (10/4/2011), Myocardial infarct ([facility]), Obesity (11/14/2011), Pain, Postsurgical aortocoronary bypass status (7/26/2016), Postsurgical aortocoronary bypass status (7/26/2016), Renal disorder, S/P aortic valve replacement with bioprosthetic valve (7/26/2016), Statin intolerance (7/26/2016), Status post cardiac revascularization  "with bypass aortocoronary anastomosis of five coronary vessels (7/26/2016), and Status post cardiac revascularization with bypass aortocoronary anastomosis of five coronary vessels (7/26/2016).    ROS   -See HPI     Objective:     Physical Exam:  /98 (BP Location: Right arm, Patient Position: Sitting, BP Cuff Size: Adult)   Pulse 88   Temp 36.7 °C (98 °F) (Temporal)   Resp 14   Ht 1.651 m (5' 5\")   Wt 102 kg (224 lb 6.9 oz)   SpO2 95%  Body mass index is 37.35 kg/m².   Constitutional: Alert, no distress.  Skin: Warm, dry, good turgor, no rashes in visible areas.  Eye: Equal, round and reactive, conjunctiva clear, lids normal.  ENMT: Left TM showing air-fluid interface.  Normal TM, pearly, normal external auditory canal.  Right TM clear, no concerns., lips without lesions, good dentition, oropharynx clear.    Const: Vitals above. Well-appearing.  CV: Inspected/palpated for upper extremity edema. Bilateral radial pulses palpated, noting below if not 2+. Capillary refill evaluated distally, noting below if greater than 2 seconds.  Skin: Inspected for rash or lesions in area of concern.  Neuro/psych: Phalen, Tinel's tests: negative. Observed for normal mood/affect/insight.  MSK: normal gait. Posture: unremarkable. Examination of bilateral wrist/hand:  - Insepected/palpated bilaterally for warmth, upper extremity carriage, ulnar variance, and for deformity and tenderness of the proximal/distal radius and ulna, scaphoid/snuffbox, carpals, metacarpals, phalanges, carpal/CMC/MCP/IP joints, and TFCC.  - Assessed passive/active range of motion bilaterally with forearm pronation/supination, wrist flexion/extension, wrist radial/ulnar deviation, MCP flexion/extension/abduction/adduction, IP joint flexion/extension, and thumb flexion/extension/abduction/adduction/opposition, noting below for any pain or crepitation.  - Assessed muscle strength bilaterally with wrist flexion (C6/7, median/ulnar nerves), wrist " extension (C7/8 radial nerve), finger extension (C7, radial nerve), finger abduction (T1, ulnar nerve), and thumb opposition (median nerve), noting below if less than 5/5 or pain. Observed for muscle atrophy in thenar, hypothenar, and interosseus areas.  - Assessed stability bilaterally at the TFCC (piano key test) and wrist, carpal, scapho-lunate (Shuck test), metacarpal, and phalangeal joints. Varus/valgus stress at MCP/IP joints: unremarkable. Finkelstein test negative.    All of the above were found to be normal, except: Decreased strength with  in the left hand.  Unable to fully flex fingers either passively or actively without pain.  Mild to moderate edema noted in MCP joints of index, middle, and ring finger.      Assessment and Plan:     1. Dysfunction of left eustachian tube  -Difficult to determine why patient is having eustachian tube dysfunction as he normally does not have any history of allergies.  We will treat at this time with intranasal corticosteroid.  At this time there are no signs of acute infection.  Patient will follow-up as needed.    2. Right hand pain  -We reviewed x-ray from patient that was taken back in February which shows no significant findings regarding arthritis.  The pain that he is experiencing is most likely musculoskeletal; however, uncertain of as to the exact etiology.  At this time we will refer patient to orthopedics for further evaluation and treatment.  - Referral to Orthopedics    Followup: No follow-ups on file.         PLEASE NOTE: This dictation was created using voice recognition software. I have made every reasonable attempt to correct obvious errors, but I expect that there are errors of grammar and possibly content that I did not discover before finalizing the note.

## 2021-12-06 PROBLEM — M18.11 PRIMARY OSTEOARTHRITIS OF FIRST CARPOMETACARPAL JOINT OF RIGHT HAND: Status: ACTIVE | Noted: 2021-12-06

## 2021-12-06 PROBLEM — M19.041 OSTEOARTHRITIS OF RIGHT HAND: Status: ACTIVE | Noted: 2021-12-06

## 2022-01-01 ENCOUNTER — APPOINTMENT (OUTPATIENT)
Dept: RADIOLOGY | Facility: MEDICAL CENTER | Age: 67
DRG: 280 | End: 2022-01-01
Attending: EMERGENCY MEDICINE
Payer: COMMERCIAL

## 2022-01-01 ENCOUNTER — APPOINTMENT (OUTPATIENT)
Dept: RADIOLOGY | Facility: MEDICAL CENTER | Age: 67
DRG: 700 | End: 2022-01-01
Payer: COMMERCIAL

## 2022-01-01 ENCOUNTER — OUTPATIENT INFUSION SERVICES (OUTPATIENT)
Dept: ONCOLOGY | Facility: MEDICAL CENTER | Age: 67
End: 2022-01-01
Attending: INTERNAL MEDICINE
Payer: COMMERCIAL

## 2022-01-01 ENCOUNTER — APPOINTMENT (OUTPATIENT)
Dept: RADIOLOGY | Facility: MEDICAL CENTER | Age: 67
DRG: 518 | End: 2022-01-01
Attending: STUDENT IN AN ORGANIZED HEALTH CARE EDUCATION/TRAINING PROGRAM
Payer: COMMERCIAL

## 2022-01-01 ENCOUNTER — OFFICE VISIT (OUTPATIENT)
Dept: MEDICAL GROUP | Facility: LAB | Age: 67
End: 2022-01-01
Payer: COMMERCIAL

## 2022-01-01 ENCOUNTER — APPOINTMENT (OUTPATIENT)
Dept: RADIOLOGY | Facility: MEDICAL CENTER | Age: 67
DRG: 700 | End: 2022-01-01
Attending: STUDENT IN AN ORGANIZED HEALTH CARE EDUCATION/TRAINING PROGRAM
Payer: COMMERCIAL

## 2022-01-01 ENCOUNTER — PHARMACY VISIT (OUTPATIENT)
Dept: PHARMACY | Facility: MEDICAL CENTER | Age: 67
End: 2022-01-01
Payer: COMMERCIAL

## 2022-01-01 ENCOUNTER — ANESTHESIA (OUTPATIENT)
Dept: SURGERY | Facility: MEDICAL CENTER | Age: 67
DRG: 518 | End: 2022-01-01
Payer: COMMERCIAL

## 2022-01-01 ENCOUNTER — TELEPHONE (OUTPATIENT)
Dept: CARDIOLOGY | Facility: MEDICAL CENTER | Age: 67
End: 2022-01-01

## 2022-01-01 ENCOUNTER — APPOINTMENT (OUTPATIENT)
Dept: CARDIOLOGY | Facility: MEDICAL CENTER | Age: 67
DRG: 280 | End: 2022-01-01
Attending: STUDENT IN AN ORGANIZED HEALTH CARE EDUCATION/TRAINING PROGRAM
Payer: COMMERCIAL

## 2022-01-01 ENCOUNTER — PATIENT MESSAGE (OUTPATIENT)
Dept: MEDICAL GROUP | Facility: LAB | Age: 67
End: 2022-01-01
Payer: COMMERCIAL

## 2022-01-01 ENCOUNTER — ANESTHESIA EVENT (OUTPATIENT)
Dept: SURGERY | Facility: MEDICAL CENTER | Age: 67
DRG: 518 | End: 2022-01-01
Payer: COMMERCIAL

## 2022-01-01 ENCOUNTER — APPOINTMENT (OUTPATIENT)
Dept: CARDIOLOGY | Facility: MEDICAL CENTER | Age: 67
DRG: 518 | End: 2022-01-01
Attending: INTERNAL MEDICINE
Payer: COMMERCIAL

## 2022-01-01 ENCOUNTER — HOSPITAL ENCOUNTER (INPATIENT)
Facility: MEDICAL CENTER | Age: 67
LOS: 20 days | DRG: 518 | End: 2022-11-25
Attending: EMERGENCY MEDICINE | Admitting: INTERNAL MEDICINE
Payer: COMMERCIAL

## 2022-01-01 ENCOUNTER — TELEPHONE (OUTPATIENT)
Dept: VASCULAR LAB | Facility: MEDICAL CENTER | Age: 67
End: 2022-01-01
Payer: COMMERCIAL

## 2022-01-01 ENCOUNTER — DOCUMENTATION (OUTPATIENT)
Dept: PHARMACY | Facility: MEDICAL CENTER | Age: 67
End: 2022-01-01
Payer: COMMERCIAL

## 2022-01-01 ENCOUNTER — APPOINTMENT (OUTPATIENT)
Dept: RADIOLOGY | Facility: MEDICAL CENTER | Age: 67
End: 2022-01-01
Attending: EMERGENCY MEDICINE
Payer: COMMERCIAL

## 2022-01-01 ENCOUNTER — TELEPHONE (OUTPATIENT)
Dept: CARDIOLOGY | Facility: MEDICAL CENTER | Age: 67
End: 2022-01-01
Payer: COMMERCIAL

## 2022-01-01 ENCOUNTER — OFFICE VISIT (OUTPATIENT)
Dept: CARDIOLOGY | Facility: MEDICAL CENTER | Age: 67
End: 2022-01-01
Payer: COMMERCIAL

## 2022-01-01 ENCOUNTER — DOCUMENTATION (OUTPATIENT)
Dept: VASCULAR LAB | Facility: MEDICAL CENTER | Age: 67
End: 2022-01-01

## 2022-01-01 ENCOUNTER — HOSPITAL ENCOUNTER (OUTPATIENT)
Dept: LAB | Facility: MEDICAL CENTER | Age: 67
End: 2022-05-19
Attending: STUDENT IN AN ORGANIZED HEALTH CARE EDUCATION/TRAINING PROGRAM
Payer: COMMERCIAL

## 2022-01-01 ENCOUNTER — HOSPITAL ENCOUNTER (OUTPATIENT)
Dept: LAB | Facility: MEDICAL CENTER | Age: 67
End: 2022-05-19
Attending: INTERNAL MEDICINE
Payer: COMMERCIAL

## 2022-01-01 ENCOUNTER — TELEPHONE (OUTPATIENT)
Dept: MEDICAL GROUP | Facility: LAB | Age: 67
End: 2022-01-01
Payer: COMMERCIAL

## 2022-01-01 ENCOUNTER — DOCUMENTATION (OUTPATIENT)
Dept: VASCULAR LAB | Facility: MEDICAL CENTER | Age: 67
End: 2022-01-01
Payer: COMMERCIAL

## 2022-01-01 ENCOUNTER — APPOINTMENT (OUTPATIENT)
Dept: RADIOLOGY | Facility: MEDICAL CENTER | Age: 67
DRG: 518 | End: 2022-01-01
Attending: INTERNAL MEDICINE
Payer: COMMERCIAL

## 2022-01-01 ENCOUNTER — HOSPITAL ENCOUNTER (INPATIENT)
Facility: REHABILITATION | Age: 67
End: 2022-01-01
Attending: PHYSICAL MEDICINE & REHABILITATION | Admitting: PHYSICAL MEDICINE & REHABILITATION
Payer: MEDICARE

## 2022-01-01 ENCOUNTER — APPOINTMENT (OUTPATIENT)
Dept: RADIOLOGY | Facility: MEDICAL CENTER | Age: 67
DRG: 518 | End: 2022-01-01
Attending: HOSPITALIST
Payer: COMMERCIAL

## 2022-01-01 ENCOUNTER — HOSPITAL ENCOUNTER (INPATIENT)
Facility: MEDICAL CENTER | Age: 67
LOS: 5 days | DRG: 700 | End: 2022-04-12
Attending: EMERGENCY MEDICINE | Admitting: HOSPITALIST
Payer: COMMERCIAL

## 2022-01-01 ENCOUNTER — APPOINTMENT (OUTPATIENT)
Dept: RADIOLOGY | Facility: MEDICAL CENTER | Age: 67
DRG: 518 | End: 2022-01-01
Payer: COMMERCIAL

## 2022-01-01 ENCOUNTER — APPOINTMENT (OUTPATIENT)
Dept: ADMISSIONS | Facility: MEDICAL CENTER | Age: 67
End: 2022-01-01
Payer: COMMERCIAL

## 2022-01-01 ENCOUNTER — HOSPITAL ENCOUNTER (INPATIENT)
Facility: MEDICAL CENTER | Age: 67
LOS: 2 days | DRG: 280 | End: 2022-12-10
Attending: EMERGENCY MEDICINE | Admitting: STUDENT IN AN ORGANIZED HEALTH CARE EDUCATION/TRAINING PROGRAM
Payer: COMMERCIAL

## 2022-01-01 ENCOUNTER — APPOINTMENT (OUTPATIENT)
Dept: RADIOLOGY | Facility: MEDICAL CENTER | Age: 67
End: 2022-01-01
Attending: NURSE PRACTITIONER
Payer: COMMERCIAL

## 2022-01-01 ENCOUNTER — HOSPITAL ENCOUNTER (EMERGENCY)
Facility: MEDICAL CENTER | Age: 67
End: 2022-12-11
Attending: EMERGENCY MEDICINE
Payer: COMMERCIAL

## 2022-01-01 ENCOUNTER — HOSPITAL ENCOUNTER (OUTPATIENT)
Dept: LAB | Facility: MEDICAL CENTER | Age: 67
End: 2022-08-10
Attending: NURSE PRACTITIONER
Payer: COMMERCIAL

## 2022-01-01 ENCOUNTER — NON-PROVIDER VISIT (OUTPATIENT)
Dept: VASCULAR LAB | Facility: MEDICAL CENTER | Age: 67
End: 2022-01-01
Attending: INTERNAL MEDICINE
Payer: COMMERCIAL

## 2022-01-01 ENCOUNTER — PATIENT MESSAGE (OUTPATIENT)
Dept: HEALTH INFORMATION MANAGEMENT | Facility: OTHER | Age: 67
End: 2022-01-01

## 2022-01-01 ENCOUNTER — PATIENT MESSAGE (OUTPATIENT)
Dept: CARDIOLOGY | Facility: MEDICAL CENTER | Age: 67
End: 2022-01-01
Payer: COMMERCIAL

## 2022-01-01 ENCOUNTER — APPOINTMENT (OUTPATIENT)
Dept: RADIOLOGY | Facility: MEDICAL CENTER | Age: 67
DRG: 280 | End: 2022-01-01
Attending: STUDENT IN AN ORGANIZED HEALTH CARE EDUCATION/TRAINING PROGRAM
Payer: COMMERCIAL

## 2022-01-01 ENCOUNTER — APPOINTMENT (OUTPATIENT)
Dept: RADIOLOGY | Facility: MEDICAL CENTER | Age: 67
DRG: 700 | End: 2022-01-01
Attending: EMERGENCY MEDICINE
Payer: COMMERCIAL

## 2022-01-01 ENCOUNTER — APPOINTMENT (OUTPATIENT)
Dept: CARDIOLOGY | Facility: MEDICAL CENTER | Age: 67
End: 2022-01-01
Attending: INTERNAL MEDICINE
Payer: COMMERCIAL

## 2022-01-01 ENCOUNTER — HOSPITAL ENCOUNTER (OUTPATIENT)
Dept: CARDIOLOGY | Facility: MEDICAL CENTER | Age: 67
End: 2022-12-01
Attending: INTERNAL MEDICINE
Payer: COMMERCIAL

## 2022-01-01 ENCOUNTER — HOSPITAL ENCOUNTER (OUTPATIENT)
Facility: MEDICAL CENTER | Age: 67
End: 2022-01-01
Attending: STUDENT IN AN ORGANIZED HEALTH CARE EDUCATION/TRAINING PROGRAM | Admitting: STUDENT IN AN ORGANIZED HEALTH CARE EDUCATION/TRAINING PROGRAM
Payer: COMMERCIAL

## 2022-01-01 ENCOUNTER — HOSPITAL ENCOUNTER (OUTPATIENT)
Dept: LAB | Facility: MEDICAL CENTER | Age: 67
End: 2022-02-28
Attending: NURSE PRACTITIONER
Payer: COMMERCIAL

## 2022-01-01 ENCOUNTER — APPOINTMENT (OUTPATIENT)
Dept: CARDIOLOGY | Facility: MEDICAL CENTER | Age: 67
DRG: 280 | End: 2022-01-01
Attending: PHYSICIAN ASSISTANT
Payer: COMMERCIAL

## 2022-01-01 VITALS
DIASTOLIC BLOOD PRESSURE: 61 MMHG | HEART RATE: 86 BPM | RESPIRATION RATE: 16 BRPM | BODY MASS INDEX: 36.56 KG/M2 | TEMPERATURE: 97.3 F | OXYGEN SATURATION: 95 % | SYSTOLIC BLOOD PRESSURE: 97 MMHG | WEIGHT: 226.41 LBS

## 2022-01-01 VITALS
HEIGHT: 66 IN | BODY MASS INDEX: 35.36 KG/M2 | OXYGEN SATURATION: 95 % | SYSTOLIC BLOOD PRESSURE: 148 MMHG | DIASTOLIC BLOOD PRESSURE: 72 MMHG | HEART RATE: 88 BPM | TEMPERATURE: 98 F | WEIGHT: 220 LBS | RESPIRATION RATE: 15 BRPM

## 2022-01-01 VITALS
OXYGEN SATURATION: 92 % | WEIGHT: 229.28 LBS | RESPIRATION RATE: 18 BRPM | SYSTOLIC BLOOD PRESSURE: 126 MMHG | BODY MASS INDEX: 36.85 KG/M2 | TEMPERATURE: 97.9 F | DIASTOLIC BLOOD PRESSURE: 80 MMHG | HEART RATE: 68 BPM | HEIGHT: 66 IN

## 2022-01-01 VITALS
HEART RATE: 74 BPM | BODY MASS INDEX: 35.79 KG/M2 | HEIGHT: 66 IN | TEMPERATURE: 97.9 F | SYSTOLIC BLOOD PRESSURE: 160 MMHG | WEIGHT: 222.66 LBS | RESPIRATION RATE: 18 BRPM | DIASTOLIC BLOOD PRESSURE: 104 MMHG | OXYGEN SATURATION: 94 %

## 2022-01-01 VITALS
RESPIRATION RATE: 14 BRPM | WEIGHT: 226.2 LBS | BODY MASS INDEX: 36.35 KG/M2 | TEMPERATURE: 98.7 F | OXYGEN SATURATION: 94 % | HEART RATE: 72 BPM | HEIGHT: 66 IN | DIASTOLIC BLOOD PRESSURE: 50 MMHG | SYSTOLIC BLOOD PRESSURE: 90 MMHG

## 2022-01-01 VITALS
SYSTOLIC BLOOD PRESSURE: 120 MMHG | RESPIRATION RATE: 18 BRPM | TEMPERATURE: 97.6 F | HEART RATE: 93 BPM | DIASTOLIC BLOOD PRESSURE: 72 MMHG | OXYGEN SATURATION: 97 %

## 2022-01-01 VITALS
BODY MASS INDEX: 36.46 KG/M2 | SYSTOLIC BLOOD PRESSURE: 134 MMHG | DIASTOLIC BLOOD PRESSURE: 82 MMHG | DIASTOLIC BLOOD PRESSURE: 72 MMHG | WEIGHT: 226.85 LBS | HEIGHT: 66 IN | TEMPERATURE: 97.7 F | SYSTOLIC BLOOD PRESSURE: 119 MMHG | HEART RATE: 93 BPM | RESPIRATION RATE: 18 BRPM | OXYGEN SATURATION: 98 % | RESPIRATION RATE: 20 BRPM | HEART RATE: 88 BPM | OXYGEN SATURATION: 98 % | TEMPERATURE: 98.1 F

## 2022-01-01 VITALS
HEIGHT: 66 IN | SYSTOLIC BLOOD PRESSURE: 86 MMHG | RESPIRATION RATE: 14 BRPM | BODY MASS INDEX: 35.68 KG/M2 | TEMPERATURE: 98.2 F | WEIGHT: 222 LBS | DIASTOLIC BLOOD PRESSURE: 62 MMHG | HEART RATE: 86 BPM | OXYGEN SATURATION: 96 %

## 2022-01-01 VITALS
DIASTOLIC BLOOD PRESSURE: 100 MMHG | WEIGHT: 221 LBS | SYSTOLIC BLOOD PRESSURE: 166 MMHG | RESPIRATION RATE: 18 BRPM | BODY MASS INDEX: 35.52 KG/M2 | HEART RATE: 93 BPM | HEIGHT: 66 IN | OXYGEN SATURATION: 95 %

## 2022-01-01 VITALS
HEART RATE: 66 BPM | WEIGHT: 216.8 LBS | OXYGEN SATURATION: 96 % | SYSTOLIC BLOOD PRESSURE: 110 MMHG | BODY MASS INDEX: 34.84 KG/M2 | HEIGHT: 66 IN | RESPIRATION RATE: 14 BRPM | DIASTOLIC BLOOD PRESSURE: 82 MMHG

## 2022-01-01 VITALS
OXYGEN SATURATION: 95 % | BODY MASS INDEX: 36.25 KG/M2 | WEIGHT: 225.53 LBS | HEART RATE: 90 BPM | OXYGEN SATURATION: 97 % | TEMPERATURE: 98 F | SYSTOLIC BLOOD PRESSURE: 83 MMHG | HEIGHT: 66 IN | SYSTOLIC BLOOD PRESSURE: 115 MMHG | TEMPERATURE: 97.4 F | DIASTOLIC BLOOD PRESSURE: 74 MMHG | RESPIRATION RATE: 22 BRPM | HEART RATE: 62 BPM | RESPIRATION RATE: 18 BRPM | DIASTOLIC BLOOD PRESSURE: 56 MMHG

## 2022-01-01 VITALS
OXYGEN SATURATION: 97 % | TEMPERATURE: 98.5 F | HEART RATE: 97 BPM | SYSTOLIC BLOOD PRESSURE: 104 MMHG | DIASTOLIC BLOOD PRESSURE: 71 MMHG | RESPIRATION RATE: 18 BRPM

## 2022-01-01 VITALS
HEIGHT: 65 IN | BODY MASS INDEX: 37.49 KG/M2 | SYSTOLIC BLOOD PRESSURE: 136 MMHG | DIASTOLIC BLOOD PRESSURE: 80 MMHG | HEART RATE: 82 BPM | TEMPERATURE: 98.9 F | WEIGHT: 225 LBS | OXYGEN SATURATION: 96 % | RESPIRATION RATE: 16 BRPM

## 2022-01-01 VITALS
OXYGEN SATURATION: 98 % | TEMPERATURE: 98 F | HEART RATE: 88 BPM | RESPIRATION RATE: 18 BRPM | SYSTOLIC BLOOD PRESSURE: 119 MMHG | DIASTOLIC BLOOD PRESSURE: 65 MMHG

## 2022-01-01 VITALS
SYSTOLIC BLOOD PRESSURE: 130 MMHG | OXYGEN SATURATION: 95 % | DIASTOLIC BLOOD PRESSURE: 84 MMHG | WEIGHT: 222 LBS | TEMPERATURE: 98.3 F | BODY MASS INDEX: 36.99 KG/M2 | HEIGHT: 65 IN | HEART RATE: 80 BPM | RESPIRATION RATE: 14 BRPM

## 2022-01-01 VITALS
HEART RATE: 82 BPM | SYSTOLIC BLOOD PRESSURE: 94 MMHG | RESPIRATION RATE: 18 BRPM | BODY MASS INDEX: 36.61 KG/M2 | WEIGHT: 225.09 LBS | OXYGEN SATURATION: 97 % | TEMPERATURE: 98.7 F | DIASTOLIC BLOOD PRESSURE: 61 MMHG

## 2022-01-01 VITALS
HEART RATE: 133 BPM | BODY MASS INDEX: 35.36 KG/M2 | WEIGHT: 220 LBS | RESPIRATION RATE: 18 BRPM | DIASTOLIC BLOOD PRESSURE: 95 MMHG | TEMPERATURE: 97.5 F | SYSTOLIC BLOOD PRESSURE: 120 MMHG | HEIGHT: 66 IN | OXYGEN SATURATION: 95 %

## 2022-01-01 VITALS
DIASTOLIC BLOOD PRESSURE: 77 MMHG | HEART RATE: 101 BPM | TEMPERATURE: 99.5 F | OXYGEN SATURATION: 92 % | RESPIRATION RATE: 18 BRPM | SYSTOLIC BLOOD PRESSURE: 132 MMHG

## 2022-01-01 VITALS
WEIGHT: 220 LBS | DIASTOLIC BLOOD PRESSURE: 70 MMHG | SYSTOLIC BLOOD PRESSURE: 110 MMHG | HEART RATE: 112 BPM | BODY MASS INDEX: 35.36 KG/M2 | OXYGEN SATURATION: 88 % | RESPIRATION RATE: 22 BRPM | HEIGHT: 66 IN

## 2022-01-01 VITALS
DIASTOLIC BLOOD PRESSURE: 64 MMHG | RESPIRATION RATE: 16 BRPM | HEART RATE: 87 BPM | OXYGEN SATURATION: 97 % | TEMPERATURE: 97.6 F | SYSTOLIC BLOOD PRESSURE: 104 MMHG

## 2022-01-01 VITALS
DIASTOLIC BLOOD PRESSURE: 76 MMHG | HEIGHT: 66 IN | SYSTOLIC BLOOD PRESSURE: 133 MMHG | OXYGEN SATURATION: 95 % | TEMPERATURE: 97.1 F | RESPIRATION RATE: 18 BRPM | HEART RATE: 113 BPM | WEIGHT: 230.38 LBS | BODY MASS INDEX: 37.03 KG/M2

## 2022-01-01 VITALS
SYSTOLIC BLOOD PRESSURE: 115 MMHG | RESPIRATION RATE: 18 BRPM | DIASTOLIC BLOOD PRESSURE: 72 MMHG | HEART RATE: 96 BPM | OXYGEN SATURATION: 96 % | TEMPERATURE: 97 F

## 2022-01-01 VITALS
HEART RATE: 111 BPM | DIASTOLIC BLOOD PRESSURE: 67 MMHG | RESPIRATION RATE: 18 BRPM | SYSTOLIC BLOOD PRESSURE: 96 MMHG | OXYGEN SATURATION: 90 % | TEMPERATURE: 98.4 F

## 2022-01-01 DIAGNOSIS — I11.9 BENIGN HYPERTENSIVE HEART DISEASE WITHOUT HEART FAILURE: ICD-10-CM

## 2022-01-01 DIAGNOSIS — Z95.3 S/P AORTIC VALVE REPLACEMENT WITH BIOPROSTHETIC VALVE: ICD-10-CM

## 2022-01-01 DIAGNOSIS — I25.118 CORONARY ARTERY DISEASE OF NATIVE ARTERY OF NATIVE HEART WITH STABLE ANGINA PECTORIS (HCC): ICD-10-CM

## 2022-01-01 DIAGNOSIS — Z95.1 S/P CABG X 3: ICD-10-CM

## 2022-01-01 DIAGNOSIS — M1A.9XX0 CHRONIC GOUT WITHOUT TOPHUS, UNSPECIFIED CAUSE, UNSPECIFIED SITE: ICD-10-CM

## 2022-01-01 DIAGNOSIS — I25.84 CORONARY ARTERY DISEASE DUE TO CALCIFIED CORONARY LESION: ICD-10-CM

## 2022-01-01 DIAGNOSIS — N28.89 RENAL MASS: ICD-10-CM

## 2022-01-01 DIAGNOSIS — M48.061 SPINAL STENOSIS OF LUMBAR REGION WITH RADICULOPATHY: ICD-10-CM

## 2022-01-01 DIAGNOSIS — E78.5 DYSLIPIDEMIA: ICD-10-CM

## 2022-01-01 DIAGNOSIS — I82.431 ACUTE DEEP VEIN THROMBOSIS (DVT) OF POPLITEAL VEIN OF RIGHT LOWER EXTREMITY (HCC): ICD-10-CM

## 2022-01-01 DIAGNOSIS — G06.2 EPIDURAL ABSCESS: ICD-10-CM

## 2022-01-01 DIAGNOSIS — M54.42 ACUTE MIDLINE LOW BACK PAIN WITH LEFT-SIDED SCIATICA: ICD-10-CM

## 2022-01-01 DIAGNOSIS — K76.0 FATTY LIVER: ICD-10-CM

## 2022-01-01 DIAGNOSIS — Z78.9 STATIN INTOLERANCE: ICD-10-CM

## 2022-01-01 DIAGNOSIS — I25.718 ATHEROSCLEROSIS OF AUTOLOGOUS VEIN CORONARY ARTERY BYPASS GRAFT WITH STABLE ANGINA PECTORIS (HCC): ICD-10-CM

## 2022-01-01 DIAGNOSIS — I20.89 EFFORT ANGINA (HCC): ICD-10-CM

## 2022-01-01 DIAGNOSIS — R73.03 PREDIABETES: ICD-10-CM

## 2022-01-01 DIAGNOSIS — M48.061 SPINAL STENOSIS OF LUMBAR REGION, UNSPECIFIED WHETHER NEUROGENIC CLAUDICATION PRESENT: ICD-10-CM

## 2022-01-01 DIAGNOSIS — M54.16 SPINAL STENOSIS OF LUMBAR REGION WITH RADICULOPATHY: ICD-10-CM

## 2022-01-01 DIAGNOSIS — I25.810 CORONARY ATHEROSCLEROSIS OF AUTOLOGOUS VEIN BYPASS GRAFT WITHOUT ANGINA: ICD-10-CM

## 2022-01-01 DIAGNOSIS — I10 ESSENTIAL HYPERTENSION: ICD-10-CM

## 2022-01-01 DIAGNOSIS — R06.09 DOE (DYSPNEA ON EXERTION): ICD-10-CM

## 2022-01-01 DIAGNOSIS — Z01.810 PREOPERATIVE CARDIOVASCULAR EXAMINATION: ICD-10-CM

## 2022-01-01 DIAGNOSIS — N28.1 CYST OF RIGHT KIDNEY: ICD-10-CM

## 2022-01-01 DIAGNOSIS — J96.01 ACUTE RESPIRATORY FAILURE WITH HYPOXIA (HCC): ICD-10-CM

## 2022-01-01 DIAGNOSIS — I35.0 AORTIC VALVE STENOSIS, ETIOLOGY OF CARDIAC VALVE DISEASE UNSPECIFIED: ICD-10-CM

## 2022-01-01 DIAGNOSIS — T82.897A HEART VALVE PROSTHESIS-PATIENT MISMATCH: ICD-10-CM

## 2022-01-01 DIAGNOSIS — I21.4 NSTEMI (NON-ST ELEVATED MYOCARDIAL INFARCTION) (HCC): ICD-10-CM

## 2022-01-01 DIAGNOSIS — I45.10 NEW ONSET RIGHT BUNDLE BRANCH BLOCK (RBBB): ICD-10-CM

## 2022-01-01 DIAGNOSIS — E78.5 DYSLIPIDEMIA: Primary | ICD-10-CM

## 2022-01-01 DIAGNOSIS — I25.110 CORONARY ARTERY DISEASE INVOLVING NATIVE CORONARY ARTERY OF NATIVE HEART WITH UNSTABLE ANGINA PECTORIS (HCC): Primary | ICD-10-CM

## 2022-01-01 DIAGNOSIS — I50.32 CHRONIC HEART FAILURE WITH PRESERVED EJECTION FRACTION (HFPEF) (HCC): ICD-10-CM

## 2022-01-01 DIAGNOSIS — I25.810 CORONARY ATHEROSCLEROSIS OF AUTOLOGOUS VEIN BYPASS GRAFT WITHOUT ANGINA: Chronic | ICD-10-CM

## 2022-01-01 DIAGNOSIS — I46.9 CARDIAC ARREST (HCC): ICD-10-CM

## 2022-01-01 DIAGNOSIS — R60.0 LOWER EXTREMITY EDEMA: ICD-10-CM

## 2022-01-01 DIAGNOSIS — R29.898 LEFT LEG WEAKNESS: ICD-10-CM

## 2022-01-01 DIAGNOSIS — N30.00 ACUTE CYSTITIS WITHOUT HEMATURIA: ICD-10-CM

## 2022-01-01 DIAGNOSIS — I44.7 INCOMPLETE LEFT BUNDLE BRANCH BLOCK (LBBB): ICD-10-CM

## 2022-01-01 DIAGNOSIS — Z79.899 ON POTASSIUM WASTING DIURETIC THERAPY: ICD-10-CM

## 2022-01-01 DIAGNOSIS — I25.118 CORONARY ARTERY DISEASE INVOLVING NATIVE CORONARY ARTERY OF NATIVE HEART WITH OTHER FORM OF ANGINA PECTORIS (HCC): ICD-10-CM

## 2022-01-01 DIAGNOSIS — R93.1 ABNORMAL NUCLEAR CARDIAC IMAGING TEST: ICD-10-CM

## 2022-01-01 DIAGNOSIS — I25.10 CORONARY ARTERY DISEASE DUE TO CALCIFIED CORONARY LESION: ICD-10-CM

## 2022-01-01 DIAGNOSIS — R52 INTRACTABLE PAIN: ICD-10-CM

## 2022-01-01 DIAGNOSIS — Z91.89 OTHER SPECIFIED PERSONAL RISK FACTORS, NOT ELSEWHERE CLASSIFIED: ICD-10-CM

## 2022-01-01 DIAGNOSIS — R55 SYNCOPE, UNSPECIFIED SYNCOPE TYPE: ICD-10-CM

## 2022-01-01 DIAGNOSIS — R73.9 HYPERGLYCEMIA: ICD-10-CM

## 2022-01-01 DIAGNOSIS — N20.0 KIDNEY STONE ON RIGHT SIDE: ICD-10-CM

## 2022-01-01 DIAGNOSIS — I87.2 CHRONIC VENOUS INSUFFICIENCY: ICD-10-CM

## 2022-01-01 DIAGNOSIS — E66.9 OBESITY (BMI 30-39.9): ICD-10-CM

## 2022-01-01 LAB
ALBUMIN SERPL BCP-MCNC: 3.4 G/DL (ref 3.2–4.9)
ALBUMIN SERPL BCP-MCNC: 3.4 G/DL (ref 3.2–4.9)
ALBUMIN SERPL BCP-MCNC: 3.5 G/DL (ref 3.2–4.9)
ALBUMIN SERPL BCP-MCNC: 3.5 G/DL (ref 3.2–4.9)
ALBUMIN SERPL BCP-MCNC: 3.6 G/DL (ref 3.2–4.9)
ALBUMIN SERPL BCP-MCNC: 3.7 G/DL (ref 3.2–4.9)
ALBUMIN SERPL BCP-MCNC: 3.8 G/DL (ref 3.2–4.9)
ALBUMIN SERPL BCP-MCNC: 3.9 G/DL (ref 3.2–4.9)
ALBUMIN SERPL BCP-MCNC: 3.9 G/DL (ref 3.2–4.9)
ALBUMIN SERPL BCP-MCNC: 4 G/DL (ref 3.2–4.9)
ALBUMIN SERPL BCP-MCNC: 4 G/DL (ref 3.2–4.9)
ALBUMIN SERPL BCP-MCNC: 4.1 G/DL (ref 3.2–4.9)
ALBUMIN SERPL BCP-MCNC: 4.1 G/DL (ref 3.2–4.9)
ALBUMIN SERPL BCP-MCNC: 4.2 G/DL (ref 3.2–4.9)
ALBUMIN SERPL BCP-MCNC: 4.3 G/DL (ref 3.2–4.9)
ALBUMIN SERPL BCP-MCNC: 4.6 G/DL (ref 3.2–4.9)
ALBUMIN SERPL BCP-MCNC: 4.7 G/DL (ref 3.2–4.9)
ALBUMIN/GLOB SERPL: 1 G/DL
ALBUMIN/GLOB SERPL: 1 G/DL
ALBUMIN/GLOB SERPL: 1.1 G/DL
ALBUMIN/GLOB SERPL: 1.3 G/DL
ALBUMIN/GLOB SERPL: 1.3 G/DL
ALBUMIN/GLOB SERPL: 1.4 G/DL
ALBUMIN/GLOB SERPL: 1.4 G/DL
ALBUMIN/GLOB SERPL: 1.5 G/DL
ALBUMIN/GLOB SERPL: 1.6 G/DL
ALBUMIN/GLOB SERPL: 1.7 G/DL
ALBUMIN/GLOB SERPL: 1.7 G/DL
ALBUMIN/GLOB SERPL: 1.8 G/DL
ALP SERPL-CCNC: 112 U/L (ref 30–99)
ALP SERPL-CCNC: 52 U/L (ref 30–99)
ALP SERPL-CCNC: 54 U/L (ref 30–99)
ALP SERPL-CCNC: 55 U/L (ref 30–99)
ALP SERPL-CCNC: 56 U/L (ref 30–99)
ALP SERPL-CCNC: 57 U/L (ref 30–99)
ALP SERPL-CCNC: 59 U/L (ref 30–99)
ALP SERPL-CCNC: 61 U/L (ref 30–99)
ALP SERPL-CCNC: 61 U/L (ref 30–99)
ALP SERPL-CCNC: 62 U/L (ref 30–99)
ALP SERPL-CCNC: 66 U/L (ref 30–99)
ALP SERPL-CCNC: 66 U/L (ref 30–99)
ALP SERPL-CCNC: 72 U/L (ref 30–99)
ALP SERPL-CCNC: 72 U/L (ref 30–99)
ALP SERPL-CCNC: 79 U/L (ref 30–99)
ALP SERPL-CCNC: 80 U/L (ref 30–99)
ALT SERPL-CCNC: 106 U/L (ref 2–50)
ALT SERPL-CCNC: 13 U/L (ref 2–50)
ALT SERPL-CCNC: 16 U/L (ref 2–50)
ALT SERPL-CCNC: 16 U/L (ref 2–50)
ALT SERPL-CCNC: 17 U/L (ref 2–50)
ALT SERPL-CCNC: 17 U/L (ref 2–50)
ALT SERPL-CCNC: 18 U/L (ref 2–50)
ALT SERPL-CCNC: 20 U/L (ref 2–50)
ALT SERPL-CCNC: 20 U/L (ref 2–50)
ALT SERPL-CCNC: 21 U/L (ref 2–50)
ALT SERPL-CCNC: 22 U/L (ref 2–50)
ALT SERPL-CCNC: 23 U/L (ref 2–50)
ALT SERPL-CCNC: 23 U/L (ref 2–50)
ALT SERPL-CCNC: 26 U/L (ref 2–50)
ALT SERPL-CCNC: 30 U/L (ref 2–50)
ALT SERPL-CCNC: 34 U/L (ref 2–50)
ANION GAP SERPL CALC-SCNC: 10 MMOL/L (ref 7–16)
ANION GAP SERPL CALC-SCNC: 11 MMOL/L (ref 7–16)
ANION GAP SERPL CALC-SCNC: 12 MMOL/L (ref 7–16)
ANION GAP SERPL CALC-SCNC: 13 MMOL/L (ref 7–16)
ANION GAP SERPL CALC-SCNC: 14 MMOL/L (ref 7–16)
ANION GAP SERPL CALC-SCNC: 17 MMOL/L (ref 7–16)
ANION GAP SERPL CALC-SCNC: 19 MMOL/L (ref 7–16)
ANION GAP SERPL CALC-SCNC: 8 MMOL/L (ref 7–16)
ANION GAP SERPL CALC-SCNC: 8 MMOL/L (ref 7–16)
ANISOCYTOSIS BLD QL SMEAR: ABNORMAL
APPEARANCE UR: CLEAR
APPEARANCE UR: CLEAR
APTT PPP: 32.6 SEC (ref 24.7–36)
APTT PPP: 34.8 SEC (ref 24.7–36)
AST SERPL-CCNC: 119 U/L (ref 12–45)
AST SERPL-CCNC: 17 U/L (ref 12–45)
AST SERPL-CCNC: 17 U/L (ref 12–45)
AST SERPL-CCNC: 18 U/L (ref 12–45)
AST SERPL-CCNC: 19 U/L (ref 12–45)
AST SERPL-CCNC: 19 U/L (ref 12–45)
AST SERPL-CCNC: 21 U/L (ref 12–45)
AST SERPL-CCNC: 21 U/L (ref 12–45)
AST SERPL-CCNC: 22 U/L (ref 12–45)
AST SERPL-CCNC: 23 U/L (ref 12–45)
AST SERPL-CCNC: 23 U/L (ref 12–45)
AST SERPL-CCNC: 24 U/L (ref 12–45)
AST SERPL-CCNC: 25 U/L (ref 12–45)
AST SERPL-CCNC: 26 U/L (ref 12–45)
BACTERIA #/AREA URNS HPF: NEGATIVE /HPF
BACTERIA #/AREA URNS HPF: NEGATIVE /HPF
BACTERIA BLD CULT: NORMAL
BACTERIA BLD CULT: NORMAL
BACTERIA SPEC ANAEROBE CULT: NORMAL
BACTERIA UR CULT: NORMAL
BACTERIA UR CULT: NORMAL
BACTERIA WND AEROBE CULT: ABNORMAL
BACTERIA WND AEROBE CULT: NORMAL
BASE EXCESS BLDA CALC-SCNC: 0 MMOL/L (ref -4–3)
BASOPHILS # BLD AUTO: 0 % (ref 0–1.8)
BASOPHILS # BLD AUTO: 0.1 % (ref 0–1.8)
BASOPHILS # BLD AUTO: 0.2 % (ref 0–1.8)
BASOPHILS # BLD AUTO: 0.3 % (ref 0–1.8)
BASOPHILS # BLD AUTO: 0.4 % (ref 0–1.8)
BASOPHILS # BLD AUTO: 0.5 % (ref 0–1.8)
BASOPHILS # BLD: 0 K/UL (ref 0–0.12)
BASOPHILS # BLD: 0.02 K/UL (ref 0–0.12)
BASOPHILS # BLD: 0.03 K/UL (ref 0–0.12)
BASOPHILS # BLD: 0.04 K/UL (ref 0–0.12)
BASOPHILS # BLD: 0.05 K/UL (ref 0–0.12)
BASOPHILS # BLD: 0.05 K/UL (ref 0–0.12)
BILIRUB SERPL-MCNC: 0.2 MG/DL (ref 0.1–1.5)
BILIRUB SERPL-MCNC: 0.3 MG/DL (ref 0.1–1.5)
BILIRUB SERPL-MCNC: 0.3 MG/DL (ref 0.1–1.5)
BILIRUB SERPL-MCNC: 0.4 MG/DL (ref 0.1–1.5)
BILIRUB SERPL-MCNC: 0.5 MG/DL (ref 0.1–1.5)
BILIRUB SERPL-MCNC: 0.6 MG/DL (ref 0.1–1.5)
BILIRUB SERPL-MCNC: 0.7 MG/DL (ref 0.1–1.5)
BILIRUB SERPL-MCNC: 0.8 MG/DL (ref 0.1–1.5)
BILIRUB SERPL-MCNC: 1.1 MG/DL (ref 0.1–1.5)
BILIRUB SERPL-MCNC: 1.2 MG/DL (ref 0.1–1.5)
BILIRUB UR QL STRIP.AUTO: NEGATIVE
BILIRUB UR QL STRIP.AUTO: NEGATIVE
BODY TEMPERATURE: ABNORMAL DEGREES
BREATHS SETTING VENT: 26
BUN SERPL-MCNC: 10 MG/DL (ref 8–22)
BUN SERPL-MCNC: 11 MG/DL (ref 8–22)
BUN SERPL-MCNC: 12 MG/DL (ref 8–22)
BUN SERPL-MCNC: 14 MG/DL (ref 8–22)
BUN SERPL-MCNC: 14 MG/DL (ref 8–22)
BUN SERPL-MCNC: 15 MG/DL (ref 8–22)
BUN SERPL-MCNC: 15 MG/DL (ref 8–22)
BUN SERPL-MCNC: 18 MG/DL (ref 8–22)
BUN SERPL-MCNC: 18 MG/DL (ref 8–22)
BUN SERPL-MCNC: 19 MG/DL (ref 8–22)
BUN SERPL-MCNC: 24 MG/DL (ref 8–22)
BUN SERPL-MCNC: 24 MG/DL (ref 8–22)
BUN SERPL-MCNC: 25 MG/DL (ref 8–22)
BUN SERPL-MCNC: 26 MG/DL (ref 8–22)
BUN SERPL-MCNC: 26 MG/DL (ref 8–22)
BUN SERPL-MCNC: 27 MG/DL (ref 8–22)
BUN SERPL-MCNC: 28 MG/DL (ref 8–22)
BUN SERPL-MCNC: 31 MG/DL (ref 8–22)
BUN SERPL-MCNC: 33 MG/DL (ref 8–22)
BUN SERPL-MCNC: 34 MG/DL (ref 8–22)
BUN SERPL-MCNC: 34 MG/DL (ref 8–22)
BUN SERPL-MCNC: 36 MG/DL (ref 8–22)
BUN SERPL-MCNC: 9 MG/DL (ref 8–22)
CALCIUM SERPL-MCNC: 8.1 MG/DL (ref 8.5–10.5)
CALCIUM SERPL-MCNC: 8.2 MG/DL (ref 8.5–10.5)
CALCIUM SERPL-MCNC: 8.4 MG/DL (ref 8.5–10.5)
CALCIUM SERPL-MCNC: 8.4 MG/DL (ref 8.5–10.5)
CALCIUM SERPL-MCNC: 8.5 MG/DL (ref 8.5–10.5)
CALCIUM SERPL-MCNC: 8.6 MG/DL (ref 8.5–10.5)
CALCIUM SERPL-MCNC: 8.7 MG/DL (ref 8.5–10.5)
CALCIUM SERPL-MCNC: 8.7 MG/DL (ref 8.5–10.5)
CALCIUM SERPL-MCNC: 8.8 MG/DL (ref 8.5–10.5)
CALCIUM SERPL-MCNC: 8.9 MG/DL (ref 8.5–10.5)
CALCIUM SERPL-MCNC: 9 MG/DL (ref 8.5–10.5)
CALCIUM SERPL-MCNC: 9.1 MG/DL (ref 8.5–10.5)
CALCIUM SERPL-MCNC: 9.2 MG/DL (ref 8.5–10.5)
CALCIUM SERPL-MCNC: 9.2 MG/DL (ref 8.5–10.5)
CALCIUM SERPL-MCNC: 9.3 MG/DL (ref 8.5–10.5)
CALCIUM SERPL-MCNC: 9.5 MG/DL (ref 8.5–10.5)
CALCIUM SERPL-MCNC: 9.7 MG/DL (ref 8.5–10.5)
CALCIUM SERPL-MCNC: 9.8 MG/DL (ref 8.5–10.5)
CHLORIDE SERPL-SCNC: 100 MMOL/L (ref 96–112)
CHLORIDE SERPL-SCNC: 100 MMOL/L (ref 96–112)
CHLORIDE SERPL-SCNC: 101 MMOL/L (ref 96–112)
CHLORIDE SERPL-SCNC: 101 MMOL/L (ref 96–112)
CHLORIDE SERPL-SCNC: 102 MMOL/L (ref 96–112)
CHLORIDE SERPL-SCNC: 103 MMOL/L (ref 96–112)
CHLORIDE SERPL-SCNC: 105 MMOL/L (ref 96–112)
CHLORIDE SERPL-SCNC: 105 MMOL/L (ref 96–112)
CHLORIDE SERPL-SCNC: 107 MMOL/L (ref 96–112)
CHLORIDE SERPL-SCNC: 109 MMOL/L (ref 96–112)
CHLORIDE SERPL-SCNC: 93 MMOL/L (ref 96–112)
CHLORIDE SERPL-SCNC: 94 MMOL/L (ref 96–112)
CHLORIDE SERPL-SCNC: 97 MMOL/L (ref 96–112)
CHLORIDE SERPL-SCNC: 97 MMOL/L (ref 96–112)
CHLORIDE SERPL-SCNC: 98 MMOL/L (ref 96–112)
CHLORIDE SERPL-SCNC: 99 MMOL/L (ref 96–112)
CHOLEST SERPL-MCNC: 164 MG/DL (ref 100–199)
CHOLEST SERPL-MCNC: 164 MG/DL (ref 100–199)
CHOLEST SERPL-MCNC: 267 MG/DL (ref 100–199)
CK SERPL-CCNC: 130 U/L (ref 0–154)
CK SERPL-CCNC: 252 U/L (ref 0–154)
CK SERPL-CCNC: 335 U/L (ref 0–154)
CK SERPL-CCNC: 347 U/L (ref 0–154)
CK SERPL-CCNC: 478 U/L (ref 0–154)
CO2 BLDA-SCNC: 28 MMOL/L (ref 20–33)
CO2 SERPL-SCNC: 20 MMOL/L (ref 20–33)
CO2 SERPL-SCNC: 21 MMOL/L (ref 20–33)
CO2 SERPL-SCNC: 22 MMOL/L (ref 20–33)
CO2 SERPL-SCNC: 23 MMOL/L (ref 20–33)
CO2 SERPL-SCNC: 24 MMOL/L (ref 20–33)
CO2 SERPL-SCNC: 25 MMOL/L (ref 20–33)
CO2 SERPL-SCNC: 26 MMOL/L (ref 20–33)
CO2 SERPL-SCNC: 27 MMOL/L (ref 20–33)
CO2 SERPL-SCNC: 27 MMOL/L (ref 20–33)
CO2 SERPL-SCNC: 29 MMOL/L (ref 20–33)
COLOR UR: YELLOW
COLOR UR: YELLOW
CREAT SERPL-MCNC: 0.74 MG/DL (ref 0.5–1.4)
CREAT SERPL-MCNC: 0.74 MG/DL (ref 0.5–1.4)
CREAT SERPL-MCNC: 0.75 MG/DL (ref 0.5–1.4)
CREAT SERPL-MCNC: 0.75 MG/DL (ref 0.5–1.4)
CREAT SERPL-MCNC: 0.81 MG/DL (ref 0.5–1.4)
CREAT SERPL-MCNC: 0.82 MG/DL (ref 0.5–1.4)
CREAT SERPL-MCNC: 0.84 MG/DL (ref 0.5–1.4)
CREAT SERPL-MCNC: 0.84 MG/DL (ref 0.5–1.4)
CREAT SERPL-MCNC: 0.87 MG/DL (ref 0.5–1.4)
CREAT SERPL-MCNC: 0.87 MG/DL (ref 0.5–1.4)
CREAT SERPL-MCNC: 0.89 MG/DL (ref 0.5–1.4)
CREAT SERPL-MCNC: 0.89 MG/DL (ref 0.5–1.4)
CREAT SERPL-MCNC: 0.9 MG/DL (ref 0.5–1.4)
CREAT SERPL-MCNC: 0.91 MG/DL (ref 0.5–1.4)
CREAT SERPL-MCNC: 0.92 MG/DL (ref 0.5–1.4)
CREAT SERPL-MCNC: 0.92 MG/DL (ref 0.5–1.4)
CREAT SERPL-MCNC: 0.95 MG/DL (ref 0.5–1.4)
CREAT SERPL-MCNC: 0.95 MG/DL (ref 0.5–1.4)
CREAT SERPL-MCNC: 0.99 MG/DL (ref 0.5–1.4)
CREAT SERPL-MCNC: 1.03 MG/DL (ref 0.5–1.4)
CREAT SERPL-MCNC: 1.13 MG/DL (ref 0.5–1.4)
CREAT SERPL-MCNC: 1.15 MG/DL (ref 0.5–1.4)
CREAT SERPL-MCNC: 1.2 MG/DL (ref 0.5–1.4)
CREAT SERPL-MCNC: 1.26 MG/DL (ref 0.5–1.4)
CREAT SERPL-MCNC: 1.35 MG/DL (ref 0.5–1.4)
CREAT SERPL-MCNC: 1.49 MG/DL (ref 0.5–1.4)
CREAT SERPL-MCNC: 1.53 MG/DL (ref 0.5–1.4)
CREAT SERPL-MCNC: 1.57 MG/DL (ref 0.5–1.4)
CREAT SERPL-MCNC: 2.49 MG/DL (ref 0.5–1.4)
CRP SERPL HS-MCNC: 3.79 MG/DL (ref 0–0.75)
CRP SERPL HS-MCNC: <0.3 MG/DL (ref 0–0.75)
D DIMER PPP IA.FEU-MCNC: 5.12 UG/ML (FEU) (ref 0–0.5)
DELSYS IDSYS: ABNORMAL
EKG IMPRESSION: NORMAL
END TIDAL CARBON DIOXIDE IECO2: 50 MMHG
EOSINOPHIL # BLD AUTO: 0 K/UL (ref 0–0.51)
EOSINOPHIL # BLD AUTO: 0 K/UL (ref 0–0.51)
EOSINOPHIL # BLD AUTO: 0.11 K/UL (ref 0–0.51)
EOSINOPHIL # BLD AUTO: 0.13 K/UL (ref 0–0.51)
EOSINOPHIL # BLD AUTO: 0.14 K/UL (ref 0–0.51)
EOSINOPHIL # BLD AUTO: 0.15 K/UL (ref 0–0.51)
EOSINOPHIL # BLD AUTO: 0.18 K/UL (ref 0–0.51)
EOSINOPHIL # BLD AUTO: 0.2 K/UL (ref 0–0.51)
EOSINOPHIL # BLD AUTO: 0.23 K/UL (ref 0–0.51)
EOSINOPHIL # BLD AUTO: 0.28 K/UL (ref 0–0.51)
EOSINOPHIL # BLD AUTO: 0.44 K/UL (ref 0–0.51)
EOSINOPHIL # BLD AUTO: 0.5 K/UL (ref 0–0.51)
EOSINOPHIL # BLD AUTO: 0.52 K/UL (ref 0–0.51)
EOSINOPHIL NFR BLD: 0 % (ref 0–6.9)
EOSINOPHIL NFR BLD: 0 % (ref 0–6.9)
EOSINOPHIL NFR BLD: 0.6 % (ref 0–6.9)
EOSINOPHIL NFR BLD: 1.2 % (ref 0–6.9)
EOSINOPHIL NFR BLD: 1.3 % (ref 0–6.9)
EOSINOPHIL NFR BLD: 1.4 % (ref 0–6.9)
EOSINOPHIL NFR BLD: 1.8 % (ref 0–6.9)
EOSINOPHIL NFR BLD: 2.2 % (ref 0–6.9)
EOSINOPHIL NFR BLD: 2.5 % (ref 0–6.9)
EOSINOPHIL NFR BLD: 2.8 % (ref 0–6.9)
EOSINOPHIL NFR BLD: 3.1 % (ref 0–6.9)
EOSINOPHIL NFR BLD: 3.1 % (ref 0–6.9)
EOSINOPHIL NFR BLD: 3.8 % (ref 0–6.9)
EOSINOPHIL NFR BLD: 5.2 % (ref 0–6.9)
EOSINOPHIL NFR BLD: 5.4 % (ref 0–6.9)
EPI CELLS #/AREA URNS HPF: NEGATIVE /HPF
EPI CELLS #/AREA URNS HPF: NEGATIVE /HPF
ERYTHROCYTE [DISTWIDTH] IN BLOOD BY AUTOMATED COUNT: 42.5 FL (ref 35.9–50)
ERYTHROCYTE [DISTWIDTH] IN BLOOD BY AUTOMATED COUNT: 43.7 FL (ref 35.9–50)
ERYTHROCYTE [DISTWIDTH] IN BLOOD BY AUTOMATED COUNT: 43.8 FL (ref 35.9–50)
ERYTHROCYTE [DISTWIDTH] IN BLOOD BY AUTOMATED COUNT: 44.4 FL (ref 35.9–50)
ERYTHROCYTE [DISTWIDTH] IN BLOOD BY AUTOMATED COUNT: 44.4 FL (ref 35.9–50)
ERYTHROCYTE [DISTWIDTH] IN BLOOD BY AUTOMATED COUNT: 44.7 FL (ref 35.9–50)
ERYTHROCYTE [DISTWIDTH] IN BLOOD BY AUTOMATED COUNT: 44.8 FL (ref 35.9–50)
ERYTHROCYTE [DISTWIDTH] IN BLOOD BY AUTOMATED COUNT: 45.1 FL (ref 35.9–50)
ERYTHROCYTE [DISTWIDTH] IN BLOOD BY AUTOMATED COUNT: 45.1 FL (ref 35.9–50)
ERYTHROCYTE [DISTWIDTH] IN BLOOD BY AUTOMATED COUNT: 45.2 FL (ref 35.9–50)
ERYTHROCYTE [DISTWIDTH] IN BLOOD BY AUTOMATED COUNT: 45.3 FL (ref 35.9–50)
ERYTHROCYTE [DISTWIDTH] IN BLOOD BY AUTOMATED COUNT: 45.6 FL (ref 35.9–50)
ERYTHROCYTE [DISTWIDTH] IN BLOOD BY AUTOMATED COUNT: 45.7 FL (ref 35.9–50)
ERYTHROCYTE [DISTWIDTH] IN BLOOD BY AUTOMATED COUNT: 45.8 FL (ref 35.9–50)
ERYTHROCYTE [DISTWIDTH] IN BLOOD BY AUTOMATED COUNT: 46.3 FL (ref 35.9–50)
ERYTHROCYTE [DISTWIDTH] IN BLOOD BY AUTOMATED COUNT: 47 FL (ref 35.9–50)
ERYTHROCYTE [DISTWIDTH] IN BLOOD BY AUTOMATED COUNT: 48.6 FL (ref 35.9–50)
ERYTHROCYTE [DISTWIDTH] IN BLOOD BY AUTOMATED COUNT: 49 FL (ref 35.9–50)
ERYTHROCYTE [DISTWIDTH] IN BLOOD BY AUTOMATED COUNT: 49.1 FL (ref 35.9–50)
ERYTHROCYTE [DISTWIDTH] IN BLOOD BY AUTOMATED COUNT: 49.6 FL (ref 35.9–50)
ERYTHROCYTE [DISTWIDTH] IN BLOOD BY AUTOMATED COUNT: 49.8 FL (ref 35.9–50)
ERYTHROCYTE [DISTWIDTH] IN BLOOD BY AUTOMATED COUNT: 50.5 FL (ref 35.9–50)
ERYTHROCYTE [DISTWIDTH] IN BLOOD BY AUTOMATED COUNT: 50.9 FL (ref 35.9–50)
ERYTHROCYTE [DISTWIDTH] IN BLOOD BY AUTOMATED COUNT: 51.2 FL (ref 35.9–50)
ERYTHROCYTE [DISTWIDTH] IN BLOOD BY AUTOMATED COUNT: 51.5 FL (ref 35.9–50)
ERYTHROCYTE [DISTWIDTH] IN BLOOD BY AUTOMATED COUNT: 51.8 FL (ref 35.9–50)
ERYTHROCYTE [DISTWIDTH] IN BLOOD BY AUTOMATED COUNT: 52.7 FL (ref 35.9–50)
ERYTHROCYTE [DISTWIDTH] IN BLOOD BY AUTOMATED COUNT: 53.7 FL (ref 35.9–50)
ERYTHROCYTE [SEDIMENTATION RATE] IN BLOOD BY WESTERGREN METHOD: 18 MM/HOUR (ref 0–20)
ERYTHROCYTE [SEDIMENTATION RATE] IN BLOOD BY WESTERGREN METHOD: 4 MM/HOUR (ref 0–20)
EST. AVERAGE GLUCOSE BLD GHB EST-MCNC: 131 MG/DL
EST. AVERAGE GLUCOSE BLD GHB EST-MCNC: 134 MG/DL
FASTING STATUS PATIENT QL REPORTED: NORMAL
FLUAV RNA SPEC QL NAA+PROBE: NEGATIVE
FLUBV RNA SPEC QL NAA+PROBE: NEGATIVE
GFR SERPLBLD CREATININE-BSD FMLA CKD-EPI: 100 ML/MIN/1.73 M 2
GFR SERPLBLD CREATININE-BSD FMLA CKD-EPI: 28 ML/MIN/1.73 M 2
GFR SERPLBLD CREATININE-BSD FMLA CKD-EPI: 48 ML/MIN/1.73 M 2
GFR SERPLBLD CREATININE-BSD FMLA CKD-EPI: 50 ML/MIN/1.73 M 2
GFR SERPLBLD CREATININE-BSD FMLA CKD-EPI: 51 ML/MIN/1.73 M 2
GFR SERPLBLD CREATININE-BSD FMLA CKD-EPI: 58 ML/MIN/1.73 M 2
GFR SERPLBLD CREATININE-BSD FMLA CKD-EPI: 63 ML/MIN/1.73 M 2
GFR SERPLBLD CREATININE-BSD FMLA CKD-EPI: 67 ML/MIN/1.73 M 2
GFR SERPLBLD CREATININE-BSD FMLA CKD-EPI: 72 ML/MIN/1.73 M 2
GFR SERPLBLD CREATININE-BSD FMLA CKD-EPI: 80 ML/MIN/1.73 M 2
GFR SERPLBLD CREATININE-BSD FMLA CKD-EPI: 84 ML/MIN/1.73 M 2
GFR SERPLBLD CREATININE-BSD FMLA CKD-EPI: 88 ML/MIN/1.73 M 2
GFR SERPLBLD CREATININE-BSD FMLA CKD-EPI: 89 ML/MIN/1.73 M 2
GFR SERPLBLD CREATININE-BSD FMLA CKD-EPI: 92 ML/MIN/1.73 M 2
GFR SERPLBLD CREATININE-BSD FMLA CKD-EPI: 92 ML/MIN/1.73 M 2
GFR SERPLBLD CREATININE-BSD FMLA CKD-EPI: 93 ML/MIN/1.73 M 2
GFR SERPLBLD CREATININE-BSD FMLA CKD-EPI: 94 ML/MIN/1.73 M 2
GFR SERPLBLD CREATININE-BSD FMLA CKD-EPI: 95 ML/MIN/1.73 M 2
GFR SERPLBLD CREATININE-BSD FMLA CKD-EPI: 96 ML/MIN/1.73 M 2
GFR SERPLBLD CREATININE-BSD FMLA CKD-EPI: 97 ML/MIN/1.73 M 2
GLOBULIN SER CALC-MCNC: 2.2 G/DL (ref 1.9–3.5)
GLOBULIN SER CALC-MCNC: 2.3 G/DL (ref 1.9–3.5)
GLOBULIN SER CALC-MCNC: 2.5 G/DL (ref 1.9–3.5)
GLOBULIN SER CALC-MCNC: 2.6 G/DL (ref 1.9–3.5)
GLOBULIN SER CALC-MCNC: 2.7 G/DL (ref 1.9–3.5)
GLOBULIN SER CALC-MCNC: 2.7 G/DL (ref 1.9–3.5)
GLOBULIN SER CALC-MCNC: 2.8 G/DL (ref 1.9–3.5)
GLOBULIN SER CALC-MCNC: 2.9 G/DL (ref 1.9–3.5)
GLOBULIN SER CALC-MCNC: 3 G/DL (ref 1.9–3.5)
GLOBULIN SER CALC-MCNC: 3.2 G/DL (ref 1.9–3.5)
GLOBULIN SER CALC-MCNC: 3.3 G/DL (ref 1.9–3.5)
GLOBULIN SER CALC-MCNC: 3.4 G/DL (ref 1.9–3.5)
GLOBULIN SER CALC-MCNC: 3.5 G/DL (ref 1.9–3.5)
GLUCOSE BLD STRIP.AUTO-MCNC: 132 MG/DL (ref 65–99)
GLUCOSE BLD STRIP.AUTO-MCNC: 136 MG/DL (ref 65–99)
GLUCOSE BLD STRIP.AUTO-MCNC: 141 MG/DL (ref 65–99)
GLUCOSE BLD STRIP.AUTO-MCNC: 152 MG/DL (ref 65–99)
GLUCOSE BLD STRIP.AUTO-MCNC: 153 MG/DL (ref 65–99)
GLUCOSE BLD STRIP.AUTO-MCNC: 158 MG/DL (ref 65–99)
GLUCOSE BLD STRIP.AUTO-MCNC: 165 MG/DL (ref 65–99)
GLUCOSE BLD STRIP.AUTO-MCNC: 167 MG/DL (ref 65–99)
GLUCOSE BLD STRIP.AUTO-MCNC: 168 MG/DL (ref 65–99)
GLUCOSE BLD STRIP.AUTO-MCNC: 169 MG/DL (ref 65–99)
GLUCOSE BLD STRIP.AUTO-MCNC: 170 MG/DL (ref 65–99)
GLUCOSE BLD STRIP.AUTO-MCNC: 170 MG/DL (ref 65–99)
GLUCOSE BLD STRIP.AUTO-MCNC: 171 MG/DL (ref 65–99)
GLUCOSE BLD STRIP.AUTO-MCNC: 172 MG/DL (ref 65–99)
GLUCOSE BLD STRIP.AUTO-MCNC: 176 MG/DL (ref 65–99)
GLUCOSE BLD STRIP.AUTO-MCNC: 180 MG/DL (ref 65–99)
GLUCOSE BLD STRIP.AUTO-MCNC: 183 MG/DL (ref 65–99)
GLUCOSE BLD STRIP.AUTO-MCNC: 186 MG/DL (ref 65–99)
GLUCOSE BLD STRIP.AUTO-MCNC: 186 MG/DL (ref 65–99)
GLUCOSE BLD STRIP.AUTO-MCNC: 188 MG/DL (ref 65–99)
GLUCOSE BLD STRIP.AUTO-MCNC: 190 MG/DL (ref 65–99)
GLUCOSE BLD STRIP.AUTO-MCNC: 193 MG/DL (ref 65–99)
GLUCOSE BLD STRIP.AUTO-MCNC: 197 MG/DL (ref 65–99)
GLUCOSE BLD STRIP.AUTO-MCNC: 198 MG/DL (ref 65–99)
GLUCOSE BLD STRIP.AUTO-MCNC: 204 MG/DL (ref 65–99)
GLUCOSE BLD STRIP.AUTO-MCNC: 205 MG/DL (ref 65–99)
GLUCOSE BLD STRIP.AUTO-MCNC: 207 MG/DL (ref 65–99)
GLUCOSE BLD STRIP.AUTO-MCNC: 208 MG/DL (ref 65–99)
GLUCOSE BLD STRIP.AUTO-MCNC: 213 MG/DL (ref 65–99)
GLUCOSE BLD STRIP.AUTO-MCNC: 218 MG/DL (ref 65–99)
GLUCOSE BLD STRIP.AUTO-MCNC: 219 MG/DL (ref 65–99)
GLUCOSE BLD STRIP.AUTO-MCNC: 221 MG/DL (ref 65–99)
GLUCOSE BLD STRIP.AUTO-MCNC: 223 MG/DL (ref 65–99)
GLUCOSE BLD STRIP.AUTO-MCNC: 224 MG/DL (ref 65–99)
GLUCOSE BLD STRIP.AUTO-MCNC: 226 MG/DL (ref 65–99)
GLUCOSE BLD STRIP.AUTO-MCNC: 228 MG/DL (ref 65–99)
GLUCOSE BLD STRIP.AUTO-MCNC: 245 MG/DL (ref 65–99)
GLUCOSE BLD STRIP.AUTO-MCNC: 251 MG/DL (ref 65–99)
GLUCOSE BLD STRIP.AUTO-MCNC: 253 MG/DL (ref 65–99)
GLUCOSE BLD STRIP.AUTO-MCNC: 255 MG/DL (ref 65–99)
GLUCOSE BLD STRIP.AUTO-MCNC: 260 MG/DL (ref 65–99)
GLUCOSE BLD STRIP.AUTO-MCNC: 319 MG/DL (ref 65–99)
GLUCOSE BLD STRIP.AUTO-MCNC: 35 MG/DL (ref 65–99)
GLUCOSE SERPL-MCNC: 104 MG/DL (ref 65–99)
GLUCOSE SERPL-MCNC: 107 MG/DL (ref 65–99)
GLUCOSE SERPL-MCNC: 109 MG/DL (ref 65–99)
GLUCOSE SERPL-MCNC: 110 MG/DL (ref 65–99)
GLUCOSE SERPL-MCNC: 114 MG/DL (ref 65–99)
GLUCOSE SERPL-MCNC: 115 MG/DL (ref 65–99)
GLUCOSE SERPL-MCNC: 120 MG/DL (ref 65–99)
GLUCOSE SERPL-MCNC: 124 MG/DL (ref 65–99)
GLUCOSE SERPL-MCNC: 127 MG/DL (ref 65–99)
GLUCOSE SERPL-MCNC: 133 MG/DL (ref 65–99)
GLUCOSE SERPL-MCNC: 135 MG/DL (ref 65–99)
GLUCOSE SERPL-MCNC: 135 MG/DL (ref 65–99)
GLUCOSE SERPL-MCNC: 137 MG/DL (ref 65–99)
GLUCOSE SERPL-MCNC: 139 MG/DL (ref 65–99)
GLUCOSE SERPL-MCNC: 141 MG/DL (ref 65–99)
GLUCOSE SERPL-MCNC: 145 MG/DL (ref 65–99)
GLUCOSE SERPL-MCNC: 146 MG/DL (ref 65–99)
GLUCOSE SERPL-MCNC: 149 MG/DL (ref 65–99)
GLUCOSE SERPL-MCNC: 152 MG/DL (ref 65–99)
GLUCOSE SERPL-MCNC: 154 MG/DL (ref 65–99)
GLUCOSE SERPL-MCNC: 159 MG/DL (ref 65–99)
GLUCOSE SERPL-MCNC: 165 MG/DL (ref 65–99)
GLUCOSE SERPL-MCNC: 178 MG/DL (ref 65–99)
GLUCOSE SERPL-MCNC: 185 MG/DL (ref 65–99)
GLUCOSE SERPL-MCNC: 189 MG/DL (ref 65–99)
GLUCOSE SERPL-MCNC: 191 MG/DL (ref 65–99)
GLUCOSE SERPL-MCNC: 195 MG/DL (ref 65–99)
GLUCOSE SERPL-MCNC: 200 MG/DL (ref 65–99)
GLUCOSE SERPL-MCNC: 200 MG/DL (ref 65–99)
GLUCOSE SERPL-MCNC: 236 MG/DL (ref 65–99)
GLUCOSE SERPL-MCNC: 91 MG/DL (ref 65–99)
GLUCOSE UR STRIP.AUTO-MCNC: NEGATIVE MG/DL
GLUCOSE UR STRIP.AUTO-MCNC: NEGATIVE MG/DL
GRAM STN SPEC: ABNORMAL
GRAM STN SPEC: NORMAL
HBA1C MFR BLD: 6.2 % (ref 4–5.6)
HBA1C MFR BLD: 6.3 % (ref 4–5.6)
HCO3 BLDA-SCNC: 26.6 MMOL/L (ref 17–25)
HCT VFR BLD AUTO: 29.3 % (ref 42–52)
HCT VFR BLD AUTO: 29.3 % (ref 42–52)
HCT VFR BLD AUTO: 31 % (ref 42–52)
HCT VFR BLD AUTO: 32.9 % (ref 42–52)
HCT VFR BLD AUTO: 34.1 % (ref 42–52)
HCT VFR BLD AUTO: 34.4 % (ref 42–52)
HCT VFR BLD AUTO: 34.8 % (ref 42–52)
HCT VFR BLD AUTO: 35.9 % (ref 42–52)
HCT VFR BLD AUTO: 36.3 % (ref 42–52)
HCT VFR BLD AUTO: 37.8 % (ref 42–52)
HCT VFR BLD AUTO: 38.5 % (ref 42–52)
HCT VFR BLD AUTO: 39.1 % (ref 42–52)
HCT VFR BLD AUTO: 39.5 % (ref 42–52)
HCT VFR BLD AUTO: 39.9 % (ref 42–52)
HCT VFR BLD AUTO: 40.1 % (ref 42–52)
HCT VFR BLD AUTO: 40.4 % (ref 42–52)
HCT VFR BLD AUTO: 40.7 % (ref 42–52)
HCT VFR BLD AUTO: 40.8 % (ref 42–52)
HCT VFR BLD AUTO: 41.1 % (ref 42–52)
HCT VFR BLD AUTO: 41.3 % (ref 42–52)
HCT VFR BLD AUTO: 42 % (ref 42–52)
HCT VFR BLD AUTO: 42.8 % (ref 42–52)
HCT VFR BLD AUTO: 43.3 % (ref 42–52)
HCT VFR BLD AUTO: 43.7 % (ref 42–52)
HCT VFR BLD AUTO: 44.2 % (ref 42–52)
HCT VFR BLD AUTO: 46.1 % (ref 42–52)
HCT VFR BLD AUTO: 46.7 % (ref 42–52)
HCT VFR BLD AUTO: 47.9 % (ref 42–52)
HCT VFR BLD AUTO: 49.8 % (ref 42–52)
HCT VFR BLD AUTO: 52.9 % (ref 42–52)
HDLC SERPL-MCNC: 26 MG/DL
HDLC SERPL-MCNC: 30 MG/DL
HDLC SERPL-MCNC: 32 MG/DL
HGB BLD-MCNC: 10 G/DL (ref 14–18)
HGB BLD-MCNC: 10.3 G/DL (ref 14–18)
HGB BLD-MCNC: 11.1 G/DL (ref 14–18)
HGB BLD-MCNC: 11.2 G/DL (ref 14–18)
HGB BLD-MCNC: 11.5 G/DL (ref 14–18)
HGB BLD-MCNC: 11.7 G/DL (ref 14–18)
HGB BLD-MCNC: 12.1 G/DL (ref 14–18)
HGB BLD-MCNC: 12.5 G/DL (ref 14–18)
HGB BLD-MCNC: 12.8 G/DL (ref 14–18)
HGB BLD-MCNC: 13.2 G/DL (ref 14–18)
HGB BLD-MCNC: 13.2 G/DL (ref 14–18)
HGB BLD-MCNC: 13.5 G/DL (ref 14–18)
HGB BLD-MCNC: 13.5 G/DL (ref 14–18)
HGB BLD-MCNC: 13.6 G/DL (ref 14–18)
HGB BLD-MCNC: 13.8 G/DL (ref 14–18)
HGB BLD-MCNC: 13.9 G/DL (ref 14–18)
HGB BLD-MCNC: 13.9 G/DL (ref 14–18)
HGB BLD-MCNC: 14.1 G/DL (ref 14–18)
HGB BLD-MCNC: 14.3 G/DL (ref 14–18)
HGB BLD-MCNC: 14.6 G/DL (ref 14–18)
HGB BLD-MCNC: 14.7 G/DL (ref 14–18)
HGB BLD-MCNC: 14.7 G/DL (ref 14–18)
HGB BLD-MCNC: 15.2 G/DL (ref 14–18)
HGB BLD-MCNC: 15.3 G/DL (ref 14–18)
HGB BLD-MCNC: 15.9 G/DL (ref 14–18)
HGB BLD-MCNC: 16.1 G/DL (ref 14–18)
HGB BLD-MCNC: 16.4 G/DL (ref 14–18)
HGB BLD-MCNC: 17.7 G/DL (ref 14–18)
HGB BLD-MCNC: 9.6 G/DL (ref 14–18)
HGB BLD-MCNC: 9.6 G/DL (ref 14–18)
HOROWITZ INDEX BLDA+IHG-RTO: 162 MM[HG]
HYALINE CASTS #/AREA URNS LPF: ABNORMAL /LPF
IMM GRANULOCYTES # BLD AUTO: 0.02 K/UL (ref 0–0.11)
IMM GRANULOCYTES # BLD AUTO: 0.02 K/UL (ref 0–0.11)
IMM GRANULOCYTES # BLD AUTO: 0.03 K/UL (ref 0–0.11)
IMM GRANULOCYTES # BLD AUTO: 0.04 K/UL (ref 0–0.11)
IMM GRANULOCYTES # BLD AUTO: 0.05 K/UL (ref 0–0.11)
IMM GRANULOCYTES # BLD AUTO: 0.07 K/UL (ref 0–0.11)
IMM GRANULOCYTES # BLD AUTO: 0.09 K/UL (ref 0–0.11)
IMM GRANULOCYTES # BLD AUTO: 0.1 K/UL (ref 0–0.11)
IMM GRANULOCYTES # BLD AUTO: 0.12 K/UL (ref 0–0.11)
IMM GRANULOCYTES # BLD AUTO: 0.24 K/UL (ref 0–0.11)
IMM GRANULOCYTES NFR BLD AUTO: 0.2 % (ref 0–0.9)
IMM GRANULOCYTES NFR BLD AUTO: 0.2 % (ref 0–0.9)
IMM GRANULOCYTES NFR BLD AUTO: 0.3 % (ref 0–0.9)
IMM GRANULOCYTES NFR BLD AUTO: 0.4 % (ref 0–0.9)
IMM GRANULOCYTES NFR BLD AUTO: 0.5 % (ref 0–0.9)
IMM GRANULOCYTES NFR BLD AUTO: 0.7 % (ref 0–0.9)
IMM GRANULOCYTES NFR BLD AUTO: 0.8 % (ref 0–0.9)
IMM GRANULOCYTES NFR BLD AUTO: 0.9 % (ref 0–0.9)
IMM GRANULOCYTES NFR BLD AUTO: 0.9 % (ref 0–0.9)
IMM GRANULOCYTES NFR BLD AUTO: 1.1 % (ref 0–0.9)
IMM GRANULOCYTES NFR BLD AUTO: 1.3 % (ref 0–0.9)
IMM GRANULOCYTES NFR BLD AUTO: 1.4 % (ref 0–0.9)
INR PPP: 1.04 (ref 0.87–1.13)
INR PPP: 1.23 (ref 0.87–1.13)
INR PPP: 1.63 (ref 0.87–1.13)
KETONES UR STRIP.AUTO-MCNC: NEGATIVE MG/DL
KETONES UR STRIP.AUTO-MCNC: NEGATIVE MG/DL
LACTATE SERPL-SCNC: 1.4 MMOL/L (ref 0.5–2)
LACTATE SERPL-SCNC: 1.7 MMOL/L (ref 0.5–2)
LDLC SERPL CALC-MCNC: 158 MG/DL
LDLC SERPL CALC-MCNC: 84 MG/DL
LDLC SERPL CALC-MCNC: ABNORMAL MG/DL
LEUKOCYTE ESTERASE UR QL STRIP.AUTO: ABNORMAL
LEUKOCYTE ESTERASE UR QL STRIP.AUTO: NEGATIVE
LIPASE SERPL-CCNC: 34 U/L (ref 11–82)
LV EJECT FRACT  99904: 55
LV EJECT FRACT  99904: 60
LV EJECT FRACT  99904: 65
LV EJECT FRACT MOD 2C 99903: 53.85
LV EJECT FRACT MOD 4C 99902: 45.16
LV EJECT FRACT MOD 4C 99902: 63.14
LV EJECT FRACT MOD BP 99901: 48.59
LYMPHOCYTES # BLD AUTO: 1.4 K/UL (ref 1–4.8)
LYMPHOCYTES # BLD AUTO: 1.41 K/UL (ref 1–4.8)
LYMPHOCYTES # BLD AUTO: 1.46 K/UL (ref 1–4.8)
LYMPHOCYTES # BLD AUTO: 1.51 K/UL (ref 1–4.8)
LYMPHOCYTES # BLD AUTO: 1.64 K/UL (ref 1–4.8)
LYMPHOCYTES # BLD AUTO: 1.79 K/UL (ref 1–4.8)
LYMPHOCYTES # BLD AUTO: 1.95 K/UL (ref 1–4.8)
LYMPHOCYTES # BLD AUTO: 1.98 K/UL (ref 1–4.8)
LYMPHOCYTES # BLD AUTO: 2.09 K/UL (ref 1–4.8)
LYMPHOCYTES # BLD AUTO: 2.18 K/UL (ref 1–4.8)
LYMPHOCYTES # BLD AUTO: 2.24 K/UL (ref 1–4.8)
LYMPHOCYTES # BLD AUTO: 2.31 K/UL (ref 1–4.8)
LYMPHOCYTES # BLD AUTO: 2.32 K/UL (ref 1–4.8)
LYMPHOCYTES # BLD AUTO: 2.96 K/UL (ref 1–4.8)
LYMPHOCYTES # BLD AUTO: 3 K/UL (ref 1–4.8)
LYMPHOCYTES NFR BLD: 13.9 % (ref 22–41)
LYMPHOCYTES NFR BLD: 13.9 % (ref 22–41)
LYMPHOCYTES NFR BLD: 14.3 % (ref 22–41)
LYMPHOCYTES NFR BLD: 15 % (ref 22–41)
LYMPHOCYTES NFR BLD: 15.7 % (ref 22–41)
LYMPHOCYTES NFR BLD: 16.7 % (ref 22–41)
LYMPHOCYTES NFR BLD: 17.6 % (ref 22–41)
LYMPHOCYTES NFR BLD: 18 % (ref 22–41)
LYMPHOCYTES NFR BLD: 19 % (ref 22–41)
LYMPHOCYTES NFR BLD: 20.8 % (ref 22–41)
LYMPHOCYTES NFR BLD: 23.2 % (ref 22–41)
LYMPHOCYTES NFR BLD: 24 % (ref 22–41)
LYMPHOCYTES NFR BLD: 24.9 % (ref 22–41)
LYMPHOCYTES NFR BLD: 25.8 % (ref 22–41)
LYMPHOCYTES NFR BLD: 9.9 % (ref 22–41)
MACROCYTES BLD QL SMEAR: ABNORMAL
MAGNESIUM SERPL-MCNC: 1.3 MG/DL (ref 1.5–2.5)
MAGNESIUM SERPL-MCNC: 1.6 MG/DL (ref 1.5–2.5)
MAGNESIUM SERPL-MCNC: 1.7 MG/DL (ref 1.5–2.5)
MAGNESIUM SERPL-MCNC: 1.7 MG/DL (ref 1.5–2.5)
MAGNESIUM SERPL-MCNC: 1.9 MG/DL (ref 1.5–2.5)
MANUAL DIFF BLD: NORMAL
MCH RBC QN AUTO: 30.2 PG (ref 27–33)
MCH RBC QN AUTO: 30.8 PG (ref 27–33)
MCH RBC QN AUTO: 30.9 PG (ref 27–33)
MCH RBC QN AUTO: 30.9 PG (ref 27–33)
MCH RBC QN AUTO: 31 PG (ref 27–33)
MCH RBC QN AUTO: 31 PG (ref 27–33)
MCH RBC QN AUTO: 31.1 PG (ref 27–33)
MCH RBC QN AUTO: 31.2 PG (ref 27–33)
MCH RBC QN AUTO: 31.2 PG (ref 27–33)
MCH RBC QN AUTO: 31.3 PG (ref 27–33)
MCH RBC QN AUTO: 31.4 PG (ref 27–33)
MCH RBC QN AUTO: 31.5 PG (ref 27–33)
MCH RBC QN AUTO: 31.5 PG (ref 27–33)
MCH RBC QN AUTO: 31.6 PG (ref 27–33)
MCH RBC QN AUTO: 31.6 PG (ref 27–33)
MCH RBC QN AUTO: 31.7 PG (ref 27–33)
MCH RBC QN AUTO: 31.8 PG (ref 27–33)
MCH RBC QN AUTO: 32 PG (ref 27–33)
MCHC RBC AUTO-ENTMCNC: 31.3 G/DL (ref 33.7–35.3)
MCHC RBC AUTO-ENTMCNC: 32.3 G/DL (ref 33.7–35.3)
MCHC RBC AUTO-ENTMCNC: 32.6 G/DL (ref 33.7–35.3)
MCHC RBC AUTO-ENTMCNC: 32.8 G/DL (ref 33.7–35.3)
MCHC RBC AUTO-ENTMCNC: 32.8 G/DL (ref 33.7–35.3)
MCHC RBC AUTO-ENTMCNC: 32.9 G/DL (ref 33.7–35.3)
MCHC RBC AUTO-ENTMCNC: 32.9 G/DL (ref 33.7–35.3)
MCHC RBC AUTO-ENTMCNC: 33 G/DL (ref 33.7–35.3)
MCHC RBC AUTO-ENTMCNC: 33.1 G/DL (ref 33.7–35.3)
MCHC RBC AUTO-ENTMCNC: 33.2 G/DL (ref 33.7–35.3)
MCHC RBC AUTO-ENTMCNC: 33.2 G/DL (ref 33.7–35.3)
MCHC RBC AUTO-ENTMCNC: 33.3 G/DL (ref 33.7–35.3)
MCHC RBC AUTO-ENTMCNC: 33.5 G/DL (ref 33.7–35.3)
MCHC RBC AUTO-ENTMCNC: 33.6 G/DL (ref 33.7–35.3)
MCHC RBC AUTO-ENTMCNC: 33.6 G/DL (ref 33.7–35.3)
MCHC RBC AUTO-ENTMCNC: 33.7 G/DL (ref 33.7–35.3)
MCHC RBC AUTO-ENTMCNC: 33.7 G/DL (ref 33.7–35.3)
MCHC RBC AUTO-ENTMCNC: 33.8 G/DL (ref 33.7–35.3)
MCHC RBC AUTO-ENTMCNC: 34 G/DL (ref 33.7–35.3)
MCHC RBC AUTO-ENTMCNC: 34 G/DL (ref 33.7–35.3)
MCHC RBC AUTO-ENTMCNC: 34.1 G/DL (ref 33.7–35.3)
MCHC RBC AUTO-ENTMCNC: 34.2 G/DL (ref 33.7–35.3)
MCHC RBC AUTO-ENTMCNC: 34.2 G/DL (ref 33.7–35.3)
MCHC RBC AUTO-ENTMCNC: 34.3 G/DL (ref 33.7–35.3)
MCHC RBC AUTO-ENTMCNC: 34.4 G/DL (ref 33.7–35.3)
MCV RBC AUTO: 90.7 FL (ref 81.4–97.8)
MCV RBC AUTO: 91.1 FL (ref 81.4–97.8)
MCV RBC AUTO: 91.1 FL (ref 81.4–97.8)
MCV RBC AUTO: 91.5 FL (ref 81.4–97.8)
MCV RBC AUTO: 91.6 FL (ref 81.4–97.8)
MCV RBC AUTO: 91.7 FL (ref 81.4–97.8)
MCV RBC AUTO: 91.9 FL (ref 81.4–97.8)
MCV RBC AUTO: 92.1 FL (ref 81.4–97.8)
MCV RBC AUTO: 92.2 FL (ref 81.4–97.8)
MCV RBC AUTO: 92.2 FL (ref 81.4–97.8)
MCV RBC AUTO: 92.4 FL (ref 81.4–97.8)
MCV RBC AUTO: 92.5 FL (ref 81.4–97.8)
MCV RBC AUTO: 92.7 FL (ref 81.4–97.8)
MCV RBC AUTO: 92.8 FL (ref 81.4–97.8)
MCV RBC AUTO: 93 FL (ref 81.4–97.8)
MCV RBC AUTO: 93 FL (ref 81.4–97.8)
MCV RBC AUTO: 93.1 FL (ref 81.4–97.8)
MCV RBC AUTO: 94.1 FL (ref 81.4–97.8)
MCV RBC AUTO: 94.3 FL (ref 81.4–97.8)
MCV RBC AUTO: 94.4 FL (ref 81.4–97.8)
MCV RBC AUTO: 94.5 FL (ref 81.4–97.8)
MCV RBC AUTO: 94.7 FL (ref 81.4–97.8)
MCV RBC AUTO: 94.7 FL (ref 81.4–97.8)
MCV RBC AUTO: 95.3 FL (ref 81.4–97.8)
MCV RBC AUTO: 95.4 FL (ref 81.4–97.8)
MCV RBC AUTO: 95.5 FL (ref 81.4–97.8)
MCV RBC AUTO: 96 FL (ref 81.4–97.8)
MCV RBC AUTO: 96.9 FL (ref 81.4–97.8)
MCV RBC AUTO: 97.2 FL (ref 81.4–97.8)
MCV RBC AUTO: 98.5 FL (ref 81.4–97.8)
MICRO URNS: ABNORMAL
MICRO URNS: ABNORMAL
MICROCYTES BLD QL SMEAR: ABNORMAL
MODE IMODE: ABNORMAL
MONOCYTES # BLD AUTO: 0.58 K/UL (ref 0–0.85)
MONOCYTES # BLD AUTO: 0.64 K/UL (ref 0–0.85)
MONOCYTES # BLD AUTO: 0.64 K/UL (ref 0–0.85)
MONOCYTES # BLD AUTO: 0.78 K/UL (ref 0–0.85)
MONOCYTES # BLD AUTO: 0.8 K/UL (ref 0–0.85)
MONOCYTES # BLD AUTO: 0.83 K/UL (ref 0–0.85)
MONOCYTES # BLD AUTO: 0.86 K/UL (ref 0–0.85)
MONOCYTES # BLD AUTO: 0.86 K/UL (ref 0–0.85)
MONOCYTES # BLD AUTO: 0.89 K/UL (ref 0–0.85)
MONOCYTES # BLD AUTO: 0.89 K/UL (ref 0–0.85)
MONOCYTES # BLD AUTO: 0.9 K/UL (ref 0–0.85)
MONOCYTES # BLD AUTO: 0.91 K/UL (ref 0–0.85)
MONOCYTES # BLD AUTO: 1.09 K/UL (ref 0–0.85)
MONOCYTES # BLD AUTO: 1.11 K/UL (ref 0–0.85)
MONOCYTES # BLD AUTO: 1.11 K/UL (ref 0–0.85)
MONOCYTES NFR BLD AUTO: 4.3 % (ref 0–13.4)
MONOCYTES NFR BLD AUTO: 5.3 % (ref 0–13.4)
MONOCYTES NFR BLD AUTO: 6.1 % (ref 0–13.4)
MONOCYTES NFR BLD AUTO: 6.1 % (ref 0–13.4)
MONOCYTES NFR BLD AUTO: 6.4 % (ref 0–13.4)
MONOCYTES NFR BLD AUTO: 7.9 % (ref 0–13.4)
MONOCYTES NFR BLD AUTO: 8.4 % (ref 0–13.4)
MONOCYTES NFR BLD AUTO: 8.5 % (ref 0–13.4)
MONOCYTES NFR BLD AUTO: 8.5 % (ref 0–13.4)
MONOCYTES NFR BLD AUTO: 8.6 % (ref 0–13.4)
MONOCYTES NFR BLD AUTO: 8.9 % (ref 0–13.4)
MONOCYTES NFR BLD AUTO: 8.9 % (ref 0–13.4)
MONOCYTES NFR BLD AUTO: 9.2 % (ref 0–13.4)
MONOCYTES NFR BLD AUTO: 9.5 % (ref 0–13.4)
MONOCYTES NFR BLD AUTO: 9.8 % (ref 0–13.4)
MORPHOLOGY BLD-IMP: NORMAL
MYELOCYTES NFR BLD MANUAL: 0.9 %
NEUTROPHILS # BLD AUTO: 12.55 K/UL (ref 1.82–7.42)
NEUTROPHILS # BLD AUTO: 13.33 K/UL (ref 1.82–7.42)
NEUTROPHILS # BLD AUTO: 16.7 K/UL (ref 1.82–7.42)
NEUTROPHILS # BLD AUTO: 5.63 K/UL (ref 1.82–7.42)
NEUTROPHILS # BLD AUTO: 5.68 K/UL (ref 1.82–7.42)
NEUTROPHILS # BLD AUTO: 5.69 K/UL (ref 1.82–7.42)
NEUTROPHILS # BLD AUTO: 6.27 K/UL (ref 1.82–7.42)
NEUTROPHILS # BLD AUTO: 6.34 K/UL (ref 1.82–7.42)
NEUTROPHILS # BLD AUTO: 6.41 K/UL (ref 1.82–7.42)
NEUTROPHILS # BLD AUTO: 7.17 K/UL (ref 1.82–7.42)
NEUTROPHILS # BLD AUTO: 7.44 K/UL (ref 1.82–7.42)
NEUTROPHILS # BLD AUTO: 7.58 K/UL (ref 1.82–7.42)
NEUTROPHILS # BLD AUTO: 7.67 K/UL (ref 1.82–7.42)
NEUTROPHILS # BLD AUTO: 7.84 K/UL (ref 1.82–7.42)
NEUTROPHILS # BLD AUTO: 8.49 K/UL (ref 1.82–7.42)
NEUTROPHILS NFR BLD: 61.9 % (ref 44–72)
NEUTROPHILS NFR BLD: 63.2 % (ref 44–72)
NEUTROPHILS NFR BLD: 63.2 % (ref 44–72)
NEUTROPHILS NFR BLD: 64.3 % (ref 44–72)
NEUTROPHILS NFR BLD: 67 % (ref 44–72)
NEUTROPHILS NFR BLD: 67.4 % (ref 44–72)
NEUTROPHILS NFR BLD: 67.6 % (ref 44–72)
NEUTROPHILS NFR BLD: 71.2 % (ref 44–72)
NEUTROPHILS NFR BLD: 72.9 % (ref 44–72)
NEUTROPHILS NFR BLD: 74 % (ref 44–72)
NEUTROPHILS NFR BLD: 74.6 % (ref 44–72)
NEUTROPHILS NFR BLD: 75 % (ref 44–72)
NEUTROPHILS NFR BLD: 75.3 % (ref 44–72)
NEUTROPHILS NFR BLD: 77.7 % (ref 44–72)
NEUTROPHILS NFR BLD: 85 % (ref 44–72)
NEUTS BAND NFR BLD MANUAL: 1.8 % (ref 0–10)
NITRITE UR QL STRIP.AUTO: NEGATIVE
NITRITE UR QL STRIP.AUTO: NEGATIVE
NRBC # BLD AUTO: 0 K/UL
NRBC # BLD AUTO: 0.02 K/UL
NRBC # BLD AUTO: 0.07 K/UL
NRBC BLD-RTO: 0 /100 WBC
NRBC BLD-RTO: 0.2 /100 WBC
NRBC BLD-RTO: 0.3 /100 WBC
NT-PROBNP SERPL IA-MCNC: 3073 PG/ML (ref 0–125)
O2/TOTAL GAS SETTING VFR VENT: 100 %
OUTPT INFUS CBC COMMENT OICOM: ABNORMAL
OUTPT INFUS CBC COMMENT OICOM: ABNORMAL
PCO2 BLDA: 49.7 MMHG (ref 26–37)
PCO2 TEMP ADJ BLDA: 45.2 MMHG (ref 26–37)
PEEP END EXPIRATORY PRESSURE IPEEP: 8 CMH20
PH BLDA: 7.34 [PH] (ref 7.4–7.5)
PH TEMP ADJ BLDA: 7.37 [PH] (ref 7.4–7.5)
PH UR STRIP.AUTO: 5.5 [PH] (ref 5–8)
PH UR STRIP.AUTO: 5.5 [PH] (ref 5–8)
PHOSPHATE SERPL-MCNC: 2.8 MG/DL (ref 2.5–4.5)
PHOSPHATE SERPL-MCNC: 3 MG/DL (ref 2.5–4.5)
PHOSPHATE SERPL-MCNC: 3.2 MG/DL (ref 2.5–4.5)
PHOSPHATE SERPL-MCNC: 3.3 MG/DL (ref 2.5–4.5)
PHOSPHATE SERPL-MCNC: 3.3 MG/DL (ref 2.5–4.5)
PHOSPHATE SERPL-MCNC: 3.4 MG/DL (ref 2.5–4.5)
PHOSPHATE SERPL-MCNC: 3.6 MG/DL (ref 2.5–4.5)
PHOSPHATE SERPL-MCNC: 4 MG/DL (ref 2.5–4.5)
PHOSPHATE SERPL-MCNC: 4.1 MG/DL (ref 2.5–4.5)
PLATELET # BLD AUTO: 167 K/UL (ref 164–446)
PLATELET # BLD AUTO: 167 K/UL (ref 164–446)
PLATELET # BLD AUTO: 178 K/UL (ref 164–446)
PLATELET # BLD AUTO: 179 K/UL (ref 164–446)
PLATELET # BLD AUTO: 184 K/UL (ref 164–446)
PLATELET # BLD AUTO: 185 K/UL (ref 164–446)
PLATELET # BLD AUTO: 190 K/UL (ref 164–446)
PLATELET # BLD AUTO: 193 K/UL (ref 164–446)
PLATELET # BLD AUTO: 201 K/UL (ref 164–446)
PLATELET # BLD AUTO: 202 K/UL (ref 164–446)
PLATELET # BLD AUTO: 205 K/UL (ref 164–446)
PLATELET # BLD AUTO: 205 K/UL (ref 164–446)
PLATELET # BLD AUTO: 221 K/UL (ref 164–446)
PLATELET # BLD AUTO: 223 K/UL (ref 164–446)
PLATELET # BLD AUTO: 224 K/UL (ref 164–446)
PLATELET # BLD AUTO: 226 K/UL (ref 164–446)
PLATELET # BLD AUTO: 228 K/UL (ref 164–446)
PLATELET # BLD AUTO: 229 K/UL (ref 164–446)
PLATELET # BLD AUTO: 231 K/UL (ref 164–446)
PLATELET # BLD AUTO: 237 K/UL (ref 164–446)
PLATELET # BLD AUTO: 243 K/UL (ref 164–446)
PLATELET # BLD AUTO: 251 K/UL (ref 164–446)
PLATELET # BLD AUTO: 256 K/UL (ref 164–446)
PLATELET # BLD AUTO: 259 K/UL (ref 164–446)
PLATELET # BLD AUTO: 260 K/UL (ref 164–446)
PLATELET # BLD AUTO: 264 K/UL (ref 164–446)
PLATELET # BLD AUTO: 289 K/UL (ref 164–446)
PLATELET # BLD AUTO: 306 K/UL (ref 164–446)
PLATELET # BLD AUTO: 320 K/UL (ref 164–446)
PLATELET # BLD AUTO: 489 K/UL (ref 164–446)
PLATELET BLD QL SMEAR: NORMAL
PMV BLD AUTO: 10 FL (ref 9–12.9)
PMV BLD AUTO: 10.1 FL (ref 9–12.9)
PMV BLD AUTO: 10.1 FL (ref 9–12.9)
PMV BLD AUTO: 10.2 FL (ref 9–12.9)
PMV BLD AUTO: 10.3 FL (ref 9–12.9)
PMV BLD AUTO: 9 FL (ref 9–12.9)
PMV BLD AUTO: 9.1 FL (ref 9–12.9)
PMV BLD AUTO: 9.2 FL (ref 9–12.9)
PMV BLD AUTO: 9.3 FL (ref 9–12.9)
PMV BLD AUTO: 9.4 FL (ref 9–12.9)
PMV BLD AUTO: 9.5 FL (ref 9–12.9)
PMV BLD AUTO: 9.5 FL (ref 9–12.9)
PMV BLD AUTO: 9.6 FL (ref 9–12.9)
PMV BLD AUTO: 9.7 FL (ref 9–12.9)
PMV BLD AUTO: 9.8 FL (ref 9–12.9)
PMV BLD AUTO: 9.9 FL (ref 9–12.9)
PMV BLD AUTO: 9.9 FL (ref 9–12.9)
PO2 BLDA: 162 MMHG (ref 64–87)
PO2 TEMP ADJ BLDA: 150 MMHG (ref 64–87)
POLYCHROMASIA BLD QL SMEAR: NORMAL
POTASSIUM SERPL-SCNC: 3.4 MMOL/L (ref 3.6–5.5)
POTASSIUM SERPL-SCNC: 3.5 MMOL/L (ref 3.6–5.5)
POTASSIUM SERPL-SCNC: 3.7 MMOL/L (ref 3.6–5.5)
POTASSIUM SERPL-SCNC: 3.7 MMOL/L (ref 3.6–5.5)
POTASSIUM SERPL-SCNC: 3.8 MMOL/L (ref 3.6–5.5)
POTASSIUM SERPL-SCNC: 3.8 MMOL/L (ref 3.6–5.5)
POTASSIUM SERPL-SCNC: 3.9 MMOL/L (ref 3.6–5.5)
POTASSIUM SERPL-SCNC: 4 MMOL/L (ref 3.6–5.5)
POTASSIUM SERPL-SCNC: 4.1 MMOL/L (ref 3.6–5.5)
POTASSIUM SERPL-SCNC: 4.2 MMOL/L (ref 3.6–5.5)
POTASSIUM SERPL-SCNC: 4.3 MMOL/L (ref 3.6–5.5)
POTASSIUM SERPL-SCNC: 4.4 MMOL/L (ref 3.6–5.5)
POTASSIUM SERPL-SCNC: 4.5 MMOL/L (ref 3.6–5.5)
POTASSIUM SERPL-SCNC: 4.6 MMOL/L (ref 3.6–5.5)
POTASSIUM SERPL-SCNC: 4.7 MMOL/L (ref 3.6–5.5)
POTASSIUM SERPL-SCNC: 4.8 MMOL/L (ref 3.6–5.5)
POTASSIUM SERPL-SCNC: 5.1 MMOL/L (ref 3.6–5.5)
PROT SERPL-MCNC: 5.8 G/DL (ref 6–8.2)
PROT SERPL-MCNC: 6.2 G/DL (ref 6–8.2)
PROT SERPL-MCNC: 6.3 G/DL (ref 6–8.2)
PROT SERPL-MCNC: 6.4 G/DL (ref 6–8.2)
PROT SERPL-MCNC: 6.6 G/DL (ref 6–8.2)
PROT SERPL-MCNC: 6.6 G/DL (ref 6–8.2)
PROT SERPL-MCNC: 6.7 G/DL (ref 6–8.2)
PROT SERPL-MCNC: 7 G/DL (ref 6–8.2)
PROT SERPL-MCNC: 7.1 G/DL (ref 6–8.2)
PROT SERPL-MCNC: 7.3 G/DL (ref 6–8.2)
PROT SERPL-MCNC: 7.5 G/DL (ref 6–8.2)
PROT SERPL-MCNC: 8 G/DL (ref 6–8.2)
PROT UR QL STRIP: NEGATIVE MG/DL
PROT UR QL STRIP: NEGATIVE MG/DL
PROTHROMBIN TIME: 13.5 SEC (ref 12–14.6)
PROTHROMBIN TIME: 15.3 SEC (ref 12–14.6)
PROTHROMBIN TIME: 18.9 SEC (ref 12–14.6)
RBC # BLD AUTO: 3.07 M/UL (ref 4.7–6.1)
RBC # BLD AUTO: 3.1 M/UL (ref 4.7–6.1)
RBC # BLD AUTO: 3.23 M/UL (ref 4.7–6.1)
RBC # BLD AUTO: 3.34 M/UL (ref 4.7–6.1)
RBC # BLD AUTO: 3.51 M/UL (ref 4.7–6.1)
RBC # BLD AUTO: 3.55 M/UL (ref 4.7–6.1)
RBC # BLD AUTO: 3.69 M/UL (ref 4.7–6.1)
RBC # BLD AUTO: 3.76 M/UL (ref 4.7–6.1)
RBC # BLD AUTO: 3.81 M/UL (ref 4.7–6.1)
RBC # BLD AUTO: 3.99 M/UL (ref 4.7–6.1)
RBC # BLD AUTO: 4.09 M/UL (ref 4.7–6.1)
RBC # BLD AUTO: 4.13 M/UL (ref 4.7–6.1)
RBC # BLD AUTO: 4.25 M/UL (ref 4.7–6.1)
RBC # BLD AUTO: 4.3 M/UL (ref 4.7–6.1)
RBC # BLD AUTO: 4.38 M/UL (ref 4.7–6.1)
RBC # BLD AUTO: 4.38 M/UL (ref 4.7–6.1)
RBC # BLD AUTO: 4.4 M/UL (ref 4.7–6.1)
RBC # BLD AUTO: 4.4 M/UL (ref 4.7–6.1)
RBC # BLD AUTO: 4.43 M/UL (ref 4.7–6.1)
RBC # BLD AUTO: 4.48 M/UL (ref 4.7–6.1)
RBC # BLD AUTO: 4.59 M/UL (ref 4.7–6.1)
RBC # BLD AUTO: 4.63 M/UL (ref 4.7–6.1)
RBC # BLD AUTO: 4.7 M/UL (ref 4.7–6.1)
RBC # BLD AUTO: 4.7 M/UL (ref 4.7–6.1)
RBC # BLD AUTO: 4.85 M/UL (ref 4.7–6.1)
RBC # BLD AUTO: 4.95 M/UL (ref 4.7–6.1)
RBC # BLD AUTO: 5.15 M/UL (ref 4.7–6.1)
RBC # BLD AUTO: 5.23 M/UL (ref 4.7–6.1)
RBC # BLD AUTO: 5.43 M/UL (ref 4.7–6.1)
RBC # BLD AUTO: 5.69 M/UL (ref 4.7–6.1)
RBC # URNS HPF: ABNORMAL /HPF
RBC # URNS HPF: ABNORMAL /HPF
RBC BLD AUTO: PRESENT
RBC UR QL AUTO: ABNORMAL
RBC UR QL AUTO: ABNORMAL
RENAL EPI CELLS #/AREA URNS HPF: NEGATIVE /HPF
RSV RNA SPEC QL NAA+PROBE: NEGATIVE
SAO2 % BLDA: 99 % (ref 93–99)
SARS-COV-2 RNA RESP QL NAA+PROBE: NOTDETECTED
SCCMEC + MECA PNL NOSE NAA+PROBE: NEGATIVE
SIGNIFICANT IND 70042: ABNORMAL
SIGNIFICANT IND 70042: NORMAL
SITE SITE: ABNORMAL
SITE SITE: NORMAL
SODIUM SERPL-SCNC: 133 MMOL/L (ref 135–145)
SODIUM SERPL-SCNC: 134 MMOL/L (ref 135–145)
SODIUM SERPL-SCNC: 135 MMOL/L (ref 135–145)
SODIUM SERPL-SCNC: 136 MMOL/L (ref 135–145)
SODIUM SERPL-SCNC: 137 MMOL/L (ref 135–145)
SODIUM SERPL-SCNC: 138 MMOL/L (ref 135–145)
SODIUM SERPL-SCNC: 139 MMOL/L (ref 135–145)
SODIUM SERPL-SCNC: 140 MMOL/L (ref 135–145)
SODIUM SERPL-SCNC: 141 MMOL/L (ref 135–145)
SODIUM SERPL-SCNC: 142 MMOL/L (ref 135–145)
SOURCE SOURCE: ABNORMAL
SOURCE SOURCE: NORMAL
SP GR UR STRIP.AUTO: 1.02
SP GR UR STRIP.AUTO: 1.04
SPECIMEN DRAWN FROM PATIENT: ABNORMAL
SPECIMEN SOURCE: NORMAL
TIDAL VOLUME IVT: 460 ML
TRIGL SERPL-MCNC: 252 MG/DL (ref 0–149)
TRIGL SERPL-MCNC: 383 MG/DL (ref 0–149)
TRIGL SERPL-MCNC: 446 MG/DL (ref 0–149)
TROPONIN T SERPL-MCNC: 22 NG/L (ref 6–19)
TROPONIN T SERPL-MCNC: 23 NG/L (ref 6–19)
TROPONIN T SERPL-MCNC: 27 NG/L (ref 6–19)
TROPONIN T SERPL-MCNC: 312 NG/L (ref 6–19)
TROPONIN T SERPL-MCNC: 317 NG/L (ref 6–19)
TROPONIN T SERPL-MCNC: 333 NG/L (ref 6–19)
TROPONIN T SERPL-MCNC: 459 NG/L (ref 6–19)
TROPONIN T SERPL-MCNC: 56 NG/L (ref 6–19)
TROPONIN T SERPL-MCNC: 67 NG/L (ref 6–19)
UFH PPP CHRO-ACNC: <0.1 IU/ML
URATE SERPL-MCNC: 9.2 MG/DL (ref 2.5–8.3)
UROBILINOGEN UR STRIP.AUTO-MCNC: 0.2 MG/DL
UROBILINOGEN UR STRIP.AUTO-MCNC: 0.2 MG/DL
VANCOMYCIN PEAK SERPL-MCNC: 26 UG/ML (ref 20–40)
VANCOMYCIN PEAK SERPL-MCNC: 27.8 UG/ML (ref 20–40)
VANCOMYCIN SERPL-MCNC: 11.6 UG/ML
VANCOMYCIN TROUGH SERPL-MCNC: <4 UG/ML (ref 10–20)
WBC # BLD AUTO: 10 K/UL (ref 4.8–10.8)
WBC # BLD AUTO: 10.1 K/UL (ref 4.8–10.8)
WBC # BLD AUTO: 10.1 K/UL (ref 4.8–10.8)
WBC # BLD AUTO: 10.2 K/UL (ref 4.8–10.8)
WBC # BLD AUTO: 10.4 K/UL (ref 4.8–10.8)
WBC # BLD AUTO: 11.4 K/UL (ref 4.8–10.8)
WBC # BLD AUTO: 11.4 K/UL (ref 4.8–10.8)
WBC # BLD AUTO: 11.6 K/UL (ref 4.8–10.8)
WBC # BLD AUTO: 12.4 K/UL (ref 4.8–10.8)
WBC # BLD AUTO: 13 K/UL (ref 4.8–10.8)
WBC # BLD AUTO: 13.9 K/UL (ref 4.8–10.8)
WBC # BLD AUTO: 14.3 K/UL (ref 4.8–10.8)
WBC # BLD AUTO: 14.8 K/UL (ref 4.8–10.8)
WBC # BLD AUTO: 14.8 K/UL (ref 4.8–10.8)
WBC # BLD AUTO: 15.5 K/UL (ref 4.8–10.8)
WBC # BLD AUTO: 15.7 K/UL (ref 4.8–10.8)
WBC # BLD AUTO: 16.1 K/UL (ref 4.8–10.8)
WBC # BLD AUTO: 17.8 K/UL (ref 4.8–10.8)
WBC # BLD AUTO: 18 K/UL (ref 4.8–10.8)
WBC # BLD AUTO: 18.2 K/UL (ref 4.8–10.8)
WBC # BLD AUTO: 18.8 K/UL (ref 4.8–10.8)
WBC # BLD AUTO: 19.1 K/UL (ref 4.8–10.8)
WBC # BLD AUTO: 19.5 K/UL (ref 4.8–10.8)
WBC # BLD AUTO: 20.6 K/UL (ref 4.8–10.8)
WBC # BLD AUTO: 21 K/UL (ref 4.8–10.8)
WBC # BLD AUTO: 9 K/UL (ref 4.8–10.8)
WBC # BLD AUTO: 9 K/UL (ref 4.8–10.8)
WBC # BLD AUTO: 9.1 K/UL (ref 4.8–10.8)
WBC # BLD AUTO: 9.3 K/UL (ref 4.8–10.8)
WBC # BLD AUTO: 9.4 K/UL (ref 4.8–10.8)
WBC #/AREA URNS HPF: ABNORMAL /HPF
WBC #/AREA URNS HPF: ABNORMAL /HPF

## 2022-01-01 PROCEDURE — A9270 NON-COVERED ITEM OR SERVICE: HCPCS | Performed by: INTERNAL MEDICINE

## 2022-01-01 PROCEDURE — 700111 HCHG RX REV CODE 636 W/ 250 OVERRIDE (IP): Performed by: STUDENT IN AN ORGANIZED HEALTH CARE EDUCATION/TRAINING PROGRAM

## 2022-01-01 PROCEDURE — 700102 HCHG RX REV CODE 250 W/ 637 OVERRIDE(OP): Performed by: HOSPITALIST

## 2022-01-01 PROCEDURE — 93005 ELECTROCARDIOGRAM TRACING: CPT

## 2022-01-01 PROCEDURE — 99233 SBSQ HOSP IP/OBS HIGH 50: CPT | Mod: GC | Performed by: HOSPITALIST

## 2022-01-01 PROCEDURE — 700102 HCHG RX REV CODE 250 W/ 637 OVERRIDE(OP): Performed by: NURSE PRACTITIONER

## 2022-01-01 PROCEDURE — 700102 HCHG RX REV CODE 250 W/ 637 OVERRIDE(OP): Performed by: STUDENT IN AN ORGANIZED HEALTH CARE EDUCATION/TRAINING PROGRAM

## 2022-01-01 PROCEDURE — 71045 X-RAY EXAM CHEST 1 VIEW: CPT

## 2022-01-01 PROCEDURE — RXMED WILLOW AMBULATORY MEDICATION CHARGE: Performed by: INTERNAL MEDICINE

## 2022-01-01 PROCEDURE — 82962 GLUCOSE BLOOD TEST: CPT | Mod: 91

## 2022-01-01 PROCEDURE — 87077 CULTURE AEROBIC IDENTIFY: CPT

## 2022-01-01 PROCEDURE — 99214 OFFICE O/P EST MOD 30 MIN: CPT | Performed by: FAMILY MEDICINE

## 2022-01-01 PROCEDURE — 700102 HCHG RX REV CODE 250 W/ 637 OVERRIDE(OP): Performed by: PHYSICAL MEDICINE & REHABILITATION

## 2022-01-01 PROCEDURE — A9270 NON-COVERED ITEM OR SERVICE: HCPCS | Performed by: HOSPITALIST

## 2022-01-01 PROCEDURE — 700102 HCHG RX REV CODE 250 W/ 637 OVERRIDE(OP): Performed by: INTERNAL MEDICINE

## 2022-01-01 PROCEDURE — 36415 COLL VENOUS BLD VENIPUNCTURE: CPT

## 2022-01-01 PROCEDURE — 700111 HCHG RX REV CODE 636 W/ 250 OVERRIDE (IP): Performed by: ANESTHESIOLOGY

## 2022-01-01 PROCEDURE — 700101 HCHG RX REV CODE 250

## 2022-01-01 PROCEDURE — 700111 HCHG RX REV CODE 636 W/ 250 OVERRIDE (IP): Mod: JW | Performed by: INTERNAL MEDICINE

## 2022-01-01 PROCEDURE — 700105 HCHG RX REV CODE 258: Performed by: INTERNAL MEDICINE

## 2022-01-01 PROCEDURE — 84484 ASSAY OF TROPONIN QUANT: CPT

## 2022-01-01 PROCEDURE — 87070 CULTURE OTHR SPECIMN AEROBIC: CPT | Mod: 91

## 2022-01-01 PROCEDURE — 99233 SBSQ HOSP IP/OBS HIGH 50: CPT | Performed by: GENERAL PRACTICE

## 2022-01-01 PROCEDURE — A9270 NON-COVERED ITEM OR SERVICE: HCPCS | Performed by: STUDENT IN AN ORGANIZED HEALTH CARE EDUCATION/TRAINING PROGRAM

## 2022-01-01 PROCEDURE — 85652 RBC SED RATE AUTOMATED: CPT

## 2022-01-01 PROCEDURE — 83690 ASSAY OF LIPASE: CPT

## 2022-01-01 PROCEDURE — 96374 THER/PROPH/DIAG INJ IV PUSH: CPT

## 2022-01-01 PROCEDURE — 85025 COMPLETE CBC W/AUTO DIFF WBC: CPT

## 2022-01-01 PROCEDURE — 96368 THER/DIAG CONCURRENT INF: CPT

## 2022-01-01 PROCEDURE — 80048 BASIC METABOLIC PNL TOTAL CA: CPT

## 2022-01-01 PROCEDURE — A9270 NON-COVERED ITEM OR SERVICE: HCPCS

## 2022-01-01 PROCEDURE — A9270 NON-COVERED ITEM OR SERVICE: HCPCS | Performed by: NURSE PRACTITIONER

## 2022-01-01 PROCEDURE — 85379 FIBRIN DEGRADATION QUANT: CPT

## 2022-01-01 PROCEDURE — 700111 HCHG RX REV CODE 636 W/ 250 OVERRIDE (IP)

## 2022-01-01 PROCEDURE — 770001 HCHG ROOM/CARE - MED/SURG/GYN PRIV*

## 2022-01-01 PROCEDURE — 87186 SC STD MICRODIL/AGAR DIL: CPT

## 2022-01-01 PROCEDURE — 99223 1ST HOSP IP/OBS HIGH 75: CPT | Performed by: INTERNAL MEDICINE

## 2022-01-01 PROCEDURE — 700111 HCHG RX REV CODE 636 W/ 250 OVERRIDE (IP): Performed by: INTERNAL MEDICINE

## 2022-01-01 PROCEDURE — 85520 HEPARIN ASSAY: CPT

## 2022-01-01 PROCEDURE — 700111 HCHG RX REV CODE 636 W/ 250 OVERRIDE (IP): Performed by: NURSE PRACTITIONER

## 2022-01-01 PROCEDURE — 96375 TX/PRO/DX INJ NEW DRUG ADDON: CPT

## 2022-01-01 PROCEDURE — 85730 THROMBOPLASTIN TIME PARTIAL: CPT

## 2022-01-01 PROCEDURE — 99213 OFFICE O/P EST LOW 20 MIN: CPT | Performed by: FAMILY MEDICINE

## 2022-01-01 PROCEDURE — 86140 C-REACTIVE PROTEIN: CPT

## 2022-01-01 PROCEDURE — 700101 HCHG RX REV CODE 250: Performed by: EMERGENCY MEDICINE

## 2022-01-01 PROCEDURE — 87040 BLOOD CULTURE FOR BACTERIA: CPT | Mod: 91

## 2022-01-01 PROCEDURE — 99024 POSTOP FOLLOW-UP VISIT: CPT | Performed by: STUDENT IN AN ORGANIZED HEALTH CARE EDUCATION/TRAINING PROGRAM

## 2022-01-01 PROCEDURE — 80069 RENAL FUNCTION PANEL: CPT

## 2022-01-01 PROCEDURE — 99233 SBSQ HOSP IP/OBS HIGH 50: CPT | Performed by: HOSPITALIST

## 2022-01-01 PROCEDURE — A9270 NON-COVERED ITEM OR SERVICE: HCPCS | Performed by: PHYSICAL MEDICINE & REHABILITATION

## 2022-01-01 PROCEDURE — 00630 ANES PX LUMBAR REGION NOS: CPT | Performed by: ANESTHESIOLOGY

## 2022-01-01 PROCEDURE — 96376 TX/PRO/DX INJ SAME DRUG ADON: CPT

## 2022-01-01 PROCEDURE — 99220 PR INITIAL OBSERVATION CARE,LEVL III: CPT | Performed by: INTERNAL MEDICINE

## 2022-01-01 PROCEDURE — 82962 GLUCOSE BLOOD TEST: CPT

## 2022-01-01 PROCEDURE — 80053 COMPREHEN METABOLIC PANEL: CPT

## 2022-01-01 PROCEDURE — 92950 HEART/LUNG RESUSCITATION CPR: CPT

## 2022-01-01 PROCEDURE — 80061 LIPID PANEL: CPT

## 2022-01-01 PROCEDURE — 770020 HCHG ROOM/CARE - TELE (206)

## 2022-01-01 PROCEDURE — 99285 EMERGENCY DEPT VISIT HI MDM: CPT

## 2022-01-01 PROCEDURE — 99214 OFFICE O/P EST MOD 30 MIN: CPT | Performed by: INTERNAL MEDICINE

## 2022-01-01 PROCEDURE — 83036 HEMOGLOBIN GLYCOSYLATED A1C: CPT | Mod: GA

## 2022-01-01 PROCEDURE — 83735 ASSAY OF MAGNESIUM: CPT

## 2022-01-01 PROCEDURE — 36225 PLACE CATH SUBCLAVIAN ART: CPT | Performed by: INTERNAL MEDICINE

## 2022-01-01 PROCEDURE — 700111 HCHG RX REV CODE 636 W/ 250 OVERRIDE (IP): Performed by: HOSPITALIST

## 2022-01-01 PROCEDURE — 99232 SBSQ HOSP IP/OBS MODERATE 35: CPT | Performed by: HOSPITALIST

## 2022-01-01 PROCEDURE — 93005 ELECTROCARDIOGRAM TRACING: CPT | Performed by: EMERGENCY MEDICINE

## 2022-01-01 PROCEDURE — 700105 HCHG RX REV CODE 258: Performed by: STUDENT IN AN ORGANIZED HEALTH CARE EDUCATION/TRAINING PROGRAM

## 2022-01-01 PROCEDURE — 99232 SBSQ HOSP IP/OBS MODERATE 35: CPT | Performed by: NURSE PRACTITIONER

## 2022-01-01 PROCEDURE — B3121ZZ FLUOROSCOPY OF LEFT SUBCLAVIAN ARTERY USING LOW OSMOLAR CONTRAST: ICD-10-PCS | Performed by: INTERNAL MEDICINE

## 2022-01-01 PROCEDURE — 96365 THER/PROPH/DIAG IV INF INIT: CPT

## 2022-01-01 PROCEDURE — 81001 URINALYSIS AUTO W/SCOPE: CPT

## 2022-01-01 PROCEDURE — 85027 COMPLETE CBC AUTOMATED: CPT

## 2022-01-01 PROCEDURE — 93306 TTE W/DOPPLER COMPLETE: CPT | Mod: 26 | Performed by: INTERNAL MEDICINE

## 2022-01-01 PROCEDURE — B2131ZZ FLUOROSCOPY OF MULTIPLE CORONARY ARTERY BYPASS GRAFTS USING LOW OSMOLAR CONTRAST: ICD-10-PCS | Performed by: INTERNAL MEDICINE

## 2022-01-01 PROCEDURE — 99212 OFFICE O/P EST SF 10 MIN: CPT

## 2022-01-01 PROCEDURE — 700101 HCHG RX REV CODE 250: Performed by: STUDENT IN AN ORGANIZED HEALTH CARE EDUCATION/TRAINING PROGRAM

## 2022-01-01 PROCEDURE — 700105 HCHG RX REV CODE 258: Performed by: HOSPITALIST

## 2022-01-01 PROCEDURE — 83036 HEMOGLOBIN GLYCOSYLATED A1C: CPT

## 2022-01-01 PROCEDURE — 36600 WITHDRAWAL OF ARTERIAL BLOOD: CPT

## 2022-01-01 PROCEDURE — 160029 HCHG SURGERY MINUTES - 1ST 30 MINS LEVEL 4: Performed by: STUDENT IN AN ORGANIZED HEALTH CARE EDUCATION/TRAINING PROGRAM

## 2022-01-01 PROCEDURE — A9576 INJ PROHANCE MULTIPACK: HCPCS | Performed by: INTERNAL MEDICINE

## 2022-01-01 PROCEDURE — 700101 HCHG RX REV CODE 250: Performed by: INTERNAL MEDICINE

## 2022-01-01 PROCEDURE — 700111 HCHG RX REV CODE 636 W/ 250 OVERRIDE (IP): Performed by: EMERGENCY MEDICINE

## 2022-01-01 PROCEDURE — 99232 SBSQ HOSP IP/OBS MODERATE 35: CPT

## 2022-01-01 PROCEDURE — 72157 MRI CHEST SPINE W/O & W/DYE: CPT

## 2022-01-01 PROCEDURE — 93010 ELECTROCARDIOGRAM REPORT: CPT | Performed by: INTERNAL MEDICINE

## 2022-01-01 PROCEDURE — 82550 ASSAY OF CK (CPK): CPT

## 2022-01-01 PROCEDURE — 99140 ANES COMP EMERGENCY COND: CPT | Performed by: ANESTHESIOLOGY

## 2022-01-01 PROCEDURE — 700105 HCHG RX REV CODE 258: Performed by: ANESTHESIOLOGY

## 2022-01-01 PROCEDURE — 93308 TTE F-UP OR LMTD: CPT

## 2022-01-01 PROCEDURE — B2111ZZ FLUOROSCOPY OF MULTIPLE CORONARY ARTERIES USING LOW OSMOLAR CONTRAST: ICD-10-PCS | Performed by: INTERNAL MEDICINE

## 2022-01-01 PROCEDURE — 160009 HCHG ANES TIME/MIN: Performed by: STUDENT IN AN ORGANIZED HEALTH CARE EDUCATION/TRAINING PROGRAM

## 2022-01-01 PROCEDURE — 160035 HCHG PACU - 1ST 60 MINS PHASE I: Performed by: STUDENT IN AN ORGANIZED HEALTH CARE EDUCATION/TRAINING PROGRAM

## 2022-01-01 PROCEDURE — 700105 HCHG RX REV CODE 258: Performed by: EMERGENCY MEDICINE

## 2022-01-01 PROCEDURE — 80202 ASSAY OF VANCOMYCIN: CPT

## 2022-01-01 PROCEDURE — 99152 MOD SED SAME PHYS/QHP 5/>YRS: CPT | Performed by: INTERNAL MEDICINE

## 2022-01-01 PROCEDURE — 97163 PT EVAL HIGH COMPLEX 45 MIN: CPT

## 2022-01-01 PROCEDURE — 99204 OFFICE O/P NEW MOD 45 MIN: CPT | Performed by: INTERNAL MEDICINE

## 2022-01-01 PROCEDURE — 87070 CULTURE OTHR SPECIMN AEROBIC: CPT

## 2022-01-01 PROCEDURE — 82803 BLOOD GASES ANY COMBINATION: CPT

## 2022-01-01 PROCEDURE — 99233 SBSQ HOSP IP/OBS HIGH 50: CPT | Performed by: INTERNAL MEDICINE

## 2022-01-01 PROCEDURE — 302214 INTUBATION BOX

## 2022-01-01 PROCEDURE — 99232 SBSQ HOSP IP/OBS MODERATE 35: CPT | Performed by: PHYSICAL MEDICINE & REHABILITATION

## 2022-01-01 PROCEDURE — 84484 ASSAY OF TROPONIN QUANT: CPT | Mod: 91

## 2022-01-01 PROCEDURE — 97166 OT EVAL MOD COMPLEX 45 MIN: CPT

## 2022-01-01 PROCEDURE — 85610 PROTHROMBIN TIME: CPT

## 2022-01-01 PROCEDURE — 93005 ELECTROCARDIOGRAM TRACING: CPT | Performed by: STUDENT IN AN ORGANIZED HEALTH CARE EDUCATION/TRAINING PROGRAM

## 2022-01-01 PROCEDURE — 99233 SBSQ HOSP IP/OBS HIGH 50: CPT | Performed by: STUDENT IN AN ORGANIZED HEALTH CARE EDUCATION/TRAINING PROGRAM

## 2022-01-01 PROCEDURE — 99024 POSTOP FOLLOW-UP VISIT: CPT | Performed by: PHYSICIAN ASSISTANT

## 2022-01-01 PROCEDURE — 93306 TTE W/DOPPLER COMPLETE: CPT

## 2022-01-01 PROCEDURE — 93005 ELECTROCARDIOGRAM TRACING: CPT | Performed by: NURSE PRACTITIONER

## 2022-01-01 PROCEDURE — 87641 MR-STAPH DNA AMP PROBE: CPT

## 2022-01-01 PROCEDURE — 74177 CT ABD & PELVIS W/CONTRAST: CPT

## 2022-01-01 PROCEDURE — 96366 THER/PROPH/DIAG IV INF ADDON: CPT

## 2022-01-01 PROCEDURE — 96367 TX/PROPH/DG ADDL SEQ IV INF: CPT

## 2022-01-01 PROCEDURE — 160002 HCHG RECOVERY MINUTES (STAT): Performed by: STUDENT IN AN ORGANIZED HEALTH CARE EDUCATION/TRAINING PROGRAM

## 2022-01-01 PROCEDURE — 84100 ASSAY OF PHOSPHORUS: CPT

## 2022-01-01 PROCEDURE — 99232 SBSQ HOSP IP/OBS MODERATE 35: CPT | Performed by: INTERNAL MEDICINE

## 2022-01-01 PROCEDURE — 99223 1ST HOSP IP/OBS HIGH 75: CPT | Mod: GC | Performed by: HOSPITALIST

## 2022-01-01 PROCEDURE — 160041 HCHG SURGERY MINUTES - EA ADDL 1 MIN LEVEL 4: Performed by: STUDENT IN AN ORGANIZED HEALTH CARE EDUCATION/TRAINING PROGRAM

## 2022-01-01 PROCEDURE — 99232 SBSQ HOSP IP/OBS MODERATE 35: CPT | Mod: GC | Performed by: HOSPITALIST

## 2022-01-01 PROCEDURE — 700117 HCHG RX CONTRAST REV CODE 255: Performed by: INTERNAL MEDICINE

## 2022-01-01 PROCEDURE — 94799 UNLISTED PULMONARY SVC/PX: CPT

## 2022-01-01 PROCEDURE — G0378 HOSPITAL OBSERVATION PER HR: HCPCS

## 2022-01-01 PROCEDURE — 87086 URINE CULTURE/COLONY COUNT: CPT

## 2022-01-01 PROCEDURE — 72158 MRI LUMBAR SPINE W/O & W/DYE: CPT

## 2022-01-01 PROCEDURE — 97535 SELF CARE MNGMENT TRAINING: CPT

## 2022-01-01 PROCEDURE — 99231 SBSQ HOSP IP/OBS SF/LOW 25: CPT | Performed by: PHYSICIAN ASSISTANT

## 2022-01-01 PROCEDURE — 700102 HCHG RX REV CODE 250 W/ 637 OVERRIDE(OP)

## 2022-01-01 PROCEDURE — 99239 HOSP IP/OBS DSCHRG MGMT >30: CPT | Performed by: HOSPITALIST

## 2022-01-01 PROCEDURE — 700117 HCHG RX CONTRAST REV CODE 255: Performed by: STUDENT IN AN ORGANIZED HEALTH CARE EDUCATION/TRAINING PROGRAM

## 2022-01-01 PROCEDURE — 99291 CRITICAL CARE FIRST HOUR: CPT | Performed by: INTERNAL MEDICINE

## 2022-01-01 PROCEDURE — C9803 HOPD COVID-19 SPEC COLLECT: HCPCS | Performed by: EMERGENCY MEDICINE

## 2022-01-01 PROCEDURE — 93000 ELECTROCARDIOGRAM COMPLETE: CPT | Performed by: INTERNAL MEDICINE

## 2022-01-01 PROCEDURE — 87205 SMEAR GRAM STAIN: CPT | Mod: 91

## 2022-01-01 PROCEDURE — A9270 NON-COVERED ITEM OR SERVICE: HCPCS | Performed by: PHYSICIAN ASSISTANT

## 2022-01-01 PROCEDURE — 700102 HCHG RX REV CODE 250 W/ 637 OVERRIDE(OP): Performed by: PHYSICIAN ASSISTANT

## 2022-01-01 PROCEDURE — 700117 HCHG RX CONTRAST REV CODE 255: Performed by: EMERGENCY MEDICINE

## 2022-01-01 PROCEDURE — 93970 EXTREMITY STUDY: CPT | Mod: 26 | Performed by: INTERNAL MEDICINE

## 2022-01-01 PROCEDURE — 00300 ANES ALL PX INTEG H/N/PTRUNK: CPT | Performed by: ANESTHESIOLOGY

## 2022-01-01 PROCEDURE — 83605 ASSAY OF LACTIC ACID: CPT

## 2022-01-01 PROCEDURE — 99239 HOSP IP/OBS DSCHRG MGMT >30: CPT | Performed by: STUDENT IN AN ORGANIZED HEALTH CARE EDUCATION/TRAINING PROGRAM

## 2022-01-01 PROCEDURE — 99223 1ST HOSP IP/OBS HIGH 75: CPT | Performed by: STUDENT IN AN ORGANIZED HEALTH CARE EDUCATION/TRAINING PROGRAM

## 2022-01-01 PROCEDURE — C1751 CATH, INF, PER/CENT/MIDLINE: HCPCS

## 2022-01-01 PROCEDURE — 87075 CULTR BACTERIA EXCEPT BLOOD: CPT | Mod: 91

## 2022-01-01 PROCEDURE — 87075 CULTR BACTERIA EXCEPT BLOOD: CPT

## 2022-01-01 PROCEDURE — 87040 BLOOD CULTURE FOR BACTERIA: CPT

## 2022-01-01 PROCEDURE — 99291 CRITICAL CARE FIRST HOUR: CPT

## 2022-01-01 PROCEDURE — 70450 CT HEAD/BRAIN W/O DYE: CPT

## 2022-01-01 PROCEDURE — 97116 GAIT TRAINING THERAPY: CPT

## 2022-01-01 PROCEDURE — 31500 INSERT EMERGENCY AIRWAY: CPT

## 2022-01-01 PROCEDURE — 02HV33Z INSERTION OF INFUSION DEVICE INTO SUPERIOR VENA CAVA, PERCUTANEOUS APPROACH: ICD-10-PCS | Performed by: HOSPITALIST

## 2022-01-01 PROCEDURE — 72156 MRI NECK SPINE W/O & W/DYE: CPT

## 2022-01-01 PROCEDURE — 110371 HCHG SHELL REV 272: Performed by: STUDENT IN AN ORGANIZED HEALTH CARE EDUCATION/TRAINING PROGRAM

## 2022-01-01 PROCEDURE — 160048 HCHG OR STATISTICAL LEVEL 1-5: Performed by: STUDENT IN AN ORGANIZED HEALTH CARE EDUCATION/TRAINING PROGRAM

## 2022-01-01 PROCEDURE — 00NY0ZZ RELEASE LUMBAR SPINAL CORD, OPEN APPROACH: ICD-10-PCS | Performed by: STUDENT IN AN ORGANIZED HEALTH CARE EDUCATION/TRAINING PROGRAM

## 2022-01-01 PROCEDURE — 99284 EMERGENCY DEPT VISIT MOD MDM: CPT | Performed by: INTERNAL MEDICINE

## 2022-01-01 PROCEDURE — 97530 THERAPEUTIC ACTIVITIES: CPT

## 2022-01-01 PROCEDURE — 700102 HCHG RX REV CODE 250 W/ 637 OVERRIDE(OP): Performed by: ANESTHESIOLOGY

## 2022-01-01 PROCEDURE — 74018 RADEX ABDOMEN 1 VIEW: CPT

## 2022-01-01 PROCEDURE — 74170 CT ABD WO CNTRST FLWD CNTRST: CPT

## 2022-01-01 PROCEDURE — 700105 HCHG RX REV CODE 258

## 2022-01-01 PROCEDURE — 93970 EXTREMITY STUDY: CPT

## 2022-01-01 PROCEDURE — 36415 COLL VENOUS BLD VENIPUNCTURE: CPT | Mod: GA

## 2022-01-01 PROCEDURE — 99153 MOD SED SAME PHYS/QHP EA: CPT

## 2022-01-01 PROCEDURE — 99223 1ST HOSP IP/OBS HIGH 75: CPT | Performed by: PHYSICAL MEDICINE & REHABILITATION

## 2022-01-01 PROCEDURE — 93010 ELECTROCARDIOGRAM REPORT: CPT | Mod: 76 | Performed by: INTERNAL MEDICINE

## 2022-01-01 PROCEDURE — 76775 US EXAM ABDO BACK WALL LIM: CPT

## 2022-01-01 PROCEDURE — A9576 INJ PROHANCE MULTIPACK: HCPCS | Performed by: STUDENT IN AN ORGANIZED HEALTH CARE EDUCATION/TRAINING PROGRAM

## 2022-01-01 PROCEDURE — 94760 N-INVAS EAR/PLS OXIMETRY 1: CPT

## 2022-01-01 PROCEDURE — 99214 OFFICE O/P EST MOD 30 MIN: CPT | Performed by: NURSE PRACTITIONER

## 2022-01-01 PROCEDURE — 99233 SBSQ HOSP IP/OBS HIGH 50: CPT | Mod: FS | Performed by: INTERNAL MEDICINE

## 2022-01-01 PROCEDURE — 93321 DOPPLER ECHO F-UP/LMTD STD: CPT | Mod: 26 | Performed by: INTERNAL MEDICINE

## 2022-01-01 PROCEDURE — A9270 NON-COVERED ITEM OR SERVICE: HCPCS | Performed by: ANESTHESIOLOGY

## 2022-01-01 PROCEDURE — 93005 ELECTROCARDIOGRAM TRACING: CPT | Performed by: HOSPITALIST

## 2022-01-01 PROCEDURE — 87205 SMEAR GRAM STAIN: CPT

## 2022-01-01 PROCEDURE — 009U00Z DRAINAGE OF SPINAL CANAL WITH DRAINAGE DEVICE, OPEN APPROACH: ICD-10-PCS | Performed by: STUDENT IN AN ORGANIZED HEALTH CARE EDUCATION/TRAINING PROGRAM

## 2022-01-01 PROCEDURE — 83880 ASSAY OF NATRIURETIC PEPTIDE: CPT

## 2022-01-01 PROCEDURE — 93455 CORONARY ART/GRFT ANGIO S&I: CPT | Mod: 26 | Performed by: INTERNAL MEDICINE

## 2022-01-01 PROCEDURE — 99214 OFFICE O/P EST MOD 30 MIN: CPT | Mod: 25 | Performed by: INTERNAL MEDICINE

## 2022-01-01 PROCEDURE — 160036 HCHG PACU - EA ADDL 30 MINS PHASE I: Performed by: STUDENT IN AN ORGANIZED HEALTH CARE EDUCATION/TRAINING PROGRAM

## 2022-01-01 PROCEDURE — 93325 DOPPLER ECHO COLOR FLOW MAPG: CPT | Mod: 26 | Performed by: INTERNAL MEDICINE

## 2022-01-01 PROCEDURE — 94002 VENT MGMT INPAT INIT DAY: CPT

## 2022-01-01 PROCEDURE — 99239 HOSP IP/OBS DSCHRG MGMT >30: CPT | Mod: GC | Performed by: HOSPITALIST

## 2022-01-01 PROCEDURE — 700101 HCHG RX REV CODE 250: Performed by: ANESTHESIOLOGY

## 2022-01-01 PROCEDURE — 84550 ASSAY OF BLOOD/URIC ACID: CPT

## 2022-01-01 PROCEDURE — 74183 MRI ABD W/O CNTR FLWD CNTR: CPT

## 2022-01-01 PROCEDURE — 72020 X-RAY EXAM OF SPINE 1 VIEW: CPT

## 2022-01-01 PROCEDURE — 93308 TTE F-UP OR LMTD: CPT | Mod: 26 | Performed by: INTERNAL MEDICINE

## 2022-01-01 PROCEDURE — RXMED WILLOW AMBULATORY MEDICATION CHARGE: Performed by: STUDENT IN AN ORGANIZED HEALTH CARE EDUCATION/TRAINING PROGRAM

## 2022-01-01 PROCEDURE — A9502 TC99M TETROFOSMIN: HCPCS

## 2022-01-01 PROCEDURE — 99222 1ST HOSP IP/OBS MODERATE 55: CPT | Performed by: PHYSICIAN ASSISTANT

## 2022-01-01 PROCEDURE — 700117 HCHG RX CONTRAST REV CODE 255: Performed by: HOSPITALIST

## 2022-01-01 PROCEDURE — 0241U HCHG SARS-COV-2 COVID-19 NFCT DS RESP RNA 4 TRGT MIC: CPT

## 2022-01-01 PROCEDURE — A9576 INJ PROHANCE MULTIPACK: HCPCS | Performed by: HOSPITALIST

## 2022-01-01 PROCEDURE — 85007 BL SMEAR W/DIFF WBC COUNT: CPT

## 2022-01-01 PROCEDURE — 97602 WOUND(S) CARE NON-SELECTIVE: CPT

## 2022-01-01 PROCEDURE — 36620 INSERTION CATHETER ARTERY: CPT | Performed by: ANESTHESIOLOGY

## 2022-01-01 RX ORDER — MIDAZOLAM HYDROCHLORIDE 1 MG/ML
INJECTION INTRAMUSCULAR; INTRAVENOUS
Status: COMPLETED
Start: 2022-01-01 | End: 2022-01-01

## 2022-01-01 RX ORDER — LIDOCAINE HYDROCHLORIDE 20 MG/ML
INJECTION, SOLUTION EPIDURAL; INFILTRATION; INTRACAUDAL; PERINEURAL PRN
Status: DISCONTINUED | OUTPATIENT
Start: 2022-01-01 | End: 2022-01-01 | Stop reason: SURG

## 2022-01-01 RX ORDER — HEPARIN SODIUM (PORCINE) LOCK FLUSH IV SOLN 100 UNIT/ML 100 UNIT/ML
500 SOLUTION INTRAVENOUS PRN
Status: CANCELLED | OUTPATIENT
Start: 2022-01-01

## 2022-01-01 RX ORDER — PREDNISONE 20 MG/1
60 TABLET ORAL ONCE
Status: COMPLETED | OUTPATIENT
Start: 2022-01-01 | End: 2022-01-01

## 2022-01-01 RX ORDER — OXYCODONE HYDROCHLORIDE 20 MG/1
20 TABLET ORAL EVERY 6 HOURS PRN
COMMUNITY
Start: 2022-01-01

## 2022-01-01 RX ORDER — SODIUM CHLORIDE, SODIUM LACTATE, POTASSIUM CHLORIDE, CALCIUM CHLORIDE 600; 310; 30; 20 MG/100ML; MG/100ML; MG/100ML; MG/100ML
INJECTION, SOLUTION INTRAVENOUS CONTINUOUS
Status: DISCONTINUED | OUTPATIENT
Start: 2022-01-01 | End: 2022-01-01 | Stop reason: HOSPADM

## 2022-01-01 RX ORDER — 0.9 % SODIUM CHLORIDE 0.9 %
3 VIAL (ML) INJECTION PRN
Status: CANCELLED | OUTPATIENT
Start: 2022-12-12

## 2022-01-01 RX ORDER — HALOPERIDOL 5 MG/ML
1 INJECTION INTRAMUSCULAR
Status: DISCONTINUED | OUTPATIENT
Start: 2022-01-01 | End: 2022-01-01 | Stop reason: HOSPADM

## 2022-01-01 RX ORDER — HYDROMORPHONE HYDROCHLORIDE 1 MG/ML
1 INJECTION, SOLUTION INTRAMUSCULAR; INTRAVENOUS; SUBCUTANEOUS ONCE
Status: COMPLETED | OUTPATIENT
Start: 2022-01-01 | End: 2022-01-01

## 2022-01-01 RX ORDER — EPINEPHRINE 0.1 MG/ML
SYRINGE (ML) INJECTION
Status: COMPLETED | OUTPATIENT
Start: 2022-01-01 | End: 2022-01-01

## 2022-01-01 RX ORDER — 0.9 % SODIUM CHLORIDE 0.9 %
10 VIAL (ML) INJECTION PRN
Status: CANCELLED | OUTPATIENT
Start: 2022-01-01

## 2022-01-01 RX ORDER — LABETALOL HYDROCHLORIDE 5 MG/ML
5 INJECTION, SOLUTION INTRAVENOUS
Status: DISCONTINUED | OUTPATIENT
Start: 2022-01-01 | End: 2022-01-01 | Stop reason: HOSPADM

## 2022-01-01 RX ORDER — FAMOTIDINE 20 MG/1
20 TABLET, FILM COATED ORAL 2 TIMES DAILY
Status: DISCONTINUED | OUTPATIENT
Start: 2022-01-01 | End: 2022-01-01 | Stop reason: HOSPADM

## 2022-01-01 RX ORDER — 0.9 % SODIUM CHLORIDE 0.9 %
3 VIAL (ML) INJECTION PRN
Status: CANCELLED | OUTPATIENT
Start: 2022-01-01

## 2022-01-01 RX ORDER — 0.9 % SODIUM CHLORIDE 0.9 %
VIAL (ML) INJECTION PRN
Status: CANCELLED | OUTPATIENT
Start: 2022-01-01

## 2022-01-01 RX ORDER — ENOXAPARIN SODIUM 100 MG/ML
40 INJECTION SUBCUTANEOUS DAILY
Status: DISCONTINUED | OUTPATIENT
Start: 2022-01-01 | End: 2022-01-01

## 2022-01-01 RX ORDER — CLOPIDOGREL BISULFATE 75 MG/1
75 TABLET ORAL DAILY
Qty: 90 TABLET | Refills: 3 | Status: SHIPPED | OUTPATIENT
Start: 2022-01-01

## 2022-01-01 RX ORDER — HYDRALAZINE HYDROCHLORIDE 20 MG/ML
5 INJECTION INTRAMUSCULAR; INTRAVENOUS
Status: DISCONTINUED | OUTPATIENT
Start: 2022-01-01 | End: 2022-01-01 | Stop reason: HOSPADM

## 2022-01-01 RX ORDER — GABAPENTIN 300 MG/1
300 CAPSULE ORAL 3 TIMES DAILY
Qty: 90 CAPSULE | Refills: 0 | Status: SHIPPED | OUTPATIENT
Start: 2022-01-01 | End: 2022-01-01 | Stop reason: SDUPTHER

## 2022-01-01 RX ORDER — GABAPENTIN 300 MG/1
300 CAPSULE ORAL 3 TIMES DAILY
Status: DISCONTINUED | OUTPATIENT
Start: 2022-01-01 | End: 2022-01-01

## 2022-01-01 RX ORDER — POLYETHYLENE GLYCOL 3350 17 G/17G
1 POWDER, FOR SOLUTION ORAL 2 TIMES DAILY PRN
Status: DISCONTINUED | OUTPATIENT
Start: 2022-01-01 | End: 2022-01-01

## 2022-01-01 RX ORDER — DEXAMETHASONE SODIUM PHOSPHATE 4 MG/ML
4 INJECTION, SOLUTION INTRA-ARTICULAR; INTRALESIONAL; INTRAMUSCULAR; INTRAVENOUS; SOFT TISSUE EVERY 6 HOURS
Status: DISCONTINUED | OUTPATIENT
Start: 2022-01-01 | End: 2022-01-01

## 2022-01-01 RX ORDER — OXYCODONE HYDROCHLORIDE 10 MG/1
10 TABLET ORAL
Status: DISCONTINUED | OUTPATIENT
Start: 2022-01-01 | End: 2022-01-01

## 2022-01-01 RX ORDER — SODIUM CHLORIDE, SODIUM LACTATE, POTASSIUM CHLORIDE, CALCIUM CHLORIDE 600; 310; 30; 20 MG/100ML; MG/100ML; MG/100ML; MG/100ML
INJECTION, SOLUTION INTRAVENOUS
Status: DISCONTINUED | OUTPATIENT
Start: 2022-01-01 | End: 2022-01-01 | Stop reason: SURG

## 2022-01-01 RX ORDER — COLCHICINE 0.6 MG/1
1.2 TABLET ORAL ONCE
Status: DISCONTINUED | OUTPATIENT
Start: 2022-01-01 | End: 2022-01-01

## 2022-01-01 RX ORDER — OXYCODONE HYDROCHLORIDE 10 MG/1
20 TABLET ORAL EVERY 4 HOURS PRN
Status: DISCONTINUED | OUTPATIENT
Start: 2022-01-01 | End: 2022-01-01

## 2022-01-01 RX ORDER — POTASSIUM CHLORIDE 20 MEQ/1
40 TABLET, EXTENDED RELEASE ORAL ONCE
Status: DISCONTINUED | OUTPATIENT
Start: 2022-01-01 | End: 2022-01-01 | Stop reason: HOSPADM

## 2022-01-01 RX ORDER — ENALAPRILAT 1.25 MG/ML
1.25 INJECTION INTRAVENOUS EVERY 4 HOURS PRN
Status: DISCONTINUED | OUTPATIENT
Start: 2022-01-01 | End: 2022-01-01 | Stop reason: HOSPADM

## 2022-01-01 RX ORDER — FUROSEMIDE 20 MG/1
20 TABLET ORAL DAILY
Qty: 90 TABLET | Refills: 3 | Status: SHIPPED | OUTPATIENT
Start: 2022-01-01 | End: 2022-01-01

## 2022-01-01 RX ORDER — FUROSEMIDE 20 MG/1
20 TABLET ORAL 2 TIMES DAILY
COMMUNITY

## 2022-01-01 RX ORDER — OXYCODONE HCL 5 MG/5 ML
10 SOLUTION, ORAL ORAL
Status: COMPLETED | OUTPATIENT
Start: 2022-01-01 | End: 2022-01-01

## 2022-01-01 RX ORDER — METOPROLOL TARTRATE 1 MG/ML
1 INJECTION, SOLUTION INTRAVENOUS
Status: DISCONTINUED | OUTPATIENT
Start: 2022-01-01 | End: 2022-01-01 | Stop reason: HOSPADM

## 2022-01-01 RX ORDER — POTASSIUM CHLORIDE 20 MEQ/1
40 TABLET, EXTENDED RELEASE ORAL 2 TIMES DAILY
Status: DISCONTINUED | OUTPATIENT
Start: 2022-01-01 | End: 2022-01-01 | Stop reason: HOSPADM

## 2022-01-01 RX ORDER — EVOLOCUMAB 140 MG/ML
INJECTION, SOLUTION SUBCUTANEOUS
Qty: 2 ML | Refills: 6 | Status: SHIPPED | OUTPATIENT
Start: 2022-01-01 | End: 2022-01-01 | Stop reason: SDUPTHER

## 2022-01-01 RX ORDER — ONDANSETRON 4 MG/1
4 TABLET, ORALLY DISINTEGRATING ORAL EVERY 4 HOURS PRN
Status: DISCONTINUED | OUTPATIENT
Start: 2022-01-01 | End: 2022-01-01

## 2022-01-01 RX ORDER — CYCLOBENZAPRINE HCL 10 MG
10 TABLET ORAL 3 TIMES DAILY
Status: DISCONTINUED | OUTPATIENT
Start: 2022-01-01 | End: 2022-01-01 | Stop reason: HOSPADM

## 2022-01-01 RX ORDER — OXYCODONE HYDROCHLORIDE 10 MG/1
10 TABLET ORAL EVERY 4 HOURS PRN
Status: DISCONTINUED | OUTPATIENT
Start: 2022-01-01 | End: 2022-01-01

## 2022-01-01 RX ORDER — VANCOMYCIN HYDROCHLORIDE 1 G/20ML
INJECTION, POWDER, LYOPHILIZED, FOR SOLUTION INTRAVENOUS
Status: COMPLETED | OUTPATIENT
Start: 2022-01-01 | End: 2022-01-01

## 2022-01-01 RX ORDER — ENEMA 19; 7 G/133ML; G/133ML
1 ENEMA RECTAL
Status: DISCONTINUED | OUTPATIENT
Start: 2022-01-01 | End: 2022-01-01

## 2022-01-01 RX ORDER — ONDANSETRON 2 MG/ML
4 INJECTION INTRAMUSCULAR; INTRAVENOUS EVERY 4 HOURS PRN
Status: DISCONTINUED | OUTPATIENT
Start: 2022-01-01 | End: 2022-01-01

## 2022-01-01 RX ORDER — SODIUM CHLORIDE 9 MG/ML
INJECTION, SOLUTION INTRAVENOUS CONTINUOUS
Status: DISCONTINUED | OUTPATIENT
Start: 2022-01-01 | End: 2022-01-01

## 2022-01-01 RX ORDER — ONDANSETRON 4 MG/1
4 TABLET, ORALLY DISINTEGRATING ORAL EVERY 4 HOURS PRN
Status: DISCONTINUED | OUTPATIENT
Start: 2022-01-01 | End: 2022-01-01 | Stop reason: HOSPADM

## 2022-01-01 RX ORDER — METHOCARBAMOL 750 MG/1
750 TABLET, FILM COATED ORAL 3 TIMES DAILY PRN
Status: DISCONTINUED | OUTPATIENT
Start: 2022-01-01 | End: 2022-01-01

## 2022-01-01 RX ORDER — AMLODIPINE BESYLATE 10 MG/1
10 TABLET ORAL DAILY
Qty: 30 TABLET | Refills: 0 | Status: SHIPPED | OUTPATIENT
Start: 2022-01-01 | End: 2022-01-01

## 2022-01-01 RX ORDER — HYDROMORPHONE HYDROCHLORIDE 2 MG/ML
INJECTION, SOLUTION INTRAMUSCULAR; INTRAVENOUS; SUBCUTANEOUS PRN
Status: DISCONTINUED | OUTPATIENT
Start: 2022-01-01 | End: 2022-01-01 | Stop reason: SURG

## 2022-01-01 RX ORDER — LANCETS 30 GAUGE
EACH MISCELLANEOUS
Qty: 100 EACH | Refills: 0 | Status: SHIPPED | OUTPATIENT
Start: 2022-01-01

## 2022-01-01 RX ORDER — HYDRALAZINE HYDROCHLORIDE 20 MG/ML
INJECTION INTRAMUSCULAR; INTRAVENOUS
Status: COMPLETED
Start: 2022-01-01 | End: 2022-01-01

## 2022-01-01 RX ORDER — DOCUSATE SODIUM 100 MG/1
100 CAPSULE, LIQUID FILLED ORAL 2 TIMES DAILY
Status: DISCONTINUED | OUTPATIENT
Start: 2022-01-01 | End: 2022-01-01

## 2022-01-01 RX ORDER — OXYCODONE HYDROCHLORIDE 10 MG/1
20 TABLET ORAL EVERY 6 HOURS PRN
Status: DISCONTINUED | OUTPATIENT
Start: 2022-01-01 | End: 2022-01-01 | Stop reason: HOSPADM

## 2022-01-01 RX ORDER — TAMSULOSIN HYDROCHLORIDE 0.4 MG/1
0.4 CAPSULE ORAL
Status: DISCONTINUED | OUTPATIENT
Start: 2022-01-01 | End: 2022-01-01 | Stop reason: HOSPADM

## 2022-01-01 RX ORDER — METOPROLOL SUCCINATE 100 MG/1
100 TABLET, EXTENDED RELEASE ORAL DAILY
Qty: 90 TABLET | Refills: 3 | Status: SHIPPED | OUTPATIENT
Start: 2022-01-01 | End: 2022-01-01 | Stop reason: SDUPTHER

## 2022-01-01 RX ORDER — ALLOPURINOL 100 MG/1
TABLET ORAL
COMMUNITY
End: 2022-01-01 | Stop reason: SDUPTHER

## 2022-01-01 RX ORDER — AMOXICILLIN 250 MG
2 CAPSULE ORAL 2 TIMES DAILY
Status: DISCONTINUED | OUTPATIENT
Start: 2022-01-01 | End: 2022-01-01 | Stop reason: HOSPADM

## 2022-01-01 RX ORDER — HYDROMORPHONE HYDROCHLORIDE 1 MG/ML
0.5 INJECTION, SOLUTION INTRAMUSCULAR; INTRAVENOUS; SUBCUTANEOUS ONCE
Status: COMPLETED | OUTPATIENT
Start: 2022-01-01 | End: 2022-01-01

## 2022-01-01 RX ORDER — HYDROMORPHONE HYDROCHLORIDE 1 MG/ML
0.5 INJECTION, SOLUTION INTRAMUSCULAR; INTRAVENOUS; SUBCUTANEOUS
Status: DISCONTINUED | OUTPATIENT
Start: 2022-01-01 | End: 2022-01-01

## 2022-01-01 RX ORDER — AMOXICILLIN 250 MG
2 CAPSULE ORAL 2 TIMES DAILY
Status: DISCONTINUED | OUTPATIENT
Start: 2022-01-01 | End: 2022-01-01

## 2022-01-01 RX ORDER — GABAPENTIN 300 MG/1
CAPSULE ORAL
Qty: 90 CAPSULE | Refills: 2 | Status: SHIPPED | OUTPATIENT
Start: 2022-01-01

## 2022-01-01 RX ORDER — GABAPENTIN 300 MG/1
300 CAPSULE ORAL
COMMUNITY
End: 2022-01-01

## 2022-01-01 RX ORDER — HYDROMORPHONE HYDROCHLORIDE 1 MG/ML
0.5 INJECTION, SOLUTION INTRAMUSCULAR; INTRAVENOUS; SUBCUTANEOUS EVERY 4 HOURS PRN
Status: DISCONTINUED | OUTPATIENT
Start: 2022-01-01 | End: 2022-01-01 | Stop reason: HOSPADM

## 2022-01-01 RX ORDER — OXYCODONE HYDROCHLORIDE 10 MG/1
10 TABLET ORAL
Status: DISCONTINUED | OUTPATIENT
Start: 2022-01-01 | End: 2022-01-01 | Stop reason: HOSPADM

## 2022-01-01 RX ORDER — BISACODYL 10 MG
10 SUPPOSITORY, RECTAL RECTAL
Status: DISCONTINUED | OUTPATIENT
Start: 2022-01-01 | End: 2022-01-01 | Stop reason: HOSPADM

## 2022-01-01 RX ORDER — HYDROMORPHONE HYDROCHLORIDE 1 MG/ML
1 INJECTION, SOLUTION INTRAMUSCULAR; INTRAVENOUS; SUBCUTANEOUS
Status: DISCONTINUED | OUTPATIENT
Start: 2022-01-01 | End: 2022-01-01 | Stop reason: HOSPADM

## 2022-01-01 RX ORDER — METOCLOPRAMIDE HYDROCHLORIDE 5 MG/ML
INJECTION INTRAMUSCULAR; INTRAVENOUS PRN
Status: DISCONTINUED | OUTPATIENT
Start: 2022-01-01 | End: 2022-01-01 | Stop reason: SURG

## 2022-01-01 RX ORDER — LORAZEPAM 2 MG/ML
1 INJECTION INTRAMUSCULAR
Status: COMPLETED | OUTPATIENT
Start: 2022-01-01 | End: 2022-01-01

## 2022-01-01 RX ORDER — ACETAMINOPHEN 500 MG
1000 TABLET ORAL EVERY 6 HOURS
Status: DISPENSED | OUTPATIENT
Start: 2022-01-01 | End: 2022-01-01

## 2022-01-01 RX ORDER — LIDOCAINE 50 MG/G
2 PATCH TOPICAL EVERY 24 HOURS
Status: DISCONTINUED | OUTPATIENT
Start: 2022-01-01 | End: 2022-01-01 | Stop reason: HOSPADM

## 2022-01-01 RX ORDER — AMOXICILLIN 250 MG
1 CAPSULE ORAL NIGHTLY
Status: DISCONTINUED | OUTPATIENT
Start: 2022-01-01 | End: 2022-01-01 | Stop reason: HOSPADM

## 2022-01-01 RX ORDER — MIDAZOLAM HYDROCHLORIDE 1 MG/ML
INJECTION INTRAMUSCULAR; INTRAVENOUS PRN
Status: DISCONTINUED | OUTPATIENT
Start: 2022-01-01 | End: 2022-01-01 | Stop reason: SURG

## 2022-01-01 RX ORDER — ALLOPURINOL 100 MG/1
100 TABLET ORAL DAILY
Status: DISCONTINUED | OUTPATIENT
Start: 2022-01-01 | End: 2022-01-01 | Stop reason: HOSPADM

## 2022-01-01 RX ORDER — GABAPENTIN 300 MG/1
300 CAPSULE ORAL 3 TIMES DAILY
Status: DISCONTINUED | OUTPATIENT
Start: 2022-01-01 | End: 2022-01-01 | Stop reason: HOSPADM

## 2022-01-01 RX ORDER — LORAZEPAM 0.5 MG/1
0.5 TABLET ORAL ONCE
Status: COMPLETED | OUTPATIENT
Start: 2022-01-01 | End: 2022-01-01

## 2022-01-01 RX ORDER — ISOSORBIDE MONONITRATE 30 MG/1
120 TABLET, EXTENDED RELEASE ORAL DAILY
Refills: 3 | Status: DISCONTINUED | OUTPATIENT
Start: 2022-01-01 | End: 2022-01-01 | Stop reason: HOSPADM

## 2022-01-01 RX ORDER — METOPROLOL SUCCINATE 50 MG/1
100 TABLET, EXTENDED RELEASE ORAL DAILY
Status: DISCONTINUED | OUTPATIENT
Start: 2022-01-01 | End: 2022-01-01 | Stop reason: HOSPADM

## 2022-01-01 RX ORDER — CALCIUM CHLORIDE 100 MG/ML
INJECTION INTRAVENOUS; INTRAVENTRICULAR
Status: COMPLETED | OUTPATIENT
Start: 2022-01-01 | End: 2022-01-01

## 2022-01-01 RX ORDER — COLCHICINE 0.6 MG/1
0.6 TABLET ORAL DAILY
Qty: 30 TABLET | Refills: 3 | Status: SHIPPED | OUTPATIENT
Start: 2022-01-01 | End: 2023-01-09

## 2022-01-01 RX ORDER — LABETALOL HYDROCHLORIDE 5 MG/ML
10 INJECTION, SOLUTION INTRAVENOUS EVERY 4 HOURS PRN
Status: DISCONTINUED | OUTPATIENT
Start: 2022-01-01 | End: 2022-01-01 | Stop reason: HOSPADM

## 2022-01-01 RX ORDER — POLYETHYLENE GLYCOL 3350 17 G/17G
1 POWDER, FOR SOLUTION ORAL 2 TIMES DAILY PRN
Status: DISCONTINUED | OUTPATIENT
Start: 2022-01-01 | End: 2022-01-01 | Stop reason: HOSPADM

## 2022-01-01 RX ORDER — CLOPIDOGREL BISULFATE 75 MG/1
75 TABLET ORAL DAILY
Status: DISCONTINUED | OUTPATIENT
Start: 2022-01-01 | End: 2022-01-01 | Stop reason: HOSPADM

## 2022-01-01 RX ORDER — DIPHENHYDRAMINE HYDROCHLORIDE 50 MG/ML
12.5 INJECTION INTRAMUSCULAR; INTRAVENOUS
Status: DISCONTINUED | OUTPATIENT
Start: 2022-01-01 | End: 2022-01-01 | Stop reason: HOSPADM

## 2022-01-01 RX ORDER — ACETAMINOPHEN 500 MG
1000 TABLET ORAL EVERY 8 HOURS PRN
Qty: 30 TABLET | Refills: 0 | Status: SHIPPED | OUTPATIENT
Start: 2022-01-01 | End: 2022-01-01

## 2022-01-01 RX ORDER — NITROGLYCERIN 0.4 MG/1
TABLET SUBLINGUAL
Qty: 25 TABLET | Refills: 1 | Status: SHIPPED | OUTPATIENT
Start: 2022-01-01 | End: 2022-01-01 | Stop reason: SDUPTHER

## 2022-01-01 RX ORDER — ACETAMINOPHEN 325 MG/1
650 TABLET ORAL EVERY 6 HOURS PRN
Status: DISCONTINUED | OUTPATIENT
Start: 2022-01-01 | End: 2022-01-01 | Stop reason: HOSPADM

## 2022-01-01 RX ORDER — HYDROCODONE BITARTRATE AND ACETAMINOPHEN 5; 325 MG/1; MG/1
1 TABLET ORAL EVERY 8 HOURS PRN
Qty: 9 TABLET | Refills: 0 | Status: SHIPPED | OUTPATIENT
Start: 2022-01-01 | End: 2022-01-01

## 2022-01-01 RX ORDER — EVOLOCUMAB 140 MG/ML
1 INJECTION, SOLUTION SUBCUTANEOUS
Qty: 2 ML | Refills: 11 | Status: SHIPPED | OUTPATIENT
Start: 2022-01-01

## 2022-01-01 RX ORDER — KETOROLAC TROMETHAMINE 30 MG/ML
30 INJECTION, SOLUTION INTRAMUSCULAR; INTRAVENOUS EVERY 6 HOURS PRN
Status: DISCONTINUED | OUTPATIENT
Start: 2022-01-01 | End: 2022-01-01

## 2022-01-01 RX ORDER — INDOMETHACIN 50 MG/1
CAPSULE ORAL
Qty: 30 CAPSULE | Refills: 3 | Status: ON HOLD | OUTPATIENT
Start: 2022-01-01 | End: 2022-01-01

## 2022-01-01 RX ORDER — CYCLOBENZAPRINE HCL 10 MG
10 TABLET ORAL 3 TIMES DAILY
Qty: 30 TABLET | Refills: 0 | Status: SHIPPED | OUTPATIENT
Start: 2022-01-01 | End: 2022-01-01 | Stop reason: SDUPTHER

## 2022-01-01 RX ORDER — NITROGLYCERIN 0.4 MG/1
0.4 TABLET SUBLINGUAL ONCE
Status: COMPLETED | OUTPATIENT
Start: 2022-01-01 | End: 2022-01-01

## 2022-01-01 RX ORDER — ACETAMINOPHEN 325 MG/1
650 TABLET ORAL EVERY 6 HOURS PRN
Status: DISCONTINUED | OUTPATIENT
Start: 2022-01-01 | End: 2022-01-01

## 2022-01-01 RX ORDER — ALLOPURINOL 100 MG/1
100 TABLET ORAL DAILY
Qty: 90 TABLET | Refills: 3 | Status: SHIPPED | OUTPATIENT
Start: 2022-01-01 | End: 2022-01-01 | Stop reason: SDUPTHER

## 2022-01-01 RX ORDER — AMOXICILLIN 250 MG
1 CAPSULE ORAL
Status: DISCONTINUED | OUTPATIENT
Start: 2022-01-01 | End: 2022-01-01 | Stop reason: HOSPADM

## 2022-01-01 RX ORDER — HYDROMORPHONE HYDROCHLORIDE 1 MG/ML
1 INJECTION, SOLUTION INTRAMUSCULAR; INTRAVENOUS; SUBCUTANEOUS EVERY 4 HOURS PRN
Status: DISCONTINUED | OUTPATIENT
Start: 2022-01-01 | End: 2022-01-01

## 2022-01-01 RX ORDER — 0.9 % SODIUM CHLORIDE 0.9 %
VIAL (ML) INJECTION PRN
Status: CANCELLED | OUTPATIENT
Start: 2022-12-12

## 2022-01-01 RX ORDER — NITROGLYCERIN 0.4 MG/1
0.4 TABLET SUBLINGUAL PRN
Qty: 30 TABLET | Refills: 11 | Status: SHIPPED | OUTPATIENT
Start: 2022-01-01 | End: 2022-01-01 | Stop reason: SDUPTHER

## 2022-01-01 RX ORDER — HYDRALAZINE HYDROCHLORIDE 20 MG/ML
10 INJECTION INTRAMUSCULAR; INTRAVENOUS EVERY 6 HOURS PRN
Status: DISCONTINUED | OUTPATIENT
Start: 2022-01-01 | End: 2022-01-01 | Stop reason: HOSPADM

## 2022-01-01 RX ORDER — HYDROMORPHONE HYDROCHLORIDE 1 MG/ML
1 INJECTION, SOLUTION INTRAMUSCULAR; INTRAVENOUS; SUBCUTANEOUS
Status: DISCONTINUED | OUTPATIENT
Start: 2022-01-01 | End: 2022-01-01

## 2022-01-01 RX ORDER — GABAPENTIN 300 MG/1
300 CAPSULE ORAL 3 TIMES DAILY
Qty: 90 CAPSULE | Refills: 2 | Status: SHIPPED | OUTPATIENT
Start: 2022-01-01 | End: 2022-01-01 | Stop reason: SDUPTHER

## 2022-01-01 RX ORDER — ALBUTEROL SULFATE 2.5 MG/3ML
2.5 SOLUTION RESPIRATORY (INHALATION)
Status: DISCONTINUED | OUTPATIENT
Start: 2022-01-01 | End: 2022-01-01 | Stop reason: HOSPADM

## 2022-01-01 RX ORDER — HYDROMORPHONE HYDROCHLORIDE 1 MG/ML
0.2 INJECTION, SOLUTION INTRAMUSCULAR; INTRAVENOUS; SUBCUTANEOUS
Status: DISCONTINUED | OUTPATIENT
Start: 2022-01-01 | End: 2022-01-01 | Stop reason: HOSPADM

## 2022-01-01 RX ORDER — DEXAMETHASONE 4 MG/1
4 TABLET ORAL DAILY
Status: DISCONTINUED | OUTPATIENT
Start: 2022-01-01 | End: 2022-01-01

## 2022-01-01 RX ORDER — 0.9 % SODIUM CHLORIDE 0.9 %
10 VIAL (ML) INJECTION PRN
Status: CANCELLED | OUTPATIENT
Start: 2022-12-12

## 2022-01-01 RX ORDER — OXYCODONE HCL 5 MG/5 ML
5 SOLUTION, ORAL ORAL
Status: COMPLETED | OUTPATIENT
Start: 2022-01-01 | End: 2022-01-01

## 2022-01-01 RX ORDER — HYDROMORPHONE HYDROCHLORIDE 1 MG/ML
0.5 INJECTION, SOLUTION INTRAMUSCULAR; INTRAVENOUS; SUBCUTANEOUS
Status: DISCONTINUED | OUTPATIENT
Start: 2022-01-01 | End: 2022-01-01 | Stop reason: HOSPADM

## 2022-01-01 RX ORDER — CEFAZOLIN SODIUM 1 G/3ML
INJECTION, POWDER, FOR SOLUTION INTRAMUSCULAR; INTRAVENOUS PRN
Status: DISCONTINUED | OUTPATIENT
Start: 2022-01-01 | End: 2022-01-01 | Stop reason: SURG

## 2022-01-01 RX ORDER — HEPARIN SODIUM 1000 [USP'U]/ML
INJECTION, SOLUTION INTRAVENOUS; SUBCUTANEOUS
Status: COMPLETED
Start: 2022-01-01 | End: 2022-01-01

## 2022-01-01 RX ORDER — ROCURONIUM BROMIDE 10 MG/ML
INJECTION, SOLUTION INTRAVENOUS PRN
Status: DISCONTINUED | OUTPATIENT
Start: 2022-01-01 | End: 2022-01-01 | Stop reason: SURG

## 2022-01-01 RX ORDER — FAMOTIDINE 20 MG/1
20 TABLET, FILM COATED ORAL 2 TIMES DAILY
Qty: 60 TABLET | Refills: 0 | Status: SHIPPED | OUTPATIENT
Start: 2022-01-01

## 2022-01-01 RX ORDER — POTASSIUM CHLORIDE 20 MEQ/1
40 TABLET, EXTENDED RELEASE ORAL ONCE
Status: COMPLETED | OUTPATIENT
Start: 2022-01-01 | End: 2022-01-01

## 2022-01-01 RX ORDER — AMINOPHYLLINE 25 MG/ML
INJECTION, SOLUTION INTRAVENOUS
Status: COMPLETED
Start: 2022-01-01 | End: 2022-01-01

## 2022-01-01 RX ORDER — ACETAMINOPHEN 500 MG
500-1000 TABLET ORAL EVERY 6 HOURS PRN
COMMUNITY

## 2022-01-01 RX ORDER — COLCHICINE 0.6 MG/1
0.6 TABLET ORAL ONCE
Status: DISCONTINUED | OUTPATIENT
Start: 2022-01-01 | End: 2022-01-01 | Stop reason: HOSPADM

## 2022-01-01 RX ORDER — NOREPINEPHRINE BITARTRATE 0.03 MG/ML
0-1 INJECTION, SOLUTION INTRAVENOUS CONTINUOUS
Status: DISCONTINUED | OUTPATIENT
Start: 2022-01-01 | End: 2022-12-12 | Stop reason: HOSPADM

## 2022-01-01 RX ORDER — MAGNESIUM SULFATE HEPTAHYDRATE 40 MG/ML
2 INJECTION, SOLUTION INTRAVENOUS ONCE
Status: COMPLETED | OUTPATIENT
Start: 2022-01-01 | End: 2022-01-01

## 2022-01-01 RX ORDER — ONDANSETRON 2 MG/ML
INJECTION INTRAMUSCULAR; INTRAVENOUS PRN
Status: DISCONTINUED | OUTPATIENT
Start: 2022-01-01 | End: 2022-01-01 | Stop reason: SURG

## 2022-01-01 RX ORDER — SUCCINYLCHOLINE CHLORIDE 20 MG/ML
INJECTION INTRAMUSCULAR; INTRAVENOUS PRN
Status: DISCONTINUED | OUTPATIENT
Start: 2022-01-01 | End: 2022-01-01 | Stop reason: SURG

## 2022-01-01 RX ORDER — AMLODIPINE BESYLATE 5 MG/1
5 TABLET ORAL DAILY
COMMUNITY

## 2022-01-01 RX ORDER — HYDRALAZINE HYDROCHLORIDE 50 MG/1
50 TABLET, FILM COATED ORAL EVERY 8 HOURS
Status: DISCONTINUED | OUTPATIENT
Start: 2022-01-01 | End: 2022-01-01 | Stop reason: HOSPADM

## 2022-01-01 RX ORDER — AMLODIPINE BESYLATE 5 MG/1
5 TABLET ORAL ONCE
Status: COMPLETED | OUTPATIENT
Start: 2022-01-01 | End: 2022-01-01

## 2022-01-01 RX ORDER — ALLOPURINOL 100 MG/1
TABLET ORAL
Qty: 90 TABLET | Refills: 3 | Status: SHIPPED | OUTPATIENT
Start: 2022-01-01 | End: 2022-01-01 | Stop reason: SDUPTHER

## 2022-01-01 RX ORDER — METOPROLOL SUCCINATE 100 MG/1
100 TABLET, EXTENDED RELEASE ORAL DAILY
Qty: 90 TABLET | Refills: 3 | Status: SHIPPED | OUTPATIENT
Start: 2022-01-01

## 2022-01-01 RX ORDER — KETOROLAC TROMETHAMINE 30 MG/ML
30 INJECTION, SOLUTION INTRAMUSCULAR; INTRAVENOUS ONCE
Status: COMPLETED | OUTPATIENT
Start: 2022-01-01 | End: 2022-01-01

## 2022-01-01 RX ORDER — DEXAMETHASONE 4 MG/1
4 TABLET ORAL EVERY 6 HOURS
Status: DISCONTINUED | OUTPATIENT
Start: 2022-01-01 | End: 2022-01-01

## 2022-01-01 RX ORDER — ENEMA 19; 7 G/133ML; G/133ML
1 ENEMA RECTAL
Status: DISCONTINUED | OUTPATIENT
Start: 2022-01-01 | End: 2022-01-01 | Stop reason: HOSPADM

## 2022-01-01 RX ORDER — OXYCODONE HYDROCHLORIDE 5 MG/1
5 TABLET ORAL
Status: DISCONTINUED | OUTPATIENT
Start: 2022-01-01 | End: 2022-01-01

## 2022-01-01 RX ORDER — ISOSORBIDE MONONITRATE 60 MG/1
120 TABLET, EXTENDED RELEASE ORAL EVERY MORNING
Status: DISCONTINUED | OUTPATIENT
Start: 2022-01-01 | End: 2022-01-01 | Stop reason: HOSPADM

## 2022-01-01 RX ORDER — HEPARIN SODIUM 200 [USP'U]/100ML
INJECTION, SOLUTION INTRAVENOUS
Status: COMPLETED
Start: 2022-01-01 | End: 2022-01-01

## 2022-01-01 RX ORDER — ONDANSETRON 2 MG/ML
4 INJECTION INTRAMUSCULAR; INTRAVENOUS
Status: COMPLETED | OUTPATIENT
Start: 2022-01-01 | End: 2022-01-01

## 2022-01-01 RX ORDER — GABAPENTIN 300 MG/1
600 CAPSULE ORAL 4 TIMES DAILY
Status: DISCONTINUED | OUTPATIENT
Start: 2022-01-01 | End: 2022-01-01 | Stop reason: HOSPADM

## 2022-01-01 RX ORDER — MEPERIDINE HYDROCHLORIDE 25 MG/ML
12.5 INJECTION INTRAMUSCULAR; INTRAVENOUS; SUBCUTANEOUS
Status: DISCONTINUED | OUTPATIENT
Start: 2022-01-01 | End: 2022-01-01 | Stop reason: HOSPADM

## 2022-01-01 RX ORDER — ACETAMINOPHEN 500 MG
1000 TABLET ORAL EVERY 8 HOURS PRN
Status: DISCONTINUED | OUTPATIENT
Start: 2022-01-01 | End: 2022-01-01 | Stop reason: HOSPADM

## 2022-01-01 RX ORDER — POLYETHYLENE GLYCOL 3350 17 G/17G
1 POWDER, FOR SOLUTION ORAL
Status: DISCONTINUED | OUTPATIENT
Start: 2022-01-01 | End: 2022-01-01 | Stop reason: HOSPADM

## 2022-01-01 RX ORDER — CYCLOBENZAPRINE HCL 10 MG
10 TABLET ORAL 3 TIMES DAILY
Qty: 30 TABLET | Refills: 2 | Status: SHIPPED | OUTPATIENT
Start: 2022-01-01 | End: 2022-01-01

## 2022-01-01 RX ORDER — ONDANSETRON 2 MG/ML
4 INJECTION INTRAMUSCULAR; INTRAVENOUS EVERY 4 HOURS PRN
Status: DISCONTINUED | OUTPATIENT
Start: 2022-01-01 | End: 2022-01-01 | Stop reason: HOSPADM

## 2022-01-01 RX ORDER — HEPARIN SODIUM 1000 [USP'U]/ML
4000 INJECTION, SOLUTION INTRAVENOUS; SUBCUTANEOUS ONCE
Status: COMPLETED | OUTPATIENT
Start: 2022-01-01 | End: 2022-01-01

## 2022-01-01 RX ORDER — OXYCODONE HYDROCHLORIDE 5 MG/1
5 TABLET ORAL
Status: DISCONTINUED | OUTPATIENT
Start: 2022-01-01 | End: 2022-01-01 | Stop reason: HOSPADM

## 2022-01-01 RX ORDER — FUROSEMIDE 10 MG/ML
60 INJECTION INTRAMUSCULAR; INTRAVENOUS
Status: DISCONTINUED | OUTPATIENT
Start: 2022-01-01 | End: 2022-01-01

## 2022-01-01 RX ORDER — LISINOPRIL 20 MG/1
TABLET ORAL
Qty: 90 TABLET | Refills: 2 | Status: SHIPPED | OUTPATIENT
Start: 2022-01-01 | End: 2022-01-01 | Stop reason: SDUPTHER

## 2022-01-01 RX ORDER — DEXAMETHASONE 4 MG/1
4 TABLET ORAL 2 TIMES DAILY
Status: DISCONTINUED | OUTPATIENT
Start: 2022-01-01 | End: 2022-01-01

## 2022-01-01 RX ORDER — DIAZEPAM 5 MG/1
5 TABLET ORAL EVERY 8 HOURS PRN
Status: DISCONTINUED | OUTPATIENT
Start: 2022-01-01 | End: 2022-01-01 | Stop reason: HOSPADM

## 2022-01-01 RX ORDER — ACETAMINOPHEN 500 MG
1000 TABLET ORAL EVERY 6 HOURS PRN
Status: DISCONTINUED | OUTPATIENT
Start: 2022-01-01 | End: 2022-01-01 | Stop reason: HOSPADM

## 2022-01-01 RX ORDER — SODIUM CHLORIDE 9 MG/ML
1000 INJECTION, SOLUTION INTRAVENOUS ONCE
Status: COMPLETED | OUTPATIENT
Start: 2022-01-01 | End: 2022-01-01

## 2022-01-01 RX ORDER — AZITHROMYCIN 500 MG/1
500 INJECTION, POWDER, LYOPHILIZED, FOR SOLUTION INTRAVENOUS ONCE
Status: DISCONTINUED | OUTPATIENT
Start: 2022-01-01 | End: 2022-01-01

## 2022-01-01 RX ORDER — ISOSORBIDE MONONITRATE 120 MG/1
TABLET, EXTENDED RELEASE ORAL
Qty: 90 TABLET | Refills: 3 | Status: SHIPPED | OUTPATIENT
Start: 2022-01-01 | End: 2022-01-01 | Stop reason: SDUPTHER

## 2022-01-01 RX ORDER — HEPARIN SODIUM 5000 [USP'U]/ML
5000 INJECTION, SOLUTION INTRAVENOUS; SUBCUTANEOUS EVERY 8 HOURS
Status: DISCONTINUED | OUTPATIENT
Start: 2022-01-01 | End: 2022-01-01

## 2022-01-01 RX ORDER — LISINOPRIL 20 MG/1
20 TABLET ORAL DAILY
Status: DISCONTINUED | OUTPATIENT
Start: 2022-01-01 | End: 2022-01-01 | Stop reason: HOSPADM

## 2022-01-01 RX ORDER — ISOSORBIDE MONONITRATE 120 MG/1
120 TABLET, EXTENDED RELEASE ORAL EVERY MORNING
Qty: 90 TABLET | Refills: 3 | Status: SHIPPED | OUTPATIENT
Start: 2022-01-01

## 2022-01-01 RX ORDER — LISINOPRIL 20 MG/1
20 TABLET ORAL DAILY
Qty: 90 TABLET | Refills: 2 | Status: SHIPPED | OUTPATIENT
Start: 2022-01-01

## 2022-01-01 RX ORDER — AMLODIPINE BESYLATE 5 MG/1
5 TABLET ORAL DAILY
Status: DISCONTINUED | OUTPATIENT
Start: 2022-01-01 | End: 2022-01-01

## 2022-01-01 RX ORDER — AMOXICILLIN 250 MG
1 CAPSULE ORAL
Status: DISCONTINUED | OUTPATIENT
Start: 2022-01-01 | End: 2022-01-01

## 2022-01-01 RX ORDER — PSEUDOEPHEDRINE HCL 30 MG
100 TABLET ORAL 2 TIMES DAILY
Qty: 60 CAPSULE | Refills: 0 | Status: SHIPPED | OUTPATIENT
Start: 2022-01-01

## 2022-01-01 RX ORDER — HEPARIN SODIUM 5000 [USP'U]/100ML
0-30 INJECTION, SOLUTION INTRAVENOUS CONTINUOUS
Status: DISCONTINUED | OUTPATIENT
Start: 2022-01-01 | End: 2022-01-01

## 2022-01-01 RX ORDER — BISACODYL 10 MG
10 SUPPOSITORY, RECTAL RECTAL
Status: DISCONTINUED | OUTPATIENT
Start: 2022-01-01 | End: 2022-01-01

## 2022-01-01 RX ORDER — HYDROMORPHONE HYDROCHLORIDE 1 MG/ML
0.1 INJECTION, SOLUTION INTRAMUSCULAR; INTRAVENOUS; SUBCUTANEOUS
Status: DISCONTINUED | OUTPATIENT
Start: 2022-01-01 | End: 2022-01-01 | Stop reason: HOSPADM

## 2022-01-01 RX ORDER — METOPROLOL SUCCINATE 100 MG/1
TABLET, EXTENDED RELEASE ORAL
Qty: 90 TABLET | Refills: 3 | Status: SHIPPED | OUTPATIENT
Start: 2022-01-01 | End: 2022-01-01 | Stop reason: SDUPTHER

## 2022-01-01 RX ORDER — CEFAZOLIN SODIUM 1 G/3ML
INJECTION, POWDER, FOR SOLUTION INTRAMUSCULAR; INTRAVENOUS
Status: DISCONTINUED | OUTPATIENT
Start: 2022-01-01 | End: 2022-01-01 | Stop reason: HOSPADM

## 2022-01-01 RX ORDER — HYDROMORPHONE HYDROCHLORIDE 1 MG/ML
0.4 INJECTION, SOLUTION INTRAMUSCULAR; INTRAVENOUS; SUBCUTANEOUS
Status: DISCONTINUED | OUTPATIENT
Start: 2022-01-01 | End: 2022-01-01 | Stop reason: HOSPADM

## 2022-01-01 RX ORDER — GABAPENTIN 300 MG/1
300 CAPSULE ORAL
Status: DISCONTINUED | OUTPATIENT
Start: 2022-01-01 | End: 2022-01-01

## 2022-01-01 RX ORDER — CYCLOBENZAPRINE HCL 10 MG
TABLET ORAL
Qty: 30 TABLET | Refills: 2 | Status: SHIPPED | OUTPATIENT
Start: 2022-01-01 | End: 2022-01-01

## 2022-01-01 RX ORDER — NITROGLYCERIN 0.4 MG/1
0.4 TABLET SUBLINGUAL
Status: DISCONTINUED | OUTPATIENT
Start: 2022-01-01 | End: 2022-01-01 | Stop reason: HOSPADM

## 2022-01-01 RX ORDER — DEXAMETHASONE SODIUM PHOSPHATE 4 MG/ML
6 INJECTION, SOLUTION INTRA-ARTICULAR; INTRALESIONAL; INTRAMUSCULAR; INTRAVENOUS; SOFT TISSUE ONCE
Status: COMPLETED | OUTPATIENT
Start: 2022-01-01 | End: 2022-01-01

## 2022-01-01 RX ORDER — AMLODIPINE BESYLATE 5 MG/1
5 TABLET ORAL DAILY
Qty: 90 TABLET | Refills: 3 | Status: SHIPPED | OUTPATIENT
Start: 2022-01-01 | End: 2022-01-01 | Stop reason: SDUPTHER

## 2022-01-01 RX ORDER — ACETAMINOPHEN 500 MG
1000 TABLET ORAL EVERY 6 HOURS PRN
Status: DISCONTINUED | OUTPATIENT
Start: 2022-01-01 | End: 2022-01-01

## 2022-01-01 RX ORDER — PHENYLEPHRINE HCL IN 0.9% NACL 0.5 MG/5ML
SYRINGE (ML) INTRAVENOUS PRN
Status: DISCONTINUED | OUTPATIENT
Start: 2022-01-01 | End: 2022-01-01 | Stop reason: SURG

## 2022-01-01 RX ORDER — NITROGLYCERIN 0.4 MG/1
0.4 TABLET SUBLINGUAL PRN
Status: DISCONTINUED | OUTPATIENT
Start: 2022-01-01 | End: 2022-01-01 | Stop reason: HOSPADM

## 2022-01-01 RX ORDER — POTASSIUM CHLORIDE 20 MEQ/1
20 TABLET, EXTENDED RELEASE ORAL DAILY
Qty: 90 TABLET | Refills: 3 | Status: SHIPPED | OUTPATIENT
Start: 2022-01-01

## 2022-01-01 RX ORDER — RANOLAZINE 500 MG/1
500 TABLET, EXTENDED RELEASE ORAL 2 TIMES DAILY
Qty: 180 TABLET | Refills: 3 | Status: SHIPPED | OUTPATIENT
Start: 2022-01-01

## 2022-01-01 RX ORDER — GABAPENTIN 300 MG/1
300 CAPSULE ORAL 3 TIMES DAILY
Qty: 90 CAPSULE | Refills: 2 | Status: SHIPPED | OUTPATIENT
Start: 2022-01-01 | End: 2022-01-01

## 2022-01-01 RX ORDER — AMOXICILLIN 500 MG/1
2000 CAPSULE ORAL
COMMUNITY
Start: 2022-01-01 | End: 2022-01-01

## 2022-01-01 RX ORDER — PROCHLORPERAZINE EDISYLATE 5 MG/ML
5 INJECTION INTRAMUSCULAR; INTRAVENOUS EVERY 4 HOURS PRN
Status: DISCONTINUED | OUTPATIENT
Start: 2022-01-01 | End: 2022-01-01 | Stop reason: HOSPADM

## 2022-01-01 RX ORDER — LIDOCAINE HYDROCHLORIDE 20 MG/ML
INJECTION, SOLUTION INFILTRATION; PERINEURAL
Status: COMPLETED
Start: 2022-01-01 | End: 2022-01-01

## 2022-01-01 RX ORDER — ONDANSETRON 2 MG/ML
4 INJECTION INTRAMUSCULAR; INTRAVENOUS ONCE
Status: COMPLETED | OUTPATIENT
Start: 2022-01-01 | End: 2022-01-01

## 2022-01-01 RX ORDER — ALLOPURINOL 100 MG/1
100 TABLET ORAL DAILY
Qty: 90 TABLET | Refills: 3 | Status: SHIPPED | OUTPATIENT
Start: 2022-01-01 | End: 2023-09-03

## 2022-01-01 RX ORDER — GABAPENTIN 400 MG/1
400 CAPSULE ORAL 3 TIMES DAILY
Status: DISCONTINUED | OUTPATIENT
Start: 2022-01-01 | End: 2022-01-01

## 2022-01-01 RX ORDER — AMOXICILLIN 250 MG
1 CAPSULE ORAL NIGHTLY
Status: DISCONTINUED | OUTPATIENT
Start: 2022-01-01 | End: 2022-01-01

## 2022-01-01 RX ORDER — GABAPENTIN 300 MG/1
600 CAPSULE ORAL 3 TIMES DAILY
Status: DISCONTINUED | OUTPATIENT
Start: 2022-01-01 | End: 2022-01-01

## 2022-01-01 RX ORDER — GLUCOSAMINE HCL/CHONDROITIN SU 500-400 MG
CAPSULE ORAL
Qty: 100 EACH | Refills: 0 | Status: SHIPPED | OUTPATIENT
Start: 2022-01-01 | End: 2022-01-01

## 2022-01-01 RX ORDER — EPINEPHRINE HCL IN 0.9 % NACL 4MG/250ML
0-.5 PLASTIC BAG, INJECTION (ML) INTRAVENOUS CONTINUOUS
Status: DISCONTINUED | OUTPATIENT
Start: 2022-01-01 | End: 2022-12-12 | Stop reason: HOSPADM

## 2022-01-01 RX ORDER — BUPIVACAINE HYDROCHLORIDE AND EPINEPHRINE 5; 5 MG/ML; UG/ML
INJECTION, SOLUTION EPIDURAL; INTRACAUDAL; PERINEURAL
Status: DISCONTINUED | OUTPATIENT
Start: 2022-01-01 | End: 2022-01-01 | Stop reason: HOSPADM

## 2022-01-01 RX ORDER — DIAZEPAM 5 MG/ML
2.5 INJECTION, SOLUTION INTRAMUSCULAR; INTRAVENOUS
Status: COMPLETED | OUTPATIENT
Start: 2022-01-01 | End: 2022-01-01

## 2022-01-01 RX ORDER — FUROSEMIDE 10 MG/ML
80 INJECTION INTRAMUSCULAR; INTRAVENOUS
Status: DISCONTINUED | OUTPATIENT
Start: 2022-01-01 | End: 2022-01-01 | Stop reason: HOSPADM

## 2022-01-01 RX ORDER — CEFTRIAXONE 2 G/1
2 INJECTION, POWDER, FOR SOLUTION INTRAMUSCULAR; INTRAVENOUS ONCE
Status: COMPLETED | OUTPATIENT
Start: 2022-01-01 | End: 2022-01-01

## 2022-01-01 RX ORDER — ALIROCUMAB 150 MG/ML
150 INJECTION, SOLUTION SUBCUTANEOUS
COMMUNITY
End: 2022-01-01

## 2022-01-01 RX ORDER — AMLODIPINE BESYLATE 5 MG/1
5 TABLET ORAL DAILY
Status: DISCONTINUED | OUTPATIENT
Start: 2022-01-01 | End: 2022-01-01 | Stop reason: HOSPADM

## 2022-01-01 RX ORDER — PROPOFOL 10 MG/ML
200 INJECTION, EMULSION INTRAVENOUS ONCE
Status: DISCONTINUED | OUTPATIENT
Start: 2022-01-01 | End: 2022-12-12 | Stop reason: HOSPADM

## 2022-01-01 RX ORDER — GUAIFENESIN/DEXTROMETHORPHAN 100-10MG/5
10 SYRUP ORAL EVERY 6 HOURS PRN
Status: DISCONTINUED | OUTPATIENT
Start: 2022-01-01 | End: 2022-01-01 | Stop reason: HOSPADM

## 2022-01-01 RX ORDER — AMLODIPINE BESYLATE 10 MG/1
10 TABLET ORAL DAILY
Status: DISCONTINUED | OUTPATIENT
Start: 2022-01-01 | End: 2022-01-01 | Stop reason: HOSPADM

## 2022-01-01 RX ORDER — OXYCODONE HYDROCHLORIDE 5 MG/1
10 TABLET ORAL
Status: DISCONTINUED | OUTPATIENT
Start: 2022-01-01 | End: 2022-01-01

## 2022-01-01 RX ORDER — REGADENOSON 0.08 MG/ML
INJECTION, SOLUTION INTRAVENOUS
Status: COMPLETED
Start: 2022-01-01 | End: 2022-01-01

## 2022-01-01 RX ORDER — INDOMETHACIN 25 MG/1
25 CAPSULE ORAL PRN
Status: ON HOLD | COMMUNITY
End: 2022-01-01

## 2022-01-01 RX ORDER — SODIUM BICARBONATE IN D5W 150/1000ML
PLASTIC BAG, INJECTION (ML) INTRAVENOUS CONTINUOUS
Status: DISPENSED | OUTPATIENT
Start: 2022-01-01 | End: 2022-01-01

## 2022-01-01 RX ORDER — NITROGLYCERIN 0.4 MG/1
0.4 TABLET SUBLINGUAL PRN
Status: DISCONTINUED | OUTPATIENT
Start: 2022-01-01 | End: 2022-01-01

## 2022-01-01 RX ORDER — POLYETHYLENE GLYCOL 3350 17 G/17G
17 POWDER, FOR SOLUTION ORAL 2 TIMES DAILY PRN
Qty: 15 EACH | Refills: 3 | Status: SHIPPED | OUTPATIENT
Start: 2022-01-01

## 2022-01-01 RX ORDER — KETOROLAC TROMETHAMINE 30 MG/ML
15 INJECTION, SOLUTION INTRAMUSCULAR; INTRAVENOUS EVERY 6 HOURS PRN
Status: DISCONTINUED | OUTPATIENT
Start: 2022-01-01 | End: 2022-01-01 | Stop reason: HOSPADM

## 2022-01-01 RX ORDER — VERAPAMIL HYDROCHLORIDE 2.5 MG/ML
INJECTION, SOLUTION INTRAVENOUS
Status: COMPLETED
Start: 2022-01-01 | End: 2022-01-01

## 2022-01-01 RX ORDER — ONDANSETRON 2 MG/ML
4 INJECTION INTRAMUSCULAR; INTRAVENOUS
Status: DISCONTINUED | OUTPATIENT
Start: 2022-01-01 | End: 2022-01-01 | Stop reason: HOSPADM

## 2022-01-01 RX ORDER — CYCLOBENZAPRINE HCL 10 MG
TABLET ORAL
Qty: 30 TABLET | Refills: 0 | Status: SHIPPED | OUTPATIENT
Start: 2022-01-01 | End: 2022-01-01 | Stop reason: SDUPTHER

## 2022-01-01 RX ORDER — LIDOCAINE 50 MG/G
1 PATCH TOPICAL EVERY 24 HOURS
Status: DISCONTINUED | OUTPATIENT
Start: 2022-01-01 | End: 2022-01-01

## 2022-01-01 RX ORDER — KETAMINE HYDROCHLORIDE 50 MG/ML
INJECTION, SOLUTION INTRAMUSCULAR; INTRAVENOUS PRN
Status: DISCONTINUED | OUTPATIENT
Start: 2022-01-01 | End: 2022-01-01 | Stop reason: SURG

## 2022-01-01 RX ORDER — CLOPIDOGREL BISULFATE 75 MG/1
75 TABLET ORAL DAILY
Status: DISCONTINUED | OUTPATIENT
Start: 2022-01-01 | End: 2022-01-01

## 2022-01-01 RX ORDER — FAMOTIDINE 20 MG/1
20 TABLET, FILM COATED ORAL DAILY
Status: DISCONTINUED | OUTPATIENT
Start: 2022-01-01 | End: 2022-01-01 | Stop reason: HOSPADM

## 2022-01-01 RX ORDER — DOCUSATE SODIUM 100 MG/1
100 CAPSULE, LIQUID FILLED ORAL 2 TIMES DAILY
Status: DISCONTINUED | OUTPATIENT
Start: 2022-01-01 | End: 2022-01-01 | Stop reason: HOSPADM

## 2022-01-01 RX ORDER — POLYETHYLENE GLYCOL 3350 17 G/17G
1 POWDER, FOR SOLUTION ORAL
Status: DISCONTINUED | OUTPATIENT
Start: 2022-01-01 | End: 2022-01-01

## 2022-01-01 RX ORDER — ENOXAPARIN SODIUM 100 MG/ML
40 INJECTION SUBCUTANEOUS DAILY
Status: DISCONTINUED | OUTPATIENT
Start: 2022-01-01 | End: 2022-01-01 | Stop reason: HOSPADM

## 2022-01-01 RX ORDER — CLOPIDOGREL BISULFATE 75 MG/1
TABLET ORAL
Qty: 90 TABLET | Refills: 3 | Status: SHIPPED | OUTPATIENT
Start: 2022-01-01 | End: 2022-01-01 | Stop reason: SDUPTHER

## 2022-01-01 RX ORDER — HYDRALAZINE HYDROCHLORIDE 20 MG/ML
20 INJECTION INTRAMUSCULAR; INTRAVENOUS EVERY 4 HOURS PRN
Status: DISCONTINUED | OUTPATIENT
Start: 2022-01-01 | End: 2022-01-01 | Stop reason: HOSPADM

## 2022-01-01 RX ORDER — EVOLOCUMAB 140 MG/ML
INJECTION, SOLUTION SUBCUTANEOUS
Qty: 2 ML | Refills: 6 | Status: SHIPPED
Start: 2022-01-01 | End: 2022-01-01

## 2022-01-01 RX ORDER — REGADENOSON 0.08 MG/ML
0.4 INJECTION, SOLUTION INTRAVENOUS ONCE
Status: COMPLETED | OUTPATIENT
Start: 2022-01-01 | End: 2022-01-01

## 2022-01-01 RX ORDER — PROPOFOL 10 MG/ML
50 INJECTION, EMULSION INTRAVENOUS ONCE
Status: COMPLETED | OUTPATIENT
Start: 2022-01-01 | End: 2022-01-01

## 2022-01-01 RX ORDER — TAMSULOSIN HYDROCHLORIDE 0.4 MG/1
0.4 CAPSULE ORAL
Qty: 30 CAPSULE | Refills: 0 | Status: SHIPPED | OUTPATIENT
Start: 2022-01-01

## 2022-01-01 RX ORDER — HEPARIN SODIUM (PORCINE) LOCK FLUSH IV SOLN 100 UNIT/ML 100 UNIT/ML
500 SOLUTION INTRAVENOUS PRN
Status: CANCELLED | OUTPATIENT
Start: 2022-12-12

## 2022-01-01 RX ORDER — HEPARIN SODIUM 1000 [USP'U]/ML
2000 INJECTION, SOLUTION INTRAVENOUS; SUBCUTANEOUS PRN
Status: DISCONTINUED | OUTPATIENT
Start: 2022-01-01 | End: 2022-01-01

## 2022-01-01 RX ORDER — CYCLOBENZAPRINE HCL 10 MG
10 TABLET ORAL 3 TIMES DAILY PRN
Status: DISCONTINUED | OUTPATIENT
Start: 2022-01-01 | End: 2022-01-01

## 2022-01-01 RX ORDER — RANOLAZINE 500 MG/1
500 TABLET, EXTENDED RELEASE ORAL 2 TIMES DAILY
Status: DISCONTINUED | OUTPATIENT
Start: 2022-01-01 | End: 2022-01-01 | Stop reason: HOSPADM

## 2022-01-01 RX ORDER — CYCLOBENZAPRINE HCL 10 MG
10 TABLET ORAL 3 TIMES DAILY PRN
Status: DISCONTINUED | OUTPATIENT
Start: 2022-01-01 | End: 2022-01-01 | Stop reason: HOSPADM

## 2022-01-01 RX ORDER — SODIUM CHLORIDE, SODIUM LACTATE, POTASSIUM CHLORIDE, CALCIUM CHLORIDE 600; 310; 30; 20 MG/100ML; MG/100ML; MG/100ML; MG/100ML
1000 INJECTION, SOLUTION INTRAVENOUS ONCE
Status: COMPLETED | OUTPATIENT
Start: 2022-01-01 | End: 2022-01-01

## 2022-01-01 RX ORDER — CYCLOBENZAPRINE HCL 10 MG
10 TABLET ORAL 3 TIMES DAILY PRN
COMMUNITY

## 2022-01-01 RX ORDER — DEXAMETHASONE SODIUM PHOSPHATE 4 MG/ML
INJECTION, SOLUTION INTRA-ARTICULAR; INTRALESIONAL; INTRAMUSCULAR; INTRAVENOUS; SOFT TISSUE PRN
Status: DISCONTINUED | OUTPATIENT
Start: 2022-01-01 | End: 2022-01-01 | Stop reason: SURG

## 2022-01-01 RX ORDER — MIDAZOLAM HYDROCHLORIDE 1 MG/ML
1 INJECTION INTRAMUSCULAR; INTRAVENOUS ONCE
Status: COMPLETED | OUTPATIENT
Start: 2022-01-01 | End: 2022-01-01

## 2022-01-01 RX ORDER — AMLODIPINE BESYLATE 5 MG/1
5 TABLET ORAL DAILY
Qty: 90 TABLET | Refills: 3 | Status: ON HOLD | OUTPATIENT
Start: 2022-01-01 | End: 2022-01-01

## 2022-01-01 RX ORDER — TEMAZEPAM 7.5 MG/1
7.5 CAPSULE ORAL
Status: DISCONTINUED | OUTPATIENT
Start: 2022-01-01 | End: 2022-01-01 | Stop reason: HOSPADM

## 2022-01-01 RX ADMIN — CYCLOBENZAPRINE 10 MG: 10 TABLET, FILM COATED ORAL at 05:52

## 2022-01-01 RX ADMIN — GABAPENTIN 600 MG: 300 CAPSULE ORAL at 17:24

## 2022-01-01 RX ADMIN — HYDRALAZINE HYDROCHLORIDE 20 MG: 20 INJECTION INTRAMUSCULAR; INTRAVENOUS at 15:36

## 2022-01-01 RX ADMIN — OXYCODONE HYDROCHLORIDE 20 MG: 10 TABLET ORAL at 23:14

## 2022-01-01 RX ADMIN — SENNOSIDES AND DOCUSATE SODIUM 1 TABLET: 50; 8.6 TABLET ORAL at 20:20

## 2022-01-01 RX ADMIN — FUROSEMIDE 80 MG: 10 INJECTION, SOLUTION INTRAMUSCULAR; INTRAVENOUS at 20:28

## 2022-01-01 RX ADMIN — HEPARIN SODIUM 2000 UNITS: 1000 INJECTION, SOLUTION INTRAVENOUS; SUBCUTANEOUS at 04:22

## 2022-01-01 RX ADMIN — ACETAMINOPHEN 1000 MG: 500 TABLET ORAL at 12:27

## 2022-01-01 RX ADMIN — SODIUM CHLORIDE: 9 INJECTION, SOLUTION INTRAVENOUS at 09:52

## 2022-01-01 RX ADMIN — INSULIN HUMAN 1 UNITS: 100 INJECTION, SOLUTION PARENTERAL at 08:44

## 2022-01-01 RX ADMIN — REGADENOSON 0.4 MG: 0.08 INJECTION, SOLUTION INTRAVENOUS at 08:53

## 2022-01-01 RX ADMIN — ACETAMINOPHEN 1000 MG: 500 TABLET ORAL at 17:27

## 2022-01-01 RX ADMIN — ACETAMINOPHEN 1000 MG: 500 TABLET ORAL at 16:41

## 2022-01-01 RX ADMIN — INSULIN HUMAN 1 UNITS: 100 INJECTION, SOLUTION PARENTERAL at 08:00

## 2022-01-01 RX ADMIN — KETOROLAC TROMETHAMINE 30 MG: 30 INJECTION, SOLUTION INTRAMUSCULAR; INTRAVENOUS at 11:43

## 2022-01-01 RX ADMIN — GABAPENTIN 600 MG: 300 CAPSULE ORAL at 20:19

## 2022-01-01 RX ADMIN — EPINEPHRINE 1 MG: 0.1 INJECTION, SOLUTION INTRAVENOUS at 17:46

## 2022-01-01 RX ADMIN — DEXAMETHASONE SODIUM PHOSPHATE 4 MG: 4 INJECTION, SOLUTION INTRA-ARTICULAR; INTRALESIONAL; INTRAMUSCULAR; INTRAVENOUS; SOFT TISSUE at 22:25

## 2022-01-01 RX ADMIN — OXYCODONE HYDROCHLORIDE 20 MG: 10 TABLET ORAL at 18:00

## 2022-01-01 RX ADMIN — MIDAZOLAM HYDROCHLORIDE 0.5 MG: 1 INJECTION, SOLUTION INTRAMUSCULAR; INTRAVENOUS at 10:31

## 2022-01-01 RX ADMIN — DAPTOMYCIN 590 MG: 500 INJECTION, POWDER, LYOPHILIZED, FOR SOLUTION INTRAVENOUS at 12:34

## 2022-01-01 RX ADMIN — CEFTRIAXONE SODIUM 2000 MG: 10 INJECTION, POWDER, FOR SOLUTION INTRAVENOUS at 12:27

## 2022-01-01 RX ADMIN — HYDROMORPHONE HYDROCHLORIDE 0.5 MG: 1 INJECTION, SOLUTION INTRAMUSCULAR; INTRAVENOUS; SUBCUTANEOUS at 00:41

## 2022-01-01 RX ADMIN — INSULIN HUMAN 4 UNITS: 100 INJECTION, SOLUTION PARENTERAL at 11:49

## 2022-01-01 RX ADMIN — SENNOSIDES AND DOCUSATE SODIUM 2 TABLET: 50; 8.6 TABLET ORAL at 17:38

## 2022-01-01 RX ADMIN — DEXAMETHASONE SODIUM PHOSPHATE 4 MG: 4 INJECTION, SOLUTION INTRA-ARTICULAR; INTRALESIONAL; INTRAMUSCULAR; INTRAVENOUS; SOFT TISSUE at 10:14

## 2022-01-01 RX ADMIN — Medication 100 MCG: at 17:09

## 2022-01-01 RX ADMIN — CLOPIDOGREL BISULFATE 75 MG: 75 TABLET ORAL at 08:42

## 2022-01-01 RX ADMIN — INSULIN HUMAN 1 UNITS: 100 INJECTION, SOLUTION PARENTERAL at 21:49

## 2022-01-01 RX ADMIN — HYDROMORPHONE HYDROCHLORIDE 1 MG: 1 INJECTION, SOLUTION INTRAMUSCULAR; INTRAVENOUS; SUBCUTANEOUS at 22:25

## 2022-01-01 RX ADMIN — FAMOTIDINE 20 MG: 20 TABLET, FILM COATED ORAL at 16:39

## 2022-01-01 RX ADMIN — CEFTRIAXONE SODIUM 2 G: 2 INJECTION, POWDER, FOR SOLUTION INTRAMUSCULAR; INTRAVENOUS at 17:19

## 2022-01-01 RX ADMIN — DIAZEPAM 2.5 MG: 5 INJECTION, SOLUTION INTRAMUSCULAR; INTRAVENOUS at 19:11

## 2022-01-01 RX ADMIN — ALLOPURINOL 100 MG: 100 TABLET ORAL at 04:53

## 2022-01-01 RX ADMIN — TAMSULOSIN HYDROCHLORIDE 0.4 MG: 0.4 CAPSULE ORAL at 07:58

## 2022-01-01 RX ADMIN — DOCUSATE SODIUM 100 MG: 100 CAPSULE, LIQUID FILLED ORAL at 18:01

## 2022-01-01 RX ADMIN — KETOROLAC TROMETHAMINE 30 MG: 30 INJECTION, SOLUTION INTRAMUSCULAR; INTRAVENOUS at 19:43

## 2022-01-01 RX ADMIN — OXYCODONE HYDROCHLORIDE 10 MG: 10 TABLET ORAL at 20:19

## 2022-01-01 RX ADMIN — OXYCODONE HYDROCHLORIDE 10 MG: 10 TABLET ORAL at 12:24

## 2022-01-01 RX ADMIN — Medication 1 APPLICATOR: at 17:28

## 2022-01-01 RX ADMIN — GABAPENTIN 400 MG: 400 CAPSULE ORAL at 16:54

## 2022-01-01 RX ADMIN — EVOLOCUMAB 140 MG: 140 INJECTION, SOLUTION SUBCUTANEOUS at 12:17

## 2022-01-01 RX ADMIN — OXYCODONE HYDROCHLORIDE 10 MG: 10 TABLET ORAL at 06:16

## 2022-01-01 RX ADMIN — HYDROMORPHONE HYDROCHLORIDE 1 MG: 1 INJECTION, SOLUTION INTRAMUSCULAR; INTRAVENOUS; SUBCUTANEOUS at 01:21

## 2022-01-01 RX ADMIN — GABAPENTIN 600 MG: 300 CAPSULE ORAL at 20:18

## 2022-01-01 RX ADMIN — HYDROMORPHONE HYDROCHLORIDE 1 MG: 1 INJECTION, SOLUTION INTRAMUSCULAR; INTRAVENOUS; SUBCUTANEOUS at 05:38

## 2022-01-01 RX ADMIN — SENNOSIDES AND DOCUSATE SODIUM 2 TABLET: 50; 8.6 TABLET ORAL at 16:17

## 2022-01-01 RX ADMIN — DAPTOMYCIN 620 MG: 500 INJECTION, POWDER, LYOPHILIZED, FOR SOLUTION INTRAVENOUS at 16:07

## 2022-01-01 RX ADMIN — ASPIRIN 81 MG: 81 TABLET, COATED ORAL at 05:49

## 2022-01-01 RX ADMIN — PROCHLORPERAZINE EDISYLATE 5 MG: 5 INJECTION INTRAMUSCULAR; INTRAVENOUS at 09:02

## 2022-01-01 RX ADMIN — SUCCINYLCHOLINE CHLORIDE 100 MG: 20 INJECTION, SOLUTION INTRAMUSCULAR; INTRAVENOUS; PARENTERAL at 17:28

## 2022-01-01 RX ADMIN — CLOPIDOGREL BISULFATE 75 MG: 75 TABLET ORAL at 05:34

## 2022-01-01 RX ADMIN — OXYCODONE HYDROCHLORIDE 10 MG: 10 TABLET ORAL at 10:33

## 2022-01-01 RX ADMIN — AMLODIPINE BESYLATE 10 MG: 10 TABLET ORAL at 04:47

## 2022-01-01 RX ADMIN — CLOPIDOGREL BISULFATE 75 MG: 75 TABLET ORAL at 05:26

## 2022-01-01 RX ADMIN — ONDANSETRON HYDROCHLORIDE 4 MG: 2 SOLUTION INTRAMUSCULAR; INTRAVENOUS at 15:37

## 2022-01-01 RX ADMIN — PROPOFOL 130 MG: 10 INJECTION, EMULSION INTRAVENOUS at 17:28

## 2022-01-01 RX ADMIN — HYDROMORPHONE HYDROCHLORIDE 0.4 MG: 2 INJECTION INTRAMUSCULAR; INTRAVENOUS; SUBCUTANEOUS at 16:37

## 2022-01-01 RX ADMIN — FAMOTIDINE 20 MG: 20 TABLET, FILM COATED ORAL at 04:42

## 2022-01-01 RX ADMIN — OXYCODONE HYDROCHLORIDE 20 MG: 10 TABLET ORAL at 05:38

## 2022-01-01 RX ADMIN — INSULIN HUMAN 2 UNITS: 100 INJECTION, SOLUTION PARENTERAL at 17:01

## 2022-01-01 RX ADMIN — LISINOPRIL 20 MG: 20 TABLET ORAL at 06:16

## 2022-01-01 RX ADMIN — DAPTOMYCIN 620 MG: 500 INJECTION, POWDER, LYOPHILIZED, FOR SOLUTION INTRAVENOUS at 17:13

## 2022-01-01 RX ADMIN — ISOSORBIDE MONONITRATE 120 MG: 60 TABLET, EXTENDED RELEASE ORAL at 05:28

## 2022-01-01 RX ADMIN — HYDRALAZINE HYDROCHLORIDE 50 MG: 50 TABLET, FILM COATED ORAL at 13:10

## 2022-01-01 RX ADMIN — ISOSORBIDE MONONITRATE 120 MG: 60 TABLET, EXTENDED RELEASE ORAL at 04:52

## 2022-01-01 RX ADMIN — DEXAMETHASONE SODIUM PHOSPHATE 4 MG: 4 INJECTION, SOLUTION INTRA-ARTICULAR; INTRALESIONAL; INTRAMUSCULAR; INTRAVENOUS; SOFT TISSUE at 11:25

## 2022-01-01 RX ADMIN — ONDANSETRON HYDROCHLORIDE 4 MG: 2 SOLUTION INTRAMUSCULAR; INTRAVENOUS at 08:46

## 2022-01-01 RX ADMIN — HYDROMORPHONE HYDROCHLORIDE 0.5 MG: 1 INJECTION, SOLUTION INTRAMUSCULAR; INTRAVENOUS; SUBCUTANEOUS at 07:43

## 2022-01-01 RX ADMIN — CYCLOBENZAPRINE 10 MG: 10 TABLET, FILM COATED ORAL at 12:19

## 2022-01-01 RX ADMIN — ALLOPURINOL 100 MG: 100 TABLET ORAL at 04:24

## 2022-01-01 RX ADMIN — CEFTRIAXONE SODIUM 2000 MG: 10 INJECTION, POWDER, FOR SOLUTION INTRAVENOUS at 21:14

## 2022-01-01 RX ADMIN — CYCLOBENZAPRINE 10 MG: 10 TABLET, FILM COATED ORAL at 21:12

## 2022-01-01 RX ADMIN — HYDROMORPHONE HYDROCHLORIDE 1 MG: 1 INJECTION, SOLUTION INTRAMUSCULAR; INTRAVENOUS; SUBCUTANEOUS at 13:29

## 2022-01-01 RX ADMIN — OXYCODONE HYDROCHLORIDE 10 MG: 5 SOLUTION ORAL at 19:30

## 2022-01-01 RX ADMIN — CEFTRIAXONE SODIUM 2 G: 2 INJECTION, POWDER, FOR SOLUTION INTRAMUSCULAR; INTRAVENOUS at 17:09

## 2022-01-01 RX ADMIN — LISINOPRIL 20 MG: 20 TABLET ORAL at 04:52

## 2022-01-01 RX ADMIN — Medication 1 APPLICATOR: at 18:04

## 2022-01-01 RX ADMIN — ONDANSETRON 4 MG: 2 INJECTION INTRAMUSCULAR; INTRAVENOUS at 16:12

## 2022-01-01 RX ADMIN — TEMAZEPAM 7.5 MG: 7.5 CAPSULE ORAL at 23:29

## 2022-01-01 RX ADMIN — OXYCODONE HYDROCHLORIDE 10 MG: 5 SOLUTION ORAL at 19:05

## 2022-01-01 RX ADMIN — OXYCODONE HYDROCHLORIDE 20 MG: 10 TABLET ORAL at 14:00

## 2022-01-01 RX ADMIN — HYDROMORPHONE HYDROCHLORIDE 1 MG: 1 INJECTION, SOLUTION INTRAMUSCULAR; INTRAVENOUS; SUBCUTANEOUS at 08:46

## 2022-01-01 RX ADMIN — AMPICILLIN AND SULBACTAM 3 G: 1; 2 INJECTION, POWDER, FOR SOLUTION INTRAMUSCULAR; INTRAVENOUS at 23:27

## 2022-01-01 RX ADMIN — GABAPENTIN 600 MG: 300 CAPSULE ORAL at 08:41

## 2022-01-01 RX ADMIN — DEXAMETHASONE SODIUM PHOSPHATE 4 MG: 4 INJECTION, SOLUTION INTRA-ARTICULAR; INTRALESIONAL; INTRAMUSCULAR; INTRAVENOUS; SOFT TISSUE at 22:39

## 2022-01-01 RX ADMIN — METHOCARBAMOL 750 MG: 750 TABLET ORAL at 18:00

## 2022-01-01 RX ADMIN — HEPARIN SODIUM 2000 UNITS: 1000 INJECTION, SOLUTION INTRAVENOUS; SUBCUTANEOUS at 01:43

## 2022-01-01 RX ADMIN — FAMOTIDINE 20 MG: 20 TABLET, FILM COATED ORAL at 04:30

## 2022-01-01 RX ADMIN — ASPIRIN 81 MG: 81 TABLET, COATED ORAL at 12:14

## 2022-01-01 RX ADMIN — OXYCODONE HYDROCHLORIDE 10 MG: 10 TABLET ORAL at 19:56

## 2022-01-01 RX ADMIN — AMPICILLIN AND SULBACTAM 3 G: 1; 2 INJECTION, POWDER, FOR SOLUTION INTRAMUSCULAR; INTRAVENOUS at 11:24

## 2022-01-01 RX ADMIN — HYDROMORPHONE HYDROCHLORIDE 0.5 MG: 1 INJECTION, SOLUTION INTRAMUSCULAR; INTRAVENOUS; SUBCUTANEOUS at 20:32

## 2022-01-01 RX ADMIN — GABAPENTIN 600 MG: 300 CAPSULE ORAL at 09:25

## 2022-01-01 RX ADMIN — HYDROMORPHONE HYDROCHLORIDE 0.5 MG: 1 INJECTION, SOLUTION INTRAMUSCULAR; INTRAVENOUS; SUBCUTANEOUS at 17:38

## 2022-01-01 RX ADMIN — ASPIRIN 81 MG: 81 TABLET, COATED ORAL at 04:45

## 2022-01-01 RX ADMIN — HYDROMORPHONE HYDROCHLORIDE 0.5 MG: 1 INJECTION, SOLUTION INTRAMUSCULAR; INTRAVENOUS; SUBCUTANEOUS at 21:13

## 2022-01-01 RX ADMIN — OXYCODONE HYDROCHLORIDE 10 MG: 10 TABLET ORAL at 19:36

## 2022-01-01 RX ADMIN — DAPTOMYCIN 620 MG: 500 INJECTION, POWDER, LYOPHILIZED, FOR SOLUTION INTRAVENOUS at 17:23

## 2022-01-01 RX ADMIN — HEPARIN SODIUM 4000 UNITS: 1000 INJECTION, SOLUTION INTRAVENOUS; SUBCUTANEOUS at 21:01

## 2022-01-01 RX ADMIN — INSULIN HUMAN 3 UNITS: 100 INJECTION, SOLUTION PARENTERAL at 16:56

## 2022-01-01 RX ADMIN — Medication 1 APPLICATOR: at 18:00

## 2022-01-01 RX ADMIN — FAMOTIDINE 20 MG: 20 TABLET, FILM COATED ORAL at 04:04

## 2022-01-01 RX ADMIN — RANOLAZINE 500 MG: 500 TABLET, EXTENDED RELEASE ORAL at 05:26

## 2022-01-01 RX ADMIN — ALLOPURINOL 100 MG: 100 TABLET ORAL at 04:52

## 2022-01-01 RX ADMIN — OXYCODONE HYDROCHLORIDE 10 MG: 10 TABLET ORAL at 16:24

## 2022-01-01 RX ADMIN — OXYCODONE HYDROCHLORIDE 20 MG: 10 TABLET ORAL at 00:43

## 2022-01-01 RX ADMIN — METOPROLOL SUCCINATE 100 MG: 25 TABLET, FILM COATED, EXTENDED RELEASE ORAL at 04:05

## 2022-01-01 RX ADMIN — CEFAZOLIN 2 G: 330 INJECTION, POWDER, FOR SOLUTION INTRAMUSCULAR; INTRAVENOUS at 17:41

## 2022-01-01 RX ADMIN — HYDRALAZINE HYDROCHLORIDE 50 MG: 50 TABLET, FILM COATED ORAL at 22:19

## 2022-01-01 RX ADMIN — DEXAMETHASONE 4 MG: 4 TABLET ORAL at 05:19

## 2022-01-01 RX ADMIN — METOPROLOL SUCCINATE 100 MG: 25 TABLET, FILM COATED, EXTENDED RELEASE ORAL at 05:53

## 2022-01-01 RX ADMIN — OXYCODONE HYDROCHLORIDE 10 MG: 10 TABLET ORAL at 19:02

## 2022-01-01 RX ADMIN — GABAPENTIN 600 MG: 300 CAPSULE ORAL at 07:50

## 2022-01-01 RX ADMIN — CYCLOBENZAPRINE 10 MG: 10 TABLET, FILM COATED ORAL at 20:29

## 2022-01-01 RX ADMIN — DOCUSATE SODIUM 100 MG: 100 CAPSULE, LIQUID FILLED ORAL at 17:27

## 2022-01-01 RX ADMIN — ACETAMINOPHEN 1000 MG: 500 TABLET ORAL at 16:36

## 2022-01-01 RX ADMIN — INSULIN HUMAN 2 UNITS: 100 INJECTION, SOLUTION PARENTERAL at 13:47

## 2022-01-01 RX ADMIN — ISOSORBIDE MONONITRATE 120 MG: 60 TABLET, EXTENDED RELEASE ORAL at 04:53

## 2022-01-01 RX ADMIN — HYDROMORPHONE HYDROCHLORIDE 0.5 MG: 1 INJECTION, SOLUTION INTRAMUSCULAR; INTRAVENOUS; SUBCUTANEOUS at 18:08

## 2022-01-01 RX ADMIN — HYDRALAZINE HYDROCHLORIDE 50 MG: 50 TABLET, FILM COATED ORAL at 21:45

## 2022-01-01 RX ADMIN — HYDROMORPHONE HYDROCHLORIDE 0.5 MG: 1 INJECTION, SOLUTION INTRAMUSCULAR; INTRAVENOUS; SUBCUTANEOUS at 21:10

## 2022-01-01 RX ADMIN — LISINOPRIL 20 MG: 20 TABLET ORAL at 06:07

## 2022-01-01 RX ADMIN — OXYCODONE HYDROCHLORIDE 10 MG: 10 TABLET ORAL at 11:35

## 2022-01-01 RX ADMIN — HEPARIN SODIUM: 1000 INJECTION, SOLUTION INTRAVENOUS; SUBCUTANEOUS at 10:02

## 2022-01-01 RX ADMIN — LISINOPRIL 20 MG: 20 TABLET ORAL at 04:47

## 2022-01-01 RX ADMIN — GABAPENTIN 600 MG: 300 CAPSULE ORAL at 16:04

## 2022-01-01 RX ADMIN — HYDRALAZINE HYDROCHLORIDE 50 MG: 50 TABLET, FILM COATED ORAL at 21:57

## 2022-01-01 RX ADMIN — ISOSORBIDE MONONITRATE 120 MG: 60 TABLET, EXTENDED RELEASE ORAL at 04:49

## 2022-01-01 RX ADMIN — VANCOMYCIN HYDROCHLORIDE 1250 MG: 500 INJECTION, POWDER, LYOPHILIZED, FOR SOLUTION INTRAVENOUS at 09:45

## 2022-01-01 RX ADMIN — SODIUM CHLORIDE, POTASSIUM CHLORIDE, SODIUM LACTATE AND CALCIUM CHLORIDE: 600; 310; 30; 20 INJECTION, SOLUTION INTRAVENOUS at 17:18

## 2022-01-01 RX ADMIN — GABAPENTIN 400 MG: 400 CAPSULE ORAL at 17:16

## 2022-01-01 RX ADMIN — CEFTRIAXONE SODIUM 2000 MG: 10 INJECTION, POWDER, FOR SOLUTION INTRAVENOUS at 12:04

## 2022-01-01 RX ADMIN — POTASSIUM CHLORIDE 40 MEQ: 1500 TABLET, EXTENDED RELEASE ORAL at 08:55

## 2022-01-01 RX ADMIN — ISOSORBIDE MONONITRATE 120 MG: 60 TABLET, EXTENDED RELEASE ORAL at 06:20

## 2022-01-01 RX ADMIN — HYDRALAZINE HYDROCHLORIDE 50 MG: 50 TABLET, FILM COATED ORAL at 04:29

## 2022-01-01 RX ADMIN — ACETAMINOPHEN 1000 MG: 500 TABLET ORAL at 04:30

## 2022-01-01 RX ADMIN — OXYCODONE HYDROCHLORIDE 20 MG: 10 TABLET ORAL at 21:34

## 2022-01-01 RX ADMIN — EPINEPHRINE 1 MG: 0.1 INJECTION, SOLUTION INTRAVENOUS at 19:26

## 2022-01-01 RX ADMIN — OXYCODONE HYDROCHLORIDE 20 MG: 10 TABLET ORAL at 09:09

## 2022-01-01 RX ADMIN — CEFTRIAXONE SODIUM 2000 MG: 10 INJECTION, POWDER, FOR SOLUTION INTRAVENOUS at 05:35

## 2022-01-01 RX ADMIN — OXYCODONE HYDROCHLORIDE 10 MG: 10 TABLET ORAL at 23:16

## 2022-01-01 RX ADMIN — SODIUM CHLORIDE, POTASSIUM CHLORIDE, SODIUM LACTATE AND CALCIUM CHLORIDE 1000 ML: 600; 310; 30; 20 INJECTION, SOLUTION INTRAVENOUS at 10:31

## 2022-01-01 RX ADMIN — Medication 1 APPLICATOR: at 05:20

## 2022-01-01 RX ADMIN — DOCUSATE SODIUM 100 MG: 100 CAPSULE, LIQUID FILLED ORAL at 04:51

## 2022-01-01 RX ADMIN — GABAPENTIN 600 MG: 300 CAPSULE ORAL at 22:04

## 2022-01-01 RX ADMIN — DAPTOMYCIN 590 MG: 500 INJECTION, POWDER, LYOPHILIZED, FOR SOLUTION INTRAVENOUS at 12:18

## 2022-01-01 RX ADMIN — OXYCODONE HYDROCHLORIDE 10 MG: 10 TABLET ORAL at 02:47

## 2022-01-01 RX ADMIN — DIAZEPAM 5 MG: 5 TABLET ORAL at 12:49

## 2022-01-01 RX ADMIN — METHOCARBAMOL 750 MG: 750 TABLET ORAL at 08:41

## 2022-01-01 RX ADMIN — Medication 1 APPLICATOR: at 16:39

## 2022-01-01 RX ADMIN — ISOSORBIDE MONONITRATE 120 MG: 60 TABLET, EXTENDED RELEASE ORAL at 07:44

## 2022-01-01 RX ADMIN — TAMSULOSIN HYDROCHLORIDE 0.4 MG: 0.4 CAPSULE ORAL at 08:41

## 2022-01-01 RX ADMIN — CEFTRIAXONE SODIUM 2000 MG: 10 INJECTION, POWDER, FOR SOLUTION INTRAVENOUS at 16:58

## 2022-01-01 RX ADMIN — CYCLOBENZAPRINE 10 MG: 10 TABLET, FILM COATED ORAL at 21:01

## 2022-01-01 RX ADMIN — CEFTRIAXONE SODIUM 2 G: 2 INJECTION, POWDER, FOR SOLUTION INTRAMUSCULAR; INTRAVENOUS at 17:48

## 2022-01-01 RX ADMIN — INSULIN HUMAN 1 UNITS: 100 INJECTION, SOLUTION PARENTERAL at 20:37

## 2022-01-01 RX ADMIN — OXYCODONE HYDROCHLORIDE 10 MG: 10 TABLET ORAL at 03:14

## 2022-01-01 RX ADMIN — ATROPINE SULFATE 0.5 MG: 0.1 INJECTION, SOLUTION ENDOTRACHEAL; INTRAMUSCULAR; INTRAVENOUS; SUBCUTANEOUS at 19:24

## 2022-01-01 RX ADMIN — TAMSULOSIN HYDROCHLORIDE 0.4 MG: 0.4 CAPSULE ORAL at 08:55

## 2022-01-01 RX ADMIN — HYDROMORPHONE HYDROCHLORIDE 0.4 MG: 1 INJECTION, SOLUTION INTRAMUSCULAR; INTRAVENOUS; SUBCUTANEOUS at 19:41

## 2022-01-01 RX ADMIN — KETOROLAC TROMETHAMINE 30 MG: 30 INJECTION, SOLUTION INTRAMUSCULAR; INTRAVENOUS at 09:52

## 2022-01-01 RX ADMIN — METOPROLOL SUCCINATE 100 MG: 50 TABLET, EXTENDED RELEASE ORAL at 06:30

## 2022-01-01 RX ADMIN — OXYCODONE HYDROCHLORIDE 20 MG: 10 TABLET ORAL at 01:26

## 2022-01-01 RX ADMIN — DOCUSATE SODIUM 100 MG: 100 CAPSULE, LIQUID FILLED ORAL at 05:33

## 2022-01-01 RX ADMIN — DEXAMETHASONE SODIUM PHOSPHATE 4 MG: 4 INJECTION, SOLUTION INTRA-ARTICULAR; INTRALESIONAL; INTRAMUSCULAR; INTRAVENOUS; SOFT TISSUE at 05:59

## 2022-01-01 RX ADMIN — METHOCARBAMOL 750 MG: 750 TABLET ORAL at 11:45

## 2022-01-01 RX ADMIN — HYDROMORPHONE HYDROCHLORIDE 0.5 MG: 1 INJECTION, SOLUTION INTRAMUSCULAR; INTRAVENOUS; SUBCUTANEOUS at 23:18

## 2022-01-01 RX ADMIN — MIDAZOLAM HYDROCHLORIDE 2 MG: 1 INJECTION, SOLUTION INTRAMUSCULAR; INTRAVENOUS at 10:09

## 2022-01-01 RX ADMIN — GADOTERIDOL 20 ML: 279.3 INJECTION, SOLUTION INTRAVENOUS at 00:37

## 2022-01-01 RX ADMIN — ASPIRIN 81 MG: 81 TABLET, COATED ORAL at 08:41

## 2022-01-01 RX ADMIN — LISINOPRIL 20 MG: 20 TABLET ORAL at 04:04

## 2022-01-01 RX ADMIN — OXYCODONE HYDROCHLORIDE 20 MG: 10 TABLET ORAL at 11:21

## 2022-01-01 RX ADMIN — DEXAMETHASONE 4 MG: 4 TABLET ORAL at 04:48

## 2022-01-01 RX ADMIN — AMPICILLIN AND SULBACTAM 3 G: 1; 2 INJECTION, POWDER, FOR SOLUTION INTRAMUSCULAR; INTRAVENOUS at 04:54

## 2022-01-01 RX ADMIN — GABAPENTIN 300 MG: 300 CAPSULE ORAL at 06:32

## 2022-01-01 RX ADMIN — Medication 1 APPLICATOR: at 05:31

## 2022-01-01 RX ADMIN — ALLOPURINOL 100 MG: 100 TABLET ORAL at 06:19

## 2022-01-01 RX ADMIN — OXYCODONE HYDROCHLORIDE 10 MG: 10 TABLET ORAL at 20:29

## 2022-01-01 RX ADMIN — NITROGLYCERIN 10 ML: 20 INJECTION INTRAVENOUS at 09:59

## 2022-01-01 RX ADMIN — HYDRALAZINE HYDROCHLORIDE 50 MG: 50 TABLET, FILM COATED ORAL at 04:41

## 2022-01-01 RX ADMIN — Medication 1 APPLICATOR: at 22:43

## 2022-01-01 RX ADMIN — VANCOMYCIN HYDROCHLORIDE 1750 MG: 500 INJECTION, POWDER, LYOPHILIZED, FOR SOLUTION INTRAVENOUS at 09:36

## 2022-01-01 RX ADMIN — INSULIN HUMAN 2 UNITS: 100 INJECTION, SOLUTION PARENTERAL at 08:46

## 2022-01-01 RX ADMIN — HYDROMORPHONE HYDROCHLORIDE 0.5 MG: 1 INJECTION, SOLUTION INTRAMUSCULAR; INTRAVENOUS; SUBCUTANEOUS at 12:46

## 2022-01-01 RX ADMIN — HYDROMORPHONE HYDROCHLORIDE 0.5 MG: 1 INJECTION, SOLUTION INTRAMUSCULAR; INTRAVENOUS; SUBCUTANEOUS at 20:14

## 2022-01-01 RX ADMIN — CEFTRIAXONE SODIUM 2000 MG: 10 INJECTION, POWDER, FOR SOLUTION INTRAVENOUS at 12:12

## 2022-01-01 RX ADMIN — Medication 100 MCG: at 17:05

## 2022-01-01 RX ADMIN — NOREPINEPHRINE BITARTRATE 0.1 MCG/KG/MIN: 1 INJECTION, SOLUTION, CONCENTRATE INTRAVENOUS at 18:53

## 2022-01-01 RX ADMIN — HYDROMORPHONE HYDROCHLORIDE 0.5 MG: 1 INJECTION, SOLUTION INTRAMUSCULAR; INTRAVENOUS; SUBCUTANEOUS at 02:20

## 2022-01-01 RX ADMIN — DEXAMETHASONE 4 MG: 4 TABLET ORAL at 05:29

## 2022-01-01 RX ADMIN — AMLODIPINE BESYLATE 10 MG: 10 TABLET ORAL at 04:55

## 2022-01-01 RX ADMIN — CEFTRIAXONE SODIUM 2 G: 2 INJECTION, POWDER, FOR SOLUTION INTRAMUSCULAR; INTRAVENOUS at 16:08

## 2022-01-01 RX ADMIN — FENTANYL CITRATE 25 MCG: 50 INJECTION, SOLUTION INTRAMUSCULAR; INTRAVENOUS at 18:22

## 2022-01-01 RX ADMIN — ASPIRIN 81 MG: 81 TABLET, COATED ORAL at 06:15

## 2022-01-01 RX ADMIN — OXYCODONE HYDROCHLORIDE 20 MG: 10 TABLET ORAL at 05:58

## 2022-01-01 RX ADMIN — DAPTOMYCIN 590 MG: 500 INJECTION, POWDER, LYOPHILIZED, FOR SOLUTION INTRAVENOUS at 11:25

## 2022-01-01 RX ADMIN — GABAPENTIN 600 MG: 300 CAPSULE ORAL at 13:26

## 2022-01-01 RX ADMIN — OXYCODONE HYDROCHLORIDE 20 MG: 10 TABLET ORAL at 09:25

## 2022-01-01 RX ADMIN — Medication 1 APPLICATOR: at 17:02

## 2022-01-01 RX ADMIN — FAMOTIDINE 20 MG: 20 TABLET, FILM COATED ORAL at 04:24

## 2022-01-01 RX ADMIN — FAMOTIDINE 20 MG: 20 TABLET, FILM COATED ORAL at 05:26

## 2022-01-01 RX ADMIN — HYDROMORPHONE HYDROCHLORIDE 0.4 MG: 1 INJECTION, SOLUTION INTRAMUSCULAR; INTRAVENOUS; SUBCUTANEOUS at 19:14

## 2022-01-01 RX ADMIN — OXYCODONE HYDROCHLORIDE 20 MG: 10 TABLET ORAL at 13:34

## 2022-01-01 RX ADMIN — GABAPENTIN 600 MG: 300 CAPSULE ORAL at 04:53

## 2022-01-01 RX ADMIN — OXYCODONE HYDROCHLORIDE 20 MG: 10 TABLET ORAL at 21:15

## 2022-01-01 RX ADMIN — GABAPENTIN 600 MG: 300 CAPSULE ORAL at 08:33

## 2022-01-01 RX ADMIN — OXYCODONE HYDROCHLORIDE 10 MG: 10 TABLET ORAL at 10:59

## 2022-01-01 RX ADMIN — DIAZEPAM 5 MG: 5 TABLET ORAL at 11:21

## 2022-01-01 RX ADMIN — HYDROMORPHONE HYDROCHLORIDE 0.4 MG: 1 INJECTION, SOLUTION INTRAMUSCULAR; INTRAVENOUS; SUBCUTANEOUS at 19:15

## 2022-01-01 RX ADMIN — ACETAMINOPHEN 1000 MG: 500 TABLET ORAL at 12:24

## 2022-01-01 RX ADMIN — Medication 1 APPLICATOR: at 04:31

## 2022-01-01 RX ADMIN — ASPIRIN 81 MG: 81 TABLET, COATED ORAL at 09:42

## 2022-01-01 RX ADMIN — METOPROLOL SUCCINATE 100 MG: 25 TABLET, FILM COATED, EXTENDED RELEASE ORAL at 04:41

## 2022-01-01 RX ADMIN — TEMAZEPAM 7.5 MG: 7.5 CAPSULE ORAL at 21:45

## 2022-01-01 RX ADMIN — INSULIN HUMAN 3 UNITS: 100 INJECTION, SOLUTION PARENTERAL at 17:16

## 2022-01-01 RX ADMIN — AMLODIPINE BESYLATE 10 MG: 10 TABLET ORAL at 05:32

## 2022-01-01 RX ADMIN — GABAPENTIN 300 MG: 300 CAPSULE ORAL at 07:53

## 2022-01-01 RX ADMIN — EPINEPHRINE 1 MG: 0.1 INJECTION, SOLUTION INTRAVENOUS at 20:01

## 2022-01-01 RX ADMIN — OXYCODONE HYDROCHLORIDE 10 MG: 10 TABLET ORAL at 14:05

## 2022-01-01 RX ADMIN — CEFTRIAXONE SODIUM 2000 MG: 10 INJECTION, POWDER, FOR SOLUTION INTRAVENOUS at 13:12

## 2022-01-01 RX ADMIN — OXYCODONE HYDROCHLORIDE 10 MG: 10 TABLET ORAL at 13:22

## 2022-01-01 RX ADMIN — GABAPENTIN 600 MG: 300 CAPSULE ORAL at 17:31

## 2022-01-01 RX ADMIN — SODIUM CHLORIDE: 9 INJECTION, SOLUTION INTRAVENOUS at 07:36

## 2022-01-01 RX ADMIN — GABAPENTIN 600 MG: 300 CAPSULE ORAL at 12:55

## 2022-01-01 RX ADMIN — ROCURONIUM BROMIDE 3 MG: 10 INJECTION, SOLUTION INTRAVENOUS at 17:28

## 2022-01-01 RX ADMIN — VANCOMYCIN HYDROCHLORIDE 1000 MG: 500 INJECTION, POWDER, LYOPHILIZED, FOR SOLUTION INTRAVENOUS at 13:19

## 2022-01-01 RX ADMIN — HYDRALAZINE HYDROCHLORIDE 50 MG: 50 TABLET, FILM COATED ORAL at 13:19

## 2022-01-01 RX ADMIN — OXYCODONE HYDROCHLORIDE 10 MG: 10 TABLET ORAL at 19:46

## 2022-01-01 RX ADMIN — HYDRALAZINE HYDROCHLORIDE 50 MG: 50 TABLET, FILM COATED ORAL at 22:48

## 2022-01-01 RX ADMIN — INSULIN HUMAN 1 UNITS: 100 INJECTION, SOLUTION PARENTERAL at 08:49

## 2022-01-01 RX ADMIN — CLOPIDOGREL BISULFATE 75 MG: 75 TABLET ORAL at 12:15

## 2022-01-01 RX ADMIN — GABAPENTIN 600 MG: 300 CAPSULE ORAL at 21:01

## 2022-01-01 RX ADMIN — Medication 1 APPLICATOR: at 18:01

## 2022-01-01 RX ADMIN — GABAPENTIN 600 MG: 300 CAPSULE ORAL at 13:11

## 2022-01-01 RX ADMIN — HYDROMORPHONE HYDROCHLORIDE 1 MG: 1 INJECTION, SOLUTION INTRAMUSCULAR; INTRAVENOUS; SUBCUTANEOUS at 10:37

## 2022-01-01 RX ADMIN — AMLODIPINE BESYLATE 10 MG: 10 TABLET ORAL at 04:53

## 2022-01-01 RX ADMIN — DEXAMETHASONE 4 MG: 4 TABLET ORAL at 04:52

## 2022-01-01 RX ADMIN — DAPTOMYCIN 620 MG: 500 INJECTION, POWDER, LYOPHILIZED, FOR SOLUTION INTRAVENOUS at 17:07

## 2022-01-01 RX ADMIN — OXYCODONE HYDROCHLORIDE 10 MG: 10 TABLET ORAL at 15:30

## 2022-01-01 RX ADMIN — SUGAMMADEX 200 MG: 100 INJECTION, SOLUTION INTRAVENOUS at 18:34

## 2022-01-01 RX ADMIN — FENTANYL CITRATE 25 MCG: 50 INJECTION, SOLUTION INTRAMUSCULAR; INTRAVENOUS at 17:55

## 2022-01-01 RX ADMIN — EPINEPHRINE 0.3 MCG/KG/MIN: 1 INJECTION INTRAMUSCULAR; INTRAVENOUS; SUBCUTANEOUS at 18:43

## 2022-01-01 RX ADMIN — OXYCODONE HYDROCHLORIDE 10 MG: 10 TABLET ORAL at 05:20

## 2022-01-01 RX ADMIN — SODIUM CHLORIDE: 9 INJECTION, SOLUTION INTRAVENOUS at 16:19

## 2022-01-01 RX ADMIN — CEFTRIAXONE SODIUM 2000 MG: 10 INJECTION, POWDER, FOR SOLUTION INTRAVENOUS at 15:11

## 2022-01-01 RX ADMIN — SODIUM CHLORIDE: 9 INJECTION, SOLUTION INTRAVENOUS at 00:33

## 2022-01-01 RX ADMIN — OXYCODONE HYDROCHLORIDE 10 MG: 10 TABLET ORAL at 08:33

## 2022-01-01 RX ADMIN — SENNOSIDES AND DOCUSATE SODIUM 2 TABLET: 50; 8.6 TABLET ORAL at 06:15

## 2022-01-01 RX ADMIN — OXYCODONE HYDROCHLORIDE 20 MG: 10 TABLET ORAL at 16:12

## 2022-01-01 RX ADMIN — INSULIN GLARGINE-YFGN 15 UNITS: 100 INJECTION, SOLUTION SUBCUTANEOUS at 17:56

## 2022-01-01 RX ADMIN — TAMSULOSIN HYDROCHLORIDE 0.4 MG: 0.4 CAPSULE ORAL at 08:09

## 2022-01-01 RX ADMIN — DIAZEPAM 5 MG: 5 TABLET ORAL at 07:43

## 2022-01-01 RX ADMIN — AMLODIPINE BESYLATE 10 MG: 10 TABLET ORAL at 05:53

## 2022-01-01 RX ADMIN — FUROSEMIDE 80 MG: 10 INJECTION, SOLUTION INTRAMUSCULAR; INTRAVENOUS at 16:09

## 2022-01-01 RX ADMIN — DAPTOMYCIN 620 MG: 500 INJECTION, POWDER, LYOPHILIZED, FOR SOLUTION INTRAVENOUS at 22:06

## 2022-01-01 RX ADMIN — Medication 1 APPLICATOR: at 04:46

## 2022-01-01 RX ADMIN — OXYCODONE HYDROCHLORIDE 10 MG: 10 TABLET ORAL at 16:54

## 2022-01-01 RX ADMIN — DEXAMETHASONE 4 MG: 4 TABLET ORAL at 05:22

## 2022-01-01 RX ADMIN — ASPIRIN 81 MG: 81 TABLET, COATED ORAL at 05:39

## 2022-01-01 RX ADMIN — CEFTRIAXONE SODIUM 2000 MG: 10 INJECTION, POWDER, FOR SOLUTION INTRAVENOUS at 14:00

## 2022-01-01 RX ADMIN — FUROSEMIDE 80 MG: 10 INJECTION, SOLUTION INTRAMUSCULAR; INTRAVENOUS at 07:53

## 2022-01-01 RX ADMIN — OXYCODONE 10 MG: 5 TABLET ORAL at 22:23

## 2022-01-01 RX ADMIN — LISINOPRIL 20 MG: 20 TABLET ORAL at 06:02

## 2022-01-01 RX ADMIN — GABAPENTIN 600 MG: 300 CAPSULE ORAL at 08:54

## 2022-01-01 RX ADMIN — VANCOMYCIN HYDROCHLORIDE 2500 MG: 500 INJECTION, POWDER, LYOPHILIZED, FOR SOLUTION INTRAVENOUS at 22:55

## 2022-01-01 RX ADMIN — Medication 1 APPLICATOR: at 17:31

## 2022-01-01 RX ADMIN — OXYCODONE HYDROCHLORIDE 20 MG: 10 TABLET ORAL at 18:08

## 2022-01-01 RX ADMIN — Medication 1 APPLICATOR: at 05:15

## 2022-01-01 RX ADMIN — NITROGLYCERIN 0.4 MG: 0.4 TABLET, ORALLY DISINTEGRATING SUBLINGUAL at 15:39

## 2022-01-01 RX ADMIN — HYDROMORPHONE HYDROCHLORIDE 1 MG: 1 INJECTION, SOLUTION INTRAMUSCULAR; INTRAVENOUS; SUBCUTANEOUS at 03:22

## 2022-01-01 RX ADMIN — SODIUM CHLORIDE, POTASSIUM CHLORIDE, SODIUM LACTATE AND CALCIUM CHLORIDE: 600; 310; 30; 20 INJECTION, SOLUTION INTRAVENOUS at 16:07

## 2022-01-01 RX ADMIN — CLOPIDOGREL BISULFATE 75 MG: 75 TABLET ORAL at 05:37

## 2022-01-01 RX ADMIN — KETOROLAC TROMETHAMINE 30 MG: 30 INJECTION, SOLUTION INTRAMUSCULAR; INTRAVENOUS at 06:14

## 2022-01-01 RX ADMIN — FAMOTIDINE 20 MG: 10 INJECTION, SOLUTION INTRAVENOUS at 19:38

## 2022-01-01 RX ADMIN — Medication 1 APPLICATOR: at 18:09

## 2022-01-01 RX ADMIN — ROCURONIUM BROMIDE 50 MG: 10 INJECTION, SOLUTION INTRAVENOUS at 16:12

## 2022-01-01 RX ADMIN — INSULIN GLARGINE-YFGN 15 UNITS: 100 INJECTION, SOLUTION SUBCUTANEOUS at 17:33

## 2022-01-01 RX ADMIN — DEXAMETHASONE 4 MG: 4 TABLET ORAL at 05:33

## 2022-01-01 RX ADMIN — DIAZEPAM 5 MG: 5 TABLET ORAL at 21:17

## 2022-01-01 RX ADMIN — CEFAZOLIN 2 G: 330 INJECTION, POWDER, FOR SOLUTION INTRAMUSCULAR; INTRAVENOUS at 16:15

## 2022-01-01 RX ADMIN — DAPTOMYCIN 590 MG: 500 INJECTION, POWDER, LYOPHILIZED, FOR SOLUTION INTRAVENOUS at 11:51

## 2022-01-01 RX ADMIN — GABAPENTIN 600 MG: 300 CAPSULE ORAL at 07:39

## 2022-01-01 RX ADMIN — FENTANYL CITRATE 50 MCG: 50 INJECTION, SOLUTION INTRAMUSCULAR; INTRAVENOUS at 20:00

## 2022-01-01 RX ADMIN — VANCOMYCIN HYDROCHLORIDE 1000 MG: 500 INJECTION, POWDER, LYOPHILIZED, FOR SOLUTION INTRAVENOUS at 01:38

## 2022-01-01 RX ADMIN — KETOROLAC TROMETHAMINE 30 MG: 30 INJECTION, SOLUTION INTRAMUSCULAR; INTRAVENOUS at 23:33

## 2022-01-01 RX ADMIN — HYDROMORPHONE HYDROCHLORIDE 0.5 MG: 1 INJECTION, SOLUTION INTRAMUSCULAR; INTRAVENOUS; SUBCUTANEOUS at 13:33

## 2022-01-01 RX ADMIN — FENTANYL CITRATE 50 MCG: 50 INJECTION, SOLUTION INTRAMUSCULAR; INTRAVENOUS at 19:15

## 2022-01-01 RX ADMIN — HYDROMORPHONE HYDROCHLORIDE 0.5 MG: 1 INJECTION, SOLUTION INTRAMUSCULAR; INTRAVENOUS; SUBCUTANEOUS at 13:42

## 2022-01-01 RX ADMIN — HYDROMORPHONE HYDROCHLORIDE 0.4 MG: 2 INJECTION INTRAMUSCULAR; INTRAVENOUS; SUBCUTANEOUS at 18:17

## 2022-01-01 RX ADMIN — SENNOSIDES AND DOCUSATE SODIUM 2 TABLET: 50; 8.6 TABLET ORAL at 05:37

## 2022-01-01 RX ADMIN — GABAPENTIN 600 MG: 300 CAPSULE ORAL at 13:34

## 2022-01-01 RX ADMIN — CEFTRIAXONE SODIUM 2000 MG: 10 INJECTION, POWDER, FOR SOLUTION INTRAVENOUS at 07:46

## 2022-01-01 RX ADMIN — HYDROMORPHONE HYDROCHLORIDE 0.4 MG: 2 INJECTION INTRAMUSCULAR; INTRAVENOUS; SUBCUTANEOUS at 18:45

## 2022-01-01 RX ADMIN — CALCIUM CHLORIDE 1 G: 100 INJECTION INTRAVENOUS; INTRAVENTRICULAR at 19:38

## 2022-01-01 RX ADMIN — OXYCODONE HYDROCHLORIDE 10 MG: 10 TABLET ORAL at 06:01

## 2022-01-01 RX ADMIN — ALLOPURINOL 100 MG: 100 TABLET ORAL at 04:56

## 2022-01-01 RX ADMIN — DEXAMETHASONE SODIUM PHOSPHATE 4 MG: 4 INJECTION, SOLUTION INTRA-ARTICULAR; INTRALESIONAL; INTRAMUSCULAR; INTRAVENOUS; SOFT TISSUE at 16:31

## 2022-01-01 RX ADMIN — Medication 1 APPLICATOR: at 04:32

## 2022-01-01 RX ADMIN — DEXAMETHASONE SODIUM PHOSPHATE 4 MG: 4 INJECTION, SOLUTION INTRA-ARTICULAR; INTRALESIONAL; INTRAMUSCULAR; INTRAVENOUS; SOFT TISSUE at 23:40

## 2022-01-01 RX ADMIN — Medication 1 APPLICATOR: at 17:25

## 2022-01-01 RX ADMIN — CLOPIDOGREL BISULFATE 75 MG: 75 TABLET ORAL at 05:38

## 2022-01-01 RX ADMIN — VANCOMYCIN HYDROCHLORIDE 1250 MG: 500 INJECTION, POWDER, LYOPHILIZED, FOR SOLUTION INTRAVENOUS at 02:30

## 2022-01-01 RX ADMIN — OXYCODONE HYDROCHLORIDE 10 MG: 10 TABLET ORAL at 04:28

## 2022-01-01 RX ADMIN — ONDANSETRON 4 MG: 2 INJECTION INTRAMUSCULAR; INTRAVENOUS at 05:04

## 2022-01-01 RX ADMIN — METOPROLOL SUCCINATE 100 MG: 50 TABLET, EXTENDED RELEASE ORAL at 06:04

## 2022-01-01 RX ADMIN — DAPTOMYCIN 590 MG: 500 INJECTION, POWDER, LYOPHILIZED, FOR SOLUTION INTRAVENOUS at 11:46

## 2022-01-01 RX ADMIN — FAMOTIDINE 20 MG: 20 TABLET, FILM COATED ORAL at 16:50

## 2022-01-01 RX ADMIN — HYDROMORPHONE HYDROCHLORIDE 1 MG: 1 INJECTION, SOLUTION INTRAMUSCULAR; INTRAVENOUS; SUBCUTANEOUS at 10:31

## 2022-01-01 RX ADMIN — EPINEPHRINE 1 MG: 0.1 INJECTION, SOLUTION INTRAVENOUS at 20:13

## 2022-01-01 RX ADMIN — HYDROMORPHONE HYDROCHLORIDE 1 MG: 1 INJECTION, SOLUTION INTRAMUSCULAR; INTRAVENOUS; SUBCUTANEOUS at 20:02

## 2022-01-01 RX ADMIN — OXYCODONE HYDROCHLORIDE 10 MG: 10 TABLET ORAL at 20:21

## 2022-01-01 RX ADMIN — Medication 1 APPLICATOR: at 06:10

## 2022-01-01 RX ADMIN — METOPROLOL SUCCINATE 100 MG: 25 TABLET, FILM COATED, EXTENDED RELEASE ORAL at 04:53

## 2022-01-01 RX ADMIN — FAMOTIDINE 20 MG: 20 TABLET, FILM COATED ORAL at 16:40

## 2022-01-01 RX ADMIN — LISINOPRIL 20 MG: 20 TABLET ORAL at 06:32

## 2022-01-01 RX ADMIN — OXYCODONE HYDROCHLORIDE 10 MG: 10 TABLET ORAL at 02:17

## 2022-01-01 RX ADMIN — INSULIN HUMAN 2 UNITS: 100 INJECTION, SOLUTION PARENTERAL at 13:03

## 2022-01-01 RX ADMIN — ASPIRIN 81 MG: 81 TABLET, COATED ORAL at 06:08

## 2022-01-01 RX ADMIN — HYDROMORPHONE HYDROCHLORIDE 0.5 MG: 1 INJECTION, SOLUTION INTRAMUSCULAR; INTRAVENOUS; SUBCUTANEOUS at 13:34

## 2022-01-01 RX ADMIN — ALLOPURINOL 100 MG: 100 TABLET ORAL at 04:05

## 2022-01-01 RX ADMIN — ONDANSETRON 4 MG: 2 INJECTION INTRAMUSCULAR; INTRAVENOUS at 15:04

## 2022-01-01 RX ADMIN — ACETAMINOPHEN 1000 MG: 500 TABLET ORAL at 23:17

## 2022-01-01 RX ADMIN — HYDRALAZINE HYDROCHLORIDE 50 MG: 50 TABLET, FILM COATED ORAL at 21:33

## 2022-01-01 RX ADMIN — EPINEPHRINE 1 MG: 0.1 INJECTION, SOLUTION INTRAVENOUS at 19:56

## 2022-01-01 RX ADMIN — GABAPENTIN 600 MG: 300 CAPSULE ORAL at 12:37

## 2022-01-01 RX ADMIN — OXYCODONE HYDROCHLORIDE 10 MG: 10 TABLET ORAL at 16:16

## 2022-01-01 RX ADMIN — OXYCODONE 10 MG: 5 TABLET ORAL at 01:46

## 2022-01-01 RX ADMIN — DAPTOMYCIN 620 MG: 500 INJECTION, POWDER, LYOPHILIZED, FOR SOLUTION INTRAVENOUS at 17:14

## 2022-01-01 RX ADMIN — ACETAMINOPHEN 1000 MG: 500 TABLET ORAL at 04:52

## 2022-01-01 RX ADMIN — HYDRALAZINE HYDROCHLORIDE 50 MG: 50 TABLET, FILM COATED ORAL at 04:05

## 2022-01-01 RX ADMIN — GABAPENTIN 600 MG: 300 CAPSULE ORAL at 12:51

## 2022-01-01 RX ADMIN — DEXAMETHASONE SODIUM PHOSPHATE 4 MG: 4 INJECTION, SOLUTION INTRA-ARTICULAR; INTRALESIONAL; INTRAMUSCULAR; INTRAVENOUS; SOFT TISSUE at 05:42

## 2022-01-01 RX ADMIN — DOCUSATE SODIUM 50 MG AND SENNOSIDES 8.6 MG 2 TABLET: 8.6; 5 TABLET, FILM COATED ORAL at 17:52

## 2022-01-01 RX ADMIN — ACETAMINOPHEN 1000 MG: 500 TABLET ORAL at 23:28

## 2022-01-01 RX ADMIN — ASPIRIN 81 MG: 81 TABLET, COATED ORAL at 05:55

## 2022-01-01 RX ADMIN — HYDROMORPHONE HYDROCHLORIDE 1 MG: 1 INJECTION, SOLUTION INTRAMUSCULAR; INTRAVENOUS; SUBCUTANEOUS at 22:29

## 2022-01-01 RX ADMIN — LISINOPRIL 20 MG: 20 TABLET ORAL at 05:53

## 2022-01-01 RX ADMIN — POTASSIUM CHLORIDE 40 MEQ: 1500 TABLET, EXTENDED RELEASE ORAL at 05:38

## 2022-01-01 RX ADMIN — DOCUSATE SODIUM 100 MG: 100 CAPSULE, LIQUID FILLED ORAL at 04:53

## 2022-01-01 RX ADMIN — CEFTRIAXONE SODIUM 2 G: 2 INJECTION, POWDER, FOR SOLUTION INTRAMUSCULAR; INTRAVENOUS at 12:10

## 2022-01-01 RX ADMIN — INSULIN HUMAN 2 UNITS: 100 INJECTION, SOLUTION PARENTERAL at 23:45

## 2022-01-01 RX ADMIN — FAMOTIDINE 20 MG: 20 TABLET, FILM COATED ORAL at 17:31

## 2022-01-01 RX ADMIN — ALLOPURINOL 100 MG: 100 TABLET ORAL at 05:19

## 2022-01-01 RX ADMIN — HYDROMORPHONE HYDROCHLORIDE 0.5 MG: 1 INJECTION, SOLUTION INTRAMUSCULAR; INTRAVENOUS; SUBCUTANEOUS at 15:29

## 2022-01-01 RX ADMIN — ACETAMINOPHEN 650 MG: 325 TABLET, FILM COATED ORAL at 05:52

## 2022-01-01 RX ADMIN — CEFTRIAXONE SODIUM 2 G: 2 INJECTION, POWDER, FOR SOLUTION INTRAMUSCULAR; INTRAVENOUS at 17:11

## 2022-01-01 RX ADMIN — GABAPENTIN 600 MG: 300 CAPSULE ORAL at 18:11

## 2022-01-01 RX ADMIN — CLOPIDOGREL BISULFATE 75 MG: 75 TABLET ORAL at 05:53

## 2022-01-01 RX ADMIN — OXYCODONE HYDROCHLORIDE 10 MG: 10 TABLET ORAL at 23:01

## 2022-01-01 RX ADMIN — INSULIN HUMAN 2 UNITS: 100 INJECTION, SOLUTION PARENTERAL at 11:52

## 2022-01-01 RX ADMIN — CEFTRIAXONE SODIUM 2000 MG: 10 INJECTION, POWDER, FOR SOLUTION INTRAVENOUS at 12:37

## 2022-01-01 RX ADMIN — DEXAMETHASONE 4 MG: 4 TABLET ORAL at 04:42

## 2022-01-01 RX ADMIN — ALLOPURINOL 100 MG: 100 TABLET ORAL at 06:06

## 2022-01-01 RX ADMIN — METOPROLOL SUCCINATE 100 MG: 25 TABLET, FILM COATED, EXTENDED RELEASE ORAL at 05:34

## 2022-01-01 RX ADMIN — INSULIN HUMAN 1 UNITS: 100 INJECTION, SOLUTION PARENTERAL at 17:32

## 2022-01-01 RX ADMIN — DAPTOMYCIN 590 MG: 500 INJECTION, POWDER, LYOPHILIZED, FOR SOLUTION INTRAVENOUS at 13:36

## 2022-01-01 RX ADMIN — HYDROMORPHONE HYDROCHLORIDE 1 MG: 1 INJECTION, SOLUTION INTRAMUSCULAR; INTRAVENOUS; SUBCUTANEOUS at 13:33

## 2022-01-01 RX ADMIN — AMLODIPINE BESYLATE 10 MG: 10 TABLET ORAL at 04:23

## 2022-01-01 RX ADMIN — INSULIN GLARGINE-YFGN 15 UNITS: 100 INJECTION, SOLUTION SUBCUTANEOUS at 17:16

## 2022-01-01 RX ADMIN — GABAPENTIN 600 MG: 300 CAPSULE ORAL at 08:32

## 2022-01-01 RX ADMIN — FAMOTIDINE 20 MG: 20 TABLET, FILM COATED ORAL at 04:47

## 2022-01-01 RX ADMIN — Medication 100 MCG: at 17:58

## 2022-01-01 RX ADMIN — OXYCODONE HYDROCHLORIDE 10 MG: 10 TABLET ORAL at 08:41

## 2022-01-01 RX ADMIN — CEFTRIAXONE SODIUM 2 G: 10 INJECTION, POWDER, FOR SOLUTION INTRAVENOUS at 11:43

## 2022-01-01 RX ADMIN — ALLOPURINOL 100 MG: 100 TABLET ORAL at 05:23

## 2022-01-01 RX ADMIN — ISOSORBIDE MONONITRATE 120 MG: 60 TABLET, EXTENDED RELEASE ORAL at 14:36

## 2022-01-01 RX ADMIN — OXYCODONE 10 MG: 5 TABLET ORAL at 09:20

## 2022-01-01 RX ADMIN — DEXAMETHASONE SODIUM PHOSPHATE 4 MG: 4 INJECTION, SOLUTION INTRA-ARTICULAR; INTRALESIONAL; INTRAMUSCULAR; INTRAVENOUS; SOFT TISSUE at 17:16

## 2022-01-01 RX ADMIN — ISOSORBIDE MONONITRATE 120 MG: 60 TABLET, EXTENDED RELEASE ORAL at 05:56

## 2022-01-01 RX ADMIN — OXYCODONE HYDROCHLORIDE 20 MG: 10 TABLET ORAL at 11:49

## 2022-01-01 RX ADMIN — Medication 100 MCG: at 16:30

## 2022-01-01 RX ADMIN — ALLOPURINOL 100 MG: 100 TABLET ORAL at 04:50

## 2022-01-01 RX ADMIN — CYCLOBENZAPRINE 10 MG: 10 TABLET, FILM COATED ORAL at 06:32

## 2022-01-01 RX ADMIN — INSULIN HUMAN 1 UNITS: 100 INJECTION, SOLUTION PARENTERAL at 17:55

## 2022-01-01 RX ADMIN — HYDROMORPHONE HYDROCHLORIDE 0.5 MG: 1 INJECTION, SOLUTION INTRAMUSCULAR; INTRAVENOUS; SUBCUTANEOUS at 20:58

## 2022-01-01 RX ADMIN — LISINOPRIL 20 MG: 20 TABLET ORAL at 06:04

## 2022-01-01 RX ADMIN — DOCUSATE SODIUM 100 MG: 100 CAPSULE, LIQUID FILLED ORAL at 04:47

## 2022-01-01 RX ADMIN — LISINOPRIL 20 MG: 20 TABLET ORAL at 05:34

## 2022-01-01 RX ADMIN — Medication 1 APPLICATOR: at 04:07

## 2022-01-01 RX ADMIN — ACETAMINOPHEN 1000 MG: 500 TABLET ORAL at 11:45

## 2022-01-01 RX ADMIN — CLOPIDOGREL BISULFATE 75 MG: 75 TABLET ORAL at 04:53

## 2022-01-01 RX ADMIN — GABAPENTIN 600 MG: 300 CAPSULE ORAL at 21:09

## 2022-01-01 RX ADMIN — DIAZEPAM 5 MG: 5 TABLET ORAL at 16:14

## 2022-01-01 RX ADMIN — TEMAZEPAM 7.5 MG: 7.5 CAPSULE ORAL at 22:10

## 2022-01-01 RX ADMIN — OXYCODONE HYDROCHLORIDE 20 MG: 10 TABLET ORAL at 23:37

## 2022-01-01 RX ADMIN — OXYCODONE HYDROCHLORIDE 20 MG: 10 TABLET ORAL at 15:38

## 2022-01-01 RX ADMIN — TEMAZEPAM 7.5 MG: 7.5 CAPSULE ORAL at 22:39

## 2022-01-01 RX ADMIN — AMLODIPINE BESYLATE 5 MG: 5 TABLET ORAL at 06:07

## 2022-01-01 RX ADMIN — GABAPENTIN 300 MG: 300 CAPSULE ORAL at 22:25

## 2022-01-01 RX ADMIN — METOPROLOL SUCCINATE 100 MG: 25 TABLET, FILM COATED, EXTENDED RELEASE ORAL at 04:24

## 2022-01-01 RX ADMIN — HYDRALAZINE HYDROCHLORIDE 50 MG: 50 TABLET, FILM COATED ORAL at 15:11

## 2022-01-01 RX ADMIN — OXYCODONE HYDROCHLORIDE 20 MG: 10 TABLET ORAL at 16:40

## 2022-01-01 RX ADMIN — OXYCODONE HYDROCHLORIDE 20 MG: 10 TABLET ORAL at 06:39

## 2022-01-01 RX ADMIN — DEXAMETHASONE SODIUM PHOSPHATE 4 MG: 4 INJECTION, SOLUTION INTRA-ARTICULAR; INTRALESIONAL; INTRAMUSCULAR; INTRAVENOUS; SOFT TISSUE at 16:15

## 2022-01-01 RX ADMIN — KETOROLAC TROMETHAMINE 15 MG: 30 INJECTION, SOLUTION INTRAMUSCULAR at 07:35

## 2022-01-01 RX ADMIN — AMLODIPINE BESYLATE 5 MG: 5 TABLET ORAL at 05:34

## 2022-01-01 RX ADMIN — CEFTRIAXONE SODIUM 2000 MG: 10 INJECTION, POWDER, FOR SOLUTION INTRAVENOUS at 17:51

## 2022-01-01 RX ADMIN — CEFTRIAXONE SODIUM 2 G: 2 INJECTION, POWDER, FOR SOLUTION INTRAMUSCULAR; INTRAVENOUS at 15:30

## 2022-01-01 RX ADMIN — METOPROLOL SUCCINATE 100 MG: 25 TABLET, FILM COATED, EXTENDED RELEASE ORAL at 05:29

## 2022-01-01 RX ADMIN — DIAZEPAM 5 MG: 5 TABLET ORAL at 04:28

## 2022-01-01 RX ADMIN — ALLOPURINOL 100 MG: 100 TABLET ORAL at 06:01

## 2022-01-01 RX ADMIN — Medication 1 APPLICATOR: at 16:03

## 2022-01-01 RX ADMIN — OXYCODONE HYDROCHLORIDE 20 MG: 10 TABLET ORAL at 16:50

## 2022-01-01 RX ADMIN — DAPTOMYCIN 590 MG: 500 INJECTION, POWDER, LYOPHILIZED, FOR SOLUTION INTRAVENOUS at 10:19

## 2022-01-01 RX ADMIN — HYDROMORPHONE HYDROCHLORIDE 0.5 MG: 1 INJECTION, SOLUTION INTRAMUSCULAR; INTRAVENOUS; SUBCUTANEOUS at 08:04

## 2022-01-01 RX ADMIN — HYDROMORPHONE HYDROCHLORIDE 0.4 MG: 1 INJECTION, SOLUTION INTRAMUSCULAR; INTRAVENOUS; SUBCUTANEOUS at 19:31

## 2022-01-01 RX ADMIN — LISINOPRIL 20 MG: 20 TABLET ORAL at 04:24

## 2022-01-01 RX ADMIN — HYDROMORPHONE HYDROCHLORIDE 1 MG: 1 INJECTION, SOLUTION INTRAMUSCULAR; INTRAVENOUS; SUBCUTANEOUS at 05:04

## 2022-01-01 RX ADMIN — ASPIRIN 81 MG: 81 TABLET, COATED ORAL at 04:04

## 2022-01-01 RX ADMIN — GABAPENTIN 600 MG: 300 CAPSULE ORAL at 21:45

## 2022-01-01 RX ADMIN — ACETAMINOPHEN 650 MG: 325 TABLET, FILM COATED ORAL at 21:11

## 2022-01-01 RX ADMIN — Medication 1 APPLICATOR: at 05:30

## 2022-01-01 RX ADMIN — SODIUM BICARBONATE 100 MEQ: 84 INJECTION, SOLUTION INTRAVENOUS at 20:08

## 2022-01-01 RX ADMIN — ACETAMINOPHEN 1000 MG: 500 TABLET ORAL at 09:49

## 2022-01-01 RX ADMIN — LISINOPRIL 20 MG: 20 TABLET ORAL at 05:29

## 2022-01-01 RX ADMIN — DAPTOMYCIN 620 MG: 500 INJECTION, POWDER, LYOPHILIZED, FOR SOLUTION INTRAVENOUS at 17:10

## 2022-01-01 RX ADMIN — GABAPENTIN 600 MG: 300 CAPSULE ORAL at 20:52

## 2022-01-01 RX ADMIN — HYDRALAZINE HYDROCHLORIDE 50 MG: 50 TABLET, FILM COATED ORAL at 14:25

## 2022-01-01 RX ADMIN — OXYCODONE HYDROCHLORIDE 20 MG: 10 TABLET ORAL at 10:10

## 2022-01-01 RX ADMIN — GABAPENTIN 400 MG: 400 CAPSULE ORAL at 21:14

## 2022-01-01 RX ADMIN — OXYCODONE HYDROCHLORIDE 20 MG: 10 TABLET ORAL at 07:53

## 2022-01-01 RX ADMIN — FENTANYL CITRATE 25 MCG: 50 INJECTION, SOLUTION INTRAMUSCULAR; INTRAVENOUS at 17:24

## 2022-01-01 RX ADMIN — CEFTRIAXONE SODIUM 2000 MG: 10 INJECTION, POWDER, FOR SOLUTION INTRAVENOUS at 12:00

## 2022-01-01 RX ADMIN — GABAPENTIN 400 MG: 400 CAPSULE ORAL at 06:19

## 2022-01-01 RX ADMIN — HYDROMORPHONE HYDROCHLORIDE 1 MG: 1 INJECTION, SOLUTION INTRAMUSCULAR; INTRAVENOUS; SUBCUTANEOUS at 22:36

## 2022-01-01 RX ADMIN — HYDROMORPHONE HYDROCHLORIDE 0.5 MG: 1 INJECTION, SOLUTION INTRAMUSCULAR; INTRAVENOUS; SUBCUTANEOUS at 16:40

## 2022-01-01 RX ADMIN — CYCLOBENZAPRINE 10 MG: 10 TABLET, FILM COATED ORAL at 11:38

## 2022-01-01 RX ADMIN — HYDROMORPHONE HYDROCHLORIDE 1 MG: 1 INJECTION, SOLUTION INTRAMUSCULAR; INTRAVENOUS; SUBCUTANEOUS at 03:55

## 2022-01-01 RX ADMIN — FAMOTIDINE 20 MG: 20 TABLET, FILM COATED ORAL at 04:51

## 2022-01-01 RX ADMIN — OXYCODONE HYDROCHLORIDE 10 MG: 10 TABLET ORAL at 04:46

## 2022-01-01 RX ADMIN — AMLODIPINE BESYLATE 5 MG: 5 TABLET ORAL at 06:19

## 2022-01-01 RX ADMIN — HYDROMORPHONE HYDROCHLORIDE: 10 INJECTION, SOLUTION INTRAMUSCULAR; INTRAVENOUS; SUBCUTANEOUS at 10:07

## 2022-01-01 RX ADMIN — AMLODIPINE BESYLATE 10 MG: 10 TABLET ORAL at 04:51

## 2022-01-01 RX ADMIN — FAMOTIDINE 20 MG: 20 TABLET, FILM COATED ORAL at 04:53

## 2022-01-01 RX ADMIN — LIDOCAINE 1 PATCH: 50 PATCH TOPICAL at 17:34

## 2022-01-01 RX ADMIN — FAMOTIDINE 20 MG: 20 TABLET, FILM COATED ORAL at 16:03

## 2022-01-01 RX ADMIN — PIPERACILLIN AND TAZOBACTAM 3.38 G: 3; .375 INJECTION, POWDER, LYOPHILIZED, FOR SOLUTION INTRAVENOUS; PARENTERAL at 21:34

## 2022-01-01 RX ADMIN — PREDNISONE 60 MG: 20 TABLET ORAL at 09:24

## 2022-01-01 RX ADMIN — OXYCODONE HYDROCHLORIDE 20 MG: 10 TABLET ORAL at 19:06

## 2022-01-01 RX ADMIN — Medication 1 APPLICATOR: at 17:52

## 2022-01-01 RX ADMIN — DAPTOMYCIN 620 MG: 500 INJECTION, POWDER, LYOPHILIZED, FOR SOLUTION INTRAVENOUS at 16:06

## 2022-01-01 RX ADMIN — METOPROLOL SUCCINATE 100 MG: 25 TABLET, FILM COATED, EXTENDED RELEASE ORAL at 04:47

## 2022-01-01 RX ADMIN — LISINOPRIL 20 MG: 20 TABLET ORAL at 05:35

## 2022-01-01 RX ADMIN — HYDROMORPHONE HYDROCHLORIDE 0.5 MG: 1 INJECTION, SOLUTION INTRAMUSCULAR; INTRAVENOUS; SUBCUTANEOUS at 08:05

## 2022-01-01 RX ADMIN — DEXAMETHASONE SODIUM PHOSPHATE 4 MG: 4 INJECTION, SOLUTION INTRA-ARTICULAR; INTRALESIONAL; INTRAMUSCULAR; INTRAVENOUS; SOFT TISSUE at 05:54

## 2022-01-01 RX ADMIN — GABAPENTIN 600 MG: 300 CAPSULE ORAL at 09:18

## 2022-01-01 RX ADMIN — ACETAMINOPHEN 1000 MG: 500 TABLET ORAL at 23:26

## 2022-01-01 RX ADMIN — CLOPIDOGREL BISULFATE 75 MG: 75 TABLET ORAL at 04:46

## 2022-01-01 RX ADMIN — SENNOSIDES AND DOCUSATE SODIUM 2 TABLET: 50; 8.6 TABLET ORAL at 04:46

## 2022-01-01 RX ADMIN — DEXAMETHASONE SODIUM PHOSPHATE 4 MG: 4 INJECTION, SOLUTION INTRA-ARTICULAR; INTRALESIONAL; INTRAMUSCULAR; INTRAVENOUS; SOFT TISSUE at 23:21

## 2022-01-01 RX ADMIN — GABAPENTIN 600 MG: 300 CAPSULE ORAL at 16:49

## 2022-01-01 RX ADMIN — ONDANSETRON 4 MG: 2 INJECTION INTRAMUSCULAR; INTRAVENOUS at 04:21

## 2022-01-01 RX ADMIN — OXYCODONE HYDROCHLORIDE 10 MG: 10 TABLET ORAL at 06:18

## 2022-01-01 RX ADMIN — GABAPENTIN 600 MG: 300 CAPSULE ORAL at 16:36

## 2022-01-01 RX ADMIN — ISOSORBIDE MONONITRATE 120 MG: 30 TABLET, EXTENDED RELEASE ORAL at 06:04

## 2022-01-01 RX ADMIN — CEFTRIAXONE SODIUM 2000 MG: 10 INJECTION, POWDER, FOR SOLUTION INTRAVENOUS at 17:24

## 2022-01-01 RX ADMIN — HYDROMORPHONE HYDROCHLORIDE 1 MG: 1 INJECTION, SOLUTION INTRAMUSCULAR; INTRAVENOUS; SUBCUTANEOUS at 15:28

## 2022-01-01 RX ADMIN — TAMSULOSIN HYDROCHLORIDE 0.4 MG: 0.4 CAPSULE ORAL at 09:18

## 2022-01-01 RX ADMIN — HYDRALAZINE HYDROCHLORIDE 50 MG: 50 TABLET, FILM COATED ORAL at 05:19

## 2022-01-01 RX ADMIN — Medication 1 APPLICATOR: at 04:59

## 2022-01-01 RX ADMIN — ASPIRIN 81 MG: 81 TABLET, COATED ORAL at 05:37

## 2022-01-01 RX ADMIN — ALLOPURINOL 100 MG: 100 TABLET ORAL at 05:34

## 2022-01-01 RX ADMIN — METOPROLOL SUCCINATE 100 MG: 25 TABLET, FILM COATED, EXTENDED RELEASE ORAL at 12:16

## 2022-01-01 RX ADMIN — INSULIN HUMAN 1 UNITS: 100 INJECTION, SOLUTION PARENTERAL at 18:11

## 2022-01-01 RX ADMIN — DOCUSATE SODIUM 100 MG: 100 CAPSULE, LIQUID FILLED ORAL at 04:35

## 2022-01-01 RX ADMIN — GABAPENTIN 600 MG: 300 CAPSULE ORAL at 12:24

## 2022-01-01 RX ADMIN — DOCUSATE SODIUM 50 MG AND SENNOSIDES 8.6 MG 2 TABLET: 8.6; 5 TABLET, FILM COATED ORAL at 05:37

## 2022-01-01 RX ADMIN — HYDRALAZINE HYDROCHLORIDE 50 MG: 50 TABLET, FILM COATED ORAL at 05:33

## 2022-01-01 RX ADMIN — INSULIN HUMAN 2 UNITS: 100 INJECTION, SOLUTION PARENTERAL at 20:20

## 2022-01-01 RX ADMIN — METOPROLOL SUCCINATE 100 MG: 50 TABLET, EXTENDED RELEASE ORAL at 05:26

## 2022-01-01 RX ADMIN — Medication 1 APPLICATOR: at 16:53

## 2022-01-01 RX ADMIN — DAPTOMYCIN 590 MG: 500 INJECTION, POWDER, LYOPHILIZED, FOR SOLUTION INTRAVENOUS at 12:42

## 2022-01-01 RX ADMIN — HYDROMORPHONE HYDROCHLORIDE 0.5 MG: 1 INJECTION, SOLUTION INTRAMUSCULAR; INTRAVENOUS; SUBCUTANEOUS at 22:04

## 2022-01-01 RX ADMIN — HYDROMORPHONE HYDROCHLORIDE 0.5 MG: 1 INJECTION, SOLUTION INTRAMUSCULAR; INTRAVENOUS; SUBCUTANEOUS at 21:26

## 2022-01-01 RX ADMIN — ASPIRIN 81 MG: 81 TABLET, COATED ORAL at 04:50

## 2022-01-01 RX ADMIN — ISOSORBIDE MONONITRATE 120 MG: 60 TABLET, EXTENDED RELEASE ORAL at 05:23

## 2022-01-01 RX ADMIN — VANCOMYCIN HYDROCHLORIDE 1250 MG: 500 INJECTION, POWDER, LYOPHILIZED, FOR SOLUTION INTRAVENOUS at 20:21

## 2022-01-01 RX ADMIN — OXYCODONE HYDROCHLORIDE 10 MG: 10 TABLET ORAL at 12:26

## 2022-01-01 RX ADMIN — SUGAMMADEX 200 MG: 100 INJECTION, SOLUTION INTRAVENOUS at 18:33

## 2022-01-01 RX ADMIN — DEXAMETHASONE SODIUM PHOSPHATE 4 MG: 4 INJECTION, SOLUTION INTRA-ARTICULAR; INTRALESIONAL; INTRAMUSCULAR; INTRAVENOUS; SOFT TISSUE at 11:41

## 2022-01-01 RX ADMIN — LISINOPRIL 20 MG: 20 TABLET ORAL at 05:48

## 2022-01-01 RX ADMIN — EPINEPHRINE 1 MG: 0.1 INJECTION, SOLUTION INTRAVENOUS at 19:43

## 2022-01-01 RX ADMIN — DIAZEPAM 2.5 MG: 5 INJECTION, SOLUTION INTRAMUSCULAR; INTRAVENOUS at 19:58

## 2022-01-01 RX ADMIN — PROCHLORPERAZINE EDISYLATE 5 MG: 5 INJECTION INTRAMUSCULAR; INTRAVENOUS at 08:30

## 2022-01-01 RX ADMIN — CLOPIDOGREL BISULFATE 75 MG: 75 TABLET ORAL at 05:30

## 2022-01-01 RX ADMIN — HYDRALAZINE HYDROCHLORIDE 20 MG: 20 INJECTION INTRAMUSCULAR; INTRAVENOUS at 04:51

## 2022-01-01 RX ADMIN — ALLOPURINOL 100 MG: 100 TABLET ORAL at 05:48

## 2022-01-01 RX ADMIN — GABAPENTIN 300 MG: 300 CAPSULE ORAL at 08:55

## 2022-01-01 RX ADMIN — GABAPENTIN 400 MG: 400 CAPSULE ORAL at 11:25

## 2022-01-01 RX ADMIN — METOPROLOL SUCCINATE 100 MG: 50 TABLET, EXTENDED RELEASE ORAL at 06:15

## 2022-01-01 RX ADMIN — ACETAMINOPHEN 1000 MG: 500 TABLET ORAL at 16:04

## 2022-01-01 RX ADMIN — HALOPERIDOL LACTATE 1 MG: 5 INJECTION, SOLUTION INTRAMUSCULAR at 19:44

## 2022-01-01 RX ADMIN — OXYCODONE HYDROCHLORIDE 20 MG: 10 TABLET ORAL at 04:52

## 2022-01-01 RX ADMIN — SODIUM CHLORIDE: 9 INJECTION, SOLUTION INTRAVENOUS at 06:02

## 2022-01-01 RX ADMIN — GABAPENTIN 600 MG: 300 CAPSULE ORAL at 20:29

## 2022-01-01 RX ADMIN — KETOROLAC TROMETHAMINE 30 MG: 30 INJECTION, SOLUTION INTRAMUSCULAR; INTRAVENOUS at 17:31

## 2022-01-01 RX ADMIN — HYDROMORPHONE HYDROCHLORIDE 1 MG: 1 INJECTION, SOLUTION INTRAMUSCULAR; INTRAVENOUS; SUBCUTANEOUS at 07:55

## 2022-01-01 RX ADMIN — EPINEPHRINE 1 MG: 0.1 INJECTION, SOLUTION INTRAVENOUS at 19:29

## 2022-01-01 RX ADMIN — SENNOSIDES AND DOCUSATE SODIUM 2 TABLET: 50; 8.6 TABLET ORAL at 06:04

## 2022-01-01 RX ADMIN — FAMOTIDINE 20 MG: 20 TABLET, FILM COATED ORAL at 06:01

## 2022-01-01 RX ADMIN — VANCOMYCIN HYDROCHLORIDE 1750 MG: 500 INJECTION, POWDER, LYOPHILIZED, FOR SOLUTION INTRAVENOUS at 21:08

## 2022-01-01 RX ADMIN — DIAZEPAM 5 MG: 5 TABLET ORAL at 09:03

## 2022-01-01 RX ADMIN — GABAPENTIN 600 MG: 300 CAPSULE ORAL at 12:27

## 2022-01-01 RX ADMIN — CYCLOBENZAPRINE 10 MG: 10 TABLET, FILM COATED ORAL at 00:31

## 2022-01-01 RX ADMIN — GADOTERIDOL 20 ML: 279.3 INJECTION, SOLUTION INTRAVENOUS at 19:11

## 2022-01-01 RX ADMIN — EPINEPHRINE 1 MG: 0.1 INJECTION, SOLUTION INTRAVENOUS at 19:00

## 2022-01-01 RX ADMIN — PIPERACILLIN AND TAZOBACTAM 3.38 G: 3; .375 INJECTION, POWDER, LYOPHILIZED, FOR SOLUTION INTRAVENOUS; PARENTERAL at 14:13

## 2022-01-01 RX ADMIN — CEFTRIAXONE SODIUM 2000 MG: 10 INJECTION, POWDER, FOR SOLUTION INTRAVENOUS at 13:19

## 2022-01-01 RX ADMIN — OXYCODONE HYDROCHLORIDE 20 MG: 10 TABLET ORAL at 18:13

## 2022-01-01 RX ADMIN — METOPROLOL SUCCINATE 100 MG: 25 TABLET, FILM COATED, EXTENDED RELEASE ORAL at 04:51

## 2022-01-01 RX ADMIN — METOPROLOL SUCCINATE 100 MG: 25 TABLET, FILM COATED, EXTENDED RELEASE ORAL at 05:48

## 2022-01-01 RX ADMIN — INSULIN HUMAN 2 UNITS: 100 INJECTION, SOLUTION PARENTERAL at 18:04

## 2022-01-01 RX ADMIN — NITROGLYCERIN 0.4 MG: 0.4 TABLET, ORALLY DISINTEGRATING SUBLINGUAL at 05:37

## 2022-01-01 RX ADMIN — FAMOTIDINE 20 MG: 10 INJECTION, SOLUTION INTRAVENOUS at 16:35

## 2022-01-01 RX ADMIN — ALLOPURINOL 100 MG: 100 TABLET ORAL at 12:15

## 2022-01-01 RX ADMIN — METOPROLOL SUCCINATE 100 MG: 50 TABLET, EXTENDED RELEASE ORAL at 07:53

## 2022-01-01 RX ADMIN — GABAPENTIN 600 MG: 300 CAPSULE ORAL at 17:13

## 2022-01-01 RX ADMIN — CEFTRIAXONE SODIUM 2000 MG: 10 INJECTION, POWDER, FOR SOLUTION INTRAVENOUS at 11:43

## 2022-01-01 RX ADMIN — HYDROMORPHONE HYDROCHLORIDE 0.5 MG: 1 INJECTION, SOLUTION INTRAMUSCULAR; INTRAVENOUS; SUBCUTANEOUS at 15:18

## 2022-01-01 RX ADMIN — HEPARIN SODIUM 16 UNITS/KG/HR: 5000 INJECTION, SOLUTION INTRAVENOUS at 16:34

## 2022-01-01 RX ADMIN — ACETAMINOPHEN 1000 MG: 500 TABLET ORAL at 18:11

## 2022-01-01 RX ADMIN — KETAMINE HYDROCHLORIDE 25 MG: 10 INJECTION INTRAMUSCULAR; INTRAVENOUS at 09:24

## 2022-01-01 RX ADMIN — SODIUM CHLORIDE 1000 ML: 9 INJECTION, SOLUTION INTRAVENOUS at 18:26

## 2022-01-01 RX ADMIN — TAMSULOSIN HYDROCHLORIDE 0.4 MG: 0.4 CAPSULE ORAL at 08:33

## 2022-01-01 RX ADMIN — OXYCODONE HYDROCHLORIDE 10 MG: 10 TABLET ORAL at 11:25

## 2022-01-01 RX ADMIN — OXYCODONE HYDROCHLORIDE 10 MG: 10 TABLET ORAL at 09:04

## 2022-01-01 RX ADMIN — METOCLOPRAMIDE 10 MG: 5 INJECTION, SOLUTION INTRAMUSCULAR; INTRAVENOUS at 18:06

## 2022-01-01 RX ADMIN — TEMAZEPAM 7.5 MG: 7.5 CAPSULE ORAL at 21:01

## 2022-01-01 RX ADMIN — HYDROMORPHONE HYDROCHLORIDE 1 MG: 1 INJECTION, SOLUTION INTRAMUSCULAR; INTRAVENOUS; SUBCUTANEOUS at 17:29

## 2022-01-01 RX ADMIN — EPINEPHRINE 1 MG: 0.1 INJECTION, SOLUTION INTRAVENOUS at 20:03

## 2022-01-01 RX ADMIN — INSULIN HUMAN 2 UNITS: 100 INJECTION, SOLUTION PARENTERAL at 12:38

## 2022-01-01 RX ADMIN — IOHEXOL 40 ML: 350 INJECTION, SOLUTION INTRAVENOUS at 10:33

## 2022-01-01 RX ADMIN — OXYCODONE HYDROCHLORIDE 10 MG: 10 TABLET ORAL at 23:58

## 2022-01-01 RX ADMIN — GABAPENTIN 600 MG: 300 CAPSULE ORAL at 09:43

## 2022-01-01 RX ADMIN — CEFTRIAXONE SODIUM 2 G: 2 INJECTION, POWDER, FOR SOLUTION INTRAMUSCULAR; INTRAVENOUS at 17:24

## 2022-01-01 RX ADMIN — ISOSORBIDE MONONITRATE 120 MG: 30 TABLET, EXTENDED RELEASE ORAL at 06:31

## 2022-01-01 RX ADMIN — Medication 1 APPLICATOR: at 16:19

## 2022-01-01 RX ADMIN — HYDRALAZINE HYDROCHLORIDE 50 MG: 50 TABLET, FILM COATED ORAL at 04:51

## 2022-01-01 RX ADMIN — CEFAZOLIN 2 G: 2 INJECTION, POWDER, FOR SOLUTION INTRAMUSCULAR; INTRAVENOUS at 22:30

## 2022-01-01 RX ADMIN — INSULIN HUMAN 1 UNITS: 100 INJECTION, SOLUTION PARENTERAL at 21:04

## 2022-01-01 RX ADMIN — GABAPENTIN 600 MG: 300 CAPSULE ORAL at 18:00

## 2022-01-01 RX ADMIN — HYDROMORPHONE HYDROCHLORIDE 1 MG: 1 INJECTION, SOLUTION INTRAMUSCULAR; INTRAVENOUS; SUBCUTANEOUS at 19:16

## 2022-01-01 RX ADMIN — CEFTRIAXONE SODIUM 2000 MG: 10 INJECTION, POWDER, FOR SOLUTION INTRAVENOUS at 05:48

## 2022-01-01 RX ADMIN — HYDROMORPHONE HYDROCHLORIDE 1 MG: 1 INJECTION, SOLUTION INTRAMUSCULAR; INTRAVENOUS; SUBCUTANEOUS at 09:52

## 2022-01-01 RX ADMIN — OXYCODONE HYDROCHLORIDE 20 MG: 10 TABLET ORAL at 11:01

## 2022-01-01 RX ADMIN — OXYCODONE HYDROCHLORIDE 20 MG: 10 TABLET ORAL at 12:19

## 2022-01-01 RX ADMIN — CEFTRIAXONE SODIUM 2 G: 2 INJECTION, POWDER, FOR SOLUTION INTRAMUSCULAR; INTRAVENOUS at 17:00

## 2022-01-01 RX ADMIN — METOPROLOL SUCCINATE 100 MG: 25 TABLET, FILM COATED, EXTENDED RELEASE ORAL at 05:22

## 2022-01-01 RX ADMIN — SODIUM CHLORIDE: 9 INJECTION, SOLUTION INTRAVENOUS at 00:34

## 2022-01-01 RX ADMIN — METOPROLOL SUCCINATE 100 MG: 25 TABLET, FILM COATED, EXTENDED RELEASE ORAL at 06:07

## 2022-01-01 RX ADMIN — HYDROMORPHONE HYDROCHLORIDE 0.5 MG: 1 INJECTION, SOLUTION INTRAMUSCULAR; INTRAVENOUS; SUBCUTANEOUS at 17:05

## 2022-01-01 RX ADMIN — CYCLOBENZAPRINE 10 MG: 10 TABLET, FILM COATED ORAL at 13:57

## 2022-01-01 RX ADMIN — LISINOPRIL 20 MG: 20 TABLET ORAL at 05:22

## 2022-01-01 RX ADMIN — ISOSORBIDE MONONITRATE 120 MG: 60 TABLET, EXTENDED RELEASE ORAL at 05:36

## 2022-01-01 RX ADMIN — LISINOPRIL 20 MG: 20 TABLET ORAL at 15:53

## 2022-01-01 RX ADMIN — ALLOPURINOL 100 MG: 100 TABLET ORAL at 04:47

## 2022-01-01 RX ADMIN — ISOSORBIDE MONONITRATE 120 MG: 60 TABLET, EXTENDED RELEASE ORAL at 04:42

## 2022-01-01 RX ADMIN — GABAPENTIN 600 MG: 300 CAPSULE ORAL at 09:09

## 2022-01-01 RX ADMIN — TAMSULOSIN HYDROCHLORIDE 0.4 MG: 0.4 CAPSULE ORAL at 08:32

## 2022-01-01 RX ADMIN — VANCOMYCIN HYDROCHLORIDE 1000 MG: 500 INJECTION, POWDER, LYOPHILIZED, FOR SOLUTION INTRAVENOUS at 13:36

## 2022-01-01 RX ADMIN — TEMAZEPAM 7.5 MG: 7.5 CAPSULE ORAL at 22:02

## 2022-01-01 RX ADMIN — FAMOTIDINE 20 MG: 20 TABLET, FILM COATED ORAL at 17:13

## 2022-01-01 RX ADMIN — CYCLOBENZAPRINE 10 MG: 10 TABLET, FILM COATED ORAL at 15:38

## 2022-01-01 RX ADMIN — TAMSULOSIN HYDROCHLORIDE 0.4 MG: 0.4 CAPSULE ORAL at 07:50

## 2022-01-01 RX ADMIN — CEFTRIAXONE SODIUM 2000 MG: 10 INJECTION, POWDER, FOR SOLUTION INTRAVENOUS at 20:03

## 2022-01-01 RX ADMIN — LIDOCAINE HYDROCHLORIDE: 20 INJECTION, SOLUTION INFILTRATION; PERINEURAL at 10:00

## 2022-01-01 RX ADMIN — INSULIN HUMAN 1 UNITS: 100 INJECTION, SOLUTION PARENTERAL at 08:36

## 2022-01-01 RX ADMIN — ACETAMINOPHEN 1000 MG: 500 TABLET ORAL at 17:35

## 2022-01-01 RX ADMIN — ACETAMINOPHEN 1000 MG: 500 TABLET ORAL at 22:27

## 2022-01-01 RX ADMIN — INSULIN HUMAN 1 UNITS: 100 INJECTION, SOLUTION PARENTERAL at 08:46

## 2022-01-01 RX ADMIN — FAMOTIDINE 20 MG: 20 TABLET, FILM COATED ORAL at 18:01

## 2022-01-01 RX ADMIN — CLOPIDOGREL BISULFATE 75 MG: 75 TABLET ORAL at 06:04

## 2022-01-01 RX ADMIN — PROPOFOL 20 MCG/KG/MIN: 10 INJECTION, EMULSION INTRAVENOUS at 18:32

## 2022-01-01 RX ADMIN — DEXAMETHASONE SODIUM PHOSPHATE 4 MG: 4 INJECTION, SOLUTION INTRA-ARTICULAR; INTRALESIONAL; INTRAMUSCULAR; INTRAVENOUS; SOFT TISSUE at 03:21

## 2022-01-01 RX ADMIN — PIPERACILLIN AND TAZOBACTAM 3.38 G: 3; .375 INJECTION, POWDER, LYOPHILIZED, FOR SOLUTION INTRAVENOUS; PARENTERAL at 22:23

## 2022-01-01 RX ADMIN — CLOPIDOGREL BISULFATE 75 MG: 75 TABLET ORAL at 04:54

## 2022-01-01 RX ADMIN — ISOSORBIDE MONONITRATE 120 MG: 60 TABLET, EXTENDED RELEASE ORAL at 06:00

## 2022-01-01 RX ADMIN — ISOSORBIDE MONONITRATE 120 MG: 60 TABLET, EXTENDED RELEASE ORAL at 05:50

## 2022-01-01 RX ADMIN — AMLODIPINE BESYLATE 10 MG: 10 TABLET ORAL at 04:05

## 2022-01-01 RX ADMIN — DAPTOMYCIN 620 MG: 500 INJECTION, POWDER, LYOPHILIZED, FOR SOLUTION INTRAVENOUS at 17:00

## 2022-01-01 RX ADMIN — DIAZEPAM 5 MG: 5 TABLET ORAL at 04:13

## 2022-01-01 RX ADMIN — GABAPENTIN 600 MG: 300 CAPSULE ORAL at 13:02

## 2022-01-01 RX ADMIN — VANCOMYCIN HYDROCHLORIDE 1250 MG: 500 INJECTION, POWDER, LYOPHILIZED, FOR SOLUTION INTRAVENOUS at 08:44

## 2022-01-01 RX ADMIN — GABAPENTIN 600 MG: 300 CAPSULE ORAL at 17:27

## 2022-01-01 RX ADMIN — METOPROLOL SUCCINATE 100 MG: 25 TABLET, FILM COATED, EXTENDED RELEASE ORAL at 10:00

## 2022-01-01 RX ADMIN — ACETAMINOPHEN 1000 MG: 500 TABLET ORAL at 11:56

## 2022-01-01 RX ADMIN — GABAPENTIN 400 MG: 400 CAPSULE ORAL at 12:10

## 2022-01-01 RX ADMIN — OXYCODONE HYDROCHLORIDE 10 MG: 10 TABLET ORAL at 05:45

## 2022-01-01 RX ADMIN — Medication 100 MCG: at 17:07

## 2022-01-01 RX ADMIN — LIDOCAINE 1 PATCH: 50 PATCH TOPICAL at 19:37

## 2022-01-01 RX ADMIN — POTASSIUM CHLORIDE 40 MEQ: 1500 TABLET, EXTENDED RELEASE ORAL at 17:52

## 2022-01-01 RX ADMIN — CYCLOBENZAPRINE 10 MG: 10 TABLET, FILM COATED ORAL at 19:50

## 2022-01-01 RX ADMIN — DEXAMETHASONE SODIUM PHOSPHATE 4 MG: 4 INJECTION, SOLUTION INTRA-ARTICULAR; INTRALESIONAL; INTRAMUSCULAR; INTRAVENOUS; SOFT TISSUE at 12:11

## 2022-01-01 RX ADMIN — OXYCODONE HYDROCHLORIDE 10 MG: 10 TABLET ORAL at 17:25

## 2022-01-01 RX ADMIN — GABAPENTIN 600 MG: 300 CAPSULE ORAL at 08:09

## 2022-01-01 RX ADMIN — DEXAMETHASONE SODIUM PHOSPHATE 4 MG: 4 INJECTION, SOLUTION INTRA-ARTICULAR; INTRALESIONAL; INTRAMUSCULAR; INTRAVENOUS; SOFT TISSUE at 10:54

## 2022-01-01 RX ADMIN — ROCURONIUM BROMIDE 10 MG: 10 INJECTION, SOLUTION INTRAVENOUS at 17:55

## 2022-01-01 RX ADMIN — GABAPENTIN 600 MG: 300 CAPSULE ORAL at 18:08

## 2022-01-01 RX ADMIN — ACETAMINOPHEN 1000 MG: 500 TABLET ORAL at 17:25

## 2022-01-01 RX ADMIN — DEXAMETHASONE 4 MG: 4 TABLET ORAL at 04:24

## 2022-01-01 RX ADMIN — Medication 1 APPLICATOR: at 16:51

## 2022-01-01 RX ADMIN — GABAPENTIN 300 MG: 300 CAPSULE ORAL at 14:37

## 2022-01-01 RX ADMIN — FAMOTIDINE 20 MG: 20 TABLET, FILM COATED ORAL at 05:34

## 2022-01-01 RX ADMIN — FAMOTIDINE 20 MG: 20 TABLET, FILM COATED ORAL at 05:53

## 2022-01-01 RX ADMIN — GABAPENTIN 400 MG: 400 CAPSULE ORAL at 05:48

## 2022-01-01 RX ADMIN — GABAPENTIN 400 MG: 400 CAPSULE ORAL at 04:36

## 2022-01-01 RX ADMIN — GABAPENTIN 600 MG: 300 CAPSULE ORAL at 14:24

## 2022-01-01 RX ADMIN — HYDROMORPHONE HYDROCHLORIDE 0.5 MG: 1 INJECTION, SOLUTION INTRAMUSCULAR; INTRAVENOUS; SUBCUTANEOUS at 00:18

## 2022-01-01 RX ADMIN — CEFTRIAXONE SODIUM 2000 MG: 10 INJECTION, POWDER, FOR SOLUTION INTRAVENOUS at 22:44

## 2022-01-01 RX ADMIN — RANOLAZINE 500 MG: 500 TABLET, EXTENDED RELEASE ORAL at 07:53

## 2022-01-01 RX ADMIN — HYDROMORPHONE HYDROCHLORIDE 1 MG: 1 INJECTION, SOLUTION INTRAMUSCULAR; INTRAVENOUS; SUBCUTANEOUS at 20:51

## 2022-01-01 RX ADMIN — HYDROMORPHONE HYDROCHLORIDE 0.5 MG: 1 INJECTION, SOLUTION INTRAMUSCULAR; INTRAVENOUS; SUBCUTANEOUS at 13:11

## 2022-01-01 RX ADMIN — INSULIN HUMAN 1 UNITS: 100 INJECTION, SOLUTION PARENTERAL at 20:24

## 2022-01-01 RX ADMIN — METOPROLOL SUCCINATE 100 MG: 25 TABLET, FILM COATED, EXTENDED RELEASE ORAL at 04:29

## 2022-01-01 RX ADMIN — ISOSORBIDE MONONITRATE 120 MG: 60 TABLET, EXTENDED RELEASE ORAL at 05:21

## 2022-01-01 RX ADMIN — ACETAMINOPHEN 1000 MG: 500 TABLET ORAL at 04:58

## 2022-01-01 RX ADMIN — TAMSULOSIN HYDROCHLORIDE 0.4 MG: 0.4 CAPSULE ORAL at 09:04

## 2022-01-01 RX ADMIN — OXYCODONE HYDROCHLORIDE 10 MG: 10 TABLET ORAL at 04:16

## 2022-01-01 RX ADMIN — ASPIRIN 81 MG: 81 TABLET, COATED ORAL at 06:04

## 2022-01-01 RX ADMIN — NITROGLYCERIN 0.4 MG: 0.4 TABLET, ORALLY DISINTEGRATING SUBLINGUAL at 14:27

## 2022-01-01 RX ADMIN — ISOSORBIDE MONONITRATE 120 MG: 30 TABLET, EXTENDED RELEASE ORAL at 05:37

## 2022-01-01 RX ADMIN — TEMAZEPAM 7.5 MG: 7.5 CAPSULE ORAL at 20:26

## 2022-01-01 RX ADMIN — GABAPENTIN 600 MG: 300 CAPSULE ORAL at 20:27

## 2022-01-01 RX ADMIN — AMPICILLIN AND SULBACTAM 3 G: 1; 2 INJECTION, POWDER, FOR SOLUTION INTRAMUSCULAR; INTRAVENOUS at 18:04

## 2022-01-01 RX ADMIN — ASPIRIN 81 MG: 81 TABLET, COATED ORAL at 06:31

## 2022-01-01 RX ADMIN — FAMOTIDINE 20 MG: 20 TABLET, FILM COATED ORAL at 17:02

## 2022-01-01 RX ADMIN — HYDROMORPHONE HYDROCHLORIDE 0.4 MG: 2 INJECTION INTRAMUSCULAR; INTRAVENOUS; SUBCUTANEOUS at 18:25

## 2022-01-01 RX ADMIN — ACETAMINOPHEN 1000 MG: 500 TABLET ORAL at 06:06

## 2022-01-01 RX ADMIN — HYDROMORPHONE HYDROCHLORIDE 0.5 MG: 1 INJECTION, SOLUTION INTRAMUSCULAR; INTRAVENOUS; SUBCUTANEOUS at 11:38

## 2022-01-01 RX ADMIN — HYDROMORPHONE HYDROCHLORIDE: 10 INJECTION, SOLUTION INTRAMUSCULAR; INTRAVENOUS; SUBCUTANEOUS at 20:58

## 2022-01-01 RX ADMIN — LABETALOL HYDROCHLORIDE 10 MG: 5 INJECTION, SOLUTION INTRAVENOUS at 09:26

## 2022-01-01 RX ADMIN — GABAPENTIN 600 MG: 300 CAPSULE ORAL at 09:03

## 2022-01-01 RX ADMIN — ISOSORBIDE MONONITRATE 120 MG: 30 TABLET, EXTENDED RELEASE ORAL at 04:46

## 2022-01-01 RX ADMIN — OXYCODONE HYDROCHLORIDE 20 MG: 10 TABLET ORAL at 01:11

## 2022-01-01 RX ADMIN — OXYCODONE HYDROCHLORIDE 20 MG: 10 TABLET ORAL at 05:29

## 2022-01-01 RX ADMIN — LORAZEPAM 1 MG: 2 INJECTION INTRAMUSCULAR; INTRAVENOUS at 23:48

## 2022-01-01 RX ADMIN — DEXAMETHASONE 4 MG: 4 TABLET ORAL at 04:29

## 2022-01-01 RX ADMIN — FUROSEMIDE 80 MG: 10 INJECTION, SOLUTION INTRAMUSCULAR; INTRAVENOUS at 05:31

## 2022-01-01 RX ADMIN — LISINOPRIL 20 MG: 20 TABLET ORAL at 04:30

## 2022-01-01 RX ADMIN — TEMAZEPAM 7.5 MG: 7.5 CAPSULE ORAL at 21:23

## 2022-01-01 RX ADMIN — GABAPENTIN 600 MG: 300 CAPSULE ORAL at 17:01

## 2022-01-01 RX ADMIN — GABAPENTIN 600 MG: 300 CAPSULE ORAL at 20:42

## 2022-01-01 RX ADMIN — ACETAMINOPHEN 1000 MG: 500 TABLET ORAL at 00:19

## 2022-01-01 RX ADMIN — FENTANYL CITRATE 25 MCG: 50 INJECTION, SOLUTION INTRAMUSCULAR; INTRAVENOUS at 18:31

## 2022-01-01 RX ADMIN — HYDROMORPHONE HYDROCHLORIDE 0.4 MG: 1 INJECTION, SOLUTION INTRAMUSCULAR; INTRAVENOUS; SUBCUTANEOUS at 19:54

## 2022-01-01 RX ADMIN — HEPARIN SODIUM 12 UNITS/KG/HR: 5000 INJECTION, SOLUTION INTRAVENOUS at 20:56

## 2022-01-01 RX ADMIN — DEXAMETHASONE SODIUM PHOSPHATE 4 MG: 4 INJECTION, SOLUTION INTRA-ARTICULAR; INTRALESIONAL; INTRAMUSCULAR; INTRAVENOUS; SOFT TISSUE at 05:35

## 2022-01-01 RX ADMIN — HYDRALAZINE HYDROCHLORIDE 50 MG: 50 TABLET, FILM COATED ORAL at 21:26

## 2022-01-01 RX ADMIN — DEXAMETHASONE 4 MG: 4 TABLET ORAL at 18:08

## 2022-01-01 RX ADMIN — ISOSORBIDE MONONITRATE 120 MG: 60 TABLET, EXTENDED RELEASE ORAL at 06:01

## 2022-01-01 RX ADMIN — GABAPENTIN 300 MG: 300 CAPSULE ORAL at 17:38

## 2022-01-01 RX ADMIN — ASPIRIN 81 MG: 81 TABLET, COATED ORAL at 04:53

## 2022-01-01 RX ADMIN — CEFTRIAXONE SODIUM 2 G: 10 INJECTION, POWDER, FOR SOLUTION INTRAVENOUS at 12:51

## 2022-01-01 RX ADMIN — OXYCODONE HYDROCHLORIDE 20 MG: 10 TABLET ORAL at 17:31

## 2022-01-01 RX ADMIN — TEMAZEPAM 7.5 MG: 7.5 CAPSULE ORAL at 22:22

## 2022-01-01 RX ADMIN — DEXAMETHASONE SODIUM PHOSPHATE 4 MG: 4 INJECTION, SOLUTION INTRA-ARTICULAR; INTRALESIONAL; INTRAMUSCULAR; INTRAVENOUS; SOFT TISSUE at 22:29

## 2022-01-01 RX ADMIN — MAGNESIUM SULFATE HEPTAHYDRATE 2 G: 40 INJECTION, SOLUTION INTRAVENOUS at 01:33

## 2022-01-01 RX ADMIN — ACETAMINOPHEN 1000 MG: 500 TABLET ORAL at 12:37

## 2022-01-01 RX ADMIN — DEXAMETHASONE SODIUM PHOSPHATE 4 MG: 4 INJECTION, SOLUTION INTRA-ARTICULAR; INTRALESIONAL; INTRAMUSCULAR; INTRAVENOUS; SOFT TISSUE at 04:53

## 2022-01-01 RX ADMIN — CYCLOBENZAPRINE 10 MG: 10 TABLET, FILM COATED ORAL at 09:21

## 2022-01-01 RX ADMIN — HYDROMORPHONE HYDROCHLORIDE 1 MG: 1 INJECTION, SOLUTION INTRAMUSCULAR; INTRAVENOUS; SUBCUTANEOUS at 01:39

## 2022-01-01 RX ADMIN — INSULIN GLARGINE-YFGN 15 UNITS: 100 INJECTION, SOLUTION SUBCUTANEOUS at 17:34

## 2022-01-01 RX ADMIN — KETOROLAC TROMETHAMINE 30 MG: 30 INJECTION, SOLUTION INTRAMUSCULAR; INTRAVENOUS at 10:41

## 2022-01-01 RX ADMIN — ISOSORBIDE MONONITRATE 120 MG: 60 TABLET, EXTENDED RELEASE ORAL at 04:24

## 2022-01-01 RX ADMIN — HYDROMORPHONE HYDROCHLORIDE 0.5 MG: 1 INJECTION, SOLUTION INTRAMUSCULAR; INTRAVENOUS; SUBCUTANEOUS at 07:39

## 2022-01-01 RX ADMIN — SODIUM CHLORIDE: 9 INJECTION, SOLUTION INTRAVENOUS at 13:45

## 2022-01-01 RX ADMIN — KETOROLAC TROMETHAMINE 30 MG: 30 INJECTION, SOLUTION INTRAMUSCULAR; INTRAVENOUS at 18:25

## 2022-01-01 RX ADMIN — TAMSULOSIN HYDROCHLORIDE 0.4 MG: 0.4 CAPSULE ORAL at 11:49

## 2022-01-01 RX ADMIN — EPINEPHRINE 0.3 MCG/KG/MIN: 1 INJECTION INTRAMUSCULAR; INTRAVENOUS; SUBCUTANEOUS at 17:57

## 2022-01-01 RX ADMIN — NITROGLYCERIN 0.4 MG: 0.4 TABLET, ORALLY DISINTEGRATING SUBLINGUAL at 14:40

## 2022-01-01 RX ADMIN — OXYCODONE HYDROCHLORIDE 20 MG: 10 TABLET ORAL at 02:38

## 2022-01-01 RX ADMIN — CLOPIDOGREL BISULFATE 75 MG: 75 TABLET ORAL at 15:53

## 2022-01-01 RX ADMIN — HYDROMORPHONE HYDROCHLORIDE 1 MG: 1 INJECTION, SOLUTION INTRAMUSCULAR; INTRAVENOUS; SUBCUTANEOUS at 11:26

## 2022-01-01 RX ADMIN — AMLODIPINE BESYLATE 10 MG: 10 TABLET ORAL at 04:50

## 2022-01-01 RX ADMIN — ASPIRIN 81 MG: 81 TABLET, COATED ORAL at 04:52

## 2022-01-01 RX ADMIN — Medication 1 APPLICATOR: at 04:41

## 2022-01-01 RX ADMIN — CLOPIDOGREL BISULFATE 75 MG: 75 TABLET ORAL at 05:22

## 2022-01-01 RX ADMIN — AMLODIPINE BESYLATE 10 MG: 10 TABLET ORAL at 04:28

## 2022-01-01 RX ADMIN — SODIUM CHLORIDE: 9 INJECTION, SOLUTION INTRAVENOUS at 00:30

## 2022-01-01 RX ADMIN — ALLOPURINOL 100 MG: 100 TABLET ORAL at 04:30

## 2022-01-01 RX ADMIN — AMINOPHYLLINE 100 MG: 25 INJECTION, SOLUTION INTRAVENOUS at 09:00

## 2022-01-01 RX ADMIN — ACETAMINOPHEN 650 MG: 325 TABLET, FILM COATED ORAL at 20:04

## 2022-01-01 RX ADMIN — MIDAZOLAM HYDROCHLORIDE 2 MG: 1 INJECTION, SOLUTION INTRAMUSCULAR; INTRAVENOUS at 17:24

## 2022-01-01 RX ADMIN — ACETAMINOPHEN 1000 MG: 500 TABLET ORAL at 22:16

## 2022-01-01 RX ADMIN — DEXAMETHASONE SODIUM PHOSPHATE 4 MG: 4 INJECTION, SOLUTION INTRA-ARTICULAR; INTRALESIONAL; INTRAMUSCULAR; INTRAVENOUS; SOFT TISSUE at 17:00

## 2022-01-01 RX ADMIN — TAMSULOSIN HYDROCHLORIDE 0.4 MG: 0.4 CAPSULE ORAL at 08:34

## 2022-01-01 RX ADMIN — ISOSORBIDE MONONITRATE 120 MG: 60 TABLET, EXTENDED RELEASE ORAL at 06:07

## 2022-01-01 RX ADMIN — HYDROMORPHONE HYDROCHLORIDE 0.5 MG: 1 INJECTION, SOLUTION INTRAMUSCULAR; INTRAVENOUS; SUBCUTANEOUS at 20:06

## 2022-01-01 RX ADMIN — EPHEDRINE SULFATE 10 MG: 50 INJECTION, SOLUTION INTRAVENOUS at 17:55

## 2022-01-01 RX ADMIN — OXYCODONE HYDROCHLORIDE 10 MG: 10 TABLET ORAL at 09:59

## 2022-01-01 RX ADMIN — AMLODIPINE BESYLATE 5 MG: 5 TABLET ORAL at 05:48

## 2022-01-01 RX ADMIN — OXYCODONE HYDROCHLORIDE 10 MG: 10 TABLET ORAL at 00:23

## 2022-01-01 RX ADMIN — Medication 1 APPLICATOR: at 18:12

## 2022-01-01 RX ADMIN — GABAPENTIN 600 MG: 300 CAPSULE ORAL at 08:34

## 2022-01-01 RX ADMIN — HYDRALAZINE HYDROCHLORIDE 50 MG: 50 TABLET, FILM COATED ORAL at 13:43

## 2022-01-01 RX ADMIN — HYDROMORPHONE HYDROCHLORIDE 1 MG: 1 INJECTION, SOLUTION INTRAMUSCULAR; INTRAVENOUS; SUBCUTANEOUS at 19:27

## 2022-01-01 RX ADMIN — INSULIN HUMAN 2 UNITS: 100 INJECTION, SOLUTION PARENTERAL at 17:32

## 2022-01-01 RX ADMIN — HYDROMORPHONE HYDROCHLORIDE 1 MG: 1 INJECTION, SOLUTION INTRAMUSCULAR; INTRAVENOUS; SUBCUTANEOUS at 07:43

## 2022-01-01 RX ADMIN — HYDRALAZINE HYDROCHLORIDE 50 MG: 50 TABLET, FILM COATED ORAL at 13:58

## 2022-01-01 RX ADMIN — HYDRALAZINE HYDROCHLORIDE 50 MG: 50 TABLET, FILM COATED ORAL at 16:03

## 2022-01-01 RX ADMIN — ISOSORBIDE MONONITRATE 120 MG: 60 TABLET, EXTENDED RELEASE ORAL at 04:55

## 2022-01-01 RX ADMIN — HYDROMORPHONE HYDROCHLORIDE 0.5 MG: 1 INJECTION, SOLUTION INTRAMUSCULAR; INTRAVENOUS; SUBCUTANEOUS at 17:21

## 2022-01-01 RX ADMIN — VANCOMYCIN HYDROCHLORIDE 1000 MG: 500 INJECTION, POWDER, LYOPHILIZED, FOR SOLUTION INTRAVENOUS at 01:20

## 2022-01-01 RX ADMIN — ACETAMINOPHEN 1000 MG: 500 TABLET ORAL at 16:40

## 2022-01-01 RX ADMIN — DEXAMETHASONE 4 MG: 4 TABLET ORAL at 16:49

## 2022-01-01 RX ADMIN — GABAPENTIN 600 MG: 300 CAPSULE ORAL at 12:12

## 2022-01-01 RX ADMIN — ASPIRIN 81 MG: 81 TABLET, COATED ORAL at 12:16

## 2022-01-01 RX ADMIN — DAPTOMYCIN 620 MG: 500 INJECTION, POWDER, LYOPHILIZED, FOR SOLUTION INTRAVENOUS at 17:24

## 2022-01-01 RX ADMIN — ASPIRIN 81 MG: 81 TABLET, COATED ORAL at 05:26

## 2022-01-01 RX ADMIN — DAPTOMYCIN 620 MG: 500 INJECTION, POWDER, LYOPHILIZED, FOR SOLUTION INTRAVENOUS at 17:09

## 2022-01-01 RX ADMIN — PROPOFOL 50 MG: 10 INJECTION, EMULSION INTRAVENOUS at 18:25

## 2022-01-01 RX ADMIN — DAPTOMYCIN 590 MG: 500 INJECTION, POWDER, LYOPHILIZED, FOR SOLUTION INTRAVENOUS at 11:44

## 2022-01-01 RX ADMIN — OXYCODONE HYDROCHLORIDE 20 MG: 10 TABLET ORAL at 05:23

## 2022-01-01 RX ADMIN — RANOLAZINE 500 MG: 500 TABLET, EXTENDED RELEASE ORAL at 17:52

## 2022-01-01 RX ADMIN — EPINEPHRINE 1 MG: 0.1 INJECTION, SOLUTION INTRAVENOUS at 19:52

## 2022-01-01 RX ADMIN — ONDANSETRON 4 MG: 2 INJECTION INTRAMUSCULAR; INTRAVENOUS at 05:37

## 2022-01-01 RX ADMIN — INSULIN HUMAN 1 UNITS: 100 INJECTION, SOLUTION PARENTERAL at 08:50

## 2022-01-01 RX ADMIN — DAPTOMYCIN 620 MG: 500 INJECTION, POWDER, LYOPHILIZED, FOR SOLUTION INTRAVENOUS at 17:47

## 2022-01-01 RX ADMIN — LISINOPRIL 20 MG: 20 TABLET ORAL at 12:16

## 2022-01-01 RX ADMIN — ACETAMINOPHEN 1000 MG: 500 TABLET ORAL at 12:49

## 2022-01-01 RX ADMIN — FAMOTIDINE 20 MG: 20 TABLET, FILM COATED ORAL at 05:21

## 2022-01-01 RX ADMIN — MAGNESIUM SULFATE HEPTAHYDRATE 2 G: 40 INJECTION, SOLUTION INTRAVENOUS at 14:29

## 2022-01-01 RX ADMIN — ONDANSETRON 4 MG: 2 INJECTION INTRAMUSCULAR; INTRAVENOUS at 10:57

## 2022-01-01 RX ADMIN — AMLODIPINE BESYLATE 10 MG: 10 TABLET ORAL at 05:28

## 2022-01-01 RX ADMIN — FAMOTIDINE 20 MG: 20 TABLET, FILM COATED ORAL at 17:25

## 2022-01-01 RX ADMIN — SENNOSIDES AND DOCUSATE SODIUM 2 TABLET: 50; 8.6 TABLET ORAL at 06:30

## 2022-01-01 RX ADMIN — ISOSORBIDE MONONITRATE 120 MG: 30 TABLET, EXTENDED RELEASE ORAL at 06:15

## 2022-01-01 RX ADMIN — HYDROMORPHONE HYDROCHLORIDE 1 MG: 1 INJECTION, SOLUTION INTRAMUSCULAR; INTRAVENOUS; SUBCUTANEOUS at 08:27

## 2022-01-01 RX ADMIN — ISOSORBIDE MONONITRATE 120 MG: 60 TABLET, EXTENDED RELEASE ORAL at 04:31

## 2022-01-01 RX ADMIN — AMLODIPINE BESYLATE 5 MG: 5 TABLET ORAL at 04:35

## 2022-01-01 RX ADMIN — ONDANSETRON 4 MG: 2 INJECTION INTRAMUSCULAR; INTRAVENOUS at 19:43

## 2022-01-01 RX ADMIN — ACETAMINOPHEN 1000 MG: 500 TABLET ORAL at 13:34

## 2022-01-01 RX ADMIN — FENTANYL CITRATE 100 MCG: 50 INJECTION, SOLUTION INTRAMUSCULAR; INTRAVENOUS at 10:08

## 2022-01-01 RX ADMIN — ISOSORBIDE MONONITRATE 120 MG: 60 TABLET, EXTENDED RELEASE ORAL at 04:04

## 2022-01-01 RX ADMIN — GADOTERIDOL 20 ML: 279.3 INJECTION, SOLUTION INTRAVENOUS at 20:57

## 2022-01-01 RX ADMIN — HYDROMORPHONE HYDROCHLORIDE 1 MG: 1 INJECTION, SOLUTION INTRAMUSCULAR; INTRAVENOUS; SUBCUTANEOUS at 01:03

## 2022-01-01 RX ADMIN — HYDRALAZINE HYDROCHLORIDE 50 MG: 50 TABLET, FILM COATED ORAL at 04:23

## 2022-01-01 RX ADMIN — PROCHLORPERAZINE EDISYLATE 5 MG: 5 INJECTION INTRAMUSCULAR; INTRAVENOUS at 02:01

## 2022-01-01 RX ADMIN — HYDROMORPHONE HYDROCHLORIDE 0.5 MG: 1 INJECTION, SOLUTION INTRAMUSCULAR; INTRAVENOUS; SUBCUTANEOUS at 12:27

## 2022-01-01 RX ADMIN — OXYCODONE HYDROCHLORIDE 10 MG: 10 TABLET ORAL at 16:03

## 2022-01-01 RX ADMIN — IOHEXOL 100 ML: 350 INJECTION, SOLUTION INTRAVENOUS at 15:40

## 2022-01-01 RX ADMIN — ACETAMINOPHEN 1000 MG: 500 TABLET ORAL at 05:53

## 2022-01-01 RX ADMIN — FAMOTIDINE 20 MG: 10 INJECTION, SOLUTION INTRAVENOUS at 04:53

## 2022-01-01 RX ADMIN — FAMOTIDINE 20 MG: 20 TABLET, FILM COATED ORAL at 05:20

## 2022-01-01 RX ADMIN — SODIUM CHLORIDE: 9 INJECTION, SOLUTION INTRAVENOUS at 23:34

## 2022-01-01 RX ADMIN — OXYCODONE HYDROCHLORIDE 20 MG: 10 TABLET ORAL at 20:42

## 2022-01-01 RX ADMIN — HYDROMORPHONE HYDROCHLORIDE 0.5 MG: 1 INJECTION, SOLUTION INTRAMUSCULAR; INTRAVENOUS; SUBCUTANEOUS at 06:56

## 2022-01-01 RX ADMIN — AMLODIPINE BESYLATE 5 MG: 5 TABLET ORAL at 04:53

## 2022-01-01 RX ADMIN — CEFTRIAXONE SODIUM 2000 MG: 10 INJECTION, POWDER, FOR SOLUTION INTRAVENOUS at 12:25

## 2022-01-01 RX ADMIN — METOPROLOL SUCCINATE 100 MG: 25 TABLET, FILM COATED, EXTENDED RELEASE ORAL at 05:32

## 2022-01-01 RX ADMIN — HYDROMORPHONE HYDROCHLORIDE 0.5 MG: 1 INJECTION, SOLUTION INTRAMUSCULAR; INTRAVENOUS; SUBCUTANEOUS at 18:15

## 2022-01-01 RX ADMIN — GABAPENTIN 600 MG: 300 CAPSULE ORAL at 13:00

## 2022-01-01 RX ADMIN — HYDRALAZINE HYDROCHLORIDE 50 MG: 50 TABLET, FILM COATED ORAL at 04:55

## 2022-01-01 RX ADMIN — DOCUSATE SODIUM 100 MG: 100 CAPSULE, LIQUID FILLED ORAL at 17:52

## 2022-01-01 RX ADMIN — FAMOTIDINE 20 MG: 20 TABLET, FILM COATED ORAL at 17:52

## 2022-01-01 RX ADMIN — HEPARIN SODIUM 2000 UNITS: 200 INJECTION, SOLUTION INTRAVENOUS at 09:59

## 2022-01-01 RX ADMIN — ONDANSETRON 4 MG: 2 INJECTION INTRAMUSCULAR; INTRAVENOUS at 08:54

## 2022-01-01 RX ADMIN — OXYCODONE HYDROCHLORIDE 20 MG: 10 TABLET ORAL at 09:42

## 2022-01-01 RX ADMIN — GABAPENTIN 600 MG: 300 CAPSULE ORAL at 21:15

## 2022-01-01 RX ADMIN — OXYCODONE HYDROCHLORIDE 20 MG: 10 TABLET ORAL at 04:04

## 2022-01-01 RX ADMIN — GABAPENTIN 300 MG: 300 CAPSULE ORAL at 12:18

## 2022-01-01 RX ADMIN — VANCOMYCIN HYDROCHLORIDE 1750 MG: 500 INJECTION, POWDER, LYOPHILIZED, FOR SOLUTION INTRAVENOUS at 21:48

## 2022-01-01 RX ADMIN — CEFTRIAXONE SODIUM 2000 MG: 10 INJECTION, POWDER, FOR SOLUTION INTRAVENOUS at 06:21

## 2022-01-01 RX ADMIN — OXYCODONE HYDROCHLORIDE 20 MG: 10 TABLET ORAL at 15:03

## 2022-01-01 RX ADMIN — HYDROMORPHONE HYDROCHLORIDE 1 MG: 1 INJECTION, SOLUTION INTRAMUSCULAR; INTRAVENOUS; SUBCUTANEOUS at 18:37

## 2022-01-01 RX ADMIN — OXYCODONE HYDROCHLORIDE 20 MG: 10 TABLET ORAL at 04:53

## 2022-01-01 RX ADMIN — TEMAZEPAM 7.5 MG: 7.5 CAPSULE ORAL at 22:57

## 2022-01-01 RX ADMIN — LIDOCAINE HYDROCHLORIDE 100 MG: 20 INJECTION, SOLUTION EPIDURAL; INFILTRATION; INTRACAUDAL at 16:12

## 2022-01-01 RX ADMIN — DEXAMETHASONE SODIUM PHOSPHATE 4 MG: 4 INJECTION, SOLUTION INTRA-ARTICULAR; INTRALESIONAL; INTRAMUSCULAR; INTRAVENOUS; SOFT TISSUE at 21:39

## 2022-01-01 RX ADMIN — HYDROMORPHONE HYDROCHLORIDE 0.5 MG: 1 INJECTION, SOLUTION INTRAMUSCULAR; INTRAVENOUS; SUBCUTANEOUS at 22:18

## 2022-01-01 RX ADMIN — TEMAZEPAM 7.5 MG: 7.5 CAPSULE ORAL at 21:33

## 2022-01-01 RX ADMIN — OXYCODONE HYDROCHLORIDE 10 MG: 10 TABLET ORAL at 05:32

## 2022-01-01 RX ADMIN — INSULIN HUMAN 2 UNITS: 100 INJECTION, SOLUTION PARENTERAL at 13:13

## 2022-01-01 RX ADMIN — LISINOPRIL 20 MG: 20 TABLET ORAL at 04:53

## 2022-01-01 RX ADMIN — TAMSULOSIN HYDROCHLORIDE 0.4 MG: 0.4 CAPSULE ORAL at 08:47

## 2022-01-01 RX ADMIN — HYDROMORPHONE HYDROCHLORIDE 1 MG: 1 INJECTION, SOLUTION INTRAMUSCULAR; INTRAVENOUS; SUBCUTANEOUS at 01:57

## 2022-01-01 RX ADMIN — TEMAZEPAM 7.5 MG: 7.5 CAPSULE ORAL at 21:16

## 2022-01-01 RX ADMIN — INSULIN HUMAN 2 UNITS: 100 INJECTION, SOLUTION PARENTERAL at 12:43

## 2022-01-01 RX ADMIN — VERAPAMIL HYDROCHLORIDE 5 MG: 2.5 INJECTION, SOLUTION INTRAVENOUS at 10:00

## 2022-01-01 RX ADMIN — HYDROMORPHONE HYDROCHLORIDE 0.5 MG: 1 INJECTION, SOLUTION INTRAMUSCULAR; INTRAVENOUS; SUBCUTANEOUS at 21:23

## 2022-01-01 RX ADMIN — PROCHLORPERAZINE EDISYLATE 5 MG: 5 INJECTION INTRAMUSCULAR; INTRAVENOUS at 16:17

## 2022-01-01 RX ADMIN — SENNOSIDES AND DOCUSATE SODIUM 1 TABLET: 50; 8.6 TABLET ORAL at 21:00

## 2022-01-01 RX ADMIN — FAMOTIDINE 20 MG: 20 TABLET, FILM COATED ORAL at 05:30

## 2022-01-01 RX ADMIN — DAPTOMYCIN 590 MG: 500 INJECTION, POWDER, LYOPHILIZED, FOR SOLUTION INTRAVENOUS at 11:23

## 2022-01-01 RX ADMIN — LISINOPRIL 20 MG: 20 TABLET ORAL at 04:50

## 2022-01-01 RX ADMIN — Medication 1 APPLICATOR: at 05:42

## 2022-01-01 RX ADMIN — DEXAMETHASONE SODIUM PHOSPHATE 4 MG: 4 INJECTION, SOLUTION INTRA-ARTICULAR; INTRALESIONAL; INTRAMUSCULAR; INTRAVENOUS; SOFT TISSUE at 23:27

## 2022-01-01 RX ADMIN — METOPROLOL SUCCINATE 100 MG: 50 TABLET, EXTENDED RELEASE ORAL at 05:37

## 2022-01-01 RX ADMIN — CLOPIDOGREL BISULFATE 75 MG: 75 TABLET ORAL at 06:15

## 2022-01-01 RX ADMIN — INSULIN HUMAN 1 UNITS: 100 INJECTION, SOLUTION PARENTERAL at 17:21

## 2022-01-01 RX ADMIN — INSULIN HUMAN 2 UNITS: 100 INJECTION, SOLUTION PARENTERAL at 12:24

## 2022-01-01 RX ADMIN — DEXAMETHASONE SODIUM PHOSPHATE 4 MG: 4 INJECTION, SOLUTION INTRA-ARTICULAR; INTRALESIONAL; INTRAMUSCULAR; INTRAVENOUS; SOFT TISSUE at 16:33

## 2022-01-01 RX ADMIN — Medication 1 APPLICATOR: at 06:03

## 2022-01-01 RX ADMIN — CLOPIDOGREL BISULFATE 75 MG: 75 TABLET ORAL at 04:04

## 2022-01-01 RX ADMIN — HYDRALAZINE HYDROCHLORIDE 50 MG: 50 TABLET, FILM COATED ORAL at 13:56

## 2022-01-01 RX ADMIN — GABAPENTIN 400 MG: 400 CAPSULE ORAL at 11:41

## 2022-01-01 RX ADMIN — CYCLOBENZAPRINE 10 MG: 10 TABLET, FILM COATED ORAL at 21:34

## 2022-01-01 RX ADMIN — CYCLOBENZAPRINE 10 MG: 10 TABLET, FILM COATED ORAL at 12:37

## 2022-01-01 RX ADMIN — PIPERACILLIN AND TAZOBACTAM 3.38 G: 3; .375 INJECTION, POWDER, LYOPHILIZED, FOR SOLUTION INTRAVENOUS; PARENTERAL at 05:42

## 2022-01-01 RX ADMIN — DAPTOMYCIN 620 MG: 500 INJECTION, POWDER, LYOPHILIZED, FOR SOLUTION INTRAVENOUS at 17:29

## 2022-01-01 RX ADMIN — ISOSORBIDE MONONITRATE 120 MG: 60 TABLET, EXTENDED RELEASE ORAL at 05:34

## 2022-01-01 RX ADMIN — ACETAMINOPHEN 1000 MG: 500 TABLET ORAL at 11:24

## 2022-01-01 RX ADMIN — HYDRALAZINE HYDROCHLORIDE 50 MG: 50 TABLET, FILM COATED ORAL at 22:10

## 2022-01-01 RX ADMIN — GABAPENTIN 600 MG: 300 CAPSULE ORAL at 21:34

## 2022-01-01 RX ADMIN — LISINOPRIL 20 MG: 20 TABLET ORAL at 04:54

## 2022-01-01 RX ADMIN — HYDRALAZINE HYDROCHLORIDE 50 MG: 50 TABLET, FILM COATED ORAL at 20:20

## 2022-01-01 RX ADMIN — GABAPENTIN 600 MG: 300 CAPSULE ORAL at 16:15

## 2022-01-01 RX ADMIN — OXYCODONE HYDROCHLORIDE 10 MG: 10 TABLET ORAL at 08:48

## 2022-01-01 RX ADMIN — INSULIN HUMAN 3 UNITS: 100 INJECTION, SOLUTION PARENTERAL at 11:39

## 2022-01-01 RX ADMIN — HYDROMORPHONE HYDROCHLORIDE 1 MG: 1 INJECTION, SOLUTION INTRAMUSCULAR; INTRAVENOUS; SUBCUTANEOUS at 15:40

## 2022-01-01 RX ADMIN — KETOROLAC TROMETHAMINE 30 MG: 30 INJECTION, SOLUTION INTRAMUSCULAR; INTRAVENOUS at 18:32

## 2022-01-01 RX ADMIN — EPINEPHRINE 1 MG: 0.1 INJECTION, SOLUTION INTRAVENOUS at 17:49

## 2022-01-01 RX ADMIN — CEFTRIAXONE SODIUM 2000 MG: 10 INJECTION, POWDER, FOR SOLUTION INTRAVENOUS at 12:17

## 2022-01-01 RX ADMIN — Medication 1 APPLICATOR: at 17:13

## 2022-01-01 RX ADMIN — HYDROMORPHONE HYDROCHLORIDE 0.5 MG: 1 INJECTION, SOLUTION INTRAMUSCULAR; INTRAVENOUS; SUBCUTANEOUS at 06:29

## 2022-01-01 RX ADMIN — DIAZEPAM 5 MG: 5 TABLET ORAL at 16:41

## 2022-01-01 RX ADMIN — DAPTOMYCIN 600 MG: 500 INJECTION, POWDER, LYOPHILIZED, FOR SOLUTION INTRAVENOUS at 17:51

## 2022-01-01 RX ADMIN — SODIUM CHLORIDE: 9 INJECTION, SOLUTION INTRAVENOUS at 17:29

## 2022-01-01 RX ADMIN — AMLODIPINE BESYLATE 10 MG: 10 TABLET ORAL at 04:41

## 2022-01-01 RX ADMIN — DEXAMETHASONE SODIUM PHOSPHATE 4 MG: 4 INJECTION, SOLUTION INTRA-ARTICULAR; INTRALESIONAL; INTRAMUSCULAR; INTRAVENOUS; SOFT TISSUE at 12:37

## 2022-01-01 RX ADMIN — OXYCODONE HYDROCHLORIDE 10 MG: 10 TABLET ORAL at 08:47

## 2022-01-01 RX ADMIN — TEMAZEPAM 7.5 MG: 7.5 CAPSULE ORAL at 21:57

## 2022-01-01 RX ADMIN — METOPROLOL SUCCINATE 100 MG: 50 TABLET, EXTENDED RELEASE ORAL at 04:46

## 2022-01-01 RX ADMIN — HYDROMORPHONE HYDROCHLORIDE 1 MG: 1 INJECTION, SOLUTION INTRAMUSCULAR; INTRAVENOUS; SUBCUTANEOUS at 06:47

## 2022-01-01 RX ADMIN — ALLOPURINOL 100 MG: 100 TABLET ORAL at 04:35

## 2022-01-01 RX ADMIN — ACETAMINOPHEN 1000 MG: 500 TABLET ORAL at 04:42

## 2022-01-01 RX ADMIN — POTASSIUM CHLORIDE 40 MEQ: 20 TABLET, EXTENDED RELEASE ORAL at 07:35

## 2022-01-01 RX ADMIN — HYDROMORPHONE HYDROCHLORIDE 1 MG: 1 INJECTION, SOLUTION INTRAMUSCULAR; INTRAVENOUS; SUBCUTANEOUS at 08:30

## 2022-01-01 RX ADMIN — Medication 100 MCG: at 17:28

## 2022-01-01 RX ADMIN — DAPTOMYCIN 590 MG: 500 INJECTION, POWDER, LYOPHILIZED, FOR SOLUTION INTRAVENOUS at 11:07

## 2022-01-01 RX ADMIN — GABAPENTIN 600 MG: 300 CAPSULE ORAL at 22:16

## 2022-01-01 RX ADMIN — ACETAMINOPHEN 1000 MG: 500 TABLET ORAL at 12:26

## 2022-01-01 RX ADMIN — FAMOTIDINE 20 MG: 10 INJECTION, SOLUTION INTRAVENOUS at 10:16

## 2022-01-01 RX ADMIN — INSULIN HUMAN 1 UNITS: 100 INJECTION, SOLUTION PARENTERAL at 21:10

## 2022-01-01 RX ADMIN — OXYCODONE HYDROCHLORIDE 10 MG: 10 TABLET ORAL at 01:05

## 2022-01-01 RX ADMIN — HYDROMORPHONE HYDROCHLORIDE 0.5 MG: 1 INJECTION, SOLUTION INTRAMUSCULAR; INTRAVENOUS; SUBCUTANEOUS at 04:24

## 2022-01-01 RX ADMIN — GABAPENTIN 300 MG: 300 CAPSULE ORAL at 16:09

## 2022-01-01 RX ADMIN — FENTANYL CITRATE 50 MCG: 50 INJECTION, SOLUTION INTRAMUSCULAR; INTRAVENOUS at 18:40

## 2022-01-01 RX ADMIN — GABAPENTIN 600 MG: 300 CAPSULE ORAL at 16:41

## 2022-01-01 RX ADMIN — TEMAZEPAM 7.5 MG: 7.5 CAPSULE ORAL at 22:29

## 2022-01-01 RX ADMIN — CEFTRIAXONE SODIUM 2000 MG: 10 INJECTION, POWDER, FOR SOLUTION INTRAVENOUS at 13:34

## 2022-01-01 RX ADMIN — SODIUM BICARBONATE 50 MEQ: 84 INJECTION, SOLUTION INTRAVENOUS at 18:23

## 2022-01-01 RX ADMIN — OXYCODONE HYDROCHLORIDE 10 MG: 10 TABLET ORAL at 12:10

## 2022-01-01 RX ADMIN — FAMOTIDINE 20 MG: 20 TABLET, FILM COATED ORAL at 18:11

## 2022-01-01 RX ADMIN — IOHEXOL 100 ML: 350 INJECTION, SOLUTION INTRAVENOUS at 09:44

## 2022-01-01 RX ADMIN — ACETAMINOPHEN 1000 MG: 500 TABLET ORAL at 21:14

## 2022-01-01 RX ADMIN — SENNOSIDES AND DOCUSATE SODIUM 2 TABLET: 50; 8.6 TABLET ORAL at 16:54

## 2022-01-01 RX ADMIN — LISINOPRIL 20 MG: 20 TABLET ORAL at 05:37

## 2022-01-01 RX ADMIN — HYDROMORPHONE HYDROCHLORIDE 0.5 MG: 1 INJECTION, SOLUTION INTRAMUSCULAR; INTRAVENOUS; SUBCUTANEOUS at 16:32

## 2022-01-01 RX ADMIN — AMPICILLIN AND SULBACTAM 3 G: 1; 2 INJECTION, POWDER, FOR SOLUTION INTRAMUSCULAR; INTRAVENOUS at 16:39

## 2022-01-01 RX ADMIN — HYDRALAZINE HYDROCHLORIDE 50 MG: 50 TABLET, FILM COATED ORAL at 22:31

## 2022-01-01 RX ADMIN — GABAPENTIN 600 MG: 300 CAPSULE ORAL at 18:01

## 2022-01-01 RX ADMIN — CEFTRIAXONE SODIUM 2 G: 2 INJECTION, POWDER, FOR SOLUTION INTRAMUSCULAR; INTRAVENOUS at 17:13

## 2022-01-01 RX ADMIN — FAMOTIDINE 20 MG: 20 TABLET, FILM COATED ORAL at 18:08

## 2022-01-01 RX ADMIN — ACETAMINOPHEN 1000 MG: 500 TABLET ORAL at 23:48

## 2022-01-01 RX ADMIN — APIXABAN 10 MG: 5 TABLET, FILM COATED ORAL at 08:06

## 2022-01-01 RX ADMIN — LISINOPRIL 20 MG: 20 TABLET ORAL at 06:19

## 2022-01-01 RX ADMIN — AMLODIPINE BESYLATE 5 MG: 5 TABLET ORAL at 07:39

## 2022-01-01 RX ADMIN — GABAPENTIN 300 MG: 300 CAPSULE ORAL at 20:04

## 2022-01-01 RX ADMIN — IOHEXOL 50 ML: 350 INJECTION, SOLUTION INTRAVENOUS at 19:05

## 2022-01-01 RX ADMIN — GABAPENTIN 600 MG: 300 CAPSULE ORAL at 08:47

## 2022-01-01 RX ADMIN — AMLODIPINE BESYLATE 5 MG: 5 TABLET ORAL at 12:16

## 2022-01-01 RX ADMIN — Medication 1 APPLICATOR: at 04:53

## 2022-01-01 RX ADMIN — CLOPIDOGREL BISULFATE 75 MG: 75 TABLET ORAL at 04:50

## 2022-01-01 RX ADMIN — CEFTRIAXONE SODIUM 2000 MG: 10 INJECTION, POWDER, FOR SOLUTION INTRAVENOUS at 11:49

## 2022-01-01 RX ADMIN — HYDROMORPHONE HYDROCHLORIDE 1 MG: 1 INJECTION, SOLUTION INTRAMUSCULAR; INTRAVENOUS; SUBCUTANEOUS at 09:03

## 2022-01-01 RX ADMIN — DEXAMETHASONE SODIUM PHOSPHATE 4 MG: 4 INJECTION, SOLUTION INTRA-ARTICULAR; INTRALESIONAL; INTRAMUSCULAR; INTRAVENOUS; SOFT TISSUE at 04:51

## 2022-01-01 RX ADMIN — OXYCODONE HYDROCHLORIDE 20 MG: 10 TABLET ORAL at 20:18

## 2022-01-01 RX ADMIN — OXYCODONE HYDROCHLORIDE 10 MG: 10 TABLET ORAL at 11:23

## 2022-01-01 RX ADMIN — GABAPENTIN 600 MG: 300 CAPSULE ORAL at 21:17

## 2022-01-01 RX ADMIN — DEXAMETHASONE 4 MG: 4 TABLET ORAL at 06:01

## 2022-01-01 RX ADMIN — ENOXAPARIN SODIUM 40 MG: 40 INJECTION SUBCUTANEOUS at 18:01

## 2022-01-01 RX ADMIN — DEXAMETHASONE SODIUM PHOSPHATE 4 MG: 4 INJECTION, SOLUTION INTRA-ARTICULAR; INTRALESIONAL; INTRAMUSCULAR; INTRAVENOUS; SOFT TISSUE at 16:54

## 2022-01-01 RX ADMIN — LISINOPRIL 20 MG: 20 TABLET ORAL at 04:55

## 2022-01-01 RX ADMIN — DEXAMETHASONE 4 MG: 4 TABLET ORAL at 05:14

## 2022-01-01 RX ADMIN — GABAPENTIN 600 MG: 300 CAPSULE ORAL at 16:12

## 2022-01-01 RX ADMIN — HYDROMORPHONE HYDROCHLORIDE 1 MG: 1 INJECTION, SOLUTION INTRAMUSCULAR; INTRAVENOUS; SUBCUTANEOUS at 16:27

## 2022-01-01 RX ADMIN — HYDROMORPHONE HYDROCHLORIDE 0.4 MG: 1 INJECTION, SOLUTION INTRAMUSCULAR; INTRAVENOUS; SUBCUTANEOUS at 19:25

## 2022-01-01 RX ADMIN — VANCOMYCIN HYDROCHLORIDE 1750 MG: 500 INJECTION, POWDER, LYOPHILIZED, FOR SOLUTION INTRAVENOUS at 09:05

## 2022-01-01 RX ADMIN — OXYCODONE HYDROCHLORIDE 10 MG: 10 TABLET ORAL at 07:11

## 2022-01-01 RX ADMIN — CYCLOBENZAPRINE 10 MG: 10 TABLET, FILM COATED ORAL at 17:38

## 2022-01-01 RX ADMIN — CYCLOBENZAPRINE 10 MG: 10 TABLET, FILM COATED ORAL at 04:16

## 2022-01-01 RX ADMIN — ACETAMINOPHEN 1000 MG: 500 TABLET ORAL at 01:21

## 2022-01-01 RX ADMIN — AMLODIPINE BESYLATE 10 MG: 10 TABLET ORAL at 05:22

## 2022-01-01 RX ADMIN — OXYCODONE HYDROCHLORIDE 10 MG: 10 TABLET ORAL at 07:18

## 2022-01-01 RX ADMIN — KETAMINE HYDROCHLORIDE 50 MG: 50 INJECTION INTRAMUSCULAR; INTRAVENOUS at 16:09

## 2022-01-01 RX ADMIN — DEXAMETHASONE SODIUM PHOSPHATE 4 MG: 4 INJECTION, SOLUTION INTRA-ARTICULAR; INTRALESIONAL; INTRAMUSCULAR; INTRAVENOUS; SOFT TISSUE at 16:41

## 2022-01-01 RX ADMIN — VANCOMYCIN HYDROCHLORIDE 1250 MG: 500 INJECTION, POWDER, LYOPHILIZED, FOR SOLUTION INTRAVENOUS at 09:06

## 2022-01-01 RX ADMIN — CLOPIDOGREL BISULFATE 75 MG: 75 TABLET ORAL at 08:45

## 2022-01-01 RX ADMIN — ACETAMINOPHEN 1000 MG: 500 TABLET ORAL at 11:21

## 2022-01-01 RX ADMIN — DEXAMETHASONE 4 MG: 4 TABLET ORAL at 04:05

## 2022-01-01 RX ADMIN — HYDRALAZINE HYDROCHLORIDE 50 MG: 50 TABLET, FILM COATED ORAL at 12:27

## 2022-01-01 RX ADMIN — GABAPENTIN 300 MG: 100 CAPSULE ORAL at 16:36

## 2022-01-01 RX ADMIN — GABAPENTIN 600 MG: 300 CAPSULE ORAL at 12:26

## 2022-01-01 RX ADMIN — ACETAMINOPHEN 1000 MG: 500 TABLET ORAL at 06:02

## 2022-01-01 RX ADMIN — DEXAMETHASONE SODIUM PHOSPHATE 4 MG: 4 INJECTION, SOLUTION INTRA-ARTICULAR; INTRALESIONAL; INTRAMUSCULAR; INTRAVENOUS; SOFT TISSUE at 21:12

## 2022-01-01 RX ADMIN — INSULIN HUMAN 1 UNITS: 100 INJECTION, SOLUTION PARENTERAL at 20:38

## 2022-01-01 RX ADMIN — GABAPENTIN 600 MG: 300 CAPSULE ORAL at 11:45

## 2022-01-01 RX ADMIN — OXYCODONE HYDROCHLORIDE 20 MG: 10 TABLET ORAL at 22:04

## 2022-01-01 RX ADMIN — ASPIRIN 81 MG: 81 TABLET, COATED ORAL at 15:52

## 2022-01-01 RX ADMIN — ACETAMINOPHEN 1000 MG: 500 TABLET ORAL at 05:42

## 2022-01-01 RX ADMIN — LABETALOL HYDROCHLORIDE 10 MG: 5 INJECTION, SOLUTION INTRAVENOUS at 15:30

## 2022-01-01 RX ADMIN — HYDROMORPHONE HYDROCHLORIDE 0.4 MG: 1 INJECTION, SOLUTION INTRAMUSCULAR; INTRAVENOUS; SUBCUTANEOUS at 19:05

## 2022-01-01 RX ADMIN — DEXAMETHASONE SODIUM PHOSPHATE 6 MG: 4 INJECTION, SOLUTION INTRA-ARTICULAR; INTRALESIONAL; INTRAMUSCULAR; INTRAVENOUS; SOFT TISSUE at 10:14

## 2022-01-01 RX ADMIN — CYCLOBENZAPRINE 10 MG: 10 TABLET, FILM COATED ORAL at 07:39

## 2022-01-01 RX ADMIN — OXYCODONE HYDROCHLORIDE 10 MG: 10 TABLET ORAL at 22:16

## 2022-01-01 RX ADMIN — AMLODIPINE BESYLATE 10 MG: 10 TABLET ORAL at 05:20

## 2022-01-01 RX ADMIN — DEXAMETHASONE SODIUM PHOSPHATE 8 MG: 4 INJECTION, SOLUTION INTRA-ARTICULAR; INTRALESIONAL; INTRAMUSCULAR; INTRAVENOUS; SOFT TISSUE at 16:12

## 2022-01-01 RX ADMIN — METOPROLOL SUCCINATE 100 MG: 25 TABLET, FILM COATED, EXTENDED RELEASE ORAL at 04:50

## 2022-01-01 RX ADMIN — HYDROMORPHONE HYDROCHLORIDE 1 MG: 1 INJECTION, SOLUTION INTRAMUSCULAR; INTRAVENOUS; SUBCUTANEOUS at 12:24

## 2022-01-01 RX ADMIN — GABAPENTIN 600 MG: 300 CAPSULE ORAL at 16:40

## 2022-01-01 RX ADMIN — HYDROMORPHONE HYDROCHLORIDE 1 MG: 1 INJECTION, SOLUTION INTRAMUSCULAR; INTRAVENOUS; SUBCUTANEOUS at 08:59

## 2022-01-01 RX ADMIN — LORAZEPAM 1 MG: 2 INJECTION INTRAMUSCULAR; INTRAVENOUS at 18:31

## 2022-01-01 RX ADMIN — HYDROMORPHONE HYDROCHLORIDE 0.5 MG: 1 INJECTION, SOLUTION INTRAMUSCULAR; INTRAVENOUS; SUBCUTANEOUS at 00:46

## 2022-01-01 RX ADMIN — INSULIN GLARGINE-YFGN 15 UNITS: 100 INJECTION, SOLUTION SUBCUTANEOUS at 18:11

## 2022-01-01 RX ADMIN — DEXAMETHASONE SODIUM PHOSPHATE 4 MG: 4 INJECTION, SOLUTION INTRA-ARTICULAR; INTRALESIONAL; INTRAMUSCULAR; INTRAVENOUS; SOFT TISSUE at 05:48

## 2022-01-01 RX ADMIN — ACETAMINOPHEN 1000 MG: 500 TABLET ORAL at 00:23

## 2022-01-01 RX ADMIN — LISINOPRIL 20 MG: 20 TABLET ORAL at 05:20

## 2022-01-01 RX ADMIN — PROPOFOL 80 MG: 10 INJECTION, EMULSION INTRAVENOUS at 16:12

## 2022-01-01 RX ADMIN — FAMOTIDINE 20 MG: 20 TABLET, FILM COATED ORAL at 16:16

## 2022-01-01 RX ADMIN — CLOPIDOGREL BISULFATE 75 MG: 75 TABLET ORAL at 06:07

## 2022-01-01 RX ADMIN — DEXAMETHASONE SODIUM PHOSPHATE 4 MG: 4 INJECTION, SOLUTION INTRA-ARTICULAR; INTRALESIONAL; INTRAMUSCULAR; INTRAVENOUS; SOFT TISSUE at 10:57

## 2022-01-01 RX ADMIN — METOPROLOL SUCCINATE 100 MG: 25 TABLET, FILM COATED, EXTENDED RELEASE ORAL at 06:02

## 2022-01-01 RX ADMIN — LORAZEPAM 1 MG: 2 INJECTION INTRAMUSCULAR; INTRAVENOUS at 19:52

## 2022-01-01 RX ADMIN — HYDROMORPHONE HYDROCHLORIDE 0.5 MG: 1 INJECTION, SOLUTION INTRAMUSCULAR; INTRAVENOUS; SUBCUTANEOUS at 03:58

## 2022-01-01 RX ADMIN — SODIUM CHLORIDE: 9 INJECTION, SOLUTION INTRAVENOUS at 09:10

## 2022-01-01 RX ADMIN — TEMAZEPAM 7.5 MG: 7.5 CAPSULE ORAL at 21:12

## 2022-01-01 RX ADMIN — CEFTRIAXONE SODIUM 2 G: 2 INJECTION, POWDER, FOR SOLUTION INTRAMUSCULAR; INTRAVENOUS at 17:08

## 2022-01-01 RX ADMIN — INSULIN HUMAN 2 UNITS: 100 INJECTION, SOLUTION PARENTERAL at 17:17

## 2022-01-01 RX ADMIN — AMLODIPINE BESYLATE 10 MG: 10 TABLET ORAL at 06:02

## 2022-01-01 RX ADMIN — HYDRALAZINE HYDROCHLORIDE 50 MG: 50 TABLET, FILM COATED ORAL at 05:23

## 2022-01-01 RX ADMIN — HYDROMORPHONE HYDROCHLORIDE 0.4 MG: 2 INJECTION INTRAMUSCULAR; INTRAVENOUS; SUBCUTANEOUS at 18:14

## 2022-01-01 RX ADMIN — HYDROMORPHONE HYDROCHLORIDE 1 MG: 1 INJECTION, SOLUTION INTRAMUSCULAR; INTRAVENOUS; SUBCUTANEOUS at 08:24

## 2022-01-01 RX ADMIN — METOPROLOL SUCCINATE 100 MG: 25 TABLET, FILM COATED, EXTENDED RELEASE ORAL at 04:56

## 2022-01-01 RX ADMIN — MIDAZOLAM HYDROCHLORIDE 2 MG: 1 INJECTION, SOLUTION INTRAMUSCULAR; INTRAVENOUS at 16:07

## 2022-01-01 RX ADMIN — OXYCODONE HYDROCHLORIDE 10 MG: 10 TABLET ORAL at 20:05

## 2022-01-01 RX ADMIN — CYCLOBENZAPRINE 10 MG: 10 TABLET, FILM COATED ORAL at 23:28

## 2022-01-01 RX ADMIN — INSULIN HUMAN 3 UNITS: 100 INJECTION, SOLUTION PARENTERAL at 11:30

## 2022-01-01 RX ADMIN — ALLOPURINOL 100 MG: 100 TABLET ORAL at 05:53

## 2022-01-01 RX ADMIN — GABAPENTIN 600 MG: 300 CAPSULE ORAL at 12:19

## 2022-01-01 RX ADMIN — CEFTRIAXONE SODIUM 2 G: 2 INJECTION, POWDER, FOR SOLUTION INTRAMUSCULAR; INTRAVENOUS at 17:14

## 2022-01-01 RX ADMIN — GABAPENTIN 400 MG: 400 CAPSULE ORAL at 05:34

## 2022-01-01 RX ADMIN — HYDROMORPHONE HYDROCHLORIDE 0.5 MG: 1 INJECTION, SOLUTION INTRAMUSCULAR; INTRAVENOUS; SUBCUTANEOUS at 04:00

## 2022-01-01 RX ADMIN — OXYCODONE HYDROCHLORIDE 20 MG: 10 TABLET ORAL at 08:08

## 2022-01-01 RX ADMIN — FENTANYL CITRATE 50 MCG: 50 INJECTION, SOLUTION INTRAMUSCULAR; INTRAVENOUS at 19:49

## 2022-01-01 RX ADMIN — HYDROMORPHONE HYDROCHLORIDE 0.5 MG: 1 INJECTION, SOLUTION INTRAMUSCULAR; INTRAVENOUS; SUBCUTANEOUS at 04:36

## 2022-01-01 RX ADMIN — HYDRALAZINE HYDROCHLORIDE 50 MG: 50 TABLET, FILM COATED ORAL at 13:12

## 2022-01-01 RX ADMIN — HYDROMORPHONE HYDROCHLORIDE 1 MG: 1 INJECTION, SOLUTION INTRAMUSCULAR; INTRAVENOUS; SUBCUTANEOUS at 14:32

## 2022-01-01 RX ADMIN — LISINOPRIL 20 MG: 20 TABLET ORAL at 04:42

## 2022-01-01 RX ADMIN — KETOROLAC TROMETHAMINE 30 MG: 30 INJECTION, SOLUTION INTRAMUSCULAR; INTRAVENOUS at 00:30

## 2022-01-01 RX ADMIN — LISINOPRIL 20 MG: 20 TABLET ORAL at 04:35

## 2022-01-01 RX ADMIN — OXYCODONE HYDROCHLORIDE 20 MG: 10 TABLET ORAL at 10:57

## 2022-01-01 RX ADMIN — HYDRALAZINE HYDROCHLORIDE 50 MG: 50 TABLET, FILM COATED ORAL at 14:23

## 2022-01-01 RX ADMIN — HYDROMORPHONE HYDROCHLORIDE 0.5 MG: 1 INJECTION, SOLUTION INTRAMUSCULAR; INTRAVENOUS; SUBCUTANEOUS at 14:37

## 2022-01-01 RX ADMIN — Medication 100 MCG: at 17:54

## 2022-01-01 RX ADMIN — OXYCODONE HYDROCHLORIDE 10 MG: 10 TABLET ORAL at 02:45

## 2022-01-01 RX ADMIN — SODIUM BICARBONATE: 84 INJECTION, SOLUTION INTRAVENOUS at 12:18

## 2022-01-01 RX ADMIN — CLOPIDOGREL BISULFATE 75 MG: 75 TABLET ORAL at 06:31

## 2022-01-01 RX ADMIN — OXYCODONE HYDROCHLORIDE 10 MG: 10 TABLET ORAL at 21:57

## 2022-01-01 RX ADMIN — ALLOPURINOL 100 MG: 100 TABLET ORAL at 05:30

## 2022-01-01 RX ADMIN — ACETAMINOPHEN 1000 MG: 500 TABLET ORAL at 21:46

## 2022-01-01 RX ADMIN — OXYCODONE 10 MG: 5 TABLET ORAL at 05:41

## 2022-01-01 RX ADMIN — ISOSORBIDE MONONITRATE 120 MG: 60 TABLET, EXTENDED RELEASE ORAL at 05:25

## 2022-01-01 RX ADMIN — CEFTRIAXONE SODIUM 2000 MG: 10 INJECTION, POWDER, FOR SOLUTION INTRAVENOUS at 13:56

## 2022-01-01 RX ADMIN — FENTANYL CITRATE 50 MCG: 50 INJECTION, SOLUTION INTRAMUSCULAR; INTRAVENOUS at 19:10

## 2022-01-01 RX ADMIN — CEFTRIAXONE SODIUM 2 G: 2 INJECTION, POWDER, FOR SOLUTION INTRAMUSCULAR; INTRAVENOUS at 17:03

## 2022-01-01 RX ADMIN — FAMOTIDINE 20 MG: 20 TABLET, FILM COATED ORAL at 04:56

## 2022-01-01 RX ADMIN — DEXAMETHASONE SODIUM PHOSPHATE 4 MG: 4 INJECTION, SOLUTION INTRA-ARTICULAR; INTRALESIONAL; INTRAMUSCULAR; INTRAVENOUS; SOFT TISSUE at 10:11

## 2022-01-01 RX ADMIN — DOCUSATE SODIUM 100 MG: 100 CAPSULE, LIQUID FILLED ORAL at 16:14

## 2022-01-01 RX ADMIN — AMPICILLIN AND SULBACTAM 3 G: 1; 2 INJECTION, POWDER, FOR SOLUTION INTRAMUSCULAR; INTRAVENOUS at 06:17

## 2022-01-01 RX ADMIN — GABAPENTIN 600 MG: 300 CAPSULE ORAL at 08:45

## 2022-01-01 RX ADMIN — GABAPENTIN 600 MG: 300 CAPSULE ORAL at 13:19

## 2022-01-01 RX ADMIN — INSULIN HUMAN 1 UNITS: 100 INJECTION, SOLUTION PARENTERAL at 21:39

## 2022-01-01 RX ADMIN — FENTANYL CITRATE 25 MCG: 50 INJECTION, SOLUTION INTRAMUSCULAR; INTRAVENOUS at 10:31

## 2022-01-01 RX ADMIN — OXYCODONE HYDROCHLORIDE 10 MG: 10 TABLET ORAL at 17:15

## 2022-01-01 RX ADMIN — FAMOTIDINE 20 MG: 20 TABLET, FILM COATED ORAL at 05:38

## 2022-01-01 RX ADMIN — ACETAMINOPHEN 1000 MG: 500 TABLET ORAL at 16:15

## 2022-01-01 RX ADMIN — METOPROLOL SUCCINATE 100 MG: 25 TABLET, FILM COATED, EXTENDED RELEASE ORAL at 04:36

## 2022-01-01 RX ADMIN — CYCLOBENZAPRINE 10 MG: 10 TABLET, FILM COATED ORAL at 23:37

## 2022-01-01 RX ADMIN — LABETALOL HYDROCHLORIDE 10 MG: 5 INJECTION, SOLUTION INTRAVENOUS at 10:52

## 2022-01-01 RX ADMIN — OXYCODONE HYDROCHLORIDE 10 MG: 10 TABLET ORAL at 17:48

## 2022-01-01 RX ADMIN — DAPTOMYCIN 600 MG: 500 INJECTION, POWDER, LYOPHILIZED, FOR SOLUTION INTRAVENOUS at 15:00

## 2022-01-01 RX ADMIN — LIDOCAINE HYDROCHLORIDE 50 MG: 20 INJECTION, SOLUTION EPIDURAL; INFILTRATION; INTRACAUDAL at 17:28

## 2022-01-01 RX ADMIN — DIAZEPAM 5 MG: 5 TABLET ORAL at 11:38

## 2022-01-01 RX ADMIN — OXYCODONE HYDROCHLORIDE 10 MG: 10 TABLET ORAL at 23:40

## 2022-01-01 RX ADMIN — ALLOPURINOL 100 MG: 100 TABLET ORAL at 04:41

## 2022-01-01 RX ADMIN — LISINOPRIL 20 MG: 20 TABLET ORAL at 04:45

## 2022-01-01 RX ADMIN — AMLODIPINE BESYLATE 5 MG: 5 TABLET ORAL at 05:26

## 2022-01-01 RX ADMIN — ONDANSETRON 4 MG: 2 INJECTION INTRAMUSCULAR; INTRAVENOUS at 22:25

## 2022-01-01 RX ADMIN — METOPROLOL SUCCINATE 100 MG: 25 TABLET, FILM COATED, EXTENDED RELEASE ORAL at 05:19

## 2022-01-01 RX ADMIN — ACETAMINOPHEN 1000 MG: 500 TABLET ORAL at 15:39

## 2022-01-01 RX ADMIN — HYDROMORPHONE HYDROCHLORIDE 0.5 MG: 1 INJECTION, SOLUTION INTRAMUSCULAR; INTRAVENOUS; SUBCUTANEOUS at 10:16

## 2022-01-01 RX ADMIN — CEFAZOLIN 2 G: 2 INJECTION, POWDER, FOR SOLUTION INTRAMUSCULAR; INTRAVENOUS at 06:22

## 2022-01-01 RX ADMIN — TAMSULOSIN HYDROCHLORIDE 0.4 MG: 0.4 CAPSULE ORAL at 08:45

## 2022-01-01 RX ADMIN — OXYCODONE HYDROCHLORIDE 10 MG: 10 TABLET ORAL at 22:31

## 2022-01-01 RX ADMIN — VANCOMYCIN HYDROCHLORIDE 1250 MG: 500 INJECTION, POWDER, LYOPHILIZED, FOR SOLUTION INTRAVENOUS at 23:10

## 2022-01-01 RX ADMIN — AMPICILLIN AND SULBACTAM 3 G: 1; 2 INJECTION, POWDER, FOR SOLUTION INTRAMUSCULAR; INTRAVENOUS at 01:32

## 2022-01-01 RX ADMIN — HYDRALAZINE HYDROCHLORIDE 50 MG: 50 TABLET, FILM COATED ORAL at 06:02

## 2022-01-01 RX ADMIN — INSULIN HUMAN 1 UNITS: 100 INJECTION, SOLUTION PARENTERAL at 20:54

## 2022-01-01 RX ADMIN — ASPIRIN 81 MG: 81 TABLET, COATED ORAL at 05:21

## 2022-01-01 RX ADMIN — OXYCODONE HYDROCHLORIDE 20 MG: 10 TABLET ORAL at 22:01

## 2022-01-01 RX ADMIN — CEFTRIAXONE SODIUM 2000 MG: 10 INJECTION, POWDER, FOR SOLUTION INTRAVENOUS at 12:26

## 2022-01-01 RX ADMIN — LORAZEPAM 0.5 MG: 0.5 TABLET ORAL at 09:43

## 2022-01-01 RX ADMIN — ATROPINE SULFATE 0.5 MG: 0.1 INJECTION, SOLUTION ENDOTRACHEAL; INTRAMUSCULAR; INTRAVENOUS; SUBCUTANEOUS at 20:09

## 2022-01-01 RX ADMIN — DAPTOMYCIN 620 MG: 500 INJECTION, POWDER, LYOPHILIZED, FOR SOLUTION INTRAVENOUS at 17:04

## 2022-01-01 RX ADMIN — ACETAMINOPHEN 1000 MG: 500 TABLET ORAL at 08:14

## 2022-01-01 RX ADMIN — Medication 1 APPLICATOR: at 06:38

## 2022-01-01 RX ADMIN — CYCLOBENZAPRINE 10 MG: 10 TABLET, FILM COATED ORAL at 11:16

## 2022-01-01 RX ADMIN — GABAPENTIN 600 MG: 300 CAPSULE ORAL at 17:48

## 2022-01-01 RX ADMIN — INSULIN HUMAN 1 UNITS: 100 INJECTION, SOLUTION PARENTERAL at 22:22

## 2022-01-01 RX ADMIN — VANCOMYCIN HYDROCHLORIDE 1750 MG: 500 INJECTION, POWDER, LYOPHILIZED, FOR SOLUTION INTRAVENOUS at 20:12

## 2022-01-01 RX ADMIN — EPINEPHRINE 1 MG: 0.1 INJECTION, SOLUTION INTRAVENOUS at 18:56

## 2022-01-01 ASSESSMENT — ENCOUNTER SYMPTOMS
DIARRHEA: 0
VOMITING: 0
STRIDOR: 0
FALLS: 0
DOUBLE VISION: 0
COUGH: 0
CHILLS: 0
ABDOMINAL PAIN: 0
BLURRED VISION: 0
FALLS: 0
SHORTNESS OF BREATH: 1
NECK PAIN: 0
HEADACHES: 0
NAUSEA: 1
FALLS: 0
HEMOPTYSIS: 0
DEPRESSION: 0
NAUSEA: 0
SORE THROAT: 0
FOCAL WEAKNESS: 1
BACK PAIN: 1
ABDOMINAL DISTENTION: 0
NERVOUS/ANXIOUS: 0
NAUSEA: 0
DEPRESSION: 0
FEVER: 0
EYE ITCHING: 0
CONFUSION: 0
SPUTUM PRODUCTION: 0
HEARTBURN: 0
CHILLS: 0
DEPRESSION: 0
VOMITING: 0
DEPRESSION: 0
PALPITATIONS: 0
FALLS: 0
HEADACHES: 1
NAUSEA: 0
PALPITATIONS: 0
BLOOD IN STOOL: 0
FOCAL WEAKNESS: 1
SINUS PAIN: 0
DEPRESSION: 0
FEVER: 0
CLAUDICATION: 0
SHORTNESS OF BREATH: 0
NAUSEA: 0
SINUS PAIN: 0
ABDOMINAL PAIN: 0
SINUS PAIN: 0
SHORTNESS OF BREATH: 0
SPEECH DIFFICULTY: 0
EYE PAIN: 0
SINUS PAIN: 0
PALPITATIONS: 0
COUGH: 0
SINUS PAIN: 0
DEPRESSION: 0
ORTHOPNEA: 0
SHORTNESS OF BREATH: 0
NERVOUS/ANXIOUS: 0
SORE THROAT: 0
DEPRESSION: 0
ADENOPATHY: 0
SPUTUM PRODUCTION: 0
SINUS PAIN: 0
TINGLING: 0
EYE REDNESS: 0
PND: 0
FEVER: 0
CONSTIPATION: 0
SHORTNESS OF BREATH: 0
SHORTNESS OF BREATH: 0
POLYDIPSIA: 0
COUGH: 0
DIZZINESS: 0
BRUISES/BLEEDS EASILY: 0
FLANK PAIN: 0
HEARTBURN: 0
ABDOMINAL DISTENTION: 0
DIARRHEA: 0
CHILLS: 0
DIZZINESS: 0
MYALGIAS: 1
NERVOUS/ANXIOUS: 0
BACK PAIN: 1
SHORTNESS OF BREATH: 1
FOCAL WEAKNESS: 0
EYE REDNESS: 0
MYALGIAS: 0
FEVER: 0
TINGLING: 1
EYE DISCHARGE: 0
VOMITING: 0
STRIDOR: 0
APPETITE CHANGE: 0
ORTHOPNEA: 0
ORTHOPNEA: 0
COUGH: 0
DIARRHEA: 0
SHORTNESS OF BREATH: 0
CONSTIPATION: 0
BACK PAIN: 1
DIARRHEA: 0
PHOTOPHOBIA: 0
CHILLS: 0
HEADACHES: 0
FLANK PAIN: 0
NAUSEA: 0
CHILLS: 0
SHORTNESS OF BREATH: 0
FOCAL WEAKNESS: 1
APNEA: 0
PALPITATIONS: 0
TINGLING: 0
DOUBLE VISION: 0
ORTHOPNEA: 0
FALLS: 0
SPUTUM PRODUCTION: 0
DOUBLE VISION: 0
MYALGIAS: 1
ABDOMINAL PAIN: 0
HEARTBURN: 0
HEMOPTYSIS: 0
DECREASED CONCENTRATION: 0
FOCAL WEAKNESS: 1
SHORTNESS OF BREATH: 0
FEVER: 0
DIARRHEA: 0
EYE PAIN: 0
EYE PAIN: 0
HEADACHES: 0
SPUTUM PRODUCTION: 0
FLANK PAIN: 1
PALPITATIONS: 0
DEPRESSION: 0
BACK PAIN: 1
EYE PAIN: 0
VOMITING: 0
CONSTIPATION: 0
BACK PAIN: 1
FEVER: 0
SPUTUM PRODUCTION: 0
COUGH: 0
ABDOMINAL PAIN: 1
BACK PAIN: 1
MYALGIAS: 1
SORE THROAT: 0
DEPRESSION: 0
HEMOPTYSIS: 0
ORTHOPNEA: 0
MYALGIAS: 0
WEAKNESS: 1
DEPRESSION: 0
CHILLS: 0
ABDOMINAL PAIN: 0
DEPRESSION: 0
NERVOUS/ANXIOUS: 0
FOCAL WEAKNESS: 1
CHILLS: 0
SPUTUM PRODUCTION: 0
CHOKING: 0
ABDOMINAL PAIN: 0
PALPITATIONS: 0
TINGLING: 0
ABDOMINAL PAIN: 0
NERVOUS/ANXIOUS: 0
SPUTUM PRODUCTION: 0
ORTHOPNEA: 0
VOMITING: 0
FEVER: 0
DOUBLE VISION: 0
DIZZINESS: 0
EYE REDNESS: 0
EYE PAIN: 0
HEMOPTYSIS: 0
NUMBNESS: 0
ABDOMINAL PAIN: 0
SPUTUM PRODUCTION: 0
SPUTUM PRODUCTION: 0
JOINT SWELLING: 0
BLOOD IN STOOL: 0
BACK PAIN: 1
TINGLING: 0
HEADACHES: 0
BACK PAIN: 1
WEAKNESS: 1
ABDOMINAL PAIN: 0
WEAKNESS: 1
BLOOD IN STOOL: 0
FEVER: 0
DIARRHEA: 0
VOMITING: 0
SPEECH CHANGE: 0
DIZZINESS: 0
HEADACHES: 0
FALLS: 0
VOMITING: 0
FLANK PAIN: 0
VOMITING: 0
DIARRHEA: 0
BLOOD IN STOOL: 0
NERVOUS/ANXIOUS: 0
BACK PAIN: 1
DIARRHEA: 0
ABDOMINAL PAIN: 0
NAUSEA: 0
CHILLS: 0
BACK PAIN: 0
SINUS PAIN: 0
PALPITATIONS: 0
SENSORY CHANGE: 0
SORE THROAT: 0
FOCAL WEAKNESS: 0
EYE ITCHING: 0
CHILLS: 0
WHEEZING: 0
CONSTIPATION: 0
FLANK PAIN: 1
DECREASED CONCENTRATION: 0
SENSORY CHANGE: 0
NAUSEA: 1
CONSTIPATION: 0
ORTHOPNEA: 0
CHEST TIGHTNESS: 0
WEAKNESS: 1
DIARRHEA: 0
BLURRED VISION: 0
DOUBLE VISION: 0
DOUBLE VISION: 0
SHORTNESS OF BREATH: 0
COUGH: 0
NERVOUS/ANXIOUS: 0
MYALGIAS: 0
WHEEZING: 0
MYALGIAS: 0
NAUSEA: 0
DIARRHEA: 0
CONSTIPATION: 0
BLURRED VISION: 0
AGITATION: 0
SPUTUM PRODUCTION: 0
WEAKNESS: 1
DEPRESSION: 0
LIGHT-HEADEDNESS: 0
SORE THROAT: 0
SORE THROAT: 0
WEAKNESS: 0
MYALGIAS: 1
HEMOPTYSIS: 0
DEPRESSION: 0
CHOKING: 0
FLANK PAIN: 0
PHOTOPHOBIA: 0
SORE THROAT: 0
FLANK PAIN: 0
JOINT SWELLING: 0
PALPITATIONS: 0
CONSTIPATION: 0
PALPITATIONS: 0
BLOOD IN STOOL: 0
WEAKNESS: 1
ORTHOPNEA: 0
BLURRED VISION: 0
FEVER: 0
PALPITATIONS: 0
HALLUCINATIONS: 0
CHILLS: 0
SPEECH DIFFICULTY: 0
VOMITING: 0
NERVOUS/ANXIOUS: 1
FLANK PAIN: 0
PHOTOPHOBIA: 0
VOMITING: 0
ABDOMINAL PAIN: 0
BACK PAIN: 1
PALPITATIONS: 0
BLOOD IN STOOL: 0
VOMITING: 0
VOMITING: 0
FOCAL WEAKNESS: 0
HALLUCINATIONS: 0
FOCAL WEAKNESS: 0
APPETITE CHANGE: 0
WHEEZING: 0
MYALGIAS: 1
DIARRHEA: 0
VOMITING: 0
NAUSEA: 0
NAUSEA: 0
ORTHOPNEA: 0
WEAKNESS: 1
NERVOUS/ANXIOUS: 1
BLURRED VISION: 0
ABDOMINAL PAIN: 0
BLOOD IN STOOL: 0
TINGLING: 0
ABDOMINAL PAIN: 1
HEMOPTYSIS: 0
DIAPHORESIS: 0
PALPITATIONS: 0
TREMORS: 0
SHORTNESS OF BREATH: 0
ABDOMINAL PAIN: 0
COUGH: 0
DEPRESSION: 0
BACK PAIN: 1
PALPITATIONS: 0
MYALGIAS: 1
HEADACHES: 0
HEADACHES: 0
MYALGIAS: 0
FLANK PAIN: 0
DIZZINESS: 0
NAUSEA: 0
SHORTNESS OF BREATH: 0
CONSTIPATION: 0
CONSTIPATION: 0
DOUBLE VISION: 0
FOCAL WEAKNESS: 0
NUMBNESS: 0
PALPITATIONS: 0
PALPITATIONS: 0
SORE THROAT: 0
BACK PAIN: 1
SORE THROAT: 0
FLANK PAIN: 0
VOMITING: 0
BACK PAIN: 1
FALLS: 1
NERVOUS/ANXIOUS: 1
SHORTNESS OF BREATH: 0
HEARTBURN: 0
PALPITATIONS: 0
DEPRESSION: 0
INSOMNIA: 0
CLAUDICATION: 0
CONSTIPATION: 0
WEAKNESS: 1
ACTIVITY CHANGE: 0
SORE THROAT: 0
FALLS: 0
CONSTIPATION: 0
DIARRHEA: 0
NECK PAIN: 0
SEIZURES: 0
WEAKNESS: 1
SENSORY CHANGE: 0
FLANK PAIN: 0
FOCAL WEAKNESS: 1
INSOMNIA: 0
FLANK PAIN: 0
ABDOMINAL PAIN: 1
EYE PAIN: 0
DIARRHEA: 0
FEVER: 0
HEARTBURN: 0
DIARRHEA: 0
SPUTUM PRODUCTION: 0
VOMITING: 0
DIZZINESS: 0
FEVER: 0
NERVOUS/ANXIOUS: 0
ORTHOPNEA: 0
HEARTBURN: 0
DOUBLE VISION: 0
SPUTUM PRODUCTION: 0
PALPITATIONS: 0
PHOTOPHOBIA: 0
PALPITATIONS: 0
SINUS PAIN: 0
COUGH: 0
DIARRHEA: 0
SPUTUM PRODUCTION: 0
PALPITATIONS: 0
TINGLING: 0
TREMORS: 0
SORE THROAT: 0
SORE THROAT: 0
TREMORS: 0
VOMITING: 0
TREMORS: 0
FEVER: 0
EYE DISCHARGE: 0
WEAKNESS: 1
BRUISES/BLEEDS EASILY: 0
ORTHOPNEA: 0
PALPITATIONS: 0
BLURRED VISION: 0
CHILLS: 0
HEADACHES: 0
BACK PAIN: 1
COUGH: 0
PALPITATIONS: 0
STRIDOR: 0
SHORTNESS OF BREATH: 0
ABDOMINAL PAIN: 0
WHEEZING: 0
PSYCHIATRIC NEGATIVE: 1
FEVER: 0
FLANK PAIN: 1
HEADACHES: 0
ABDOMINAL PAIN: 0
SORE THROAT: 0
POLYDIPSIA: 0
WEAKNESS: 1
SEIZURES: 0
HEARTBURN: 0
FEVER: 0
HEADACHES: 0
PHOTOPHOBIA: 0
CHILLS: 0
DEPRESSION: 0
SPUTUM PRODUCTION: 0
DEPRESSION: 0
EYE DISCHARGE: 0
HEADACHES: 0
WHEEZING: 0
TREMORS: 0
CHILLS: 0
NAUSEA: 0
MYALGIAS: 1
NERVOUS/ANXIOUS: 1
HEMOPTYSIS: 0
EYE PAIN: 0
DIZZINESS: 0
FEVER: 0
DOUBLE VISION: 0
PALPITATIONS: 0
PALPITATIONS: 0
DIZZINESS: 0
MYALGIAS: 1
ABDOMINAL PAIN: 0
CONSTIPATION: 0
SORE THROAT: 0
SORE THROAT: 0
MYALGIAS: 1
WHEEZING: 0
FEVER: 0
ABDOMINAL PAIN: 1
HEADACHES: 0
ADENOPATHY: 0
DIARRHEA: 0
DOUBLE VISION: 0
COUGH: 0
BACK PAIN: 1
BLURRED VISION: 0
FLANK PAIN: 0
HEARTBURN: 0
HEADACHES: 0
HEARTBURN: 0
PALPITATIONS: 0
DIARRHEA: 0
INSOMNIA: 0
NAUSEA: 0
TINGLING: 1
WEAKNESS: 1
MYALGIAS: 1
VOMITING: 0
LOSS OF CONSCIOUSNESS: 0
FEVER: 0
HEADACHES: 0
COLOR CHANGE: 0
ORTHOPNEA: 0
LOSS OF CONSCIOUSNESS: 1
ABDOMINAL PAIN: 0
CONSTIPATION: 0
CHILLS: 0
COUGH: 0
HEMOPTYSIS: 0
BLOOD IN STOOL: 0
DIAPHORESIS: 0
WEAKNESS: 1
ORTHOPNEA: 0
DIARRHEA: 0
ORTHOPNEA: 0
DIARRHEA: 0
ORTHOPNEA: 0
NAUSEA: 0
HEADACHES: 0
SHORTNESS OF BREATH: 0
FOCAL WEAKNESS: 1
HEARTBURN: 0
VOMITING: 0
TINGLING: 0
WEAKNESS: 0
SPUTUM PRODUCTION: 0
FEVER: 0
BACK PAIN: 1
MYALGIAS: 1
BLURRED VISION: 0
DIAPHORESIS: 0
TINGLING: 0
TINGLING: 1
BACK PAIN: 1
SPUTUM PRODUCTION: 0
HEADACHES: 0
NERVOUS/ANXIOUS: 0
FOCAL WEAKNESS: 0
ORTHOPNEA: 0
HEARTBURN: 0
BACK PAIN: 1
WEAKNESS: 1
INSOMNIA: 0
SHORTNESS OF BREATH: 0
HEMOPTYSIS: 0
BACK PAIN: 0
DEPRESSION: 0
FEVER: 0
NAUSEA: 0
BACK PAIN: 1
NAUSEA: 0
FEVER: 0
CONFUSION: 0
CHILLS: 0
WEAKNESS: 1
FOCAL WEAKNESS: 0
CONSTIPATION: 0
FOCAL WEAKNESS: 1
INSOMNIA: 0
FOCAL WEAKNESS: 1
NAUSEA: 0
NAUSEA: 0
HEADACHES: 0
ABDOMINAL PAIN: 0
CHOKING: 0
NAUSEA: 0
FLANK PAIN: 0
FOCAL WEAKNESS: 1
FALLS: 0
ABDOMINAL PAIN: 0
SHORTNESS OF BREATH: 0
NAUSEA: 0
SENSORY CHANGE: 0
SORE THROAT: 0
NAUSEA: 0
CHILLS: 0
CHILLS: 0
BACK PAIN: 0
NERVOUS/ANXIOUS: 0
DOUBLE VISION: 0
CONSTIPATION: 0
DIARRHEA: 0
CLAUDICATION: 0
HEARTBURN: 0
CLAUDICATION: 0
COUGH: 0
SEIZURES: 0
MYALGIAS: 1
VOMITING: 0
ORTHOPNEA: 0
BACK PAIN: 0
NECK PAIN: 0
FOCAL WEAKNESS: 1
CHILLS: 0
DIZZINESS: 0
LOSS OF CONSCIOUSNESS: 0
WEIGHT LOSS: 0
PSYCHIATRIC NEGATIVE: 1
HEMOPTYSIS: 0
BLURRED VISION: 0
INSOMNIA: 0
WHEEZING: 0
FEVER: 0
COLOR CHANGE: 0
WEAKNESS: 1
CONSTIPATION: 0
WEAKNESS: 0
TREMORS: 0
TINGLING: 0
DOUBLE VISION: 0
BLURRED VISION: 0
FEVER: 0
FEVER: 0
VOMITING: 0
ABDOMINAL PAIN: 0
SORE THROAT: 0
WEAKNESS: 1
WEAKNESS: 0
SHORTNESS OF BREATH: 0
FALLS: 0
COUGH: 0
DEPRESSION: 0
VOMITING: 0
POLYPHAGIA: 0
MYALGIAS: 0
BACK PAIN: 1
PALPITATIONS: 0
BLURRED VISION: 0
NAUSEA: 0
STRIDOR: 0
SORE THROAT: 0
DEPRESSION: 0
NERVOUS/ANXIOUS: 0
ORTHOPNEA: 0
BLURRED VISION: 0
FLANK PAIN: 1
DIARRHEA: 0
HEADACHES: 0
ORTHOPNEA: 0
BLURRED VISION: 0
SHORTNESS OF BREATH: 0
NAUSEA: 0
BLURRED VISION: 0
HEADACHES: 0
SENSORY CHANGE: 0
BACK PAIN: 1
DOUBLE VISION: 0
WEIGHT LOSS: 0
ABDOMINAL PAIN: 0
ABDOMINAL PAIN: 0
CHILLS: 0
FALLS: 1
COUGH: 0
DIARRHEA: 0
PALPITATIONS: 0
WHEEZING: 0
BRUISES/BLEEDS EASILY: 0
FEVER: 0
CHEST TIGHTNESS: 0
ORTHOPNEA: 0
SEIZURES: 0
COUGH: 0
DOUBLE VISION: 0
WEAKNESS: 1
ORTHOPNEA: 0
NAUSEA: 0
COUGH: 0
NERVOUS/ANXIOUS: 1
SPUTUM PRODUCTION: 0
COUGH: 0
SHORTNESS OF BREATH: 0
SHORTNESS OF BREATH: 0
NAUSEA: 0
SHORTNESS OF BREATH: 0
FLANK PAIN: 1
SHORTNESS OF BREATH: 0
NERVOUS/ANXIOUS: 1
CHILLS: 0
MYALGIAS: 1
AGITATION: 0
DIZZINESS: 0
COUGH: 0
HEADACHES: 0
PALPITATIONS: 0
POLYPHAGIA: 0
WEIGHT LOSS: 0
HEADACHES: 0
NAUSEA: 0
NECK PAIN: 0
ABDOMINAL PAIN: 0
HEARTBURN: 0
HEADACHES: 0
MYALGIAS: 1
NERVOUS/ANXIOUS: 0
FEVER: 0
FEVER: 0
SHORTNESS OF BREATH: 0
HEADACHES: 0
MYALGIAS: 1
BLOOD IN STOOL: 0
ACTIVITY CHANGE: 0
SHORTNESS OF BREATH: 0
HEMOPTYSIS: 0
VOMITING: 0
NERVOUS/ANXIOUS: 1
NAUSEA: 0
MYALGIAS: 1
MYALGIAS: 1
ABDOMINAL PAIN: 0
BLURRED VISION: 0
ABDOMINAL PAIN: 0
CONSTIPATION: 0
SPUTUM PRODUCTION: 0
FEVER: 0
COUGH: 0
ORTHOPNEA: 0
VOMITING: 0
DOUBLE VISION: 0
HEADACHES: 0
BACK PAIN: 1
TINGLING: 0
EYE REDNESS: 0
BACK PAIN: 1
COUGH: 0
BACK PAIN: 1
DIZZINESS: 0
NERVOUS/ANXIOUS: 0
TREMORS: 0
BLURRED VISION: 0
SENSORY CHANGE: 0
WEAKNESS: 0
FEVER: 0
VOMITING: 0
INSOMNIA: 0
BRUISES/BLEEDS EASILY: 0
CHEST TIGHTNESS: 0
VOMITING: 0
CHILLS: 0
NERVOUS/ANXIOUS: 0
HEARTBURN: 0
PALPITATIONS: 0
ABDOMINAL PAIN: 1
SORE THROAT: 0
WEIGHT LOSS: 0
WEAKNESS: 1
BACK PAIN: 1
DIARRHEA: 0
BLURRED VISION: 0
COUGH: 0
VOMITING: 0
BLURRED VISION: 0
SHORTNESS OF BREATH: 0
HEADACHES: 0
FOCAL WEAKNESS: 0
FALLS: 0
PALPITATIONS: 0
PALPITATIONS: 0
DEPRESSION: 0
ORTHOPNEA: 0
DIARRHEA: 0
NECK PAIN: 0
SPUTUM PRODUCTION: 0
PALPITATIONS: 0
BLURRED VISION: 0
EYE DISCHARGE: 0
NERVOUS/ANXIOUS: 1
SHORTNESS OF BREATH: 0
SPUTUM PRODUCTION: 0
NECK PAIN: 0
CONSTIPATION: 0
WEAKNESS: 1
BLURRED VISION: 0
WHEEZING: 0
HEMOPTYSIS: 0
LIGHT-HEADEDNESS: 0
NAUSEA: 0
FEVER: 0
CHILLS: 0
INSOMNIA: 0

## 2022-01-01 ASSESSMENT — PAIN DESCRIPTION - PAIN TYPE
TYPE: SURGICAL PAIN
TYPE: ACUTE PAIN
TYPE: ACUTE PAIN
TYPE: SURGICAL PAIN
TYPE: CHRONIC PAIN
TYPE: ACUTE PAIN
TYPE: SURGICAL PAIN
TYPE: ACUTE PAIN
TYPE: ACUTE PAIN;SURGICAL PAIN
TYPE: SURGICAL PAIN
TYPE: ACUTE PAIN;SURGICAL PAIN
TYPE: SURGICAL PAIN
TYPE: CHRONIC PAIN
TYPE: ACUTE PAIN
TYPE: SURGICAL PAIN
TYPE: ACUTE PAIN
TYPE: ACUTE PAIN
TYPE: SURGICAL PAIN;ACUTE PAIN
TYPE: SURGICAL PAIN
TYPE: SURGICAL PAIN
TYPE: ACUTE PAIN
TYPE: SURGICAL PAIN
TYPE: SURGICAL PAIN
TYPE: ACUTE PAIN
TYPE: ACUTE PAIN
TYPE: ACUTE PAIN;SURGICAL PAIN
TYPE: SURGICAL PAIN
TYPE: SURGICAL PAIN
TYPE: ACUTE PAIN;SURGICAL PAIN
TYPE: SURGICAL PAIN
TYPE: ACUTE PAIN
TYPE: SURGICAL PAIN
TYPE: ACUTE PAIN;SURGICAL PAIN
TYPE: SURGICAL PAIN
TYPE: ACUTE PAIN;SURGICAL PAIN
TYPE: CHRONIC PAIN
TYPE: CHRONIC PAIN
TYPE: ACUTE PAIN
TYPE: SURGICAL PAIN
TYPE: CHRONIC PAIN
TYPE: SURGICAL PAIN
TYPE: ACUTE PAIN
TYPE: CHRONIC PAIN
TYPE: SURGICAL PAIN
TYPE: SURGICAL PAIN;REFERRED PAIN
TYPE: ACUTE PAIN;SURGICAL PAIN
TYPE: ACUTE PAIN
TYPE: SURGICAL PAIN
TYPE: ACUTE PAIN
TYPE: SURGICAL PAIN
TYPE: ACUTE PAIN
TYPE: SURGICAL PAIN
TYPE: SURGICAL PAIN
TYPE: CHRONIC PAIN
TYPE: ACUTE PAIN
TYPE: SURGICAL PAIN
TYPE: CHRONIC PAIN
TYPE: SURGICAL PAIN
TYPE: ACUTE PAIN
TYPE: ACUTE PAIN;SURGICAL PAIN
TYPE: ACUTE PAIN
TYPE: SURGICAL PAIN
TYPE: ACUTE PAIN
TYPE: SURGICAL PAIN
TYPE: ACUTE PAIN
TYPE: ACUTE PAIN;CHRONIC PAIN
TYPE: SURGICAL PAIN
TYPE: ACUTE PAIN
TYPE: SURGICAL PAIN
TYPE: ACUTE PAIN;SURGICAL PAIN
TYPE: SURGICAL PAIN
TYPE: ACUTE PAIN;SURGICAL PAIN
TYPE: ACUTE PAIN
TYPE: SURGICAL PAIN
TYPE: SURGICAL PAIN
TYPE: ACUTE PAIN
TYPE: ACUTE PAIN;SURGICAL PAIN
TYPE: ACUTE PAIN
TYPE: ACUTE PAIN;SURGICAL PAIN
TYPE: SURGICAL PAIN
TYPE: ACUTE PAIN;SURGICAL PAIN
TYPE: SURGICAL PAIN
TYPE: SURGICAL PAIN
TYPE: ACUTE PAIN
TYPE: ACUTE PAIN;SURGICAL PAIN
TYPE: ACUTE PAIN
TYPE: SURGICAL PAIN
TYPE: ACUTE PAIN
TYPE: SURGICAL PAIN
TYPE: ACUTE PAIN
TYPE: SURGICAL PAIN
TYPE: SURGICAL PAIN
TYPE: ACUTE PAIN;CHRONIC PAIN
TYPE: ACUTE PAIN
TYPE: SURGICAL PAIN
TYPE: ACUTE PAIN;SURGICAL PAIN
TYPE: ACUTE PAIN
TYPE: SURGICAL PAIN
TYPE: ACUTE PAIN
TYPE: SURGICAL PAIN
TYPE: ACUTE PAIN
TYPE: SURGICAL PAIN
TYPE: ACUTE PAIN;SURGICAL PAIN
TYPE: SURGICAL PAIN
TYPE: ACUTE PAIN
TYPE: SURGICAL PAIN
TYPE: SURGICAL PAIN
TYPE: ACUTE PAIN
TYPE: ACUTE PAIN
TYPE: SURGICAL PAIN
TYPE: ACUTE PAIN
TYPE: ACUTE PAIN;SURGICAL PAIN
TYPE: ACUTE PAIN
TYPE: ACUTE PAIN;SURGICAL PAIN
TYPE: SURGICAL PAIN
TYPE: ACUTE PAIN;SURGICAL PAIN
TYPE: ACUTE PAIN
TYPE: ACUTE PAIN;SURGICAL PAIN
TYPE: ACUTE PAIN
TYPE: ACUTE PAIN
TYPE: CHRONIC PAIN
TYPE: ACUTE PAIN
TYPE: SURGICAL PAIN
TYPE: SURGICAL PAIN
TYPE: ACUTE PAIN;SURGICAL PAIN
TYPE: SURGICAL PAIN
TYPE: ACUTE PAIN
TYPE: ACUTE PAIN
TYPE: SURGICAL PAIN
TYPE: ACUTE PAIN
TYPE: ACUTE PAIN;SURGICAL PAIN
TYPE: ACUTE PAIN
TYPE: ACUTE PAIN
TYPE: ACUTE PAIN;CHRONIC PAIN
TYPE: CHRONIC PAIN
TYPE: ACUTE PAIN
TYPE: SURGICAL PAIN
TYPE: SURGICAL PAIN
TYPE: ACUTE PAIN
TYPE: SURGICAL PAIN
TYPE: ACUTE PAIN;SURGICAL PAIN
TYPE: SURGICAL PAIN
TYPE: CHRONIC PAIN
TYPE: ACUTE PAIN
TYPE: SURGICAL PAIN
TYPE: ACUTE PAIN
TYPE: SURGICAL PAIN
TYPE: ACUTE PAIN;CHRONIC PAIN
TYPE: ACUTE PAIN
TYPE: SURGICAL PAIN

## 2022-01-01 ASSESSMENT — COGNITIVE AND FUNCTIONAL STATUS - GENERAL
SUGGESTED CMS G CODE MODIFIER DAILY ACTIVITY: CH
CLIMB 3 TO 5 STEPS WITH RAILING: A LOT
STANDING UP FROM CHAIR USING ARMS: A LITTLE
SUGGESTED CMS G CODE MODIFIER MOBILITY: CH
SUGGESTED CMS G CODE MODIFIER MOBILITY: CH
MOVING FROM LYING ON BACK TO SITTING ON SIDE OF FLAT BED: UNABLE
MOVING TO AND FROM BED TO CHAIR: A LITTLE
MOVING FROM LYING ON BACK TO SITTING ON SIDE OF FLAT BED: A LITTLE
SUGGESTED CMS G CODE MODIFIER DAILY ACTIVITY: CK
DRESSING REGULAR LOWER BODY CLOTHING: A LOT
SUGGESTED CMS G CODE MODIFIER MOBILITY: CJ
MOBILITY SCORE: 23
MOBILITY SCORE: 17
MOBILITY SCORE: 21
CLIMB 3 TO 5 STEPS WITH RAILING: A LOT
SUGGESTED CMS G CODE MODIFIER DAILY ACTIVITY: CH
TOILETING: A LOT
WALKING IN HOSPITAL ROOM: A LITTLE
DAILY ACTIVITIY SCORE: 24
DAILY ACTIVITIY SCORE: 24
SUGGESTED CMS G CODE MODIFIER DAILY ACTIVITY: CH
WALKING IN HOSPITAL ROOM: A LITTLE
TURNING FROM BACK TO SIDE WHILE IN FLAT BAD: UNABLE
MOVING TO AND FROM BED TO CHAIR: UNABLE
SUGGESTED CMS G CODE MODIFIER MOBILITY: CL
DAILY ACTIVITIY SCORE: 24
MOVING TO AND FROM BED TO CHAIR: A LITTLE
SUGGESTED CMS G CODE MODIFIER MOBILITY: CK
CLIMB 3 TO 5 STEPS WITH RAILING: A LITTLE
HELP NEEDED FOR BATHING: A LOT
SUGGESTED CMS G CODE MODIFIER MOBILITY: CJ
WALKING IN HOSPITAL ROOM: A LITTLE
CLIMB 3 TO 5 STEPS WITH RAILING: A LITTLE
TURNING FROM BACK TO SIDE WHILE IN FLAT BAD: A LITTLE
MOBILITY SCORE: 22
MOBILITY SCORE: 24
PERSONAL GROOMING: A LITTLE
STANDING UP FROM CHAIR USING ARMS: A LITTLE
MOBILITY SCORE: 24
MOVING FROM LYING ON BACK TO SITTING ON SIDE OF FLAT BED: A LITTLE
DRESSING REGULAR UPPER BODY CLOTHING: A LITTLE
SUGGESTED CMS G CODE MODIFIER MOBILITY: CI
TURNING FROM BACK TO SIDE WHILE IN FLAT BAD: A LITTLE
MOBILITY SCORE: 11
DAILY ACTIVITIY SCORE: 16
SUGGESTED CMS G CODE MODIFIER DAILY ACTIVITY: CH
DAILY ACTIVITIY SCORE: 24

## 2022-01-01 ASSESSMENT — GAIT ASSESSMENTS
DISTANCE (FEET): 20
DEVIATION: BRADYKINETIC;DECREASED BASE OF SUPPORT
ASSISTIVE DEVICE: FRONT WHEEL WALKER
ASSISTIVE DEVICE: FRONT WHEEL WALKER
DISTANCE (FEET): 300
DISTANCE (FEET): 3
GAIT LEVEL OF ASSIST: MINIMAL ASSIST
DISTANCE (FEET): 300
DEVIATION: BRADYKINETIC;DECREASED HEEL STRIKE;DECREASED TOE OFF
DEVIATION: BRADYKINETIC;ANTALGIC;DECREASED HEEL STRIKE
GAIT LEVEL OF ASSIST: SUPERVISED
GAIT LEVEL OF ASSIST: CONTACT GUARD ASSIST
ASSISTIVE DEVICE: FRONT WHEEL WALKER
DEVIATION: BRADYKINETIC;DECREASED HEEL STRIKE;DECREASED TOE OFF
ASSISTIVE DEVICE: FRONT WHEEL WALKER
GAIT LEVEL OF ASSIST: STANDBY ASSIST
ASSISTIVE DEVICE: FRONT WHEEL WALKER
DISTANCE (FEET): 300
GAIT LEVEL OF ASSIST: SUPERVISED

## 2022-01-01 ASSESSMENT — PATIENT HEALTH QUESTIONNAIRE - PHQ9
1. LITTLE INTEREST OR PLEASURE IN DOING THINGS: NOT AT ALL
CLINICAL INTERPRETATION OF PHQ2 SCORE: 0
2. FEELING DOWN, DEPRESSED, IRRITABLE, OR HOPELESS: NOT AT ALL
SUM OF ALL RESPONSES TO PHQ9 QUESTIONS 1 AND 2: 0
SUM OF ALL RESPONSES TO PHQ9 QUESTIONS 1 AND 2: 0
2. FEELING DOWN, DEPRESSED, IRRITABLE, OR HOPELESS: NOT AT ALL
1. LITTLE INTEREST OR PLEASURE IN DOING THINGS: NOT AT ALL
SUM OF ALL RESPONSES TO PHQ9 QUESTIONS 1 AND 2: 0
1. LITTLE INTEREST OR PLEASURE IN DOING THINGS: NOT AT ALL
SUM OF ALL RESPONSES TO PHQ9 QUESTIONS 1 AND 2: 0
2. FEELING DOWN, DEPRESSED, IRRITABLE, OR HOPELESS: NOT AT ALL
2. FEELING DOWN, DEPRESSED, IRRITABLE, OR HOPELESS: NOT AT ALL
1. LITTLE INTEREST OR PLEASURE IN DOING THINGS: NOT AT ALL

## 2022-01-01 ASSESSMENT — LIFESTYLE VARIABLES
HAVE YOU EVER FELT YOU SHOULD CUT DOWN ON YOUR DRINKING: NO
SUBSTANCE_ABUSE: 0
HAVE PEOPLE ANNOYED YOU BY CRITICIZING YOUR DRINKING: NO
HAVE PEOPLE ANNOYED YOU BY CRITICIZING YOUR DRINKING: NO
ON A TYPICAL DAY WHEN YOU DRINK ALCOHOL HOW MANY DRINKS DO YOU HAVE: 0
SUBSTANCE_ABUSE: 0
SUBSTANCE_ABUSE: 0
TOTAL SCORE: 0
CONSUMPTION TOTAL: NEGATIVE
EVER FELT BAD OR GUILTY ABOUT YOUR DRINKING: NO
CONSUMPTION TOTAL: NEGATIVE
TOTAL SCORE: 0
ON A TYPICAL DAY WHEN YOU DRINK ALCOHOL HOW MANY DRINKS DO YOU HAVE: 0
TOTAL SCORE: 0
AVERAGE NUMBER OF DAYS PER WEEK YOU HAVE A DRINK CONTAINING ALCOHOL: 0
SUBSTANCE_ABUSE: 0
SUBSTANCE_ABUSE: 0
ALCOHOL_USE: NO
SUBSTANCE_ABUSE: 0
EVER HAD A DRINK FIRST THING IN THE MORNING TO STEADY YOUR NERVES TO GET RID OF A HANGOVER: NO
SUBSTANCE_ABUSE: 0
HOW MANY TIMES IN THE PAST YEAR HAVE YOU HAD 5 OR MORE DRINKS IN A DAY: 0
SUBSTANCE_ABUSE: 0
DO YOU DRINK ALCOHOL: NO
TOTAL SCORE: 0
ALCOHOL_USE: NO
HOW MANY TIMES IN THE PAST YEAR HAVE YOU HAD 5 OR MORE DRINKS IN A DAY: 0
HAVE YOU EVER FELT YOU SHOULD CUT DOWN ON YOUR DRINKING: NO
EVER HAD A DRINK FIRST THING IN THE MORNING TO STEADY YOUR NERVES TO GET RID OF A HANGOVER: NO
TOTAL SCORE: 0
AVERAGE NUMBER OF DAYS PER WEEK YOU HAVE A DRINK CONTAINING ALCOHOL: 0
TOTAL SCORE: 0
EVER FELT BAD OR GUILTY ABOUT YOUR DRINKING: NO
SUBSTANCE_ABUSE: 0

## 2022-01-01 ASSESSMENT — FIBROSIS 4 INDEX
FIB4 SCORE: 1.07
FIB4 SCORE: 1.76
FIB4 SCORE: 1.76
FIB4 SCORE: 1.43
FIB4 SCORE: 1.13
FIB4 SCORE: 1.85
FIB4 SCORE: 1.43
FIB4 SCORE: 1.74
FIB4 SCORE: 1.74
FIB4 SCORE: 1.59
FIB4 SCORE: 1.13
FIB4 SCORE: 1.33
FIB4 SCORE: 1.07
FIB4 SCORE: 1.73
FIB4 SCORE: 1.85
FIB4 SCORE: 1.76
FIB4 SCORE: 1.85
FIB4 SCORE: 1.01
FIB4 SCORE: 1.57
FIB4 SCORE: 1.14
FIB4 SCORE: 1.34
FIB4 SCORE: 1.43
FIB4 SCORE: 1.74
FIB4 SCORE: 1.13

## 2022-01-01 ASSESSMENT — PAIN SCALES - WONG BAKER
WONGBAKER_NUMERICALRESPONSE: HURTS A WHOLE LOT
WONGBAKER_NUMERICALRESPONSE: HURTS EVEN MORE

## 2022-01-01 ASSESSMENT — ACTIVITIES OF DAILY LIVING (ADL): TOILETING: INDEPENDENT

## 2022-01-06 NOTE — TELEPHONE ENCOUNTER
ELLIOT Ramirez called from [facility] to advise the Pt wanted the Rx for Evolocumab, REPATHA, (REPATHA SURECLICK) 140 MG/ML Solution Auto-injector to be sent to them. They can be reached at [phone]. PT is out    Thank you,  Cary CISNEROS

## 2022-02-10 NOTE — PROGRESS NOTES
Subjective:   Abimael Hamilton is a 66 y.o. male here today for   Chief Complaint   Patient presents with   • Back Pain     Neurosurgeon Lamemetomy    • Referral Needed     Physcial therapy at [facility]      #Spinal stenosis:  -This been a chronic ongoing for patient for several years.  He has been seen by [facility] [place] where he is undergoing pain management with opiate therapy.  After symptoms had worsened we referred patient to serial neurosurgery.  Serial neurosurgery did recommend the laminectomy for patient.  Patient states that even since then the pain continues to worsen the point where he is having a lot of weakness, numbness in the legs.  He is concerned about completing laminectomy and would like a second opinion.  He is requesting referral to [facility] as well as to restart physical therapy.    #CAD:  -Extensive history of CAD with 5 vessel CABG in 1998, redo three-vessel CABG in 2015, stent placement in 2018.  This is complicated by history of familial hyper lipidemia with statin intolerance and currently treating with Repatha as well as a history of hypertension.  Patient recently increased his opiate therapy for pain management.  He states that with all the medications he was taking he is having significant abdominal pain, nausea.  Because of this he had discontinued all medications with the exception of his pain meds.  Patient states is feeling much better.  Denies any chest pain, angina symptoms.  Is checking blood pressures regularly and states that they are below 140/90.    #Hypertension:  -Currently treated with amlodipine, lisinopril, metoprolol.  Patient discontinued all medications given significant side effects and polypharmacy.  States that he is feeling well with no side effects at this time.  Continue to check blood pressure.    Allergies   Allergen Reactions   • Gemfibrozil      Dehydration,Severe Muscle cramps   • Lipitor [Atorvastatin Calcium] Unspecified     Severe Muscle cramps,  dehydration   • Lovastatin      Dehydration, severe muscle cramps   • Tricor Unspecified     Dehydration, severe muscle cramps         Current medicines (including changes today)  Current Outpatient Medications   Medication Sig Dispense Refill   • Oxycodone HCl 20 MG Tab      • amoxicillin (AMOXIL) 500 MG Cap Take 2,000 mg by mouth as needed. For dentist      • nitroglycerin (NITROSTAT) 0.4 MG SL Tab PLACE 1TAB UNDER TONGUE AS NEEDED FOR CHEST PAIN(1TAB EVERY 5 MINS,MAX 3DOSES AS NEEDED)IF NO RELIEF CALL 911 (Patient not taking: Reported on 2/10/2022) 25 Tablet 1   • Evolocumab, REPATHA, (REPATHA SURECLICK) 140 MG/ML Solution Auto-injector Inject contents of 1 pen under the skin every 14 days. (Patient not taking: Reported on 2/10/2022) 2 mL 6   • allopurinol (ZYLOPRIM) 100 MG Tab every day. (Patient not taking: Reported on 2/10/2022)     • indomethacin (INDOCIN) 50 MG Cap TAKE ONE CAPSULE BY MOUTH TWICE DAILY AS NEEDED  (Patient not taking: Reported on 2/10/2022) 30 capsule 1   • HYDROcodone/acetaminophen (NORCO)  MG Tab as needed. Twice a day as needed (Patient not taking: Reported on 2/10/2022)     • amLODIPine (NORVASC) 5 MG Tab TAKE ONE TABLET BY MOUTH ONE TIME DAILY  (Patient not taking: Reported on 2/10/2022) 90 tablet 2   • lisinopril (PRINIVIL) 20 MG Tab TAKE ONE TABLET BY MOUTH ONE TIME DAILY  (Patient not taking: Reported on 2/10/2022) 90 tablet 2   • clopidogrel (PLAVIX) 75 MG Tab TAKE ONE TABLET BY MOUTH ONE TIME DAILY  (Patient not taking: Reported on 2/10/2022) 90 tablet 3   • isosorbide mononitrate (IMDUR) 120 MG CR tablet TAKE  ONE TABLET BY MOUTH EVERY MORNING (Patient not taking: Reported on 2/10/2022) 90 tablet 3   • Omega-3 Fatty Acids (FISH OIL) 1000 MG Cap capsule Take 2,000 mg by mouth every day. (Patient not taking: Reported on 2/10/2022)     • metoprolol SR (TOPROL XL) 100 MG TABLET SR 24 HR Take 1 Tab by mouth every day. (Patient not taking: Reported on 2/10/2022) 30 Tab 11   •  acetaminophen (TYLENOL) 500 MG Tab Take 1,000 mg by mouth every 6 hours as needed for Moderate Pain.     • Red Yeast Rice Extract (RED YEAST RICE PO) Take 2 Tabs by mouth every day. (Patient not taking: Reported on 2/10/2022)     • aspirin 81 MG EC tablet Take 1 Tab by mouth every day. (Patient not taking: Reported on 2/10/2022) 30 Tab 11   • Glucosamine-Chondroit-Vit C-Mn (GLUCOSAMINE CHONDROITIN COMPLX) CAPS Take 2 Caps by mouth every day. (Patient not taking: Reported on 2/10/2022)       No current facility-administered medications for this visit.     He  has a past medical history of Aortic stenosis (12/1/2015), Arthritis, Benign hypertensive heart disease without heart failure (11/14/2011), CAD (coronary artery disease), Congestive heart failure ([facility]), Coronary atherosclerosis of autologous vein bypass graft (11/14/2011), Essential hypertension (4/25/2019), Gout, Heart valve disease, High cholesterol, History of pancreatitis, History of pancreatitis, Hypertension, Migraine (2/16/2019), Muscle cramps (10/4/2011), Myocardial infarct ([facility]), Obesity (11/14/2011), Pain, Postsurgical aortocoronary bypass status (7/26/2016), Postsurgical aortocoronary bypass status (7/26/2016), Renal disorder, S/P aortic valve replacement with bioprosthetic valve (7/26/2016), Statin intolerance (7/26/2016), Status post cardiac revascularization with bypass aortocoronary anastomosis of five coronary vessels (7/26/2016), and Status post cardiac revascularization with bypass aortocoronary anastomosis of five coronary vessels (7/26/2016).    ROS   Review of Systems   Constitutional: Negative for chills and fever.   Eyes: Negative for blurred vision.   Respiratory: Negative for shortness of breath and wheezing.    Cardiovascular: Negative for chest pain and palpitations.   Musculoskeletal: Positive for back pain.   Neurological: Positive for tingling and weakness. Negative for dizziness and headaches.      Objective:     Physical Exam:  BP  "130/84   Pulse 80   Temp 36.8 °C (98.3 °F) (Temporal)   Resp 14   Ht 1.651 m (5' 5\")   Wt 101 kg (222 lb)   SpO2 95%  Body mass index is 36.94 kg/m².   Constitutional: Alert, no distress.  Skin: Warm, dry, good turgor, no rashes in visible areas.  Eye: Equal, round and reactive, conjunctiva clear, lids normal.  Respiratory: Unlabored respiratory effort, lungs clear to auscultation, no wheezes, no rhonchi.  Cardiovascular: Normal S1, S2, no murmur, no edema.  Abdomen: Soft, non-tender, no masses, no hepatosplenomegaly.  Psych: Alert and oriented x3, normal affect and mood.    Assessment and Plan:     1. Spinal stenosis of lumbar region, unspecified whether neurogenic claudication present  -We will refer patient over to orthopedics for second opinion regarding laminectomy.  Also start physical therapy.  - Referral to Orthopedics  - Referral to Physical Therapy    2. Coronary artery disease of native artery of native heart with stable angina pectoris (HCC)  -At this time patient is asymptomatic; however, I am concerned that his not take any of his medications.  I understand that he is taking a lot of medications which could lead to symptoms of polypharmacy; however, there are some that I am reluctant to stop.  I recommend the patient restart aspirin, clopidogrel given his coronary history.  We will continue to check blood pressures regularly.  If they start going above 140/90 then we will slowly restart pressure medication to keep a max goal.  Of course recommend continue with appropriate diet and exercise regiment.  He will follow-up with me in 1 month for reevaluation of blood pressure and medications.    3. Benign hypertensive heart disease without heart failure  -See #2.    Followup: No follow-ups on file.         PLEASE NOTE: This dictation was created using voice recognition software. I have made every reasonable attempt to correct obvious errors, but I expect that there are errors of grammar and possibly " content that I did not discover before finalizing the note.

## 2022-03-02 NOTE — PROGRESS NOTES
"Subjective:   Abimael Hamilton is a 66 y.o. male here today for   Chief Complaint   Patient presents with   • Lab Results   • Follow-Up     #CAD:  -Extensive history of CAD with 5 vessel CABG in 1998, redo three-vessel CABG in 2015, stent placement in 2018.  This is complicated by history of familial hyper lipidemia with statin intolerance and currently treating with Repatha as well as a history of hypertension.  -At previous appointment we discussed starting back on some medications.  Patient discontinued all medications due to concerns of polypharmacy.  Patient also states has been taking medications for long so long that is \"demoralizing\".  He also has a history complicated with severe back pain there is been times where he feels like he wants to \"give up on all other\".  Recently after meeting with back surgeon he has gained back some hope.  He is working with his wife on improving diet.  -At previous appoint we discussed restarting the aspirin, clopidogrel.  Patient has not yet restarted but does plan on restarting.  -He has been monitoring blood pressure at home, states that blood pressures usually in the 120s over 80s.  -Denies any chest pain, shortness of breath.    #Prediabetes:  -Recently had lab work completed by another provider, patient interviewed.  Labs do show an A1c of 6.2%, fasting blood glucose of 115.  Denies any increased fatigue, polydipsia, polyuria, numbness/tingling/pain in lower extremities.      Allergies   Allergen Reactions   • Gemfibrozil      Dehydration,Severe Muscle cramps   • Lipitor [Atorvastatin Calcium] Unspecified     Severe Muscle cramps, dehydration   • Lovastatin      Dehydration, severe muscle cramps   • Tricor Unspecified     Dehydration, severe muscle cramps         Current medicines (including changes today)  Current Outpatient Medications   Medication Sig Dispense Refill   • Oxycodone HCl 20 MG Tab      • nitroglycerin (NITROSTAT) 0.4 MG SL Tab PLACE 1TAB UNDER TONGUE AS " NEEDED FOR CHEST PAIN(1TAB EVERY 5 MINS,MAX 3DOSES AS NEEDED)IF NO RELIEF CALL 911 (Patient not taking: Reported on 2/10/2022) 25 Tablet 1   • Evolocumab, REPATHA, (REPATHA SURECLICK) 140 MG/ML Solution Auto-injector Inject contents of 1 pen under the skin every 14 days. (Patient not taking: Reported on 2/10/2022) 2 mL 6   • allopurinol (ZYLOPRIM) 100 MG Tab every day. (Patient not taking: Reported on 2/10/2022)     • indomethacin (INDOCIN) 50 MG Cap TAKE ONE CAPSULE BY MOUTH TWICE DAILY AS NEEDED  (Patient not taking: Reported on 2/10/2022) 30 capsule 1   • HYDROcodone/acetaminophen (NORCO)  MG Tab as needed. Twice a day as needed (Patient not taking: Reported on 2/10/2022)     • amLODIPine (NORVASC) 5 MG Tab TAKE ONE TABLET BY MOUTH ONE TIME DAILY  (Patient not taking: Reported on 2/10/2022) 90 tablet 2   • lisinopril (PRINIVIL) 20 MG Tab TAKE ONE TABLET BY MOUTH ONE TIME DAILY  (Patient not taking: Reported on 2/10/2022) 90 tablet 2   • clopidogrel (PLAVIX) 75 MG Tab TAKE ONE TABLET BY MOUTH ONE TIME DAILY  (Patient not taking: Reported on 2/10/2022) 90 tablet 3   • isosorbide mononitrate (IMDUR) 120 MG CR tablet TAKE  ONE TABLET BY MOUTH EVERY MORNING (Patient not taking: Reported on 2/10/2022) 90 tablet 3   • Omega-3 Fatty Acids (FISH OIL) 1000 MG Cap capsule Take 2,000 mg by mouth every day. (Patient not taking: Reported on 2/10/2022)     • amoxicillin (AMOXIL) 500 MG Cap Take 2,000 mg by mouth as needed. For dentist      • metoprolol SR (TOPROL XL) 100 MG TABLET SR 24 HR Take 1 Tab by mouth every day. (Patient not taking: Reported on 2/10/2022) 30 Tab 11   • acetaminophen (TYLENOL) 500 MG Tab Take 1,000 mg by mouth every 6 hours as needed for Moderate Pain.     • Red Yeast Rice Extract (RED YEAST RICE PO) Take 2 Tabs by mouth every day. (Patient not taking: Reported on 2/10/2022)     • aspirin 81 MG EC tablet Take 1 Tab by mouth every day. (Patient not taking: Reported on 2/10/2022) 30 Tab 11   •  "Glucosamine-Chondroit-Vit C-Mn (GLUCOSAMINE CHONDROITIN COMPLX) CAPS Take 2 Caps by mouth every day. (Patient not taking: Reported on 2/10/2022)       No current facility-administered medications for this visit.     He  has a past medical history of Aortic stenosis (12/1/2015), Arthritis, Benign hypertensive heart disease without heart failure (11/14/2011), CAD (coronary artery disease), Congestive heart failure ([facility]), Coronary atherosclerosis of autologous vein bypass graft (11/14/2011), Essential hypertension (4/25/2019), Gout, Heart valve disease, High cholesterol, History of pancreatitis, History of pancreatitis, Hypertension, Migraine (2/16/2019), Muscle cramps (10/4/2011), Myocardial infarct ([facility]), Obesity (11/14/2011), Pain, Postsurgical aortocoronary bypass status (7/26/2016), Postsurgical aortocoronary bypass status (7/26/2016), Renal disorder, S/P aortic valve replacement with bioprosthetic valve (7/26/2016), Statin intolerance (7/26/2016), Status post cardiac revascularization with bypass aortocoronary anastomosis of five coronary vessels (7/26/2016), and Status post cardiac revascularization with bypass aortocoronary anastomosis of five coronary vessels (7/26/2016).    ROS   Review of Systems   Constitutional: Negative for chills and fever.   Eyes: Negative for blurred vision.   Respiratory: Negative for shortness of breath and wheezing.    Cardiovascular: Negative for chest pain and palpitations.   Gastrointestinal: Negative for abdominal pain, constipation, diarrhea, nausea and vomiting.   Musculoskeletal: Positive for back pain (Chronic).        Objective:     Physical Exam:  /80 (BP Location: Right arm, Patient Position: Sitting, BP Cuff Size: Adult)   Pulse 82   Temp 37.2 °C (98.9 °F) (Temporal)   Resp 16   Ht 1.651 m (5' 5\")   Wt 102 kg (225 lb)   SpO2 96%  Body mass index is 37.44 kg/m².   Constitutional: Alert, no distress.  Skin: Warm, dry, good turgor, no rashes in visible " areas.  Eye: Equal, round and reactive, conjunctiva clear, lids normal.  ENMT: TM's clear bilaterally, lips without lesions, good dentition, oropharynx clear.  Neck: Trachea midline, no masses, no thyromegaly. No cervical or supraclavicular lymphadenopathy.  Respiratory: Unlabored respiratory effort, lungs clear to auscultation, no wheezes, no rhonchi.  Cardiovascular: Normal S1, S2, no murmur, no edema.  Abdomen: Soft, non-tender, no masses, no hepatosplenomegaly.  Psych: Alert and oriented x3, normal affect and mood.    Assessment and Plan:     1. Coronary artery disease of native artery of native heart with stable angina pectoris (HCC)  -Patient has a significant medical history and is on several different medications.  This been extremely difficult for patient the point we have discontinued all medications.  Discussed with patient the importance of starting on some medications that we know will help with morbidity/mortality improvement including aspirin and clopidogrel.  Patient states that he will begin with these medications.  After he started these medications and is stable, taking daily, will most likely need to add in metoprolol at that time.  -Discussed the importance of continuation of appropriate diet and exercise regiment.  -Also discussed the importance of controlling high cholesterol with Repatha which he needs to restart as well.  -Patient understood and agreed to this plan, he will follow-up in 1 to 2 months.    2. Prediabetes  -Hemoglobin A1c at 6.2%.  Patient does not wish to start any new medications at this time.  Discussed the importance of appropriate diet and exercise regiment.  We will recheck hemoglobin A1c in 6 months.    3. Benign hypertensive heart disease without heart failure  -Blood pressure today is at goal.  Again, patient is not compliant medications and is not excited to restart so many different medications.  We have 50 other plan we will continue to monitor blood pressure  regularly.  If blood pressure is above 130/80 he will let me know at which point we will slowly restart antihypertensives to make sure that he is at goal with avoiding polypharmacy.  Patient understood and agreed with current treatment plan        Followup: No follow-ups on file.         PLEASE NOTE: This dictation was created using voice recognition software. I have made every reasonable attempt to correct obvious errors, but I expect that there are errors of grammar and possibly content that I did not discover before finalizing the note.

## 2022-03-25 NOTE — TELEPHONE ENCOUNTER
Message  Received: Today  Shweta Morales M.D.  REVA Neely,   This pt should be seen to discuss sx, risk and possible stress testing. Tx, LS             Previous Messages                  Letter      Juan Luis Dumont Ass't on 3/25/2022        [facility]          LS received a clearance letter from the [facility]. Per LS, the pt needs to be seen for clearance to be addressed. Message sent to Perla Burciaga MA at the [facility].

## 2022-03-25 NOTE — LETTER
PROCEDURE/SURGERY CLEARANCE FORM      Encounter Date: 3/25/2022    Patient: Abimael Hamilton  YOB: 1955    The above patient is cleared to have the following procedure/surgery: L2-S1 Laminectomy                                           Additional comments: Patient is cleared by PCP provided he has additional clearance from cardiologist.                  MD Kelsey Jimenez M.D.

## 2022-03-30 NOTE — TELEPHONE ENCOUNTER
Clearance form signed and on your desk. Please let patient know that he must also get clearance from cardiologist

## 2022-04-04 PROBLEM — Z95.1 S/P CABG X 3: Status: ACTIVE | Noted: 2022-01-01

## 2022-04-04 NOTE — PROGRESS NOTES
Chief Complaint   Patient presents with   • Coronary Artery Disease     F/V Dx: Coronary artery disease of native artery of native heart with stable angina pectoris (HCC)   • Carotid Artery Stenosis     F/V Dx: Bilateral carotid artery occlusion   • Dyslipidemia       Subjective     Abimael Hamilton is a 66 y.o. male who presents today for preoperative cardiovascular examination for back surgery with Dr. Sparks.    He is followed by Dr. Morales in our clinic, history of coronary artery disease, s/p 5 vessel CABG in 1998 due to CP and syncope, and re-do 3-V CABG (SVG to ramus, SVG sequential from LAD to OM) in 12/2015 as well as bioprosthetic AVR at that time ([facility]).     PET nuclear stress test 11/2018, ischemia and TID noted. Subsequent left heart catheterization 11/2018 showed all grafts are occluded with the exception of his LIMA-LAD which fills his circumflex distribution via a jump graft. His native coronary arteries are severely diseased as well. There is a 80% narrowing at the anastamotic site of FORD to LAD.  4/2/2021 had PCI of his LIMA- LAD with 2.5 x 15 mm resolute MOE  anastomosis with impella support with refractory angina with Dr. Wang Fernandez ([place]).     Patient is planning on getting back surgery with Dr. Adam Sparks at [facility] [place] 4/18/2022. On Saturday, he took a 5 hour energy drink and developed chest pain right away. He had to take nitroglycerin and chest pain resolved.     He is off lisinopril, imdur and metoprolol due to side effect per patient. Not interested in going back on them. Off repatha due to insurance and cost, has follow up appointment with vascular medicine to restart the medication.     Past Medical History:   Diagnosis Date   • Aortic stenosis 12/1/2015   • Arthritis    • Benign hypertensive heart disease without heart failure 11/14/2011   • CAD (coronary artery disease)    • Congestive heart failure (HCC)    • Coronary atherosclerosis of autologous vein bypass  graft 11/14/2011   • Essential hypertension 4/25/2019   • Gout    • Heart valve disease    • High cholesterol    • History of pancreatitis    • History of pancreatitis    • Hypertension    • Migraine 2/16/2019   • Muscle cramps 10/4/2011   • Myocardial infarct (HCC)     Multiple MI's, 1998, 2015   • Obesity 11/14/2011   • Pain     Joints   • Postsurgical aortocoronary bypass status 7/26/2016    Status post redo 3-V CABG in [place] 12/2015: SVG-acute marginal, SVG sequential to LAD, and OM    • Postsurgical aortocoronary bypass status 7/26/2016    Status post redo 3-V CABG in [place] 12/2015: SVG-acute marginal, SVG sequential to LAD, and OM    • Renal disorder     Hx of Acute renal failure r/t medication/statins   • S/P aortic valve replacement with bioprosthetic valve 7/26/2016    #23 [place] Magna AVR (bioprosthetic)    • Statin intolerance 7/26/2016   • Status post cardiac revascularization with bypass aortocoronary anastomosis of five coronary vessels 7/26/2016    5-V CABG done in 1998 (done in [place] at [facility]), patent LIMA to LAD, occluded SVG-OM, occluded SVG-RCA by cardiac catheterization 11/15/2007 with other vein grafts not identified at that time, poor targets for revascularization and declined repeat CABG in 2007 by [facility]. No ischemic was identified by MPI 11/17/07 with an EF of 50%.     • Status post cardiac revascularization with bypass aortocoronary anastomosis of five coronary vessels 7/26/2016    5-V CABG done in 1998 (done in [place] at [facility]), patent LIMA to LAD, occluded SVG-OM, occluded SVG-RCA by cardiac catheterization 11/15/2007 with other vein grafts not identified at that time, poor targets for revascularization and declined repeat CABG in 2007 by [facility]. No ischemic was identified by MPI 11/17/07 with an EF of 50%.       Past Surgical History:   Procedure Laterality Date   • SHOULDER DECOMPRESSION ARTHROSCOPIC Right 9/5/2017    Procedure: SHOULDER DECOMPRESSION ARTHROSCOPIC SUBACROMIAL;   Surgeon: Mg Campos M.D.;  Location: [facility];  Service: Orthopedics   • DEBRIDEMENT, LABRUM, HIP, ARTHROSCOPIC Right 2017    Procedure: ARTHROSCOPIC LABRAL DEBRIDEMENT;  Surgeon: Mg Campos M.D.;  Location: [facility];  Service: Orthopedics   • SHOULDER ARTHROSCOPY W/ ROTATOR CUFF REPAIR Right 2017    Procedure: SHOULDER ARTHROSCOPY W/ ROTATOR CUFF REPAIR;  Surgeon: Mg Campos M.D.;  Location: [facility];  Service: Orthopedics   • SHOULDER ARTHROSCOPY W/ BICIPITAL TENODESIS REPAIR Right 2017    Procedure: SHOULDER ARTHROSCOPY W/ BICIPITAL TENODESIS REPAIR;  Surgeon: Mg Campos M.D.;  Location: [facility];  Service: Orthopedics   • SYNOVECTOMY Right 2017    Procedure: SYNOVECTOMY;  Surgeon: Mg Campos M.D.;  Location: [facility];  Service: Orthopedics   • MULTIPLE CORONARY ARTERY BYPASS  2015    3 way bypass & Bovine valve   • LAMINOTOMY  2004    Diskectomy L4-L5, L5-S1   • MULTIPLE CORONARY ARTERY BYPASS  1998    5 way bypass   • BIOPSY GENERAL      buttock lesion   • EYE SURGERY     • MITRAL VALVE REPLACE       Family History   Problem Relation Age of Onset   • Heart Attack Father         MI and CABG, unknown age   • Hyperlipidemia Father    • Cancer Mother         Breast   • Heart Disease Brother    • Arthritis Maternal Grandmother    • Stroke Neg Hx    • Diabetes Neg Hx    • Lung Disease Neg Hx      Social History     Socioeconomic History   • Marital status:      Spouse name: Not on file   • Number of children: Not on file   • Years of education: Not on file   • Highest education level: GED or equivalent   Occupational History   • Not on file   Tobacco Use   • Smoking status: Former Smoker     Packs/day: 3.00     Years: 20.00     Pack years: 60.00     Types: Cigarettes     Quit date: 1992     Years since quittin.2   • Smokeless  tobacco: Never Used   Vaping Use   • Vaping Use: Never used   Substance and Sexual Activity   • Alcohol use: Not Currently     Comment: 4 per month   • Drug use: Yes     Types: Marijuana, Inhaled     Comment: Marijuana- Daily (4 times per day)   • Sexual activity: Yes     Partners: Female   Other Topics Concern   • Not on file   Social History Narrative   • Not on file     Social Determinants of Health     Financial Resource Strain: Not on file   Food Insecurity: Not on file   Transportation Needs: Not on file   Physical Activity: Not on file   Stress: Not on file   Social Connections: Not on file   Intimate Partner Violence: Not on file   Housing Stability: Not on file     Allergies   Allergen Reactions   • Gemfibrozil      Dehydration,Severe Muscle cramps   • Lipitor [Atorvastatin Calcium] Unspecified     Severe Muscle cramps, dehydration   • Lovastatin      Dehydration, severe muscle cramps   • Tricor Unspecified     Dehydration, severe muscle cramps     Outpatient Encounter Medications as of 4/4/2022   Medication Sig Dispense Refill   • clopidogrel (PLAVIX) 75 MG Tab TAKE ONE TABLET BY MOUTH ONE TIME DAILY 90 Tablet 3   • Oxycodone HCl 20 MG Tab Take 20 mg by mouth four times daily - before meals and nightly as needed.     • nitroglycerin (NITROSTAT) 0.4 MG SL Tab PLACE 1TAB UNDER TONGUE AS NEEDED FOR CHEST PAIN(1TAB EVERY 5 MINS,MAX 3DOSES AS NEEDED)IF NO RELIEF CALL 911 (Patient taking differently: Place 0.4 mg under the tongue as needed.) 25 Tablet 1   • indomethacin (INDOCIN) 50 MG Cap TAKE ONE CAPSULE BY MOUTH TWICE DAILY AS NEEDED  (Patient taking differently: Take  by mouth as needed.) 30 capsule 1   • amoxicillin (AMOXIL) 500 MG Cap Take 2,000 mg by mouth as needed. For dentist     • acetaminophen (TYLENOL) 500 MG Tab Take 1,000 mg by mouth every 6 hours as needed for Moderate Pain.     • aspirin 81 MG EC tablet Take 1 Tab by mouth every day. 30 Tab 11   • Evolocumab, REPATHA, (REPATHA SURECLICK) 140  MG/ML Solution Auto-injector Inject contents of 1 pen under the skin every 14 days. (Patient not taking: No sig reported) 2 mL 6   • [DISCONTINUED] allopurinol (ZYLOPRIM) 100 MG Tab every day. (Patient not taking: No sig reported)     • [DISCONTINUED] HYDROcodone/acetaminophen (NORCO)  MG Tab as needed. Twice a day as needed (Patient not taking: No sig reported)     • lisinopril (PRINIVIL) 20 MG Tab TAKE ONE TABLET BY MOUTH ONE TIME DAILY  (Patient not taking: No sig reported) 90 tablet 2   • [DISCONTINUED] amLODIPine (NORVASC) 5 MG Tab TAKE ONE TABLET BY MOUTH ONE TIME DAILY  (Patient not taking: No sig reported) 90 tablet 2   • isosorbide mononitrate (IMDUR) 120 MG CR tablet TAKE  ONE TABLET BY MOUTH EVERY MORNING (Patient not taking: No sig reported) 90 tablet 3   • [DISCONTINUED] Omega-3 Fatty Acids (FISH OIL) 1000 MG Cap capsule Take 2,000 mg by mouth every day. (Patient not taking: No sig reported)     • metoprolol SR (TOPROL XL) 100 MG TABLET SR 24 HR Take 1 Tab by mouth every day. (Patient not taking: No sig reported) 30 Tab 11   • [DISCONTINUED] Red Yeast Rice Extract (RED YEAST RICE PO) Take 2 Tabs by mouth every day. (Patient not taking: No sig reported)     • [DISCONTINUED] Glucosamine-Chondroit-Vit C-Mn (GLUCOSAMINE CHONDROITIN COMPLX) CAPS Take 2 Caps by mouth every day. (Patient not taking: No sig reported)       No facility-administered encounter medications on file as of 4/4/2022.     Review of Systems   Constitutional: Negative for malaise/fatigue.   Eyes: Negative for blurred vision and double vision.   Respiratory: Negative for cough, shortness of breath and wheezing.    Cardiovascular: Positive for chest pain. Negative for palpitations, orthopnea and leg swelling.   Musculoskeletal: Positive for back pain and joint pain. Negative for falls.   Neurological: Negative for dizziness, focal weakness, loss of consciousness, weakness and headaches.   Psychiatric/Behavioral: Suicidal ideas: : The  "patient is not nervous/anxious.    All other systems reviewed and are negative.             Objective     BP (!) 166/100 (BP Location: Left arm, Patient Position: Sitting, BP Cuff Size: Adult)   Pulse 93   Resp 18   Ht 1.676 m (5' 6\")   Wt 100 kg (221 lb)   SpO2 95%   BMI 35.67 kg/m²     Physical Exam       Echocardiogram, 9/21/2018  Normal left ventricular ejection fraction (measured at 70%) with normal regional wall motion.  Mildly stenotic bioprosthetic aortic valve with a mean gradient measured at 27 mm a radiant measured at 48 mmHg (V-max 3.5 m/s) without perivalvular leak.  Left atrium is mildly dilated with a volume index measured at 39 mL/meter squared     Echocardiogram, 8/4/2016:  Normal left ventricular size and systolic function, EF 60%.  Mild concentric left ventricular hypertrophy.  Mild mitral regurgitation.  Normally functioning bovine bioprosthetic aortic valve.   Normal right sided pressures.  No prior studies for comparison     Myocardial perfusion imaging, 11/7/2018:  IMPRESSION:  1.  Dipyridamole stress negative for chest discomfort and negative for   ischemic EKG changes.  2.  PET perfusion images are suggestive of ischemia in the proximal to mid inferior segment and in the proximal mid bilateral segment.  There is no definite infarction.  In addition TID was present which could be indicative of   multivessel disease, possibly more severe than indicated by the perfusion study.  3.  The patient's resting ejection fraction was mildly reduced and david appropriately during dipyridamole stress.  There was basilar to mid inferior hypokinesis noted     Cardiac catheterization, 11/16/2018:  1.  Angina  2.  Positive stress test for inferior ischemia  3.  CAD status post CABG in the 90s and redo in 2015  4.  Bioprosthetic AVR 2015  5.  Poorly controlled hypertension  6.  Morbid obesity  POST  1.  Multivessel coronary artery disease  2.  Occluded vein grafts x6   3.  LIMA to LAD with focal 70-80% " distal anastomotic stenosis 4.  Severe native coronary artery disease not amenable to PCI 5.  Poorly controlled hypertension  RECOMMENDATIONS:  His FORD to LAD supplies both the distal LAD territory as well as via retrograde partially occluded jump graft the circumflex territory and is essentially his last remaining vessel.  There is WESLEY-3 flow and he is not maximally managed medically, in addition he has  of the RCA which was dominant and the vein graft is occluded and this is his only ischemic territory on noninvasive imaging.  1.  Medical therapy  2. Consideration of high risk last vessel PCI LIMA to LAD anastomosis should he prove to not be a redo surgical candidate (CT surgical consultation prior to any potential PCI would be required) and not be well managed with better medical therapy.  3. Weight loss     Cardiac catheterization 4/2/2021 at [place]  Occlusion of all his grafts excepts for LIMA to LAD s/p PCI with 2.5x15mm resolute MOE and post-dilated with 2.75NC balloon to the anastomotic lesion         CTA 8/20/2018 chest  1.  The pulmonary arteries are suboptimally opacified limiting evaluation, no large central pulmonary embolus is appreciated. Additional imaging would be required for definitive exclusion of pulmonary embolism  2.  Hepatomegaly and diffuse hepatic steatosis.  3.  Right nephrolithiasis     Echocardiogram 12/2/2020  CONCLUSIONS  Left ventricular ejection fraction is visually estimated to be 65%.  Mild concentric left ventricular hypertrophy.  Known bioprosthetic aortic valve replacement with mildly increased   gradient: Vmax is 3.1 m/s, MG 22 mmHg, ALIS 1.1 cm2.  Unable to estimate RVSP, normal estimated RAP.  Compare to prior echo on 9/21/18, no significant change, gradients   continue to be mildly elevated, were elevated on prior.     Pulmonary function tests 10/4/2020  FEV1 2.61 L, 86% predicted  FEV1/FVC 97% predicted  FEF 25-75% 82% predicted  % predicted  DLCO 112%  predicted    Assessment & Plan     1. Preoperative cardiovascular examination  NM-CARDIAC STRESS TEST   2. Benign hypertensive heart disease without heart failure  EKG   3. Coronary artery disease of native artery of native heart with stable angina pectoris (HCC)  NM-CARDIAC STRESS TEST   4. S/P aortic valve replacement with bioprosthetic valve     5. S/P CABG x 3  NM-CARDIAC STRESS TEST   6. Other specified personal risk factors, not elsewhere classified  NM-CARDIAC STRESS TEST   7. New onset right bundle branch block (RBBB)  NM-CARDIAC STRESS TEST       Medical Decision Making: Today's Assessment/Status/Plan:     1. Preoperative cardiovascular examination      ,- EKG today showed new q wave with new RBBB  - RCRI 3  - stress test ordered     2. CAD with history of CABG:  - recurring chest pain and abnormal ekg , pending stress test   - recommend he restart bb therapy and imdur, patient at this time not interested   - continue asa therapy and plavix   - ECHO showed ef 65%    3. S/p AVR:  - Known bioprosthetic aortic valve replacement with mildly increased gradient: Vmax is 3.1 m/s, MG 22 mmHg, ALIS 1.1 cm2.    4. Hypertension:  - elevated today   - patient home blood pressure 120s per patient.    Follow up after MPI. Sooner as needed.

## 2022-04-07 PROBLEM — N28.1 CYST OF RIGHT KIDNEY: Status: ACTIVE | Noted: 2022-01-01

## 2022-04-07 PROBLEM — N20.0 KIDNEY STONE ON RIGHT SIDE: Status: ACTIVE | Noted: 2022-01-01

## 2022-04-07 NOTE — SENIOR ADMIT NOTE
"SENIOR ADMIT NOTE:    Chelsea Lemon M.D.  Date & Time note created:    4/7/2022   3:03 PM     Patient: Dottie Hamilton  MRN: [id number].   Chief Complaint:  Abdominal pain right flank pain radiating to the groin x3 days    History of Present Illness:    66-year-old male patient with a past medical history of multivessel coronary artery disease (history of 5 vessel CABG in 1998, redo three-vessel CABG and December 2005), statin intolerant dyslipidemia, coronary atherosclerosis, aortic stenosis status post bioprosthetic aortic valve replacement in 2005, thoracic aortic aneurysm, GERD, hypertension and obesity presented 4/7/2022 with 8 out of 10 right flank pain x3 days.  The patient denied nausea, vomiting, dysuria or any other associated urinary tract symptoms.  Upon presentation to the ER, the patient was hemodynamically stable however he has elevated blood pressure readings.  Initial blood work-up was pretty unremarkable.  UA showed trace leukocyte esterase and trace occult blood.  Urine microscopic showed 10-20 white blood cells.  CT scan abdomen pelvis showed 14 mm nonobstructing right renal stone, and right renal cyst with mildly increased density. Ultrasound renal showed 3.1 cm hypoechoic mass in the lower pole of the right kidney with recommendation for dedicated renal protocol CT or MRI.      Physical Exam:    Weight/BMI: Body mass index is 35.02 kg/m².  /72   Pulse 71   Temp 36.4 °C (97.6 °F)   Resp 16   Ht 1.676 m (5' 6\")   Wt 98.4 kg (217 lb)   SpO2 94%   Vitals:    04/07/22 1201 04/07/22 1302 04/07/22 1352 04/07/22 1401   BP: (!) 179/84 135/61  138/72   Pulse: 71 66 76 71   Resp: 18 18 18 16   Temp:       TempSrc:       SpO2: 96% 98% 93% 94%   Weight:       Height:         Oxygen Therapy:  Pulse Oximetry: 94 %, O2 (LPM): 2, O2 Delivery Device: None - Room Air    Physical exam:  Constitutional: Obese, no acute distress. A&O x3  HENMT:  Normocephalic, Atraumatic  Eyes:  PERRLA, EOMI, Conjunctiva " normal  Neck:  Supple, Full range of motion, No stridor  Cardiovascular: Sternotomy scar murmur heard, regular rate and rhythm, No rubs, No gallops.   Lungs: Respiratory effort is normal, no crackles, no wheezing.  MSK: tender right flank, no edema  Extremities: Good pedal pulses b/l, no edema, 5/5 strength.  Abdomen: Bowel sounds x4, Soft, Non-tender, Non-distended, No guarding, No rebound, No masses.  Neurologic: Good sensations, Cranial nerves II through XII grossly intact. No focal deficits noted.    Imaging  US-RENAL   Final Result      1.  3.1 cm hyperechoic mass in the lower pole the right kidney may represent a hemorrhagic cyst. Cystic neoplasm cannot be excluded. Follow-up ultrasound or dedicated renal protocol CT or MRI is recommended for further evaluation.      2.  Right nephrolithiasis.      CT-ABDOMEN-PELVIS WITH   Final Result      1.  14 mm nonobstructing right renal stone.   2.  Enlargement of a right renal cyst with mildly increased density compared to simple fluid and some mild adjacent fat stranding. Cannot exclude superimposed hemorrhage or infection into right renal cyst. Recommend ultrasound for further evaluation.   3.  No hydronephrosis.   4.  Hepatic steatosis.            ASSESSMENT/PLAN:  #Right flank pain  #Right kidney stone  #Complex right renal cyst  #Possible UTI  -Right flank pain x3 days, differential diagnosis include hemorrhagic cyst versus pain secondary to kidney stone  -3 days course of IV antibiotics (ceftriaxone 2 g) in the setting of possible UTI and kidney stone at may work as a nidus for infection.  -Urology consult  -IV fluids  -Complex cyst on the right kidney, abdominal MRI with kidney protocol was ordered for better characterization of the cyst.    #ACS rule out:  Complains of atypical chest pain in the ER because of history of significant and severe coronary artery disease will admit to telemetry.  Stat troponin came back at 27, will continue to trend  EKG did not  show acute changes  Isosorbide mononitrate as needed for chest pain  Continue home meds including DAPT and beta-blockers      #Elevated blood pressure readings:  -Reviewed blood pressure readings are likely secondary to pain, pain medication as needed (Dilaudid every 4 hours)  -As needed antihypertensives in case the pain is controlled and the blood pressure is not coming down.        DVT prophylaxis: SCD for now, for anticipation of possible urological or IR interventions  Code status: DNAR/DNI    For complete details, please refer to H&P by Dr. Barkley.    Chelsea Lemon M.D.

## 2022-04-07 NOTE — ED NOTES
Admitting MD notified that pt is having active chest pain. Orders received.  Medicated w/SL nitro as ordered. RN to do EKG and will draw troponin.

## 2022-04-07 NOTE — H&P
History & Physical Note    Date of Admission: 4/7/2022  Admission Status: Emergency  [facility] Team: [facility]  Attending: Dr. Wang Ly  Senior Resident: Dr. Chelsea Lemon  Intern: Dr. Skylar Barkley    Contact Number: [phone]    Chief Complaint: Right flank and abdominal pain    History of Present Illness (HPI):   Abimael is a 66 y.o. male who presented 4/7/2022 with right lower quadrant abdominal pain, radiating to right flank, over the past three days. Patient has a past medical history of CAD, CHF, coronary artherosclerosis of autologuous vein bypass graft X3 (2011), essential hypertension, gout, hyperlipidemia, postsurgical aortocornary bypass status (7/2016), postsurgical aortic valve replacement with bioprosthetic valve (2016), statin intolerence, aortocoronary anastomosis of five coronary vessels (2016).     Patient noted the pain in his abdomen and flank three days ago. The pain is sharp and constant. The pain was an 8-9/10, and did not radiate. It was located mainly on the right flank. Patient did not note any changes in his urine, no blood or pain with urination. Nothing made the pain better, but it has been managed fine with hydromophone in the ED. He denies any nausea, vomiting or diarrhea. He has been having headaches. Right lower abdominal pain and right sided back pain has been present.     In the ED, patient was given fenyl 100 mg and Zofran by EMS, hydromorphone was given in ED. His abdominal pain improved to 3-4/10 with pain medications. CT showed 14 mm non-obstructive renal stone, with 3 mm renal cyst with mildly increased density, compared to simple fluid and mild adjacent fat stranding. Hepatic steatosis was also visualized. Ultrasound confirmed findings on CT.     Patient around 1420 had chest pain. EKG and troponin was ordered. Troponin is elevated at 27. EKG showed sinus rhythm, borderline intraventricular conduction delay.     Patient has been admitted for further work up of renal  cyst, and management of kidney stone and ACS rule out.     Review of Systems:   Review of Systems   Constitutional: Negative for chills, diaphoresis, fever, malaise/fatigue and weight loss.   HENT: Negative.  Negative for congestion, nosebleeds, sinus pain and sore throat.    Eyes: Negative for blurred vision, double vision, photophobia, pain, discharge and redness.   Respiratory: Positive for shortness of breath (With exertion). Negative for stridor.    Cardiovascular: Positive for chest pain. Negative for palpitations, orthopnea, claudication, leg swelling and PND.   Gastrointestinal: Positive for abdominal pain. Negative for blood in stool, constipation, diarrhea, heartburn, melena, nausea and vomiting.   Genitourinary: Positive for flank pain. Negative for dysuria, frequency, hematuria and urgency.   Musculoskeletal: Positive for back pain (CVA tenderness).   Skin: Negative.    Neurological: Positive for headaches. Negative for dizziness, tingling, tremors, sensory change, speech change, focal weakness, seizures and weakness.   Endo/Heme/Allergies: Negative.    Psychiatric/Behavioral: Negative.        Past Medical History:   Past Medical History was reviewed with patient.   has a past medical history of Aortic stenosis (12/1/2015), Arthritis, Benign hypertensive heart disease without heart failure (11/14/2011), CAD (coronary artery disease), Congestive heart failure (HCC), Coronary atherosclerosis of autologous vein bypass graft (11/14/2011), Essential hypertension (4/25/2019), Gout, Heart valve disease, High cholesterol, History of pancreatitis, History of pancreatitis, Hypertension, Migraine (2/16/2019), Muscle cramps (10/4/2011), Myocardial infarct (HCC), Obesity (11/14/2011), Pain, Postsurgical aortocoronary bypass status (7/26/2016), Postsurgical aortocoronary bypass status (7/26/2016), Renal disorder, S/P aortic valve replacement with bioprosthetic valve (7/26/2016), Statin intolerance (7/26/2016), Status  post cardiac revascularization with bypass aortocoronary anastomosis of five coronary vessels (7/26/2016), and Status post cardiac revascularization with bypass aortocoronary anastomosis of five coronary vessels (7/26/2016).    Past Surgical History:   Past Surgical History was reviewed with patient.   has a past surgical history that includes laminotomy (12/2004); biopsy general; multiple coronary artery bypass (11/11/1998); multiple coronary artery bypass (12/08/2015); shoulder decompression arthroscopic (Right, 9/5/2017); debridement, labrum, hip, arthroscopic (Right, 9/5/2017); shoulder arthroscopy w/ rotator cuff repair (Right, 9/5/2017); shoulder arthroscopy w/ bicipital tenodesis repair (Right, 9/5/2017); synovectomy (Right, 9/5/2017); eye surgery; and mitral valve replace.    Medications:   Medications have been reviewed with patient.  Prior to Admission Medications   Prescriptions Last Dose Informant Patient Reported? Taking?   Oxycodone HCl 20 MG Tab 4/7/2022 at 0430 Significant Other Yes No   Sig: Take 20 mg by mouth 4 times a day.   aspirin 81 MG EC tablet 4/5/2022 at PM Significant Other No No   Sig: Take 1 Tab by mouth every day.   clopidogrel (PLAVIX) 75 MG Tab 4/5/2022 at PM Significant Other No No   Sig: TAKE ONE TABLET BY MOUTH ONE TIME DAILY   Patient taking differently: Take 75 mg by mouth every day. TAKE ONE TABLET BY MOUTH ONE TIME DAILY   isosorbide mononitrate (IMDUR) 120 MG CR tablet 4/5/2022 at PM Significant Other No No   Sig: TAKE  ONE TABLET BY MOUTH EVERY MORNING   Patient taking differently: Take 120 mg by mouth every day. TAKE  ONE TABLET BY MOUTH EVERY MORNING   lisinopril (PRINIVIL) 20 MG Tab 4/5/2022 at PM Significant Other No No   Sig: TAKE ONE TABLET BY MOUTH ONE TIME DAILY    Patient taking differently: Take 20 mg by mouth every day.   metoprolol SR (TOPROL XL) 100 MG TABLET SR 24 HR 4/5/2022 at PM Significant Other No No   Sig: Take 1 Tab by mouth every day.   nitroglycerin  (NITROSTAT) 0.4 MG SL Tab 4/6/2022 at AFTERNOON Significant Other No No   Sig: PLACE 1TAB UNDER TONGUE AS NEEDED FOR CHEST PAIN(1TAB EVERY 5 MINS,MAX 3DOSES AS NEEDED)IF NO RELIEF CALL 911   Patient taking differently: Place 0.4 mg under the tongue as needed for Chest Pain.      Facility-Administered Medications: None        Allergies:   Allergies have been reviewed with patient.  Allergies   Allergen Reactions   • Gemfibrozil      Dehydration,Severe Muscle cramps   • Lipitor [Atorvastatin Calcium] Unspecified     Severe Muscle cramps, dehydration   • Lovastatin      Dehydration, severe muscle cramps   • Tricor Unspecified     Dehydration, severe muscle cramps       Family History:     family history includes Arthritis in his maternal grandmother; Cancer in his mother; Heart Attack in his father; Heart Disease in his brother; Hyperlipidemia in his father.     Social History:   Tobacco: 20 years X 3 packs per day  Alcohol: none  Recreational drugs (illegal and prescription):  [facility]  Employment: Retired  Activity Level: minimal  Living situation:  Lives with wife   Recent travel:  none  Other (stressors, spirituality, exposures):  none  Primary Care Provider: reviewed Raul Jimenez M.D.    Objective:  Physical Exam:  Temp:  [36.4 °C (97.6 °F)-36.6 °C (97.9 °F)] 36.4 °C (97.6 °F)  Pulse:  [66-79] 79  Resp:  [16-20] 18  BP: (117-183)/() 168/82  SpO2:  [88 %-98 %] 93 %    Physical Exam  Constitutional:       General: He is not in acute distress.     Appearance: Normal appearance. He is not ill-appearing or toxic-appearing.   HENT:      Head: Normocephalic and atraumatic.      Nose: Nose normal. No congestion or rhinorrhea.      Mouth/Throat:      Mouth: Mucous membranes are moist.      Pharynx: Oropharynx is clear. No oropharyngeal exudate or posterior oropharyngeal erythema.   Eyes:      General: No scleral icterus.        Right eye: No discharge.         Left eye: No discharge.      Extraocular  Movements: Extraocular movements intact.      Pupils: Pupils are equal, round, and reactive to light.   Neck:      Vascular: No carotid bruit.   Cardiovascular:      Rate and Rhythm: Normal rate and regular rhythm.      Heart sounds: Murmur (Systolic holosystolic ) heard.   Pulmonary:      Effort: Pulmonary effort is normal. No respiratory distress.      Breath sounds: Normal breath sounds. No wheezing or rales.   Abdominal:      General: Abdomen is flat. Bowel sounds are normal. There is no distension.      Palpations: Abdomen is soft.      Tenderness: There is abdominal tenderness (Right lower quadrant pain).   Musculoskeletal:         General: No swelling or tenderness. Normal range of motion.      Cervical back: Normal range of motion. No tenderness.      Right lower leg: No edema.      Left lower leg: No edema.      Comments: 5/5 strength bilaterally upper extremities. 4/5 right lower extremity, 5/5 left lower extremity.     CVA tenderness   Skin:     General: Skin is warm and dry.   Neurological:      General: No focal deficit present.      Mental Status: He is alert and oriented to person, place, and time.      Gait: Gait normal.      Deep Tendon Reflexes: Reflexes normal.         Labs:   Recent Labs     04/07/22  0804   WBC 10.1   RBC 4.70   HEMOGLOBIN 14.7   HEMATOCRIT 43.7   MCV 93.0   MCH 31.3   RDW 46.3   PLATELETCT 167   MPV 9.5   NEUTSPOLYS 74.00*   LYMPHOCYTES 15.00*   MONOCYTES 8.60   EOSINOPHILS 1.80   BASOPHILS 0.30     Recent Labs     04/07/22  0804   SODIUM 138   POTASSIUM 3.7   CHLORIDE 105   CO2 20   GLUCOSE 124*   BUN 18      Recent Labs     04/07/22  0804   ALBUMIN 4.2   TBILIRUBIN 0.8   ALKPHOSPHAT 66   TOTPROTEIN 6.7   ALTSGPT 20   ASTSGOT 24   CREATININE 0.99        EKG: Per my read, Sinus Rhythm. Borderline intraventricular conduction delay.   HR: 200  QTC: 432    Imaging:   US-RENAL   Final Result      1.  3.1 cm hyperechoic mass in the lower pole the right kidney may represent a  hemorrhagic cyst. Cystic neoplasm cannot be excluded. Follow-up ultrasound or dedicated renal protocol CT or MRI is recommended for further evaluation.      2.  Right nephrolithiasis.      CT-ABDOMEN-PELVIS WITH   Final Result      1.  14 mm nonobstructing right renal stone.   2.  Enlargement of a right renal cyst with mildly increased density compared to simple fluid and some mild adjacent fat stranding. Cannot exclude superimposed hemorrhage or infection into right renal cyst. Recommend ultrasound for further evaluation.   3.  No hydronephrosis.   4.  Hepatic steatosis.          Previous Data Review: reviewed    Assessment and Plan:  * Cyst of right kidney- (present on admission)  Assessment & Plan  Right abdominal pain and right flank pain for three days. Has a history of kidney stones, unsure when and what type of stones. Pain responded to pain medication.  Abdominal CT showed 14 mm non-obstructing renal stone, and right renal cyst, 3mm with mildly increased density compared to simple fluid, with mild adjacent fat stranding.     Plan:  -Consult urology  -MRI, with renal protocol  -Pain control     Kidney stone on right side  Assessment & Plan  Past history of kidney stones. Abdominal pain with right flank pain. Hematuria and WBC on UA. CT showed 14 mm non-obstructive renal stone. Since stone is >10 mm and the presence of renal cyst, warrants inpatient evaluation. Kidney stone is of unknown etiology. Patient has a known history of gout. Possible UTI, indicated by elevated WBC in urine    Plan:  -Pain management 1 mg Dilaudid, Q 4 Hrs PRN  -Ceftriaxone 2 g IV, daily  -Uric acid level: pending  -phosphorus: pending  -Urology consult  -IV fluids, 150 mL/hr        Coronary artery disease of native artery of native heart with stable angina pectoris (HCC)- (present on admission)  Assessment & Plan  History of CAD, had cardiac pain around 1420 in the ED. EKG showed sinus rhythm with borderline intraventricular  conduction. Troponin was elevated 27. Concern for angina, with acute stresser. Shortness of breath with exertion.     Plan:  -Serial Troponins   -Serial EKGs  -lisinopril 20 mg  -isosorbide mononitrate 120 mg  -Nitroglycerine as needed  -Metoprolol  mg  -Aspirin 81 mg  -Plavix      Dyslipidemia- (present on admission)  Assessment & Plan  History of hyperlipidemia. Has allergic reaction with statin. Concern of muscle pains and possible muscular damage.     -Consider lipid treatment

## 2022-04-07 NOTE — ASSESSMENT & PLAN NOTE
Hemorrhagic cyst on MRI imaging.  Initially pain was 8 out of 10, it has decreased to 7 out of 10.  Ultrasound of the kidneys and abdominal CT scan showed a renal cyst.  -CT renal with and without contrast pending.  -Continue pain medications as needed.  -Urology consulted, appreciate their recommendations.

## 2022-04-07 NOTE — ED NOTES
Break RN:  EKG completed, troponin sent.  PT continues to c/o 8/10 CP.  Alerted Dr. Barkley, [facility], who gave VO for nitro x 1.

## 2022-04-07 NOTE — ED TRIAGE NOTES
Chief Complaint   Patient presents with   • Abdominal Pain     C/o rlq abdominal pain x 3 days radiates to flank, describes pain as constant and sharp. Denies dysuria     Was given fentanyl 100 mcg and zofran 4mg iv by ems w/o relief. Arrived w/pain of 8/10

## 2022-04-07 NOTE — ASSESSMENT & PLAN NOTE
Right renal stone, 14 mm in size.  Of elevated uric acid with history of gout.  -Urology consulted, appreciate recommendations.  -Hold allopurinol due to prevent stone formation in the urine.  -Continue pain medications as needed.

## 2022-04-07 NOTE — NON-PROVIDER
History & Physical Note    Date of Admission: 4/7/2022  Admission Status: Emergency  [facility] Team: [facility]  Attending: Dr. Wang Ly   Senior Resident: Dr. Chelsea Lemon  Intern: Dr. Skylar Barkley    Chief Complaint: R flank pain for the past 3 days     History of Present Illness (HPI):   Mr. Hamilton is a 66 y.o. male who presented 4/7/2022 with R flank pain for the past 3 days. He describes it as a constant and sharp pain, and nothing seems to make it better. He denies hematuria, dysuria or cloudy urine. The pain does not radiate anywhere. He previously described the pain as a 9/10, but currently is a 3/10 with administration of Dilaudid. He has a past history of a R kidney stone, unsure of the date or what kind of stone it was. He reports new onset chest pain while being in the ER.     Review of Systems:  General: reports occasional headache for the past 2 days  Cardio: denies chest pain or palpitations  Resp: denies SOB or cough  GI: reports RLQ pain associated with R flank pain  : reports R flank pain; denies hematuria or dysuria    Past Medical History:   Past Medical History was reviewed with patient.   has a past medical history of Aortic stenosis (12/1/2015), Arthritis, Benign hypertensive heart disease without heart failure (11/14/2011), CAD (coronary artery disease), Congestive heart failure (HCC), Coronary atherosclerosis of autologous vein bypass graft (11/14/2011), Essential hypertension (4/25/2019), Gout, Heart valve disease, High cholesterol, History of pancreatitis, History of pancreatitis, Hypertension, Migraine (2/16/2019), Muscle cramps (10/4/2011), Myocardial infarct (HCC), Obesity (11/14/2011), Pain, Postsurgical aortocoronary bypass status (7/26/2016), Postsurgical aortocoronary bypass status (7/26/2016), Renal disorder, S/P aortic valve replacement with bioprosthetic valve (7/26/2016), Statin intolerance (7/26/2016), Status post cardiac revascularization with bypass aortocoronary  anastomosis of five coronary vessels (7/26/2016), and Status post cardiac revascularization with bypass aortocoronary anastomosis of five coronary vessels (7/26/2016).    Past Surgical History: Past Surgical History was reviewed with patient.   has a past surgical history that includes laminotomy (12/2004); biopsy general; multiple coronary artery bypass (11/11/1998); multiple coronary artery bypass (12/08/2015); shoulder decompression arthroscopic (Right, 9/5/2017); debridement, labrum, hip, arthroscopic (Right, 9/5/2017); shoulder arthroscopy w/ rotator cuff repair (Right, 9/5/2017); shoulder arthroscopy w/ bicipital tenodesis repair (Right, 9/5/2017); synovectomy (Right, 9/5/2017); eye surgery; and mitral valve replace.    Medications: Medications have been reviewed with patient.  Prior to Admission Medications   Prescriptions Last Dose Informant Patient Reported? Taking?   Oxycodone HCl 20 MG Tab 4/7/2022 at 0430 Significant Other Yes No   Sig: Take 20 mg by mouth 4 times a day.   aspirin 81 MG EC tablet 4/5/2022 at PM Significant Other No No   Sig: Take 1 Tab by mouth every day.   clopidogrel (PLAVIX) 75 MG Tab 4/5/2022 at PM Significant Other No No   Sig: TAKE ONE TABLET BY MOUTH ONE TIME DAILY   Patient taking differently: Take 75 mg by mouth every day. TAKE ONE TABLET BY MOUTH ONE TIME DAILY   isosorbide mononitrate (IMDUR) 120 MG CR tablet 4/5/2022 at PM Significant Other No No   Sig: TAKE  ONE TABLET BY MOUTH EVERY MORNING   Patient taking differently: Take 120 mg by mouth every day. TAKE  ONE TABLET BY MOUTH EVERY MORNING   lisinopril (PRINIVIL) 20 MG Tab 4/5/2022 at PM Significant Other No No   Sig: TAKE ONE TABLET BY MOUTH ONE TIME DAILY    Patient taking differently: Take 20 mg by mouth every day.   metoprolol SR (TOPROL XL) 100 MG TABLET SR 24 HR 4/5/2022 at PM Significant Other No No   Sig: Take 1 Tab by mouth every day.   nitroglycerin (NITROSTAT) 0.4 MG SL Tab 4/6/2022 at AFTERNOON Significant  Other No No   Sig: PLACE 1TAB UNDER TONGUE AS NEEDED FOR CHEST PAIN(1TAB EVERY 5 MINS,MAX 3DOSES AS NEEDED)IF NO RELIEF CALL 911   Patient taking differently: Place 0.4 mg under the tongue as needed for Chest Pain.      Facility-Administered Medications: None        Allergies: Allergies have been reviewed with patient.  Allergies   Allergen Reactions   • Gemfibrozil      Dehydration,Severe Muscle cramps   • Lipitor [Atorvastatin Calcium] Unspecified     Severe Muscle cramps, dehydration   • Lovastatin      Dehydration, severe muscle cramps   • Tricor Unspecified     Dehydration, severe muscle cramps       Family History:   family history includes Arthritis in his maternal grandmother; Cancer in his mother; Heart Attack in his father; Heart Disease in his brother; Hyperlipidemia in his father.   History of kidney stones in Mom and Grandmother    Social History:   Tobacco: denies tobacco use  Alcohol: denies alcohol use  Recreational drugs (illegal and prescription):  Reports occasional marijuana use, but denies illicit drugs and narcotics  Employment: retired   Living situation:  Lives at home with his wife  Primary Care Provider: reviewed Raul Jimenez M.D.      Physical Exam:   Vitals:  Temp:  [36.4 °C (97.6 °F)-36.6 °C (97.9 °F)] 36.4 °C (97.6 °F)  Pulse:  [66-76] 76  Resp:  [16-20] 18  BP: (135-183)/() 135/61  SpO2:  [88 %-98 %] 93 %    Physical Exam  Constitutional:       General: He is not in acute distress.  Cardiovascular:      Rate and Rhythm: Normal rate and regular rhythm.      Pulses: Normal pulses.   Pulmonary:      Effort: Pulmonary effort is normal.      Breath sounds: Normal breath sounds.   Abdominal:      Tenderness: There is abdominal tenderness (tenderness to light palpation in the RLQ). There is right CVA tenderness.   Musculoskeletal:         General: No swelling.   Neurological:      General: No focal deficit present.      Mental Status: He is alert and oriented to person,  place, and time.      Deep Tendon Reflexes: Reflexes normal.         Labs:   Recent Labs     04/07/22  0804   WBC 10.1   RBC 4.70   HEMOGLOBIN 14.7   HEMATOCRIT 43.7   MCV 93.0   MCH 31.3   RDW 46.3   PLATELETCT 167   MPV 9.5   NEUTSPOLYS 74.00*   LYMPHOCYTES 15.00*   MONOCYTES 8.60   EOSINOPHILS 1.80   BASOPHILS 0.30     Recent Labs     04/07/22  0804   SODIUM 138   POTASSIUM 3.7   CHLORIDE 105   CO2 20   GLUCOSE 124*   BUN 18     Recent Labs     04/07/22  0804   ALBUMIN 4.2   TBILIRUBIN 0.8   ALKPHOSPHAT 66   TOTPROTEIN 6.7   ALTSGPT 20   ASTSGOT 24   CREATININE 0.99   UA shows trace occult blood and leukocyte esterase; WBC of 10-20      Imaging:   CT abdomen/pelvis w/ contrast: 14 mm nonobstructing R renal stone  Large R renal cyst     US Renal: 3.1 cm hyperechoic mass in R kidney, may be suggestive of a hemorrhagic cyst      Assessment:  66 year old male with a PMH of multivessel CAD, gout, and kidney stones presenting for right flank pain for the past 3 days. He reports sharp pain on the R lower back, and UA shows trace leukocyte esterase and occult blood. CT shows a 14 mm non-obstructing right renal stone. The cause of the kidney stone is unknown, and the details of the previous kidney stone are unknown. Cannot exclude uric acid stone due to patient's PMH of gout. He also has imaging showing a large renal cyst, possibly a hemorrhagic cyst or cystic neoplasm. While being in the ED, he has started to experience chest pain, most likely due to history of CAD and CHF.    Plan:     #R nephrolithiasis, due to unknown etiology  - could possibly be uric acid, 2/2 to gout  -order phosphorus, magnesium, and uric acid levels  -start IV NS infusion  -ceftriaxone IM injection 2g q24 hours  -due to size of stone >10mm, consult with urology   -keep patient as NPO    #chest pain  -admit to inpatient telemetry  -patient has a strong history of CAD and CHF  -patient reports new onset chest pain while being in ER  -order  EKG  -order troponins  -give sublingual nitroglycerin    #R renal cyst  -unclear if this is an ongoing mass  -US suggestive of a possible hemorrhagic cyst or cystic neoplasm.  -order renal MRI for evaluation of cause of cyst    CODE STATUS: DNR/DNI    Heike Rendon, MS3

## 2022-04-07 NOTE — ED NOTES
Med rec completed per pt's wife   Allergies reviewed  No PO antibiotics in the last 30 days    Pt STOPPED taking his medications in October 2021 and restarted his Plavix and Asprin 1 month ago. Pt restarted taking his Isosorbide, Lisinopril, and Metoprolol on 4/5/2022     Pt took a dose of Nitro yesterday (4/6/2022) afternoon

## 2022-04-07 NOTE — ASSESSMENT & PLAN NOTE
History of coronary artery disease.  Patient complains of chest pain.  Troponins and EKG were nonsignificant.  -Continue metoprolol.  -Continue clopidogrel gel and aspirin  -Continue nitroglycerin as needed  -Continue lisinopril.

## 2022-04-07 NOTE — ED PROVIDER NOTES
ED Provider Note    CHIEF COMPLAINT  Chief Complaint   Patient presents with   • Abdominal Pain     C/o rlq abdominal pain x 3 days radiates to flank, describes pain as constant and sharp. Denies dysuria       HPI  Abimael Hamilton is a 66 y.o. male who presents to the emergency department complaining of right flank and right lower quadrant abdominal pain.  The patient says that this has been a constant pain over the last 3 days fairly severe at times never really goes away and sharp in nature.  He does not recognize any specific exacerbating or alleviating factors or precipitating events.  Pain is slightly improved after receiving 100 mcg of fentanyl and 4 Zofran by the EMS crew but the patient says he still has 8 out of 10 pain    REVIEW OF SYSTEMS no fever no chills no hemoptysis.  All other systems negative    PAST MEDICAL HISTORY  Past Medical History:   Diagnosis Date   • Aortic stenosis 12/1/2015   • Arthritis    • Benign hypertensive heart disease without heart failure 11/14/2011   • CAD (coronary artery disease)    • Congestive heart failure (HCC)    • Coronary atherosclerosis of autologous vein bypass graft 11/14/2011   • Essential hypertension 4/25/2019   • Gout    • Heart valve disease    • High cholesterol    • History of pancreatitis    • History of pancreatitis    • Hypertension    • Migraine 2/16/2019   • Muscle cramps 10/4/2011   • Myocardial infarct (HCC)     Multiple MI's, 1998, 2015   • Obesity 11/14/2011   • Pain     Joints   • Postsurgical aortocoronary bypass status 7/26/2016    Status post redo 3-V CABG in [place] 12/2015: SVG-acute marginal, SVG sequential to LAD, and OM    • Postsurgical aortocoronary bypass status 7/26/2016    Status post redo 3-V CABG in [place] 12/2015: SVG-acute marginal, SVG sequential to LAD, and OM    • Renal disorder     Hx of Acute renal failure r/t medication/statins   • S/P aortic valve replacement with bioprosthetic valve 7/26/2016    #23 [address] Magna AVR  (bioprosthetic)    • Statin intolerance 2016   • Status post cardiac revascularization with bypass aortocoronary anastomosis of five coronary vessels 2016    5-V CABG done in  (done in [facility]), patent LIMA to LAD, occluded SVG-OM, occluded SVG-RCA by cardiac catheterization 11/15/2007 with other vein grafts not identified at that time, poor targets for revascularization and declined repeat CABG in  by [facility]. No ischemic was identified by MPI 07 with an EF of 50%.     • Status post cardiac revascularization with bypass aortocoronary anastomosis of five coronary vessels 2016    5-V CABG done in  (done in [facility]), patent LIMA to LAD, occluded SVG-OM, occluded SVG-RCA by cardiac catheterization 11/15/2007 with other vein grafts not identified at that time, poor targets for revascularization and declined repeat CABG in  by [facility]. No ischemic was identified by MPI 07 with an EF of 50%.         FAMILY HISTORY  Family History   Problem Relation Age of Onset   • Heart Attack Father         MI and CABG, unknown age   • Hyperlipidemia Father    • Cancer Mother         Breast   • Heart Disease Brother    • Arthritis Maternal Grandmother    • Stroke Neg Hx    • Diabetes Neg Hx    • Lung Disease Neg Hx        SOCIAL HISTORY  Social History     Socioeconomic History   • Marital status:    • Highest education level: GED or equivalent   Tobacco Use   • Smoking status: Former Smoker     Packs/day: 3.00     Years: 20.00     Pack years: 60.00     Types: Cigarettes     Quit date: 1992     Years since quittin.2   • Smokeless tobacco: Never Used   Vaping Use   • Vaping Use: Never used   Substance and Sexual Activity   • Alcohol use: Not Currently     Comment: 4 per month   • Drug use: Yes     Types: Marijuana, Inhaled     Comment: Marijuana- Daily (4 times per day)   • Sexual activity: Yes     Partners: Female       SURGICAL HISTORY  Past Surgical History:    Procedure Laterality Date   • SHOULDER DECOMPRESSION ARTHROSCOPIC Right 9/5/2017    Procedure: SHOULDER DECOMPRESSION ARTHROSCOPIC SUBACROMIAL;  Surgeon: Mg Campos M.D.;  Location: [facility];  Service: Orthopedics   • DEBRIDEMENT, LABRUM, HIP, ARTHROSCOPIC Right 9/5/2017    Procedure: ARTHROSCOPIC LABRAL DEBRIDEMENT;  Surgeon: Mg Campos M.D.;  Location: [facility];  Service: Orthopedics   • SHOULDER ARTHROSCOPY W/ ROTATOR CUFF REPAIR Right 9/5/2017    Procedure: SHOULDER ARTHROSCOPY W/ ROTATOR CUFF REPAIR;  Surgeon: Mg Campos M.D.;  Location: [facility];  Service: Orthopedics   • SHOULDER ARTHROSCOPY W/ BICIPITAL TENODESIS REPAIR Right 9/5/2017    Procedure: SHOULDER ARTHROSCOPY W/ BICIPITAL TENODESIS REPAIR;  Surgeon: Mg Campos M.D.;  Location: [facility];  Service: Orthopedics   • SYNOVECTOMY Right 9/5/2017    Procedure: SYNOVECTOMY;  Surgeon: Mg Campos M.D.;  Location: [facility];  Service: Orthopedics   • MULTIPLE CORONARY ARTERY BYPASS  12/08/2015    3 way bypass & Bovine valve   • LAMINOTOMY  12/2004    Diskectomy L4-L5, L5-S1   • MULTIPLE CORONARY ARTERY BYPASS  11/11/1998    5 way bypass   • BIOPSY GENERAL      buttock lesion   • EYE SURGERY     • MITRAL VALVE REPLACE         CURRENT MEDICATIONS  Home Medications     Reviewed by Liz Medley (Pharmacy Tech) on 04/07/22 at 1323  Med List Status: Complete   Medication Last Dose Status   aspirin 81 MG EC tablet 4/5/2022 Active   clopidogrel (PLAVIX) 75 MG Tab 4/5/2022 Active   isosorbide mononitrate (IMDUR) 120 MG CR tablet 4/5/2022 Active   lisinopril (PRINIVIL) 20 MG Tab 4/5/2022 Active   metoprolol SR (TOPROL XL) 100 MG TABLET SR 24 HR 4/5/2022 Active   nitroglycerin (NITROSTAT) 0.4 MG SL Tab 4/6/2022 Active   Oxycodone HCl 20 MG Tab 4/7/2022 Active                ALLERGIES  Allergies   Allergen Reactions   •  "Gemfibrozil      Dehydration,Severe Muscle cramps   • Lipitor [Atorvastatin Calcium] Unspecified     Severe Muscle cramps, dehydration   • Lovastatin      Dehydration, severe muscle cramps   • Tricor Unspecified     Dehydration, severe muscle cramps       PHYSICAL EXAM  VITAL SIGNS: BP (!) 164/88   Pulse 74   Temp 36.4 °C (97.6 °F)   Resp 16   Ht 1.676 m (5' 6\")   Wt 98.4 kg (217 lb)   SpO2 95%   BMI 35.02 kg/m²    Oxygen saturation is interpreted as adequate  Constitutional: Awake lucid verbal moving all extremities without difficulty  Eyes: No erythema discharge or jaundice  Neck: Trachea midline no JVD  Cardiovascular: Regular rate and rhythm  Lungs: Clear and equal bilaterally with no apparent difficulty breathing  Abdomen/Back: Soft and diffusely tender throughout the right side of the abdomen but no peritoneal findings he does not have a classic Barksdale sign bowel sounds are present  Skin: Warm and dry  Musculoskeletal: No acute bony deformity no leg edema  Neurologic: Awake lucid verbal and moving all extremities without difficulty    Laboratory  CBC shows white blood cell count of 10.1 hemoglobin is adequate at 14.7 basic metabolic panel is unremarkable LFTs and lipase are normal urinalysis is abnormal with 10-20 white blood cells and trace of leukocyte Estrace and nitrite negative.  I have ordered and add on urine culture    EKG interpretation  During the patient's ER stay the admitting doctor ordered an EKG and I have reviewed this it is a twelve-lead EKG showing sinus rhythm 76 bpm there is extensive baseline artifact but no pathologic ST elevation Q waves are noted in V1 and V2 findings are similar to the EKG from April 4, 2022    Radiology  US-RENAL   Final Result      1.  3.1 cm hyperechoic mass in the lower pole the right kidney may represent a hemorrhagic cyst. Cystic neoplasm cannot be excluded. Follow-up ultrasound or dedicated renal protocol CT or MRI is recommended for further evaluation. "      2.  Right nephrolithiasis.      CT-ABDOMEN-PELVIS WITH   Final Result      1.  14 mm nonobstructing right renal stone.   2.  Enlargement of a right renal cyst with mildly increased density compared to simple fluid and some mild adjacent fat stranding. Cannot exclude superimposed hemorrhage or infection into right renal cyst. Recommend ultrasound for further evaluation.   3.  No hydronephrosis.   4.  Hepatic steatosis.          MEDICAL DECISION MAKING and DISPOSITION  In the emergency department an IV was established and the patient placed on the cardiac monitor he was given intravenous Dilaudid for pain and required a couple of doses.  I have reviewed the radiographic findings with the radiologist over the telephone and I have reviewed the case with Dr. Hector from urology.  At this time it is not clear if this is a malignant mass in the kidney or complex hemorrhagic cyst but the radiologist recommends MRI for further evaluation.  At this time the patient is required significant amounts of narcotic pain medications to control his right flank and abdominal pain.  I reviewed all the findings thus far available with the patient and his family and the patient is referred to the hospitalist service for further evaluation and treatment.  Dr. Hector will provide urology consultation.    IMPRESSION  1.  Complex cystic mass involving the right kidney of unclear etiology  2.  Right flank and right side abdominal pain  3.  Abnormal urinalysis  4.  Intractable pain           Electronically signed by: Jordi Conway M.D., 4/7/2022 4:06 PM

## 2022-04-07 NOTE — ED NOTES
Report to Adriana HASSAN. Pt transported to T802 by RN on tele monitor and luma. Belongings w/patient.

## 2022-04-08 NOTE — PROGRESS NOTES
Assumed care of patient. Bedside report received from Candida HASSAN. Updated POC, call light within reach, fall precautions in place. Bed locked, and in lowest position, patient is up independently. Patient is AOx4, states 7 /10 pain, will medicate. Patient instructed to call for assistance. All questions answered, no further needs at this time.

## 2022-04-08 NOTE — NON-PROVIDER
[facility] PROGRESS NOTE     Attending: Dr. Wang Ly    Resident: Dr. Skylar Barkley    PATIENT: 66 year old male presenting on 4/7 for R flank pain for the past 3 days. He has a PMH of multivessel CAD, gout, spinal stenosis, and R nephrolithiasis in 2014. CT shows a 14 mm R non-obstructing renal stone and a 3.1cm R renal cyst. He was admitted for management of R nephrolithiasis and evaluation of a R renal cyst.      Interval: Overnight, patient reported severe R flank pain and he was started on 10mg oxycodone [id number] PRN.    On interview, patient reports 6/10 flank pain. He He reports that oxycodone improves his spinal stenosis pain, but not his flank pain. He reports RLQ abdominal pain, but denies dysuria, hematuria, chest pain, or SOB.    OBJECTIVE:  Temp:  [36.2 °C (97.2 °F)-37 °C (98.6 °F)] 36.4 °C (97.6 °F)  Pulse:  [] 79  Resp:  [16-20] 18  BP: (117-179)/(61-97) 126/80  SpO2:  [88 %-98 %] 92 %      Intake/Output Summary (Last 24 hours) at 4/8/2022 0749  Last data filed at 4/7/2022 2030  Gross per 24 hour   Intake 1360 ml   Output --   Net 1360 ml       PE:   General: in moderate distress due to pain  HEENT: NC/AT. PERRL. EOMI. MMM  Cardiovascular: Normal S1/S2, RRR, no m/r/g  Respiratory: Symmetric inspiratory effort. CTAB with no adventitious sounds  Abdomen: BS+, tender to light palpation on RLQ    LABS:  Recent Labs     04/07/22  0804 04/08/22  0213   WBC 10.1 11.4*   RBC 4.70 4.95   HEMOGLOBIN 14.7 15.3   HEMATOCRIT 43.7 46.1   MCV 93.0 93.1   MCH 31.3 30.9   RDW 46.3 45.7   PLATELETCT 167 178   MPV 9.5 10.0   NEUTSPOLYS 74.00* 74.60*   LYMPHOCYTES 15.00* 15.70*   MONOCYTES 8.60 7.90   EOSINOPHILS 1.80 1.30   BASOPHILS 0.30 0.20     Recent Labs     04/07/22  0804 04/07/22  1444 04/08/22  0213   SODIUM 138  --  139   POTASSIUM 3.7  --  4.1   CHLORIDE 105  --  103   CO2 20  --  23   BUN 18  --  12   CREATININE 0.99  --  0.84   GLUCOSE 124*  --  109*   CALCIUM 8.5  --  8.8   MAGNESIUM  --  1.9  --     PHOSPHORUS  --  3.0  --    ALBUMIN 4.2  --  4.1   Uric acid 9.2  Magnesium 1.9  Phosphorus 3      IMAGING: pending MRI of abdomen    MEDS:  Current Facility-Administered Medications   Medication Last Admin   • ondansetron (ZOFRAN) syringe/vial injection 4 mg 4 mg at 04/08/22 0421   • senna-docusate (PERICOLACE or SENOKOT S) 8.6-50 MG per tablet 2 Tablet 2 Tablet at 04/08/22 0615    And   • polyethylene glycol/lytes (MIRALAX) PACKET 1 Packet      And   • magnesium hydroxide (MILK OF MAGNESIA) suspension 30 mL      And   • bisacodyl (DULCOLAX) suppository 10 mg     • HYDROmorphone (Dilaudid) injection 1 mg 1 mg at 04/08/22 0755   • aspirin EC (ECOTRIN) tablet 81 mg 81 mg at 04/08/22 0615   • clopidogrel (PLAVIX) tablet 75 mg 75 mg at 04/08/22 0615   • isosorbide mononitrate SR (IMDUR) tablet 120 mg 120 mg at 04/08/22 0615   • lisinopril (PRINIVIL) tablet 20 mg 20 mg at 04/08/22 0616   • nitroglycerin (NITROSTAT) tablet 0.4 mg 0.4 mg at 04/07/22 1440   • metoprolol SR (TOPROL XL) tablet 100 mg 100 mg at 04/08/22 0615   • cefTRIAXone (Rocephin) syringe 2 g     • NS infusion New Bag at 04/08/22 0736   • oxyCODONE immediate release (ROXICODONE) tablet 10 mg 10 mg at 04/08/22 0616       ASSESSMENT/PLAN: Abimael Hamilton is a 66 y.o. male on hospital day 1 admitted for management of R nephrolithiasis and R renal cyst. Given the patient's previous hx of gout and his elevated uric acid, it is possibly a uric acid stone. His R renal cyst could be a hemorrhagic cyst or a cystic neoplasm, but it will need additional imaging for work up.    #R nephrolithiasis  -due to patient's hx of gout and elevated uric acid, possibly a uric acid stone  -continue IV fluids  -continue ceftriaxone 2g q24 hours  -continue discussion with urology to determine how to remove  -advance diet to clear fluids    #hyperuricemia  -uric acid elevated at 9.2  -start allopurinol     #R renal cyst  -per US report, possibly hemorrhagic or could be due to cystic  neoplasm  -pending abdominal MRI for further evaluation    #R flank pain  -patient takes oxycodone 20mg [id number] at home  -currently on 10mg oxycodone B1nszed, which is not improving flank pain  -start 30 mg toradol injection for flank pain management    #RLQ abdominal pain  #N/V  -could be due to nephrolithiasis or R renal cyst  -Zofran Q4hour PRN  -hyoscyamimne-lidocaine-Maalox (GI cocktail) [id number] PRN      Heike Rendon, [facility]  [facility] [employer]      No Vaccines Administered.

## 2022-04-08 NOTE — PROGRESS NOTES
Daily Progress Note:     Date of Service: 4/8/2022  Primary Team: [facility] IM Gray Team   Attending: LORI Ly M.D.   Senior Resident: Dr. Lemon  Intern: Dr. Barkley  Contact:  [phone]    Chief Complaint:   Right flank pain    ID:  66-year-old male patient with past medical history of multivessel coronary artery disease (history of 5 vessel CABG in 1998, redo three-vessel CABG in December 2005), statin intolerant dyslipidemia, carotid atherosclerosis, external status post aortic valve replacement in 2005, thoracic aortic aneurysm, GERD, hypertension, and obesity admitted to the hospital 4/7/2022 with right flank pain and was found to have 14 mm nonobstructive right renal stone and right renal cyst that needs further imaging with renal protocol CT or MRI.          Subjective/interval changes:  The patient was seen and assessed at the bedside this morning.  Still reporting 8 out of 10 right flank pain radiating to the groin associated with nausea and vomiting.  He is currently on Dilaudid every 2 hours along with home dose oxycodone for back pain.  Renal protocol MRI still pending.    Consultants/Specialty:  Urology    Review of Systems:    Review of Systems   Constitutional: Negative for chills, fever, malaise/fatigue and weight loss.   HENT: Negative for ear discharge, ear pain, hearing loss and tinnitus.    Eyes: Negative for blurred vision, double vision and photophobia.   Respiratory: Negative for cough, hemoptysis and sputum production.    Cardiovascular: Negative for chest pain, palpitations, orthopnea and claudication.   Gastrointestinal: Positive for abdominal pain and nausea. Negative for constipation, diarrhea, heartburn and vomiting.   Genitourinary: Positive for flank pain. Negative for dysuria, frequency and urgency.   Musculoskeletal: Negative for back pain, myalgias and neck pain.   Neurological: Negative for dizziness, tingling, tremors, sensory change and headaches.    Psychiatric/Behavioral: Negative for depression, substance abuse and suicidal ideas.       Objective Data:   Physical Exam:   Vitals:   Temp:  [36.2 °C (97.2 °F)-37 °C (98.6 °F)] 36.6 °C (97.9 °F)  Pulse:  [] 75  Resp:  [16-20] 17  BP: (114-168)/(70-97) 114/70  SpO2:  [92 %-97 %] 95 %    Physical Exam  Constitutional:       General: He is in acute distress.      Appearance: He is obese. He is not ill-appearing.      Comments: In pain   HENT:      Head: Normocephalic and atraumatic.      Nose: Nose normal.   Eyes:      Pupils: Pupils are equal, round, and reactive to light.   Cardiovascular:      Rate and Rhythm: Normal rate and regular rhythm.      Heart sounds: No murmur heard.    No friction rub. No gallop.      Comments: Sternotomy scar  Pulmonary:      Effort: No respiratory distress.      Breath sounds: No stridor. No wheezing or rhonchi.   Abdominal:      Tenderness: There is abdominal tenderness. There is right CVA tenderness. There is no left CVA tenderness, guarding or rebound.      Comments: Right flank pain   Musculoskeletal:         General: No swelling, tenderness, deformity or signs of injury.   Skin:     Coloration: Skin is not jaundiced or pale.      Findings: No bruising or erythema.   Neurological:      General: No focal deficit present.      Mental Status: He is alert and oriented to person, place, and time.      Cranial Nerves: No cranial nerve deficit.      Sensory: No sensory deficit.      Motor: No weakness.      Coordination: Coordination normal.   Psychiatric:         Mood and Affect: Mood normal.           Labs:   Recent Labs     04/07/22  0804 04/08/22  0213   WBC 10.1 11.4*   RBC 4.70 4.95   HEMOGLOBIN 14.7 15.3   HEMATOCRIT 43.7 46.1   MCV 93.0 93.1   MCH 31.3 30.9   RDW 46.3 45.7   PLATELETCT 167 178   MPV 9.5 10.0   NEUTSPOLYS 74.00* 74.60*   LYMPHOCYTES 15.00* 15.70*   MONOCYTES 8.60 7.90   EOSINOPHILS 1.80 1.30   BASOPHILS 0.30 0.20     Recent Labs     04/07/22  0804  04/08/22  0213   SODIUM 138 139   POTASSIUM 3.7 4.1   CHLORIDE 105 103   CO2 20 23   GLUCOSE 124* 109*   BUN 18 12     Recent Labs     04/07/22  0804 04/08/22  0213   ALBUMIN 4.2 4.1   TBILIRUBIN 0.8 1.2   ALKPHOSPHAT 66 66   TOTPROTEIN 6.7 6.7   ALTSGPT 20 16   ASTSGOT 24 19   CREATININE 0.99 0.84         Imaging:   US-RENAL   Final Result      1.  3.1 cm hyperechoic mass in the lower pole the right kidney may represent a hemorrhagic cyst. Cystic neoplasm cannot be excluded. Follow-up ultrasound or dedicated renal protocol CT or MRI is recommended for further evaluation.      2.  Right nephrolithiasis.      CT-ABDOMEN-PELVIS WITH   Final Result      1.  14 mm nonobstructing right renal stone.   2.  Enlargement of a right renal cyst with mildly increased density compared to simple fluid and some mild adjacent fat stranding. Cannot exclude superimposed hemorrhage or infection into right renal cyst. Recommend ultrasound for further evaluation.   3.  No hydronephrosis.   4.  Hepatic steatosis.      [id number]    (Results Pending)     Assessment and plan      * Cyst of right kidney- (present on admission)  Assessment & Plan  8/10 right flank pain x3 days  Also patient on the kidney ultrasound and abdomen CAT scan  Needs further imaging with renal protocol MRI.  MRI is pending at this time  Pain medication as needed including ketorolac  Urology on board    Kidney stone on right side  Assessment & Plan  40 mm nonobstructive right renal stone was appreciated CT scan and ultrasound.  Pain medication as needed  Dilaudid 1 mg every 3 hours along with ketorolac.  Elevated uric acid in the setting of gout history, will hold off initiation of allopurinol while inpatient.  Maintenance IV fluid  Urology on board, appreciate recommendations.      Dyslipidemia- (present on admission)  Assessment & Plan  Statin intolerant dyslipidemia      Coronary artery disease of native artery of native heart with stable angina pectoris (HCC)-  (present on admission)  Assessment & Plan  Currently stable.  Severe multivessel coronary artery disease, had 1 episode of chest pain, tropes and EKG are reassuring.  Resume beta-blocker, and antiplatelet therapy and dose of home regimen medications.  Including as needed nitroglycerin.

## 2022-04-08 NOTE — CARE PLAN
The patient is Stable - Low risk of patient condition declining or worsening    Shift Goals  Clinical Goals: Pain management, rest  Patient Goals: Pain Control  Family Goals: Comfort/rest      Problem: Pain - Standard  Goal: Alleviation of pain or a reduction in pain to the patient’s comfort goal  Outcome: Progressing  Note: Patient complaining of right flank pain, medication for pain & repositioning in use for pain relief       Problem: Knowledge Deficit - Standard  Goal: Patient and family/care givers will demonstrate understanding of plan of care, disease process/condition, diagnostic tests and medications  Outcome: Progressing  Note: Pt educated on POC, verbalizes understanding.        Progress made toward(s) clinical / shift goals:  Progressing

## 2022-04-08 NOTE — CONSULTS
[facility] [place] Consult/H&P Note      Attending: LORI Ly M.D.  Patient's Name/MRN: Abimael Hamilton, [id number]    Admit Date:4/7/2022  Today's Date: 4/8/2022   Length of stay:  LOS: 1 day   Room #: T802/00      Reason for consult/chief complaint: right flank pain, RLQ abdominal pain  ID/HPI: Abimael Hamilton is a 66 y.o. male patient consulted to our service for R flank pain and RLQ abdominal pain. He reports this began suddenly 3 days ago and describes it as a sharp 8-9/10 pain. He has a history of gross hematuria and underwent a cystoscopy in 2018 with no bladder findings. A non-obstructing right renal stone was found at that time with plans for continued observation however this patient was lost to follow up. He additionally tells me he has a longstanding history of severs spinal stenosis with chronic back pain. He is set up for surgery for this next week. CT of the abdomen/pelvis demonstrates a 14 mm stone in the superior right renal collecting system. No hydronephrosis. There is a 2.9 cm hypodense lesion in the anterior right kidney. Cr was within normal limits.      Past Medical History:   Past Medical History:   Diagnosis Date   • Aortic stenosis 12/1/2015   • Arthritis    • Benign hypertensive heart disease without heart failure 11/14/2011   • CAD (coronary artery disease)    • Congestive heart failure (HCC)    • Coronary atherosclerosis of autologous vein bypass graft 11/14/2011   • Essential hypertension 4/25/2019   • Gout    • Heart valve disease    • High cholesterol    • History of pancreatitis    • History of pancreatitis    • Hypertension    • Migraine 2/16/2019   • Muscle cramps 10/4/2011   • Myocardial infarct (HCC)     Multiple MI's, 1998, 2015   • Obesity 11/14/2011   • Pain     Joints   • Postsurgical aortocoronary bypass status 7/26/2016    Status post redo 3-V CABG in [place] 12/2015: SVG-acute marginal, SVG sequential to LAD, and OM    • Postsurgical aortocoronary bypass status  7/26/2016    Status post redo 3-V CABG in [place] 12/2015: SVG-acute marginal, SVG sequential to LAD, and OM    • Renal disorder     Hx of Acute renal failure r/t medication/statins   • S/P aortic valve replacement with bioprosthetic valve 7/26/2016    #23 [place] Magna AVR (bioprosthetic)    • Statin intolerance 7/26/2016   • Status post cardiac revascularization with bypass aortocoronary anastomosis of five coronary vessels 7/26/2016    5-V CABG done in 1998 (done in [place] at [facility]), patent LIMA to LAD, occluded SVG-OM, occluded SVG-RCA by cardiac catheterization 11/15/2007 with other vein grafts not identified at that time, poor targets for revascularization and declined repeat CABG in 2007 by Brandl. No ischemic was identified by MPI 11/17/07 with an EF of 50%.     • Status post cardiac revascularization with bypass aortocoronary anastomosis of five coronary vessels 7/26/2016    5-V CABG done in 1998 (done in [facility]), patent LIMA to LAD, occluded SVG-OM, occluded SVG-RCA by cardiac catheterization 11/15/2007 with other vein grafts not identified at that time, poor targets for revascularization and declined repeat CABG in 2007 by Brandl. No ischemic was identified by MPI 11/17/07 with an EF of 50%.          Past Surgical History:   Past Surgical History:   Procedure Laterality Date   • SHOULDER DECOMPRESSION ARTHROSCOPIC Right 9/5/2017    Procedure: SHOULDER DECOMPRESSION ARTHROSCOPIC SUBACROMIAL;  Surgeon: Mg Campos M.D.;  Location: [facility];  Service: Orthopedics   • DEBRIDEMENT, LABRUM, HIP, ARTHROSCOPIC Right 9/5/2017    Procedure: ARTHROSCOPIC LABRAL DEBRIDEMENT;  Surgeon: Mg Campos M.D.;  Location: [facility];  Service: Orthopedics   • SHOULDER ARTHROSCOPY W/ ROTATOR CUFF REPAIR Right 9/5/2017    Procedure: SHOULDER ARTHROSCOPY W/ ROTATOR CUFF REPAIR;  Surgeon: Mg Campos M.D.;  Location: [facility];  Service:  Orthopedics   • SHOULDER ARTHROSCOPY W/ BICIPITAL TENODESIS REPAIR Right 2017    Procedure: SHOULDER ARTHROSCOPY W/ BICIPITAL TENODESIS REPAIR;  Surgeon: Mg Campos M.D.;  Location: [facility];  Service: Orthopedics   • SYNOVECTOMY Right 2017    Procedure: SYNOVECTOMY;  Surgeon: Mg Campos M.D.;  Location: SUR[facility];  Service: Orthopedics   • MULTIPLE CORONARY ARTERY BYPASS  2015    3 way bypass & Bovine valve   • LAMINOTOMY  2004    Diskectomy L4-L5, L5-S1   • MULTIPLE CORONARY ARTERY BYPASS  1998    5 way bypass   • BIOPSY GENERAL      buttock lesion   • EYE SURGERY     • MITRAL VALVE REPLACE          Family History:   Family History   Problem Relation Age of Onset   • Heart Attack Father         MI and CABG, unknown age   • Hyperlipidemia Father    • Cancer Mother         Breast   • Heart Disease Brother    • Arthritis Maternal Grandmother    • Stroke Neg Hx    • Diabetes Neg Hx    • Lung Disease Neg Hx          Social History:   Social History     Tobacco Use   • Smoking status: Former Smoker     Packs/day: 3.00     Years: 20.00     Pack years: 60.00     Types: Cigarettes     Quit date: 1992     Years since quittin.2   • Smokeless tobacco: Never Used   Vaping Use   • Vaping Use: Never used   Substance Use Topics   • Alcohol use: Not Currently     Comment: 4 per month   • Drug use: Yes     Types: Marijuana, Inhaled     Comment: Marijuana- Daily (4 times per day)      Social History     Social History Narrative   • Not on file        Allergies: he Gemfibrozil, Lipitor [atorvastatin calcium], Lovastatin, and Tricor    Medications:   Medications Prior to Admission   Medication Sig Dispense Refill Last Dose   • clopidogrel (PLAVIX) 75 MG Tab TAKE ONE TABLET BY MOUTH ONE TIME DAILY (Patient taking differently: Take 75 mg by mouth every day. TAKE ONE TABLET BY MOUTH ONE TIME DAILY) 90 Tablet 3 2022 at PM   • Oxycodone HCl 20 MG Tab  "Take 20 mg by mouth 4 times a day.   4/7/2022 at 0430   • nitroglycerin (NITROSTAT) 0.4 MG SL Tab PLACE 1TAB UNDER TONGUE AS NEEDED FOR CHEST PAIN(1TAB EVERY 5 MINS,MAX 3DOSES AS NEEDED)IF NO RELIEF CALL 911 (Patient taking differently: Place 0.4 mg under the tongue as needed for Chest Pain.) 25 Tablet 1 4/6/2022 at AFTERNOON   • lisinopril (PRINIVIL) 20 MG Tab TAKE ONE TABLET BY MOUTH ONE TIME DAILY  (Patient taking differently: Take 20 mg by mouth every day.) 90 tablet 2 4/5/2022 at PM   • isosorbide mononitrate (IMDUR) 120 MG CR tablet TAKE  ONE TABLET BY MOUTH EVERY MORNING (Patient taking differently: Take 120 mg by mouth every day. TAKE  ONE TABLET BY MOUTH EVERY MORNING) 90 tablet 3 4/5/2022 at PM   • metoprolol SR (TOPROL XL) 100 MG TABLET SR 24 HR Take 1 Tab by mouth every day. 30 Tab 11 4/5/2022 at PM   • aspirin 81 MG EC tablet Take 1 Tab by mouth every day. 30 Tab 11 4/5/2022 at PM         Review of Systems  Review of Systems   Constitutional: Negative for chills and fever.   Gastrointestinal: Positive for abdominal pain. Negative for nausea and vomiting.   Genitourinary: Positive for flank pain. Negative for dysuria and hematuria.        Physical Exam  VITAL SIGNS: /80   Pulse 79   Temp 36.4 °C (97.6 °F) (Temporal)   Resp 18   Ht 1.676 m (5' 6\")   Wt 98.4 kg (217 lb)   SpO2 92%   BMI 35.02 kg/m²   Physical Exam  Vitals and nursing note reviewed.   Constitutional:       Appearance: Normal appearance.   HENT:      Head: Normocephalic and atraumatic.   Abdominal:      General: Abdomen is flat.   Neurological:      General: No focal deficit present.      Mental Status: He is alert.   Psychiatric:         Mood and Affect: Mood normal.         Behavior: Behavior normal.           Labs:  Recent Labs     04/07/22  0804 04/08/22  0213   WBC 10.1 11.4*   RBC 4.70 4.95   HEMOGLOBIN 14.7 15.3   HEMATOCRIT 43.7 46.1   MCV 93.0 93.1   MCH 31.3 30.9   MCHC 33.6* 33.2*   RDW 46.3 45.7   PLATELETCT 167 178 "   MPV 9.5 10.0     Recent Labs     04/07/22  0804 04/08/22  0213   SODIUM 138 139   POTASSIUM 3.7 4.1   CHLORIDE 105 103   CO2 20 23   GLUCOSE 124* 109*   BUN 18 12   CREATININE 0.99 0.84   CALCIUM 8.5 8.8         Glucose:  Recent Labs     04/07/22  0804 04/08/22  0213   GLUCOSE 124* 109*     Coags:  No results for input(s): INR in the last 72 hours.      Urinalysis:   Recent Labs     04/07/22  1023   COLORURINE Yellow   CLARITY Clear   SPECGRAVITY 1.045   PHURINE 5.5   GLUCOSEUR Negative   KETONES Negative   NITRITE Negative   OCCULTBLOOD Trace*   RBCURINE 2-5*   BACTERIA Negative   EPITHELCELL Negative       Imaging:  US-RENAL   Final Result      1.  3.1 cm hyperechoic mass in the lower pole the right kidney may represent a hemorrhagic cyst. Cystic neoplasm cannot be excluded. Follow-up ultrasound or dedicated renal protocol CT or MRI is recommended for further evaluation.      2.  Right nephrolithiasis.      CT-ABDOMEN-PELVIS WITH   Final Result      1.  14 mm nonobstructing right renal stone.   2.  Enlargement of a right renal cyst with mildly increased density compared to simple fluid and some mild adjacent fat stranding. Cannot exclude superimposed hemorrhage or infection into right renal cyst. Recommend ultrasound for further evaluation.   3.  No hydronephrosis.   4.  Hepatic steatosis.      NC-MHHOWNK-G/O    (Results Pending)            Assessment/Recommendation   66 y.o. M with 14 mm stone in the superior right renal collecting system and 2.9 cm hypodense lesion in the anterior right kidney.     · MRI of the abdomen has been ordered to further evaluate the hypodense lesion and r/o RCC. We do not believe his pain is d/t his non-obstructing stone at this time. Urology will continue to follow and will base plan on results of MRI.     Thank you for this consult. Plan of care directed by Dr. Larry. Case discussed with patient, spouse, and urology. Please contact us with questions.        Barbara Alegria,  VERNA   [address] [place] [address]   [phone]

## 2022-04-08 NOTE — PROGRESS NOTES
4 Eyes Skin Assessment Completed by MO Tirado and MO Nava.    Head WDL  Ears Redness and Blanching  Nose WDL  Mouth WDL  Neck WDL  Breast/Chest Redness and Rash  Shoulder Blades WDL  Spine WDL  (R) Arm/Elbow/Hand WDL  (L) Arm/Elbow/Hand WDL  Abdomen WDL  Groin WDL   Scrotum/Coccyx/Buttocks WDL, erythemic, blanching  (R) Leg Scar  (L) Leg Rash  (R) Heel/Foot/Toe WDL  (L) Heel/Foot/Toe WDL          Devices In Places Tele Box and Pulse Ox      Interventions In Place N/A    Possible Skin Injury No    Pictures Uploaded Into Epic N/A  Wound Consult Placed N/A  RN Wound Prevention Protocol Ordered No

## 2022-04-08 NOTE — PROGRESS NOTES
Paged by RN stating patient is c/o rt flank pain and he is only on IV dilaudid Q4h prn which is short lasting and pain is back. Patient mentioned he takes oxycodone 20 mg Q4h prn at home. I will resume oxycodone at 10 mg Q4h prn and monitor.

## 2022-04-08 NOTE — CARE PLAN
The patient is Watcher - Medium risk of patient condition declining or worsening    Shift Goals  Clinical Goals: improved pain management, MRI  Patient Goals: improved pain magement, MRI  Family Goals: Comfort/rest    Progress made toward(s) clinical / shift goals:  MRI scheduled for this afternoon. Patient states pain goals not met despite interventions provided. Discussed with MD ASHLEY to review pain regime.    Patient is not progressing towards the following goals:      Problem: Pain - Standard  Goal: Alleviation of pain or a reduction in pain to the patient’s comfort goal  Outcome: Not Progressing

## 2022-04-08 NOTE — PROGRESS NOTES
Assumed care of patient at bedside report from NOC RN. Updated on POC. Patient currently A & O x 4; on room air ; up independently; with complaints of acute pain. Medicated per MAR. Patient reports despite escalation of pain medication overnight, current pain regime is not sufficient to manage pain, MD Lemon made aware. Pain management to be discussed with attending. Call light within reach. Whiteboard updated. Fall precautions in place. Bed locked and in lowest position. All questions answered. No other needs indicated at this time.

## 2022-04-09 NOTE — PROGRESS NOTES
Patient going to MRI via wheelchair, screening form previously completed. Patient medicated with 1mg of ativan prior to procedure. Heart monitor off for MRI, order in place, monitor room notified.

## 2022-04-09 NOTE — PROGRESS NOTES
Note to reader: this note follows the APSO format rather than the historical SOAP format. Assessment and plan located at the top of the note for ease of use.    Chief Complaint  66 y.o. year old male here with Abdominal Pain (C/o rlq abdominal pain x 3 days radiates to flank, describes pain as constant and sharp. Denies dysuria)      Assessment/Plan  Interval History   Active Hospital Problems    Diagnosis    • Cyst of right kidney [N28.1]    • Kidney stone on right side [N20.0]    • Coronary artery disease of native artery of native heart with stable angina pectoris (HCC) [I25.118]      Extensive history of CAD with 5 vessel CABG in 1998, redo three-vessel CABG in 2015, stent placement in 2018.  This is complicated by history of familial hyper lipidemia with statin intolerance and currently treating with Repatha as well as a history of hypertension.     • Dyslipidemia [E78.5]       patient seen and examined     4/9- MRI still pending. abd pain not resolved. Cr 1.13. tolerating diet. Wanting to go home.     Disposition  Stable    PLAN:  Await results of MRI regarding renal mass- plan for outpatient management accordingly in terms of surgical interventions.   Discussed case with patient, wife and Dr. Cabrera- urology.       Review of Systems  Physical Exam   Review of Systems   Constitutional: Negative for chills and fever.   Cardiovascular: Negative for chest pain.   Gastrointestinal: Positive for abdominal pain. Negative for nausea and vomiting.   Genitourinary: Negative for dysuria, flank pain, frequency, hematuria and urgency.   Neurological: Negative for dizziness and headaches.   All other systems reviewed and are negative.    Vitals:    04/08/22 2012 04/08/22 2327 04/09/22 0600 04/09/22 0726   BP:  (!) 162/87 158/88 151/88   Pulse:  74 73 67   Resp:  18  18   Temp:  36.3 °C (97.3 °F)  36.7 °C (98 °F)   TempSrc:  Temporal  Temporal   SpO2:  94%  94%   Weight: 101 kg (222 lb 14.2 oz)      Height:         Physical  Exam  Vitals and nursing note reviewed.   Constitutional:       Appearance: Normal appearance.   HENT:      Head: Normocephalic and atraumatic.      Mouth/Throat:      Mouth: Mucous membranes are moist.   Eyes:      Pupils: Pupils are equal, round, and reactive to light.   Pulmonary:      Effort: Pulmonary effort is normal.   Neurological:      Mental Status: He is alert and oriented to person, place, and time.   Psychiatric:         Mood and Affect: Mood normal.         Behavior: Behavior normal.          Hematology Chemistry   Lab Results   Component Value Date/Time    WBC 10.2 04/09/2022 01:03 AM    HEMOGLOBIN 13.9 (L) 04/09/2022 01:03 AM    HEMATOCRIT 40.7 (L) 04/09/2022 01:03 AM    PLATELETCT 185 04/09/2022 01:03 AM     Lab Results   Component Value Date/Time    SODIUM 136 04/09/2022 01:03 AM    POTASSIUM 4.0 04/09/2022 01:03 AM    CHLORIDE 102 04/09/2022 01:03 AM    CO2 22 04/09/2022 01:03 AM    GLUCOSE 114 (H) 04/09/2022 01:03 AM    BUN 14 04/09/2022 01:03 AM    CREATININE 1.13 04/09/2022 01:03 AM         Labs not explicitly included in this progress note were reviewed by the author.   Radiology/imaging not explicitly included in this progress note was reviewed by the author.     Core Measures

## 2022-04-09 NOTE — CARE PLAN
The patient is Stable - Low risk of patient condition declining or worsening    Shift Goals  Clinical Goals: MRI, improve pain managment  Patient Goals: Improve pain management, nausea control  Family Goals: Sleep/rest      Problem: Pain - Standard  Goal: Alleviation of pain or a reduction in pain to the patient’s comfort goal  Outcome: Progressing  Note: Using pharmacologic  and rest/relaxation for pain control.     Problem: Knowledge Deficit - Standard  Goal: Patient and family/care givers will demonstrate understanding of plan of care, disease process/condition, diagnostic tests and medications  Outcome: Progressing  Note: Patient educated on POC and MRI procedure. Verbalizes understanding.        Progress made toward(s) clinical / shift goals:  Progressing

## 2022-04-09 NOTE — PROGRESS NOTES
Daily Progress Note:     Date of Service: 4/9/2022  Primary Team: [facility] IM [facility] Team   Attending: LORI Ly M.D.   Senior Resident: Dr. Lemon  Intern: Dr. Barkley  Contact:  [phone]    Chief Complaint:   Right flank pain    ID:  66-year-old male patient with past medical history of multivessel coronary artery disease (history of 5 vessel CABG in 1998, redo three-vessel CABG in December 2005), statin intolerant dyslipidemia, carotid atherosclerosis, external status post aortic valve replacement in 2005, thoracic aortic aneurysm, GERD, hypertension, and obesity admitted to the hospital 4/7/2022 with right flank pain and was found to have 14 mm nonobstructive right renal stone and right renal cyst that needs further imaging with renal protocol CT or MRI.          Subjective/interval changes:  The patient was seen and assessed at the bedside this morning.  Continues to have flank and has increasing abdominal pain. Rates the pain as a 6-7/10. No nausea or vomiting, no chest pain.     Abdominal Xray:  Non-obstructive gas pattern, right kidney stone  MRI: pending read    Consultants/Specialty:  Urology    Review of Systems:    Review of Systems   Constitutional: Negative for chills, fever, malaise/fatigue and weight loss.   HENT: Negative for ear discharge, ear pain, hearing loss and tinnitus.    Eyes: Negative for blurred vision, double vision and photophobia.   Respiratory: Negative for cough, hemoptysis and sputum production.    Cardiovascular: Negative for chest pain, palpitations, orthopnea and claudication.   Gastrointestinal: Positive for abdominal pain. Negative for constipation, diarrhea, heartburn, nausea and vomiting.   Genitourinary: Positive for flank pain. Negative for dysuria, frequency and urgency.   Musculoskeletal: Negative for back pain, myalgias and neck pain.   Neurological: Negative for dizziness, tingling, tremors, sensory change and headaches.   Psychiatric/Behavioral: Negative for  depression, substance abuse and suicidal ideas.       Objective Data:   Physical Exam:   Vitals:   Temp:  [36.3 °C (97.3 °F)-36.9 °C (98.4 °F)] 36.7 °C (98 °F)  Pulse:  [67-77] 68  Resp:  [16-19] 16  BP: (124-165)/() 165/100  SpO2:  [92 %-94 %] 93 %    Physical Exam  Constitutional:       General: He is not in acute distress.     Appearance: He is obese. He is not ill-appearing.      Comments: In pain   HENT:      Head: Normocephalic and atraumatic.      Nose: Nose normal.   Eyes:      Pupils: Pupils are equal, round, and reactive to light.   Cardiovascular:      Rate and Rhythm: Normal rate and regular rhythm.      Heart sounds: No murmur heard.    No friction rub. No gallop.      Comments: Sternotomy scar  Pulmonary:      Effort: No respiratory distress.      Breath sounds: No stridor. No wheezing or rhonchi.   Abdominal:      Tenderness: There is abdominal tenderness. There is right CVA tenderness. There is no left CVA tenderness, guarding or rebound.      Comments: Right flank pain   Musculoskeletal:         General: No swelling, tenderness, deformity or signs of injury.   Skin:     Coloration: Skin is not jaundiced or pale.      Findings: No bruising or erythema.   Neurological:      General: No focal deficit present.      Mental Status: He is alert and oriented to person, place, and time.      Cranial Nerves: No cranial nerve deficit.      Sensory: No sensory deficit.      Motor: No weakness.      Coordination: Coordination normal.   Psychiatric:         Mood and Affect: Mood normal.           Labs:   Recent Labs     04/07/22  0804 04/08/22  0213 04/09/22  0103   WBC 10.1 11.4* 10.2   RBC 4.70 4.95 4.40*   HEMOGLOBIN 14.7 15.3 13.9*   HEMATOCRIT 43.7 46.1 40.7*   MCV 93.0 93.1 92.5   MCH 31.3 30.9 31.6   RDW 46.3 45.7 45.2   PLATELETCT 167 178 185   MPV 9.5 10.0 9.8   NEUTSPOLYS 74.00* 74.60* 75.30*   LYMPHOCYTES 15.00* 15.70* 13.90*   MONOCYTES 8.60 7.90 8.90   EOSINOPHILS 1.80 1.30 1.40   BASOPHILS  0.30 0.20 0.20     Recent Labs     04/07/22  0804 04/08/22  0213 04/09/22  0103   SODIUM 138 139 136   POTASSIUM 3.7 4.1 4.0   CHLORIDE 105 103 102   CO2 20 23 22   GLUCOSE 124* 109* 114*   BUN 18 12 14     Recent Labs     04/07/22  0804 04/08/22  0213 04/09/22  0103   ALBUMIN 4.2 4.1 4.0   TBILIRUBIN 0.8 1.2 1.1   ALKPHOSPHAT 66 66 59   TOTPROTEIN 6.7 6.7 6.6   ALTSGPT 20 16 21   ASTSGOT 24 19 23   CREATININE 0.99 0.84 1.13         Imaging:   [id number] VIEW   Final Result      1.  Right renal stone.   2.  Nonobstructive bowel gas pattern.      US-RENAL   Final Result      1.  3.1 cm hyperechoic mass in the lower pole the right kidney may represent a hemorrhagic cyst. Cystic neoplasm cannot be excluded. Follow-up ultrasound or dedicated renal protocol CT or MRI is recommended for further evaluation.      2.  Right nephrolithiasis.      CT-ABDOMEN-PELVIS WITH   Final Result      1.  14 mm nonobstructing right renal stone.   2.  Enlargement of a right renal cyst with mildly increased density compared to simple fluid and some mild adjacent fat stranding. Cannot exclude superimposed hemorrhage or infection into right renal cyst. Recommend ultrasound for further evaluation.   3.  No hydronephrosis.   4.  Hepatic steatosis.      MR-ABDOMEN-WITH & W/O    (Results Pending)     Assessment and plan      * Cyst of right kidney- (present on admission)  Assessment & Plan  8/10 right flank pain x3 days  Also patient on the kidney ultrasound and abdomen CAT scan  Needs further imaging with renal protocol MRI.  MRI is pending at this time  Pain medication as needed including ketorolac  Urology on board  -Tolerating pain, but increased blood pressure this morning.     Kidney stone on right side  Assessment & Plan  40 mm nonobstructive right renal stone was appreciated CT scan and ultrasound.  Pain medication as needed  Dilaudid 1 mg every 3 hours along with ketorolac.  Elevated uric acid in the setting of gout history, will hold  off initiation of allopurinol while inpatient.  Maintenance IV fluid  Urology on board, appreciate recommendations.      Coronary artery disease of native artery of native heart with stable angina pectoris (HCC)- (present on admission)  Assessment & Plan  Currently stable.  Severe multivessel coronary artery disease, had 1 episode of chest pain, tropes and EKG are reassuring.  Resume beta-blocker, and antiplatelet therapy and dose of home regimen medications.  Including as needed nitroglycerin.      Dyslipidemia- (present on admission)  Assessment & Plan  Statin intolerant dyslipidemia

## 2022-04-09 NOTE — PROGRESS NOTES
Assumed care of patient at bedside report from Lauren HASSAN. Pt c/o new 6/10 abdominal pain and a 3/10 headache. Updated on POC. Call light within reach. Whiteboard updated. Fall precautions in place. Bed locked and in lowest position. All questions answered. No other needs indicated at this time.

## 2022-04-09 NOTE — NON-PROVIDER
[facility] PROGRESS NOTE     Attending: Dr. Wang Ly    Resident: Dr. Skylar Barkley    PATIENT: 66 year old male admitted for management of a R nephrolithiasis and evaluation of a R renal cyst.      SUBJECTIVE: No acute events overnight. His abdominal MRI was done at midnight. On interview, patient reports the Toradol is improving his kidney pain. He now reports the pain as 5/10. His RLQ pain is increasing in severity, and he reports nausea but no vomiting. He was dry-heaving yesterday but has not today. Zofran and Compazine PRN have helped temporarily. He denies chest pain, SOB, palpitations, dysuria, headache, or vision changes.    OBJECTIVE:  Temp:  [36.3 °C (97.3 °F)-36.9 °C (98.4 °F)] 36.7 °C (98 °F)  Pulse:  [67-77] 67  Resp:  [17-19] 18  BP: (114-162)/(70-88) 151/88  SpO2:  [92 %-95 %] 94 %    PE:   General: in moderate distress from pain  HEENT: NC/AT. PERRL. EOMI. MMM  Cardiovascular: Normal S1/S2, RRR, no m/r/g  Respiratory: Symmetric inspiratory effort. CTAB with no adventitious sounds  Abdomen:RLQ tender to light palpation; decreased bowel sounds present  Back: R CVA tenderness  EXT:  No JONA  2+ pulses, no rashes, bruising, or bleeding.  Neuro: alert and oriented     LABS:  Recent Labs     04/07/22  0804 04/08/22  0213 04/09/22  0103   WBC 10.1 11.4* 10.2   RBC 4.70 4.95 4.40*   HEMOGLOBIN 14.7 15.3 13.9*   HEMATOCRIT 43.7 46.1 40.7*   MCV 93.0 93.1 92.5   MCH 31.3 30.9 31.6   RDW 46.3 45.7 45.2   PLATELETCT 167 178 185   MPV 9.5 10.0 9.8   NEUTSPOLYS 74.00* 74.60* 75.30*   LYMPHOCYTES 15.00* 15.70* 13.90*   MONOCYTES 8.60 7.90 8.90   EOSINOPHILS 1.80 1.30 1.40   BASOPHILS 0.30 0.20 0.20     Recent Labs     04/07/22  0804 04/07/22  1444 04/08/22  0213 04/09/22  0103   SODIUM 138  --  139 136   POTASSIUM 3.7  --  4.1 4.0   CHLORIDE 105  --  103 102   CO2 20  --  23 22   BUN 18  --  12 14   CREATININE 0.99  --  0.84 1.13   GLUCOSE 124*  --  109* 114*   CALCIUM 8.5  --  8.8 9.0   MAGNESIUM  --  1.9  --    --    PHOSPHORUS  --  3.0  --   --    ALBUMIN 4.2  --  4.1 4.0       Estimated GFR/CRCL = Estimated Creatinine Clearance: 72.5 mL/min (by C-G formula based on SCr of 1.13 mg/dL).  Recent Labs     04/07/22  0804 04/08/22  0213 04/09/22  0103   ASTSGOT 24 19 23   ALTSGPT 20 16 21   TBILIRUBIN 0.8 1.2 1.1   ALKPHOSPHAT 66 66 59   GLOBULIN 2.5 2.6 2.6             No results for input(s): INR, APTT, FIBRINOGEN in the last 72 hours.    Invalid input(s): DIMER        IMAGING:   Abdominal MRI pending results       MEDS:  Current Facility-Administered Medications   Medication Last Admin   • ondansetron (ZOFRAN) syringe/vial injection 4 mg 4 mg at 04/08/22 1504   • ketorolac (TORADOL) injection 30 mg 30 mg at 04/09/22 0614   • hyoscyamine-lidocaine-Maalox (GI Cocktail) oral susp cup 15 mL     • HYDROmorphone (Dilaudid) injection 1 mg 1 mg at 04/09/22 1126   • acetaminophen (TYLENOL) tablet 1,000 mg 1,000 mg at 04/09/22 0814   • prochlorperazine (COMPAZINE) injection 5 mg 5 mg at 04/08/22 1617   • senna-docusate (PERICOLACE or SENOKOT S) 8.6-50 MG per tablet 2 Tablet 2 Tablet at 04/09/22 0604    And   • polyethylene glycol/lytes (MIRALAX) PACKET 1 Packet      And   • magnesium hydroxide (MILK OF MAGNESIA) suspension 30 mL      And   • bisacodyl (DULCOLAX) suppository 10 mg     • aspirin EC (ECOTRIN) tablet 81 mg 81 mg at 04/09/22 0604   • clopidogrel (PLAVIX) tablet 75 mg 75 mg at 04/09/22 0604   • isosorbide mononitrate SR (IMDUR) tablet 120 mg 120 mg at 04/09/22 0604   • lisinopril (PRINIVIL) tablet 20 mg 20 mg at 04/09/22 0604   • nitroglycerin (NITROSTAT) tablet 0.4 mg 0.4 mg at 04/07/22 1440   • metoprolol SR (TOPROL XL) tablet 100 mg 100 mg at 04/09/22 0604   • cefTRIAXone (Rocephin) syringe 2 g 2 g at 04/08/22 1143   • NS infusion Rate Change at 04/09/22 1116       ASSESSMENT/PLAN: Abimael Hamilton is a 66 y.o. male on hospital day 2 admitted for management of a R nephrolithiasis and evaluation of a R renal cyst.    #R  nephrolithiasis  CT on admission shows 14 mm R non-obstructing kidney stone. Due to the patient's hx of gout and elevated uric acid, there is a high likelihood that this is a uric acid stone. The patient's increased RLQ pain today is suggestive of the stone passing from the ureter to the bladder. Urology was consulted and recommending f/u with urology outpatient if it does not pass. We are planning to decrease his fluids to avoid fluid overload and kidney damage, while still trying to move his kidney stone.  -continue ceftriaxone 2g q24 hours  -continue clear fluid diet  -decrease NS infusion from 150 to 100ml     #R renal cyst  Patient has a 3.1cm R renal cyst detected on US. Per US report, it could be a hemorrhagic cyst or a cystic neoplasm. He had an MRI today to evaluate the cause of this mass, and the report is pending. Urology recommends waiting for the MRI results first before discussing management.  -pending MRI results    #RLQ abdominal pain  Patient reports increased severity of RLQ on interview today. He is also having decreased bowel sounds on physical. This could be due to the stone passing from the ureter to the bladder.The decreased bowel sounds is concerning for stool burden. He has been afebrile, and there are no clinical signs of a viral gastroenteritis.   -order abdominal xray to evaluate for stool burden and/or stool movement  -continue Zofran, compazine, and GI cocktail PRN    #R flank pain  Patient is currently on Dilaudid and Toradol PRN for management of his kidney and back pain. This regimen seems to be improving his pain, and he now reports it as a 5/10.   -continue current pain regimen    #hyperuricemia  Patient has elevated uric acid of 9.2 on 4/8. He has a PMH of gout.  -start allopurinol     CODE STATUS: DNR/DNI    Heike Rendon, [facility]  [facility]

## 2022-04-09 NOTE — CARE PLAN
The patient is Watcher - Medium risk of patient condition declining or worsening    Shift Goals  Clinical Goals: Pain management  Patient Goals: Improve pain management, nausea control  Family Goals: Sleep/rest    Progress made toward(s) clinical / shift goals:  Patient updated on POC, all questions answered. Pt medicated for pain per MAR.        Problem: Pain - Standard  Goal: Alleviation of pain or a reduction in pain to the patient’s comfort goal  Outcome: Progressing     Problem: Knowledge Deficit - Standard  Goal: Patient and family/care givers will demonstrate understanding of plan of care, disease process/condition, diagnostic tests and medications  Outcome: Progressing

## 2022-04-09 NOTE — PROGRESS NOTES
Monitor Summary  Rhythm: SR  Rate: 74-84  Ectopy: PVC, Trigem  0.19 / 0.11 / 0.36    Monitor strip reviewed.

## 2022-04-09 NOTE — PROGRESS NOTES
Assumed care of patient. Bedside report received from Candida CELIS RN. Updated POC, call light within reach, fall precautions in place. Bed locked, and in lowest position. Patient instructed to call for assistance if needed, he is up independently. Patient is AOx4, states 7/10 pain, will medicate per MAR. All questions answered, no further needs at this time.

## 2022-04-10 NOTE — PROGRESS NOTES
Received bedside report from MO Lindo. POC discussed. First assessment completed, call light within reach. Fall precautions in place. Will continue to monitor.

## 2022-04-10 NOTE — PROGRESS NOTES
This RN told by URN residents that MRI are no longer being read on the weekends. This RN called radiology to inquire about MRI reading on the weekends. Multiple Radiologist MDs confirmed that MRIs are read on the weekend, no change has been made. This RN asked the tech to pass along to the Radiologists to pleaser read the pt's MRI, the tech agreed. Dr Barkley, [facility] resident notified via voalte.

## 2022-04-10 NOTE — PROGRESS NOTES
DOS 4/10/22  I saw and examined the patient and reviewed the case by the resident physician Dr. Barkley. I reviewed the resident's note and agree with the documented findings and plan of care except as noted in my comments below.    Additional attending comments:  Continues to c/o 8-10/10 right flank and lower right abdominal pain. Non- obstructing 14mm stone on right side does not appear to have moved by imaging. MRI reading still pending (re right renal cyst/mass and stone).  Must also consider other possible etiologies (other than renal) for pain (spinal stenosis with referred pain?). Multiple complex issues. See orders.     LORI Ly MD, [facility], [facility]  Attending Physician  Professor of Medicine & Academic Hospitalist      Daily Progress Note:     Date of Service: 4/10/2022  Primary Team: [facility] IM Gray Team   Attending: LORI Ly M.D.   Senior Resident: Dr. Lemon  Intern: Dr. Barkley  Contact:  [phone]    Chief Complaint:   Right flank pain    ID:  66-year-old male patient with past medical history of multivessel coronary artery disease (history of 5 vessel CABG in 1998, redo three-vessel CABG in December 2005), statin intolerant dyslipidemia, carotid atherosclerosis, external status post aortic valve replacement in 2005, thoracic aortic aneurysm, GERD, hypertension, and obesity admitted to the hospital 4/7/2022 with right flank pain and was found to have 14 mm nonobstructive right renal stone and right renal cyst, awaiting MRI read.         Subjective/interval changes:  Continued to have abdominal pain. He was curled up in bed in obvious pain. He has had increased abdominal pain, back pain, headache with light sensitivity and dry heaving. No other symptoms.     Consultants/Specialty:  Urology    Review of Systems:    Review of Systems   Constitutional: Negative for chills, fever, malaise/fatigue and weight loss.   HENT: Negative for ear discharge, ear pain, hearing loss and tinnitus.    Eyes:  Negative for blurred vision, double vision and photophobia.   Respiratory: Negative for cough, hemoptysis and sputum production.    Cardiovascular: Negative for chest pain, palpitations, orthopnea and claudication.   Gastrointestinal: Positive for abdominal pain. Negative for constipation, diarrhea, heartburn, nausea and vomiting.   Genitourinary: Positive for flank pain. Negative for dysuria, frequency and urgency.   Musculoskeletal: Negative for back pain, myalgias and neck pain.   Neurological: Positive for tingling (Sensitivity to light. ). Negative for dizziness, tremors and sensory change.   Psychiatric/Behavioral: Negative for depression, substance abuse and suicidal ideas.       Objective Data:   Physical Exam:   Vitals:   Temp:  [36.7 °C (98 °F)-37 °C (98.6 °F)] 36.7 °C (98 °F)  Pulse:  [65-72] 72  Resp:  [16-20] 19  BP: (119-180)/() 162/92  SpO2:  [93 %-97 %] 93 %    Physical Exam  Constitutional:       General: He is not in acute distress.     Appearance: He is obese. He is not ill-appearing.      Comments: In pain   HENT:      Head: Normocephalic and atraumatic.      Nose: Nose normal.   Eyes:      Extraocular Movements: Extraocular movements intact.      Pupils: Pupils are equal, round, and reactive to light.      Comments: Sensitivity to light   Cardiovascular:      Rate and Rhythm: Normal rate and regular rhythm.      Heart sounds: No murmur heard.    No friction rub. No gallop.      Comments: Sternotomy scar  Pulmonary:      Effort: No respiratory distress.      Breath sounds: No stridor. No wheezing or rhonchi.   Abdominal:      Tenderness: There is abdominal tenderness (Extreme). There is right CVA tenderness. There is no left CVA tenderness, guarding or rebound.      Comments: Right flank pain, decreased bowel sounds   Musculoskeletal:         General: No swelling, tenderness, deformity or signs of injury.   Skin:     Coloration: Skin is not jaundiced or pale.      Findings: No bruising or  erythema.   Neurological:      General: No focal deficit present.      Mental Status: He is alert and oriented to person, place, and time.      Cranial Nerves: No cranial nerve deficit.      Sensory: No sensory deficit.      Motor: No weakness.      Coordination: Coordination normal.   Psychiatric:         Mood and Affect: Mood normal.           Labs:   Recent Labs     04/08/22  0213 04/09/22  0103 04/10/22  0124   WBC 11.4* 10.2 9.0   RBC 4.95 4.40* 4.25*   HEMOGLOBIN 15.3 13.9* 13.2*   HEMATOCRIT 46.1 40.7* 40.1*   MCV 93.1 92.5 94.4   MCH 30.9 31.6 31.1   RDW 45.7 45.2 45.1   PLATELETCT 178 185 179   MPV 10.0 9.8 10.3   NEUTSPOLYS 74.60* 75.30* 63.20   LYMPHOCYTES 15.70* 13.90* 24.90   MONOCYTES 7.90 8.90 9.20   EOSINOPHILS 1.30 1.40 2.20   BASOPHILS 0.20 0.20 0.30     Recent Labs     04/08/22 0213 04/09/22 0103 04/10/22  0124   SODIUM 139 136 142   POTASSIUM 4.1 4.0 3.9   CHLORIDE 103 102 109   CO2 23 22 21   GLUCOSE 109* 114* 91   BUN 12 14 14     Recent Labs     04/08/22  0213 04/09/22  0103 04/10/22  0124   ALBUMIN 4.1 4.0 3.9   TBILIRUBIN 1.2 1.1 0.5   ALKPHOSPHAT 66 59 52   TOTPROTEIN 6.7 6.6 6.2   ALTSGPT 16 21 17   ASTSGOT 19 23 18   CREATININE 0.84 1.13 0.95         Imaging:   [id number] VIEW   Final Result      1.  Right renal stone.   2.  Nonobstructive bowel gas pattern.      US-RENAL   Final Result      1.  3.1 cm hyperechoic mass in the lower pole the right kidney may represent a hemorrhagic cyst. Cystic neoplasm cannot be excluded. Follow-up ultrasound or dedicated renal protocol CT or MRI is recommended for further evaluation.      2.  Right nephrolithiasis.      CT-ABDOMEN-PELVIS WITH   Final Result      1.  14 mm nonobstructing right renal stone.   2.  Enlargement of a right renal cyst with mildly increased density compared to simple fluid and some mild adjacent fat stranding. Cannot exclude superimposed hemorrhage or infection into right renal cyst. Recommend ultrasound for further  evaluation.   3.  No hydronephrosis.   4.  Hepatic steatosis.      MR-ABDOMEN-WITH & W/O    (Results Pending)     Assessment and plan      * Cyst of right kidney- (present on admission)  Assessment & Plan  8/10 right flank pain x3 days, prior to admission. Had kidney ultrasound and abdomen CAT scan, showing renal cyst.  Continues to have acute pain.   Pain medication as needed including ketorolac  Urology on board, awaiting MRI read.       Kidney stone on right side  Assessment & Plan  14 mm nonobstructive right renal stone was appreciated CT scan and ultrasound.  Pain medication as needed  Dilaudid 1 mg every 3 hours along with ketorolac.  Elevated uric acid in the setting of gout history, will hold off initiation of allopurinol while inpatient.  Maintenance IV fluid  Urology on board, appreciate recommendations.      Coronary artery disease of native artery of native heart with stable angina pectoris (HCC)- (present on admission)  Assessment & Plan  Currently stable.  Severe multivessel coronary artery disease, had 1 episode of chest pain, tropes and EKG are reassuring.  Resume beta-blocker, and antiplatelet therapy and dose of home regimen medications.  Including as needed nitroglycerin.      Dyslipidemia- (present on admission)  Assessment & Plan  Statin intolerant dyslipidemia

## 2022-04-10 NOTE — CARE PLAN
The patient is Watcher - Medium risk of patient condition declining or worsening    Shift Goals  Clinical Goals: pain managment, antiv  Patient Goals: pain managment, rest  Family Goals: rest    Progress made toward(s) clinical / shift goals:    Problem: Knowledge Deficit - Standard  Goal: Patient and family/care givers will demonstrate understanding of plan of care, disease process/condition, diagnostic tests and medications  Outcome: Progressing  Note: Discuss and review POC with patient/family. Wife and patient verbalized understanding regarding MRI being read before any further action. Re-educate as needed.         Patient is not progressing towards the following goals:      Problem: Pain - Standard  Goal: Alleviation of pain or a reduction in pain to the patient’s comfort goal  Outcome: Not Progressing  Flowsheets  Taken 4/9/2022 2104 by Pat Brand, R.N.  Pain Rating Scale (NPRS): 6  Taken 4/9/2022 1226 by Belgica Gracia R.NDottie  Non Verbal Scale:   Grimacing   Moaning  Taken 4/7/2022 1434 by Candida Hinds R.NDottie  Mensah-Nielsen Scale: 6   Note: Assess and monitor for pain. Provide pharmacological and non-pharmacological interventions as appropriate. Re-evaluate and continue to monitor.

## 2022-04-10 NOTE — CARE PLAN
Problem: Pain - Standard  Goal: Alleviation of pain or a reduction in pain to the patient’s comfort goal  Outcome: Progressing     Problem: Knowledge Deficit - Standard  Goal: Patient and family/care givers will demonstrate understanding of plan of care, disease process/condition, diagnostic tests and medications  Outcome: Progressing   The patient is Watcher - Medium risk of patient condition declining or worsening    Shift Goals  Clinical Goals: Pain mgmt, MRI read  Patient Goals: Pain mgmt  Family Goals: rest, pain relief    Progress made toward(s) clinical / shift goals:      Patient is not progressing towards the following goals:

## 2022-04-11 PROBLEM — R10.9 ABDOMINAL PAIN: Status: ACTIVE | Noted: 2022-01-01

## 2022-04-11 NOTE — PROGRESS NOTES
Daily Progress Note:     Date of Service: 4/11/2022  Primary Team: [facility] Team   Attending: LORI Ly M.D.   Senior Resident: Dr. Rj Burnett  Intern: Dr. Roberto Rockwell  Contact:  [phone]    ID:   66-year-old male with past medical history of coronary artery disease, 5 vessel CABG in 1998 and redone three-vessel CABG in 2005, dyslipidemia not on statin due to intolerance, carotid atherosclerosis, aortic valve replacement in 2005, thoracic aortic aneurysm, GERD, obesity, hypertension presented on 4/7/2022 with right flank pain and was found to have 40 mm nonobstructive right renal stone and right renal cyst.    Subjective:  No acute events reported overnight.  Patient continues to have abdominal pain and flank pain on the right side, 7 out of 10.  Patient continues to feel nauseous, but denies any vomiting.  Patient has tingling in his feet bilaterally, stating that he was scheduled for spinal stenosis surgery in about a week.    Consultants/Specialty:  None     Review of Systems:  Review of Systems   Constitutional: Negative for chills and fever.   HENT: Negative for ear discharge, ear pain and sore throat.    Eyes: Negative for pain and discharge.   Respiratory: Negative for cough, hemoptysis and shortness of breath.    Cardiovascular: Negative for chest pain, palpitations and orthopnea.   Gastrointestinal: Positive for abdominal pain (Right) and nausea. Negative for blood in stool, constipation, heartburn and vomiting.   Genitourinary: Positive for flank pain (Right). Negative for dysuria, frequency, hematuria and urgency.   Musculoskeletal: Negative for myalgias and neck pain.   Skin: Negative for itching and rash.   Neurological: Positive for tingling (BLE). Negative for sensory change and focal weakness.   Endo/Heme/Allergies: Does not bruise/bleed easily.       Objective Data:   Physical Exam:   Vitals:   Temp:  [36.6 °C (97.9 °F)-37 °C (98.6 °F)] 36.8 °C (98.3 °F)  Pulse:  [64-73]  68  Resp:  [16-20] 18  BP: (135-180)/() 135/81  SpO2:  [93 %-96 %] 96 %    Physical Exam  Constitutional:       General: He is not in acute distress.     Appearance: He is obese. He is ill-appearing.   HENT:      Head: Normocephalic and atraumatic.      Nose: Nose normal. No congestion or rhinorrhea.   Eyes:      Extraocular Movements: Extraocular movements intact.      Pupils: Pupils are equal, round, and reactive to light.   Cardiovascular:      Rate and Rhythm: Normal rate and regular rhythm.      Heart sounds: No murmur heard.    No friction rub.   Pulmonary:      Effort: No respiratory distress.      Breath sounds: No wheezing or rales.   Abdominal:      General: Abdomen is flat. Bowel sounds are normal.      Tenderness: There is abdominal tenderness. There is right CVA tenderness.   Musculoskeletal:         General: No swelling or tenderness.   Skin:     Coloration: Skin is not jaundiced.   Neurological:      Mental Status: He is alert.         Labs:   Most Recent Labs:    Lab Results   Component Value Date/Time    WBC 9.4 04/11/2022 03:47 AM    RBC 4.48 (L) 04/11/2022 03:47 AM    HEMOGLOBIN 14.1 04/11/2022 03:47 AM    HEMATOCRIT 41.3 (L) 04/11/2022 03:47 AM    MCV 92.2 04/11/2022 03:47 AM    MCH 31.5 04/11/2022 03:47 AM    MCHC 34.1 04/11/2022 03:47 AM    MPV 9.8 04/11/2022 03:47 AM    NEUTSPOLYS 67.60 04/11/2022 03:47 AM    LYMPHOCYTES 20.80 (L) 04/11/2022 03:47 AM    MONOCYTES 8.50 04/11/2022 03:47 AM    EOSINOPHILS 2.50 04/11/2022 03:47 AM    BASOPHILS 0.40 04/11/2022 03:47 AM      Lab Results   Component Value Date/Time    SODIUM 141 04/11/2022 03:47 AM    POTASSIUM 3.8 04/11/2022 03:47 AM    CHLORIDE 107 04/11/2022 03:47 AM    CO2 21 04/11/2022 03:47 AM    GLUCOSE 110 (H) 04/11/2022 03:47 AM    BUN 9 04/11/2022 03:47 AM    CREATININE 0.82 04/11/2022 03:47 AM    BUNCREATRAT 16 07/29/2016 10:22 AM      Lab Results   Component Value Date/Time    ALTSGPT 20 04/11/2022 03:47 AM    ASTSGOT 19 04/11/2022  03:47 AM    ALKPHOSPHAT 57 04/11/2022 03:47 AM    TBILIRUBIN 0.7 04/11/2022 03:47 AM    DBILIRUBIN 0.12 07/29/2016 10:22 AM    LIPASE 34 04/07/2022 08:04 AM    ALBUMIN 4.0 04/11/2022 03:47 AM    GLOBULIN 2.6 04/11/2022 03:47 AM    INR 1.00 11/15/2018 07:58 AM     Lab Results   Component Value Date/Time    PROTHROMBTM 13.3 11/15/2018 07:58 AM    INR 1.00 11/15/2018 07:58 AM        Imaging:   [id number] VIEW   Final Result      1.  Right renal stone.   2.  Nonobstructive bowel gas pattern.      MR-ABDOMEN-WITH & W/O   Final Result      1.  Images are suboptimal due to motion artifact.      2.  Probable cyst arising anterolaterally lower pole left kidney measuring 2.9 cm is again identified. Precontrast images indicate possible hemorrhagic cyst. There is possible thin enhancing septation on the T2-weighted images. Bosniak classification    based on these limited images is at least category IIF. Dedicated renal CT with and without contrast could be obtained for further evaluation since the exam would be less susceptible to motion artifact.      3.  Renal veins and IVC are within normal limits. No adenopathy identified.            US-RENAL   Final Result      1.  3.1 cm hyperechoic mass in the lower pole the right kidney may represent a hemorrhagic cyst. Cystic neoplasm cannot be excluded. Follow-up ultrasound or dedicated renal protocol CT or MRI is recommended for further evaluation.      2.  Right nephrolithiasis.      CT-ABDOMEN-PELVIS WITH   Final Result      1.  14 mm nonobstructing right renal stone.   2.  Enlargement of a right renal cyst with mildly increased density compared to simple fluid and some mild adjacent fat stranding. Cannot exclude superimposed hemorrhage or infection into right renal cyst. Recommend ultrasound for further evaluation.   3.  No hydronephrosis.   4.  Hepatic steatosis.      CT-RENAL WITH & W/O    (Results Pending)     Problem Representation:     * Abdominal pain  Assessment &  Plan  Likely due to lower back pain, versus right kidney stone, versus right kidney cyst.  Patient was scheduled to have spinal stenosis surgery in 2 weeks.  Patient has a 40 mm kidney stone which is nonocclusive therefore less likely to cause pain.  MRI showed right kidney cyst is hemorrhagic so less likely to cause pain.  -Start Flexeril 10 mg 3 times daily  -Start gabapentin 300 mg 3 times daily  -Continue Tylenol as needed.  -Continue to monitor patient's pain.    Kidney stone on right side  Assessment & Plan  Right renal stone, 14 mm in size.  Of elevated uric acid with history of gout.  -Urology consulted, appreciate recommendations.  -Hold allopurinol due to prevent stone formation in the urine.  -Continue pain medications as needed.    Cyst of right kidney- (present on admission)  Assessment & Plan  Hemorrhagic cyst on MRI imaging.  Initially pain was 8 out of 10, it has decreased to 7 out of 10.  Ultrasound of the kidneys and abdominal CT scan showed a renal cyst.  -CT renal with and without contrast pending.  -Continue pain medications as needed.  -Urology consulted, appreciate their recommendations.    Coronary artery disease of native artery of native heart with stable angina pectoris (HCC)- (present on admission)  Assessment & Plan  History of coronary artery disease.  Patient complains of chest pain.  Troponins and EKG were nonsignificant.  -Continue metoprolol.  -Continue clopidogrel gel and aspirin  -Continue nitroglycerin as needed  -Continue lisinopril.    Dyslipidemia- (present on admission)  Assessment & Plan  History of dyslipidemia.  Patient is not tolerant to statins.

## 2022-04-11 NOTE — PROGRESS NOTES
Bedside report received from Hoa MORILLO RN. Pt A&Ox4. Requesting to have wife spend the night today. POC discussed with pt. Pt verbalizes understanding. Call light and belongings within reach. Bed locked and in lowest position. Alarm and fall precautions in place.

## 2022-04-11 NOTE — PROGRESS NOTES
Monitor Summary   SR 64-70    IL .18  QRS .10  QT .40  PVC              From monitor summary, no strip loaded

## 2022-04-11 NOTE — CARE PLAN
The patient is Stable - Low risk of patient condition declining or worsening    Shift Goals  Clinical Goals: pain managment  Patient Goals: pain managment  Family Goals: rest, pain relief    Progress made toward(s) clinical / shift goals:    Problem: Pain - Standard  Goal: Alleviation of pain or a reduction in pain to the patient’s comfort goal  4/10/2022 2138 by Pat Brand RJEFFREY  Outcome: Progressing  Flowsheets  Taken 4/10/2022 2000 by Pat Brand RJEFFREY  Pain Rating Scale (NPRS): 5  Taken 4/9/2022 1226 by Belgica Gracia RDottieNDottie  Non Verbal Scale:   Grimacing   Moaning  Taken 4/7/2022 1434 by Candida Hinds RJEFFREY  Mensah-Nielsen Scale: 6   Note: Assess and monitor for pain. Provide pharmacological and non-pharmacological interventions as appropriate. Re-evaluate and continue to monitor. Per patient and AM RN, pain more controlled today than previously.     4/10/2022 2138 by Pat Brand R.GIA  Outcome: Progressing     Problem: Knowledge Deficit - Standard  Goal: Patient and family/care givers will demonstrate understanding of plan of care, disease process/condition, diagnostic tests and medications  4/10/2022 2138 by Pat Brand R.GIA  Outcome: Progressing  Note: Discuss and review POC with patient/family. Re-educate as needed.    4/10/2022 2138 by Pat Brand R.N.  Outcome: Progressing     Problem: Psychosocial  Goal: Patient's level of anxiety will decrease  Outcome: Progressing  Flowsheets (Taken 4/10/2022 2138)  Decrease Anxiety Level:   Implemented stimuli reduction, calming techniques   Encouraged support system participation   Pharmacologic management per MD order  Note: Assess for anxiety. Provide support and reassurance. Provide pharmacological interventions as appropriate per MD order.        Problem: Communication  Goal: The ability to communicate needs accurately and effectively will improve  Outcome: Progressing  Flowsheets (Taken 4/10/2022  5889)  Communication:   Assessed patient's ability to understand and communicate   Oriented patient to call light   Oriented family/support system to call light   Reoriented patient to environment  Note: Call light within reach. Educated pt to use call light as needed. Reorient and re-educate as needed. Continue to monitor.         Patient is not progressing towards the following goals: N/A

## 2022-04-11 NOTE — PROGRESS NOTES
Received bedside report from MO Sweeney. POC discussed. First assessment completed, call light within reach. Fall precautions in place. Will continue to monitor.

## 2022-04-11 NOTE — CARE PLAN
Problem: Pain - Standard  Goal: Alleviation of pain or a reduction in pain to the patient’s comfort goal  Outcome: Not Progressing  Flowsheets (Taken 4/11/2022 1333)  Pain Rating Scale (NPRS): 7  Note: Assess and monitor for pain. Provide pharmacological and non-pharmacological interventions as appropriate. Patient has continuous right back and flank pain. Patient provided with PRN medication regimen. Dr. Rockwell notified throughout the day. Patient states his pain is continuously hurting but the medication does provide relief. Will re-evaluate and continue to monitor.        Problem: Knowledge Deficit - Standard  Goal: Patient and family/care givers will demonstrate understanding of plan of care, disease process/condition, diagnostic tests and medications  Outcome: Progressing  Note: Patient educated on the plan of care, medication regimen, and imaging ordered. Patient verbalized understanding. Call light within reach. Educated pt to use call light as needed. Reorient and re-educate as needed. Continue to monitor.     The patient is Stable - Low risk of patient condition declining or worsening    Shift Goals: Pain control, goal of 3  Clinical Goals: Pain management  Patient Goals: Pain management  Family Goals: rest, pain relief    Progress made toward(s) clinical / shift goals:  PRN medication administered.     Patient is not progressing towards the following goals: Dr. Rockwell notified throughout the day regarding pain and medication regimen.

## 2022-04-11 NOTE — ASSESSMENT & PLAN NOTE
Likely due to lower back pain, versus right kidney stone, versus right kidney cyst.  Patient was scheduled to have spinal stenosis surgery in 2 weeks.  Patient has a 40 mm kidney stone which is nonocclusive therefore less likely to cause pain.  MRI showed right kidney cyst is hemorrhagic so less likely to cause pain.  -Start Flexeril 10 mg 3 times daily  -Start gabapentin 300 mg 3 times daily  -Continue Tylenol as needed.  -Continue to monitor patient's pain.

## 2022-04-12 NOTE — CARE PLAN
Problem: Pain - Standard  Goal: Alleviation of pain or a reduction in pain to the patient’s comfort goal  Outcome: Progressing  Flowsheets (Taken 4/12/2022 2840)  Pain Rating Scale (NPRS): 6  Note: Assess and monitor for pain. Provide pharmacological and non-pharmacological interventions as appropriate. Re-evaluate and continue to monitor.        Problem: Knowledge Deficit - Standard  Goal: Patient and family/care givers will demonstrate understanding of plan of care, disease process/condition, diagnostic tests and medications  Outcome: Progressing  Note: Patient educated on the discharge plan and medication regimen. Call light within reach. Educated pt to use call light as needed. Reorient and re-educate as needed. Continue to monitor.     The patient is Stable - Low risk of patient condition declining or worsening    Shift Goals: Pain control  Clinical Goals: Pain management  Patient Goals: Pain control  Family Goals: Rest    Progress made toward(s) clinical / shift goals:  patient discharging home    Patient is not progressing towards the following goals: Pain oon the right flank, patient will follow up outpatient

## 2022-04-12 NOTE — PROGRESS NOTES
10:00  Arrive to dc lounge via wheelchair. A/O, no complaints, dc instructions reviewed w/ pt and wife. Verbalized understanding.     DC home in stable condition w/ wife.

## 2022-04-12 NOTE — PROGRESS NOTES
Chart check only. MRI demonstrated 2.9cm probably hemorrhagic cyst, likely bosniak 2F. Urology signing off. We do not plan on surgical intervention at this time. We will arrange outpatient follow up with Dr. Zacarias to discuss stone treatment and cyst observation.     Please contact us with questions.   Barbara Alegria PA-C

## 2022-04-12 NOTE — DISCHARGE SUMMARY
Discharge Summary    Date of Admission: 4/7/2022  Date of Discharge: 4/12/2022 10:25 AM  Discharging Attending: Dr. Key  Discharging Senior Resident: Dr. Burnett  Discharging Intern: Dr. Rockwell    CHIEF COMPLAINT ON ADMISSION  Chief Complaint   Patient presents with   • Abdominal Pain     C/o rlq abdominal pain x 3 days radiates to flank, describes pain as constant and sharp. Denies dysuria       Reason for Admission  Right line pain.    Admission Date  4/7/2022    CODE STATUS  Full Code    HPI & HOSPITAL COURSE  This is a 66 y.o. male with PMHx of multivessel coronary artery disease (history of 5 vessel CABG in 1998, redo three-vessel CABG and December 2005), statin intolerant dyslipidemia, coronary atherosclerosis, aortic stenosis status post bioprosthetic aortic valve replacement in 2005, thoracic aortic aneurysm, GERD, hypertension and obesity, who presented to the ED on 04/07 with right flank pain for 3 days.  He denied any other symptoms including urinary symptoms.  He was found to be afebrile and hemodynamically stable.  CT scan of the abdomen and pelvis showed 14 mm nonobstructing right renal stone and right renal cyst.  Urology was consulted who recommended MRI with renal protocol.  MRI and CT scan with and without contrast with renal protocol were performed which showed 2.9 cm cystic mass of the lower pole of the right kidney (Bosniak IIF) with possible superimposed infection or hemorrhage.  Patient remained hemodynamically stable, afebrile without leukocytosis.  Pain was minimally controlled with opioid medications but improved with addition of gabapentin and Flexeril.  He has lumbar spinal stenosis and he is being evaluated for spinal surgery and currently taking oxycodone at home for back pain.  Sharp shooting right loin pain is multifactorial, combination of radiating neuropathic pain from lumbar spinal stenosis and renal cyst. Imagings were reviewed by urology and they recommended outpatient  management for renal stone and right renal cyst.  His other chronic medical problems were stable during hospitalization.    Therefore, he is discharged in good and stable condition to home with close outpatient follow-up    The patient met 2-midnight criteria for an inpatient stay at the time of discharge.    PHYSICAL EXAM ON DISCHARGE  Temp:  [36.6 °C (97.9 °F)-37.1 °C (98.8 °F)] 36.6 °C (97.9 °F)  Pulse:  [68-79] 74  Resp:  [16-18] 18  BP: (135-166)/() 160/104  SpO2:  [93 %-96 %] 94 %      Physical Exam  Constitutional:       General: He is not in acute distress.     Appearance: Normal appearance. He is not ill-appearing.   HENT:      Head: Normocephalic and atraumatic.      Nose: Nose normal. No congestion or rhinorrhea.      Mouth/Throat:      Mouth: Mucous membranes are moist.   Eyes:      Conjunctiva/sclera: Conjunctivae normal.      Pupils: Pupils are equal, round, and reactive to light.   Cardiovascular:      Rate and Rhythm: Normal rate and regular rhythm.      Pulses: Normal pulses.      Heart sounds: Normal heart sounds.   Pulmonary:      Effort: Pulmonary effort is normal. No respiratory distress.      Breath sounds: Normal breath sounds. No wheezing or rales.   Abdominal:      General: Abdomen is flat. Bowel sounds are normal. There is no distension.      Palpations: Abdomen is soft.      Tenderness: There is no abdominal tenderness. There is no guarding.   Musculoskeletal:         General: No swelling or tenderness. Normal range of motion.   Skin:     General: Skin is warm and dry.      Coloration: Skin is not jaundiced or pale.   Neurological:      General: No focal deficit present.      Mental Status: He is alert and oriented to person, place, and time.   Psychiatric:         Mood and Affect: Mood normal.         Behavior: Behavior normal.       Discharge Date  4/12/2022    FOLLOW UP ITEMS POST DISCHARGE  -Follow-up with Dr. Zacarias in urology clinic for renal stone and kidney cyst.  -Follow-up  with primary care physician for other chronic medical problem.  -Follow-up with spinal surgery for back pain.    DISCHARGE DIAGNOSES  Principal Problem:    Abdominal pain POA: Unknown  Active Problems:    Coronary artery disease of native artery of native heart with stable angina pectoris (HCC) POA: Yes      Overview: Extensive history of CAD with 5 vessel CABG in 1998, redo three-vessel       CABG in 2015, stent placement in 2018.  This is complicated by history of       familial hyper lipidemia with statin intolerance and currently treating       with Repatha as well as a history of hypertension.    Dyslipidemia POA: Yes    Cyst of right kidney POA: Yes    Kidney stone on right side POA: Unknown  Resolved Problems:    * No resolved hospital problems. *      FOLLOW UP  Future Appointments   Date Time Provider Department Center   4/19/2022  8:30 AM [facility] EXAM 5 [facility] None   6/9/2022 10:00 AM Shweta Morales M.D. [facility] None     [facility]- 2ND   [address]  [facility] [address]  [phone]  In 6 days        MEDICATIONS ON DISCHARGE     Medication List      START taking these medications      Instructions   acetaminophen 500 MG Tabs  Commonly known as: TYLENOL   Take 2 Tablets by mouth every 8 hours as needed for up to 7 days.  Dose: 1,000 mg     cyclobenzaprine 10 mg Tabs  Commonly known as: Flexeril   Take 1 Tablet by mouth 3 times a day for 7 days.  Dose: 10 mg     gabapentin 300 MG Caps  Commonly known as: NEURONTIN   Take 1 Capsule by mouth 3 times a day for 7 days.  Dose: 300 mg        CHANGE how you take these medications      Instructions   clopidogrel 75 MG Tabs  What changed: additional instructions  Commonly known as: PLAVIX   TAKE ONE TABLET BY MOUTH ONE TIME DAILY     isosorbide mononitrate 120 MG CR tablet  What changed:   · how much to take  · how to take this  · when to take this  Commonly known as: IMDUR   TAKE  ONE TABLET BY MOUTH EVERY MORNING      nitroglycerin 0.4 MG Subl  What changed: See the new instructions.  Commonly known as: NITROSTAT   PLACE 1TAB UNDER TONGUE AS NEEDED FOR CHEST PAIN(1TAB EVERY 5 MINS,MAX 3DOSES AS NEEDED)IF NO RELIEF CALL 911        CONTINUE taking these medications      Instructions   aspirin 81 MG EC tablet   Take 1 Tab by mouth every day.  Dose: 81 mg     lisinopril 20 MG Tabs  Commonly known as: PRINIVIL   TAKE ONE TABLET BY MOUTH ONE TIME DAILY     metoprolol  MG Tb24  Commonly known as: TOPROL XL   Take 1 Tab by mouth every day.  Dose: 100 mg     Oxycodone HCl 20 MG Tabs   Take 20 mg by mouth 4 times a day.  Dose: 20 mg            Allergies  Allergies   Allergen Reactions   • Gemfibrozil      Dehydration,Severe Muscle cramps   • Lipitor [Atorvastatin Calcium] Unspecified     Severe Muscle cramps, dehydration   • Lovastatin      Dehydration, severe muscle cramps   • Tricor Unspecified     Dehydration, severe muscle cramps       DIET  Orders Placed This Encounter   Procedures   • Diet Order Diet: Cardiac     Standing Status:   Standing     Number of Occurrences:   1     Order Specific Question:   Diet:     Answer:   Cardiac [6]       ACTIVITY  As tolerated.  Weight bearing as tolerated    CONSULTATIONS  Dr. Larry with Urology consulted.  Treatment options were discussed and plan of care agreed upon.    PROCEDURES  None

## 2022-04-12 NOTE — PROGRESS NOTES
Bedside report received from Davina MORILLO RN. Pt A&Ox4. Appears to be resting comfortably. POC discussed with pt. Pt verbalizes understanding. Call light and belongings within reach. Bed locked and in lowest position. Alarm and fall precautions in place.

## 2022-04-12 NOTE — CARE PLAN
The patient is Watcher - Medium risk of patient condition declining or worsening    Shift Goals  Clinical Goals: Manage pain  Patient Goals: Rest/pain management  Family Goals: Rest    Progress made toward(s) clinical / shift goals:    Problem: Knowledge Deficit - Standard  Goal: Patient and family/care givers will demonstrate understanding of plan of care, disease process/condition, diagnostic tests and medications  Outcome: Progressing     Problem: Pain - Standard  Goal: Alleviation of pain or a reduction in pain to the patient’s comfort goal  Outcome: Progressing     Problem: Psychosocial  Goal: Patient's level of anxiety will decrease  Outcome: Progressing     Pt has verbalized his understanding of his plan of care.

## 2022-04-12 NOTE — PROGRESS NOTES
Bedside report received from day RN, pt care assumed, assessment completed. Pt is A&O 4, pain at a 2. Updated on POC, questions answered. Bed in lowest, locked position, treaded socks on, call light and belongings within reach. Fall precautions in place.

## 2022-04-12 NOTE — DISCHARGE INSTRUCTIONS
Discharge Instructions    Discharged to home by car with relative. Discharged via wheelchair, hospital escort: Yes.  Special equipment needed: Not Applicable    Be sure to schedule a follow-up appointment with your primary care doctor or any specialists as instructed.     Discharge Plan:        I understand that a diet low in cholesterol, fat, and sodium is recommended for good health. Unless I have been given specific instructions below for another diet, I accept this instruction as my diet prescription.   Other diet: renal    Special Instructions: None    · Is patient discharged on Warfarin / Coumadin?   No     Depression / Suicide Risk    As you are discharged from this [facility] facility, it is important to learn how to keep safe from harming yourself.    Recognize the warning signs:  · Abrupt changes in personality, positive or negative- including increase in energy   · Giving away possessions  · Change in eating patterns- significant weight changes-  positive or negative  · Change in sleeping patterns- unable to sleep or sleeping all the time   · Unwillingness or inability to communicate  · Depression  · Unusual sadness, discouragement and loneliness  · Talk of wanting to die  · Neglect of personal appearance   · Rebelliousness- reckless behavior  · Withdrawal from people/activities they love  · Confusion- inability to concentrate     If you or a loved one observes any of these behaviors or has concerns about self-harm, here's what you can do:  · Talk about it- your feelings and reasons for harming yourself  · Remove any means that you might use to hurt yourself (examples: pills, rope, extension cords, firearm)  · Get professional help from the community (Mental Health, Substance Abuse, psychological counseling)  · Do not be alone:Call your Safe Contact- someone whom you trust who will be there for you.  · Call your local CRISIS HOTLINE 988-1090 or 485-583-2929  · Call your local Children's Mobile Crisis  Response Team [facility] (676) 245-4704 or www.[employer]  · Call the toll free National Suicide Prevention Hotlines   · National Suicide Prevention Lifeline 827-202-EKBY (5429)  · [facility] Hope Line Network 800-SUICIDE (960-7119)    Kidney Stones    Kidney stones (urolithiasis) are rock-like masses that form inside of the kidneys. Kidneys are organs that make pee (urine). A kidney stone can cause very bad pain and can block the flow of pee. The stone usually leaves your body (passes) through your pee. You may need to have a doctor take out the stone.  Follow these instructions at home:  Eating and drinking  · Drink enough fluid to keep your pee clear or pale yellow. This will help you pass the stone.  · If told by your doctor, change the foods you eat (your diet). This may include:  ? Limiting how much salt (sodium) you eat.  ? Eating more fruits and vegetables.  ? Limiting how much meat, poultry, fish, and eggs you eat.  · Follow instructions from your doctor about eating or drinking restrictions.  General instructions  · Collect pee samples as told by your doctor. You may need to collect a pee sample:  ? 24 hours after a stone comes out.  ? 8-12 weeks after a stone comes out, and every 6-12 months after that.  · Strain your pee every time you pee (urinate), for as long as told. Use the strainer that your doctor recommends.  · Do not throw out the stone. Keep it so that it can be tested by your doctor.  · Take over-the-counter and prescription medicines only as told by your doctor.  · Keep all follow-up visits as told by your doctor. This is important. You may need follow-up tests.  Preventing kidney stones  To prevent another kidney stone:  · Drink enough fluid to keep your pee clear or pale yellow. This is the best way to prevent kidney stones.  · Eat healthy foods.  · Avoid certain foods as told by your doctor. You may be told to eat less protein.  · Stay at a healthy weight.  Contact a doctor  if:  · You have pain that gets worse or does not get better with medicine.  Get help right away if:  · You have a fever or chills.  · You get very bad pain.  · You get new pain in your belly (abdomen).  · You pass out (faint).  · You cannot pee.  This information is not intended to replace advice given to you by your health care provider. Make sure you discuss any questions you have with your health care provider.  Document Released: 06/05/2009 Document Revised: 07/30/2019 Document Reviewed: 09/05/2017  Elsevier Patient Education © 2020 [employer] Inc[employer]

## 2022-04-19 NOTE — PROGRESS NOTES
[facility] - FollowUp Visit  Date of Service: 22    Catherine Hamilton is here for follow up of dyslipidemia.    Subjective    HPI  Pertinent Interval History since last visit:   Pt stopped Repatha because he was unable to retrieve medication d/t insurance/cost  Pt was admitted recently for abdomina pain 2/2 spinal stenosis  Per last visit pt was prescribed ezetimibe by PCP but pt refused to take it d/t fear of ADRs  Current Prescription Lipid Lowering Medications - including dose:   Statin: None  Non-Statin: None  Current Lipid Lowering and Related Supplements:   None  Any Current Side Effects Potentially Related to Lipid Lowering therapy?   n/a  Current Adherence to Lipid Lowering Therapies:  Completely non-adherent d/t insurance  Any Previous History of Statin Intolerance?   Yes, Details:   Patient has been on atorvastatin and lovastatin               Outcome: Severe muscle aches, dehydration, and kidney failure per patient  Non-Statin: Gemfibrozil and Fenofibrate              Outcome: Severe muscle cramps and dehydration  Baseline Lipids Prior to Treatment:  Results for CATHERINE HAMILTON (MRN [id number]) as of 2019 07:25    Ref. Range 10/15/2018 08:56   Cholesterol,Tot Latest Ref Range: 100 - 199 mg/dL 284 (H)   Triglycerides Latest Ref Range: 0 - 149 mg/dL 345 (H)   HDL Latest Ref Range: >=40 mg/dL 31 (A)   LDL Latest Ref Range: <100 mg/dL 184 (H)        SOCIAL HISTORY  Social History     Tobacco Use   Smoking Status Former Smoker   • Packs/day: 3.00   • Years: 20.00   • Pack years: 60.00   • Types: Cigarettes   • Quit date: 1992   • Years since quittin.3   Smokeless Tobacco Never Used      Change in weight: Stable  Exercise habits: works out ~1.5 hrs for 5 days a week   Diet: 1600 calories/day - continues to eat red meat regularly      Objective    There were no vitals filed for this visit.   Physical Exam    DATA REVIEW  Most Recent Lipid Panel:   Lab Results   Component Value Date     CHOLSTRLTOT 267 (H) 02/28/2022    TRIGLYCERIDE 383 (H) 02/28/2022    HDL 32 (A) 02/28/2022     (H) 02/28/2022       Other Pertinent Blood Work:   Lab Results   Component Value Date    SODIUM 139 04/12/2022    POTASSIUM 3.4 (L) 04/12/2022    CHLORIDE 103 04/12/2022    CO2 24 04/12/2022    ANION 12.0 04/12/2022    GLUCOSE 104 (H) 04/12/2022    BUN 11 04/12/2022    CREATININE 0.92 04/12/2022    CALCIUM 9.2 04/12/2022    ASTSGOT 17 04/12/2022    ALTSGPT 18 04/12/2022    ALKPHOSPHAT 61 04/12/2022    TBILIRUBIN 0.5 04/12/2022    ALBUMIN 4.1 04/12/2022    AGRATIO 1.4 04/12/2022       Other:  NA    Recent Imaging Studies:    None since last visit          ASSESSMENT AND PLAN  Patient Type, check all that apply:   Secondary Prevention  Established Atherosclerotic Cardiovascular Disease (ASCVD)  Yes, Details: hx of CABG x3 + PCI w/ MOE  Other Established (non-atherosclerotic) Vascular Disease, if Present:    HTN, prediabetes  Evidence of Heterozygous Familial Hypercholesterolemia (FH):   Possible   - Has been having cardiac issues since he was 19 yo  - Father & brother both passed away of cardiac-related issues  ACC/AHA Indication for Statin Therapy, emily all that apply:   Established ASCVD: Indication for High intensity statin    Calculated Risk for ASCVD, if applicable:  N/A  Other Significant Risk Markers, if any, emily all that apply:  Family history of premature ASCVD in first degree relative  National Lipid Association (NLA) Goal (if applicable):  LDL-C:   <70 mg/dL  Lifestyle Recommendations From Today’s Visit:   Eating Plan: Concentrate on  continue lower calorie diet, but aim for fresh, whole foods and limit red meat  Exercise: continue current exercise regimen  Statin Recommendations from Today's Visit  None d/t hx of statin intolerance; pt refuses to retrial any statin  Non-Statin Medications Recommendations from Today’s Visit:   Restart Praluent 150 mg q 14 days - provided pt with 4 weeks of samples  Will  send RX for Repatha 140 q 14 days since this is the PCSK9i his insurance previously preferred  Indication for PCSK9 Inhibitor, if applicable:  ASCVD with suboptimal control of LDL-C despite maximally tolerated statin  Supplements Recommended at this visit:  None  Recommendations for Other Cardiovascular Risk Factors, emily all that apply:   Diabetes/Impaired Fasting Glucose- a1c is elevated; pt to limit carb intake  Other Issues:  At last visit, pt's TGs were elevated despite LDL at goal per lipid panel in June 2021. Will discuss addition of Vascepa to pt's regimen after restarting PCSK9i pending lipid panel    Studies Ordered at Todays Visit:  None   Blood Work Ordered At Today’s visit:   Lipid panel  Follow-Up:   2 weeks via phone to assess affordability  2 months f2f per pt preference    Sarahi Urias, PharmD    CC:  Russell T Stodtmeister, M.D. Bloch, Mg ARAGON M.D.

## 2022-04-19 NOTE — PROGRESS NOTES
Abimael Hamilton (Long: [id number])  Repatha SureClick 140MG/ML auto-injectors  CaseId:[id number]  Status:Approved;  Coverage Start Date:03/20/2022;  Coverage End Date:04/19/2023;

## 2022-04-20 NOTE — TELEPHONE ENCOUNTER
[facility] Floyd Hamilton FA Approval  Pharmacy Card Id [id number]   Group [id number]  [facility] [id number]  BIN [id number]

## 2022-04-20 NOTE — TELEPHONE ENCOUNTER
Contacted patient at [phone] to discuss [facility] pharmacy and services/benefits offered. No answer, left voicemail.    Mariel Welch  Rx Coordinator   [phone]

## 2022-04-22 NOTE — PROGRESS NOTES
Subjective:   Abimael Hamilton is a 66 y.o. male here today for   Chief Complaint   Patient presents with   • Follow-Up     Discuss medication prescribed at HF / sore throat x 1 day        #Hosptial Follow up:  -Patient seen in hospital from 4 7-4 12 after having sided flank pain.  During hospitalization CT scan showed 14 mm nonobstructing renal stone and right renal cyst.  MRI completed showing 2.9 cm cystic mass of the lower pole the right kidney with possible superimposed infection hemorrhage.  Patient was placed on pain control, started on Flexeril and gabapentin.  He had already been taking opiates chronically for back pain.  With the new medication he states that his back pain/flank pain did improve.  Urology was consulted.  Patient does have follow-up with urology in a week.  Currently states that he is feeling well, no concerns.  Would like to continue the Flexeril and gabapentin and is here today requesting refills.  -States that the flank pain is resolving but continues to have episodes.  Had recently taken a Flexeril this morning.  Denies any lightheadedness, dizziness, syncope presyncopal episodes, lethargy, fatigue.      Allergies   Allergen Reactions   • Gemfibrozil      Dehydration,Severe Muscle cramps   • Lipitor [Atorvastatin Calcium] Unspecified     Severe Muscle cramps, dehydration   • Lovastatin      Dehydration, severe muscle cramps   • Tricor Unspecified     Dehydration, severe muscle cramps         Current medicines (including changes today)  Current Outpatient Medications   Medication Sig Dispense Refill   • Evolocumab, REPATHA, (REPATHA SURECLICK) 140 MG/ML Solution Auto-injector Inject 1 Each under the skin every 14 days. 2 mL 11   • Alirocumab (PRALUENT) 150 MG/ML Solution Auto-injector Inject 150 mg under the skin every 14 days. Pt on samples while we wait for insurance to approve Repatha     • clopidogrel (PLAVIX) 75 MG Tab TAKE ONE TABLET BY MOUTH ONE TIME DAILY (Patient taking  differently: Take 75 mg by mouth every day. TAKE ONE TABLET BY MOUTH ONE TIME DAILY) 90 Tablet 3   • Oxycodone HCl 20 MG Tab Take 20 mg by mouth 4 times a day.     • nitroglycerin (NITROSTAT) 0.4 MG SL Tab PLACE 1TAB UNDER TONGUE AS NEEDED FOR CHEST PAIN(1TAB EVERY 5 MINS,MAX 3DOSES AS NEEDED)IF NO RELIEF CALL 911 (Patient taking differently: Place 0.4 mg under the tongue as needed for Chest Pain.) 25 Tablet 1   • lisinopril (PRINIVIL) 20 MG Tab TAKE ONE TABLET BY MOUTH ONE TIME DAILY  (Patient taking differently: Take 20 mg by mouth every day.) 90 tablet 2   • isosorbide mononitrate (IMDUR) 120 MG CR tablet TAKE  ONE TABLET BY MOUTH EVERY MORNING (Patient taking differently: Take 120 mg by mouth every day. TAKE  ONE TABLET BY MOUTH EVERY MORNING) 90 tablet 3   • metoprolol SR (TOPROL XL) 100 MG TABLET SR 24 HR Take 1 Tab by mouth every day. 30 Tab 11   • aspirin 81 MG EC tablet Take 1 Tab by mouth every day. 30 Tab 11     No current facility-administered medications for this visit.     He  has a past medical history of Aortic stenosis (12/1/2015), Arthritis, Benign hypertensive heart disease without heart failure (11/14/2011), CAD (coronary artery disease), Congestive heart failure (HCC), Coronary atherosclerosis of autologous vein bypass graft (11/14/2011), Essential hypertension (4/25/2019), Gout, Heart valve disease, High cholesterol, History of pancreatitis, History of pancreatitis, Hypertension, Migraine (2/16/2019), Muscle cramps (10/4/2011), Myocardial infarct ([facility]), Obesity (11/14/2011), Pain, Postsurgical aortocoronary bypass status (7/26/2016), Postsurgical aortocoronary bypass status (7/26/2016), Renal disorder, S/P aortic valve replacement with bioprosthetic valve (7/26/2016), Statin intolerance (7/26/2016), Status post cardiac revascularization with bypass aortocoronary anastomosis of five coronary vessels (7/26/2016), and Status post cardiac revascularization with bypass aortocoronary anastomosis of  "five coronary vessels (7/26/2016).    ROS   Review of Systems   Constitutional: Negative for chills and fever.   Respiratory: Negative for shortness of breath and wheezing.    Cardiovascular: Negative for chest pain and palpitations.   Gastrointestinal: Negative for abdominal pain, constipation, diarrhea, nausea and vomiting.        Objective:     Physical Exam:  BP (!) 90/50 (BP Location: Left arm, Patient Position: Sitting, BP Cuff Size: Adult)   Pulse 72   Temp 37.1 °C (98.7 °F)   Resp 14   Ht 1.676 m (5' 6\")   Wt 103 kg (226 lb 3.2 oz)   SpO2 94%  Body mass index is 36.51 kg/m².   Constitutional: Alert, no distress.  Skin: Warm, dry, good turgor, no rashes in visible areas.  Eye: Equal, round and reactive, conjunctiva clear, lids normal.  Respiratory: Unlabored respiratory effort, lungs clear to auscultation, no wheezes, no rhonchi.  Cardiovascular: Normal S1, S2, no murmur, no edema.  Abdomen: Soft, non-tender, no masses, no hepatosplenomegaly.  Psych: Alert and oriented x3, normal affect and mood.    Assessment and Plan:     1. Cyst of right kidney  -Abdominal/flank pain the patient experiencing hospital most likely due to his cyst of right kidney as well as kidney stone on the right side.  He will continue to follow-up with urology for further evaluation.  In the meantime we will continue to work on pain control with gabapentin and Flexeril.  This medication also benefits his chronic back pain as he feels like he is able to move around more and take the medication.  I did caution patient on Flexeril to only be using on an as-needed basis.  He will continue the gabapentin daily.  Discussed possible side effects of medications.  We will recheck in 1 month, sooner if needed.  - cyclobenzaprine (FLEXERIL) 10 mg Tab; Take 1 Tablet by mouth 3 times a day for 7 days.  Dispense: 30 Tablet; Refill: 2  - gabapentin (NEURONTIN) 300 MG Cap; Take 1 Capsule by mouth 3 times a day for 7 days.  Dispense: 90 Capsule; " Refill: 2    2. Kidney stone on right side  If and see #1.  - cyclobenzaprine (FLEXERIL) 10 mg Tab; Take 1 Tablet by mouth 3 times a day for 7 days.  Dispense: 30 Tablet; Refill: 2  - gabapentin (NEURONTIN) 300 MG Cap; Take 1 Capsule by mouth 3 times a day for 7 days.  Dispense: 90 Capsule; Refill: 2    Followup: Return in about 4 weeks (around 5/20/2022).  Return appointment to follow-up on blood pressure/blood pressure medications.    My total time spent caring for the patient on the day of the encounter was 30 minutes.   This does not include time spent on separately billable procedures/tests.           PLEASE NOTE: This dictation was created using voice recognition software. I have made every reasonable attempt to correct obvious errors, but I expect that there are errors of grammar and possibly content that I did not discover before finalizing the note.

## 2022-04-26 NOTE — TELEPHONE ENCOUNTER
Received request via: Pharmacy    Was the patient seen in the last year in this department? Yes  4/22/22  Does the patient have an active prescription (recently filled or refills available) for medication(s) requested? No

## 2022-04-27 NOTE — TELEPHONE ENCOUNTER
[facility]    Patient is on Repatha 140 mg SC q 2 weeks.    Repatha was delivered to pt on 4/22    F/u appt scheduled for 6/9.    Sarahi Urias, SonD

## 2022-05-05 NOTE — PROGRESS NOTES
PHARMACIST PRE SCREEN - **New Onboarding**     List Drug Allergies: gemfibrozil; lipitor; lovastatin; tricor  List Non-Drug Allergies: nka  List ICD-10: Dyslipidemia   List Diagnosis: E78.5  List Goals of Therapy:   • Achieve goal LDL levels (< 70 mg/dL) to reduce risk of cardiovascular events (secondary prevention)   • Implement lifestyle modifications: diet, exercise, weight loss, and smoking cessation  Drug Therapy (name/formulation/dose/route of admin/freq): Evolocumab, REPATHA, (REPATHA SURECLICK) 140 MG/ML Solution Auto-injector, 1 pen SC Q 14 days    • Appropriateness Review (Y/N + If being prescribed off label, notate supporting reference): Y    • Dose appropriateness (Y/N + BSA, height/weight if applicable): Y    • Any Renal/Hepatic adjustments needed (Y/N + explain)? N    • Comorbidity and Past Medical History reviewed in EMR. Any comorbidities, PMH, precautions, or contraindications that pose medication safety concern (Y/N + document and reach out to provider if appropriate)? N    • Any relevant lab work needed that affect the initial start date (Y/N + document and reach out to provider if appropriate)?  N   • EMR med list reviewed. List DDI’s:     ? Cat D oxycodone + cyclobenzaprine + gabapentin = increased/additive CNS depression, cont to monitor    ? Cat C aspirin + clopidogrel = increased antiplatelet activity, cont to monitor for s/sx of bleeding.   • Patient’s ability to self-administer medication: No issues identified per EMR    PHARMACIST NEW START - Hyperlipidemia Call Review   Dx: Dyslipidemia (E78.5)     Tx prescribed: Evolocumab, REPATHA, (REPATHA SURECLICK) 140 MG/ML Solution Auto-injector, 1 pen SC Q 14 days    • Duration of therapy: until progression, toxicity, or no longer clinically beneficial   • Past Txt: Repatha; atorvastatin; lovastatin; gemfibrozil; fenofibrate  Med Rec/updated Drug list:  EMR med list reviewed, med rec with patient deferred**   • OTC Use/Common DI Avoidance:  deferred**   • Concurrent Statin/HL Meds: none  DI Check:  DDI check of EMR med list completed, refer to onboarding    Goals of Therapy:   • Achieve goal LDL levels (< 70 mg/dL) to reduce risk of cardiovascular events (secondary prevention)   • Implement lifestyle modifications: diet, exercise, weight loss, and smoking cessation  UTR patient, review patient specific goals of therapy and progression towards goals at next follow up assessment    S/Sx(Baseline):  deferred**  Labs:    • CBC: (4/12/22) wnl   • Chem7: (4/12/22) K 3.4*L   • LFTs: (4/12/22) wnl   • BP/HR: (4/22/22) 90/50 72  A1c: (2/28/22) 6.2*H  Lipid Panel: (2/28/22) Chol 267*H HDL 32*L *H *H  HeFH/HoFH: possible per EMR notes  Admin/Schedule: deferred**   • Inj technique:  deferred**  Adherence: deferred**   • Missed dose mgmt: deferred**   • Barriers (if exist): deferred**  List Common SE Reviewed: deferred**  List Serious SE Reviewed/Precautions: deferred**  Proper Handling/Storage/Excursion/Disposal: deferred**  Wellness/Lifestyle (Heart Healthy Diet, smoking cessation): deferred**  Risk for falls (use STEADI): deferred**  Vaccines: deferred**  ADLs: deferred**  Additional: Abimael has been UTR to introduce clinical services after 3 call attempts. Last OV on 4/19 initiated Repatha, patient started on Praluent samples (4 week supply) provided by office on 4/19/22.

## 2022-05-20 NOTE — PROGRESS NOTES
Subjective:   Abimael Hamilton is a 66 y.o. male here today for   Chief Complaint   Patient presents with   • Hospital Follow-up       #Kidney cyst:  -Patient here for follow-up after hospitalization last month.  He states that all symptoms have cleared.  Denies any abdominal pain, flank pain, dysuria, hematuria.  Is working appropriate diet and exercise regimen.  No concerns at this time.    #Chronic back pain:  -Patient has chronic back pain secondary to final stenosis.  Is planning on surgery; however, patient was first complete stress test which is scheduled for later this month.  He has been working on good physical therapy which has been helping immensely.  He has been able to bend over, work on his bike, ride his bike more than he normally has been.  He plans on continue physical therapy I will follow-up as needed.    #High blood pressure  -Currently treated with lisinopril 20 mg daily, Imdur.  Has been compliant with medications.  Blood pressure remains slightly elevated.  Is asymptomatic.  Was on amlodipine at 1 point; however, had discontinued for reasons unknown.    Allergies   Allergen Reactions   • Gemfibrozil      Dehydration,Severe Muscle cramps   • Lipitor [Atorvastatin Calcium] Unspecified     Severe Muscle cramps, dehydration   • Lovastatin      Dehydration, severe muscle cramps   • Tricor Unspecified     Dehydration, severe muscle cramps         Current medicines (including changes today)  Current Outpatient Medications   Medication Sig Dispense Refill   • metoprolol SR (TOPROL XL) 100 MG TABLET SR 24 HR TAKE ONE TABLET BY MOUTH ONE TIME DAILY 90 Tablet 3   • lisinopril (PRINIVIL) 20 MG Tab TAKE ONE TABLET BY MOUTH ONE TIME DAILY 90 Tablet 2   • Evolocumab, REPATHA, (REPATHA SURECLICK) 140 MG/ML Solution Auto-injector Inject 1 Each under the skin every 14 days. 2 mL 11   • clopidogrel (PLAVIX) 75 MG Tab TAKE ONE TABLET BY MOUTH ONE TIME DAILY (Patient taking differently: Take 75 mg by mouth every  day. TAKE ONE TABLET BY MOUTH ONE TIME DAILY) 90 Tablet 3   • Oxycodone HCl 20 MG Tab Take 20 mg by mouth 4 times a day.     • nitroglycerin (NITROSTAT) 0.4 MG SL Tab PLACE 1TAB UNDER TONGUE AS NEEDED FOR CHEST PAIN(1TAB EVERY 5 MINS,MAX 3DOSES AS NEEDED)IF NO RELIEF CALL 911 (Patient taking differently: Place 0.4 mg under the tongue as needed for Chest Pain.) 25 Tablet 1   • isosorbide mononitrate (IMDUR) 120 MG CR tablet TAKE  ONE TABLET BY MOUTH EVERY MORNING (Patient taking differently: Take 120 mg by mouth every day. TAKE  ONE TABLET BY MOUTH EVERY MORNING) 90 tablet 3   • aspirin 81 MG EC tablet Take 1 Tab by mouth every day. 30 Tab 11     No current facility-administered medications for this visit.     He  has a past medical history of Aortic stenosis (12/1/2015), Arthritis, Benign hypertensive heart disease without heart failure (11/14/2011), CAD (coronary artery disease), Congestive heart failure ([facility]), Coronary atherosclerosis of autologous vein bypass graft (11/14/2011), Essential hypertension (4/25/2019), Gout, Heart valve disease, High cholesterol, History of pancreatitis, History of pancreatitis, Hypertension, Migraine (2/16/2019), Muscle cramps (10/4/2011), Myocardial infarct ([facility]), Obesity (11/14/2011), Pain, Postsurgical aortocoronary bypass status (7/26/2016), Postsurgical aortocoronary bypass status (7/26/2016), Renal disorder, S/P aortic valve replacement with bioprosthetic valve (7/26/2016), Statin intolerance (7/26/2016), Status post cardiac revascularization with bypass aortocoronary anastomosis of five coronary vessels (7/26/2016), and Status post cardiac revascularization with bypass aortocoronary anastomosis of five coronary vessels (7/26/2016).    ROS   Review of Systems   Constitutional: Negative for chills and fever.   Respiratory: Negative for shortness of breath and wheezing.    Cardiovascular: Negative for chest pain and palpitations.   Gastrointestinal: Negative for abdominal pain,  "diarrhea, nausea and vomiting.   Neurological: Negative for dizziness and headaches.      Objective:     Physical Exam:  BP (!) 148/72   Pulse 88   Temp 36.7 °C (98 °F) (Temporal)   Resp 15   Ht 1.676 m (5' 5.98\")   Wt 99.8 kg (220 lb)   SpO2 95%  Body mass index is 35.53 kg/m².   Constitutional: Alert, no distress.  Skin: Warm, dry, good turgor, no rashes in visible areas.  Eye: Equal, round and reactive, conjunctiva clear, lids normal.  Respiratory: Unlabored respiratory effort, lungs clear to auscultation, no wheezes, no rhonchi.  Cardiovascular: Normal S1, S2, no murmur, no edema.  Abdomen: Soft, non-tender, no masses, no hepatosplenomegaly.  Psych: Alert and oriented x3, normal affect and mood.    Assessment and Plan:     1. Cyst of right kidney  -Resolved.  Continue with appropriate hydration, diet.  Return precautions given.    2. Spinal stenosis of lumbar region, unspecified whether neurogenic claudication present  -At this time we will glad to see that patient and his pain level is improving dramatically.  He is interested in continuing forward with this surgery although is concerned about what may come from the stress test.  He will discuss this in more detail with cardiology.  We will continue with physical therapy as symptoms are stable at this time.    3. Benign hypertensive heart disease without heart failure  -Chronic condition, unstable.  Medication change needed.  Blood pressure remains slightly elevated.  Will augment therapy with amlodipine.  Continue with appropriate lifestyle modification.  - amLODIPine (NORVASC) 5 MG Tab; Take 1 Tablet by mouth every day for 90 days.  Dispense: 90 Tablet; Refill: 3      Followup: No follow-ups on file.         PLEASE NOTE: This dictation was created using voice recognition software. I have made every reasonable attempt to correct obvious errors, but I expect that there are errors of grammar and possibly content that I did not discover before finalizing " the note.

## 2022-05-31 NOTE — TELEPHONE ENCOUNTER
Received request via: Pharmacy    Was the patient seen in the last year in this department? Yes  LOV 05/20/2022  Does the patient have an active prescription (recently filled or refills available) for medication(s) requested? No

## 2022-06-06 NOTE — PROGRESS NOTES
Subjective:   Chief Complaint:   Chief Complaint   Patient presents with   • Coronary Artery Disease     F/V Dx: Coronary artery disease of native artery of native heart with stable angina pectoris (HCC)   • Dyslipidemia   • Other     F/V Dx: Bilateral carotid artery occlusion           Abimael Hamilton is a 66 y.o. male who returns today for complex heart care including CAD/CABG/chronic angina, bioprosthetic AVR, HTN/LVH, possible CVA, statin intolerance, mixed HLP, chronic HFpEF and today preop assessment.    He has multivessel coronary artery disease, prior 5 vessel CABG in 1998 (CP and syncope), and re-do 3-V CABG in 12/2015 as well as bioprosthetic AVR at that time ([facility]).  Had a PET nuclear stress test 11/2018, ischemia and TID noted.  Subsequent left heart catheterization 11/2018 showed all grafts are occluded with the exception of his LIMA-LAD which fills his circumflex distribution via a jump graft. His native coronary arteries are severely diseased as well.   EF has been 60%.  He was having recurrent Mis, unable to do intervention here. Trops normal 2019.  Had PCI with MOE to LIMA 4-2-2021.  On Imdur, CCB, BB. Has nitro, not using recently.  He quit all of his meds in Oct out of frustration, was having GI problems.  Then tried hardwork, felt poorly, went back on meds.    In terms of the valve, there was valve dysfunction in 2018 on echo.  Dec 2020, gradient across the valve was 3.1 m/s, ALIS calculated at 1.1 cm².  Unable to estimate RVSP.  Gets his teeth cleaned regularly and takes antibiotics prior.    He is maintained on isosorbide and using nitro for CP.  On long-term dual antiplatelet therapy.  Remains on metoprolol.  He was taken off of DAPT once, had more Cps.  Had episode of severe CP a few months ago, was on the ground, lasted for a while, 4 nitros.    Somewhere recorded he has a 4.5 cm thoracic aortic aneurysm but I reviewed his CT scan from 2018, the asc aorta and desc aorta and arch are  normal size. I wonder if he had TAA repair.    He had sx of CVA, arm pain in left arm, came up to his neck, then head.  Now when he eats spicy food, sweats only on the left side of the head.    Has hypertension, mild LVH, BP okay today.    Has impaired fasting glucose but no diabetes.    Has hyperlipidemia,  without meds, triglycerides elevated.    Statin intolerance, has what sounds like rhabdo with Lipitor, he will not take statins again, not even one time per week.  Has re started on Praluent, prior LDL on this was 84, triglycerides elevated, HDL low  Has moderate carotid atherosclerosis by ultrasound February 2018.    Has chronic FUNES, [facility] 3, mostly due to angina. Now feels like it is worse.  No significant orthopnea.  Mild LE edema, some venous insuff after veins removed for CABG.  No significant palpitations, lightheadedness, or presyncope/syncope.   No radha symptoms of leg claudication, probably due spine pain.  No stroke/TIA like symptoms.    Wife is Ana, is not here today.    DATA REVIEWED by me:  ECG 4-7-22  Sinus, 76, delayed R wave progression, nonspecific intraventricular conduction delay, baseline artifact    ECG 7/23/2019  Sinus, rate 68, first-degree AV delay, incomplete left bundle branch block, nonspecific T wave changes    Echo   Left ventricular ejection fraction is visually estimated to be 65%.  Mild concentric left ventricular hypertrophy.  Known bioprosthetic aortic valve replacement with mildly increased   gradient: Vmax is 3.1 m/s, MG 22 mmHg, ALIS 1.1 cm2.  Unable to estimate RVSP, normal estimated RAP.     Compare to prior echo on 9/21/18, no significant change, gradients   continue to be mildly elevated, were elevated on prior.    Echocardiogram, 9/21/2018  Normal left ventricular ejection fraction (measured at 70%) with normal regional wall motion.  Mildly stenotic bioprosthetic aortic valve with a mean gradient measured at 27 mm a radiant measured at 48 mmHg (V-max 3.5  m/s) without perivalvular leak.  Left atrium is mildly dilated with a volume index measured at 39 mL/meter squared    Echocardiogram, 8/4/2016:  Normal left ventricular size and systolic function, EF 60%.  Mild concentric left ventricular hypertrophy.  Mild mitral regurgitation.  Normally functioning bovine bioprosthetic aortic valve.   Normal right sided pressures.  No prior studies for comparison    [facility] 4/2/2021 Dr. Erica Fernandez  Procedure Date: 4/2/2021  Procedures Performed this admission:    PCI DESx1 LIMA-LAD          Myocardial perfusion imaging, 11/7/2018:  IMPRESSION:  1.  Dipyridamole stress negative for chest discomfort and negative for   ischemic EKG changes.  2.  PET perfusion images are suggestive of ischemia in the proximal to mid inferior segment and in the proximal mid bilateral segment.  There is no definite infarction.  In addition TID was present which could be indicative of   multivessel disease, possibly more severe than indicated by the perfusion study.  3.  The patient's resting ejection fraction was mildly reduced and david appropriately during dipyridamole stress.  There was basilar to mid inferior hypokinesis noted     Cardiac catheterization, 11/16/2018:  1.  Angina  2.  Positive stress test for inferior ischemia  3.  CAD status post CABG in the 90s and redo in 2015  4.  Bioprosthetic AVR 2015  5.  Poorly controlled hypertension  6.  Morbid obesity  POST  1.  Multivessel coronary artery disease  2.  Occluded vein grafts x6   3.  LIMA to LAD with focal 70-80% distal anastomotic stenosis 4.  Severe native coronary artery disease not amenable to PCI 5.  Poorly controlled hypertension     RECOMMENDATIONS:  His FORD to LAD supplies both the distal LAD territory as well as via retrograde partially occluded jump graft the circumflex territory and is essentially his last remaining vessel.  There is WESLEY-3 flow and he is not maximally managed medically, in addition he has  of the  RCA which was dominant and the vein graft is occluded and this is his only ischemic territory on noninvasive imaging.     1.  Medical therapy  2. Consideration of high risk last vessel PCI LIMA to LAD anastomosis should he prove to not be a redo surgical candidate (CT surgical consultation prior to any potential PCI would be required) and not be well managed with better medical therapy.  3. Weight loss    CTA 8/20/2018  1.  The pulmonary arteries are suboptimally opacified limiting evaluation, no large central pulmonary embolus is appreciated. Additional imaging would be required for definitive exclusion of pulmonary embolism  2.  Hepatomegaly and diffuse hepatic steatosis.  3.  Right nephrolithiasis    PFTs 12/8/2020  Please note that the patient gave good effort and the results of this test   meet the ATS standards for acceptability and repeatability.  The patient's   FEV1/FVC ratio was 76% predicted which is consistent with no evidence of any   obstructive lung disease.  The post-bronchodilator spirometry did not show any   significant bronchodilator response.  The total lung capacity is 110%   predicted which is normal.  The diffusion capacity is normal at 112%   predicted.     In summary, this pulmonary function testing revealed no evidence of   obstruction, no evidence of restriction, and a normal diffusion capacity.    Most recent labs:         Lab Results   Component Value Date/Time    HEMOGLOBIN 15.2 04/12/2022 02:21 AM    HEMATOCRIT 44.2 04/12/2022 02:21 AM    MCV 91.1 04/12/2022 02:21 AM    INR 1.00 11/15/2018 07:58 AM      Lab Results   Component Value Date/Time    SODIUM 139 04/12/2022 02:21 AM    POTASSIUM 3.4 (L) 04/12/2022 02:21 AM    CHLORIDE 103 04/12/2022 02:21 AM    CO2 24 04/12/2022 02:21 AM    GLUCOSE 104 (H) 04/12/2022 02:21 AM    BUN 11 04/12/2022 02:21 AM    CREATININE 0.92 04/12/2022 02:21 AM      Lab Results   Component Value Date/Time    ASTSGOT 17 04/12/2022 02:21 AM    ALTSGPT 18  04/12/2022 02:21 AM    ALBUMIN 4.1 04/12/2022 02:21 AM      Lab Results   Component Value Date/Time    CHOLSTRLTOT 164 05/19/2022 10:45 AM    LDL 84 05/19/2022 10:45 AM    HDL 30 (A) 05/19/2022 10:45 AM    TRIGLYCERIDE 252 (H) 05/19/2022 10:45 AM         Past Medical History:   Diagnosis Date   • Aortic stenosis 12/1/2015   • Arthritis    • Benign hypertensive heart disease without heart failure 11/14/2011   • CAD (coronary artery disease)    • Congestive heart failure (HCC)    • Coronary atherosclerosis of autologous vein bypass graft 11/14/2011   • Essential hypertension 4/25/2019   • Gout    • Heart valve disease    • High cholesterol    • History of pancreatitis    • History of pancreatitis    • Hypertension    • Migraine 2/16/2019   • Muscle cramps 10/4/2011   • Myocardial infarct (HCC)     Multiple MI's, 1998, 2015   • Obesity 11/14/2011   • Pain     Joints   • Postsurgical aortocoronary bypass status 7/26/2016    Status post redo 3-V CABG in [place] 12/2015: SVG-acute marginal, SVG sequential to LAD, and OM    • Postsurgical aortocoronary bypass status 7/26/2016    Status post redo 3-V CABG in [place] 12/2015: SVG-acute marginal, SVG sequential to LAD, and OM    • Renal disorder     Hx of Acute renal failure r/t medication/statins   • S/P aortic valve replacement with bioprosthetic valve 7/26/2016    #23 [place] Magna AVR (bioprosthetic)    • Statin intolerance 7/26/2016   • Status post cardiac revascularization with bypass aortocoronary anastomosis of five coronary vessels 7/26/2016    5-V CABG done in 1998 (done in [place] at [facility]), patent LIMA to LAD, occluded SVG-OM, occluded SVG-RCA by cardiac catheterization 11/15/2007 with other vein grafts not identified at that time, poor targets for revascularization and declined repeat CABG in 2007 by [facility]. No ischemic was identified by MPI 11/17/07 with an EF of 50%.     • Status post cardiac revascularization with bypass aortocoronary anastomosis of five  coronary vessels 7/26/2016    5-V CABG done in 1998 (done in [place] at [facility]), patent LIMA to LAD, occluded SVG-OM, occluded SVG-RCA by cardiac catheterization 11/15/2007 with other vein grafts not identified at that time, poor targets for revascularization and declined repeat CABG in 2007 by Darlin. No ischemic was identified by MPI 11/17/07 with an EF of 50%.       Past Surgical History:   Procedure Laterality Date   • SHOULDER DECOMPRESSION ARTHROSCOPIC Right 9/5/2017    Procedure: SHOULDER DECOMPRESSION ARTHROSCOPIC SUBACROMIAL;  Surgeon: Mg Campos M.D.;  Location: [facility];  Service: Orthopedics   • DEBRIDEMENT, LABRUM, HIP, ARTHROSCOPIC Right 9/5/2017    Procedure: ARTHROSCOPIC LABRAL DEBRIDEMENT;  Surgeon: Mg Campos M.D.;  Location: [facility];  Service: Orthopedics   • SHOULDER ARTHROSCOPY W/ ROTATOR CUFF REPAIR Right 9/5/2017    Procedure: SHOULDER ARTHROSCOPY W/ ROTATOR CUFF REPAIR;  Surgeon: Mg Campos M.D.;  Location: [facility];  Service: Orthopedics   • SHOULDER ARTHROSCOPY W/ BICIPITAL TENODESIS REPAIR Right 9/5/2017    Procedure: SHOULDER ARTHROSCOPY W/ BICIPITAL TENODESIS REPAIR;  Surgeon: Mg Campos M.D.;  Location: [facility];  Service: Orthopedics   • SYNOVECTOMY Right 9/5/2017    Procedure: SYNOVECTOMY;  Surgeon: Mg Campos M.D.;  Location: [facility];  Service: Orthopedics   • MULTIPLE CORONARY ARTERY BYPASS  12/08/2015    3 way bypass & Bovine valve   • LAMINOTOMY  12/2004    Diskectomy L4-L5, L5-S1   • MULTIPLE CORONARY ARTERY BYPASS  11/11/1998    5 way bypass   • BIOPSY GENERAL      buttock lesion   • EYE SURGERY     • MITRAL VALVE REPLACE       Family History   Problem Relation Age of Onset   • Heart Attack Father         MI and CABG, unknown age   • Hyperlipidemia Father    • Cancer Mother         Breast   • Heart Disease Brother    • Arthritis Maternal  Grandmother    • Stroke Neg Hx    • Diabetes Neg Hx    • Lung Disease Neg Hx      Social History     Socioeconomic History   • Marital status:      Spouse name: Not on file   • Number of children: Not on file   • Years of education: Not on file   • Highest education level: GED or equivalent   Occupational History   • Not on file   Tobacco Use   • Smoking status: Former Smoker     Packs/day: 3.00     Years: 20.00     Pack years: 60.00     Types: Cigarettes     Quit date: 1992     Years since quittin.4   • Smokeless tobacco: Never Used   Vaping Use   • Vaping Use: Never used   Substance and Sexual Activity   • Alcohol use: Not Currently   • Drug use: Yes     Types: Marijuana, Inhaled     Comment: Marijuana- Daily (4 times per day)   • Sexual activity: Yes     Partners: Female   Other Topics Concern   • Not on file   Social History Narrative   • Not on file     Social Determinants of Health     Financial Resource Strain: Not on file   Food Insecurity: Not on file   Transportation Needs: Not on file   Physical Activity: Not on file   Stress: Not on file   Social Connections: Not on file   Intimate Partner Violence: Not on file   Housing Stability: Not on file     Allergies   Allergen Reactions   • Gemfibrozil      Dehydration,Severe Muscle cramps   • Lipitor [Atorvastatin Calcium] Unspecified     Severe Muscle cramps, dehydration   • Lovastatin      Dehydration, severe muscle cramps   • Tricor Unspecified     Dehydration, severe muscle cramps       Current Outpatient Medications   Medication Sig Dispense Refill   • amoxicillin (AMOXIL) 500 MG Cap Take 4 Capsules by mouth one time as needed (30-60 min prior to dental work).     • isosorbide mononitrate (IMDUR) 120 MG CR tablet TAKE  ONE TABLET BY MOUTH EVERY MORNING (Patient taking differently: Take 120 mg by mouth every morning. TAKE  ONE TABLET BY MOUTH EVERY MORNING) 90 Tablet 3   • amLODIPine (NORVASC) 5 MG Tab Take 1 Tablet by mouth every day for 90  "days. 90 Tablet 3   • metoprolol SR (TOPROL XL) 100 MG TABLET SR 24 HR TAKE ONE TABLET BY MOUTH ONE TIME DAILY (Patient taking differently: Take 100 mg by mouth every day.) 90 Tablet 3   • lisinopril (PRINIVIL) 20 MG Tab TAKE ONE TABLET BY MOUTH ONE TIME DAILY (Patient taking differently: Take 20 mg by mouth every day.) 90 Tablet 2   • Evolocumab, REPATHA, (REPATHA SURECLICK) 140 MG/ML Solution Auto-injector Inject 1 Each under the skin every 14 days. 2 mL 11   • clopidogrel (PLAVIX) 75 MG Tab TAKE ONE TABLET BY MOUTH ONE TIME DAILY (Patient taking differently: Take 75 mg by mouth every day. TAKE ONE TABLET BY MOUTH ONE TIME DAILY) 90 Tablet 3   • Oxycodone HCl 20 MG Tab Take 20 mg by mouth 4 times a day.     • nitroglycerin (NITROSTAT) 0.4 MG SL Tab PLACE 1TAB UNDER TONGUE AS NEEDED FOR CHEST PAIN(1TAB EVERY 5 MINS,MAX 3DOSES AS NEEDED)IF NO RELIEF CALL 911 (Patient taking differently: Place 0.4 mg under the tongue as needed for Chest Pain.) 25 Tablet 1   • aspirin 81 MG EC tablet Take 1 Tab by mouth every day. 30 Tab 11     No current facility-administered medications for this visit.       ROS    All others systems reviewed and negative.     Objective:     /82 (BP Location: Left arm, Patient Position: Sitting, BP Cuff Size: Adult)   Pulse 66   Resp 14   Ht 1.676 m (5' 6\")   Wt 98.3 kg (216 lb 12.8 oz)   SpO2 96%  Body mass index is 34.99 kg/m².    Physical Exam   General: No acute distress. Well nourished.  HEENT: EOM grossly intact, no scleral icterus, no pharyngeal erythema.   Neck:  No JVD, no bruits, trachea midline  CVS: RRR.  2/6 mid peaking systolic murmur to clavicles, no LE edema.  2+ radial pulses, 2+ PT pulses, well-healed midline incision  Resp: CTAB. No wheezing or crackles/rhonchi. Normal respiratory effort.  Abdomen: Soft, NT, no radha hepatomegaly, obese.  MSK/Ext: No clubbing or cyanosis.  Skin: Warm and dry, no rashes.  Neurological: CN III-XII grossly intact. No focal deficits. "   Psych: A&O x 3, appropriate affect, good judgement    Physical exam performed today and unchanged, except what is noted, compared to 10-7-2020      Assessment:     1. Coronary artery disease involving native coronary artery of native heart with other form of angina pectoris (HCC)     2. S/P aortic valve replacement with bioprosthetic valve  EC-ECHOCARDIOGRAM COMPLETE W/O CONT   3. S/P CABG x 3     4. Coronary atherosclerosis of autologous vein bypass graft without angina     5. Statin intolerance     6. Aortic valve stenosis, etiology of cardiac valve disease unspecified     7. Essential hypertension     8. Fatty liver     9. Abnormal nuclear cardiac imaging test     10. FUNES (dyspnea on exertion)     11. Prediabetes     12. Chronic venous insufficiency         Medical Decision Making:  Today's Assessment / Status / Plan:     -Class 1-2 now angina, using meds, did better after stent placed in the LIMA to LAD. Cont lifelong DAPT, beta-blocker.  -[facility] 2 FUNES, not changed, PFTs were ok  -Echo [id number], increased gradient across the valve, on DAPT, gets Abx prior to dental work  -PCSK9 inhibitor, he will not try statins again  -Blood pressure looks okay  -Vascular surgeon for symptomatic carotid disease in the future, he is in vascular clinic  -I did not see TAA on CT, not sure if this is a reference to another aneurysm or perhaps he had a repair  -Today he is hoping to get back surgery with general anesthesia.  He would need a chemical nuclear perfusion stress test prior.  He is reluctant and has his concerns.  We discussed today.  I would encourage him to get it done.  But he does not need the nuclear stress test if he is not going to have surgery.  We can reorder the stress test at any time.  -Echocardiogram to look at the bioprosthetic aortic valve which appears to have some dysfunction from December 2020.  -RTC 6 months, echo.    Written instructions given today:    -All bioprosthetic heart valves made with tissue  start to wear out over time.  Your last echocardiogram January 2020 showed that the heart valve was starting to get stuck down with some wear and tear.  It is time for an updated echocardiogram.    -You only need the chemical nuclear stress test if you are going for surgery.  If it needs to be reordered let us know.  I am not afraid of you having the nuclear stress test.  Its not a real stress test, it is a perfusion test.  You feel like crap because we have dilate the blood vessels all over the body at once.  It is incredibly rare to have a serious cardiac event during the stress test, you are much more likely to be splattered on the highway driving home after seeing me today.    -I love all of your heart medications, please do not change them.      Return in about 6 months (around 12/9/2022).    It is my pleasure to participate in the care of Mr. Hamilton.  Please do not hesitate to contact me with questions or concerns.    Shweta Morales MD, [facility]  Cardiologist [facility]    Please note that this dictation was created using voice recognition software. I have made every reasonable attempt to correct obvious errors, but it is possible there are errors of grammar and possibly content that I did not discover before finalizing the note.

## 2022-06-08 NOTE — PROGRESS NOTES
PHARMACIST FOLLOW UP - **Unable to reach - Chart Review and Refill Activity evaluation**  ICD10 verified: E78.5  List Diagnosis: Dyslipidemia     No Updates or Outside Providers     Review of refill assessment - any changes to dose, new medication, new allergy/health condition: N  I have evaluated data from the Refill Activity. Last appointment with specialty provider was 4/19/22

## 2022-06-09 NOTE — LETTER
[facility]   [address], [address]  [place] [address]  Phone: [phone]  Fax: [phone]              Abimael Hamilton  1955    Encounter Date: 6/9/2022    Shweta Morales M.D.          PROGRESS NOTE:  Subjective:   Chief Complaint:   Chief Complaint   Patient presents with   • Coronary Artery Disease     F/V Dx: Coronary artery disease of native artery of native heart with stable angina pectoris (HCC)   • Dyslipidemia   • Other     F/V Dx: Bilateral carotid artery occlusion           Abimael Hamilton is a 66 y.o. male who returns today for complex heart care including CAD/CABG/chronic angina, bioprosthetic AVR, HTN/LVH, possible CVA, statin intolerance, mixed HLP, chronic HFpEF and today preop assessment.    He has multivessel coronary artery disease, prior 5 vessel CABG in 1998 (CP and syncope), and re-do 3-V CABG in 12/2015 as well as bioprosthetic AVR at that time ([facility] [place]).  Had a PET nuclear stress test 11/2018, ischemia and TID noted.  Subsequent left heart catheterization 11/2018 showed all grafts are occluded with the exception of his LIMA-LAD which fills his circumflex distribution via a jump graft. His native coronary arteries are severely diseased as well.   EF has been 60%.  He was having recurrent Mis, unable to do intervention here. Trops normal 2019.  Had PCI with MOE to LIMA 4-2-2021.  On Imdur, CCB, BB. Has nitro, not using recently.  He quit all of his meds in Oct out of frustration, was having GI problems.  Then tried hardwork, felt poorly, went back on meds.    In terms of the valve, there was valve dysfunction in 2018 on echo.  Dec 2020, gradient across the valve was 3.1 m/s, ALIS calculated at 1.1 cm².  Unable to estimate RVSP.  Gets his teeth cleaned regularly and takes antibiotics prior.    He is maintained on isosorbide and using nitro for CP.  On long-term dual antiplatelet therapy.  Remains on metoprolol.  He was taken  off of DAPT once, had more Cps.  Had episode of severe CP a few months ago, was on the ground, lasted for a while, 4 nitros.    Somewhere recorded he has a 4.5 cm thoracic aortic aneurysm but I reviewed his CT scan from 2018, the asc aorta and desc aorta and arch are normal size. I wonder if he had TAA repair.    He had sx of CVA, arm pain in left arm, came up to his neck, then head.  Now when he eats spicy food, sweats only on the left side of the head.    Has hypertension, mild LVH, BP okay today.    Has impaired fasting glucose but no diabetes.    Has hyperlipidemia,  without meds, triglycerides elevated.    Statin intolerance, has what sounds like rhabdo with Lipitor, he will not take statins again, not even one time per week.  Has re started on Praluent, prior LDL on this was 84, triglycerides elevated, HDL low  Has moderate carotid atherosclerosis by ultrasound February 2018.    Has chronic FUNES, [facility] 3, mostly due to angina. Now feels like it is worse.  No significant orthopnea.  Mild LE edema, some venous insuff after veins removed for CABG.  No significant palpitations, lightheadedness, or presyncope/syncope.   No radha symptoms of leg claudication, probably due spine pain.  No stroke/TIA like symptoms.    Wife is Ana, is not here today.    DATA REVIEWED by me:  ECG 4-7-22  Sinus, 76, delayed R wave progression, nonspecific intraventricular conduction delay, baseline artifact    ECG 7/23/2019  Sinus, rate 68, first-degree AV delay, incomplete left bundle branch block, nonspecific T wave changes    Echo   Left ventricular ejection fraction is visually estimated to be 65%.  Mild concentric left ventricular hypertrophy.  Known bioprosthetic aortic valve replacement with mildly increased   gradient: Vmax is 3.1 m/s, MG 22 mmHg, ALIS 1.1 cm2.  Unable to estimate RVSP, normal estimated RAP.     Compare to prior echo on 9/21/18, no significant change, gradients   continue to be mildly elevated, were  elevated on prior.    Echocardiogram, 9/21/2018  Normal left ventricular ejection fraction (measured at 70%) with normal regional wall motion.  Mildly stenotic bioprosthetic aortic valve with a mean gradient measured at 27 mm a radiant measured at 48 mmHg (V-max 3.5 m/s) without perivalvular leak.  Left atrium is mildly dilated with a volume index measured at 39 mL/meter squared    Echocardiogram, 8/4/2016:  Normal left ventricular size and systolic function, EF 60%.  Mild concentric left ventricular hypertrophy.  Mild mitral regurgitation.  Normally functioning bovine bioprosthetic aortic valve.   Normal right sided pressures.  No prior studies for comparison    [facility] 4/2/2021 Dr. Erica Fernandez  Procedure Date: 4/2/2021  Procedures Performed this admission:    PCI DESx1 LIMA-LAD          Myocardial perfusion imaging, 11/7/2018:  IMPRESSION:  1.  Dipyridamole stress negative for chest discomfort and negative for   ischemic EKG changes.  2.  PET perfusion images are suggestive of ischemia in the proximal to mid inferior segment and in the proximal mid bilateral segment.  There is no definite infarction.  In addition TID was present which could be indicative of   multivessel disease, possibly more severe than indicated by the perfusion study.  3.  The patient's resting ejection fraction was mildly reduced and david appropriately during dipyridamole stress.  There was basilar to mid inferior hypokinesis noted     Cardiac catheterization, 11/16/2018:  1.  Angina  2.  Positive stress test for inferior ischemia  3.  CAD status post CABG in the 90s and redo in 2015  4.  Bioprosthetic AVR 2015  5.  Poorly controlled hypertension  6.  Morbid obesity  POST  1.  Multivessel coronary artery disease  2.  Occluded vein grafts x6   3.  LIMA to LAD with focal 70-80% distal anastomotic stenosis 4.  Severe native coronary artery disease not amenable to PCI 5.  Poorly controlled hypertension     RECOMMENDATIONS:  His LIMA to  LAD supplies both the distal LAD territory as well as via retrograde partially occluded jump graft the circumflex territory and is essentially his last remaining vessel.  There is WESLEY-3 flow and he is not maximally managed medically, in addition he has  of the RCA which was dominant and the vein graft is occluded and this is his only ischemic territory on noninvasive imaging.     1.  Medical therapy  2. Consideration of high risk last vessel PCI LIMA to LAD anastomosis should he prove to not be a redo surgical candidate (CT surgical consultation prior to any potential PCI would be required) and not be well managed with better medical therapy.  3. Weight loss    CTA 8/20/2018  1.  The pulmonary arteries are suboptimally opacified limiting evaluation, no large central pulmonary embolus is appreciated. Additional imaging would be required for definitive exclusion of pulmonary embolism  2.  Hepatomegaly and diffuse hepatic steatosis.  3.  Right nephrolithiasis    PFTs 12/8/2020  Please note that the patient gave good effort and the results of this test   meet the ATS standards for acceptability and repeatability.  The patient's   FEV1/FVC ratio was 76% predicted which is consistent with no evidence of any   obstructive lung disease.  The post-bronchodilator spirometry did not show any   significant bronchodilator response.  The total lung capacity is 110%   predicted which is normal.  The diffusion capacity is normal at 112%   predicted.     In summary, this pulmonary function testing revealed no evidence of   obstruction, no evidence of restriction, and a normal diffusion capacity.    Most recent labs:         Lab Results   Component Value Date/Time    HEMOGLOBIN 15.2 04/12/2022 02:21 AM    HEMATOCRIT 44.2 04/12/2022 02:21 AM    MCV 91.1 04/12/2022 02:21 AM    INR 1.00 11/15/2018 07:58 AM      Lab Results   Component Value Date/Time    SODIUM 139 04/12/2022 02:21 AM    POTASSIUM 3.4 (L) 04/12/2022 02:21 AM     CHLORIDE 103 04/12/2022 02:21 AM    CO2 24 04/12/2022 02:21 AM    GLUCOSE 104 (H) 04/12/2022 02:21 AM    BUN 11 04/12/2022 02:21 AM    CREATININE 0.92 04/12/2022 02:21 AM      Lab Results   Component Value Date/Time    ASTSGOT 17 04/12/2022 02:21 AM    ALTSGPT 18 04/12/2022 02:21 AM    ALBUMIN 4.1 04/12/2022 02:21 AM      Lab Results   Component Value Date/Time    CHOLSTRLTOT 164 05/19/2022 10:45 AM    LDL 84 05/19/2022 10:45 AM    HDL 30 (A) 05/19/2022 10:45 AM    TRIGLYCERIDE 252 (H) 05/19/2022 10:45 AM         Past Medical History:   Diagnosis Date   • Aortic stenosis 12/1/2015   • Arthritis    • Benign hypertensive heart disease without heart failure 11/14/2011   • CAD (coronary artery disease)    • Congestive heart failure (HCC)    • Coronary atherosclerosis of autologous vein bypass graft 11/14/2011   • Essential hypertension 4/25/2019   • Gout    • Heart valve disease    • High cholesterol    • History of pancreatitis    • History of pancreatitis    • Hypertension    • Migraine 2/16/2019   • Muscle cramps 10/4/2011   • Myocardial infarct (HCC)     Multiple MI's, 1998, 2015   • Obesity 11/14/2011   • Pain     Joints   • Postsurgical aortocoronary bypass status 7/26/2016    Status post redo 3-V CABG in [place] 12/2015: SVG-acute marginal, SVG sequential to LAD, and OM    • Postsurgical aortocoronary bypass status 7/26/2016    Status post redo 3-V CABG in [place] 12/2015: SVG-acute marginal, SVG sequential to LAD, and OM    • Renal disorder     Hx of Acute renal failure r/t medication/statins   • S/P aortic valve replacement with bioprosthetic valve 7/26/2016    #23 [place] Magna AVR (bioprosthetic)    • Statin intolerance 7/26/2016   • Status post cardiac revascularization with bypass aortocoronary anastomosis of five coronary vessels 7/26/2016    5-V CABG done in 1998 (done in [place] at [facility]), patent LIMA to LAD, occluded SVG-OM, occluded SVG-RCA by cardiac catheterization 11/15/2007 with other vein grafts  not identified at that time, poor targets for revascularization and declined repeat CABG in 2007 by [facility]lin. No ischemic was identified by MPI 11/17/07 with an EF of 50%.     • Status post cardiac revascularization with bypass aortocoronary anastomosis of five coronary vessels 7/26/2016    5-V CABG done in 1998 (done in [place] at [facility]), patent LIMA to LAD, occluded SVG-OM, occluded SVG-RCA by cardiac catheterization 11/15/2007 with other vein grafts not identified at that time, poor targets for revascularization and declined repeat CABG in 2007 by Darlin. No ischemic was identified by MPI 11/17/07 with an EF of 50%.       Past Surgical History:   Procedure Laterality Date   • SHOULDER DECOMPRESSION ARTHROSCOPIC Right 9/5/2017    Procedure: SHOULDER DECOMPRESSION ARTHROSCOPIC SUBACROMIAL;  Surgeon: Mg Campos M.D.;  Location: [facility];  Service: Orthopedics   • DEBRIDEMENT, LABRUM, HIP, ARTHROSCOPIC Right 9/5/2017    Procedure: ARTHROSCOPIC LABRAL DEBRIDEMENT;  Surgeon: gM Campos M.D.;  Location: [facility];  Service: Orthopedics   • SHOULDER ARTHROSCOPY W/ ROTATOR CUFF REPAIR Right 9/5/2017    Procedure: SHOULDER ARTHROSCOPY W/ ROTATOR CUFF REPAIR;  Surgeon: Mg Campos M.D.;  Location: [facility];  Service: Orthopedics   • SHOULDER ARTHROSCOPY W/ BICIPITAL TENODESIS REPAIR Right 9/5/2017    Procedure: SHOULDER ARTHROSCOPY W/ BICIPITAL TENODESIS REPAIR;  Surgeon: Mg Campos M.D.;  Location: [facility];  Service: Orthopedics   • SYNOVECTOMY Right 9/5/2017    Procedure: SYNOVECTOMY;  Surgeon: Mg Campos M.D.;  Location: [facility];  Service: Orthopedics   • MULTIPLE CORONARY ARTERY BYPASS  12/08/2015    3 way bypass & Bovine valve   • LAMINOTOMY  12/2004    Diskectomy L4-L5, L5-S1   • MULTIPLE CORONARY ARTERY BYPASS  11/11/1998    5 way bypass   • BIOPSY GENERAL      buttock lesion   •  EYE SURGERY     • MITRAL VALVE REPLACE       Family History   Problem Relation Age of Onset   • Heart Attack Father         MI and CABG, unknown age   • Hyperlipidemia Father    • Cancer Mother         Breast   • Heart Disease Brother    • Arthritis Maternal Grandmother    • Stroke Neg Hx    • Diabetes Neg Hx    • Lung Disease Neg Hx      Social History     Socioeconomic History   • Marital status:      Spouse name: Not on file   • Number of children: Not on file   • Years of education: Not on file   • Highest education level: GED or equivalent   Occupational History   • Not on file   Tobacco Use   • Smoking status: Former Smoker     Packs/day: 3.00     Years: 20.00     Pack years: 60.00     Types: Cigarettes     Quit date: 1992     Years since quittin.4   • Smokeless tobacco: Never Used   Vaping Use   • Vaping Use: Never used   Substance and Sexual Activity   • Alcohol use: Not Currently   • Drug use: Yes     Types: Marijuana, Inhaled     Comment: Marijuana- Daily (4 times per day)   • Sexual activity: Yes     Partners: Female   Other Topics Concern   • Not on file   Social History Narrative   • Not on file     Social Determinants of Health     Financial Resource Strain: Not on file   Food Insecurity: Not on file   Transportation Needs: Not on file   Physical Activity: Not on file   Stress: Not on file   Social Connections: Not on file   Intimate Partner Violence: Not on file   Housing Stability: Not on file     Allergies   Allergen Reactions   • Gemfibrozil      Dehydration,Severe Muscle cramps   • Lipitor [Atorvastatin Calcium] Unspecified     Severe Muscle cramps, dehydration   • Lovastatin      Dehydration, severe muscle cramps   • Tricor Unspecified     Dehydration, severe muscle cramps       Current Outpatient Medications   Medication Sig Dispense Refill   • amoxicillin (AMOXIL) 500 MG Cap Take 4 Capsules by mouth one time as needed (30-60 min prior to dental work).     • isosorbide  "mononitrate (IMDUR) 120 MG CR tablet TAKE  ONE TABLET BY MOUTH EVERY MORNING (Patient taking differently: Take 120 mg by mouth every morning. TAKE  ONE TABLET BY MOUTH EVERY MORNING) 90 Tablet 3   • amLODIPine (NORVASC) 5 MG Tab Take 1 Tablet by mouth every day for 90 days. 90 Tablet 3   • metoprolol SR (TOPROL XL) 100 MG TABLET SR 24 HR TAKE ONE TABLET BY MOUTH ONE TIME DAILY (Patient taking differently: Take 100 mg by mouth every day.) 90 Tablet 3   • lisinopril (PRINIVIL) 20 MG Tab TAKE ONE TABLET BY MOUTH ONE TIME DAILY (Patient taking differently: Take 20 mg by mouth every day.) 90 Tablet 2   • Evolocumab, REPATHA, (REPATHA SURECLICK) 140 MG/ML Solution Auto-injector Inject 1 Each under the skin every 14 days. 2 mL 11   • clopidogrel (PLAVIX) 75 MG Tab TAKE ONE TABLET BY MOUTH ONE TIME DAILY (Patient taking differently: Take 75 mg by mouth every day. TAKE ONE TABLET BY MOUTH ONE TIME DAILY) 90 Tablet 3   • Oxycodone HCl 20 MG Tab Take 20 mg by mouth 4 times a day.     • nitroglycerin (NITROSTAT) 0.4 MG SL Tab PLACE 1TAB UNDER TONGUE AS NEEDED FOR CHEST PAIN(1TAB EVERY 5 MINS,MAX 3DOSES AS NEEDED)IF NO RELIEF CALL 911 (Patient taking differently: Place 0.4 mg under the tongue as needed for Chest Pain.) 25 Tablet 1   • aspirin 81 MG EC tablet Take 1 Tab by mouth every day. 30 Tab 11     No current facility-administered medications for this visit.       ROS    All others systems reviewed and negative.     Objective:     /82 (BP Location: Left arm, Patient Position: Sitting, BP Cuff Size: Adult)   Pulse 66   Resp 14   Ht 1.676 m (5' 6\")   Wt 98.3 kg (216 lb 12.8 oz)   SpO2 96%  Body mass index is 34.99 kg/m².    Physical Exam   General: No acute distress. Well nourished.  HEENT: EOM grossly intact, no scleral icterus, no pharyngeal erythema.   Neck:  No JVD, no bruits, trachea midline  CVS: RRR.  2/6 mid peaking systolic murmur to clavicles, no LE edema.  2+ radial pulses, 2+ PT pulses, well-healed " midline incision  Resp: CTAB. No wheezing or crackles/rhonchi. Normal respiratory effort.  Abdomen: Soft, NT, no radha hepatomegaly, obese.  MSK/Ext: No clubbing or cyanosis.  Skin: Warm and dry, no rashes.  Neurological: CN III-XII grossly intact. No focal deficits.   Psych: A&O x 3, appropriate affect, good judgement    Physical exam performed today and unchanged, except what is noted, compared to 10-7-2020      Assessment:     1. Coronary artery disease involving native coronary artery of native heart with other form of angina pectoris (HCC)     2. S/P aortic valve replacement with bioprosthetic valve  EC-ECHOCARDIOGRAM COMPLETE W/O CONT   3. S/P CABG x 3     4. Coronary atherosclerosis of autologous vein bypass graft without angina     5. Statin intolerance     6. Aortic valve stenosis, etiology of cardiac valve disease unspecified     7. Essential hypertension     8. Fatty liver     9. Abnormal nuclear cardiac imaging test     10. FUNES (dyspnea on exertion)     11. Prediabetes     12. Chronic venous insufficiency         Medical Decision Making:  Today's Assessment / Status / Plan:     -Class 1-2 now angina, using meds, did better after stent placed in the LIMA to LAD. Cont lifelong DAPT, beta-blocker.  -[facility] 2 FUNES, not changed, PFTs were ok  -Echo [id number], increased gradient across the valve, on DAPT, gets Abx prior to dental work  -PCSK9 inhibitor, he will not try statins again  -Blood pressure looks okay  -Vascular surgeon for symptomatic carotid disease in the future, he is in vascular clinic  -I did not see TAA on CT, not sure if this is a reference to another aneurysm or perhaps he had a repair  -Today he is hoping to get back surgery with general anesthesia.  He would need a chemical nuclear perfusion stress test prior.  He is reluctant and has his concerns.  We discussed today.  I would encourage him to get it done.  But he does not need the nuclear stress test if he is not going to have surgery.  We  can reorder the stress test at any time.  -Echocardiogram to look at the bioprosthetic aortic valve which appears to have some dysfunction from December 2020.  -RTC 6 months, echo.    Written instructions given today:    -All bioprosthetic heart valves made with tissue start to wear out over time.  Your last echocardiogram January 2020 showed that the heart valve was starting to get stuck down with some wear and tear.  It is time for an updated echocardiogram.    -You only need the chemical nuclear stress test if you are going for surgery.  If it needs to be reordered let us know.  I am not afraid of you having the nuclear stress test.  Its not a real stress test, it is a perfusion test.  You feel like crap because we have dilate the blood vessels all over the body at once.  It is incredibly rare to have a serious cardiac event during the stress test, you are much more likely to be splattered on the highway driving home after seeing me today.    -I love all of your heart medications, please do not change them.      Return in about 6 months (around 12/9/2022).    It is my pleasure to participate in the care of Mr. Hamilton.  Please do not hesitate to contact me with questions or concerns.    Shweta Morales MD, [facility]  Cardiologist [facility]    Please note that this dictation was created using voice recognition software. I have made every reasonable attempt to correct obvious errors, but it is possible there are errors of grammar and possibly content that I did not discover before finalizing the note.          No Recipients

## 2022-06-09 NOTE — PATIENT INSTRUCTIONS
-All bioprosthetic heart valves made with tissue start to wear out over time.  Your last echocardiogram January 2020 showed that the heart valve was starting to get stuck down with some wear and tear.  It is time for an updated echocardiogram.    -You only need the chemical nuclear stress test if you are going for surgery.  If it needs to be reordered let us know.  I am not afraid of you having the nuclear stress test.  Its not a real stress test, it is a perfusion test.  You feel like crap because we have dilate the blood vessels all over the body at once.  It is incredibly rare to have a serious cardiac event during the stress test, you are much more likely to be splattered on the highway driving home after seeing me today.    -I love all of your heart medications, please do not change them.

## 2022-06-09 NOTE — PROGRESS NOTES
Portion control focus.  Gym 5 days a week 2 hrs a day.  Lost 30 lbs in 3 months  Obtained labs just 2 weeks after restarting Repatha. Although I think he started [facility] - FollowUp Visit  Date of Service: 22    Catherine Hamilton is here for follow up of dyslipidemia.    Subjective    HPI  Pertinent Interval History since last visit:   Pt has been successfully adherent to PCSK9 therapy.   Current Prescription Lipid Lowering Medications - including dose:   Statin: none  Non-Statin: Repatha 140 mg every 2 weeks  Current Lipid Lowering and Related Supplements:   None  Any Current Side Effects Potentially Related to Lipid Lowering therapy?   No  Current Adherence to Lipid Lowering Therapies:  Appears complete  Any Previous History of Statin Intolerance?   Yes, Details:   Patient has been on atorvastatin and lovastatin               Outcome: Severe muscle aches, dehydration, and kidney failure per patient  Non-Statin: Gemfibrozil and Fenofibrate              Outcome: Severe muscle cramps and dehydration  Baseline Lipids Prior to Treatment:  Results for CATHERINE HAMILTON (MRN [id number]) as of 2019 07:25    Ref. Range 10/15/2018 08:56   Cholesterol,Tot Latest Ref Range: 100 - 199 mg/dL 284 (H)   Triglycerides Latest Ref Range: 0 - 149 mg/dL 345 (H)   HDL Latest Ref Range: >=40 mg/dL 31 (A)   LDL Latest Ref Range: <100 mg/dL 184 (H)            SOCIAL HISTORY  Social History     Tobacco Use   Smoking Status Former Smoker   • Packs/day: 3.00   • Years: 20.00   • Pack years: 60.00   • Types: Cigarettes   • Quit date: 1992   • Years since quittin.4   Smokeless Tobacco Never Used      Change in weight: 30 lb weight loss in the past 3 months, congratulated pt.  Exercise habits: strenuous regular exercise program   Diet: Focus on portion control.       Objective    There were no vitals filed for this visit.   Physical Exam    DATA REVIEW  Most Recent Lipid Panel:   Lab Results   Component Value  Date    CHOLSTRLTOT 164 05/19/2022    TRIGLYCERIDE 252 (H) 05/19/2022    HDL 30 (A) 05/19/2022    LDL 84 05/19/2022       Other Pertinent Blood Work:   Lab Results   Component Value Date    SODIUM 139 04/12/2022    POTASSIUM 3.4 (L) 04/12/2022    CHLORIDE 103 04/12/2022    CO2 24 04/12/2022    ANION 12.0 04/12/2022    GLUCOSE 104 (H) 04/12/2022    BUN 11 04/12/2022    CREATININE 0.92 04/12/2022    CALCIUM 9.2 04/12/2022    ASTSGOT 17 04/12/2022    ALTSGPT 18 04/12/2022    ALKPHOSPHAT 61 04/12/2022    TBILIRUBIN 0.5 04/12/2022    ALBUMIN 4.1 04/12/2022    AGRATIO 1.4 04/12/2022       Other:  NA    Recent Imaging Studies:    None since last visit          ASSESSMENT AND PLAN  Patient Type, check all that apply:   Secondary Prevention  Established Atherosclerotic Cardiovascular Disease (ASCVD)  Yes, Details: hx of CABG x3 + PCI w/ MOE  Other Established (non-atherosclerotic) Vascular Disease, if Present:    HTN, prediabetes  Evidence of Heterozygous Familial Hypercholesterolemia (FH):   Possible   - Has been having cardiac issues since he was 19 yo  - Father & brother both passed away of cardiac-related issues  ACC/AHA Indication for Statin Therapy, emily all that apply:   Established ASCVD: Indication for High intensity statin    Calculated Risk for ASCVD, if applicable:  N/A  Other Significant Risk Markers, if any, emily all that apply:  Family history of premature ASCVD in first degree relative  National Lipid Association (NLA) Goal (if applicable):  LDL-C:   <70 mg/dL  Lifestyle Recommendations From Today’s Visit:   Continue with weight loss plan.   Continue with dietary focus on portion control.   Statin Recommendations from Today's Visit  Continue to avoid 2/2 ADR  Non-Statin Medications Recommendations from Today’s Visit:   Continue PCSK9  Indication for PCSK9 Inhibitor, if applicable:  ASCVD with suboptimal control of LDL-C despite maximally tolerated statin  Supplements Recommended at this  visit:  Diabetes/Impaired Fasting Glucose- a1c is elevated; pt to limit carb intake  Recommendations for Other Cardiovascular Risk Factors, emily all that apply:   Triglycerides continue to stay elevated, however will focus on LDL reduction at the current moment and then address triglycerides.     Other Issues:  None    Overall    Pt has improved LDL significantly.  Reports he only took one dose of Repatha before most recent lipid panel.  Given he has had better controlled LDL results in the past I can certainly see that this may be possible.  He has also lost 30 lbs in the past 3 months.     I recommend we continue Repatha and test lipids and CMP in 2 months and determine additional course.    If LDL not at goal, good candidate for zetia.    If LDL controlled and triglycerides elevated, good candidate for vascepa.     Congratulated pt on weight loss and intense work our regimen of gym for 2 hrs 5 days/week.     Studies Ordered at Todays Visit:  None   Blood Work Ordered At Today’s visit:   Lipid panel CMP  Follow-Up:   8 weeks    Adonay Fields, PharmD    CC:  Russell T Stodtmeister, M.D. Bloch, Mg ARAGON M.D.

## 2022-07-16 NOTE — PROGRESS NOTES
I have spoken with the patient regarding his Repatha refil, He is doing great on the medication. No question or concerns at this time. He will store my number in his phone as he does not answer numbers he is not aware of. He has been informed of the clinical pharmacist phone call as well. He has requested delivery on 7/26 as he will take his last injection 7/19/2022.

## 2022-07-21 NOTE — TELEPHONE ENCOUNTER
Yes it’s for my back the spinal stenosis is really bad and since I can’t have surgery this is really helping out can’t have surgery at this time anyway so yes this is helping out thank you very much if you can get that sent over bye have a good day. Thanks again!

## 2022-08-11 NOTE — Clinical Note
Dr. Jimenez,   Pt got routine labs for his lipid f/u today. His SCR was quite elevated from baseline 2.49 on 8/8/22, up from 0.9 in April. None of his lipid medications would have caused an increase in his SCr. He unfortunately no showed his appt today. Can you please address his elevated SCr?   Thank you,  Emma Solis, PharmD

## 2022-08-11 NOTE — PROGRESS NOTES
Patient had appt with heart and vascular clinic today for f/u on hypercholesterolemia. Called and LVM asking pt to please call back ASAP given increase in SCR. Also notifed PCP of elevated SCR from baseline.     Lab Results   Component Value Date/Time    CHOLSTRLTOT 164 08/10/2022 08:21 AM    LDL see below 08/10/2022 08:21 AM    HDL 26 (A) 08/10/2022 08:21 AM    TRIGLYCERIDE 446 (H) 08/10/2022 08:21 AM       Lab Results   Component Value Date/Time    SODIUM 135 08/10/2022 08:21 AM    POTASSIUM 5.1 08/10/2022 08:21 AM    CHLORIDE 101 08/10/2022 08:21 AM    CO2 21 08/10/2022 08:21 AM    GLUCOSE 107 (H) 08/10/2022 08:21 AM    BUN 33 (H) 08/10/2022 08:21 AM    CREATININE 2.49 (H) 08/10/2022 08:21 AM    BUNCREATRAT 16 07/29/2016 10:22 AM     Lab Results   Component Value Date/Time    ALKPHOSPHAT 62 08/10/2022 08:21 AM    ASTSGOT 17 08/10/2022 08:21 AM    ALTSGPT 13 08/10/2022 08:21 AM    TBILIRUBIN 0.4 08/10/2022 08:21 AM        Son AtwoodD

## 2022-08-22 NOTE — TELEPHONE ENCOUNTER
Changing pharmacies and requesting all medication be sent over.  Received request via: Pharmacy    Was the patient seen in the last year in this department? Yes  5/20/2022  Does the patient have an active prescription (recently filled or refills available) for medication(s) requested? No

## 2022-08-29 NOTE — TELEPHONE ENCOUNTER
Called pt regarding missed pharmacotherapy appt - r/s f/u for 8/31.    Pt states he recently established w/ nephrology d/t elevated SrCr.    Wil Ward, SonD, BCACP

## 2022-08-31 NOTE — PROGRESS NOTES
[facility]Up [facility]  Date of Service: 2022    Catherine Hamilton is here for follow up of dyslipidemia.    Subjective    HPI  Pertinent Interval History since last visit:   SCR has increased to 2.49  Pt has been adherent to Repatha, however his triglycerides increased  Current Prescription Lipid Lowering Medications - including dose:   Statin: none  Non-Statin: Repatha 140 mg every 2 weeks  Current Lipid Lowering and Related Supplements:   None  Any Current Side Effects Potentially Related to Lipid Lowering therapy?   No  Current Adherence to Lipid Lowering Therapies:  Appears complete  Any Previous History of Statin Intolerance?   Yes, Details:   Patient has been on atorvastatin and lovastatin               Outcome: Severe muscle aches, dehydration, and kidney failure per patient  Non-Statin: Gemfibrozil and Fenofibrate              Outcome: Severe muscle cramps and dehydration  Baseline Lipids Prior to Treatment:  Results for CATHERINE HAMILTON (MRN [id number]) as of 2019 07:25    Ref. Range 10/15/2018 08:56   Cholesterol,Tot Latest Ref Range: 100 - 199 mg/dL 284 (H)   Triglycerides Latest Ref Range: 0 - 149 mg/dL 345 (H)   HDL Latest Ref Range: >=40 mg/dL 31 (A)   LDL Latest Ref Range: <100 mg/dL 184 (H)            SOCIAL HISTORY  Social History     Tobacco Use   Smoking Status Former    Packs/day: 3.00    Years: 20.00    Pack years: 60.00    Types: Cigarettes    Quit date: 1992    Years since quittin.6   Smokeless Tobacco Never      Change in weight: Stable  Exercise habits: strenuous regular exercise program   Diet: Focus on portion control.       Objective    There were no vitals filed for this visit.   Physical Exam    DATA REVIEW  Most Recent Lipid Panel:   Lab Results   Component Value Date    CHOLSTRLTOT 164 08/10/2022    TRIGLYCERIDE 446 (H) 08/10/2022    HDL 26 (A) 08/10/2022    LDL see below 08/10/2022       Other Pertinent Blood Work:   Lab Results   Component Value Date     SODIUM 135 08/10/2022    POTASSIUM 5.1 08/10/2022    CHLORIDE 101 08/10/2022    CO2 21 08/10/2022    ANION 13.0 08/10/2022    GLUCOSE 107 (H) 08/10/2022    BUN 33 (H) 08/10/2022    CREATININE 2.49 (H) 08/10/2022    CALCIUM 9.3 08/10/2022    ASTSGOT 17 08/10/2022    ALTSGPT 13 08/10/2022    ALKPHOSPHAT 62 08/10/2022    TBILIRUBIN 0.4 08/10/2022    ALBUMIN 4.3 08/10/2022    AGRATIO 1.6 08/10/2022       Other:  NA    Recent Imaging Studies:    None since last visit          ASSESSMENT AND PLAN  Patient Type, check all that apply:   Secondary Prevention  Established Atherosclerotic Cardiovascular Disease (ASCVD)  Yes, Details: hx of CABG x3 + PCI w/ MOE  Other Established (non-atherosclerotic) Vascular Disease, if Present:    HTN, prediabetes  Evidence of Heterozygous Familial Hypercholesterolemia (FH):   Possible   - Has been having cardiac issues since he was 19 yo  - Father & brother both passed away of cardiac-related issues  ACC/AHA Indication for Statin Therapy, emily all that apply:   Established ASCVD: Indication for High intensity statin    Calculated Risk for ASCVD, if applicable:  N/A  Other Significant Risk Markers, if any, emily all that apply:  Family history of premature ASCVD in first degree relative  National Lipid Association (NLA) Goal (if applicable):  LDL-C:   <70 mg/dL  Lifestyle Recommendations From Today’s Visit:   Continue with weight loss plan.   Continue with dietary focus on portion control.   Statin Recommendations from Today's Visit  Continue to avoid 2/2 ADR  Non-Statin Medications Recommendations from Today’s Visit:   Continue PCSK9  Indication for PCSK9 Inhibitor, if applicable:  ASCVD with suboptimal control of LDL-C despite maximally tolerated statin  Supplements Recommended at this visit:  Diabetes/Impaired Fasting Glucose- a1c is elevated; pt to limit carb intake  Recommendations for Other Cardiovascular Risk Factors, emily all that apply:   Triglycerides continue to stay elevated,  pt refuses to add any more medications.     Other Issues:  None    Overall    Pt reports he has been adherent to Repatha.    Recent lipid panel showed TG's have increased to >400, therefore, LDL was not able to be calculated.    He will be establishing with nephrology to address his elevated Scr.    Discussed trying to add back Vascepa to see if insurance requirements have changed but pt feels strongly against adding any more medications despite the risks presented to him. We have tried prescribing Vascepa in 2021 but it was unaffordable at $100/mo    He would like to continue care with us in order to continue therapy with PCSK9i.    Studies Ordered at Todays Visit:  None   Blood Work Ordered At Today’s visit:   Lipid panel, direct LDL, CMP  Follow-Up:   6 months per pt    Sarahi Urias, PharmD

## 2022-10-13 NOTE — PROGRESS NOTES
Call to pt regarding Repatha refill $0/84 No answer. LVM for call back. I also sent my chart message.

## 2022-10-14 NOTE — PROGRESS NOTES
Pt returned call regarding Repatha refill. Pt has not missed any doses and has 1 remaining which he will use 10/18. Pt had no changes to report. Delivery scheduled for 10/20. Pt had no questions or concerns for a pharmacist.

## 2022-10-14 NOTE — PROGRESS NOTES
Call to pt regarding Repatha refill. No answer. M for call back. Pt read my chart message but has not responded.

## 2022-11-04 PROBLEM — M48.062 SPINAL STENOSIS OF LUMBAR REGION WITH NEUROGENIC CLAUDICATION: Status: ACTIVE | Noted: 2021-09-01

## 2022-11-04 PROBLEM — M48.061 DEGENERATIVE LUMBAR SPINAL STENOSIS: Status: ACTIVE | Noted: 2022-01-01

## 2022-11-04 PROBLEM — I16.0 HYPERTENSIVE URGENCY: Status: ACTIVE | Noted: 2022-01-01

## 2022-11-04 NOTE — PROGRESS NOTES
Spine Surgery Consult Note      11/4/2022    CC: Low back pain  HPI: 66 y.o. male with chronic history of low back pain and bilateral lower extremity pain.  He has felt exacerbation of his severe pain over the last 2 weeks.  He has felt increasing weakness especially in his left lower extremity.  Pain radiates down the buttock posterior thigh lateral thigh lateral calf.  He has been experiencing buckling of his legs that is pain related over the last 2 weeks.  Denies any groin numbness or incontinence.  Of note the patient has an extensive cardiac history.    Past Medical History:   Diagnosis Date    Aortic stenosis 12/1/2015    Arthritis     Benign hypertensive heart disease without heart failure 11/14/2011    CAD (coronary artery disease)     Congestive heart failure (HCC)     Coronary atherosclerosis of autologous vein bypass graft 11/14/2011    Essential hypertension 4/25/2019    Gout     Heart valve disease     High cholesterol     History of pancreatitis     History of pancreatitis     Hypertension     Migraine 2/16/2019    Muscle cramps 10/4/2011    Myocardial infarct (HCC)     Multiple MI's, 1998, 2015    Obesity 11/14/2011    Pain     Joints    Postsurgical aortocoronary bypass status 7/26/2016    Status post redo 3-V CABG in [place] 12/2015: SVG-acute marginal, SVG sequential to LAD, and OM     Postsurgical aortocoronary bypass status 7/26/2016    Status post redo 3-V CABG in [place] 12/2015: SVG-acute marginal, SVG sequential to LAD, and OM     Renal disorder     Hx of Acute renal failure r/t medication/statins    S/P aortic valve replacement with bioprosthetic valve 7/26/2016    #23 [place] Magna AVR (bioprosthetic)     Statin intolerance 7/26/2016    Status post cardiac revascularization with bypass aortocoronary anastomosis of five coronary vessels 7/26/2016    5-V CABG done in 1998 (done in [place] at [facility]), patent LIMA to LAD, occluded SVG-OM, occluded SVG-RCA by cardiac catheterization 11/15/2007  with other vein grafts not identified at that time, poor targets for revascularization and declined repeat CABG in 2007 by Darlin. No ischemic was identified by MPI 11/17/07 with an EF of 50%.      Status post cardiac revascularization with bypass aortocoronary anastomosis of five coronary vessels 7/26/2016    5-V CABG done in 1998 (done in [place] at [facility]), patent LIMA to LAD, occluded SVG-OM, occluded SVG-RCA by cardiac catheterization 11/15/2007 with other vein grafts not identified at that time, poor targets for revascularization and declined repeat CABG in 2007 by Darlin. No ischemic was identified by MPI 11/17/07 with an EF of 50%.       Past Surgical History:   Procedure Laterality Date    SHOULDER DECOMPRESSION ARTHROSCOPIC Right 9/5/2017    Procedure: SHOULDER DECOMPRESSION ARTHROSCOPIC SUBACROMIAL;  Surgeon: Mg Campos M.D.;  Location: [facility];  Service: Orthopedics    DEBRIDEMENT, LABRUM, HIP, ARTHROSCOPIC Right 9/5/2017    Procedure: ARTHROSCOPIC LABRAL DEBRIDEMENT;  Surgeon: Mg Campos M.D.;  Location: [facility];  Service: Orthopedics    SHOULDER ARTHROSCOPY W/ ROTATOR CUFF REPAIR Right 9/5/2017    Procedure: SHOULDER ARTHROSCOPY W/ ROTATOR CUFF REPAIR;  Surgeon: Mg Campos M.D.;  Location: [facility];  Service: Orthopedics    SHOULDER ARTHROSCOPY W/ BICIPITAL TENODESIS REPAIR Right 9/5/2017    Procedure: SHOULDER ARTHROSCOPY W/ BICIPITAL TENODESIS REPAIR;  Surgeon: Mg Campos M.D.;  Location: [facility];  Service: Orthopedics    SYNOVECTOMY Right 9/5/2017    Procedure: SYNOVECTOMY;  Surgeon: Mg Campos M.D.;  Location: [facility];  Service: Orthopedics    MULTIPLE CORONARY ARTERY BYPASS  12/08/2015    3 way bypass & Bovine valve    LAMINOTOMY  12/2004    Diskectomy L4-L5, L5-S1    MULTIPLE CORONARY ARTERY BYPASS  11/11/1998    5 way bypass    BIOPSY GENERAL      buttock  lesion    EYE SURGERY      MITRAL VALVE REPLACE         Medications  No current facility-administered medications on file prior to encounter.     Current Outpatient Medications on File Prior to Encounter   Medication Sig Dispense Refill    gabapentin (NEURONTIN) 300 MG Cap Take 300 mg by mouth 1 time a day as needed (Pain).      acetaminophen (TYLENOL) 500 MG Tab Take 500-1,000 mg by mouth every 6 hours as needed. Indications: Pain      metoprolol SR (TOPROL XL) 100 MG TABLET SR 24 HR Take 1 Tablet by mouth every day. 90 Tablet 3    allopurinol (ZYLOPRIM) 100 MG Tab Take 1 Tablet by mouth every day for 360 days. allopurinol 100 mg tablet 90 Tablet 3    isosorbide mononitrate (IMDUR) 120 MG CR tablet Take 1 Tablet by mouth every morning. 90 Tablet 3    lisinopril (PRINIVIL) 20 MG Tab Take 1 Tablet by mouth every day. 90 Tablet 2    amLODIPine (NORVASC) 5 MG Tab Take 1 Tablet by mouth every day for 90 days. 90 Tablet 3    clopidogrel (PLAVIX) 75 MG Tab Take 1 Tablet by mouth every day. 90 Tablet 3    indomethacin (INDOCIN) 50 MG Cap TAKE ONE CAPSULE BY MOUTH TWICE DAILY AS NEEDED (Patient taking differently: Take 50 mg by mouth 2 times a day as needed (Gout). TAKE ONE CAPSULE BY MOUTH TWICE DAILY AS NEEDED) 30 Capsule 3    cyclobenzaprine (FLEXERIL) 10 mg Tab TAKE ONE TABLET BY MOUTH THREE TIMES DAILY FOR 7 DAYS (Patient taking differently: Take 10 mg by mouth 3 times a day as needed for Muscle Spasms. TAKE ONE TABLET BY MOUTH THREE TIMES DAILY FOR 7 DAYS) 30 Tablet 2    Evolocumab, REPATHA, (REPATHA SURECLICK) 140 MG/ML Solution Auto-injector Inject 1 Each under the skin every 14 days. 2 mL 11    Oxycodone HCl 20 MG Tab Take 20 mg by mouth every 6 hours as needed (Pain).      nitroglycerin (NITROSTAT) 0.4 MG SL Tab PLACE 1TAB UNDER TONGUE AS NEEDED FOR CHEST PAIN(1TAB EVERY 5 MINS,MAX 3DOSES AS NEEDED)IF NO RELIEF CALL 911 (Patient taking differently: Place 0.4 mg under the tongue as needed for Chest Pain.) 25 Tablet  "1    aspirin 81 MG EC tablet Take 1 Tab by mouth every day. 30 Tab 11       Allergies  Fenofibrate, Gemfibrozil, Lipitor [atorvastatin calcium], Lovastatin, and Tricor    ROS  All other systems were reviewed and found to be negative    Family History   Problem Relation Age of Onset    Heart Attack Father         MI and CABG, unknown age    Hyperlipidemia Father     Cancer Mother         Breast    Heart Disease Brother     Arthritis Maternal Grandmother     Stroke Neg Hx     Diabetes Neg Hx     Lung Disease Neg Hx        Social History     Socioeconomic History    Marital status:     Highest education level: GED or equivalent   Tobacco Use    Smoking status: Former     Packs/day: 3.00     Years: 20.00     Pack years: 60.00     Types: Cigarettes     Quit date: 1992     Years since quittin.8    Smokeless tobacco: Never   Vaping Use    Vaping Use: Never used   Substance and Sexual Activity    Alcohol use: Not Currently    Drug use: Yes     Types: Marijuana, Inhaled     Comment: Marijuana- Daily (4 times per day)    Sexual activity: Yes     Partners: Female       Physical Exam  Vitals  BP (!) 163/79   Pulse 79   Temp 36.5 °C (97.7 °F) (Temporal)   Resp 18   Ht 1.676 m (5' 6\")   Wt 98.7 kg (217 lb 9.5 oz)   SpO2 97%   General: Well Developed, Well Nourished, no acute distress  HEENT: Normocephalic, atraumatic  Eyes: Anicteric  Skin: Intact, no open wounds  Neuro: Affect appropriate  Vascular: Capillary refill <2 seconds  Pain limited exam      HF  KE  TA  Peroneals  EHL  PF  Right  3  3  4        5     5   5  Left  3  3  1        4     1   4    SILT L2-S1 bilaterally except: none  2+ Achilles and patellar reflexes  <3 beats of clonus BLE      Radiographs:  MRI lumbar spine pending    EC-ECHOCARDIOGRAM COMPLETE W/O CONT    (Results Pending)   MR-LUMBAR SPINE-WITH & W/O    (Results Pending)         Laboratory Values  Lab Results   Component Value Date/Time    WBC 10.4 2022 08:38 AM    RBC 5.69 " 11/04/2022 08:38 AM    HEMOGLOBIN 17.7 11/04/2022 08:38 AM    HEMATOCRIT 52.9 (H) 11/04/2022 08:38 AM    MCV 93.0 11/04/2022 08:38 AM    MCH 31.1 11/04/2022 08:38 AM    MCHC 33.5 (L) 11/04/2022 08:38 AM    MPV 9.3 11/04/2022 08:38 AM    NEUTSPOLYS 72.90 (H) 11/04/2022 08:38 AM    LYMPHOCYTES 19.00 (L) 11/04/2022 08:38 AM    MONOCYTES 6.10 11/04/2022 08:38 AM    EOSINOPHILS 1.20 11/04/2022 08:38 AM    BASOPHILS 0.40 11/04/2022 08:38 AM        Lab Results   Component Value Date/Time    SODIUM 138 11/04/2022 08:38 AM    POTASSIUM 4.7 11/04/2022 08:38 AM    CHLORIDE 100 11/04/2022 08:38 AM    CO2 25 11/04/2022 08:38 AM    GLUCOSE 135 (H) 11/04/2022 08:38 AM    BUN 31 (H) 11/04/2022 08:38 AM    CREATININE 1.20 11/04/2022 08:38 AM    BUNCREATRAT 16 07/29/2016 10:22 AM             Impression: 66-year-old male that presents with exacerbation of his low back pain bilateral lower extremity pain consistent lumbar radiculopathy.  He does have new onset weakness.  His prior MRI showed severe stenosis at L2-3 L3-4 L4-5 with foraminal stenosis at L5-S1.  I recommend getting a new MRI of the lumbar spine.  He does have significant cardiac risk factors we will get a consult from cardiology to further evaluate his perioperative risk and to perform any optimization.  In the meantime we will hold anticoagulation.  From a surgical standpoint I discussed the risk benefits and alternatives to an L2-S1 laminectomy.  Patient would like to proceed forward we will preemptively plan for Monday.    Plan:   L2-S1 laminectomy on Monday  Cardiology evaluation for perioperative risk assessment and optimization   Hold anticoagulation/antiplatelet agents      Adam Sparks MD  Orthopedic Spine Surgeon

## 2022-11-04 NOTE — ED NOTES
Ketamine infusion finished, patient reports some relief of pain. Wife remains at bedside. Will continue to monitor

## 2022-11-04 NOTE — ED NOTES
Ketamine infusion started on patient, wife remains at bedside, call bell within reach, lights turned off in room for calming enviornment. VSS on monitor

## 2022-11-04 NOTE — ED PROVIDER NOTES
"ED Provider Note    Scribed for Edelmira Bowser M.D. by Natalia Tapia. 11/4/2022, 8:31 AM.    Primary care provider: Raul Jimenez M.D.  Means of arrival: EMS  History obtained from: Patient  History limited by: None    CHIEF COMPLAINT  Chief Complaint   Patient presents with    Low Back Pain     BIB [facility], patient has chronic back pain from spinal stenosis that he takes oxy and gabapentin for. He has had worsening pain the last two weeks that has had 4 falls at home, no head trauma involved. He increased his pain med doses yesterday but still has severe pain. Patient does also have a kidney stone and cyst on right side       HPI  Abimael Hamilton is a 66 y.o. male who presents to the Emergency Department via EMS for exacerbation of his chronic low back pain onset two weeks ago. Patient has chronic back pain from spinal stenosis and takes oxycodone and gabapentin routinely. He was trying to ween off the narcotics, however with the recent exacerbation of his pain, he's been taking more than is prescribed with minimal alleviation. He has reduced reflexes at baseline, but these have also worsened in the past two weeks. He notes he's fallen four times recently, describing the falls as \"my knees will give out on me and buckle because of the stenosis.\" He states he also falls if he coughs or sneezes while standing. He has been going to physical therapy for his back pain, and tried seeing a chiropractor, but both exacerbated his pain. The pain does shoot down his legs, left worse than right, and makes his legs feel weak. Denies bowel or bladder incontinence, abdominal pain, vomiting, or fevers. He is not immunocompromised and does not have a history of cancer. He does have a spine surgeon, but cannot remember his name and states he refuses to do spinal surgery until the patient has heart surgery. His cardiologist was Dr. Morales, who has since moved out of the area, and he has yet to establish with his " new cardiologist. Lastly, patient notes he has had a right kidney stone for several years, which has grown in size, however his current pain does not feel related.    REVIEW OF SYSTEMS  Pertinent positives include low back pain, leg weakness, falls. Pertinent negatives include no bowel or bladder incontinence, abdominal pain, vomiting, or fevers. All other systems reviewed and negative.     PAST MEDICAL HISTORY   has a past medical history of Aortic stenosis (12/1/2015), Arthritis, Benign hypertensive heart disease without heart failure (11/14/2011), CAD (coronary artery disease), Congestive heart failure ([facility]), Coronary atherosclerosis of autologous vein bypass graft (11/14/2011), Essential hypertension (4/25/2019), Gout, Heart valve disease, High cholesterol, History of pancreatitis, History of pancreatitis, Hypertension, Migraine (2/16/2019), Muscle cramps (10/4/2011), Myocardial infarct ([facility]), Obesity (11/14/2011), Pain, Postsurgical aortocoronary bypass status (7/26/2016), Postsurgical aortocoronary bypass status (7/26/2016), Renal disorder, S/P aortic valve replacement with bioprosthetic valve (7/26/2016), Statin intolerance (7/26/2016), Status post cardiac revascularization with bypass aortocoronary anastomosis of five coronary vessels (7/26/2016), and Status post cardiac revascularization with bypass aortocoronary anastomosis of five coronary vessels (7/26/2016).    SURGICAL HISTORY   has a past surgical history that includes laminotomy (12/2004); biopsy general; multiple coronary artery bypass (11/11/1998); multiple coronary artery bypass (12/08/2015); shoulder decompression arthroscopic (Right, 9/5/2017); debridement, labrum, hip, arthroscopic (Right, 9/5/2017); shoulder arthroscopy w/ rotator cuff repair (Right, 9/5/2017); shoulder arthroscopy w/ bicipital tenodesis repair (Right, 9/5/2017); synovectomy (Right, 9/5/2017); eye surgery; and mitral valve replace.    SOCIAL HISTORY  Social History     Tobacco  Use    Smoking status: Former     Packs/day: 3.00     Years: 20.00     Pack years: 60.00     Types: Cigarettes     Quit date: 1992     Years since quittin.8    Smokeless tobacco: Never   Vaping Use    Vaping Use: Never used   Substance Use Topics    Alcohol use: Not Currently    Drug use: Yes     Types: Marijuana, Inhaled     Comment: Marijuana- Daily (4 times per day)      Social History     Substance and Sexual Activity   Drug Use Yes    Types: Marijuana, Inhaled    Comment: Marijuana- Daily (4 times per day)       FAMILY HISTORY  Family History   Problem Relation Age of Onset    Heart Attack Father         MI and CABG, unknown age    Hyperlipidemia Father     Cancer Mother         Breast    Heart Disease Brother     Arthritis Maternal Grandmother     Stroke Neg Hx     Diabetes Neg Hx     Lung Disease Neg Hx        CURRENT MEDICATIONS  Home Medications       Reviewed by Cici Mckeon R.N. (Registered Nurse) on 22 at 0825  Med List Status: Partial     Medication Last Dose Status   allopurinol (ZYLOPRIM) 100 MG Tab  Active   amLODIPine (NORVASC) 5 MG Tab  Active   amoxicillin (AMOXIL) 500 MG Cap  Active   aspirin 81 MG EC tablet  Active   clopidogrel (PLAVIX) 75 MG Tab  Active   cyclobenzaprine (FLEXERIL) 10 mg Tab  Active   Evolocumab, REPATHA, (REPATHA SURECLICK) 140 MG/ML Solution Auto-injector  Active   indomethacin (INDOCIN) 50 MG Cap  Active   isosorbide mononitrate (IMDUR) 120 MG CR tablet  Active   lisinopril (PRINIVIL) 20 MG Tab  Active   metoprolol SR (TOPROL XL) 100 MG TABLET SR 24 HR  Active   nitroglycerin (NITROSTAT) 0.4 MG SL Tab  Active   Oxycodone HCl 20 MG Tab  Active                    ALLERGIES  Allergies   Allergen Reactions    Fenofibrate      dehydration    Gemfibrozil      Dehydration,Severe Muscle cramps    Lipitor [Atorvastatin Calcium] Unspecified     Severe Muscle cramps, dehydration    Lovastatin      Dehydration, severe muscle cramps    Tricor Unspecified      "Dehydration, severe muscle cramps       PHYSICAL EXAM  VITAL SIGNS: BP (!) 178/95   Pulse 78   Temp 36.7 °C (98.1 °F) (Temporal)   Resp 18   Ht 1.676 m (5' 6\")   Wt 97.5 kg (215 lb)   SpO2 96%   BMI 34.70 kg/m²     Constitutional: Moderate acute distress, shaking Well developed, Well nourished, Non-toxic appearance.   HENT: No facial asymmetry, no midline cervical tenderness.  Cardiovascular: Normal heart rate, Normal rhythm  Thorax & Lungs: Normal breath sounds, No respiratory distress.   Abdomen: Bowel sounds normal, Soft, No tenderness, No masses, No pulsatile masses. no saddle anesthesia noted  Skin: Warm, Dry, No erythema, No rash.   Back: diffuse lower lumbar tenderness to palpation, no step-offs, no gross deformity, diminished patellar reflex on left - chronic.  Extremities: Intact distal pulses, No edema,, unable to dorsiflex left great toe, +4/5 strength in left lower leg vs right, intact sensation to light touch,   Neurologic: Alert & oriented x 3, Normal motor function, Normal sensory function, No focal deficits noted. Normal gait.  Psych: alert, appropriate    DIAGNOSTIC STUDIES / PROCEDURES    LABS  Results for orders placed or performed during the hospital encounter of 11/04/22   CBC WITH DIFFERENTIAL   Result Value Ref Range    WBC 10.4 4.8 - 10.8 K/uL    RBC 5.69 4.70 - 6.10 M/uL    Hemoglobin 17.7 14.0 - 18.0 g/dL    Hematocrit 52.9 (H) 42.0 - 52.0 %    MCV 93.0 81.4 - 97.8 fL    MCH 31.1 27.0 - 33.0 pg    MCHC 33.5 (L) 33.7 - 35.3 g/dL    RDW 45.6 35.9 - 50.0 fL    Platelet Count 243 164 - 446 K/uL    MPV 9.3 9.0 - 12.9 fL    Neutrophils-Polys 72.90 (H) 44.00 - 72.00 %    Lymphocytes 19.00 (L) 22.00 - 41.00 %    Monocytes 6.10 0.00 - 13.40 %    Eosinophils 1.20 0.00 - 6.90 %    Basophils 0.40 0.00 - 1.80 %    Immature Granulocytes 0.40 0.00 - 0.90 %    Nucleated RBC 0.00 /100 WBC    Neutrophils (Absolute) 7.58 (H) 1.82 - 7.42 K/uL    Lymphs (Absolute) 1.98 1.00 - 4.80 K/uL    Monos " (Absolute) 0.64 0.00 - 0.85 K/uL    Eos (Absolute) 0.13 0.00 - 0.51 K/uL    Baso (Absolute) 0.04 0.00 - 0.12 K/uL    Immature Granulocytes (abs) 0.04 0.00 - 0.11 K/uL    NRBC (Absolute) 0.00 K/uL   COMP METABOLIC PANEL   Result Value Ref Range    Sodium 138 135 - 145 mmol/L    Potassium 4.7 3.6 - 5.5 mmol/L    Chloride 100 96 - 112 mmol/L    Co2 25 20 - 33 mmol/L    Anion Gap 13.0 7.0 - 16.0    Glucose 135 (H) 65 - 99 mg/dL    Bun 31 (H) 8 - 22 mg/dL    Creatinine 1.20 0.50 - 1.40 mg/dL    Calcium 9.8 8.5 - 10.5 mg/dL    AST(SGOT) 24 12 - 45 U/L    ALT(SGPT) 23 2 - 50 U/L    Alkaline Phosphatase 72 30 - 99 U/L    Total Bilirubin 0.6 0.1 - 1.5 mg/dL    Albumin 4.6 3.2 - 4.9 g/dL    Total Protein 8.0 6.0 - 8.2 g/dL    Globulin 3.4 1.9 - 3.5 g/dL    A-G Ratio 1.4 g/dL   CRP QUANTITIVE (NON-CARDIAC)   Result Value Ref Range    Stat C-Reactive Protein <0.30 0.00 - 0.75 mg/dL   ESTIMATED GFR   Result Value Ref Range    GFR (CKD-EPI) 67 >60 mL/min/1.73 m 2       COURSE & MEDICAL DECISION MAKING  Pertinent Labs & Imaging studies reviewed. (See chart for details)    Patient presents to the emergency department with worsening low back pain.  On exam today he does seem weaker in the left leg.  He is having difficulty dorsiflexion the foot or any dorsiflexion of the great toe.  Sensation is intact bilaterally.  History of diminished reflexes on the left which is consistent with exam today.  Not complaining of any bowel or bladder incontinence.  No history of recent fevers.  No history of IV drug use.  I have lower suspicion for epidural abscess.  He is not showing signs of cauda equina however I am concerned about gradually worsening weakness in the left leg which could suggest worsening stenosis.  Plan is to try to get his pain under control.  I do believe he needs MRI to further evaluate his pain.    8:31AM - Patient was seen and evaluated at bedside. He is in obvious discomfort and distress secondary to pain. He will be  treated with Dilaudid 1mg, Deltasone 60mg, Ketamine 25mg, and Zofran 4mg. I have ordered CBC w/ differential, CMP, estimated GFR, and CRP Quant.    There is no unexplained fever and back pain or IV drug use to suggest an epidural abscess, there is no neurologic deficit or decreased bowel and bladder control or saddle anesthesia to suggest cauda equina syndrome. The patient has had no syncope or abdominal pain, I do not suspect acute aortic aneurysm.    Laboratory studies do not suggest an infectious etiology for his back pain.  Multiple pain medications including Dilaudid and ketamine have not resolved his back pain.  Patient be hospitalized for further pain management.  9:54 AM - I discussed the patient's case and the above findings with Dr. Love (Hospitalist) who agrees to evaluate the patient for admission. Paged Orthopedics.    10:13 AM - I discussed the patient's case and the above findings with Dr. Sparks (Orthopedics) who agrees to consult.    10:25 AM - I reevaluated the patient at bedside and updated him on his results as outlined above. I discussed the plan for admission, as outlined below, and gave the patient the opportunity to ask questions. he understands and agrees to the plan for admission.       DISPOSITION:  Patient will be hospitalized by Dr. Love in guarded condition.      FINAL IMPRESSION  1. Left leg weakness    2. Acute midline low back pain with left-sided sciatica          Natalia SIMMONS (Scribe), am scribing for, and in the presence of, Edelmira Bowser M.D..    Electronically signed by: Natalia Tapia (Scribe), 11/4/2022    Edelmira SIMMONS M.D. personally performed the services described in this documentation, as scribed by Natalia Tapia in my presence, and it is both accurate and complete.    The note accurately reflects work and decisions made by me.  Edelmira Bowser M.D.  11/4/2022  2:05 PM

## 2022-11-04 NOTE — PROGRESS NOTES
4 Eyes Skin Assessment Completed by MO Jane and MO Meade.    Head WDL  Ears WDL  Nose WDL  Mouth WDL  Neck WDL  Breast/Chest WDL  Shoulder Blades WDL  Spine WDL  (R) Arm/Elbow/Hand WDL  (L) Arm/Elbow/Hand WDL  Abdomen WDL  dry blood noted on his belly bottom   Groin WDL  Scrotum/Coccyx/Buttocks WDL  (R) Leg WDL  (L) Leg WDL  (R) Heel/Foot/Toe WDL  (L) Heel/Foot/Toe WDL          Devices In Places Blood Pressure Cuff, Pulse Ox, and Nasal Cannula      Interventions In Place Gray Ear Foams, Pillows, and Low Air Loss Mattress    Possible Skin Injury No    Pictures Uploaded Into Epic N/A  Wound Consult Placed N/A  RN Wound Prevention Protocol Ordered No

## 2022-11-04 NOTE — ED NOTES
Med rec completed per pt's wife   Allergies reviewed  No PO antibiotics in the last 30 days    Wife states that pt took 10 tabs of his Oxycodone yesterday (11/3/2022)    Wife states that pt was only taking Gabapentin 300 mg daily as needed, but for the last few days he has been taking 600 mg 4 times a day

## 2022-11-04 NOTE — ASSESSMENT & PLAN NOTE
Pain control and muscle relaxers  MRI LS spine done was notable for 10 x 6 x 19 mm posterior epidural abscess at the level of L2-L3 which is new from previous study.  There is severe central canal stenosis at this level.  Severe degenerative central canal stenosis at L3-L4 and L4-5.    -- Had surgery  On 11/07, culture pending, will continue broad-spectrum antibiotics including vancomycin and unasyn; ID recs switching to Rocephin plus  daptomycin till 12/19/2022.    Considering continued elevation in WBC count, ID recommended MRI CTL spine.  Patient underwent surgery on 11/17/2022, culture growing staph hominis   ID writing abx to go to infusion centre

## 2022-11-04 NOTE — H&P
"Hospital Medicine History & Physical Note    Date of Service  11/4/2022    Primary Care Physician  Raul Jimenez M.D.    Consultants      Code Status  Full Code    Chief Complaint  Chief Complaint   Patient presents with    Low Back Pain     BIB [facility], patient has chronic back pain from spinal stenosis that he takes oxy and gabapentin for. He has had worsening pain the last two weeks that has had 4 falls at home, no head trauma involved. He increased his pain med doses yesterday but still has severe pain. Patient does also have a kidney stone and cyst on right side       History of Presenting Illness  Abimael Hamilton is a 66 y.o. male who presented 11/4/2022 with Low Back Pain (BIB [facility], patient has chronic back pain from spinal stenosis that he takes oxy and gabapentin for. He has had worsening pain the last two weeks that has had 4 falls at home, no head trauma involved. He increased his pain med doses yesterday but still has severe pain. Patient does also have a kidney stone and cyst on right side)  .  He has a history of L3-5 severe spinal stenosis on an MRI in 2020 with L foot drop chornic low back pain, had been seen by Dr. Calvo and [facility] for surgical evaluation, however also has \"heart valve issues\" (echo in 2020 showed aortic valve replacement, EF 65%) which patient verbally reports required repair but his cardiologist Dr. Morales has left and he has not followed up. He now presents with 2 weeks of worsening low back pain. He also noted that his left leg weakness has worsened to the point he uses his arm to lift it up and now complains of R leg weakness. No bowel or bladder spillage/dysfunction. He denies saddle anesthesia.  At the ED, he is afebrile and hypertensive.   No leukocytosis.  I spoke with EDP to speak with Neurosurgery given he has symptomatic spinal stenosis with inability or weakness to dorsiflex L foot. He had an MRI in March but no report; I spoke with EDP to either have " Radiology read that MRI or obtain a new one pending Neurosurgery input.    I discussed the plan of care with patient, family, and bedside RN.    Review of Systems  Review of Systems   Constitutional:  Negative for chills and fever.   HENT:  Negative for congestion, hearing loss and nosebleeds.    Eyes:  Negative for pain and redness.   Respiratory:  Negative for cough, hemoptysis, shortness of breath and wheezing.    Cardiovascular:  Negative for chest pain and palpitations.   Gastrointestinal:  Negative for abdominal pain, blood in stool, constipation, diarrhea, nausea and vomiting.   Genitourinary:  Negative for dysuria, frequency and hematuria.   Musculoskeletal:  Positive for back pain and myalgias. Negative for falls and joint pain.   Skin:  Negative for rash.   Neurological:  Positive for focal weakness and weakness. Negative for dizziness, tremors, seizures, loss of consciousness and headaches.   Psychiatric/Behavioral:  The patient is not nervous/anxious and does not have insomnia.    All other systems reviewed and are negative.    Past Medical History   has a past medical history of Aortic stenosis (12/1/2015), Arthritis, Benign hypertensive heart disease without heart failure (11/14/2011), CAD (coronary artery disease), Congestive heart failure ([facility]), Coronary atherosclerosis of autologous vein bypass graft (11/14/2011), Essential hypertension (4/25/2019), Gout, Heart valve disease, High cholesterol, History of pancreatitis, History of pancreatitis, Hypertension, Migraine (2/16/2019), Muscle cramps (10/4/2011), Myocardial infarct ([facility]), Obesity (11/14/2011), Pain, Postsurgical aortocoronary bypass status (7/26/2016), Postsurgical aortocoronary bypass status (7/26/2016), Renal disorder, S/P aortic valve replacement with bioprosthetic valve (7/26/2016), Statin intolerance (7/26/2016), Status post cardiac revascularization with bypass aortocoronary anastomosis of five coronary vessels (7/26/2016), and Status  post cardiac revascularization with bypass aortocoronary anastomosis of five coronary vessels (7/26/2016).    Surgical History   has a past surgical history that includes laminotomy (12/2004); biopsy general; multiple coronary artery bypass (11/11/1998); multiple coronary artery bypass (12/08/2015); shoulder decompression arthroscopic (Right, 9/5/2017); debridement, labrum, hip, arthroscopic (Right, 9/5/2017); shoulder arthroscopy w/ rotator cuff repair (Right, 9/5/2017); shoulder arthroscopy w/ bicipital tenodesis repair (Right, 9/5/2017); synovectomy (Right, 9/5/2017); eye surgery; and mitral valve replace.     Family History  family history includes Arthritis in his maternal grandmother; Cancer in his mother; Heart Attack in his father; Heart Disease in his brother; Hyperlipidemia in his father.   Family history reviewed with patient. There is no family history that is pertinent to the chief complaint.     Social History   reports that he quit smoking about 30 years ago. His smoking use included cigarettes. He has a 60.00 pack-year smoking history. He has never used smokeless tobacco. He reports that he does not currently use alcohol. He reports current drug use. Drugs: Marijuana and Inhaled.    Allergies  Allergies   Allergen Reactions    Fenofibrate      dehydration    Gemfibrozil      Dehydration,Severe Muscle cramps    Lipitor [Atorvastatin Calcium] Unspecified     Severe Muscle cramps, dehydration    Lovastatin      Dehydration, severe muscle cramps    Tricor Unspecified     Dehydration, severe muscle cramps       Medications  Prior to Admission Medications   Prescriptions Last Dose Informant Patient Reported? Taking?   Evolocumab, REPATHA, (REPATHA SURECLICK) 140 MG/ML Solution Auto-injector   No No   Sig: Inject 1 Each under the skin every 14 days.   Oxycodone HCl 20 MG Tab  Significant Other Yes No   Sig: Take 20 mg by mouth 4 times a day.   allopurinol (ZYLOPRIM) 100 MG Tab   No No   Sig: Take 1 Tablet  by mouth every day for 360 days. allopurinol 100 mg tablet   amLODIPine (NORVASC) 5 MG Tab   No No   Sig: Take 1 Tablet by mouth every day for 90 days.   amoxicillin (AMOXIL) 500 MG Cap   Yes No   Sig: Take 4 Capsules by mouth one time as needed (30-60 min prior to dental work).   aspirin 81 MG EC tablet  Significant Other No No   Sig: Take 1 Tab by mouth every day.   clopidogrel (PLAVIX) 75 MG Tab   No No   Sig: Take 1 Tablet by mouth every day.   cyclobenzaprine (FLEXERIL) 10 mg Tab   No No   Sig: TAKE ONE TABLET BY MOUTH THREE TIMES DAILY FOR 7 DAYS   indomethacin (INDOCIN) 50 MG Cap   No No   Sig: TAKE ONE CAPSULE BY MOUTH TWICE DAILY AS NEEDED   isosorbide mononitrate (IMDUR) 120 MG CR tablet   No No   Sig: Take 1 Tablet by mouth every morning.   lisinopril (PRINIVIL) 20 MG Tab   No No   Sig: Take 1 Tablet by mouth every day.   metoprolol SR (TOPROL XL) 100 MG TABLET SR 24 HR   No No   Sig: Take 1 Tablet by mouth every day.   nitroglycerin (NITROSTAT) 0.4 MG SL Tab   No No   Sig: PLACE 1TAB UNDER TONGUE AS NEEDED FOR CHEST PAIN(1TAB EVERY 5 MINS,MAX 3DOSES AS NEEDED)IF NO RELIEF CALL 911   Patient taking differently: Place 0.4 mg under the tongue as needed for Chest Pain.      Facility-Administered Medications: None       Physical Exam  Temp:  [36.7 °C (98.1 °F)] 36.7 °C (98.1 °F)  Pulse:  [70-78] 70  Resp:  [14-19] 14  BP: (140-178)/(66-95) 160/91  SpO2:  [96 %-99 %] 98 %  Blood Pressure : (!) 160/91   Temperature: 36.7 °C (98.1 °F)   Pulse: 70   Respiration: 14   Pulse Oximetry: 98 %       Physical Exam  Vitals and nursing note reviewed.   HENT:      Head: Normocephalic and atraumatic.      Right Ear: External ear normal.      Left Ear: External ear normal.      Nose: Nose normal.      Mouth/Throat:      Mouth: Mucous membranes are moist.   Eyes:      General: No scleral icterus.     Conjunctiva/sclera: Conjunctivae normal.   Cardiovascular:      Rate and Rhythm: Normal rate and regular rhythm.      Heart  sounds: No murmur heard.    No friction rub. No gallop.   Pulmonary:      Effort: Pulmonary effort is normal.      Breath sounds: Normal breath sounds.   Abdominal:      General: Abdomen is flat. Bowel sounds are normal. There is no distension.      Palpations: Abdomen is soft.      Tenderness: There is no abdominal tenderness. There is no guarding.   Musculoskeletal:         General: Tenderness (low back) present. Normal range of motion.      Cervical back: Normal range of motion and neck supple.   Skin:     General: Skin is warm.   Neurological:      Mental Status: He is alert and oriented to person, place, and time. Mental status is at baseline.      Motor: Weakness (Worsening L leg weakness, L foot dorsiflexion weakness, R leg weakness) present.   Psychiatric:         Mood and Affect: Mood normal.         Behavior: Behavior normal.         Thought Content: Thought content normal.         Judgment: Judgment normal.       Laboratory:  Recent Labs     11/04/22  0838   WBC 10.4   RBC 5.69   HEMOGLOBIN 17.7   HEMATOCRIT 52.9*   MCV 93.0   MCH 31.1   MCHC 33.5*   RDW 45.6   PLATELETCT 243   MPV 9.3     Recent Labs     11/04/22  0838   SODIUM 138   POTASSIUM 4.7   CHLORIDE 100   CO2 25   GLUCOSE 135*   BUN 31*   CREATININE 1.20   CALCIUM 9.8     Recent Labs     11/04/22  0838   ALTSGPT 23   ASTSGOT 24   ALKPHOSPHAT 72   TBILIRUBIN 0.6   GLUCOSE 135*         No results for input(s): NTPROBNP in the last 72 hours.      No results for input(s): TROPONINT in the last 72 hours.    Imaging:  No orders to display       Assessment/Plan:  Justification for Admission Status  I anticipate this patient is appropriate for observation status at this time because .chief      * Spinal stenosis of lumbar region (L3-5) with neurogenic claudication and L foot drop/weakness, valvular heart disease  Assessment & Plan  Pain control and muscle relaxers  Low threshold to get MRI back, awaiting Neurosurgery input with EDP  Await Neurosurgery  recommendations  Echo ordered  Update: Dr. Sparks, consulted    Hypertensive urgency  Assessment & Plan  PRN IV antihypertensives  Continue amlodipine    Obesity (BMI 30-39.9)- (present on admission)  Assessment & Plan  Recommended weight loss advised, 5% through reduced calorie, low carb diet and 150 mins of exercise a week once better  Recommend bariatric surgery evaluation if morbidly obese  Educated on the increase of morbidity and mortality associated with excess weight including DM, Heart Disease, HTN, stroke, and sleep apnea. Recommended outpatient monitoring  of blood sugars, lipid panel, sleep study evaluation for metabolic syndrome.        VTE prophylaxis: enoxaparin ppx

## 2022-11-04 NOTE — ED TRIAGE NOTES
Chief Complaint   Patient presents with    Low Back Pain     BIB [facility], patient has chronic back pain from spinal stenosis that he takes oxy and gabapentin for. He has had worsening pain the last two weeks that has had 4 falls at home, no head trauma involved. He increased his pain med doses yesterday but still has severe pain. Patient does also have a kidney stone and cyst on right side

## 2022-11-05 PROBLEM — G06.2 EPIDURAL ABSCESS: Status: ACTIVE | Noted: 2022-01-01

## 2022-11-05 NOTE — PROGRESS NOTES
"Neurosurgery  HD#2  Patient brought in by ambulance for severe uncontrolled back pain over the last 2 weeks with new weakness in the left lower extremity and radicular pain.  Pain unchanged this morning, was able to stand independently and transfer from wheel chair to bed.   Continues to deny saddle anesthesia or incontinence.  Wife at bedside  NM cardiac stress test this morning, results pending  MRI w/ contrast reviewed, shows new 10 x 6 x 19 mm sized posterior epidural abscess at the level of L2-3 resulting in severe lumbar stenosis. This was reviewed with Dr. Sparks last night, he would like to await cardiac risk stratification and optimization and plans for OR on Monday.  ESR, CRP were negative. Blood cultures pending.  Patient was started on antibiotics last night  Plavix and ASA have been held  WBC elevated at 15.5 today although he has been on steroids.        Objective:  /68   Pulse 87   Temp 36.9 °C (98.4 °F) (Temporal)   Resp 18   Ht 1.676 m (5' 6\")   Wt 98.7 kg (217 lb 9.5 oz)   SpO2 95%     Intake/Output Summary (Last 24 hours) at 11/5/2022 0901  Last data filed at 11/4/2022 1930  Gross per 24 hour   Intake 50 ml   Output 1451 ml   Net -1401 ml       Recent Labs     11/04/22  0838 11/05/22  0312   WBC 10.4 15.5*   RBC 5.69 5.15   HEMOGLOBIN 17.7 15.9   HEMATOCRIT 52.9* 46.7   MCV 93.0 90.7   MCH 31.1 30.9   MCHC 33.5* 34.0   RDW 45.6 44.8   PLATELETCT 243 256   MPV 9.3 9.4     Recent Labs     11/04/22  0838 11/05/22  0312   SODIUM 138 138   POTASSIUM 4.7 4.8   CHLORIDE 100 103   CO2 25 22   GLUCOSE 135* 154*   BUN 31* 34*         HTN yesterday, this is improved. VSS  Kidney function sluggish. WBC elevated at 15.5 on steroids. Otherwise labs are stable.  Strength:  Lower extremities strength testing reveals:  Left EHL/DF were 0/5.  Remainder of lower extremity strength was 4/5 limited secondary to pain.   SLR was positive bilaterally  Upper extremities are 5/5 grossly  Otherwise neurologically " intact  Patient has poor dentition with multiple dental caries.    Assessment:  Active Hospital Problems    Diagnosis     Obesity (BMI 30-39.9) [E66.9]      Priority: Low    Dyslipidemia [E78.5]      Priority: Low    Epidural abscess [G06.2]     Hypertensive urgency [I16.0]     Degenerative lumbar spinal stenosis [M48.061]     Spinal stenosis of lumbar region (L3-5) with neurogenic claudication and L foot drop/weakness, valvular heart disease [M48.062]      HD#2  Chemoprophylaxis: Please hold in anticipation of surgery on Monday    Case was d/w Dr. Oconnell today. Dr. Sparks last night. All of the patient and wife questions were answered to the best of my ability.    Plan:  1. Continue neuro checks - if neuro exam worsens, will expedite surgery - notify myself with any neuro changes  2. IV abx and blood cultures appropriate  3. NPO Midnight on Sunday, hold blood thinners  4. Optimize/risk stratify from Cardiology for surgical intervention that is planned

## 2022-11-05 NOTE — PROGRESS NOTES
Pt is aox4, RA, on Tele, vitals WNL, tolerated diet. Continue to monitor Severe spinal stenosis pain.  Head CT performed. MRI pending. Spoke to On-call JEREMIAS Linton for pain management. Continue to monitor vitals and pain.

## 2022-11-05 NOTE — THERAPY
11/05/22 0815   Interdisciplinary Plan of Care Collaboration   Collaboration Comments OT consult received. Pt pending cardiac POC and possible spinal sx on Monday. Will hold OT eval until further POC developed and pt post op.

## 2022-11-05 NOTE — PROGRESS NOTES
Pt is refusing to have the mrsa swab done, I explained the reason it needed to be done pt still refused, will pass this info onto the day nurse

## 2022-11-05 NOTE — PROGRESS NOTES
NOC APRN CROSS COVER NOTE      Contacted by radiology regarding results of patient's MRI of his lumbar spine.  This demonstrates a 10 x 6 x 19 mm posterior epidural abscess at the level of L2-L3 which is new from previous study.  There is severe central canal stenosis at this level.  Severe degenerative central canal stenosis at L3-L4 and L4-5.    Patient was admitted today for chronic worsening low back pain related to known spinal stenosis.    I have reached out and spoken to the surgeon on-call for Dr. Spraks.  He will review the films with their on-call spinal surgeon to determine if surgical intervention will be required this weekend.    Plan: Stat blood cultures x2  Initiate broad-spectrum IV antibiotics, vancomycin and Rocephin  Neurosurgery consultation  Pain management    Perla Linton [facility]-, NOC Hospitalist APRSARY

## 2022-11-05 NOTE — PROGRESS NOTES
"Pharmacy Vancomycin Kinetics Note for 11/5/2022     66 y.o. male on Vancomycin day # 2     Vancomycin Indication (AUC Dosing): Epidural Abscess    Provider specified end date: 11/11/22    Active Antibiotics (From admission, onward)      Ordered     Ordering Provider       Sat Nov 5, 2022 12:36 PM    11/05/22 1236  vancomycin (VANCOCIN) 1,000 mg in  mL IVPB  (vancomycin (VANCOCIN) IV (LD + Maintenance))  EVERY 12 HOURS        Note to Pharmacy: Per P&T Kinetics Protocol    Jayant Morris M.D.       Fri Nov 4, 2022  9:15 PM    11/04/22 2115  cefTRIAXone (Rocephin) syringe 2,000 mg  EVERY 12 HOURS         RADHA MajanoP.RDottieN.    11/04/22 2115  MD Alert...Vancomycin per Pharmacy  PHARMACY TO DOSE        Question:  Indication(s) for vancomycin?  Answer:  Epidural abscess/vertebral osteomyelitis    RADHA MajanoP.R.N.            Dosing Weight: 98.7 kg (217 lb 9.5 oz)    Admission History: Admitted on 11/4/2022 for Degenerative lumbar spinal stenosis [M48.061]  Epidural abscess [G06.2]  Pertinent history: Patient with worsening back pain found to have epidural abscess on MRI and is now being epirically treated.    Allergies:     Morphine, Fenofibrate, Gemfibrozil, Lipitor [atorvastatin calcium], Lovastatin, and Tricor     Pertinent cultures to date:     Results       Procedure Component Value Units Date/Time    BLOOD CULTURE [[id number]] Collected: 11/04/22 2233    Order Status: Completed Specimen: Blood from Peripheral Updated: 11/05/22 0809     Significant Indicator NEG     Source BLD     Site PERIPHERAL     Culture Result No Growth  Note: Blood cultures are incubated for 5 days and  are monitored continuously.Positive blood cultures  are called to the RN and reported as soon as  they are identified.      Narrative:      Per Hospital Policy: Only change Specimen Src: to \"Line\" if  specified by physician order.  Right Hand    BLOOD CULTURE [[id number]] Collected: 11/04/22 2231    Order Status: Completed " "Specimen: Blood from Peripheral Updated: 22 0809     Significant Indicator NEG     Source BLD     Site PERIPHERAL     Culture Result No Growth  Note: Blood cultures are incubated for 5 days and  are monitored continuously.Positive blood cultures  are called to the RN and reported as soon as  they are identified.      Narrative:      Per Hospital Policy: Only change Specimen Src: to \"Line\" if  specified by physician order.  Left Hand    MRSA By PCR (Amp) [[id number]]     Order Status: Sent Specimen: Respirate from Nares             Labs:     Estimated Creatinine Clearance: 64.3 mL/min (by C-G formula based on SCr of 1.26 mg/dL).  Recent Labs     22   WBC 10.4 15.5*   NEUTSPOLYS 72.90*  --      Recent Labs     22   BUN 31* 34*   CREATININE 1.20 1.26   ALBUMIN 4.6  --        Intake/Output Summary (Last 24 hours) at 2022 1237  Last data filed at 2022 1930  Gross per 24 hour   Intake no documentation   Output 1451 ml   Net -1451 ml      Blood Pressure 120/71   Pulse 85   Temperature 36.8 °C (98.3 °F) (Temporal)   Respiration 18   Height 1.676 m (5' 6\")   Weight 98.7 kg (217 lb 9.5 oz)   Oxygen Saturation 93%  Temp (24hrs), Av.9 °C (98.5 °F), Min:36.8 °C (98.3 °F), Max:37.2 °C (98.9 °F)      List concerns for Vancomycin clearance:     Age;Obesity;BUN/Scr ratio greater than 20:1    Pharmacokinetics:     AUC kinetics:   Ke (hr ^-1): 0.0578 hr^-1  Half life: 11.99 hr  Clearance: 4.397  Estimated TDD: 2198.5  Estimated Dose: 1282  Estimated interval: 14    Trough kinetics:   Recent Labs     22  1123   VANCORANDOM 11.6       A/P:     -  Vancomycin dose: 1000 mg IV q12h    -  Next vancomycin level(s): not ordered   -  @ 0400   - Vt @ 1230     -  Predicted vancomycin AUC from initial AUC test calculator: 455 mg·hr/L    -  Comments: Discontinued scheduled dosing this morning given SCr currently higher than baseline of ~0.8-0.9. Ordered " a 12 hour random level (from when loading dose administered) and this level demonstrating that patient is clearing vancomycin. Scheduled dosing with AUC calculator resumed based on labs from today.  Plan for levels as above or sooner if change in renal function.      Bella Eisenberg, PharmD, BCPS, BCCCP

## 2022-11-05 NOTE — ASSESSMENT & PLAN NOTE
C/w metoprolol and lisinopril, Imdur    Stress test- a small tomoderate sized focus of ischemia involving the lateral wall within the cardiac base and midzone. Findings were discussed with cardiology Dr. Dozier - no further inpatient cardiology evaluation/procedure recommended. Pt is a moderate risk patient.    -- History of aspirin and Plavix was held considering patient undergoing surgery.  I discussed with Dr. Sparks  stated okay starting the patient on aspirin and Plavix.  Started 11/09/2022.   -- monitor leg strength     It looks like Plavix has been discontinued on 11/17, will restart once okay by neurosurgery    11/23: Patient is noted to have chest pain, I did talk to neurosurgery, who recommended okay to start aspirin and Plavix.    Cards called  Transfer to tele ordered but  Later discontinued as patient didn't wanted to go to tele

## 2022-11-05 NOTE — CARE PLAN
The patient is Watcher - Medium risk of patient condition declining or worsening    Shift Goals  Clinical Goals: safety, pain control  Patient Goals: pain control, rest,    Progress made toward(s) clinical / shift goals:    Problem: Knowledge Deficit - Standard  Goal: Patient and family/care givers will demonstrate understanding of plan of care, disease process/condition, diagnostic tests and medications  Outcome: Progressing     Problem: Knowledge Deficit - Standard  Goal: Patient and family/care givers will demonstrate understanding of plan of care, disease process/condition, diagnostic tests and medications  Outcome: Progressing     Problem: Pain - Standard  Goal: Alleviation of pain or a reduction in pain to the patient’s comfort goal  Outcome: Progressing     Problem: Fall Risk  Goal: Patient will remain free from falls  Outcome: Progressing     Problem: Fall Risk  Goal: Patient will remain free from falls  Outcome: Progressing     Problem: Skin Integrity  Goal: Skin integrity is maintained or improved  Outcome: Progressing     Problem: Skin Integrity  Goal: Skin integrity is maintained or improved  Outcome: Progressing       Patient is not progressing towards the following goals:

## 2022-11-05 NOTE — PROGRESS NOTES
Monitor Summary: SR 87-98, HI .18, QRS .11, QT .38 with rare PVC per strip from monitor room

## 2022-11-05 NOTE — CARE PLAN
JENNIFER to MEETA Conte 
 The patient is Stable - Low risk of patient condition declining or worsening         Progress made toward(s) clinical / shift goals:    Problem: Knowledge Deficit - Standard  Goal: Patient and family/care givers will demonstrate understanding of plan of care, disease process/condition, diagnostic tests and medications  Outcome: Progressing     Problem: Pain - Standard  Goal: Alleviation of pain or a reduction in pain to the patient’s comfort goal  Outcome: Progressing     Problem: Fall Risk  Goal: Patient will remain free from falls  Outcome: Progressing       Patient is not progressing towards the following goals:

## 2022-11-05 NOTE — PROGRESS NOTES
"Pharmacy Vancomycin Kinetics Note for 11/4/2022     66 y.o. male on Vancomycin day # 1     Vancomycin Indication (AUC Dosing): Epidural Abscess    Provider specified end date: 11/11/22    Active Antibiotics (From admission, onward)      Ordered     Ordering Provider       Fri Nov 4, 2022 10:08 PM    11/04/22 2208  vancomycin (VANCOCIN) 2,500 mg in  mL IVPB  (vancomycin (VANCOCIN) IV (LD + Maintenance))  ONCE         JUJU Majano       Fri Nov 4, 2022  9:15 PM    11/04/22 2115  cefTRIAXone (Rocephin) syringe 2,000 mg  EVERY 12 HOURS         JUJU Majano    11/04/22 2115  MD Alert...Vancomycin per Pharmacy  PHARMACY TO DOSE        Question:  Indication(s) for vancomycin?  Answer:  Epidural abscess/vertebral osteomyelitis    JUJU Majano            Dosing Weight: 98.7 kg (217 lb 9.5 oz)      Admission History: Admitted on 11/4/2022 for Degenerative lumbar spinal stenosis [M48.061]  Pertinent history: Patient with worsening back pain found to have epidural abscess on MRI and is now being epirically treated.    Allergies:     Morphine, Fenofibrate, Gemfibrozil, Lipitor [atorvastatin calcium], Lovastatin, and Tricor     Pertinent cultures to date:     Results       Procedure Component Value Units Date/Time    BLOOD CULTURE [[id number]] Collected: 11/04/22 2233    Order Status: Sent Specimen: Blood from Peripheral Updated: 11/04/22 2247    Narrative:      Per Hospital Policy: Only change Specimen Src: to \"Line\" if  specified by physician order.    BLOOD CULTURE [[id number]] Collected: 11/04/22 2231    Order Status: Sent Specimen: Blood from Peripheral Updated: 11/04/22 2247    Narrative:      Per Hospital Policy: Only change Specimen Src: to \"Line\" if  specified by physician order.            Labs:     Estimated Creatinine Clearance: 67.5 mL/min (by C-G formula based on SCr of 1.2 mg/dL).  Recent Labs     11/04/22  0838   WBC 10.4   NEUTSPOLYS 72.90*     Recent Labs    " " 22  0838   BUN 31*   CREATININE 1.20   ALBUMIN 4.6       Intake/Output Summary (Last 24 hours) at 2022 2332  Last data filed at 2022 1930  Gross per 24 hour   Intake 50 ml   Output 1451 ml   Net -1401 ml      /65   Pulse 89   Temp 37.2 °C (98.9 °F) (Temporal)   Resp 18   Ht 1.676 m (5' 6\")   Wt 98.7 kg (217 lb 9.5 oz)   SpO2 94%  Temp (24hrs), Av.8 °C (98.3 °F), Min:36.5 °C (97.7 °F), Max:37.2 °C (98.9 °F)      List concerns for Vancomycin clearance:     Age;Obesity    Pharmacokinetics:     AUC kinetics:   Ke (hr ^-1): 0.0604 hr^-1  Half life: 11.48 hr  Clearance: 4.518  Estimated TDD: 2259  Estimated Dose: 1271  Estimated interval: 13.5    A/P:     -  Vancomycin dose: 2.5g load followed by 1250mg Q12H @ 1100 and 2300    -  Next vancomycin level(s):    TBD by clinical pharmacist.     -  Predicted vancomycin AUC from initial AUC test calculator: 553 mg·hr/L    -  Comments: MRSA nares ordered, please continue to monitor for changes in CrCl and adjust as needed.     Kenan Phillips, PharmD    "

## 2022-11-05 NOTE — PROGRESS NOTES
"Hospital Medicine Daily Progress Note    Date of Service  11/5/2022    Chief Complaint  Abimael Hamilton is a 66 y.o. male admitted 11/4/2022 with worsening low back pain    Hospital Course  Abimael Hamilton is a 66 y.o. male who presented 11/4/2022 with Low Back Pain (BIB [facility], patient has chronic back pain from spinal stenosis that he takes oxy and gabapentin for. He has had worsening pain the last two weeks that has had 4 falls at home, no head trauma involved. He increased his pain med doses a day prior but still has severe pain. Patient does also have a kidney stone and cyst on right side)    He has a history of L3-5 severe spinal stenosis on an MRI in 2020 with L foot drop chornic low back pain, had been seen by Dr. Calvo and RHINA for surgical evaluation; however also has \"heart valve issues\" (echo in 2020 showed aortic valve replacement, EF 65%) which patient verbally reports required repair but his cardiologist Dr. Morales has left and he has not followed up. He now presented with 2 weeks of worsening low back pain. He also noted that his left leg weakness has worsened to the point he uses his arm to lift it up and now complains of R leg weakness. No bowel or bladder spillage/dysfunction. He denied saddle anesthesia.    Pt was admitted to Hospital Medicine for a further management. Neurosurgery was consulted and cardiology consult was also requested for pre-op risk assessment.    Interval Problem Update  11/5  Vitals reviewed; afebrile.  The rest of the vitals within normal parameters.  Pain scale reported - 7-10 in lower back  Blood glucose in last 24hrs - around 100s   I/O - UO about 1.4L    Overnight, MRI LS spine done was notable for 10 x 6 x 19 mm posterior epidural abscess at the level of L2-L3 which is new from previous study.  There is severe central canal stenosis at this level.  Severe degenerative central canal stenosis at L3-L4 and L4-5. On-call surgeon was contacted and the patient was " started on broad-spectrum IV Abx.     Pt underwent NST this AM - a small tomoderate sized focus of ischemia involving the lateral wall within the cardiac base and midzone. Findings were discussed with cardiology Dr. Dozier - no further inpatient cardiology evaluation/procedure recommended. Pt is a moderate risk patient.     Neurosurg consult appreciated - plan for OR tentatively Monday 11/7.     At bedside, quite pleasant. Current care plan discussed.     Labs and imagings reviewed   Acute leukocytosis noted  ESR and CRP wnl     I have discussed this patient's plan of care and discharge plan at IDT rounds today with Case Management, Nursing, Nursing leadership, and other members of the IDT team.    Consultants/Specialty  cardiology and neurosurgery Dr. Dozier and Dr. Sparks    Code Status  Full Code    Disposition  Patient is not medically cleared for discharge.   Anticipate discharge to  D .  I have placed the appropriate orders for post-discharge needs.    Review of Systems  Review of Systems   Constitutional:  Negative for chills, fever and malaise/fatigue.   HENT:  Negative for congestion and sore throat.    Eyes:  Negative for blurred vision and double vision.   Respiratory:  Negative for cough, sputum production and shortness of breath.    Cardiovascular:  Negative for chest pain, palpitations and leg swelling.   Gastrointestinal:  Negative for abdominal pain, heartburn, nausea and vomiting.   Genitourinary:  Negative for dysuria, frequency and urgency.   Musculoskeletal:  Positive for back pain, falls and joint pain. Negative for myalgias and neck pain.   Neurological:  Positive for focal weakness and weakness. Negative for dizziness and headaches.   Psychiatric/Behavioral:  Negative for depression.       Physical Exam  Temp:  [36.8 °C (98.3 °F)-37.3 °C (99.1 °F)] 37.3 °C (99.1 °F)  Pulse:  [] 82  Resp:  [18-20] 18  BP: (120-212)/() 148/84  SpO2:  [91 %-97 %] 95 %    Physical Exam  Vitals and nursing  note reviewed.   Constitutional:       General: He is not in acute distress.     Appearance: Normal appearance. He is not ill-appearing.   HENT:      Head: Normocephalic and atraumatic.      Comments: Poor dentition      Mouth/Throat:      Mouth: Mucous membranes are moist.      Pharynx: Oropharynx is clear.   Eyes:      General: No scleral icterus.     Conjunctiva/sclera: Conjunctivae normal.   Cardiovascular:      Rate and Rhythm: Normal rate and regular rhythm.      Pulses: Normal pulses.      Heart sounds: Normal heart sounds.   Pulmonary:      Effort: Pulmonary effort is normal. No respiratory distress.      Breath sounds: Normal breath sounds. No wheezing.   Abdominal:      General: Bowel sounds are normal. There is no distension.      Palpations: Abdomen is soft.      Tenderness: There is no abdominal tenderness.   Musculoskeletal:         General: No swelling or tenderness. Normal range of motion.   Skin:     General: Skin is warm and dry.      Capillary Refill: Capillary refill takes less than 2 seconds.   Neurological:      General: No focal deficit present.      Mental Status: He is alert and oriented to person, place, and time. Mental status is at baseline.      Comments: Left LE 0/5; weak DF  Remainder of lower extremity strength was 4/5 limited secondary to pain.   SLR was positive bilaterally  Upper extremities are 5/5 grossly   Psychiatric:         Mood and Affect: Mood normal.       Fluids    Intake/Output Summary (Last 24 hours) at 11/5/2022 1518  Last data filed at 11/4/2022 1930  Gross per 24 hour   Intake --   Output 851 ml   Net -851 ml       Laboratory  Recent Labs     11/04/22  0838 11/05/22 0312   WBC 10.4 15.5*   RBC 5.69 5.15   HEMOGLOBIN 17.7 15.9   HEMATOCRIT 52.9* 46.7   MCV 93.0 90.7   MCH 31.1 30.9   MCHC 33.5* 34.0   RDW 45.6 44.8   PLATELETCT 243 256   MPV 9.3 9.4     Recent Labs     11/04/22  0838 11/05/22 0312   SODIUM 138 138   POTASSIUM 4.7 4.8   CHLORIDE 100 103   CO2 25 22  "  GLUCOSE 135* 154*   BUN 31* 34*   CREATININE 1.20 1.26   CALCIUM 9.8 9.5                   Imaging  NM-CARDIAC STRESS TEST   Final Result      MR-LUMBAR SPINE-WITH & W/O   Final Result      1.  There is an approximately 10 x 6 x 19 mm sized posterior epidural abscess at the level of L2-3. This is new since the previous study. There is severe central canal stenosis at this level.   2.  Severe degenerative central canal stenosis at L3-4 and L4-5.   3.  Degenerative disease as described above.   4.  There is an approximately 1.9 cm sized T2 hypointense lesion in the right kidney likely representing complicated cyst. There is also a simple cyst in the right kidney. This is unchanged since the previous study.      CT-HEAD W/O   Final Result      1.  Head CT without contrast within normal limits. No evidence of acute cerebral infarction, hemorrhage or mass lesion.   2.  Atherosclerotic vascular calcification.         EC-ECHOCARDIOGRAM COMPLETE W/O CONT   Final Result           Assessment/Plan  * Spinal stenosis of lumbar region (L3-5) with neurogenic claudication and L foot drop/weakness, valvular heart disease  Assessment & Plan  Pain control and muscle relaxers  MRI LS spine done was notable for 10 x 6 x 19 mm posterior epidural abscess at the level of L2-L3 which is new from previous study.  There is severe central canal stenosis at this level.  Severe degenerative central canal stenosis at L3-L4 and L4-5.     Currently on broad-spectrum IV Abx (follow cultures)  Decadron IV  OR tentatively planned for 11/7         Epidural abscess- (present on admission)  Assessment & Plan  See above    Hypertensive urgency  Assessment & Plan  PRN IV antihypertensives  Continue amlodipine    Obesity (BMI 30-39.9)- (present on admission)  Assessment & Plan  On admission:   \"Recommended weight loss advised, 5% through reduced calorie, low carb diet and 150 mins of exercise a week once better  Recommend bariatric surgery evaluation if " "morbidly obese  Educated on the increase of morbidity and mortality associated with excess weight including DM, Heart Disease, HTN, stroke, and sleep apnea. Recommended outpatient monitoring  of blood sugars, lipid panel, sleep study evaluation for metabolic syndrome.\"      Dyslipidemia- (present on admission)  Assessment & Plan  Hold home evolocumab    Coronary artery disease of native artery of native heart with stable angina pectoris (HCC)- (present on admission)  Assessment & Plan  C/w metoprolol and lisinopril     Pt underwent NST this AM - a small tomoderate sized focus of ischemia involving the lateral wall within the cardiac base and midzone. Findings were discussed with cardiology Dr. Dozier - no further inpatient cardiology evaluation/procedure recommended. Pt is a moderate risk patient.     Holding ASA and Plavix for OR         VTE prophylaxis: SCDs/TEDs    I have performed a physical exam and reviewed and updated ROS and Plan today (11/5/2022).     "

## 2022-11-05 NOTE — PROGRESS NOTES
Cardiology Follow Up Progress Note    Date of Service  11/5/2022    Attending Physician  Jayant Morris M.D.    Chief Complaint   Pre-op    HPI  Abimael Hamilton is a 66 y.o. male admitted 11/4/2022 with Pre-op    Interim Events  No chest pain  Having back pain  Stress test today  Labs stable    Review of Systems  Review of Systems   Constitutional:  Negative for activity change, appetite change, chills and diaphoresis.   Eyes:  Negative for pain, discharge, redness and itching.   Respiratory:  Negative for cough, choking, chest tightness and stridor.    Cardiovascular:  Negative for palpitations.   Gastrointestinal:  Negative for abdominal distention, abdominal pain, constipation, diarrhea and nausea.   Endocrine: Negative for cold intolerance, heat intolerance, polydipsia and polyphagia.   Genitourinary:  Negative for difficulty urinating, dysuria, enuresis, flank pain, frequency, genital sores and hematuria.   Musculoskeletal:  Positive for back pain. Negative for gait problem, joint swelling and myalgias.   Skin:  Negative for color change, pallor and rash.   Neurological:  Negative for tremors, seizures, syncope, speech difficulty, weakness, light-headedness, numbness and headaches.   Hematological:  Negative for adenopathy. Does not bruise/bleed easily.   Psychiatric/Behavioral:  Negative for agitation, behavioral problems, confusion, decreased concentration and hallucinations. The patient is not nervous/anxious.      Vital signs in last 24 hours  Temp:  [36.5 °C (97.7 °F)-37.2 °C (98.9 °F)] 36.9 °C (98.4 °F)  Pulse:  [] 87  Resp:  [15-20] 18  BP: (122-212)/() 131/68  SpO2:  [91 %-98 %] 95 %    Physical Exam  Physical Exam  Vitals and nursing note reviewed.   Constitutional:       General: He is not in acute distress.     Appearance: Normal appearance. He is normal weight. He is not ill-appearing, toxic-appearing or diaphoretic.   HENT:      Head: Normocephalic and atraumatic.      Right Ear: Ear  canal and external ear normal.      Left Ear: Ear canal and external ear normal.      Nose: Nose normal. No congestion or rhinorrhea.      Mouth/Throat:      Mouth: Mucous membranes are moist.      Pharynx: Oropharynx is clear. No oropharyngeal exudate or posterior oropharyngeal erythema.   Eyes:      General: No scleral icterus.        Right eye: No discharge.         Left eye: No discharge.      Extraocular Movements: Extraocular movements intact.      Conjunctiva/sclera: Conjunctivae normal.      Pupils: Pupils are equal, round, and reactive to light.   Neck:      Vascular: No carotid bruit.   Cardiovascular:      Rate and Rhythm: Normal rate and regular rhythm.      Pulses: Normal pulses.      Heart sounds: Normal heart sounds. No murmur heard.    No gallop.   Pulmonary:      Effort: Pulmonary effort is normal. No respiratory distress.      Breath sounds: Normal breath sounds. No stridor. No wheezing, rhonchi or rales.   Abdominal:      General: Abdomen is flat. Bowel sounds are normal. There is no distension.      Palpations: Abdomen is soft. There is no mass.      Tenderness: There is no abdominal tenderness. There is no guarding or rebound.      Hernia: No hernia is present.   Musculoskeletal:         General: No swelling or tenderness. Normal range of motion.      Cervical back: Normal range of motion and neck supple. No rigidity. No muscular tenderness.      Right lower leg: No edema.      Left lower leg: No edema.   Lymphadenopathy:      Cervical: No cervical adenopathy.   Skin:     General: Skin is warm and dry.      Capillary Refill: Capillary refill takes 2 to 3 seconds.      Coloration: Skin is not jaundiced or pale.      Findings: No bruising or lesion.   Neurological:      General: No focal deficit present.      Mental Status: He is alert and oriented to person, place, and time. Mental status is at baseline.      Cranial Nerves: No cranial nerve deficit.      Motor: No weakness.   Psychiatric:          Mood and Affect: Mood normal.         Behavior: Behavior normal.         Thought Content: Thought content normal.         Judgment: Judgment normal.       Lab Review  Lab Results   Component Value Date/Time    WBC 15.5 (H) 11/05/2022 03:12 AM    RBC 5.15 11/05/2022 03:12 AM    HEMOGLOBIN 15.9 11/05/2022 03:12 AM    HEMATOCRIT 46.7 11/05/2022 03:12 AM    MCV 90.7 11/05/2022 03:12 AM    MCH 30.9 11/05/2022 03:12 AM    MCHC 34.0 11/05/2022 03:12 AM    MPV 9.4 11/05/2022 03:12 AM      Lab Results   Component Value Date/Time    SODIUM 138 11/05/2022 03:12 AM    POTASSIUM 4.8 11/05/2022 03:12 AM    CHLORIDE 103 11/05/2022 03:12 AM    CO2 22 11/05/2022 03:12 AM    GLUCOSE 154 (H) 11/05/2022 03:12 AM    BUN 34 (H) 11/05/2022 03:12 AM    CREATININE 1.26 11/05/2022 03:12 AM    BUNCREATRAT 16 07/29/2016 10:22 AM      Lab Results   Component Value Date/Time    ASTSGOT 24 11/04/2022 08:38 AM    ALTSGPT 23 11/04/2022 08:38 AM     Lab Results   Component Value Date/Time    CHOLSTRLTOT 164 08/10/2022 08:21 AM    LDL see below 08/10/2022 08:21 AM    HDL 26 (A) 08/10/2022 08:21 AM    TRIGLYCERIDE 446 (H) 08/10/2022 08:21 AM    TROPONINT 23 (H) 04/08/2022 02:13 AM       No results for input(s): NTPROBNP in the last 72 hours.    Cardiac Imaging and Procedures Review  Dated 4/7/2022 personally reviewed and interpreted myself showing normal sinus rhythm with an interventricular conduction delay.     ECHO: Dated 11/4/2022 personally viewed inter myself showing  CONCLUSIONS  Compared to the prior study on 12-2-20, velocity across aortic valve is   slightly worse.  The left ventricular ejection fraction is visually estimated to be 65%.  Known bioprosthetic aortic valve .  Moderate aortic valve stenosis. Vmax is 3.8 m/s.  Aortic valve area calculated from the continuity equation is 1.3 cm2.    Assessment/Plan  No new Assessment & Plan notes have been filed under this hospital service since the last note was generated.  Service:  Cardiology  66-year-old male with CAD status post bypass with native valve replacement.  We are awaiting the results of his nuclear stress test.  If his stress test is low risk or negative then he will be moderate risk for the surgery with no further testing needed.    Thank you for allowing me to participate in the care of this patient.  I will continue to follow this patient    Please contact me with any questions.    Bethel Dozier M.D.   Cardiologist, [facility]  [phone]

## 2022-11-05 NOTE — PROGRESS NOTES
Report received from night shift RN. Assume care. Pt. AAOx4 pt is bed,  Assessment completed. VSS. Pt sleeping, good CMS and pulses to valerie LE, denies numbness and tingling.  Pt was update for the care for the day. White board updated, All question answered. Pt has call light within reach,  bed is in the lowest position. Pt has no other needs at this time.    0745  Dilaudid 1mg and Metoprolol was no available at this time, Pt left unit with transport staff to go get stress test  0945 Pt back from stress test done, able to walk to bed without difficulty. Neurologist and wife at bedside  1005 Pt transferring to [facility] Rm [id number]. Report given to Shani HASSAN

## 2022-11-05 NOTE — ASSESSMENT & PLAN NOTE
S/p L2-SI LAMINECTOMY with Dr. Sparks 11/7/2022 11/7 Posterior lumbar decompression using laminectomy type approach, Levels: L2-L3-L4 L5-S1  11/17 Lumbar I&D  Rocephin/Dapto  ID recs  Ortho recs - maintian drain til 11/24, no anticoagulation til 11/24    daptomycin 6-8 mg/kg daily continue ceftriaxone 2 g daily with an end date of 12/31/22 if no worsening abscess or complication

## 2022-11-05 NOTE — CONSULTS
Cardiology Consult Note:    Bethel Dozier M.D.  Date & Time note created:    11/4/2022   5:09 PM     Referring MD:  Dr. Love    Patient ID:   Name:             Abimael Hamilton   YOB: 1955  Age:                 66 y.o.  male   MRN:               [id number]                                                             Reason for Consult:      Pre-op    History of Present Illness:    66-year-old male with a past medical history of CAD status post aortic valve replacement as well as bypass surgery with a stent to his LIMA to LAD graft.  He has been having low-level chest pain chronically with no changes.  Last time he got chest pain was at the gym he was working out and doing aerobic exercises.  He takes a single nitroglycerin and the chest pain goes away.  This been unchanged in pattern for last couple of months.  He is getting surgery for spinal stenosis and we have been asked offer preoperative stratification.  His last stress test was over 2 years ago.  His last echocardiogram showed increased aortic valve gradients with an indexed aortic valve area of 1.0.  He has been having no changes in his shortness of breath and is limited by his chest pain and exercise before he gets shortness of breath.    Review of Systems:      Constitutional: Denies fevers, Denies weight changes  Eyes: Denies changes in vision, no eye pain  Ears/Nose/Throat/Mouth: Denies nasal congestion or sore throat   Cardiovascular: + chest pain, no palpitations   Respiratory: no shortness of breath , Denies cough  Gastrointestinal/Hepatic: Denies abdominal pain, nausea, vomiting, diarrhea, constipation or GI bleeding   Genitourinary: Denies dysuria or frequency  Musculoskeletal/Rheum: Denies  joint pain and swelling, no edema  Skin: Denies rash  Neurological: Denies headache, confusion, memory loss or focal weakness/parasthesias  Psychiatric: denies mood disorder   Endocrine: Alecia thyroid problems  Heme/Oncology/Lymph  Nodes: Denies enlarged lymph nodes, denies brusing or known bleeding disorder  All other systems were reviewed and are negative (AMA/CMS criteria)                Past Medical History:   Past Medical History:   Diagnosis Date    Aortic stenosis 12/1/2015    Arthritis     Benign hypertensive heart disease without heart failure 11/14/2011    CAD (coronary artery disease)     Congestive heart failure (HCC)     Coronary atherosclerosis of autologous vein bypass graft 11/14/2011    Essential hypertension 4/25/2019    Gout     Heart valve disease     High cholesterol     History of pancreatitis     History of pancreatitis     Hypertension     Migraine 2/16/2019    Muscle cramps 10/4/2011    Myocardial infarct (HCC)     Multiple MI's, 1998, 2015    Obesity 11/14/2011    Pain     Joints    Postsurgical aortocoronary bypass status 7/26/2016    Status post redo 3-V CABG in [place] 12/2015: SVG-acute marginal, SVG sequential to LAD, and OM     Postsurgical aortocoronary bypass status 7/26/2016    Status post redo 3-V CABG in [place] 12/2015: SVG-acute marginal, SVG sequential to LAD, and OM     Renal disorder     Hx of Acute renal failure r/t medication/statins    S/P aortic valve replacement with bioprosthetic valve 7/26/2016    #23 [place] Magna AVR (bioprosthetic)     Statin intolerance 7/26/2016    Status post cardiac revascularization with bypass aortocoronary anastomosis of five coronary vessels 7/26/2016    5-V CABG done in 1998 (done in [place] at [facility]), patent LIMA to LAD, occluded SVG-OM, occluded SVG-RCA by cardiac catheterization 11/15/2007 with other vein grafts not identified at that time, poor targets for revascularization and declined repeat CABG in 2007 by [facility]. No ischemic was identified by MPI 11/17/07 with an EF of 50%.      Status post cardiac revascularization with bypass aortocoronary anastomosis of five coronary vessels 7/26/2016    5-V CABG done in 1998 (done in [place] at [facility]), patent LIMA to LAD,  occluded SVG-OM, occluded SVG-RCA by cardiac catheterization 11/15/2007 with other vein grafts not identified at that time, poor targets for revascularization and declined repeat CABG in 2007 by [facility]. No ischemic was identified by MPI 11/17/07 with an EF of 50%.       Active Hospital Problems    Diagnosis     Hypertensive urgency [I16.0]     Degenerative lumbar spinal stenosis [M48.061]     Spinal stenosis of lumbar region (L3-5) with neurogenic claudication and L foot drop/weakness, valvular heart disease [M48.062]     Obesity (BMI 30-39.9) [E66.9]     Dyslipidemia [E78.5]        Past Surgical History:  Past Surgical History:   Procedure Laterality Date    SHOULDER DECOMPRESSION ARTHROSCOPIC Right 9/5/2017    Procedure: SHOULDER DECOMPRESSION ARTHROSCOPIC SUBACROMIAL;  Surgeon: Mg Campos M.D.;  Location: [facility];  Service: Orthopedics    DEBRIDEMENT, LABRUM, HIP, ARTHROSCOPIC Right 9/5/2017    Procedure: ARTHROSCOPIC LABRAL DEBRIDEMENT;  Surgeon: Mg Campos M.D.;  Location: [facility];  Service: Orthopedics    SHOULDER ARTHROSCOPY W/ ROTATOR CUFF REPAIR Right 9/5/2017    Procedure: SHOULDER ARTHROSCOPY W/ ROTATOR CUFF REPAIR;  Surgeon: Mg Campos M.D.;  Location: [facility];  Service: Orthopedics    SHOULDER ARTHROSCOPY W/ BICIPITAL TENODESIS REPAIR Right 9/5/2017    Procedure: SHOULDER ARTHROSCOPY W/ BICIPITAL TENODESIS REPAIR;  Surgeon: Mg Campos M.D.;  Location: [facility];  Service: Orthopedics    SYNOVECTOMY Right 9/5/2017    Procedure: SYNOVECTOMY;  Surgeon: Mg Campos M.D.;  Location: [facility];  Service: Orthopedics    MULTIPLE CORONARY ARTERY BYPASS  12/08/2015    3 way bypass & Bovine valve    LAMINOTOMY  12/2004    Diskectomy L4-L5, L5-S1    MULTIPLE CORONARY ARTERY BYPASS  11/11/1998    5 way bypass    BIOPSY GENERAL      buttock lesion    EYE SURGERY      MITRAL  VALVE REPLACE         Hospital Medications:  Current Facility-Administered Medications   Medication Dose    allopurinol (ZYLOPRIM) tablet 100 mg  100 mg    amLODIPine (NORVASC) tablet 5 mg  5 mg    [Held by provider] aspirin EC (ECOTRIN) tablet 81 mg  81 mg    [Held by provider] clopidogrel (PLAVIX) tablet 75 mg  75 mg    cyclobenzaprine (Flexeril) tablet 10 mg  10 mg    isosorbide mononitrate SR (IMDUR) tablet 120 mg  120 mg    lisinopril (PRINIVIL) tablet 20 mg  20 mg    metoprolol SR (TOPROL XL) tablet 100 mg  100 mg    nitroglycerin (NITROSTAT) tablet 0.4 mg  0.4 mg    senna-docusate (PERICOLACE or SENOKOT S) 8.6-50 MG per tablet 2 Tablet  2 Tablet    And    polyethylene glycol/lytes (MIRALAX) PACKET 1 Packet  1 Packet    And    magnesium hydroxide (MILK OF MAGNESIA) suspension 30 mL  30 mL    And    bisacodyl (DULCOLAX) suppository 10 mg  10 mg    acetaminophen (Tylenol) tablet 650 mg  650 mg    Pharmacy Consult Request ...Pain Management Review 1 Each  1 Each    ondansetron (ZOFRAN) syringe/vial injection 4 mg  4 mg    ondansetron (ZOFRAN ODT) dispertab 4 mg  4 mg    labetalol (NORMODYNE/TRANDATE) injection 10 mg  10 mg    diazePAM (VALIUM) tablet 5 mg  5 mg    LORazepam (ATIVAN) injection 1 mg  1 mg    hydrALAZINE (APRESOLINE) injection 20 mg  20 mg    enalaprilat (Vasotec) injection 1.25 mg 1 mL  1.25 mg    HYDROmorphone (Dilaudid) injection 1 mg  1 mg    Or    oxyCODONE immediate-release (ROXICODONE) tablet 5 mg  5 mg    Or    oxyCODONE immediate release (ROXICODONE) tablet 10 mg  10 mg    dexamethasone (DECADRON) injection 4 mg  4 mg    gabapentin (NEURONTIN) capsule 300 mg  300 mg         Current Outpatient Medications:  Medications Prior to Admission   Medication Sig Dispense Refill Last Dose    gabapentin (NEURONTIN) 300 MG Cap Take 300 mg by mouth 1 time a day as needed (Pain).   11/3/2022 at 2345    acetaminophen (TYLENOL) 500 MG Tab Take 500-1,000 mg by mouth every 6 hours as needed. Indications:  Pain   11/4/2022 at 0700    metoprolol SR (TOPROL XL) 100 MG TABLET SR 24 HR Take 1 Tablet by mouth every day. 90 Tablet 3 ABOUT 1 WEEK AGO at [facility]    allopurinol (ZYLOPRIM) 100 MG Tab Take 1 Tablet by mouth every day for 360 days. allopurinol 100 mg tablet 90 Tablet 3 ABOUT 1 WEEK AGO at [facility]    isosorbide mononitrate (IMDUR) 120 MG CR tablet Take 1 Tablet by mouth every morning. 90 Tablet 3 ABOUT 1 WEEK AGO at [facility]    lisinopril (PRINIVIL) 20 MG Tab Take 1 Tablet by mouth every day. 90 Tablet 2 ABOUT 1 WEEK AGO at [facility]    amLODIPine (NORVASC) 5 MG Tab Take 1 Tablet by mouth every day for 90 days. 90 Tablet 3 ABOUT 1 WEEK AGO at [facility]    clopidogrel (PLAVIX) 75 MG Tab Take 1 Tablet by mouth every day. 90 Tablet 3 ABOUT 1 WEEK AGO at [facility]    indomethacin (INDOCIN) 50 MG Cap TAKE ONE CAPSULE BY MOUTH TWICE DAILY AS NEEDED (Patient taking differently: Take 50 mg by mouth 2 times a day as needed (Gout). TAKE ONE CAPSULE BY MOUTH TWICE DAILY AS NEEDED) 30 Capsule 3 FEW DAYS AGO at [facility]    cyclobenzaprine (FLEXERIL) 10 mg Tab TAKE ONE TABLET BY MOUTH THREE TIMES DAILY FOR 7 DAYS (Patient taking differently: Take 10 mg by mouth 3 times a day as needed for Muscle Spasms. TAKE ONE TABLET BY MOUTH THREE TIMES DAILY FOR 7 DAYS) 30 Tablet 2 11/3/2022 at 2345    Evolocumab, REPATHA, (REPATHA SURECLICK) 140 MG/ML Solution Auto-injector Inject 1 Each under the skin every 14 days. 2 mL 11 11/3/2022 at PM    Oxycodone HCl 20 MG Tab Take 20 mg by mouth every 6 hours as needed (Pain).   11/3/2022 at 2345    nitroglycerin (NITROSTAT) 0.4 MG SL Tab PLACE 1TAB UNDER TONGUE AS NEEDED FOR CHEST PAIN(1TAB EVERY 5 MINS,MAX 3DOSES AS NEEDED)IF NO RELIEF CALL 911 (Patient taking differently: Place 0.4 mg under the tongue as needed for Chest Pain.) 25 Tablet 1 FEW WEEKS AGO at [facility]    aspirin 81 MG EC tablet Take 1 Tab by mouth every day. 30 Tab 11 ABOUT 1 WEEK AGO at [facility]       Medication Allergy:  Allergies   Allergen Reactions    Fenofibrate  "Unspecified     Dehydration, severe muscle cramps     Gemfibrozil Unspecified     Dehydration,Severe Muscle cramps    Lipitor [Atorvastatin Calcium] Unspecified     Severe Muscle cramps, dehydration    Lovastatin Unspecified     Dehydration, severe muscle cramps    Tricor Unspecified     Dehydration, severe muscle cramps       Family History:  Family History   Problem Relation Age of Onset    Heart Attack Father         MI and CABG, unknown age    Hyperlipidemia Father     Cancer Mother         Breast    Heart Disease Brother     Arthritis Maternal Grandmother     Stroke Neg Hx     Diabetes Neg Hx     Lung Disease Neg Hx        Social History:  Social History     Socioeconomic History    Marital status:      Spouse name: Not on file    Number of children: Not on file    Years of education: Not on file    Highest education level: GED or equivalent   Occupational History    Not on file   Tobacco Use    Smoking status: Former     Packs/day: 3.00     Years: 20.00     Pack years: 60.00     Types: Cigarettes     Quit date: 1992     Years since quittin.8    Smokeless tobacco: Never   Vaping Use    Vaping Use: Never used   Substance and Sexual Activity    Alcohol use: Not Currently    Drug use: Yes     Types: Marijuana, Inhaled     Comment: Marijuana- Daily (4 times per day)    Sexual activity: Yes     Partners: Female   Other Topics Concern    Not on file   Social History Narrative    Not on file     Social Determinants of Health     Financial Resource Strain: Not on file   Food Insecurity: Not on file   Transportation Needs: Not on file   Physical Activity: Not on file   Stress: Not on file   Social Connections: Not on file   Intimate Partner Violence: Not on file   Housing Stability: Not on file         Physical Exam:  Vitals/ General Appearance:   Weight/BMI: Body mass index is 35.12 kg/m².  BP (!) 163/79   Pulse 79   Temp 36.5 °C (97.7 °F) (Temporal)   Resp 18   Ht 1.676 m (5' 6\")   Wt 98.7 kg (217 " "lb 9.5 oz)   SpO2 97%   Vitals:    11/04/22 0950 11/04/22 1001 11/04/22 1029 11/04/22 1100   BP:  (!) 165/85 (!) 149/77 (!) 163/79   Pulse:  72 74 79   Resp: 15  16 18   Temp:    36.5 °C (97.7 °F)   TempSrc:    Temporal   SpO2:  96% 94% 97%   Weight:    98.7 kg (217 lb 9.5 oz)   Height:    1.676 m (5' 6\")     Oxygen Therapy:  Pulse Oximetry: 97 %, O2 (LPM): 2, O2 Delivery Device: Nasal Cannula    Constitutional:   Well developed, Well nourished, No acute distress  HENMT:  Normocephalic, Atraumatic, Oropharynx moist mucous membranes, No oral exudates, Nose normal.  No thyromegaly.  Eyes:  EOMI, Conjunctiva normal, No discharge.  Neck:  Normal range of motion, No cervical tenderness,  no JVD.  Cardiovascular:  Normal heart rate, Normal rhythm, No murmurs, No rubs, No gallops.   Extremitites with intact distal pulses, no cyanosis, or edema.  Lungs:  Normal breath sounds, breath sounds clear to auscultation bilaterally,  no crackles, no wheezing.   Abdomen: Bowel sounds normal, Soft, No tenderness, No guarding, No rebound, No masses, No hepatosplenomegaly.  Skin: Warm, Dry, No erythema, No rash, no induration.  Neurologic: Alert & oriented x 3, No focal deficits noted, cranial nerves II through X are grossly intact.  Psychiatric: Affect normal, Judgment normal, Mood normal.      MDM (Data Review):     Records reviewed and summarized in current documentation    Lab Data Review:  Recent Results (from the past 24 hour(s))   CBC WITH DIFFERENTIAL    Collection Time: 11/04/22  8:38 AM   Result Value Ref Range    WBC 10.4 4.8 - 10.8 K/uL    RBC 5.69 4.70 - 6.10 M/uL    Hemoglobin 17.7 14.0 - 18.0 g/dL    Hematocrit 52.9 (H) 42.0 - 52.0 %    MCV 93.0 81.4 - 97.8 fL    MCH 31.1 27.0 - 33.0 pg    MCHC 33.5 (L) 33.7 - 35.3 g/dL    RDW 45.6 35.9 - 50.0 fL    Platelet Count 243 164 - 446 K/uL    MPV 9.3 9.0 - 12.9 fL    Neutrophils-Polys 72.90 (H) 44.00 - 72.00 %    Lymphocytes 19.00 (L) 22.00 - 41.00 %    Monocytes 6.10 0.00 - " 13.40 %    Eosinophils 1.20 0.00 - 6.90 %    Basophils 0.40 0.00 - 1.80 %    Immature Granulocytes 0.40 0.00 - 0.90 %    Nucleated RBC 0.00 /100 WBC    Neutrophils (Absolute) 7.58 (H) 1.82 - 7.42 K/uL    Lymphs (Absolute) 1.98 1.00 - 4.80 K/uL    Monos (Absolute) 0.64 0.00 - 0.85 K/uL    Eos (Absolute) 0.13 0.00 - 0.51 K/uL    Baso (Absolute) 0.04 0.00 - 0.12 K/uL    Immature Granulocytes (abs) 0.04 0.00 - 0.11 K/uL    NRBC (Absolute) 0.00 K/uL   COMP METABOLIC PANEL    Collection Time: 11/04/22  8:38 AM   Result Value Ref Range    Sodium 138 135 - 145 mmol/L    Potassium 4.7 3.6 - 5.5 mmol/L    Chloride 100 96 - 112 mmol/L    Co2 25 20 - 33 mmol/L    Anion Gap 13.0 7.0 - 16.0    Glucose 135 (H) 65 - 99 mg/dL    Bun 31 (H) 8 - 22 mg/dL    Creatinine 1.20 0.50 - 1.40 mg/dL    Calcium 9.8 8.5 - 10.5 mg/dL    AST(SGOT) 24 12 - 45 U/L    ALT(SGPT) 23 2 - 50 U/L    Alkaline Phosphatase 72 30 - 99 U/L    Total Bilirubin 0.6 0.1 - 1.5 mg/dL    Albumin 4.6 3.2 - 4.9 g/dL    Total Protein 8.0 6.0 - 8.2 g/dL    Globulin 3.4 1.9 - 3.5 g/dL    A-G Ratio 1.4 g/dL   CRP QUANTITIVE (NON-CARDIAC)    Collection Time: 11/04/22  8:38 AM   Result Value Ref Range    Stat C-Reactive Protein <0.30 0.00 - 0.75 mg/dL   ESTIMATED GFR    Collection Time: 11/04/22  8:38 AM   Result Value Ref Range    GFR (CKD-EPI) 67 >60 mL/min/1.73 m 2   Sed Rate    Collection Time: 11/04/22  8:38 AM   Result Value Ref Range    Sed Rate Westergren 4 0 - 20 mm/hour   CRP QUANTITIVE (NON-CARDIAC)    Collection Time: 11/04/22  2:14 PM   Result Value Ref Range    Stat C-Reactive Protein <0.30 0.00 - 0.75 mg/dL   EC-ECHOCARDIOGRAM COMPLETE W/O CONT    Collection Time: 11/04/22  3:22 PM   Result Value Ref Range    Eject.Frac. MOD 4C 63.14     Left Ventrical Ejection Fraction 65        Imaging/Procedures Review:    Chest Xray:  Reviewed    EKG:   Dated 4/7/2022 personally reviewed and interpreted myself showing normal sinus rhythm with an interventricular conduction  delay.    ECHO: Dated 11/4/2022 personally viewed inter myself showing  CONCLUSIONS  Compared to the prior study on 12-2-20, velocity across aortic valve is   slightly worse.  The left ventricular ejection fraction is visually estimated to be 65%.  Known bioprosthetic aortic valve .  Moderate aortic valve stenosis. Vmax is 3.8 m/s.  Aortic valve area calculated from the continuity equation is 1.3 cm2.  MDM (Assessment and Plan):     Active Hospital Problems    Diagnosis     Hypertensive urgency [I16.0]     Degenerative lumbar spinal stenosis [M48.061]     Spinal stenosis of lumbar region (L3-5) with neurogenic claudication and L foot drop/weakness, valvular heart disease [M48.062]     Obesity (BMI 30-39.9) [E66.9]     Dyslipidemia [E78.5]      66-year-old male with chronic stable angina with CAD status post bypass with a stent to his LIMA to LAD graft.  For his preoperative stratification I am thinking he will be moderate risk with no increased risk.  We will get a nuclear stress test tomorrow to rule stratify his ischemia.  However given his high functional capacity and lack of chest pain at rest or with low-level exertion we likely would still say that he is moderate risk with no further testing needed.  We will await the results of the stress test.

## 2022-11-06 NOTE — PROGRESS NOTES
"Neurosurgery  HD#3  11/5/2022  Patient brought in by ambulance for severe uncontrolled back pain over the last 2 weeks with new weakness in the left lower extremity and radicular pain.  Pain unchanged this morning, was able to stand independently and transfer from wheel chair to bed.   Continues to deny saddle anesthesia or incontinence.  Wife at bedside  NM cardiac stress test this morning, results pending  MRI w/ contrast reviewed, shows new 10 x 6 x 19 mm sized posterior epidural abscess at the level of L2-3 resulting in severe lumbar stenosis. This was reviewed with Dr. Sparks last night, he would like to await cardiac risk stratification and optimization and plans for OR on Monday.  ESR, CRP were negative. Blood cultures pending.  Patient was started on antibiotics last night  Plavix and ASA have been held  WBC elevated at 15.5 today although he has been on steroids.    11/6/2022  No changes overnight  Continues with pain in left foot and low back.  Denies in incontinence or saddle anesthesia  Pain poorly controlled  All questions answered        Objective:  BP (!) 140/82   Pulse 72   Temp 37 °C (98.6 °F) (Temporal)   Resp 16   Ht 1.676 m (5' 6\")   Wt 98.7 kg (217 lb 9.5 oz)   SpO2 95%     Intake/Output Summary (Last 24 hours) at 11/5/2022 0901  Last data filed at 11/4/2022 1930  Gross per 24 hour   Intake 50 ml   Output 1451 ml   Net -1401 ml       Recent Labs     11/04/22  0838 11/05/22  0312 11/06/22  0113   WBC 10.4 15.5* 14.8*   RBC 5.69 5.15 5.23   HEMOGLOBIN 17.7 15.9 16.1   HEMATOCRIT 52.9* 46.7 47.9   MCV 93.0 90.7 91.6   MCH 31.1 30.9 30.8   MCHC 33.5* 34.0 33.6*   RDW 45.6 44.8 44.8   PLATELETCT 243 256 260   MPV 9.3 9.4 9.6     Recent Labs     11/04/22  0838 11/05/22  0312 11/06/22  0113   SODIUM 138 138 137   POTASSIUM 4.7 4.8 4.1   CHLORIDE 100 103 102   CO2 25 22 21   GLUCOSE 135* 154* 146*   BUN 31* 34* 33*         HTN yesterday, this is improved. VSS  Kidney function sluggish. WBC elevated " at 15.5 on steroids. Otherwise labs are stable.  Strength:  Lower extremities strength testing reveals:  Left EHL/DF were 0/5.  Remainder of lower extremity strength was 4/5 limited secondary to pain.   SLR was positive bilaterally  Upper extremities are 5/5 grossly  Otherwise neurologically intact  Patient has poor dentition with multiple dental caries.    Assessment:  Active Hospital Problems    Diagnosis     Coronary artery disease of native artery of native heart with stable angina pectoris (HCC) [I25.118]      Priority: Medium     Extensive history of CAD with 5 vessel CABG in 1998, redo three-vessel CABG in 2015, stent placement in 2018.  This is complicated by history of familial hyper lipidemia with statin intolerance and currently treating with Repatha as well as a history of hypertension.      Obesity (BMI 30-39.9) [E66.9]      Priority: Low    Dyslipidemia [E78.5]      Priority: Low    Epidural abscess [G06.2]     Hypertensive urgency [I16.0]     Degenerative lumbar spinal stenosis [M48.061]     Spinal stenosis of lumbar region (L3-5) with neurogenic claudication and L foot drop/weakness, valvular heart disease [M48.062]      HD#3  Chemoprophylaxis: Please hold in anticipation of surgery on Monday    Case has been d/w Dr. Oconnell and Dr. Sparks.     Plan:  1. Continue neuro checks - if neuro exam worsens, will expedite surgery - notify myself with any neuro changes  2. IV abx and blood cultures appropriate  3. NPO Midnight on Sunday, hold blood thinners

## 2022-11-06 NOTE — PROGRESS NOTES
Cardiology Follow Up Progress Note    Date of Service  11/6/2022    Attending Physician  Jayant Morris M.D.    Chief Complaint   Pre-op    HPI  Abimael Hamilton is a 66 y.o. male admitted 11/4/2022 with Pre-op    Interim Events  Stress test yesterday  Mild ischemia in the basal lateral wall  Planning surgery tomorrow  No chest pain      Review of Systems  Review of Systems   Constitutional:  Negative for activity change, appetite change, chills and diaphoresis.   Eyes:  Negative for pain, discharge, redness and itching.   Respiratory:  Negative for cough, choking, chest tightness and stridor.    Cardiovascular:  Negative for palpitations.   Gastrointestinal:  Negative for abdominal distention, abdominal pain, constipation, diarrhea and nausea.   Endocrine: Negative for cold intolerance, heat intolerance, polydipsia and polyphagia.   Genitourinary:  Negative for difficulty urinating, dysuria, enuresis, flank pain, frequency, genital sores and hematuria.   Musculoskeletal:  Positive for back pain. Negative for gait problem, joint swelling and myalgias.   Skin:  Negative for color change, pallor and rash.   Neurological:  Negative for tremors, seizures, syncope, speech difficulty, weakness, light-headedness, numbness and headaches.   Hematological:  Negative for adenopathy. Does not bruise/bleed easily.   Psychiatric/Behavioral:  Negative for agitation, behavioral problems, confusion, decreased concentration and hallucinations. The patient is not nervous/anxious.      Vital signs in last 24 hours  Temp:  [36.7 °C (98 °F)-37.5 °C (99.5 °F)] 37 °C (98.6 °F)  Pulse:  [72-82] 72  Resp:  [16-18] 16  BP: (123-148)/(68-84) 140/82  SpO2:  [93 %-95 %] 95 %    Physical Exam  Physical Exam  Vitals and nursing note reviewed.   Constitutional:       General: He is not in acute distress.     Appearance: Normal appearance. He is normal weight. He is not ill-appearing, toxic-appearing or diaphoretic.   HENT:      Head: Normocephalic  and atraumatic.      Right Ear: Ear canal and external ear normal.      Left Ear: Ear canal and external ear normal.      Nose: Nose normal. No congestion or rhinorrhea.      Mouth/Throat:      Mouth: Mucous membranes are moist.      Pharynx: Oropharynx is clear. No oropharyngeal exudate or posterior oropharyngeal erythema.   Eyes:      General: No scleral icterus.        Right eye: No discharge.         Left eye: No discharge.      Extraocular Movements: Extraocular movements intact.      Conjunctiva/sclera: Conjunctivae normal.      Pupils: Pupils are equal, round, and reactive to light.   Neck:      Vascular: No carotid bruit.   Cardiovascular:      Rate and Rhythm: Normal rate and regular rhythm.      Pulses: Normal pulses.      Heart sounds: Normal heart sounds. No murmur heard.    No gallop.   Pulmonary:      Effort: Pulmonary effort is normal. No respiratory distress.      Breath sounds: Normal breath sounds. No stridor. No wheezing, rhonchi or rales.   Abdominal:      General: Abdomen is flat. Bowel sounds are normal. There is no distension.      Palpations: Abdomen is soft. There is no mass.      Tenderness: There is no abdominal tenderness. There is no guarding or rebound.      Hernia: No hernia is present.   Musculoskeletal:         General: No swelling or tenderness. Normal range of motion.      Cervical back: Normal range of motion and neck supple. No rigidity. No muscular tenderness.      Right lower leg: No edema.      Left lower leg: No edema.   Lymphadenopathy:      Cervical: No cervical adenopathy.   Skin:     General: Skin is warm and dry.      Capillary Refill: Capillary refill takes 2 to 3 seconds.      Coloration: Skin is not jaundiced or pale.      Findings: No bruising or lesion.   Neurological:      General: No focal deficit present.      Mental Status: He is alert and oriented to person, place, and time. Mental status is at baseline.      Cranial Nerves: No cranial nerve deficit.       Motor: No weakness.   Psychiatric:         Mood and Affect: Mood normal.         Behavior: Behavior normal.         Thought Content: Thought content normal.         Judgment: Judgment normal.       Lab Review  Lab Results   Component Value Date/Time    WBC 14.8 (H) 11/06/2022 01:13 AM    RBC 5.23 11/06/2022 01:13 AM    HEMOGLOBIN 16.1 11/06/2022 01:13 AM    HEMATOCRIT 47.9 11/06/2022 01:13 AM    MCV 91.6 11/06/2022 01:13 AM    MCH 30.8 11/06/2022 01:13 AM    MCHC 33.6 (L) 11/06/2022 01:13 AM    MPV 9.6 11/06/2022 01:13 AM      Lab Results   Component Value Date/Time    SODIUM 137 11/06/2022 01:13 AM    POTASSIUM 4.1 11/06/2022 01:13 AM    CHLORIDE 102 11/06/2022 01:13 AM    CO2 21 11/06/2022 01:13 AM    GLUCOSE 146 (H) 11/06/2022 01:13 AM    BUN 33 (H) 11/06/2022 01:13 AM    CREATININE 0.92 11/06/2022 01:13 AM    BUNCREATRAT 16 07/29/2016 10:22 AM      Lab Results   Component Value Date/Time    ASTSGOT 22 11/06/2022 01:13 AM    ALTSGPT 17 11/06/2022 01:13 AM     Lab Results   Component Value Date/Time    CHOLSTRLTOT 164 08/10/2022 08:21 AM    LDL see below 08/10/2022 08:21 AM    HDL 26 (A) 08/10/2022 08:21 AM    TRIGLYCERIDE 446 (H) 08/10/2022 08:21 AM    TROPONINT 23 (H) 04/08/2022 02:13 AM       No results for input(s): NTPROBNP in the last 72 hours.    Cardiac Imaging and Procedures Review  Dated 4/7/2022 personally reviewed and interpreted myself showing normal sinus rhythm with an interventricular conduction delay.     ECHO: Dated 11/4/2022 personally viewed inter myself showing  CONCLUSIONS  Compared to the prior study on 12-2-20, velocity across aortic valve is   slightly worse.  The left ventricular ejection fraction is visually estimated to be 65%.  Known bioprosthetic aortic valve .  Moderate aortic valve stenosis. Vmax is 3.8 m/s.  Aortic valve area calculated from the continuity equation is 1.3 cm2.    Assessment/Plan  No new Assessment & Plan notes have been filed under this hospital service since the  last note was generated.  Service: Cardiology  66-year-old male with CAD status post bypass with native valve replacement.  He is able to tolerate a moderate amount of exercise with no chest pain at the gym.  He has a mild amount of ischemia in his lateral wall.  Therefore based upon the CARP trial there would be no benefit to further cardiac testing.  He is moderate risk for surgery.       Thank you for allowing me to participate in the care of this patient.  Cardiology will sign off on this patient    Please contact me with any questions.    Bethel Dozier M.D.   Cardiologist, [facility]  [phone]

## 2022-11-06 NOTE — HOSPITAL COURSE
This is a 66 -year-old male with a past medical history significant for hypertension, hyperlipidemia, CAD status post CABG in 1998, 2016, aortic valve replacement in 2016, post stent in 4/2022, morbid obesity, gout, L3 L5 severe spinal stenosis with left foot drop, chronic back pain was admitted on 11/4/2022 with worsening of lower back pain.     MRI of the lumbar spine  on 11/04 noted 10 x 6 x 19 mm posterior epidural abscess at the level of L2-L3 which is new from previous study. There is severe central canal stenosis at this level.  Severe degenerative central canal stenosis at L3-L4 and L4-5.     Cardiology consulted for preop clearance given patient significant cardiac history, nuclear stress test noted small area of ischemia involving the lateral wall.  Patient cleared by cardiology, moderate risk for the surgery.      Surgery was consulted, patient went to the OR with Dr. Sparks on 11/7/2022.  Biopsy was negative, patient was initially started on IV Rocephin.  Infectious disease was consulted.  They recommended 6-week IV antibiotic course and on discharge can transition to daptomycin 6MG/KG daily and continue ceftriaxone 2 g daily with an end date of 12/19/2022.       MR C/T/L-spine was ordered with request of Dr. 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  Postop changes at L2-L3, L3-L4, L4-L5 with a fluid collection that impinges upon the dorsal aspect of thecal sac and causing severe cauda equina compression.  Fluid collection could represent CSF leak or seroma.  Dr. Sparks was contacted, patient underwent irrigation debridement of deep lumbar wound on 11/20/2022.  Cultures no growth to date.     Continue to follow the patient, recommended continuing IV vancomycin and Rocephin while being in the hospital.  Switching to daptomycin plus Rocephin at the time of discharge with the last day  being 12/31/2022.    During the stay in the hospital, patient was having intermittent chest pain, I reached out to spine surgery Dr. Lamar along with cardiology, patient was started on aspirin, Plavix will continue Imdur 120 mg p.o. daily, Toprol-XL 1 mg p.o. daily, lisinopril 20 mg p.o. daily, amlodipine 10 mg p.o. daily.  He will follow-up with cardiology as an outpatient, follow-up appointment on 12/8/2022.    Patient does have an appointment at [facility] center for continuation of IV antibiotics.  At this time patient medically stable to be discharged home

## 2022-11-06 NOTE — DISCHARGE PLANNING
Care Transition Team Assessment    Information Source  Orientation Level: Oriented X4  Information Given By: Patient  Informant's Name: CATHERINE MORELOS  Who is responsible for making decisions for patient? : Patient    Readmission Evaluation  Is this a readmission?: No    Elopement Risk  Legal Hold: No  Ambulatory or Self Mobile in Wheelchair: No-Not an Elopement Risk  Elopement Risk: Not at Risk for Elopement    Interdisciplinary Discharge Planning  Primary Care Physician: JAVIER PIERRE  Lives with - Patient's Self Care Capacity: Spouse  Patient or legal guardian wants to designate a caregiver: No  Support Systems: Spouse / Significant Other  Housing / Facility: [address] [place] [address]  Prior Services: None  Patient Prefers to be Discharged to:: Home  Assistance Needed: Unknown at this Time  Durable Medical Equipment: Not Applicable    Discharge Preparedness  What is your plan after discharge?: Home health care  What are your discharge supports?: Spouse  Prior Functional Level: Ambulatory    Functional Assesment  Prior Functional Level: Ambulatory         Vision / Hearing Impairment  Vision Impairment : No  Hearing Impairment : No              Domestic Abuse  Have you ever been the victim of abuse or violence?: No  Physical Abuse or Sexual Abuse: No  Verbal Abuse or Emotional Abuse: No  Possible Abuse/Neglect Reported to:: Not Applicable              Anticipated Discharge Information  Discharge Disposition: D/T to home under [facility]HA care in anticipation of covered skilled care (06)  Discharge Address: [address] [place] [id number]  Discharge Contact Phone Number: [phone]

## 2022-11-06 NOTE — PROGRESS NOTES
Hospital Medicine Daily Progress Note    Date of Service  11/6/2022    Chief Complaint  Abimael Hamilton is a 66 y.o. male admitted 11/4/2022 with worsening back pain    Hospital Course  This is a 66-year-old male with past medical history of hypertension, hyperlipidemia, CAD s/p native valve replacement, gout, L3-L5 severe spinal stenosis with left foot drop and chronic back pain and obesity who was admitted on 11/4/2022 with worsening lower back pain.    MRI of the lumbar spine noted 10 x 6 x 19 mm posterior epidural abscess at the level of L2-L3 which is new from previous study.  There is severe central canal stenosis at this level.  Severe degenerative central canal stenosis at L3-L4 and L4-5.     Neurosurgery consulted, patient is for OR tentatively on Monday 11/7.  Patient started on broad-spectrum antibiotics.    Cardiology consulted for preop clearance given patient significant cardiac history, nuclear stress test noted small area of ischemia involving the lateral wall.  Patient cleared by cardiology, moderate risk for the surgery.    Interval Problem Update  Cardiology consulted for preop clearance given patient significant cardiac history, nuclear stress test noted small area of ischemia involving the lateral wall.  Patient cleared by cardiology, moderate risk for the surgery.  Patient is for OR 11/7.    Once we have OR cultures, will consult ID for antibiotic regimen.  Anticipate he will likely need IV antibiotics.     Patient to be evaluated by PT/OT postoperatively to determine discharge needs in terms of home versus SNF/IRF.    I have discussed this patient's plan of care and discharge plan at IDT rounds today with Case Management, Nursing, Nursing leadership, and other members of the IDT team.    Consultants/Specialty  cardiology and neurosurgery    Code Status  Full Code    Disposition  Patient is not medically cleared for discharge.   Anticipate discharge to   vs SNF vs IRF .  I have placed the  appropriate orders for post-discharge needs.    Review of Systems  Review of Systems   All other systems reviewed and are negative.     Physical Exam  Temp:  [36.7 °C (98 °F)-37.5 °C (99.5 °F)] 37 °C (98.6 °F)  Pulse:  [72-82] 72  Resp:  [16-18] 16  BP: (123-148)/(68-84) 140/82  SpO2:  [93 %-95 %] 95 %    Physical Exam  Vitals and nursing note reviewed.   Constitutional:       General: He is not in acute distress.     Appearance: Normal appearance.   HENT:      Head: Normocephalic and atraumatic.   Eyes:      Extraocular Movements: Extraocular movements intact.      Conjunctiva/sclera: Conjunctivae normal.      Pupils: Pupils are equal, round, and reactive to light.   Cardiovascular:      Rate and Rhythm: Normal rate and regular rhythm.      Pulses: Normal pulses.      Heart sounds: No murmur heard.    No friction rub. No gallop.   Pulmonary:      Effort: Pulmonary effort is normal. No respiratory distress.      Breath sounds: Normal breath sounds. No wheezing, rhonchi or rales.   Abdominal:      General: Bowel sounds are normal. There is no distension.      Palpations: Abdomen is soft.      Tenderness: There is no abdominal tenderness.   Musculoskeletal:         General: No swelling or tenderness. Normal range of motion.      Cervical back: Normal range of motion and neck supple. No muscular tenderness.      Right lower leg: No edema.      Left lower leg: No edema.      Comments: Left foot drop   Skin:     General: Skin is warm and dry.      Capillary Refill: Capillary refill takes less than 2 seconds.      Findings: No bruising, erythema or rash.   Neurological:      General: No focal deficit present.      Mental Status: He is alert and oriented to person, place, and time.       Fluids    Intake/Output Summary (Last 24 hours) at 11/6/2022 1054  Last data filed at 11/6/2022 0857  Gross per 24 hour   Intake 240 ml   Output --   Net 240 ml       Laboratory  Recent Labs     11/04/22  0838 11/05/22  0312 11/06/22  0113    WBC 10.4 15.5* 14.8*   RBC 5.69 5.15 5.23   HEMOGLOBIN 17.7 15.9 16.1   HEMATOCRIT 52.9* 46.7 47.9   MCV 93.0 90.7 91.6   MCH 31.1 30.9 30.8   MCHC 33.5* 34.0 33.6*   RDW 45.6 44.8 44.8   PLATELETCT 243 256 260   MPV 9.3 9.4 9.6     Recent Labs     11/04/22  0838 11/05/22  0312 11/06/22  0113   SODIUM 138 138 137   POTASSIUM 4.7 4.8 4.1   CHLORIDE 100 103 102   CO2 25 22 21   GLUCOSE 135* 154* 146*   BUN 31* 34* 33*   CREATININE 1.20 1.26 0.92   CALCIUM 9.8 9.5 9.7                   Imaging  NM-CARDIAC STRESS TEST   Final Result      MR-LUMBAR SPINE-WITH & W/O   Final Result      1.  There is an approximately 10 x 6 x 19 mm sized posterior epidural abscess at the level of L2-3. This is new since the previous study. There is severe central canal stenosis at this level.   2.  Severe degenerative central canal stenosis at L3-4 and L4-5.   3.  Degenerative disease as described above.   4.  There is an approximately 1.9 cm sized T2 hypointense lesion in the right kidney likely representing complicated cyst. There is also a simple cyst in the right kidney. This is unchanged since the previous study.      CT-HEAD W/O   Final Result      1.  Head CT without contrast within normal limits. No evidence of acute cerebral infarction, hemorrhage or mass lesion.   2.  Atherosclerotic vascular calcification.         EC-ECHOCARDIOGRAM COMPLETE W/O CONT   Final Result           Assessment/Plan  * Spinal stenosis of lumbar region (L3-5) with neurogenic claudication and L foot drop/weakness, valvular heart disease  Assessment & Plan  Pain control and muscle relaxers  MRI LS spine done was notable for 10 x 6 x 19 mm posterior epidural abscess at the level of L2-L3 which is new from previous study.  There is severe central canal stenosis at this level.  Severe degenerative central canal stenosis at L3-L4 and L4-5.     Currently on broad-spectrum IV Abx (follow cultures)  Decadron IV  OR tentatively planned for 11/7         Epidural  "abscess- (present on admission)  Assessment & Plan  See above    Hypertensive urgency  Assessment & Plan  PRN IV antihypertensives  Continue amlodipine    Obesity (BMI 30-39.9)- (present on admission)  Assessment & Plan  On admission:   \"Recommended weight loss advised, 5% through reduced calorie, low carb diet and 150 mins of exercise a week once better  Recommend bariatric surgery evaluation if morbidly obese  Educated on the increase of morbidity and mortality associated with excess weight including DM, Heart Disease, HTN, stroke, and sleep apnea. Recommended outpatient monitoring  of blood sugars, lipid panel, sleep study evaluation for metabolic syndrome.\"      Dyslipidemia- (present on admission)  Assessment & Plan  Hold home evolocumab    Coronary artery disease of native artery of native heart with stable angina pectoris (HCC)- (present on admission)  Assessment & Plan  C/w metoprolol and lisinopril     Pt underwent NST this AM - a small tomoderate sized focus of ischemia involving the lateral wall within the cardiac base and midzone. Findings were discussed with cardiology Dr. Dozier - no further inpatient cardiology evaluation/procedure recommended. Pt is a moderate risk patient.     Holding ASA and Plavix for OR         VTE prophylaxis: SCDs/TEDs    I have performed a physical exam and reviewed and updated ROS and Plan today (11/6/2022). In review of yesterday's note (11/5/2022), there are no changes except as documented above.        "

## 2022-11-07 NOTE — THERAPY
PT Contact Note    Pt to OR today for L2-S1 lami, plan to follow-up post-op tomorrow as appropriate for mobility eval.    Elly Duffy, PT, DPT  Ext. [phone]

## 2022-11-07 NOTE — CARE PLAN
The patient is Stable - Low risk of patient condition declining or worsening    Shift Goals  Clinical Goals: Pain control, safety  Patient Goals: Pain control, surgery tomorrow  Family Goals: Rest, comfort    Progress made toward(s) clinical / shift goals:  Patient's is being controlled with PRN medications. Patient verbalizes understanding of safety precautions.     Patient is not progressing towards the following goals: N/A

## 2022-11-07 NOTE — PROGRESS NOTES
Hospital Medicine Daily Progress Note    Date of Service  11/7/2022    Chief Complaint  Abimael Hamilton is a 66 y.o. male admitted 11/4/2022 with worsening back pain    Hospital Course  This is a 66-year-old male with past medical history of hypertension, hyperlipidemia, CAD s/p native valve replacement, gout, L3-L5 severe spinal stenosis with left foot drop and chronic back pain and obesity who was admitted on 11/4/2022 with worsening lower back pain.    MRI of the lumbar spine noted 10 x 6 x 19 mm posterior epidural abscess at the level of L2-L3 which is new from previous study. There is severe central canal stenosis at this level.  Severe degenerative central canal stenosis at L3-L4 and L4-5. Neurosurgery consulted, patient is for OR tentatively on Monday 11/7. MRSA negative, currently on Rocephin.    Cardiology consulted for preop clearance given patient significant cardiac history, nuclear stress test noted small area of ischemia involving the lateral wall.  Patient cleared by cardiology, moderate risk for the surgery.     Interval Problem Update  Patient cleared by cardiology, moderate risk for the surgery.  Patient is for OR  today.    Once we have OR cultures, will consult ID for antibiotic regimen.  Anticipate he will likely need IV antibiotics.     Patient to be evaluated by PT/OT postoperatively to determine discharge needs in terms of home versus SNF/IRF.    I have discussed this patient's plan of care and discharge plan at IDT rounds today with Case Management, Nursing, Nursing leadership, and other members of the IDT team.    Consultants/Specialty  cardiology and neurosurgery    Code Status  Full Code    Disposition  Patient is not medically cleared for discharge.   Anticipate discharge to  HH vs SNF vs IRF .  I have placed the appropriate orders for post-discharge needs.    Review of Systems  Review of Systems   All other systems reviewed and are negative.     Physical Exam  Temp:  [37.1 °C (98.7  °F)] 37.1 °C (98.7 °F)  Pulse:  [66-76] 72  Resp:  [16-18] 18  BP: (108-146)/(65-77) 125/75  SpO2:  [95 %-97 %] 95 %    Physical Exam  Vitals and nursing note reviewed.   Constitutional:       General: He is not in acute distress.     Appearance: Normal appearance.   HENT:      Head: Normocephalic and atraumatic.   Eyes:      Extraocular Movements: Extraocular movements intact.      Conjunctiva/sclera: Conjunctivae normal.      Pupils: Pupils are equal, round, and reactive to light.   Cardiovascular:      Rate and Rhythm: Normal rate and regular rhythm.      Pulses: Normal pulses.      Heart sounds: No murmur heard.    No friction rub. No gallop.   Pulmonary:      Effort: Pulmonary effort is normal. No respiratory distress.      Breath sounds: Normal breath sounds. No wheezing, rhonchi or rales.   Abdominal:      General: Bowel sounds are normal. There is no distension.      Palpations: Abdomen is soft.      Tenderness: There is no abdominal tenderness.   Musculoskeletal:         General: No swelling or tenderness. Normal range of motion.      Cervical back: Normal range of motion and neck supple. No muscular tenderness.      Right lower leg: No edema.      Left lower leg: No edema.      Comments: Left foot drop   Skin:     General: Skin is warm and dry.      Capillary Refill: Capillary refill takes less than 2 seconds.      Findings: No bruising, erythema or rash.   Neurological:      General: No focal deficit present.      Mental Status: He is alert and oriented to person, place, and time.       Fluids    Intake/Output Summary (Last 24 hours) at 11/7/2022 1338  Last data filed at 11/7/2022 0528  Gross per 24 hour   Intake --   Output 350 ml   Net -350 ml       Laboratory  Recent Labs     11/05/22  0312 11/06/22  0113 11/07/22  0406   WBC 15.5* 14.8* 13.0*   RBC 5.15 5.23 5.43   HEMOGLOBIN 15.9 16.1 16.4   HEMATOCRIT 46.7 47.9 49.8   MCV 90.7 91.6 91.7   MCH 30.9 30.8 30.2   MCHC 34.0 33.6* 32.9*   RDW 44.8 44.8  44.4   PLATELETCT 256 260 228   MPV 9.4 9.6 9.7     Recent Labs     11/05/22  0312 11/06/22  0113 11/07/22  0406   SODIUM 138 137 135   POTASSIUM 4.8 4.1 4.4   CHLORIDE 103 102 102   CO2 22 21 20   GLUCOSE 154* 146* 135*   BUN 34* 33* 33*   CREATININE 1.26 0.92 0.90   CALCIUM 9.5 9.7 9.3     Recent Labs     11/07/22  0406   INR 1.04               Imaging  NM-CARDIAC STRESS TEST   Final Result      MR-LUMBAR SPINE-WITH & W/O   Final Result      1.  There is an approximately 10 x 6 x 19 mm sized posterior epidural abscess at the level of L2-3. This is new since the previous study. There is severe central canal stenosis at this level.   2.  Severe degenerative central canal stenosis at L3-4 and L4-5.   3.  Degenerative disease as described above.   4.  There is an approximately 1.9 cm sized T2 hypointense lesion in the right kidney likely representing complicated cyst. There is also a simple cyst in the right kidney. This is unchanged since the previous study.      CT-HEAD W/O   Final Result      1.  Head CT without contrast within normal limits. No evidence of acute cerebral infarction, hemorrhage or mass lesion.   2.  Atherosclerotic vascular calcification.         EC-ECHOCARDIOGRAM COMPLETE W/O CONT   Final Result      DX-SPINE-ANY ONE VIEW    (Results Pending)   DX-PORTABLE FLUORO > 1 HOUR    (Results Pending)        Assessment/Plan  * Spinal stenosis of lumbar region (L3-5) with neurogenic claudication and L foot drop/weakness, valvular heart disease  Assessment & Plan  Pain control and muscle relaxers  MRI LS spine done was notable for 10 x 6 x 19 mm posterior epidural abscess at the level of L2-L3 which is new from previous study.  There is severe central canal stenosis at this level.  Severe degenerative central canal stenosis at L3-L4 and L4-5.     Currently on broad-spectrum IV Abx (follow cultures)  Decadron IV  OR tentatively planned for 11/7         Epidural abscess- (present on admission)  Assessment &  "Plan  See above    Hypertensive urgency  Assessment & Plan  PRN IV antihypertensives  Continue amlodipine    Obesity (BMI 30-39.9)- (present on admission)  Assessment & Plan  On admission:   \"Recommended weight loss advised, 5% through reduced calorie, low carb diet and 150 mins of exercise a week once better  Recommend bariatric surgery evaluation if morbidly obese  Educated on the increase of morbidity and mortality associated with excess weight including DM, Heart Disease, HTN, stroke, and sleep apnea. Recommended outpatient monitoring  of blood sugars, lipid panel, sleep study evaluation for metabolic syndrome.\"      Dyslipidemia- (present on admission)  Assessment & Plan  Hold home evolocumab    Coronary artery disease of native artery of native heart with stable angina pectoris (HCC)- (present on admission)  Assessment & Plan  C/w metoprolol and lisinopril     Pt underwent NST this AM - a small tomoderate sized focus of ischemia involving the lateral wall within the cardiac base and midzone. Findings were discussed with cardiology Dr. Dozier - no further inpatient cardiology evaluation/procedure recommended. Pt is a moderate risk patient.     Holding ASA and Plavix for OR         VTE prophylaxis: SCDs/TEDs    I have performed a physical exam and reviewed and updated ROS and Plan today (11/7/2022). In review of yesterday's note (11/6/2022), there are no changes except as documented above.        "

## 2022-11-07 NOTE — CARE PLAN
The patient is Stable - Low risk of patient condition declining or worsening    Shift Goals  Clinical Goals: pain control; NPO  Patient Goals: pain control  Family Goals: Rest, comfort    Progress made toward(s) clinical / shift goals:    Problem: Knowledge Deficit - Standard  Goal: Patient and family/care givers will demonstrate understanding of plan of care, disease process/condition, diagnostic tests and medications  Outcome: Progressing     Problem: Pain - Standard  Goal: Alleviation of pain or a reduction in pain to the patient’s comfort goal  Outcome: Progressing     Problem: Fall Risk  Goal: Patient will remain free from falls  Outcome: Progressing     Problem: Skin Integrity  Goal: Skin integrity is maintained or improved  Outcome: Progressing       Patient is not progressing towards the following goals:

## 2022-11-07 NOTE — PROGRESS NOTES
Report received from RN, assumed care at 0645  Pt is A0X4, and responds appropriately   Pt declines any SOB, chest pain, new onset of numbness/ tingling  Pt rates pain at 8/10, on a scale of 1-10, pt medicated per MAR and given heat packs  Pt is voiding adequatly and without hesitancy  Pt has + flatus, + bowel sounds, last BM on 11/5  Pt ambulates with a x 1 assist   Pt is tolerating a cardiac diet, pt denies any nausea/vomiting  Plan of care discussed, all questions answered. Explained importance of calling before getting OOB and pt verbalizes understanding. Explained importance of oral care. Call light is within reach, treaded slipper socks on, bed in lowest/ locked position, hourly rounding in place, all needs met at this time

## 2022-11-07 NOTE — CARE PLAN
The patient is Stable - Low risk of patient condition declining or worsening    Shift Goals  Clinical Goals: pain control, safety, comfort, rest  Patient Goals: pain control, comfort  Family Goals: Rest, comfort    Progress made toward(s) clinical / shift goals:    Problem: Knowledge Deficit - Standard  Goal: Patient and family/care givers will demonstrate understanding of plan of care, disease process/condition, diagnostic tests and medications  Outcome: Progressing     Problem: Pain - Standard  Goal: Alleviation of pain or a reduction in pain to the patient’s comfort goal  Outcome: Progressing       Patient aware of plan of care. Patient medicated for pain per MAR.

## 2022-11-07 NOTE — ANESTHESIA PREPROCEDURE EVALUATION
Case: [id number] Date/Time: 11/07/22 1517    Procedure: L2-SI LAMINECTOMY    Location: [facility] OR 08 / SURGERY [facility]    Surgeons: Adam Sparks M.D.      66yoM with CAD s/p CABG with AVR s/p MI, HTN, HLD, migraines    HLD  Obesity    Allergies reviewed  NPO  No AC    Evaluated by cardiology = pt with known ischemia of lateral wall on stress test--> moderate risk for surgery    ECHO EF 65%, moderate aortic valve stenosis    Relevant Problems   CARDIAC   (positive) Bilateral carotid artery occlusion   (positive) Coronary artery disease of native artery of native heart with stable angina pectoris (HCC)   (positive) Coronary atherosclerosis of autologous vein bypass graft   (positive) Hypertensive urgency         (positive) Cyst of right kidney   (positive) Kidney stone on right side       Physical Exam    Airway   Mallampati: II       Cardiovascular - normal exam     Dental - normal exam           Pulmonary - normal exam     Abdominal - normal exam  (+) obese     Neurological - normal exam                 Anesthesia Plan    ASA 3- EMERGENT   ASA physical status 3 criteria: CAD/stents (> 3 months)ASA physical status emergent criteria: acutely contaminated wound or identified infection source    Plan - general       Airway plan will be ETT          Induction: intravenous    Postoperative Plan: Postoperative administration of opioids is intended.    Pertinent diagnostic labs and testing reviewed    Informed Consent:    Anesthetic plan and risks discussed with patient.

## 2022-11-07 NOTE — PROGRESS NOTES
"Spine Surgery    HD#4  11/5/2022  Patient brought in by ambulance for severe uncontrolled back pain over the last 2 weeks with new weakness in the left lower extremity and radicular pain.  Pain unchanged this morning, was able to stand independently and transfer from wheel chair to bed.   Continues to deny saddle anesthesia or incontinence.  Wife at bedside  NM cardiac stress test this morning, results pending  MRI w/ contrast reviewed, shows new 10 x 6 x 19 mm sized posterior epidural abscess at the level of L2-3 resulting in severe lumbar stenosis.   ESR, CRP were negative. Blood cultures pending.  Patient was started on antibiotics Over weekend  Plavix and ASA have been held      11/6/2022  No changes overnight  Continues with pain in left foot and low back.  Denies in incontinence or saddle anesthesia  Pain poorly controlled  All questions answered    11/7/22   No changes overnight  Continues with pain in left foot and low back.  Denies in incontinence or saddle anesthesia  Pain poorly controlled  All questions answered    Objective:  /75   Pulse 72   Temp 37.1 °C (98.7 °F) (Oral)   Resp 18   Ht 1.676 m (5' 6\")   Wt 98.7 kg (217 lb 9.5 oz)   SpO2 95%     Intake/Output Summary (Last 24 hours) at 11/5/2022 0901  Last data filed at 11/4/2022 1930  Gross per 24 hour   Intake 50 ml   Output 1451 ml   Net -1401 ml       Recent Labs     11/05/22 0312 11/06/22  0113 11/07/22  0406   WBC 15.5* 14.8* 13.0*   RBC 5.15 5.23 5.43   HEMOGLOBIN 15.9 16.1 16.4   HEMATOCRIT 46.7 47.9 49.8   MCV 90.7 91.6 91.7   MCH 30.9 30.8 30.2   MCHC 34.0 33.6* 32.9*   RDW 44.8 44.8 44.4   PLATELETCT 256 260 228   MPV 9.4 9.6 9.7       Recent Labs     11/05/22 0312 11/06/22  0113 11/07/22  0406   SODIUM 138 137 135   POTASSIUM 4.8 4.1 4.4   CHLORIDE 103 102 102   CO2 22 21 20   GLUCOSE 154* 146* 135*   BUN 34* 33* 33*       Recent Labs     11/07/22  0406   INR 1.04     HTN yesterday, this is improved. VSS  Kidney function " sluggish. WBC elevated at 15.5 on steroids. Otherwise labs are stable.  Strength:  Lower extremities strength testing reveals:  Left EHL/DF were 0/5.  Remainder of lower extremity strength was 4/5 limited secondary to pain.   SLR was positive bilaterally  Upper extremities are 5/5 grossly  Otherwise neurologically intact  Patient has poor dentition with multiple dental caries.    Assessment:  Active Hospital Problems    Diagnosis     Epidural abscess [G06.2]     Hypertensive urgency [I16.0]     Degenerative lumbar spinal stenosis [M48.061]     Spinal stenosis of lumbar region (L3-5) with neurogenic claudication and L foot drop/weakness, valvular heart disease [M48.062]     Obesity (BMI 30-39.9) [E66.9]     Dyslipidemia [E78.5]     Coronary artery disease of native artery of native heart with stable angina pectoris (HCC) [I25.118]      Extensive history of CAD with 5 vessel CABG in 1998, redo three-vessel CABG in 2015, stent placement in 2018.  This is complicated by history of familial hyper lipidemia with statin intolerance and currently treating with Repatha as well as a history of hypertension.       HD#5  Chemoprophylaxis: Please hold in anticipation of surgery today      Plan:  1. NPO for OR toady  2. IV abx and blood cultures appropriate  3. Hold anticoag

## 2022-11-08 NOTE — OP REPORT
Date of Surgery: 11/7/2022    PREOPERATIVE DIAGNOSIS(ES): Lumbar stenosis with epidural abscess and neurologic deficit    POSTOPERATIVE DIAGNOSIS(ES): Same    PROCEDURE: Posterior lumbar decompression using laminectomy type approach, Levels: L2-L3-L4 L5-S1    ANESTHESIA: General endotracheal.     SURGEON(S): Adam Sparks MD     ASSISTANT: None     ESTIMATED BLOOD LOSS: 200 cc.     TRANSFUSIONS: None.     IMPLANTS: None.     SPECIMENS: L2-3 epidural abscess culture x2     COMPLICATIONS: None.     DISPOSITION: The patient was transferred to recovery room at the end of the case in stable condition.     INDICATIONS: 66 y.o. male with chronic history of low back pain and bilateral lower extremity pain.  He has felt exacerbation of his severe pain over the last 2 weeks.  He has felt increasing weakness especially in his left lower extremity.  Pain radiates down the buttock posterior thigh lateral thigh lateral calf.  He has been experiencing buckling of his legs that is pain related over the last 2 weeks.  Denies any groin numbness or incontinence.  Of note the patient has an extensive cardiac history.  Admitted on 11/4/2022 underwent an extensive work-up.  MRI demonstrated continued severe stenosis L2-3 L3-4 L4-5 L5-S1.  Lesion at L2-3 concerning for possible epidural abscess.  Due to his extensive cardiac history we obtained cardiology work-up which ultimately determined that the patient has moderate risk.         Prior to surgery, the patient was told the nature of the surgical procedure, the alternatives of surgery, and the risks and benefits of the operation. The risks of surgery include, but not limited to nerve root injury, cerebrospinal fluid leak, incomplete resolution of symptoms, iatrogenic destabilization, recurrent disk herniation, wound infection, and general medical and anesthetic complications. Despite the risks, the patient elected to proceed with surgery.     DESCRIPTION OF PROCEDURE: The patient was  identified in the holding area by their name, medical record number, and date of birth. The surgical site was marked and preoperative antibiotics were administered. The patient was transferred to the operating room where general anesthesia was induced. Sequential compression devices were placed and activated. The patient was placed in the prone position on the operating room table with all bony prominences padded. The low back was prepped and draped in standard fashion. A surgical time-out was performed. A longitudinal incision was made extending from the spinous process of L1 to the spinous process of S1. This incision was carried down through subcutaneous tissue exposing the underlying fascia. The fascia was split longitudinally on either side of the side of the spinous processes exposing the lamina and pars interarticularis at L2 L3-L4-L5 and S1 ala. A lateral radiograph confirmed the surgical levels.      An ultrasonic bone scalpel was used to make bilateral troughs through the entirety of the lamina of L2 L3-L4-L5.  I ensured to leave over 5 mm of pars bilaterally.  I then removed the L2 L3-L4-L5 lamina en bloc.    A 3 mm and 4 mm Kerrisons were used to perform a midline decompression extending from the pedicles of L2 down below the pedicles of S1.The lateral recesses were generously decompressed bilaterally. The neural foramina of L2 L3-L4-L5 and S1 were directly decompressed bilaterally using 4 mm Kerrison.     At the L2-L3 level and epidural collection was noted this was fairly thick without a fluid component.  This was sent for culture.  I then cleared this content away from the dural elements.    Palpation using [place] elevator confirmed the adequacy of the decompression bilaterally at both levels. The wound was vigorously irrigated with a liter of crystalloid containing antibiotics. Repeat inspection of disk space demonstrated no further fragments. Hemostasis was achieved using FloSeal and  electrocautery.  2 subfascial drains were placed.  The fascia was closed using interrupted #1 Vicryl sutures. The superficial subcutaneous tissue was closed using interrupted 2-0 Vicryl sutures. The skin was closed using running locking 3-0 nylon suture. A sterile bandage was placed. The patient was returned to the supine position where she was extubated without incident and transferred to the recovery room in stable condition. At the end of the operation, all sponge, needle, and instrument counts were correct. I was present for and performed all critical aspects of the surgery. There were no complications associated with the surgery

## 2022-11-08 NOTE — PROGRESS NOTES
Pharmacy Vancomycin Kinetics Note for 11/7/2022     66 y.o. male on Vancomycin day # 4     Vancomycin Indication (AUC Dosing): Epidural Abscess    Provider specified end date: 11/14/22    Active Antibiotics (From admission, onward)      Ordered     Ordering Provider       Mon Nov 7, 2022  8:26 PM    11/07/22 2026  vancomycin (VANCOCIN) 1,250 mg in  mL IVPB  EVERY 12 HOURS        Note to Pharmacy: Per P&T Kinetics Protocol    Adam Sparks M.D.       Mon Nov 7, 2022  6:52 PM    11/07/22 1852  MD Alert...Vancomycin per Pharmacy  PHARMACY TO DOSE        Question:  Indication(s) for vancomycin?  Answer:  Epidural abscess/vertebral osteomyelitis    Adam Sparks M.D.    11/07/22 1852  piperacillin-tazobactam (Zosyn) 3.375 g in  mL IVPB  (piperacillin-tazobactam (ZOSYN) IV (Extended-infusion) PANEL )  ONCE        See Hyperspace for full Linked Orders Report.    Adam Sparks M.D.    11/07/22 1852  piperacillin-tazobactam (Zosyn) 3.375 g in  mL IVPB  (piperacillin-tazobactam (ZOSYN) IV (Extended-infusion) PANEL )  EVERY 8 HOURS        See Hyperspace for full Linked Orders Report.    Adam Sparks M.D.       Mon Nov 7, 2022  6:12 PM    11/07/22 1812  vancomycin (VANCOCIN) injection  INTRA-OP CONTINUOUS         Adam Sparks M.D.       Fri Nov 4, 2022  9:15 PM    11/04/22 2115  [MAR Hold]  cefTRIAXone (Rocephin) syringe 2,000 mg  EVERY 12 HOURS        (MAR Hold since Mon 11/7/2022 at 1501.Hold Reason: Not in room)    RADHA MajanoP.RDottieN.            Dosing Weight: 98.7 kg (217 lb 9.5 oz)      Admission History: Admitted on 11/4/2022 for Degenerative lumbar spinal stenosis [M48.061]  Epidural abscess [G06.2]  Pertinent history: Patient with worsening back pain found to have epidural abscess on MRI. Vanco therapy was stopped due to MRSA nares being negative. Post-operatively vanco and zosyn are being started by the srugical team pending culture results.    Allergies:     Morphine, Fenofibrate, Gemfibrozil, Lipitor  [atorvastatin calcium], Lovastatin, and Tricor     Pertinent cultures to date:     Results       Procedure Component Value Units Date/Time    Anaerobic Culture [[id number]] Collected: 11/07/22 1743    Order Status: No result Specimen: Wound Updated: 11/07/22 1910     Significant Indicator NEG     Source WND     Site L2-L3 Epidural Abscess     Culture Result -    Narrative:      Surgery - swabs received    CULTURE WOUND W/ GRAM STAIN [[id number]] Collected: 11/07/22 1743    Order Status: No result Specimen: Wound Updated: 11/07/22 1910     Significant Indicator NEG     Source WND     Site L2-L3 Epidural Abscess     Culture Result -     Gram Stain Result -    Narrative:      Surgery - swabs received    Anaerobic Culture [[id number]] Collected: 11/07/22 1741    Order Status: No result Specimen: Wound Updated: 11/07/22 1908     Significant Indicator NEG     Source WND     Site L2-L3 Epidural Abscess     Culture Result -    Narrative:      Surgery - swabs received    CULTURE WOUND W/ GRAM STAIN [[id number]] Collected: 11/07/22 1741    Order Status: No result Specimen: Wound Updated: 11/07/22 1908     Significant Indicator NEG     Source WND     Site L2-L3 Epidural Abscess     Culture Result -     Gram Stain Result -    Narrative:      Surgery - swabs received    MRSA By PCR (Amp) [[id number]] Collected: 11/06/22 0755    Order Status: Completed Specimen: Respirate from Nares Updated: 11/06/22 1327     MRSA by PCR Negative    Narrative:      Collected By: [id number] NAIN LOPEZ    BLOOD CULTURE [[id number]] Collected: 11/04/22 2233    Order Status: Completed Specimen: Blood from Peripheral Updated: 11/05/22 0809     Significant Indicator NEG     Source BLD     Site PERIPHERAL     Culture Result No Growth  Note: Blood cultures are incubated for 5 days and  are monitored continuously.Positive blood cultures  are called to the RN and reported as soon as  they are identified.      Narrative:      Per Hospital Policy: Only change  "Specimen Src: to \"Line\" if  specified by physician order.  Right Hand    BLOOD CULTURE [[id number]] Collected: 22    Order Status: Completed Specimen: Blood from Peripheral Updated: 22     Significant Indicator NEG     Source BLD     Site PERIPHERAL     Culture Result No Growth  Note: Blood cultures are incubated for 5 days and  are monitored continuously.Positive blood cultures  are called to the RN and reported as soon as  they are identified.      Narrative:      Per Hospital Policy: Only change Specimen Src: to \"Line\" if  specified by physician order.  Left Hand    MRSA By PCR (Amp) [[id number]]     Order Status: Canceled Specimen: Respirate from Nares             Labs:     Estimated Creatinine Clearance: 90 mL/min (by C-G formula based on SCr of 0.9 mg/dL).  Recent Labs     22   WBC 15.5* 14.8* 13.0*   NEUTSPOLYS  --  85.00*  --      Recent Labs     22  040   BUN 34* 33* 33*   CREATININE 1.26 0.92 0.90   ALBUMIN  --  4.3 4.3       Intake/Output Summary (Last 24 hours) at 2022  Last data filed at 2022 192  Gross per 24 hour   Intake 1200 ml   Output 700 ml   Net 500 ml      BP (!) 140/74   Pulse 73   Temp 36.2 °C (97.2 °F) (Temporal)   Resp 20   Ht 1.676 m (5' 6\")   Wt 98.7 kg (217 lb 9.5 oz)   SpO2 100%  Temp (24hrs), Av.6 °C (97.8 °F), Min:36.2 °C (97.2 °F), Max:37.1 °C (98.7 °F)      List concerns for Vancomycin clearance:     Age;Obesity;BUN/Scr ratio greater than 20:1    Pharmacokinetics:     AUC kinetics:   Ke (hr ^-1): 0.0791 hr^-1  Half life: 8.76 hr  Clearance: 5.12  Estimated TDD: 2560  Estimated Dose: 1152  Estimated interval: 10.8    Trough kinetics:   Recent Labs     22  1123 11/07/22  0406   VANCOPEAK  --  26.0   VANCORANDOM 11.6  --        A/P:     -  Vancomycin dose: 1250mg Q12h    -  Next vancomycin level(s):    - None ordered at this time. Likely obtain levels after " the 4th maintenance dose unless clinical status or renal function changes.     -  Predicted vancomycin AUC from initial AUC test calculator: 488 mg·hr/L    -  Comments: Vanco therapy restarted post-operatively. Patients renal function has improved from initial dosing and a higher dose is indicated based on the AUC calculator. Last dose of vanco prior to D/C was this morning around 0130, re-starting with no load STAT. Pharmacy will continue to follow and adjust therapy as clinically appropriate.     Jony Aly, PharmD

## 2022-11-08 NOTE — PROGRESS NOTES
4 Eyes Skin Assessment Completed by Lara RN and Rebecca RN.    Head WDL  Ears WDL  Nose WDL  Mouth WDL  Neck WDL  Breast/Chest Scar  Shoulder Blades WDL  Spine Incision with dressing in place (small shadowing around R DENISE drain), x2 DENISE drains  (R) Arm/Elbow/Hand WDL  (L) Arm/Elbow/Hand WDL  Abdomen WDL  Groin WDL  Scrotum/Coccyx/Buttocks WDL  (R) Leg WDL  (L) Leg WDL  (R) Heel/Foot/Toe WDL  (L) Heel/Foot/Toe WDL          Devices In Places Blood Pressure Cuff, Pulse Ox, Lai, SCD's, and Nasal Cannula      Interventions In Place NC W/Ear Foams, Pillows, Heels Loaded W/Pillows, and Pressure Redistribution Mattress    Possible Skin Injury No    Pictures Uploaded Into Epic N/A  Wound Consult Placed N/A  RN Wound Prevention Protocol Ordered No

## 2022-11-08 NOTE — THERAPY
Occupational Therapy   Initial Evaluation     Patient Name: Abimael Hamilton  Age:  66 y.o., Sex:  male  Medical Record #: [id number]  Today's Date: 11/8/2022     Precautions  Precautions: Fall Risk, Spinal / Back Precautions   Comments: no brace per orders    Assessment  Patient is 66 y.o. male with a diagnosis of L2-S1 lami.  Pt currently limited by decreased functional mobility, activity tolerance, balance, and pain which are affecting pt's ability to complete ADLs/IADLs at baseline. Pt would benefit from OT services in the acute care setting to maximize functional recovery.    Plan    Recommend Occupational Therapy 4 times per week until therapy goals are met for the following treatments:  Self Care/Activities of Daily Living and Therapeutic Activities.       Discharge Recommendations: (P) Recommend post-acute placement for additional occupational therapy services prior to discharge home        11/08/22 1003   Prior Living Situation   Prior Services None   Housing / Facility 1 Story House   Steps Into Home 2   Steps In Home 0   Equipment Owned None   Lives with - Patient's Self Care Capacity Spouse   Prior Level of ADL Function   Self Feeding Independent   Grooming / Hygiene Independent   Bathing Independent   Dressing Independent   Toileting Independent   ADL Assessment   Grooming Minimal Assist   Upper Body Dressing Minimal Assist   Lower Body Dressing Maximal Assist   Toileting Maximal Assist   Functional Mobility   Sit to Stand Minimal Assist   Short Term Goals   Short Term Goal # 1 supervised with UB dressing   Short Term Goal # 2 min A with LB dressing   Short Term Goal # 3 min A with ADL txfs   Anticipated Discharge Equipment and Recommendations   Discharge Recommendations Recommend post-acute placement for additional occupational therapy services prior to discharge home

## 2022-11-08 NOTE — PROGRESS NOTES
"Spine Surgery Progress Note    66 y.o. male sp L2-S1 laminectomy on 11/7/2022 for lumbar stenosis and likely epidural abscess    S: Doing well overnight. ERIBERTO.  Pain similar to preop continues to have a left foot drop and a pain limited exam.  O:  /78   Pulse 65   Temp 36.1 °C (97 °F) (Temporal)   Resp 18   Ht 1.676 m (5' 6\")   Wt 98.7 kg (217 lb 9.5 oz)   SpO2 99%   BMI 35.12 kg/m²     Gen: WNWD NAD  Breathing comfortably    Dressing CDI    0 out of 5 left tib ant EHL, otherwise 4 out of 5 bilateral lower extremity primarily pain limited exam  Sensation grossly intact bilateral lower extremities    Lab Results   Component Value Date/Time    WBC 13.0 (H) 11/07/2022 04:06 AM    RBC 5.43 11/07/2022 04:06 AM    HEMOGLOBIN 16.4 11/07/2022 04:06 AM    HEMATOCRIT 49.8 11/07/2022 04:06 AM    MCV 91.7 11/07/2022 04:06 AM    MCH 30.2 11/07/2022 04:06 AM    MCHC 32.9 (L) 11/07/2022 04:06 AM    MPV 9.7 11/07/2022 04:06 AM    NEUTSPOLYS 85.00 (H) 11/06/2022 01:13 AM    LYMPHOCYTES 9.90 (L) 11/06/2022 01:13 AM    MONOCYTES 4.30 11/06/2022 01:13 AM    EOSINOPHILS 0.00 11/06/2022 01:13 AM    BASOPHILS 0.10 11/06/2022 01:13 AM      Lab Results   Component Value Date/Time    SODIUM 135 11/07/2022 04:06 AM    POTASSIUM 4.4 11/07/2022 04:06 AM    CHLORIDE 102 11/07/2022 04:06 AM    CO2 20 11/07/2022 04:06 AM    GLUCOSE 135 (H) 11/07/2022 04:06 AM    BUN 33 (H) 11/07/2022 04:06 AM    CREATININE 0.90 11/07/2022 04:06 AM    BUNCREATRAT 16 07/29/2016 10:22 AM          AP: 66 y.o. male sp L2-S1 laminectomy on 11/7/2022 for possible epidural abscess.  Intraoperative cultures pending    - Recommend ID consult for Abx management  - Recommend Vanc/Zosyn until final abx plan established  - Continue drain  - DC perkins zaynab  - Cont PT/OT  "

## 2022-11-08 NOTE — OR NURSING
Patient recovered well post op. A&Ox4. VSS, 3L O2. Surgical sites CDI. Surgical pain managed. Patient able to drink fluids without Nausea and vomiting. PT belongings in his room. Spouse updated and discussed plan of care. Report called to Lara HASSAN.

## 2022-11-08 NOTE — PROGRESS NOTES
2035  Report received from Hima HASSAN.    2055  Pt arrived to unit via hospital bed. A&O x 4, pain 10/10, on 3L O2, dressing to lower middle back in place, with all belongings at bedside. Pt oriented to unit, call light and belongings within reach, educated to call for assistance.

## 2022-11-08 NOTE — DISCHARGE PLANNING
[facility] Transitional Care Coordination    Referral from: Dr. Sparks    Insurance Provider on Facesheet: ANT/MCR    Potential Rehab Diagnosis: Other Ortho    Chart review indicates patient may have on going medical management and may have therapy needs to possibly meet inpatient rehab facility criteria with the goal of returning to community.    D/C support will need to be verified: Spouse    Physiatry consultation pended per protocol.  POD 1 L2-S1 laminectomy.  Would appreciate PARMINDER jackson once appropriate.     Thank you for the referral.

## 2022-11-08 NOTE — CARE PLAN
The patient is Stable - Low risk of patient condition declining or worsening    Shift Goals  Clinical Goals: pain control, mobility, safety, IV abx, wound/drain management  Patient Goals: pain control, comfort, rest  Family Goals: pain control, comfort, rest    Progress made toward(s) clinical / shift goals:      Problem: Knowledge Deficit - Standard  Goal: Patient and family/care givers will demonstrate understanding of plan of care, disease process/condition, diagnostic tests and medications  Outcome: Progressing  Note: POC discussed with pt at bedside. Pt asks appropriate questions, no additional concerns at this time.      Problem: Fall Risk  Goal: Patient will remain free from falls  Outcome: Progressing  Note: Bed is locked and in lowest position, treaded socks on, bed alarm on, DME out of sight, call light and belongings within reach, pt calls appropriately for assistance, hourly rounding in place.

## 2022-11-08 NOTE — THERAPY
Physical Therapy   Initial Evaluation     Patient Name: Abimael Hamilton  Age:  66 y.o., Sex:  male  Medical Record #: [id number]  Today's Date: 11/8/2022     Precautions  Precautions: Fall Risk;Spinal / Back Precautions   Comments: no brace per orders    Assessment  Patient is 66 y.o. male presenting to physical therapy s/p L2-S1 laminectomy for lumbar stenosis and likely epidural abscess. Pt currently with incontrollable pain but agreeable to work with PT. Required Min- CGA to complete mobility as detailed below, again ambulation distance limited by pain. Discussed non-pharmacologic pain management strategies (ice, mobility/position changes) and use of log roll for transition OOB. Pt will benefit from acute PT interventions to address impairments in strength, balance, activity tolerance which impact pt's ability to return home at this time.     Plan    Recommend Physical Therapy 5 times per week until therapy goals are met for the following treatments:  Bed Mobility, Equipment, Gait Training, Neuro Re-Education / Balance, Self Care/Home Evaluation, Stair Training, Therapeutic Activities, and Therapeutic Exercises    DC Equipment Recommendations: Unable to determine at this time  Discharge Recommendations: Recommend post-acute placement for additional physical therapy services prior to discharge home        11/08/22 1007   Precautions   Precautions Fall Risk;Spinal / Back Precautions    Comments no brace per orders   Pain 0 - 10 Group   Therapist Pain Assessment During Activity;Nurse Notified;9  (significant pain, premedicated and still uncontrollable)   Prior Living Situation   Prior Services None   Housing / Facility 1 Story House   Steps Into Home 2   Steps In Home 0   Equipment Owned None   Lives with - Patient's Self Care Capacity Spouse   Prior Level of Functional Mobility   Bed Mobility Independent   Transfer Status Independent   Ambulation Independent   Distance Ambulation (Feet)   (community)   Assistive  Devices Used None   Stairs Independent   Cognition    Level of Consciousness Alert   Comments pt reports 2+ yr hx of back pain but debilitated to the point of bedbound the past 3 weeks   Active ROM Lower Body    Active ROM Lower Body  X   Comments functional ROM but limited by pain   Strength Lower Body   Lower Body Strength  X   Comments as above   Balance Assessment   Sitting Balance (Static) Fair   Sitting Balance (Dynamic) Fair   Standing Balance (Static) Fair -   Standing Balance (Dynamic) Poor +   Weight Shift Sitting Fair   Weight Shift Standing Fair   Comments w/ FWW   Gait Analysis   Gait Level Of Assist Minimal Assist   Assistive Device Front Wheel Walker   Distance (Feet) 3   # of Times Distance was Traveled 1   Deviation Bradykinetic;Decreased Base Of Support  (decreased step length)   # of Stairs Climbed 0   Weight Bearing Status no restrictions   Comments pain limiting ambulation tolerance   Bed Mobility    Supine to Sit Minimal Assist   Sit to Supine Moderate Assist  (to B LE)   Scooting Contact Guard Assist   Rolling Minimum Assist to Lt.   Comments educated on log roll mechanics and majority of assist was for pain management   Functional Mobility   Sit to Stand Minimal Assist   Transfer Method Stand Step   How much difficulty does the patient currently have...   Turning over in bed (including adjusting bedclothes, sheets and blankets)? 1   Sitting down on and standing up from a chair with arms (e.g., wheelchair, bedside commode, etc.) 1   Moving from lying on back to sitting on the side of the bed? 1   How much help from another person does the patient currently need...   Moving to and from a bed to a chair (including a wheelchair)? 3   Need to walk in a hospital room? 3   Climbing 3-5 steps with a railing? 2   6 clicks Mobility Score 11   Activity Tolerance   Sitting Edge of Bed 7 min   Standing 3-5 min total   Patient / Family Goals    Patient / Family Goal #1 to have pain resolved   Short Term  Goals    Short Term Goal # 1 pt will perform supine <> sit via log roll with SPV in 6 visits   Short Term Goal # 2 pt will perform sit <> stand and functional transfers with LRAD and SPV to improve mobility independence in 6 visits   Short Term Goal # 3 pt will ambulate > 200 ft with LRAD and SPV to access community in 6 visit   Short Term Goal # 4 pt will negotiate 2 steps with LRAD and SPV to access home environment in 6 visits   Short Term Goal # 5 pt will demonstrate independent understanding of spine precautions during mobility in 6 visits   Education Group   Education Provided Role of Physical Therapist;Spine Precautions   Spine Precautions Patient Response Patient;Family;Acceptance;Explanation;Handout;Verbal Demonstration   Role of Physical Therapist Patient Response Acceptance;Explanation;Verbal Demonstration;Family;Patient   Problem List    Problems Pain;Impaired Bed Mobility;Impaired Transfers;Impaired Ambulation;Functional Strength Deficit;Impaired Balance;Decreased Activity Tolerance;Limited Knowledge of Post-Op Precautions;Motor Planning / Sequencing   Anticipated Discharge Equipment and Recommendations   DC Equipment Recommendations Unable to determine at this time   Discharge Recommendations Recommend post-acute placement for additional physical therapy services prior to discharge home

## 2022-11-08 NOTE — ANESTHESIA PROCEDURE NOTES
Airway    Date/Time: 11/7/2022 4:13 PM  Performed by: Bettie Patterson M.D.  Authorized by: Bettie Patterson M.D.     Location:  OR  Urgency:  Elective  Indications for Airway Management:  Anesthesia      Spontaneous Ventilation: absent    Sedation Level:  Deep  Preoxygenated: Yes    Patient Position:  Sniffing  Mask Difficulty Assessment:  1 - vent by mask  Final Airway Type:  Endotracheal airway  Final Endotracheal Airway:  ETT  Cuffed: Yes    Technique Used for Successful ETT Placement:  Direct laryngoscopy    Insertion Site:  Oral  Blade Type:  [place]  Laryngoscope Blade/Videolaryngoscope Blade Size:  2  ETT Size (mm):  7.5  Measured from:  Lips  ETT to Lips (cm):  23  Placement Verified by: capnometry    Cormack-Lehane Classification:  Grade I - full view of glottis  Number of Attempts at Approach:  1

## 2022-11-08 NOTE — ANESTHESIA POSTPROCEDURE EVALUATION
Patient: Abimael Hamilton    Procedure Summary     Date: 11/07/22 Room / Location: [facility] 08 / SURGERY [facility]    Anesthesia Start: 1607 Anesthesia Stop: 1854    Procedure: L2-SI LAMINECTOMY (Spine Lumbar) Diagnosis: (LUMBAR STENOSIS)    Surgeons: Adam Sparks M.D. Responsible Provider: Marcello Otto M.D.    Anesthesia Type: general ASA Status: 3 - Emergent          Final Anesthesia Type: general  Last vitals  BP   Blood Pressure : 118/78    Temp   36.1 °C (97 °F)    Pulse   65   Resp   18    SpO2   99 %      Anesthesia Post Evaluation    Patient location during evaluation: PACU  Patient participation: complete - patient participated  Level of consciousness: awake and alert    Airway patency: patent  Anesthetic complications: no  Cardiovascular status: hemodynamically stable  Respiratory status: acceptable  Hydration status: euvolemic    PONV: none          No notable events documented.     Nurse Pain Score: 9 (NPRS)

## 2022-11-08 NOTE — PROGRESS NOTES
Hospital Medicine Daily Progress Note    Date of Service  11/8/2022    Chief Complaint  Abimael Hamilton is a 66 y.o. male admitted 11/4/2022 with worsening back pain    Hospital Course        This is a 66 -year-old male with a past medical history significant for hypertension, hyperlipidemia, CAD status post CABG in 1998, 2016, aortic valve replacement in 2016, post stent in 4/2022, morbid obesity, gout, L3 L5 severe spinal stenosis with left foot drop, chronic back pain was admitted on 11/4/2022 with worsening of lower back pain.      MRI of the lumbar spine  on 11/04 noted 10 x 6 x 19 mm posterior epidural abscess at the level of L2-L3 which is new from previous study. There is severe central canal stenosis at this level.  Severe degenerative central canal stenosis at L3-L4 and L4-5.    Cardiology consulted for preop clearance given patient significant cardiac history, nuclear stress test noted small area of ischemia involving the lateral wall.  Patient cleared by cardiology, moderate risk for the surgery.     Surgery was consulted, patient went to the OR with Dr. Sparks on 11/7/2022.  Biopsy was negative, patient was initially started on IV Rocephin.  Considering patient had epidural abscess, will continue vancomycin and Zosyn.  Culture pending.    Interval events:  -- No acute events overnight, medicine has been stable, heart rate 61-88, blood pressure 131/82, saturating 91% on 3 to of oxygen.  Patient is alert and oriented, answering questions appropriately.  He continued to complain of back pain.  He stated that his oral pain medication did not control his pain.  This was a started on IV PCA pump for pain control.  --He was started on muscle relaxant, gabapentin, scheduled Tylenol  --Continue IV vancomycin and Zosyn.  Will consult ID tomorrow  --WBC count is elevated at 18.2, monitor  --Patient had  Surgery yesterday, will hold aspirin and Plavix, will discuss with neurosurgery when we can start aspirin Plavix  considering patient having stent placement in 4/2022      I have discussed this patient's plan of care and discharge plan at IDT rounds today with Case Management, Nursing, Nursing leadership, and other members of the IDT team.    Consultants/Specialty  cardiology and neurosurgery    Code Status  Full Code    Disposition  Patient is not medically cleared for discharge.   Anticipate discharge to  HH vs SNF vs IRF .  I have placed the appropriate orders for post-discharge needs.    Review of Systems  Review of Systems   Constitutional:  Positive for malaise/fatigue. Negative for fever.   HENT:  Negative for congestion and sore throat.    Eyes:  Negative for blurred vision and double vision.   Respiratory:  Negative for hemoptysis, sputum production and shortness of breath.    Cardiovascular:  Negative for chest pain, palpitations, orthopnea and leg swelling.   Gastrointestinal:  Negative for abdominal pain, heartburn, nausea and vomiting.   Musculoskeletal:  Positive for back pain and myalgias.   Neurological:  Positive for weakness. Negative for headaches.   Psychiatric/Behavioral:  Negative for depression. The patient is nervous/anxious.    All other systems reviewed and are negative.     Physical Exam  Temp:  [36.1 °C (96.9 °F)-36.6 °C (97.9 °F)] 36.2 °C (97.2 °F)  Pulse:  [65-88] 86  Resp:  [12-20] 17  BP: (109-174)/(67-94) 135/82  SpO2:  [92 %-100 %] 94 %    Physical Exam  Vitals and nursing note reviewed.   Constitutional:       Appearance: Normal appearance.   HENT:      Head: Normocephalic and atraumatic.   Eyes:      Extraocular Movements: Extraocular movements intact.      Pupils: Pupils are equal, round, and reactive to light.   Cardiovascular:      Rate and Rhythm: Normal rate and regular rhythm.      Pulses: Normal pulses.   Pulmonary:      Effort: Pulmonary effort is normal. No respiratory distress.      Breath sounds: Normal breath sounds. No wheezing, rhonchi or rales.   Abdominal:      General: Bowel  sounds are normal. There is no distension.      Palpations: Abdomen is soft.      Tenderness: There is no abdominal tenderness.   Musculoskeletal:         General: No tenderness.      Cervical back: Neck supple. No muscular tenderness.      Right lower leg: No edema.      Left lower leg: No edema.      Comments: Left foot drop   Skin:     General: Skin is warm and dry.      Findings: No bruising, erythema or rash.   Neurological:      Mental Status: He is alert and oriented to person, place, and time.   Psychiatric:         Mood and Affect: Mood normal.       Fluids    Intake/Output Summary (Last 24 hours) at 11/8/2022 1459  Last data filed at 11/8/2022 1115  Gross per 24 hour   Intake 1400 ml   Output 1840 ml   Net -440 ml         Laboratory  Recent Labs     11/06/22  0113 11/07/22  0406 11/08/22  1105   WBC 14.8* 13.0* 18.2*   RBC 5.23 5.43 4.63*   HEMOGLOBIN 16.1 16.4 14.7   HEMATOCRIT 47.9 49.8 42.8   MCV 91.6 91.7 92.4   MCH 30.8 30.2 31.7   MCHC 33.6* 32.9* 34.3   RDW 44.8 44.4 45.1   PLATELETCT 260 228 223   MPV 9.6 9.7 10.2       Recent Labs     11/06/22  0113 11/07/22  0406 11/08/22  1105   SODIUM 137 135 136   POTASSIUM 4.1 4.4 4.2   CHLORIDE 102 102 102   CO2 21 20 22   GLUCOSE 146* 135* 185*   BUN 33* 33* 36*   CREATININE 0.92 0.90 0.87   CALCIUM 9.7 9.3 8.5       Recent Labs     11/07/22  0406   INR 1.04                 Imaging  DX-SPINE-ANY ONE VIEW   Final Result      Fluoroscopic image(s) obtained during lumbar spine instrumentation. Please see the patient's chart for full procedural details.      Fluoroscopy time 4 seconds.         DX-PORTABLE FLUORO > 1 HOUR   Final Result      Portable fluoroscopy utilized for 4 seconds.         INTERPRETING LOCATION: [address] [place] [address] [place], [id number]      [id number] VIEW   Final Result      1.  Negative single view abdomen without gross evidence of a radiopaque surgical device.      NM-CARDIAC STRESS TEST   Final Result      MR-LUMBAR SPINE-WITH & W/O   Final Result  "     1.  There is an approximately 10 x 6 x 19 mm sized posterior epidural abscess at the level of L2-3. This is new since the previous study. There is severe central canal stenosis at this level.   2.  Severe degenerative central canal stenosis at L3-4 and L4-5.   3.  Degenerative disease as described above.   4.  There is an approximately 1.9 cm sized T2 hypointense lesion in the right kidney likely representing complicated cyst. There is also a simple cyst in the right kidney. This is unchanged since the previous study.      CT-HEAD W/O   Final Result      1.  Head CT without contrast within normal limits. No evidence of acute cerebral infarction, hemorrhage or mass lesion.   2.  Atherosclerotic vascular calcification.         EC-ECHOCARDIOGRAM COMPLETE W/O CONT   Final Result             Assessment/Plan  * Spinal stenosis of lumbar region (L3-5) with neurogenic claudication and L foot drop/weakness, valvular heart disease  Assessment & Plan  Pain control and muscle relaxers  MRI LS spine done was notable for 10 x 6 x 19 mm posterior epidural abscess at the level of L2-L3 which is new from previous study.  There is severe central canal stenosis at this level.  Severe degenerative central canal stenosis at L3-L4 and L4-5.    -- Had surgery yesterday, culture pending, will continue broad-spectrum antibiotics including vancomycin and Zosyn   --will obtain PT and OT   Will consult ID     Obesity (BMI 30-39.9)- (present on admission)  Assessment & Plan  On admission:   \"Recommended weight loss advised, 5% through reduced calorie, low carb diet and 150 mins of exercise a week once better  Recommend bariatric surgery evaluation if morbidly obese  Educated on the increase of morbidity and mortality associated with excess weight including DM, Heart Disease, HTN, stroke, and sleep apnea. Recommended outpatient monitoring  of blood sugars, lipid panel, sleep study evaluation for metabolic syndrome.\"      Dyslipidemia- " (present on admission)  Assessment & Plan  Hold home evolocumab    Coronary artery disease of native artery of native heart with stable angina pectoris (HCC)- (present on admission)  Assessment & Plan  ;C/w metoprolol and lisinopril     Pt underwent NST this AM - a small tomoderate sized focus of ischemia involving the lateral wall within the cardiac base and midzone. Findings were discussed with cardiology Dr. Dozier - no further inpatient cardiology evaluation/procedure recommended. Pt is a moderate risk patient.     Holding ASA and Plavix for OR  Will discuss with Neurosurgery when we acn start this       Epidural abscess- (present on admission)  Assessment & Plan  See above    Hypertensive urgency  Assessment & Plan  PRN IV antihypertensives  Continue amlodipine       VTE prophylaxis: SCDs/TEDs

## 2022-11-08 NOTE — CARE PLAN
The patient is Stable - Low risk of patient condition declining or worsening    Shift Goals  Clinical Goals: pain control, mobility, safety, IV abx, wound/drain management  Patient Goals: pain control, comfort, rest  Family Goals: pain control, comfort, rest    Progress made toward(s) clinical / shift goals:    Problem: Early Mobilization - Post Surgery  Goal: Early mobilization post surgery  Outcome: Progressing  Note: Pt able to Stand & take some steps with therapy despite 9/10 back pain.      Problem: Pain - Post Surgery  Goal: Alleviation or reduction of pain post surgery  Outcome: Progressing  Note: Pain medications switched to PCA with some improvement.        Patient is not progressing towards the following goals: NA

## 2022-11-09 NOTE — PREADMISSION SCREENING NOTE
Pre-Admission Screening Form    Patient Information:   Name: Abimael Hamilton     MRN: [id number]       : 1955      Age: 66 y.o.   Gender: male      Race: White [7]       Marital Status:  [2]  Family Contact: Ana Hamilton        Relationship: Spouse [17]  Home Phone: [phone]           Cell Phone: [phone]  Advanced Directives: None  Code Status:  FULL  Current Attending Provider: Scout Angulo M.D.  Referring Physician: Dr. Sparks        Physiatrist Consult: Dr. Childress       Referral Date:22   Primary Payor Source:  [facility]  Secondary Payor Source:  MEDICARE    Medical Information:   Date of Admission to Acute Care [place]2022  Room Number: T328/02  Rehabilitation Diagnosis: 0008.9 - Orthopaedic Disorders: Other Orthopaedic  Immunization History   Administered Date(s) Administered    Zoë SARS-CoV-2 Vaccine 2021    Tdap Vaccine 2017    Zoster Vaccine Recombinant (RZV) (SHINGRIX) 2020, 2020     Allergies   Allergen Reactions    Morphine Vomiting, Nausea and Unspecified     violent    Fenofibrate Unspecified     Dehydration, severe muscle cramps     Gemfibrozil Unspecified     Dehydration,Severe Muscle cramps    Lipitor [Atorvastatin Calcium] Unspecified     Severe Muscle cramps, dehydration    Lovastatin Unspecified     Dehydration, severe muscle cramps    Tricor Unspecified     Dehydration, severe muscle cramps     Past Medical History:   Diagnosis Date    Aortic stenosis 2015    Arthritis     Benign hypertensive heart disease without heart failure 2011    CAD (coronary artery disease)     Congestive heart failure (HCC)     Coronary atherosclerosis of autologous vein bypass graft 2011    Essential hypertension 2019    Gout     Heart valve disease     High cholesterol     History of pancreatitis     History of pancreatitis     Hypertension     Migraine 2019    Muscle cramps 10/4/2011    Myocardial infarct (HCC)     Multiple MI's,  1998, 2015    Obesity 11/14/2011    Pain     Joints    Postsurgical aortocoronary bypass status 7/26/2016    Status post redo 3-V CABG in [place] 12/2015: SVG-acute marginal, SVG sequential to LAD, and OM     Postsurgical aortocoronary bypass status 7/26/2016    Status post redo 3-V CABG in [place] 12/2015: SVG-acute marginal, SVG sequential to LAD, and OM     Renal disorder     Hx of Acute renal failure r/t medication/statins    S/P aortic valve replacement with bioprosthetic valve 7/26/2016    #23 Peñaloza Magna AVR (bioprosthetic)     Statin intolerance 7/26/2016    Status post cardiac revascularization with bypass aortocoronary anastomosis of five coronary vessels 7/26/2016    5-V CABG done in 1998 (done in [place] at [facility]), patent LIMA to LAD, occluded SVG-OM, occluded SVG-RCA by cardiac catheterization 11/15/2007 with other vein grafts not identified at that time, poor targets for revascularization and declined repeat CABG in 2007 by Brandl. No ischemic was identified by MPI 11/17/07 with an EF of 50%.      Status post cardiac revascularization with bypass aortocoronary anastomosis of five coronary vessels 7/26/2016    5-V CABG done in 1998 (done in [facility]), patent LIMA to LAD, occluded SVG-OM, occluded SVG-RCA by cardiac catheterization 11/15/2007 with other vein grafts not identified at that time, poor targets for revascularization and declined repeat CABG in 2007 by Brandl. No ischemic was identified by MPI 11/17/07 with an EF of 50%.       Past Surgical History:   Procedure Laterality Date    LUMBAR LAMINECTOMY DISKECTOMY  11/7/2022    Procedure: L2-SI LAMINECTOMY;  Surgeon: Adam Sparks M.D.;  Location: SURGERY [facility];  Service: Orthopedics    SHOULDER DECOMPRESSION ARTHROSCOPIC Right 9/5/2017    Procedure: SHOULDER DECOMPRESSION ARTHROSCOPIC SUBACROMIAL;  Surgeon: Mg Campos M.D.;  Location: SURGERY [facility];  Service: Orthopedics    DEBRIDEMENT, LABRUM, HIP, ARTHROSCOPIC  Right 9/5/2017    Procedure: ARTHROSCOPIC LABRAL DEBRIDEMENT;  Surgeon: Mg Campos M.D.;  Location: SURGERY [facility];  Service: Orthopedics    SHOULDER ARTHROSCOPY W/ ROTATOR CUFF REPAIR Right 9/5/2017    Procedure: SHOULDER ARTHROSCOPY W/ ROTATOR CUFF REPAIR;  Surgeon: gM Campos M.D.;  Location: SURGERY [facility];  Service: Orthopedics    SHOULDER ARTHROSCOPY W/ BICIPITAL TENODESIS REPAIR Right 9/5/2017    Procedure: SHOULDER ARTHROSCOPY W/ BICIPITAL TENODESIS REPAIR;  Surgeon: Mg Campos M.D.;  Location: SURGERY [facility];  Service: Orthopedics    SYNOVECTOMY Right 9/5/2017    Procedure: SYNOVECTOMY;  Surgeon: Mg Campos M.D.;  Location: [facility];  Service: Orthopedics    MULTIPLE CORONARY ARTERY BYPASS  12/08/2015    3 way bypass & Bovine valve    LAMINOTOMY  12/2004    Diskectomy L4-L5, L5-S1    MULTIPLE CORONARY ARTERY BYPASS  11/11/1998    5 way bypass    BIOPSY GENERAL      buttock lesion    EYE SURGERY      MITRAL VALVE REPLACE         History Leading to Admission, Conditions that Caused the Need for Rehab (CMS):   See above  Co-morbidities: as listed above and below  Potential Risk - Complications: Cognitive Impairment, Contractures, Deep Vein Thrombosis, Incontinence, Malnutrition, Pain, Perceptual Impairment, Pneumonia, Pressure Ulcer, and Urinary Tract Infection  Level of Risk: High    Ongoing Medical Management Needed (Medical/Nursing Needs):   Patient Active Problem List    Diagnosis Date Noted    Epidural abscess 11/05/2022    Hypertensive urgency 11/04/2022    Degenerative lumbar spinal stenosis 11/04/2022    Abdominal pain 04/11/2022    Cyst of right kidney 04/07/2022    Kidney stone on right side 04/07/2022    S/P CABG x 3 04/04/2022    Primary osteoarthritis of first carpometacarpal joint of right hand 12/06/2021    Osteoarthritis of right hand 12/06/2021    Spinal stenosis of lumbar region (L3-5) with  neurogenic claudication and L foot drop/weakness, valvular heart disease 09/01/2021    Primary osteoarthritis of right hand 03/16/2020    Bilateral carotid artery occlusion 02/21/2019    Obesity (BMI 30-39.9) 05/25/2017    Prediabetes 09/15/2016    Gout     Statin intolerance 07/26/2016    S/P aortic valve replacement with bioprosthetic valve 07/26/2016    Coronary artery disease of native artery of native heart with stable angina pectoris (HCC) 07/30/2012    Dyslipidemia 07/30/2012    Coronary atherosclerosis of autologous vein bypass graft 11/14/2011    Benign hypertensive heart disease without heart failure 11/14/2011   Date of Service: 11/9/2022     Consult Requested By: Scout Angulo M.D.     Reason for Consultation: Lumbar spine epidural abscess     History of Present Illness:   Abimael Hamilton is a 66 y.o.  admitted 11/4/2022. Pt has a past medical history of hypertension, hyperlipidemia, CAD status post CABG x2, aortic valve replacement 2016, gout, L3-L5 severe spinal stenosis with left foot drop and chronic back pain.      Hospital Course:   MRI lumbar spine 11/4 with a 10 x 6 x 19 mm posterior epidural abscess at the level of L2-L3 which is new.  Also noted severe central canal stenosis at this level and severe degenerative canal stenosis at L3-L5.  Patient went to the OR with orthopedic surgery on 11/7 for posterior lumbar decompression/laminectomy at levels L2-S1.  Epidural collection was found which was reported as thick without a fluid component.  This was sent for culture.     Review Of Systems:  Review of Systems   Constitutional:  Negative for chills, fever and malaise/fatigue.   HENT:  Negative for hearing loss.    Eyes:  Negative for blurred vision and double vision.   Respiratory:  Negative for cough, sputum production and shortness of breath.    Cardiovascular:  Negative for chest pain.   Gastrointestinal:  Negative for abdominal pain, constipation, diarrhea, nausea and vomiting.    Genitourinary:  Negative for dysuria.   Musculoskeletal:  Positive for back pain and myalgias.   Skin:  Negative for rash.   Neurological:  Positive for focal weakness and weakness.   Psychiatric/Behavioral:  The patient is not nervous/anxious.       PMH:   Past Medical History  Past Medical History:  Diagnosis Date   Aortic stenosis 12/1/2015   Arthritis     Benign hypertensive heart disease without heart failure 11/14/2011   CAD (coronary artery disease)     Congestive heart failure (HCC)     Coronary atherosclerosis of autologous vein bypass graft 11/14/2011   Essential hypertension 4/25/2019   Gout     Heart valve disease     High cholesterol     History of pancreatitis     History of pancreatitis     Hypertension     Migraine 2/16/2019   Muscle cramps 10/4/2011   Myocardial infarct (HCC)      Multiple MI's, 1998, 2015   Obesity 11/14/2011   Pain      Joints   Postsurgical aortocoronary bypass status 7/26/2016    Status post redo 3-V CABG in [place] 12/2015: SVG-acute marginal, SVG sequential to LAD, and OM    Postsurgical aortocoronary bypass status 7/26/2016    Status post redo 3-V CABG in [place] 12/2015: SVG-acute marginal, SVG sequential to LAD, and OM    Renal disorder      Hx of Acute renal failure r/t medication/statins   S/P aortic valve replacement with bioprosthetic valve 7/26/2016    #23 Peñaloza Magna AVR (bioprosthetic)    Statin intolerance 7/26/2016   Status post cardiac revascularization with bypass aortocoronary anastomosis of five coronary vessels 7/26/2016    5-V CABG done in 1998 (done in [place] at [facility]), patent LIMA to LAD, occluded SVG-OM, occluded SVG-RCA by cardiac catheterization 11/15/2007 with other vein grafts not identified at that time, poor targets for revascularization and declined repeat CABG in 2007 by Darlin. No ischemic was identified by MPI 11/17/07 with an EF of 50%.     Status post cardiac revascularization with bypass aortocoronary anastomosis of five coronary  vessels 7/26/2016    5-V CABG done in 1998 (done in [place] at [facility]), patent LIMA to LAD, occluded SVG-OM, occluded SVG-RCA by cardiac catheterization 11/15/2007 with other vein grafts not identified at that time, poor targets for revascularization and declined repeat CABG in 2007 by Darlin. No ischemic was identified by MPI 11/17/07 with an EF of 50%.            PSH:  Past Surgical History  Past Surgical History:  Procedure Laterality Date   LUMBAR LAMINECTOMY DISKECTOMY   11/7/2022    Procedure: L2-SI LAMINECTOMY;  Surgeon: Adam Sparks M.D.;  Location: [facility];  Service: Orthopedics   SHOULDER DECOMPRESSION ARTHROSCOPIC Right 9/5/2017    Procedure: SHOULDER DECOMPRESSION ARTHROSCOPIC SUBACROMIAL;  Surgeon: Mg Campos M.D.;  Location: [facility];  Service: Orthopedics   DEBRIDEMENT, LABRUM, HIP, ARTHROSCOPIC Right 9/5/2017    Procedure: ARTHROSCOPIC LABRAL DEBRIDEMENT;  Surgeon: Mg Campos M.D.;  Location: [facility];  Service: Orthopedics   SHOULDER ARTHROSCOPY W/ ROTATOR CUFF REPAIR Right 9/5/2017    Procedure: SHOULDER ARTHROSCOPY W/ ROTATOR CUFF REPAIR;  Surgeon: Mg Campos M.D.;  Location: [facility];  Service: Orthopedics   SHOULDER ARTHROSCOPY W/ BICIPITAL TENODESIS REPAIR Right 9/5/2017    Procedure: SHOULDER ARTHROSCOPY W/ BICIPITAL TENODESIS REPAIR;  Surgeon: Mg Campos M.D.;  Location: [facility];  Service: Orthopedics   SYNOVECTOMY Right 9/5/2017    Procedure: SYNOVECTOMY;  Surgeon: Mg Campos M.D.;  Location: [facility];  Service: Orthopedics   MULTIPLE CORONARY ARTERY BYPASS   12/08/2015    3 way bypass & Bovine valve   LAMINOTOMY   12/2004    Diskectomy L4-L5, L5-S1   MULTIPLE CORONARY ARTERY BYPASS   11/11/1998    5 way bypass   BIOPSY GENERAL        buttock lesion   EYE SURGERY       MITRAL VALVE REPLACE              FAMILY HX:  Family History  Family  History  Problem Relation Age of Onset   Heart Attack Father          MI and CABG, unknown age   Hyperlipidemia Father     Cancer Mother          Breast   Heart Disease Brother     Arthritis Maternal Grandmother     Stroke Neg Hx     Diabetes Neg Hx     Lung Disease Neg Hx         Reviewed family history. No pertinent family history.      SOCIAL HX:  Social History         Socioeconomic History   Marital status:       Spouse name: Not on file   Number of children: Not on file   Years of education: Not on file   Highest education level: GED or equivalent  Occupational History   Not on file  Tobacco Use   Smoking status: Former      Packs/day: 3.00      Years: 20.00      Pack years: 60.00      Types: Cigarettes      Quit date: 1992      Years since quittin.8   Smokeless tobacco: Never  Vaping Use   Vaping Use: Never used  Substance and Sexual Activity   Alcohol use: Not Currently   Drug use: Yes      Types: Marijuana, Inhaled      Comment: Marijuana- Daily (4 times per day)   Sexual activity: Yes      Partners: Female  Other Topics Concern   Not on file  Social History Narrative   Not on file       Social Determinants of Health       Financial Resource Strain: Not on file  Food Insecurity: Not on file  Transportation Needs: Not on file  Physical Activity: Not on file  Stress: Not on file  Social Connections: Not on file  Intimate Partner Violence: Not on file  Housing Stability: Not on file       Social History     Tobacco Use  Smoking Status Former   Packs/day: 3.00   Years: 20.00   Pack years: 60.00   Types: Cigarettes   Quit date: 1992   Years since quittin.8  Smokeless Tobacco Never     Social History     Substance and Sexual Activity  Alcohol Use Not Currently        Allergies/Intolerances:  Allergies  Allergen Reactions   Morphine Vomiting, Nausea and Unspecified      violent   Fenofibrate Unspecified      Dehydration, severe muscle cramps    Gemfibrozil Unspecified       Dehydration,Severe Muscle cramps   Lipitor [Atorvastatin Calcium] Unspecified      Severe Muscle cramps, dehydration   Lovastatin Unspecified      Dehydration, severe muscle cramps   Tricor Unspecified      Dehydration, severe muscle cramps        History reviewed with the patient      Other Current Medications:     Current Facility-Administered Medications:     methocarbamol (ROBAXIN) tablet 750 mg, 750 mg, Oral, TID PRN, Scout Angulo M.D., 750 mg at 11/09/22 0841    gabapentin (NEURONTIN) capsule 600 mg, 600 mg, Oral, TID, Scout Angulo M.D., 600 mg at 11/09/22 0453    famotidine (PEPCID) injection 20 mg, 20 mg, Intravenous, BID, Scout Angulo M.D., 20 mg at 11/09/22 0453    Pharmacy Consult Request ...Pain Management Review 1 Each, 1 Each, Other, PHARMACY TO DOSE, Scout Angulo M.D.    ampicillin/sulbactam (UNASYN) 3 g in  mL IVPB, 3 g, Intravenous, Q6HRS, Scout Angulo M.D., Stopped at 11/09/22 0524    temazepam (Restoril) capsule 7.5 mg, 7.5 mg, Oral, QHS PRN, Scout Angulo M.D., 7.5 mg at 11/08/22 2229    lidocaine (LIDODERM) 5 % 2 Patch, 2 Patch, Transdermal, Q24HR, Barbara Javier A.P.R.N.    HYDROmorphone (DILAUDID) 0.2 mg/mL in 50 mL NS (PCA), , Intravenous, Continuous, Barbara Javier A.P.R.N., Rate Verify at 11/09/22 0652    MD ALERT...DO NOT ADMINISTER NSAIDS or ASPIRIN unless ORDERED By Neurosurgery 1 Each, 1 Each, Other, PRN, Adam Sparks M.D.    docusate sodium (COLACE) capsule 100 mg, 100 mg, Oral, BID, Adam Sparks M.D., 100 mg at 11/09/22 0453    senna-docusate (PERICOLACE or SENOKOT S) 8.6-50 MG per tablet 1 Tablet, 1 Tablet, Oral, Nightly, Adam Sparks M.D.    senna-docusate (PERICOLACE or SENOKOT S) 8.6-50 MG per tablet 1 Tablet, 1 Tablet, Oral, Q24HRS PRN, Adam Sparks M.D.    polyethylene glycol/lytes (MIRALAX) PACKET 1 Packet, 1 Packet, Oral, BID PRN, Adam Sparks M.D.    magnesium hydroxide (MILK OF MAGNESIA) suspension 30 mL, 30 mL, Oral, QDAY PRN, Adam Sparks M.D.    bisacodyl  (DULCOLAX) suppository 10 mg, 10 mg, Rectal, Q24HRS PRN, Adam Sparks M.D.    sodium phosphate (Fleet) enema 133 mL, 1 Each, Rectal, Once PRN, Adam Sparks M.D.    acetaminophen (TYLENOL) tablet 1,000 mg, 1,000 mg, Oral, Q6HRS, 1,000 mg at 11/09/22 0452 **FOLLOWED BY** [START ON 11/12/2022] acetaminophen (TYLENOL) tablet 1,000 mg, 1,000 mg, Oral, Q6HRS PRN, Adam Sparks M.D.    ondansetron (ZOFRAN) syringe/vial injection 4 mg, 4 mg, Intravenous, Q4HRS PRN, Adam Sparks M.D.    ondansetron (ZOFRAN ODT) dispertab 4 mg, 4 mg, Oral, Q4HRS PRN, Adam Sparks M.D. MD Alert...Vancomycin per Pharmacy, , Other, PHARMACY TO DOSE, Adam Sparks M.D.    vancomycin (VANCOCIN) 1,250 mg in  mL IVPB, 1,250 mg, Intravenous, Q12HR, Adam Sparks M.D., Last Rate: 125 mL/hr at 11/09/22 0844, 1,250 mg at 11/09/22 0844    Nozin nasal  swab, 1 Applicator, Each Nostril, BID, Adam Sparks M.D., 1 Applicator at 11/09/22 0459    allopurinol (ZYLOPRIM) tablet 100 mg, 100 mg, Oral, DAILY, Ronald Love M.D., 100 mg at 11/09/22 0453    amLODIPine (NORVASC) tablet 5 mg, 5 mg, Oral, DAILY, Ronald Love M.D., 5 mg at 11/09/22 0453    aspirin EC (ECOTRIN) tablet 81 mg, 81 mg, Oral, DAILY, Ronald Love M.D., 81 mg at 11/09/22 0841    clopidogrel (PLAVIX) tablet 75 mg, 75 mg, Oral, DAILY, Ronald Love M.D., 75 mg at 11/09/22 0842    isosorbide mononitrate SR (IMDUR) tablet 120 mg, 120 mg, Oral, QAM, Ronald Love M.D., 120 mg at 11/09/22 0453    lisinopril (PRINIVIL) tablet 20 mg, 20 mg, Oral, DAILY, Ronald Love M.D., 20 mg at 11/09/22 0453    metoprolol SR (TOPROL XL) tablet 100 mg, 100 mg, Oral, DAILY, Ronald Love M.D., 100 mg at 11/09/22 0453    nitroglycerin (NITROSTAT) tablet 0.4 mg, 0.4 mg, Sublingual, Q5 MIN PRN, Ronald Love M.D.    labetalol (NORMODYNE/TRANDATE) injection 10 mg, 10 mg, Intravenous, Q4HRS PRN, Ronald Love M.D., 10 mg at 11/04/22 1530    diazePAM (VALIUM) tablet 5 mg, 5 mg, Oral,  "Q8HRS PRN, Ronald Love M.D., 5 mg at 22 1614    hydrALAZINE (APRESOLINE) injection 20 mg, 20 mg, Intravenous, Q4HRS PRN, Ronald Love M.D., 20 mg at 22 1536    enalaprilat (Vasotec) injection 1.25 mg 1 mL, 1.25 mg, Intravenous, Q4HRS PRN, Ronald Love M.D.    dexamethasone (DECADRON) injection 4 mg, 4 mg, Intravenous, Q6HRS, Ronald Love M.D., 4 mg at 22 0273  [unfilled]     Most Recent Vital Signs:  BP (!) 146/79   Pulse 64   Temp 36.7 °C (98 °F) (Temporal)   Resp 16   Ht 1.676 m (5' 6\")   Wt 98.7 kg (217 lb 9.5 oz)   SpO2 94%   BMI 35.12 kg/m²   Temp  Av.7 °C (98 °F)  Min: 36.1 °C (96.9 °F)  Max: 37.5 °C (99.5 °F)     Physical Exam:  Physical Exam  Constitutional:       Appearance: Normal appearance.   HENT:      Head: Normocephalic and atraumatic.      Right Ear: External ear normal.      Left Ear: External ear normal.      Nose: Nose normal.      Mouth/Throat:      Mouth: Mucous membranes are dry.      Pharynx: Oropharynx is clear.   Eyes:      Extraocular Movements: Extraocular movements intact.      Conjunctiva/sclera: Conjunctivae normal.      Pupils: Pupils are equal, round, and reactive to light.   Cardiovascular:      Rate and Rhythm: Normal rate and regular rhythm.      Heart sounds: Murmur heard.   Pulmonary:      Effort: Pulmonary effort is normal.      Breath sounds: Normal breath sounds.   Abdominal:      General: Abdomen is flat. Bowel sounds are normal.      Palpations: Abdomen is soft.   Musculoskeletal:      Cervical back: Normal range of motion and neck supple.      Comments: Surgical bandaging in place as well as 2 drains from lumbar spine   Skin:     General: Skin is warm and dry.   Neurological:      General: No focal deficit present.      Mental Status: He is alert and oriented to person, place, and time.   Psychiatric:         Mood and Affect: Mood normal.         Behavior: Behavior normal.            Pertinent Lab Results:  Recent " Labs    11/07/22  0406 11/08/22  1105 11/09/22  0425  WBC 13.0* 18.2* 14.3*     Recent Labs    11/07/22  0406 11/08/22  1105 11/09/22  0425  HEMOGLOBIN 16.4 14.7 14.6  HEMATOCRIT 49.8 42.8 43.3  MCV 91.7 92.4 92.1  MCH 30.2 31.7 31.1  PLATELETCT 228 223 201           Recent Labs    11/07/22  0406 11/08/22  1105 11/09/22  0425  SODIUM 135 136 134*  POTASSIUM 4.4 4.2 4.2  CHLORIDE 102 102 103  CO2 20 22 20  CREATININE 0.90 0.87 0.75        Recent Labs    11/07/22  0406  ALBUMIN 4.3        Pertinent Micro:  Results        Procedure Component Value Units Date/Time    Anaerobic Culture [[id number]] Collected: 11/07/22 1741    Order Status: Completed Specimen: Wound Updated: 11/08/22 1426      Significant Indicator NEG      Source WND      Site L2-L3 Epidural Abscess      Culture Result Culture in progress.    Narrative:      Surgery - swabs received    CULTURE WOUND W/ GRAM STAIN [[id number]] Collected: 11/07/22 1741    Order Status: Completed Specimen: Wound Updated: 11/08/22 1426      Significant Indicator NEG      Source WND      Site L2-L3 Epidural Abscess      Culture Result No growth at 24 hours.      Gram Stain Result Rare WBCs.  No organisms seen.       Narrative:      Surgery - swabs received    Anaerobic Culture [[id number]] Collected: 11/07/22 1743    Order Status: Completed Specimen: Wound Updated: 11/08/22 1426      Significant Indicator NEG      Source WND      Site L2-L3 Epidural Abscess      Culture Result Culture in progress.    Narrative:      Surgery - swabs received    CULTURE WOUND W/ GRAM STAIN [[id number]] Collected: 11/07/22 1743    Order Status: Completed Specimen: Wound Updated: 11/08/22 1426      Significant Indicator NEG      Source WND      Site L2-L3 Epidural Abscess      Culture Result No growth at 24 hours.      Gram Stain Result Rare WBCs.  No organisms seen.       Narrative:      Surgery - swabs received    GRAM STAIN [[id number]] Collected: 11/07/22 1743    Order Status: Completed Specimen:  "Wound Updated: 11/07/22 2158      Significant Indicator .      Source WND      Site L2-L3 Epidural Abscess      Gram Stain Result Rare WBCs.  No organisms seen.       Narrative:      Surgery - swabs received    GRAM STAIN [[id number]] Collected: 11/07/22 1741    Order Status: Completed Specimen: Wound Updated: 11/07/22 2157      Significant Indicator .      Source WND      Site L2-L3 Epidural Abscess      Gram Stain Result Rare WBCs.  No organisms seen.       Narrative:      Surgery - swabs received    MRSA By PCR (Amp) [[id number]] Collected: 11/06/22 0755    Order Status: Completed Specimen: Respirate from Nares Updated: 11/06/22 1327      MRSA by PCR Negative    Narrative:      Collected By: [id number] NAIN LOPEZ    BLOOD CULTURE [[id number]] Collected: 11/04/22 2233    Order Status: Completed Specimen: Blood from Peripheral Updated: 11/05/22 0809      Significant Indicator NEG      Source BLD      Site PERIPHERAL      Culture Result No Growth  Note: Blood cultures are incubated for 5 days and  are monitored continuously.Positive blood cultures  are called to the RN and reported as soon as  they are identified.       Narrative:      Per Hospital Policy: Only change Specimen Src: to \"Line\" if  specified by physician order.  Right Hand    BLOOD CULTURE [[id number]] Collected: 11/04/22 2231    Order Status: Completed Specimen: Blood from Peripheral Updated: 11/05/22 0809      Significant Indicator NEG      Source BLD      Site PERIPHERAL      Culture Result No Growth  Note: Blood cultures are incubated for 5 days and  are monitored continuously.Positive blood cultures  are called to the RN and reported as soon as  they are identified.       Narrative:      Per Hospital Policy: Only change Specimen Src: to \"Line\" if  specified by physician order.  Left Hand    MRSA By PCR (Amp) [[id number]]      Order Status: Canceled Specimen: Respirate from Nares             Blood Culture  Date Value Ref Range Status  08/21/2018    "  Final    No growth after 5 days of incubation.  Blood culture testing and Gram stain, if indicated, are  performed at [facility], [address] [place] [address].  Positive blood cultures are  sent to [facility], [address] [place] [address], for organism identification and  susceptibility testing.           Studies:  CT-HEAD W/O     Result Date: 11/4/2022 11/4/2022 4:10 PM HISTORY/REASON FOR EXAM:  headache high blood pressure, r/o bleed. TECHNIQUE/EXAM DESCRIPTION AND NUMBER OF VIEWS: CT of the head without contrast. The study was performed on a helical multidetector CT scanner. Contiguous axial sections were obtained from the skull base through the vertex. Up to date radiation dose reduction adjustments have been utilized to meet ALARA standards for radiation dose reduction. COMPARISON:  Head CT 2/16/2019 FINDINGS: The calvariae and skull base are unremarkable. There are no extraaxial fluid collections. The ventricular system and basal cisterns are within normal limits. There are no areas of abnormal density in the brain substance. There are no hemorrhagic lesions.  There are no mass effects or shift of midline structures. The brainstem and posterior fossa structures are unremarkable. There is atherosclerotic vascular calcification in the vertebrobasilar and juxtasellar carotid arteries. Paranasal sinuses in the field of view are unremarkable. Mastoids in the field of view are unremarkable.      1.  Head CT without contrast within normal limits. No evidence of acute cerebral infarction, hemorrhage or mass lesion. 2.  Atherosclerotic vascular calcification.      [id number] VIEW     Result Date: 11/7/2022 11/7/2022 5:56 PM HISTORY/REASON FOR EXAM:  Instrument count. TECHNIQUE/EXAM DESCRIPTION AND NUMBER OF VIEWS:  1 view(s) of the abdomen. COMPARISON: 4/9/2022 FINDINGS: Exam limited due to the intraoperative technique with overlying artifact in the field of  view. No gross evidence of a radiopaque surgical instrument or needle device. There are sternotomy wires in the lower thorax.      1.  Negative single view abdomen without gross evidence of a radiopaque surgical device.     DX-SPINE-ANY ONE VIEW     Result Date: 11/8/2022 11/7/2022 4:07 PM HISTORY/REASON FOR EXAM:  Intraoperative images for procedural navigation. TECHNIQUE/EXAM DESCRIPTION AND NUMBER OF VIEWS:  1 view(s) of the lumbar spine.      Fluoroscopic image(s) obtained during lumbar spine instrumentation. Please see the patient's chart for full procedural details. Fluoroscopy time 4 seconds.      MR-LUMBAR SPINE-WITH & W/O     Result Date: 11/4/2022 11/4/2022 6:52 PM HISTORY/REASON FOR EXAM:  Chronic back pain. TECHNIQUE/EXAM DESCRIPTION: MRI of the lumbar spine without and with contrast. The study was performed on a [employer]a 1.5 Zuleika MRI scanner. T1 sagittal, T2 fast spin-echo sagittal, and T2 axial images were obtained of the lumbar spine. T1 post-contrast fat-suppressed sagittal images were obtained. 20 mL ProHance contrast was administered intravenously. COMPARISON:  12/29/2020 FINDINGS: There is peripherally enhancing posterior epidural fluid collection noted at the level of L2-3. It measures an approximately 10 x 6 x 19 mm in size. The lumbar spine maintains normal height and alignment. There is no fracture or dislocation. There is no pathologic marrow infiltration. There are degenerative changes as evidenced by a reduced intervertebral disc height, loss of disc T2 signal and degenerative marrow signal changes. There is no focal osseous lesion. At the level of L5-S1,there are combinations of diffuse disc bulge and facet joint arthropathy causing severe bilateral neural foraminal stenosis. There is mild central canal stenosis. There is moderate bilateral lateral recess stenosis. At the level of L4-5,there are combinations of diffuse disc bulge and facet joint arthropathy causing severe central  canal stenosis. There is mild to moderate bilateral neural foraminal stenosis. At the level of L3-4,there are combinations of diffuse disc bulge and facet joint arthropathy causing severe central canal stenosis. There is mild to moderate bilateral neural foraminal stenosis. At the level of L2-3,there are combinations of posterior epidural fluid collection, left paracentral disc protrusion and facet joint arthropathy causing severe central canal stenosis. There is severe left lateral recess stenosis. There is moderate left neural foraminal stenosis. At the level of L1-2, there is minimal disc bulge without significant spinal or neural foraminal stenosis. The conus terminates at the level of L1. There is an approximately 1.9 cm sized T2 hypointense lesion in the right kidney likely representing complicated cyst. There is also a simple cyst in the right kidney.      1.  There is an approximately 10 x 6 x 19 mm sized posterior epidural abscess at the level of L2-3. This is new since the previous study. There is severe central canal stenosis at this level. 2.  Severe degenerative central canal stenosis at L3-4 and L4-5. 3.  Degenerative disease as described above. 4.  There is an approximately 1.9 cm sized T2 hypointense lesion in the right kidney likely representing complicated cyst. There is also a simple cyst in the right kidney. This is unchanged since the previous study.     DX-PORTABLE FLUORO > 1 HOUR     Result Date: 11/8/2022 11/7/2022 4:07 PM HISTORY/REASON FOR EXAM:  Lumbar laminectomy TECHNIQUE/EXAM DESCRIPTION AND NUMBER OF VIEWS: Portable fluoroscopy for greater than one hour in OR. FINDINGS: The portable fluoroscopy unit was obligated to the procedure for greater than one hour. Actual fluoro time was 4 seconds.      Portable fluoroscopy utilized for 4 seconds. INTERPRETING LOCATION: [address] [place] [address] [place], [id number]     NM-CARDIAC STRESS TEST     Result Date: 11/5/2022   Myocardial Perfusion  Report  NUCLEAR  IMAGING INTERPRETATION  There is a small tomoderate sized focus of ischemia involving the lateral  wall within the cardiac base and midzone.  Normal left ventricular wall motion.  LV ejection fraction = 56%.  CATHERINE MORELOS  MRN:    [id number]         Gender:    M  Exam Date: 2022 06:40  Exam Location:      Inpatient  Ordering Phys:     CANDACE LEAL  NucMed Tech:       Sharmin Valverde RT  Age:    66    :    1955        Ht (in):     66  Wt (lb):     218    BMI:    34.97       Radiologist  Risk Factors:             Hypertension, Family history of coronary disease,                            Obesity  Indications:              Encounter for other preprocedural examination  ICD Codes:                Z01.818  Cardiac History:          Previous MI, S/p PTCA/stent, S/p CABG, Dyspnea  Cardiac Meds:  Meds Past 24 hrs:  Pretest Chest Pain:       No symptoms  STRESS TEST      Pharmacologi                   ras Ernst   Lexiscan       Dose: 0.4 mg  ol:  Post-Injection Exercise:        No exercise followed the intravenous injection  Resting HR (bpm):      84  Peak HR (bpm):         101  Resting BP (mmHg):       142    /   87  Peak BP (mmHg):       145   /   77  MaxPHR:     155     Target HR (bpm):       132  % MaxPHR:     65  Double Product:       [id number]  BP Response:  Stress Termination:       Protocol completed  Stress Symptoms:  Chest pain 9/10  ECG  Resting ECG:  Stress ECG:  IMAGE PROTOCOL      Rest/Stress 1                      Day          RadiopharmaceuticalDose (mCi)   Imaging  Date      Imaging  Time         Inj to Img Time (min)  Rest:   Tc-99m             7.3          2022        08:32                 30  Stress: Tc-99m             27           2022        09:23                 30  Rest:  Administration Site:       Left antecubital                             fossa  Administered by:      Sharmin Valverde RT  Stress:  Administration Site:       Left antecubital                              fossa  Administered by:      Sharmin Valverde RT  % Percent HR Achieved:  SPECT RESULTS  Technical Quality:       Good  Raw Data Analysis:  Summed Stress Score:    8  Summed Rest Score:    4  Summed Difference Score:        7  PERFUSION:  There is a small to moderate sized focus of ischemia involving the lateral  wall within the cardiac base and midzone.  FUNCTIONAL RESULTS (calculated via Gated SPECT)  Stress Image LV EF:        56     %  Upper Normal Limit  Stress EDV:      120    ml   EDVI:    58      ml/m2  Stress ESV:      53     ml   ESVI:    26      ml/m2  TID:    1      TID - 1.19      TID (ed) - 1.23  LV Function:  Normal left ventricular wall motion.  LV ejection fraction = 56%.  Trent Chang  (Electronically Signed)  Final Date:      2022                   10:29     EC-ECHOCARDIOGRAM COMPLETE W/O CONT     Result Date: 2022  Transthoracic Echo Report Echocardiography Laboratory CONCLUSIONS Compared to the prior study on 20, velocity across aortic valve is slightly worse. The left ventricular ejection fraction is visually estimated to be 65%. Known bioprosthetic aortic valve . Moderate aortic valve stenosis. Vmax is 3.8 m/s. Aortic valve area calculated from the continuity equation is 1.3 cm2. CATHERINE MORELOS Exam Date:         2022                    13:47 Exam Location:     Inpatient Priority:          Routine Ordering Physician:        BECKY SNOWDEN Referring Physician:       [id number] Gomez Sonographer:               Andrew MARTIN Age:    66     Gender:    M MRN:    [id number] :    1955 BSA:    2.06   Ht (in):    66     Wt (lb):    215 Exam Type:     Complete Indications:     Cardiac murmur, unspecified ICD Codes:       R01.1 CPT Codes:       68311 BP:   165    /   85     HR: Technical Quality:       Fair MEASUREMENTS  (Male / Female) Normal Values 2D ECHO LVOT Diameter                     2 cm                  Estimated LV Ejection Fraction    65 %                   LV Ejection Fraction MOD 4C       63.1 %                LV Ejection Fraction 4C AL        66.9 %                LA Volume Index                   28.5 cm3/m2           16 - 28 cm3/m2 DOPPLER AV Peak Velocity                  3.7 m/s               AV Peak Gradient                  53.4 mmHg             AV Mean Gradient                  30.2 mmHg             AV Acceleration Time              56 ms                 LVOT Peak Velocity                1.5 m/s               AV Area Cont Eq vti               1.4 cm2               Mitral E Point Velocity           0.8 m/s               Mitral E to A Ratio               0.67                  MV Pressure Half Time             50 ms                 MV Area PHT                       4.4 cm2               MV Deceleration Time              172 ms                PV Peak Velocity                  1.6 m/s               PV Peak Gradient                  10.6 mmHg             RVOT Peak Velocity                1.2 m/s               LV E' Lateral Velocity            10.7 cm/s             Mitral E to LV E' Lateral Ratio   7.5                   LV E' Septal Velocity             5.4 cm/s              Mitral E to LV E' Septal Ratio    14.7                  * Indicates values subject to auto-interpretation LV EF:  65    % FINDINGS Left Ventricle The left ventricle is normal in size. Mild concentric left ventricular hypertrophy. Normal left ventricular systolic function. The left ventricular ejection fraction is visually estimated to be 65%. No regional wall motion abnormalities. Normal diastolic function. Right Ventricle The right ventricle is normal in size and systolic function. Right Atrium The right atrium is normal in size. Normal inferior vena cava size and inspiratory collapse. Left Atrium The left atrium is normal in size. Left atrial volume index is 27 mL/sq m. Mitral Valve Structurally normal mitral valve without significant stenosis. Mild mitral regurgitation. Aortic Valve Known  bioprosthetic aortic valve . Moderate aortic valve stenosis. Vmax is 3.8 m/s. Transvalvular gradients are - Peak: 58  mmHg, Mean: 30 mmHg. Aortic valve area calculated from the continuity equation is 1.3 cm2. Acceleration time is 56 ms. Tricuspid Valve Structurally normal tricuspid valve without significant stenosis or regurgitation. Pulmonic Valve Structurally normal pulmonic valve without significant stenosis or regurgitation. Pericardium Normal pericardium without effusion. Aorta Normal aortic root for body surface area. The ascending aorta diameter is 3.1 cm. Oumar Mello MD (Electronically Signed) Final Date:     04 November 2022                 16:30        ASSESSMENT/PLAN:     66 y.o.  admitted 11/4/2022. Pt has a past medical history of aortic valve replacement 2016, gout, L3-L5 severe spinal stenosis with left foot drop and chronic back pain.      Hospital Course:   MRI lumbar spine 11/4 with a 10 x 6 x 19 mm posterior epidural abscess at the level of L2-L3 which is new.  Also noted severe central canal stenosis at this level and severe degenerative canal stenosis at L3-L5.  Patient went to the OR with orthopedic surgery on 11/7 for posterior lumbar decompression/laminectomy at levels L2-S1.  Epidural collection was found which was reported as thick without a fluid component.  This was sent for culture.  Patient states he was on no antibiotics prior to admit.  Started on broad-spectrum antibiotics on 11/4 with surgery on 11/7 which will likely decrease culture yield.      Problem List     Lumbar spine epidural abscess  Degenerative central canal stenosis  -Status post OR as described above  History of aortic valve replacement  History of gout     Plan      --- Continue vancomycin and Unasyn for now  --- Follow-up OR cultures, NGTD  --- Follow-up blood cultures, NGTD   --- Monitor labs         Dispo: Awaiting culture results and work-up as above   PICC: TBD           Date of initial consultation:  "11/9/2022  Consulting provider: Adam Sparks M.D.  Reason for consultation: assess for acute inpatient rehab appropriateness  LOS: 4 Day(s)     Chief complaint: Back pain     HPI: The patient is a 66 y.o. right hand dominant male with a past medical history of L3-5 severe spinal stenosis, left foot drop, chronic low back pain, CAD status post bypass with stenting;  who presented on 11/4/2022  8:19 AM with low back pain, brought in by [facility] with worsening low back pain and falls at home.  Patient denies bowel or bladder involvement, no saddle anesthesia.  Per patient, he was supposed to have surgery but had \"heart valve issues\" His cardiologist left, and he never followed up.      Patient was seen by orthopedic spine surgeon Dr. Adam Sparks who assessed patient to have bilateral lumbar radiculopathy and ordered updated imaging.  MRI L-spine found new 10 x 6 x 19 mm posterior epidural abscess at the level of L2-3 causing severe central canal stenosis. Patient was seen by cardiology on this determined to be a moderate risk surgery.  Patient was taken to the OR by Dr. Adam Sparks MD on 11/7/2022 for L2-S1 laminectomy, evacuation of epidural abscess, and intraoperative cultures.  Patient was started on Vanco and Zosyn pending ID consult.     The patient currently reports severe lower back pain, with radiculopathy down the left leg.  Patient reports left foot weakness consistent with foot drop.  Patient denies any bowel or bladder involvement.  Patient has a history of chronic back pain, reports he takes oxycodone 20 mg 4 times daily, prescribed by Dr. Cortez.  Patient also endorses gabapentin and Flexeril use at home.  Patient has good support from his spouse.  Patient is interested in IPR.      ROS  Pertinent positives are mentioned in the HPI, all others reviewed and are negative.     Social Hx:  1 SH  2 BEN  With: Spouse     Employment: Not working  Tobacco: Denies  Alcohol: Denies  Drugs: Endorses marijuana   "   THERAPY:  Restrictions: Fall risk, spinal/back precautions  PT: Functional mobility   11/8: Walking 3 feet with front wheel walker at min assist     OT: ADLs  11/8: Max assist lower body dressing and toileting     SLP:   None     IMAGING:  MRI L-spine 11/4/2022  1.  There is an approximately 10 x 6 x 19 mm sized posterior epidural abscess at the level of L2-3. This is new since the previous study. There is severe central canal stenosis at this level.  2.  Severe degenerative central canal stenosis at L3-4 and L4-5.  3.  Degenerative disease as described above.  4.  There is an approximately 1.9 cm sized T2 hypointense lesion in the right kidney likely representing complicated cyst. There is also a simple cyst in the right kidney. This is unchanged since the previous study.     PROCEDURES:  Adam Sparks 11/7/2022  Posterior lumbar decompression using laminectomy type approach, Levels: L2-L3-L4 L5-S1     PMH:  Past Medical History  Past Medical History:  Diagnosis Date   Aortic stenosis 12/1/2015   Arthritis     Benign hypertensive heart disease without heart failure 11/14/2011   CAD (coronary artery disease)     Congestive heart failure (HCC)     Coronary atherosclerosis of autologous vein bypass graft 11/14/2011   Essential hypertension 4/25/2019   Gout     Heart valve disease     High cholesterol     History of pancreatitis     History of pancreatitis     Hypertension     Migraine 2/16/2019   Muscle cramps 10/4/2011   Myocardial infarct (HCC)      Multiple MI's, 1998, 2015   Obesity 11/14/2011   Pain      Joints   Postsurgical aortocoronary bypass status 7/26/2016    Status post redo 3-V CABG in [place] 12/2015: SVG-acute marginal, SVG sequential to LAD, and OM    Postsurgical aortocoronary bypass status 7/26/2016    Status post redo 3-V CABG in [place] 12/2015: SVG-acute marginal, SVG sequential to LAD, and OM    Renal disorder      Hx of Acute renal failure r/t medication/statins   S/P aortic valve replacement  with bioprosthetic valve 7/26/2016    #23 [place] Magna AVR (bioprosthetic)    Statin intolerance 7/26/2016   Status post cardiac revascularization with bypass aortocoronary anastomosis of five coronary vessels 7/26/2016    5-V CABG done in 1998 (done in [facility]), patent LIMA to LAD, occluded SVG-OM, occluded SVG-RCA by cardiac catheterization 11/15/2007 with other vein grafts not identified at that time, poor targets for revascularization and declined repeat CABG in 2007 by Brandl. No ischemic was identified by MPI 11/17/07 with an EF of 50%.     Status post cardiac revascularization with bypass aortocoronary anastomosis of five coronary vessels 7/26/2016    5-V CABG done in 1998 (done in [facility]), patent LIMA to LAD, occluded SVG-OM, occluded SVG-RCA by cardiac catheterization 11/15/2007 with other vein grafts not identified at that time, poor targets for revascularization and declined repeat CABG in 2007 by Brandl. No ischemic was identified by MPI 11/17/07 with an EF of 50%.            PSH:  Past Surgical History  Past Surgical History:  Procedure Laterality Date   LUMBAR LAMINECTOMY DISKECTOMY   11/7/2022    Procedure: L2-SI LAMINECTOMY;  Surgeon: Adam Sparks M.D.;  Location: [facility];  Service: Orthopedics   SHOULDER DECOMPRESSION ARTHROSCOPIC Right 9/5/2017    Procedure: SHOULDER DECOMPRESSION ARTHROSCOPIC SUBACROMIAL;  Surgeon: Mg Campos M.D.;  Location: [facility];  Service: Orthopedics   DEBRIDEMENT, LABRUM, HIP, ARTHROSCOPIC Right 9/5/2017    Procedure: ARTHROSCOPIC LABRAL DEBRIDEMENT;  Surgeon: gM Campos M.D.;  Location: [facility];  Service: Orthopedics   SHOULDER ARTHROSCOPY W/ ROTATOR CUFF REPAIR Right 9/5/2017    Procedure: SHOULDER ARTHROSCOPY W/ ROTATOR CUFF REPAIR;  Surgeon: Mg Campos M.D.;  Location: [facility];  Service: Orthopedics   SHOULDER ARTHROSCOPY W/ BICIPITAL TENODESIS  REPAIR Right 9/5/2017    Procedure: SHOULDER ARTHROSCOPY W/ BICIPITAL TENODESIS REPAIR;  Surgeon: Mg Campos M.D.;  Location: [facility]ERY [facility];  Service: Orthopedics   SYNOVECTOMY Right 9/5/2017    Procedure: SYNOVECTOMY;  Surgeon: Mg Campos M.D.;  Location: SURGERY [facility];  Service: Orthopedics   MULTIPLE CORONARY ARTERY BYPASS   12/08/2015    3 way bypass & Bovine valve   LAMINOTOMY   12/2004    Diskectomy L4-L5, L5-S1   MULTIPLE CORONARY ARTERY BYPASS   11/11/1998    5 way bypass   BIOPSY GENERAL        buttock lesion   EYE SURGERY       MITRAL VALVE REPLACE              FHX:  Family History  Family History  Problem Relation Age of Onset   Heart Attack Father          MI and CABG, unknown age   Hyperlipidemia Father     Cancer Mother          Breast   Heart Disease Brother     Arthritis Maternal Grandmother     Stroke Neg Hx     Diabetes Neg Hx     Lung Disease Neg Hx            Medications:  Current Facility-Administered Medications  Medication Dose   methocarbamol (ROBAXIN) tablet 750 mg  750 mg   gabapentin (NEURONTIN) capsule 600 mg  600 mg   famotidine (PEPCID) injection 20 mg  20 mg   Pharmacy Consult Request ...Pain Management Review 1 Each  1 Each   ampicillin/sulbactam (UNASYN) 3 g in  mL IVPB  3 g   temazepam (Restoril) capsule 7.5 mg  7.5 mg   lidocaine (LIDODERM) 5 % 2 Patch  2 Patch   HYDROmorphone (DILAUDID) 0.2 mg/mL in 50 mL NS (PCA)     MD ALERT...DO NOT ADMINISTER NSAIDS or ASPIRIN unless ORDERED By Neurosurgery 1 Each  1 Each   docusate sodium (COLACE) capsule 100 mg  100 mg   senna-docusate (PERICOLACE or SENOKOT S) 8.6-50 MG per tablet 1 Tablet  1 Tablet   senna-docusate (PERICOLACE or SENOKOT S) 8.6-50 MG per tablet 1 Tablet  1 Tablet   polyethylene glycol/lytes (MIRALAX) PACKET 1 Packet  1 Packet   magnesium hydroxide (MILK OF MAGNESIA) suspension 30 mL  30 mL   bisacodyl (DULCOLAX) suppository 10 mg  10 mg   sodium phosphate (Fleet) enema  "133 mL  1 Each   acetaminophen (TYLENOL) tablet 1,000 mg  1,000 mg    Followed by   [START ON 11/12/2022] acetaminophen (TYLENOL) tablet 1,000 mg  1,000 mg   ondansetron (ZOFRAN) syringe/vial injection 4 mg  4 mg   ondansetron (ZOFRAN ODT) dispertab 4 mg  4 mg   MD Alert...Vancomycin per Pharmacy     vancomycin (VANCOCIN) 1,250 mg in  mL IVPB  1,250 mg   Nozin nasal  swab  1 Applicator   allopurinol (ZYLOPRIM) tablet 100 mg  100 mg   amLODIPine (NORVASC) tablet 5 mg  5 mg   [Held by provider] aspirin EC (ECOTRIN) tablet 81 mg  81 mg   [Held by provider] clopidogrel (PLAVIX) tablet 75 mg  75 mg   isosorbide mononitrate SR (IMDUR) tablet 120 mg  120 mg   lisinopril (PRINIVIL) tablet 20 mg  20 mg   metoprolol SR (TOPROL XL) tablet 100 mg  100 mg   nitroglycerin (NITROSTAT) tablet 0.4 mg  0.4 mg   labetalol (NORMODYNE/TRANDATE) injection 10 mg  10 mg   diazePAM (VALIUM) tablet 5 mg  5 mg   hydrALAZINE (APRESOLINE) injection 20 mg  20 mg   enalaprilat (Vasotec) injection 1.25 mg 1 mL  1.25 mg   dexamethasone (DECADRON) injection 4 mg  4 mg        Allergies:  Allergies  Allergen Reactions   Morphine Vomiting, Nausea and Unspecified      violent   Fenofibrate Unspecified      Dehydration, severe muscle cramps    Gemfibrozil Unspecified      Dehydration,Severe Muscle cramps   Lipitor [Atorvastatin Calcium] Unspecified      Severe Muscle cramps, dehydration   Lovastatin Unspecified      Dehydration, severe muscle cramps   Tricor Unspecified      Dehydration, severe muscle cramps           Physical Exam:  Vitals: BP (!) 145/90   Pulse 67   Temp 36.7 °C (98 °F) (Temporal)   Resp 20   Ht 1.676 m (5' 6\")   Wt 98.7 kg (217 lb 9.5 oz)   SpO2 93%   Gen: NAD  Head: NC/AT  Eyes/ Nose/ Mouth: PERRLA, moist mucous membranes  Cardio: RRR, good distal perfusion, warm extremities  Pulm: normal respiratory effort, no cyanosis   Abd: Soft NTND, negative borborygmi   Ext: No peripheral edema. No calf tenderness. No " clubbing.     Mental status:  A&Ox4 (person, place, date, situation) answers questions appropriately follows commands  Speech: fluent, no aphasia or dysarthria     Motor:                            Upper Extremity  Myotome R L  Shoulder flexion C5 5 5  Elbow flexion C5 5 5  Wrist extension C6 5 5  Elbow extension C7 5 5  Finger flexion C8 5 5  Finger abduction T1 5 5     Lower Extremity Myotome R L  Hip flexion L2 4/5 3/5  Knee extension L3 5 4/5  Ankle dorsiflexion L4 4/5 1/5  Toe extension L5 5 1/5  Ankle plantarflexion S1 5 5        Sensory:   intact to light touch through out     Labs: Reviewed and significant for   Recent Labs    11/07/22 0406 11/08/22 1105 11/09/22  0425  RBC 5.43 4.63* 4.70  HEMOGLOBIN 16.4 14.7 14.6  HEMATOCRIT 49.8 42.8 43.3  PLATELETCT 228 223 201  PROTHROMBTM 13.5  --   --   INR 1.04  --   --      Recent Labs    11/07/22 0406 11/08/22 1105 11/09/22  0425  SODIUM 135 136 134*  POTASSIUM 4.4 4.2 4.2  CHLORIDE 102 102 103  CO2 20 22 20  GLUCOSE 135* 185* 141*  BUN 33* 36* 25*  CREATININE 0.90 0.87 0.75  CALCIUM 9.3 8.5 8.5     Recent Results  Recent Results (from the past 24 hour(s))  CBC WITHOUT DIFFERENTIAL    Collection Time: 11/08/22 11:05 AM  Result Value Ref Range    WBC 18.2 (H) 4.8 - 10.8 K/uL    RBC 4.63 (L) 4.70 - 6.10 M/uL    Hemoglobin 14.7 14.0 - 18.0 g/dL    Hematocrit 42.8 42.0 - 52.0 %    MCV 92.4 81.4 - 97.8 fL    MCH 31.7 27.0 - 33.0 pg    MCHC 34.3 33.7 - 35.3 g/dL    RDW 45.1 35.9 - 50.0 fL    Platelet Count 223 164 - 446 K/uL    MPV 10.2 9.0 - 12.9 fL  Basic Metabolic Panel (BMP)    Collection Time: 11/08/22 11:05 AM  Result Value Ref Range    Sodium 136 135 - 145 mmol/L    Potassium 4.2 3.6 - 5.5 mmol/L    Chloride 102 96 - 112 mmol/L    Co2 22 20 - 33 mmol/L    Glucose 185 (H) 65 - 99 mg/dL    Bun 36 (H) 8 - 22 mg/dL    Creatinine 0.87 0.50 - 1.40 mg/dL    Calcium 8.5 8.5 - 10.5 mg/dL    Anion Gap 12.0 7.0 - 16.0  ESTIMATED GFR    Collection Time: 11/08/22 11:05  AM  Result Value Ref Range    GFR (CKD-EPI) 95 >60 mL/min/1.73 m 2  Basic Metabolic Panel (BMP)    Collection Time: 11/09/22  4:25 AM  Result Value Ref Range    Sodium 134 (L) 135 - 145 mmol/L    Potassium 4.2 3.6 - 5.5 mmol/L    Chloride 103 96 - 112 mmol/L    Co2 20 20 - 33 mmol/L    Glucose 141 (H) 65 - 99 mg/dL    Bun 25 (H) 8 - 22 mg/dL    Creatinine 0.75 0.50 - 1.40 mg/dL    Calcium 8.5 8.5 - 10.5 mg/dL    Anion Gap 11.0 7.0 - 16.0  CBC without Differential    Collection Time: 11/09/22  4:25 AM  Result Value Ref Range    WBC 14.3 (H) 4.8 - 10.8 K/uL    RBC 4.70 4.70 - 6.10 M/uL    Hemoglobin 14.6 14.0 - 18.0 g/dL    Hematocrit 43.3 42.0 - 52.0 %    MCV 92.1 81.4 - 97.8 fL    MCH 31.1 27.0 - 33.0 pg    MCHC 33.7 33.7 - 35.3 g/dL    RDW 44.7 35.9 - 50.0 fL    Platelet Count 201 164 - 446 K/uL    MPV 9.8 9.0 - 12.9 fL  ESTIMATED GFR    Collection Time: 11/09/22  4:25 AM  Result Value Ref Range    GFR (CKD-EPI) 100 >60 mL/min/1.73 m 2             ASSESSMENT:  Patient is a 66 y.o. male admitted with severe lower back pain, radiculopathy, and found to have lumbar epidural abscess now s/p L2-S1 laminectomy for evacuation of epidural abscess by Dr. Sparks on 11/7/2022     [facility] Code / Diagnosis to Support: 0008.9 - Orthopaedic Disorders: Other Orthopaedic     Rehabilitation: Impaired ADLs and mobility  Patient is a good candidate for inpatient rehab based on needs for PT, OT.  Patient will also benefit from family training.  Patient has a good discharge situation which will be home with spouse.      Barriers to transfer include: Antibiotic plan, insurance authorization, TCCs to verify disposition, medical clearance and bed availability      All cases are subject to administrative review and recommendations may change     Disposition recommendations:  -Good candidate for IPR, pending recommendations from infectious disease.  Patient will need to be able to return home on twice daily or daily IV antibiotics to attend IPR.     - 3 times daily and 4 times daily antibiotics are too frequent for a successful discharge home and would require SNF placement  -TCC to follow Case for rehab appropriateness     Medical Complexity:     Lumbar epidural abscess  -Status postevacuation by Dr. Adam Sparks on 11/7/2022 with L2-S1 laminectomy  -Chronic pain patient, follows with Dr. Cortez  -Awaiting infectious disease recommendations for long-term antibiotics  -Currently on Unasyn 4 times daily, vancomycin twice daily  -WBC improving  -Dexamethasone 4 mg 4 times daily IV  -Continue PT/OT while in-house     Pain  -Tylenol 1000 mg every 6 hours  - Allopurinol 100 mg daily  - Diazepam 5 mg 3 times daily as needed  -Increased frequency: Gabapentin 600 mg 4 times daily.  High risk medication, labs reviewed, creatinine clearance 108.1,   -Dilaudid PCA -this is a barrier to IPR.  Recommend discontinuing and switching back to home medication of oxycodone 20 mg 4 times daily as needed.   -Lidocaine patch (2) Q 24  -Starting: Flexeril 10 mg 3 times daily (preferred home med)  -Discontinued: Robaxin     Hypertension  -Amlodipine 5 mg daily  -Lisinopril 20 mg daily  -Metoprolol 100 mg daily     CAD  -Aspirin 81 mg daily  -Plavix 75 mg daily     Insomnia  -Temazepam 7.5 mg nightly     DVT PPX: SCDs            Cardiology Consult Note:    Bethel Dozier M.D.  Date & Time note created:    11/4/2022   5:09 PM     Referring MD:  Dr. Love     Patient ID:   Name:             Abimael Hamilton   YOB: 1955  Age:                 66 y.o.  male     MRN:               [id number]                                                             Reason for Consult:      Pre-op     History of Present Illness:    66-year-old male with a past medical history of CAD status post aortic valve replacement as well as bypass surgery with a stent to his LIMA to LAD graft.  He has been having low-level chest pain chronically with no changes.  Last time he got chest  pain was at the gym he was working out and doing aerobic exercises.  He takes a single nitroglycerin and the chest pain goes away.  This been unchanged in pattern for last couple of months.  He is getting surgery for spinal stenosis and we have been asked offer preoperative stratification.  His last stress test was over 2 years ago.  His last echocardiogram showed increased aortic valve gradients with an indexed aortic valve area of 1.0.  He has been having no changes in his shortness of breath and is limited by his chest pain and exercise before he gets shortness of breath.     Review of Systems:      Constitutional: Denies fevers, Denies weight changes  Eyes: Denies changes in vision, no eye pain  Ears/Nose/Throat/Mouth: Denies nasal congestion or sore throat   Cardiovascular: + chest pain, no palpitations   Respiratory: no shortness of breath , Denies cough  Gastrointestinal/Hepatic: Denies abdominal pain, nausea, vomiting, diarrhea, constipation or GI bleeding   Genitourinary: Denies dysuria or frequency  Musculoskeletal/Rheum: Denies  joint pain and swelling, no edema  Skin: Denies rash  Neurological: Denies headache, confusion, memory loss or focal weakness/parasthesias  Psychiatric: denies mood disorder   Endocrine: Alecia thyroid problems  Heme/Oncology/Lymph Nodes: Denies enlarged lymph nodes, denies brusing or known bleeding disorder  All other systems were reviewed and are negative (AMA/CMS criteria)                  Past Medical History:   Past Medical History  Past Medical History:  Diagnosis Date   Aortic stenosis 12/1/2015   Arthritis     Benign hypertensive heart disease without heart failure 11/14/2011   CAD (coronary artery disease)     Congestive heart failure (HCC)     Coronary atherosclerosis of autologous vein bypass graft 11/14/2011   Essential hypertension 4/25/2019   Gout     Heart valve disease     High cholesterol     History of pancreatitis     History of pancreatitis     Hypertension      Migraine 2/16/2019   Muscle cramps 10/4/2011   Myocardial infarct (HCC)      Multiple MI's, 1998, 2015   Obesity 11/14/2011   Pain      Joints   Postsurgical aortocoronary bypass status 7/26/2016    Status post redo 3-V CABG in [place] 12/2015: SVG-acute marginal, SVG sequential to LAD, and OM    Postsurgical aortocoronary bypass status 7/26/2016    Status post redo 3-V CABG in [place] 12/2015: SVG-acute marginal, SVG sequential to LAD, and OM    Renal disorder      Hx of Acute renal failure r/t medication/statins   S/P aortic valve replacement with bioprosthetic valve 7/26/2016    #23 [place] Magna AVR (bioprosthetic)    Statin intolerance 7/26/2016   Status post cardiac revascularization with bypass aortocoronary anastomosis of five coronary vessels 7/26/2016    5-V CABG done in 1998 (done in [place] at [facility]), patent LIMA to LAD, occluded SVG-OM, occluded SVG-RCA by cardiac catheterization 11/15/2007 with other vein grafts not identified at that time, poor targets for revascularization and declined repeat CABG in 2007 by Brandl. No ischemic was identified by MPI 11/17/07 with an EF of 50%.     Status post cardiac revascularization with bypass aortocoronary anastomosis of five coronary vessels 7/26/2016    5-V CABG done in 1998 (done in [place] at [facility]), patent LIMA to LAD, occluded SVG-OM, occluded SVG-RCA by cardiac catheterization 11/15/2007 with other vein grafts not identified at that time, poor targets for revascularization and declined repeat CABG in 2007 by Brandl. No ischemic was identified by MPI 11/17/07 with an EF of 50%.         Active Hospital Problems    Diagnosis     Hypertensive urgency [I16.0]     Degenerative lumbar spinal stenosis [M48.061]     Spinal stenosis of lumbar region (L3-5) with neurogenic claudication and L foot drop/weakness, valvular heart disease [M48.062]     Obesity (BMI 30-39.9) [E66.9]     Dyslipidemia [E78.5]          Past Surgical History:  Past Surgical History  Past  Surgical History:  Procedure Laterality Date   SHOULDER DECOMPRESSION ARTHROSCOPIC Right 9/5/2017    Procedure: SHOULDER DECOMPRESSION ARTHROSCOPIC SUBACROMIAL;  Surgeon: Mg Campos M.D.;  Location: [facility];  Service: Orthopedics   DEBRIDEMENT, LABRUM, HIP, ARTHROSCOPIC Right 9/5/2017    Procedure: ARTHROSCOPIC LABRAL DEBRIDEMENT;  Surgeon: Mg Campos M.D.;  Location: [facility];  Service: Orthopedics   SHOULDER ARTHROSCOPY W/ ROTATOR CUFF REPAIR Right 9/5/2017    Procedure: SHOULDER ARTHROSCOPY W/ ROTATOR CUFF REPAIR;  Surgeon: Mg Campos M.D.;  Location: [facility];  Service: Orthopedics   SHOULDER ARTHROSCOPY W/ BICIPITAL TENODESIS REPAIR Right 9/5/2017    Procedure: SHOULDER ARTHROSCOPY W/ BICIPITAL TENODESIS REPAIR;  Surgeon: Mg Campos M.D.;  Location: [facility];  Service: Orthopedics   SYNOVECTOMY Right 9/5/2017    Procedure: SYNOVECTOMY;  Surgeon: Mg Campos M.D.;  Location: [facility];  Service: Orthopedics   MULTIPLE CORONARY ARTERY BYPASS   12/08/2015    3 way bypass & Bovine valve   LAMINOTOMY   12/2004    Diskectomy L4-L5, L5-S1   MULTIPLE CORONARY ARTERY BYPASS   11/11/1998    5 way bypass   BIOPSY GENERAL        buttock lesion   EYE SURGERY       MITRAL VALVE REPLACE              Hospital Medications:  Current Facility-Administered Medications  Medication Dose   allopurinol (ZYLOPRIM) tablet 100 mg  100 mg   amLODIPine (NORVASC) tablet 5 mg  5 mg   [Held by provider] aspirin EC (ECOTRIN) tablet 81 mg  81 mg   [Held by provider] clopidogrel (PLAVIX) tablet 75 mg  75 mg   cyclobenzaprine (Flexeril) tablet 10 mg  10 mg   isosorbide mononitrate SR (IMDUR) tablet 120 mg  120 mg   lisinopril (PRINIVIL) tablet 20 mg  20 mg   metoprolol SR (TOPROL XL) tablet 100 mg  100 mg   nitroglycerin (NITROSTAT) tablet 0.4 mg  0.4 mg   senna-docusate (PERICOLACE or SENOKOT S) 8.6-50 MG per  tablet 2 Tablet  2 Tablet    And   polyethylene glycol/lytes (MIRALAX) PACKET 1 Packet  1 Packet    And   magnesium hydroxide (MILK OF MAGNESIA) suspension 30 mL  30 mL    And   bisacodyl (DULCOLAX) suppository 10 mg  10 mg   acetaminophen (Tylenol) tablet 650 mg  650 mg   Pharmacy Consult Request ...Pain Management Review 1 Each  1 Each   ondansetron (ZOFRAN) syringe/vial injection 4 mg  4 mg   ondansetron (ZOFRAN ODT) dispertab 4 mg  4 mg   labetalol (NORMODYNE/TRANDATE) injection 10 mg  10 mg   diazePAM (VALIUM) tablet 5 mg  5 mg   LORazepam (ATIVAN) injection 1 mg  1 mg   hydrALAZINE (APRESOLINE) injection 20 mg  20 mg   enalaprilat (Vasotec) injection 1.25 mg 1 mL  1.25 mg   HYDROmorphone (Dilaudid) injection 1 mg  1 mg    Or   oxyCODONE immediate-release (ROXICODONE) tablet 5 mg  5 mg    Or   oxyCODONE immediate release (ROXICODONE) tablet 10 mg  10 mg   dexamethasone (DECADRON) injection 4 mg  4 mg   gabapentin (NEURONTIN) capsule 300 mg  300 mg           Current Outpatient Medications:  Prescriptions Prior to Admission  Medications Prior to Admission  Medication Sig Dispense Refill Last Dose   gabapentin (NEURONTIN) 300 MG Cap Take 300 mg by mouth 1 time a day as needed (Pain).     11/3/2022 at 2345   acetaminophen (TYLENOL) 500 MG Tab Take 500-1,000 mg by mouth every 6 hours as needed. Indications: Pain     11/4/2022 at 0700   metoprolol SR (TOPROL XL) 100 MG TABLET SR 24 HR Take 1 Tablet by mouth every day. 90 Tablet 3 ABOUT 1 WEEK AGO at [facility]   allopurinol (ZYLOPRIM) 100 MG Tab Take 1 Tablet by mouth every day for 360 days. allopurinol 100 mg tablet 90 Tablet 3 ABOUT 1 WEEK AGO at [facility]   isosorbide mononitrate (IMDUR) 120 MG CR tablet Take 1 Tablet by mouth every morning. 90 Tablet 3 ABOUT 1 WEEK AGO at [facility]   lisinopril (PRINIVIL) 20 MG Tab Take 1 Tablet by mouth every day. 90 Tablet 2 ABOUT 1 WEEK AGO at [facility]   amLODIPine (NORVASC) 5 MG Tab Take 1 Tablet by mouth every day for 90 days. 90 Tablet 3 ABOUT 1  WEEK AGO at [facility]   clopidogrel (PLAVIX) 75 MG Tab Take 1 Tablet by mouth every day. 90 Tablet 3 ABOUT 1 WEEK AGO at [facility]   indomethacin (INDOCIN) 50 MG Cap TAKE ONE CAPSULE BY MOUTH TWICE DAILY AS NEEDED (Patient taking differently: Take 50 mg by mouth 2 times a day as needed (Gout). TAKE ONE CAPSULE BY MOUTH TWICE DAILY AS NEEDED) 30 Capsule 3 FEW DAYS AGO at [facility]   cyclobenzaprine (FLEXERIL) 10 mg Tab TAKE ONE TABLET BY MOUTH THREE TIMES DAILY FOR 7 DAYS (Patient taking differently: Take 10 mg by mouth 3 times a day as needed for Muscle Spasms. TAKE ONE TABLET BY MOUTH THREE TIMES DAILY FOR 7 DAYS) 30 Tablet 2 11/3/2022 at 2345   Evolocumab, REPATHA, (REPATHA SURECLICK) 140 MG/ML Solution Auto-injector Inject 1 Each under the skin every 14 days. 2 mL 11 11/3/2022 at PM   Oxycodone HCl 20 MG Tab Take 20 mg by mouth every 6 hours as needed (Pain).     11/3/2022 at 2345   nitroglycerin (NITROSTAT) 0.4 MG SL Tab PLACE 1TAB UNDER TONGUE AS NEEDED FOR CHEST PAIN(1TAB EVERY 5 MINS,MAX 3DOSES AS NEEDED)IF NO RELIEF CALL 911 (Patient taking differently: Place 0.4 mg under the tongue as needed for Chest Pain.) 25 Tablet 1 FEW WEEKS AGO at [facility]   aspirin 81 MG EC tablet Take 1 Tab by mouth every day. 30 Tab 11 ABOUT 1 WEEK AGO at [facility]          Medication Allergy:  Allergies  Allergen Reactions   Fenofibrate Unspecified      Dehydration, severe muscle cramps    Gemfibrozil Unspecified      Dehydration,Severe Muscle cramps   Lipitor [Atorvastatin Calcium] Unspecified      Severe Muscle cramps, dehydration   Lovastatin Unspecified      Dehydration, severe muscle cramps   Tricor Unspecified      Dehydration, severe muscle cramps        Family History:  Family History  Family History  Problem Relation Age of Onset   Heart Attack Father          MI and CABG, unknown age   Hyperlipidemia Father     Cancer Mother          Breast   Heart Disease Brother     Arthritis Maternal Grandmother     Stroke Neg Hx     Diabetes Neg Hx     Lung  "Disease Neg Hx            Social History:  Social History         Socioeconomic History   Marital status:       Spouse name: Not on file   Number of children: Not on file   Years of education: Not on file   Highest education level: GED or equivalent  Occupational History   Not on file  Tobacco Use   Smoking status: Former      Packs/day: 3.00      Years: 20.00      Pack years: 60.00      Types: Cigarettes      Quit date: 1992      Years since quittin.8   Smokeless tobacco: Never  Vaping Use   Vaping Use: Never used  Substance and Sexual Activity   Alcohol use: Not Currently   Drug use: Yes      Types: Marijuana, Inhaled      Comment: Marijuana- Daily (4 times per day)   Sexual activity: Yes      Partners: Female  Other Topics Concern   Not on file  Social History Narrative   Not on file       Social Determinants of Health       Financial Resource Strain: Not on file  Food Insecurity: Not on file  Transportation Needs: Not on file  Physical Activity: Not on file  Stress: Not on file  Social Connections: Not on file  Intimate Partner Violence: Not on file  Housing Stability: Not on file             Physical Exam:  Vitals/ General Appearance:   Weight/BMI: Body mass index is 35.12 kg/m².  BP (!) 163/79   Pulse 79   Temp 36.5 °C (97.7 °F) (Temporal)   Resp 18   Ht 1.676 m (5' 6\")   Wt 98.7 kg (217 lb 9.5 oz)   SpO2 97%   Vitals  Vitals:    22 0950 22 1001 22 1029 22 1100  BP:   (!) 165/85 (!) 149/77 (!) 163/79  Pulse:   72 74 79  Resp: 15   16 18  Temp:       36.5 °C (97.7 °F)  TempSrc:       Temporal  SpO2:   96% 94% 97%  Weight:       98.7 kg (217 lb 9.5 oz)  Height:       1.676 m (5' 6\")       Oxygen Therapy:  Pulse Oximetry: 97 %, O2 (LPM): 2, O2 Delivery Device: Nasal Cannula     Constitutional:   Well developed, Well nourished, No acute distress  HENMT:  Normocephalic, Atraumatic, Oropharynx moist mucous membranes, No oral exudates, Nose normal.  No thyromegaly.  Eyes: "  EOMI, Conjunctiva normal, No discharge.  Neck:  Normal range of motion, No cervical tenderness,  no JVD.  Cardiovascular:  Normal heart rate, Normal rhythm, No murmurs, No rubs, No gallops.   Extremitites with intact distal pulses, no cyanosis, or edema.  Lungs:  Normal breath sounds, breath sounds clear to auscultation bilaterally,  no crackles, no wheezing.   Abdomen: Bowel sounds normal, Soft, No tenderness, No guarding, No rebound, No masses, No hepatosplenomegaly.  Skin: Warm, Dry, No erythema, No rash, no induration.  Neurologic: Alert & oriented x 3, No focal deficits noted, cranial nerves II through X are grossly intact.  Psychiatric: Affect normal, Judgment normal, Mood normal.        MDM (Data Review):     Records reviewed and summarized in current documentation     Lab Data Review:  Recent Results  Recent Results (from the past 24 hour(s))  CBC WITH DIFFERENTIAL    Collection Time: 11/04/22  8:38 AM  Result Value Ref Range    WBC 10.4 4.8 - 10.8 K/uL    RBC 5.69 4.70 - 6.10 M/uL    Hemoglobin 17.7 14.0 - 18.0 g/dL    Hematocrit 52.9 (H) 42.0 - 52.0 %    MCV 93.0 81.4 - 97.8 fL    MCH 31.1 27.0 - 33.0 pg    MCHC 33.5 (L) 33.7 - 35.3 g/dL    RDW 45.6 35.9 - 50.0 fL    Platelet Count 243 164 - 446 K/uL    MPV 9.3 9.0 - 12.9 fL    Neutrophils-Polys 72.90 (H) 44.00 - 72.00 %    Lymphocytes 19.00 (L) 22.00 - 41.00 %    Monocytes 6.10 0.00 - 13.40 %    Eosinophils 1.20 0.00 - 6.90 %    Basophils 0.40 0.00 - 1.80 %    Immature Granulocytes 0.40 0.00 - 0.90 %    Nucleated RBC 0.00 /100 WBC    Neutrophils (Absolute) 7.58 (H) 1.82 - 7.42 K/uL    Lymphs (Absolute) 1.98 1.00 - 4.80 K/uL    Monos (Absolute) 0.64 0.00 - 0.85 K/uL    Eos (Absolute) 0.13 0.00 - 0.51 K/uL    Baso (Absolute) 0.04 0.00 - 0.12 K/uL    Immature Granulocytes (abs) 0.04 0.00 - 0.11 K/uL    NRBC (Absolute) 0.00 K/uL  COMP METABOLIC PANEL    Collection Time: 11/04/22  8:38 AM  Result Value Ref Range    Sodium 138 135 - 145 mmol/L     Potassium 4.7 3.6 - 5.5 mmol/L    Chloride 100 96 - 112 mmol/L    Co2 25 20 - 33 mmol/L    Anion Gap 13.0 7.0 - 16.0    Glucose 135 (H) 65 - 99 mg/dL    Bun 31 (H) 8 - 22 mg/dL    Creatinine 1.20 0.50 - 1.40 mg/dL    Calcium 9.8 8.5 - 10.5 mg/dL    AST(SGOT) 24 12 - 45 U/L    ALT(SGPT) 23 2 - 50 U/L    Alkaline Phosphatase 72 30 - 99 U/L    Total Bilirubin 0.6 0.1 - 1.5 mg/dL    Albumin 4.6 3.2 - 4.9 g/dL    Total Protein 8.0 6.0 - 8.2 g/dL    Globulin 3.4 1.9 - 3.5 g/dL    A-G Ratio 1.4 g/dL  CRP QUANTITIVE (NON-CARDIAC)    Collection Time: 11/04/22  8:38 AM  Result Value Ref Range    Stat C-Reactive Protein <0.30 0.00 - 0.75 mg/dL  ESTIMATED GFR    Collection Time: 11/04/22  8:38 AM  Result Value Ref Range    GFR (CKD-EPI) 67 >60 mL/min/1.73 m 2  Sed Rate    Collection Time: 11/04/22  8:38 AM  Result Value Ref Range    Sed Rate Westergren 4 0 - 20 mm/hour  CRP QUANTITIVE (NON-CARDIAC)    Collection Time: 11/04/22  2:14 PM  Result Value Ref Range    Stat C-Reactive Protein <0.30 0.00 - 0.75 mg/dL  EC-ECHOCARDIOGRAM COMPLETE W/O CONT    Collection Time: 11/04/22  3:22 PM  Result Value Ref Range    Eject.Frac. MOD 4C 63.14      Left Ventrical Ejection Fraction 65            Imaging/Procedures Review:    Chest Xray:  Reviewed     EKG:   Dated 4/7/2022 personally reviewed and interpreted myself showing normal sinus rhythm with an interventricular conduction delay.     ECHO: Dated 11/4/2022 personally viewed inter myself showing  CONCLUSIONS  Compared to the prior study on 12-2-20, velocity across aortic valve is   slightly worse.  The left ventricular ejection fraction is visually estimated to be 65%.  Known bioprosthetic aortic valve .  Moderate aortic valve stenosis. Vmax is 3.8 m/s.  Aortic valve area calculated from the continuity equation is 1.3 cm2.  MDM (Assessment and Plan):     Active Hospital Problems    Diagnosis     Hypertensive urgency [I16.0]     Degenerative lumbar spinal stenosis [M48.061]     Spinal  "stenosis of lumbar region (L3-5) with neurogenic claudication and L foot drop/weakness, valvular heart disease [M48.062]     Obesity (BMI 30-39.9) [E66.9]     Dyslipidemia [E78.5]       66-year-old male with chronic stable angina with CAD status post bypass with a stent to his LIMA to LAD graft.  For his preoperative stratification I am thinking he will be moderate risk with no increased risk.  We will get a nuclear stress test tomorrow to rule stratify his ischemia.  However given his high functional capacity and lack of chest pain at rest or with low-level exertion we likely would still say that he is moderate risk with no further testing needed.  We will await the results of the stress test.                   Current Vital Signs:   Temperature: 36.7 °C (98 °F) Pulse: (!) 113 Respiration: 16 Blood Pressure : (!) 153/93  Weight: 98.7 kg (217 lb 9.5 oz) Height: 167.6 cm (5' 6\")  Pulse Oximetry: 94 % O2 (LPM): 0      Completed Laboratory Reports:  Recent Labs     11/07/22  0406 11/08/22  1105 11/09/22  0425   WBC 13.0* 18.2* 14.3*   HEMOGLOBIN 16.4 14.7 14.6   HEMATOCRIT 49.8 42.8 43.3   PLATELETCT 228 223 201   SODIUM 135 136 134*   POTASSIUM 4.4 4.2 4.2   BUN 33* 36* 25*   CREATININE 0.90 0.87 0.75   ALBUMIN 4.3  --   --    GLUCOSE 135* 185* 141*   INR 1.04  --   --      Additional Labs:       Prior Living Situation:   Housing / Facility: 1 Story House  Steps Into Home: 2  Steps In Home: 0  Lives with - Patient's Self Care Capacity: Spouse  Equipment Owned: None    Prior Level of Function / Living Situation:   Physical Therapy: Prior Services: None  Housing / Facility: 1 Story House  Steps Into Home: 2  Steps In Home: 0  Equipment Owned: None  Lives with - Patient's Self Care Capacity: Spouse  Bed Mobility: Independent  Transfer Status: Independent  Ambulation: Independent  Distance Ambulation (Feet):  (community)  Assistive Devices Used: None  Stairs: Independent  Current Level of Function:   Gait Level Of Assist: " Contact Guard Assist  Assistive Device: Front Wheel Walker  Distance (Feet): 20  Deviation: Bradykinetic, Decreased Base Of Support (decreased step length)  # of Stairs Climbed: 0  Weight Bearing Status: no restrictions  Skilled Intervention: Verbal Cuing, Postural Facilitation, Tactile Cuing, Compensatory Strategies  Supine to Sit: Standby Assist  Sit to Supine: Moderate Assist (to B LE)  Scooting: Contact Guard Assist  Rolling: Supervised  Skilled Intervention: Verbal Cuing  Comments: supv for safety via log roll technique, all precautions maintianed.  Sit to Stand: Contact Guard Assist  Bed, Chair, Wheelchair Transfer: Minimal Assist  Transfer Method: Stand Step  Skilled Intervention: Verbal Cuing, Tactile Cuing, Postural Facilitation, Compensatory Strategies  Sitting in Chair: left UIC upon completion  Sitting Edge of Bed: 7 min  Standing: FWW  Occupational Therapy:   Self Feeding: Independent  Grooming / Hygiene: Independent  Bathing: Independent  Dressing: Independent  Toileting: Independent  Medication Management: Independent  Laundry: Independent  Kitchen Mobility: Independent  Finances: Independent  Home Management: Independent  Shopping: Independent  Prior Level Of Mobility: Independent Without Device in Community  Prior Services: None  Housing / Facility: [address] [place] [address]  Current Level of Function:   Upper Body Dressing: Minimal Assist  Lower Body Dressing: Maximal Assist  Toileting: Maximal Assist  Speech Language Pathology:      Rehabilitation Prognosis/Potential: Good  Estimated Length of Stay: 10-14 days    Nursing:      Continent    Scope/Intensity of Services Recommended:  Physical Therapy: 1.5 hr / day  5 days / week. Therapeutic Interventions Required: Maximize Endurance, Mobility, Strength, and Safety    He requires 24-hour rehabilitation nursing to manage bowel and bladder function, skin care, surgical incision, wound, nutrition and fluid intake, pulmonary hygiene, pain control, safety, medication  management, and patient/family goals. In addition, rehabilitation nursing will reiterate and reinforce therapy skills and equipment use, including ADLs, as well as provide education to the patient and family. Abimael Hamilton is willing to participate in and is able to tolerate the proposed plan of care.    Rehabilitation Goals and Plan (Expected frequency & duration of treatment in the IRF):   Return to the Community  Anticipated Date of Rehabilitation Admission: 11/10  Patient/Family oriented IRF level of care/facility/plan: Yes  Patient/Family willing to participate in IRF care/facility/plan: Yes  Patient able to tolerate IRF level of care proposed: Yes  Patient has potential to benefit IRF level of care proposed: Yes  Comments:       Special Needs or Precautions - Medical Necessity:  Complex Wound Care: abcess  Diet:   DIET ORDERS (From admission to next 24h)       Start     Ordered    11/08/22 0617  Diet Order Diet: Cardiac  ALL MEALS        Question:  Diet:  Answer:  Cardiac    11/08/22 0616                    Anticipated Discharge Destination / Patient/Family Goal:  Destination: Home with Assistance Support System: Spouse and Family   Anticipated home health services: OT, PT, and Nursing  Previously used HH service/ provider:  none  Anticipated DME Needs: [employer]  Outpatient Services: OT and PT  Alternative resources to address additional identified needs:     Future Appointments   Date Time Provider Department Center   12/1/2022  8:15 AM [facility] EXAM [facility]   12/8/2022  8:40 AM Darrian Martinez M.D. [facility] None   2/22/2023  7:30 AM [facility] EXAM 5 [facility] None       Pre-Screen Completed: 11/9/2022 1:19 PM Monica Ramirez L.P.N.

## 2022-11-09 NOTE — PROGRESS NOTES
[facility] Medicine Daily Progress Note    Date of Service  11/9/2022    Chief Complaint  Abimael Hamilton is a 66 y.o. male admitted 11/4/2022 with worsening back pain    Hospital Course  This is a 66 -year-old male with a past medical history significant for hypertension, hyperlipidemia, CAD status post CABG in 1998, 2016, aortic valve replacement in 2016, post stent in 4/2022, morbid obesity, gout, L3 L5 severe spinal stenosis with left foot drop, chronic back pain was admitted on 11/4/2022 with worsening of lower back pain.    MRI of the lumbar spine  on 11/04 noted 10 x 6 x 19 mm posterior epidural abscess at the level of L2-L3 which is new from previous study. There is severe central canal stenosis at this level.  Severe degenerative central canal stenosis at L3-L4 and L4-5.    Cardiology consulted for preop clearance given patient significant cardiac history, nuclear stress test noted small area of ischemia involving the lateral wall.  Patient cleared by cardiology, moderate risk for the surgery.     Surgery was consulted, patient went to the OR with Dr. Sparks on 11/7/2022.  Biopsy was negative, patient was initially started on IV Rocephin.  Considering patient had epidural abscess, will continue vancomycin and Unaysn Culture pending.ID called and will follow their recs    Interval events:  -- No acute events overnight, medicine has been stable, heart rate 61-88, blood pressure 131/82, saturating 91% on 3 to of oxygen.  Patient is alert and oriented, answering questions appropriately.  He continued to complain of back pain.  He stated that his oral pain medication did not control his pain.  This was a started on IV PCA pump for pain control.  --He was started on muscle relaxant, gabapentin, scheduled Tylenol  --Continue IV vancomycin and Zosyn.  Will consult ID tomorrow  --WBC count is elevated at 18.2, monitor  --Patient had  Surgery yesterday, will hold aspirin and Plavix, will discuss with neurosurgery when  we can start aspirin Plavix considering patient having stent placement in 4/2022 11/09:  -- No acute events overnight, medicine has been stable, heart rate 61-1 13, blood pressure 150/90, saturating 94% on room air.  Patient is alert and oriented, answering questions appropriately.  He has a very high threshold for pain.  He was taking oxycodone 20 mg every 6 hours as an outpatient.  During the stay in the hospital, he was started initially on IV PCA pump for Dilaudid.  -- Currently patient was started on oxycodone 20 every 4 hours along with 0.5 mg of IV Dilaudid every 3 hours  --Continue Muscle relaxant   -- His blood pressure is elevated likely secondary to pain, currently patient is on lisinopril 20 mg daily, Toprol-XL 1 mg daily, amlodipine 5 mg p.o. daily, Imdur 120 mg p.o. daily.    -- Patient is  on decadron as per NS , will continue    Cx, currently patient is on IV vancomycin and Unasyn.  ID following, will follow the recommendation    Discussed with neurosurgery Dr. Sparks who recommended starting the patient  on ASA/Plavicx     I have discussed this patient's plan of care and discharge plan at IDT rounds today with Case Management, Nursing, Nursing leadership, and other members of the IDT team.    Consultants/Specialty  cardiology and neurosurgery    Code Status  Full Code    Disposition  Patient is not medically cleared for discharge.   Anticipate discharge to   vs SNF vs IRF .  I have placed the appropriate orders for post-discharge needs.    Review of Systems  Review of Systems   Constitutional:  Positive for malaise/fatigue. Negative for fever.   HENT:  Negative for congestion and sore throat.    Eyes:  Negative for blurred vision and double vision.   Respiratory:  Negative for hemoptysis, sputum production and shortness of breath.    Cardiovascular:  Negative for chest pain, palpitations, orthopnea and leg swelling.   Gastrointestinal:  Negative for abdominal pain, heartburn, nausea and vomiting.    Musculoskeletal:  Positive for back pain and myalgias.   Neurological:  Positive for weakness. Negative for headaches.   Psychiatric/Behavioral:  Negative for depression. The patient is nervous/anxious.    All other systems reviewed and are negative.     Physical Exam  Temp:  [36.7 °C (98 °F)-37.1 °C (98.7 °F)] 36.7 °C (98 °F)  Pulse:  [] 113  Resp:  [16-20] 16  BP: (121-178)/(79-99) 153/93  SpO2:  [93 %-96 %] 94 %    Physical Exam  Vitals and nursing note reviewed.   Constitutional:       Appearance: Normal appearance.   HENT:      Head: Normocephalic and atraumatic.   Eyes:      Extraocular Movements: Extraocular movements intact.      Pupils: Pupils are equal, round, and reactive to light.   Cardiovascular:      Rate and Rhythm: Normal rate and regular rhythm.      Pulses: Normal pulses.   Pulmonary:      Effort: Pulmonary effort is normal. No respiratory distress.      Breath sounds: Normal breath sounds. No wheezing, rhonchi or rales.   Abdominal:      General: Bowel sounds are normal. There is no distension.      Palpations: Abdomen is soft.      Tenderness: There is no abdominal tenderness.   Musculoskeletal:         General: No tenderness.      Cervical back: Neck supple. No muscular tenderness.      Right lower leg: No edema.      Left lower leg: No edema.      Comments: Left foot drop   Skin:     General: Skin is warm and dry.      Findings: No bruising, erythema or rash.   Neurological:      Mental Status: He is alert and oriented to person, place, and time.   Psychiatric:         Mood and Affect: Mood normal.       Fluids    Intake/Output Summary (Last 24 hours) at 11/9/2022 1347  Last data filed at 11/9/2022 1300  Gross per 24 hour   Intake 1286.15 ml   Output 2375 ml   Net -1088.85 ml         Laboratory  Recent Labs     11/07/22  0406 11/08/22  1105 11/09/22  0425   WBC 13.0* 18.2* 14.3*   RBC 5.43 4.63* 4.70   HEMOGLOBIN 16.4 14.7 14.6   HEMATOCRIT 49.8 42.8 43.3   MCV 91.7 92.4 92.1   MCH  30.2 31.7 31.1   MCHC 32.9* 34.3 33.7   RDW 44.4 45.1 44.7   PLATELETCT 228 223 201   MPV 9.7 10.2 9.8       Recent Labs     11/07/22  0406 11/08/22  1105 11/09/22  0425   SODIUM 135 136 134*   POTASSIUM 4.4 4.2 4.2   CHLORIDE 102 102 103   CO2 20 22 20   GLUCOSE 135* 185* 141*   BUN 33* 36* 25*   CREATININE 0.90 0.87 0.75   CALCIUM 9.3 8.5 8.5       Recent Labs     11/07/22  0406   INR 1.04                 Imaging  DX-SPINE-ANY ONE VIEW   Final Result      Fluoroscopic image(s) obtained during lumbar spine instrumentation. Please see the patient's chart for full procedural details.      Fluoroscopy time 4 seconds.         DX-PORTABLE FLUORO > 1 HOUR   Final Result      Portable fluoroscopy utilized for 4 seconds.         INTERPRETING LOCATION: [address] [place] [address], [facility], [facility]      [id number] VIEW   Final Result      1.  Negative single view abdomen without gross evidence of a radiopaque surgical device.      NM-CARDIAC STRESS TEST   Final Result      MR-LUMBAR SPINE-WITH & W/O   Final Result      1.  There is an approximately 10 x 6 x 19 mm sized posterior epidural abscess at the level of L2-3. This is new since the previous study. There is severe central canal stenosis at this level.   2.  Severe degenerative central canal stenosis at L3-4 and L4-5.   3.  Degenerative disease as described above.   4.  There is an approximately 1.9 cm sized T2 hypointense lesion in the right kidney likely representing complicated cyst. There is also a simple cyst in the right kidney. This is unchanged since the previous study.      CT-HEAD W/O   Final Result      1.  Head CT without contrast within normal limits. No evidence of acute cerebral infarction, hemorrhage or mass lesion.   2.  Atherosclerotic vascular calcification.         EC-ECHOCARDIOGRAM COMPLETE W/O CONT   Final Result             Assessment/Plan  * Spinal stenosis of lumbar region (L3-5) with neurogenic claudication and L foot drop/weakness, valvular heart  disease  Assessment & Plan  Pain control and muscle relaxers  MRI LS spine done was notable for 10 x 6 x 19 mm posterior epidural abscess at the level of L2-L3 which is new from previous study.  There is severe central canal stenosis at this level.  Severe degenerative central canal stenosis at L3-L4 and L4-5.    -- Had surgery  On 11/07, culture pending, will continue broad-spectrum antibiotics including vancomycin and unasyn  --will obtain PT and OT    - ID following   Pt and ot    Difficulty in managing the patient's pain.  Patient has a very high tolerance for pain.  He does take oxycodone 20 mg every 6 hours as an outpatient.  We will continue oxycodone 20 mg every 4 hours along with IV Dilaudid 0.5 mg every 3 hours as needed.  On muscle relaxant, scheduled Tylenol, gabapentin    Obesity (BMI 30-39.9)- (present on admission)  Assessment & Plan  Lifestyle modification including diet exercise and weight loss as an outpatient      Dyslipidemia- (present on admission)  Assessment & Plan  Hold home evolocumab    Coronary artery disease of native artery of native heart with stable angina pectoris (HCC)- (present on admission)  Assessment & Plan  ;C/w metoprolol and lisinopril, Imdur    Stress test- a small tomoderate sized focus of ischemia involving the lateral wall within the cardiac base and midzone. Findings were discussed with cardiology Dr. Dozier - no further inpatient cardiology evaluation/procedure recommended. Pt is a moderate risk patient.    -- History of aspirin and Plavix was held considering patient undergoing surgery.  I discussed with Dr. Sparks  stated okay starting the patient on aspirin and Plavix.  Started 11/09/2022.      Epidural abscess- (present on admission)  Assessment & Plan  See above    Hypertensive urgency  Assessment & Plan  PRN IV antihypertensives  Continue amlodipine       VTE prophylaxis: SCDs/TEDs    I have performed a physical exam and reviewed and updated ROS and Plan today  (11/9/2022). In review of yesterday's note (11/8/2022), there are no changes except as documented above.

## 2022-11-09 NOTE — CONSULTS
"    Physical Medicine and Rehabilitation Consultation              Date of initial consultation: 11/9/2022  Consulting provider: Adam Sparks M.D.  Reason for consultation: assess for acute inpatient rehab appropriateness  LOS: 4 Day(s)    Chief complaint: Back pain    HPI: The patient is a 66 y.o. right hand dominant male with a past medical history of L3-5 severe spinal stenosis, left foot drop, chronic low back pain, CAD status post bypass with stenting;  who presented on 11/4/2022  8:19 AM with low back pain, brought in by [facility] with worsening low back pain and falls at home.  Patient denies bowel or bladder involvement, no saddle anesthesia.  Per patient, he was supposed to have surgery but had \"heart valve issues\" His cardiologist left, and he never followed up.     Patient was seen by orthopedic spine surgeon Dr. Adam Sparks who assessed patient to have bilateral lumbar radiculopathy and ordered updated imaging.  MRI L-spine found new 10 x 6 x 19 mm posterior epidural abscess at the level of L2-3 causing severe central canal stenosis. Patient was seen by cardiology on this determined to be a moderate risk surgery.  Patient was taken to the OR by Dr. Adam Sparks MD on 11/7/2022 for L2-S1 laminectomy, evacuation of epidural abscess, and intraoperative cultures.  Patient was started on Vanco and Zosyn pending ID consult.    The patient currently reports severe lower back pain, with radiculopathy down the left leg.  Patient reports left foot weakness consistent with foot drop.  Patient denies any bowel or bladder involvement.  Patient has a history of chronic back pain, reports he takes oxycodone 20 mg 4 times daily, prescribed by Dr. Cortez.  Patient also endorses gabapentin and Flexeril use at home.  Patient has good support from his spouse.  Patient is interested in IPR.     ROS  Pertinent positives are mentioned in the HPI, all others reviewed and are negative.    Social Hx:  1 SH  2 BEN  With: " Spouse    Employment: Not working  Tobacco: Denies  Alcohol: Denies  Drugs: Endorses marijuana    THERAPY:  Restrictions: Fall risk, spinal/back precautions  PT: Functional mobility   11/8: Walking 3 feet with front wheel walker at min assist    OT: ADLs  11/8: Max assist lower body dressing and toileting    SLP:   None    IMAGING:  MRI L-spine 11/4/2022  1.  There is an approximately 10 x 6 x 19 mm sized posterior epidural abscess at the level of L2-3. This is new since the previous study. There is severe central canal stenosis at this level.  2.  Severe degenerative central canal stenosis at L3-4 and L4-5.  3.  Degenerative disease as described above.  4.  There is an approximately 1.9 cm sized T2 hypointense lesion in the right kidney likely representing complicated cyst. There is also a simple cyst in the right kidney. This is unchanged since the previous study.    PROCEDURES:  Adam Sparks 11/7/2022  Posterior lumbar decompression using laminectomy type approach, Levels: L2-L3-L4 L5-S1    PMH:  Past Medical History:   Diagnosis Date    Aortic stenosis 12/1/2015    Arthritis     Benign hypertensive heart disease without heart failure 11/14/2011    CAD (coronary artery disease)     Congestive heart failure (HCC)     Coronary atherosclerosis of autologous vein bypass graft 11/14/2011    Essential hypertension 4/25/2019    Gout     Heart valve disease     High cholesterol     History of pancreatitis     History of pancreatitis     Hypertension     Migraine 2/16/2019    Muscle cramps 10/4/2011    Myocardial infarct (HCC)     Multiple MI's, 1998, 2015    Obesity 11/14/2011    Pain     Joints    Postsurgical aortocoronary bypass status 7/26/2016    Status post redo 3-V CABG in [place] 12/2015: SVG-acute marginal, SVG sequential to LAD, and OM     Postsurgical aortocoronary bypass status 7/26/2016    Status post redo 3-V CABG in [place] 12/2015: SVG-acute marginal, SVG sequential to LAD, and OM     Renal disorder     Hx  of Acute renal failure r/t medication/statins    S/P aortic valve replacement with bioprosthetic valve 7/26/2016    #23 [place] [employer] AVR (bioprosthetic)     Statin intolerance 7/26/2016    Status post cardiac revascularization with bypass aortocoronary anastomosis of five coronary vessels 7/26/2016    5-V CABG done in 1998 (done in [facility]), patent LIMA to LAD, occluded SVG-OM, occluded SVG-RCA by cardiac catheterization 11/15/2007 with other vein grafts not identified at that time, poor targets for revascularization and declined repeat CABG in 2007 by [facility]. No ischemic was identified by MPI 11/17/07 with an EF of 50%.      Status post cardiac revascularization with bypass aortocoronary anastomosis of five coronary vessels 7/26/2016    5-V CABG done in 1998 (done in [facility]), patent LIMA to LAD, occluded SVG-OM, occluded SVG-RCA by cardiac catheterization 11/15/2007 with other vein grafts not identified at that time, poor targets for revascularization and declined repeat CABG in 2007 by [facility]. No ischemic was identified by MPI 11/17/07 with an EF of 50%.         PSH:  Past Surgical History:   Procedure Laterality Date    LUMBAR LAMINECTOMY DISKECTOMY  11/7/2022    Procedure: L2-SI LAMINECTOMY;  Surgeon: Adam Sparks M.D.;  Location: [facility];  Service: Orthopedics    SHOULDER DECOMPRESSION ARTHROSCOPIC Right 9/5/2017    Procedure: SHOULDER DECOMPRESSION ARTHROSCOPIC SUBACROMIAL;  Surgeon: Mg Campos M.D.;  Location: [facility];  Service: Orthopedics    DEBRIDEMENT, LABRUM, HIP, ARTHROSCOPIC Right 9/5/2017    Procedure: ARTHROSCOPIC LABRAL DEBRIDEMENT;  Surgeon: Mg Campos M.D.;  Location: [facility];  Service: Orthopedics    SHOULDER ARTHROSCOPY W/ ROTATOR CUFF REPAIR Right 9/5/2017    Procedure: SHOULDER ARTHROSCOPY W/ ROTATOR CUFF REPAIR;  Surgeon: Mg Campos M.D.;  Location: [facility];  Service:  Orthopedics    SHOULDER ARTHROSCOPY W/ BICIPITAL TENODESIS REPAIR Right 9/5/2017    Procedure: SHOULDER ARTHROSCOPY W/ BICIPITAL TENODESIS REPAIR;  Surgeon: Mg Campos M.D.;  Location: SURGERY [facility] [place] [facility];  Service: Orthopedics    SYNOVECTOMY Right 9/5/2017    Procedure: SYNOVECTOMY;  Surgeon: Mg Campos M.D.;  Location: SURGERY [facility] [place] [facility];  Service: Orthopedics    MULTIPLE CORONARY ARTERY BYPASS  12/08/2015    3 way bypass & Bovine valve    LAMINOTOMY  12/2004    Diskectomy L4-L5, L5-S1    MULTIPLE CORONARY ARTERY BYPASS  11/11/1998    5 way bypass    BIOPSY GENERAL      buttock lesion    EYE SURGERY      MITRAL VALVE REPLACE         FHX:  Family History   Problem Relation Age of Onset    Heart Attack Father         MI and CABG, unknown age    Hyperlipidemia Father     Cancer Mother         Breast    Heart Disease Brother     Arthritis Maternal Grandmother     Stroke Neg Hx     Diabetes Neg Hx     Lung Disease Neg Hx        Medications:  Current Facility-Administered Medications   Medication Dose    methocarbamol (ROBAXIN) tablet 750 mg  750 mg    gabapentin (NEURONTIN) capsule 600 mg  600 mg    famotidine (PEPCID) injection 20 mg  20 mg    Pharmacy Consult Request ...Pain Management Review 1 Each  1 Each    ampicillin/sulbactam (UNASYN) 3 g in  mL IVPB  3 g    temazepam (Restoril) capsule 7.5 mg  7.5 mg    lidocaine (LIDODERM) 5 % 2 Patch  2 Patch    HYDROmorphone (DILAUDID) 0.2 mg/mL in 50 mL NS (PCA)      MD ALERT...DO NOT ADMINISTER NSAIDS or ASPIRIN unless ORDERED By Neurosurgery 1 Each  1 Each    docusate sodium (COLACE) capsule 100 mg  100 mg    senna-docusate (PERICOLACE or SENOKOT S) 8.6-50 MG per tablet 1 Tablet  1 Tablet    senna-docusate (PERICOLACE or SENOKOT S) 8.6-50 MG per tablet 1 Tablet  1 Tablet    polyethylene glycol/lytes (MIRALAX) PACKET 1 Packet  1 Packet    magnesium hydroxide (MILK OF MAGNESIA) suspension 30 mL  30 mL    bisacodyl (DULCOLAX)  "suppository 10 mg  10 mg    sodium phosphate (Fleet) enema 133 mL  1 Each    acetaminophen (TYLENOL) tablet 1,000 mg  1,000 mg    Followed by    [START ON 11/12/2022] acetaminophen (TYLENOL) tablet 1,000 mg  1,000 mg    ondansetron (ZOFRAN) syringe/vial injection 4 mg  4 mg    ondansetron (ZOFRAN ODT) dispertab 4 mg  4 mg    MD Alert...Vancomycin per Pharmacy      vancomycin (VANCOCIN) 1,250 mg in  mL IVPB  1,250 mg    Nozin nasal  swab  1 Applicator    allopurinol (ZYLOPRIM) tablet 100 mg  100 mg    amLODIPine (NORVASC) tablet 5 mg  5 mg    [Held by provider] aspirin EC (ECOTRIN) tablet 81 mg  81 mg    [Held by provider] clopidogrel (PLAVIX) tablet 75 mg  75 mg    isosorbide mononitrate SR (IMDUR) tablet 120 mg  120 mg    lisinopril (PRINIVIL) tablet 20 mg  20 mg    metoprolol SR (TOPROL XL) tablet 100 mg  100 mg    nitroglycerin (NITROSTAT) tablet 0.4 mg  0.4 mg    labetalol (NORMODYNE/TRANDATE) injection 10 mg  10 mg    diazePAM (VALIUM) tablet 5 mg  5 mg    hydrALAZINE (APRESOLINE) injection 20 mg  20 mg    enalaprilat (Vasotec) injection 1.25 mg 1 mL  1.25 mg    dexamethasone (DECADRON) injection 4 mg  4 mg       Allergies:  Allergies   Allergen Reactions    Morphine Vomiting, Nausea and Unspecified     violent    Fenofibrate Unspecified     Dehydration, severe muscle cramps     Gemfibrozil Unspecified     Dehydration,Severe Muscle cramps    Lipitor [Atorvastatin Calcium] Unspecified     Severe Muscle cramps, dehydration    Lovastatin Unspecified     Dehydration, severe muscle cramps    Tricor Unspecified     Dehydration, severe muscle cramps         Physical Exam:  Vitals: BP (!) 145/90   Pulse 67   Temp 36.7 °C (98 °F) (Temporal)   Resp 20   Ht 1.676 m (5' 6\")   Wt 98.7 kg (217 lb 9.5 oz)   SpO2 93%   Gen: NAD  Head: NC/AT  Eyes/ Nose/ Mouth: PERRLA, moist mucous membranes  Cardio: RRR, good distal perfusion, warm extremities  Pulm: normal respiratory effort, no cyanosis   Abd: Soft NTND, " negative borborygmi   Ext: No peripheral edema. No calf tenderness. No clubbing.    Mental status:  A&Ox4 (person, place, date, situation) answers questions appropriately follows commands  Speech: fluent, no aphasia or dysarthria    Motor:      Upper Extremity  Myotome R L   Shoulder flexion C5 5 5   Elbow flexion C5 5 5   Wrist extension C6 5 5   Elbow extension C7 5 5   Finger flexion C8 5 5   Finger abduction T1 5 5     Lower Extremity Myotome R L   Hip flexion L2 4/5 3/5   Knee extension L3 5 4/5   Ankle dorsiflexion L4 4/5 1/5   Toe extension L5 5 1/5   Ankle plantarflexion S1 5 5       Sensory:   intact to light touch through out    Labs: Reviewed and significant for   Recent Labs     11/07/22 0406 11/08/22 1105 11/09/22 0425   RBC 5.43 4.63* 4.70   HEMOGLOBIN 16.4 14.7 14.6   HEMATOCRIT 49.8 42.8 43.3   PLATELETCT 228 223 201   PROTHROMBTM 13.5  --   --    INR 1.04  --   --      Recent Labs     11/07/22 0406 11/08/22 1105 11/09/22 0425   SODIUM 135 136 134*   POTASSIUM 4.4 4.2 4.2   CHLORIDE 102 102 103   CO2 20 22 20   GLUCOSE 135* 185* 141*   BUN 33* 36* 25*   CREATININE 0.90 0.87 0.75   CALCIUM 9.3 8.5 8.5     Recent Results (from the past 24 hour(s))   CBC WITHOUT DIFFERENTIAL    Collection Time: 11/08/22 11:05 AM   Result Value Ref Range    WBC 18.2 (H) 4.8 - 10.8 K/uL    RBC 4.63 (L) 4.70 - 6.10 M/uL    Hemoglobin 14.7 14.0 - 18.0 g/dL    Hematocrit 42.8 42.0 - 52.0 %    MCV 92.4 81.4 - 97.8 fL    MCH 31.7 27.0 - 33.0 pg    MCHC 34.3 33.7 - 35.3 g/dL    RDW 45.1 35.9 - 50.0 fL    Platelet Count 223 164 - 446 K/uL    MPV 10.2 9.0 - 12.9 fL   Basic Metabolic Panel (BMP)    Collection Time: 11/08/22 11:05 AM   Result Value Ref Range    Sodium 136 135 - 145 mmol/L    Potassium 4.2 3.6 - 5.5 mmol/L    Chloride 102 96 - 112 mmol/L    Co2 22 20 - 33 mmol/L    Glucose 185 (H) 65 - 99 mg/dL    Bun 36 (H) 8 - 22 mg/dL    Creatinine 0.87 0.50 - 1.40 mg/dL    Calcium 8.5 8.5 - 10.5 mg/dL    Anion Gap 12.0 7.0  - 16.0   ESTIMATED GFR    Collection Time: 11/08/22 11:05 AM   Result Value Ref Range    GFR (CKD-EPI) 95 >60 mL/min/1.73 m 2   Basic Metabolic Panel (BMP)    Collection Time: 11/09/22  4:25 AM   Result Value Ref Range    Sodium 134 (L) 135 - 145 mmol/L    Potassium 4.2 3.6 - 5.5 mmol/L    Chloride 103 96 - 112 mmol/L    Co2 20 20 - 33 mmol/L    Glucose 141 (H) 65 - 99 mg/dL    Bun 25 (H) 8 - 22 mg/dL    Creatinine 0.75 0.50 - 1.40 mg/dL    Calcium 8.5 8.5 - 10.5 mg/dL    Anion Gap 11.0 7.0 - 16.0   CBC without Differential    Collection Time: 11/09/22  4:25 AM   Result Value Ref Range    WBC 14.3 (H) 4.8 - 10.8 K/uL    RBC 4.70 4.70 - 6.10 M/uL    Hemoglobin 14.6 14.0 - 18.0 g/dL    Hematocrit 43.3 42.0 - 52.0 %    MCV 92.1 81.4 - 97.8 fL    MCH 31.1 27.0 - 33.0 pg    MCHC 33.7 33.7 - 35.3 g/dL    RDW 44.7 35.9 - 50.0 fL    Platelet Count 201 164 - 446 K/uL    MPV 9.8 9.0 - 12.9 fL   ESTIMATED GFR    Collection Time: 11/09/22  4:25 AM   Result Value Ref Range    GFR (CKD-EPI) 100 >60 mL/min/1.73 m 2         ASSESSMENT:  Patient is a 66 y.o. male admitted with severe lower back pain, radiculopathy, and found to have lumbar epidural abscess now s/p L2-S1 laminectomy for evacuation of epidural abscess by Dr. Sparks on 11/7/2022    [facility] Code / Diagnosis to Support: 0008.9 - Orthopaedic Disorders: Other Orthopaedic    Rehabilitation: Impaired ADLs and mobility  Patient is a good candidate for inpatient rehab based on needs for PT, OT.  Patient will also benefit from family training.  Patient has a good discharge situation which will be home with spouse.     Barriers to transfer include: Antibiotic plan, insurance authorization, TCCs to verify disposition, medical clearance and bed availability     All cases are subject to administrative review and recommendations may change    Disposition recommendations:  -Good candidate for IPR, pending recommendations from infectious disease.  Patient will need to be able to return home  on twice daily or daily IV antibiotics to attend IPR.    - 3 times daily and 4 times daily antibiotics are too frequent for a successful discharge home and would require SNF placement  -TCC to follow Case for rehab appropriateness    Medical Complexity:    Lumbar epidural abscess  -Status postevacuation by Dr. Adam Sparks on 11/7/2022 with L2-S1 laminectomy  -Chronic pain patient, follows with Dr. Cortez  -Awaiting infectious disease recommendations for long-term antibiotics  -Currently on Unasyn 4 times daily, vancomycin twice daily  -WBC improving  -Dexamethasone 4 mg 4 times daily IV  -Continue PT/OT while in-house    Pain  -Tylenol 1000 mg every 6 hours  - Allopurinol 100 mg daily  - Diazepam 5 mg 3 times daily as needed  -Increased frequency: Gabapentin 600 mg 4 times daily.  High risk medication, labs reviewed, creatinine clearance 108.1,   -Dilaudid PCA -this is a barrier to IPR.  Recommend discontinuing and switching back to home medication of oxycodone 20 mg 4 times daily as needed.   -Lidocaine patch (2) Q 24  -Starting: Flexeril 10 mg 3 times daily (preferred home med)  -Discontinued: Robaxin     Hypertension  -Amlodipine 5 mg daily  -Lisinopril 20 mg daily  -Metoprolol 100 mg daily    CAD  -Aspirin 81 mg daily  -Plavix 75 mg daily    Insomnia  -Temazepam 7.5 mg nightly    DVT PPX: SCDs      Thank you for allowing us to participate in the care of this patient.     Cirilo Lezama, DO   Physical Medicine and Rehabilitation     Please note that this dictation was created using voice recognition software. I have made every reasonable attempt to correct obvious errors, but there may be errors of grammar and possibly content that I did not discover before finalizing the note.

## 2022-11-09 NOTE — CONSULTS
Consults  INFECTIOUS DISEASES INPATIENT CONSULT NOTE     Date of Service: 11/9/2022    Consult Requested By: Scout Angulo M.D.    Reason for Consultation: Lumbar spine epidural abscess    History of Present Illness:   Abimael Hamilton is a 66 y.o.  admitted 11/4/2022. Pt has a past medical history of hypertension, hyperlipidemia, CAD status post CABG x2, aortic valve replacement 2016, gout, L3-L5 severe spinal stenosis with left foot drop and chronic back pain.     Hospital Course:   MRI lumbar spine 11/4 with a 10 x 6 x 19 mm posterior epidural abscess at the level of L2-L3 which is new.  Also noted severe central canal stenosis at this level and severe degenerative canal stenosis at L3-L5.  Patient went to the OR with orthopedic surgery on 11/7 for posterior lumbar decompression/laminectomy at levels L2-S1.  Epidural collection was found which was reported as thick without a fluid component.  This was sent for culture.    Review Of Systems:  Review of Systems   Constitutional:  Negative for chills, fever and malaise/fatigue.   HENT:  Negative for hearing loss.    Eyes:  Negative for blurred vision and double vision.   Respiratory:  Negative for cough, sputum production and shortness of breath.    Cardiovascular:  Negative for chest pain.   Gastrointestinal:  Negative for abdominal pain, constipation, diarrhea, nausea and vomiting.   Genitourinary:  Negative for dysuria.   Musculoskeletal:  Positive for back pain and myalgias.   Skin:  Negative for rash.   Neurological:  Positive for focal weakness and weakness.   Psychiatric/Behavioral:  The patient is not nervous/anxious.      PMH:   Past Medical History:   Diagnosis Date    Aortic stenosis 12/1/2015    Arthritis     Benign hypertensive heart disease without heart failure 11/14/2011    CAD (coronary artery disease)     Congestive heart failure (HCC)     Coronary atherosclerosis of autologous vein bypass graft 11/14/2011    Essential hypertension 4/25/2019     Gout     Heart valve disease     High cholesterol     History of pancreatitis     History of pancreatitis     Hypertension     Migraine 2/16/2019    Muscle cramps 10/4/2011    Myocardial infarct (HCC)     Multiple MI's, 1998, 2015    Obesity 11/14/2011    Pain     Joints    Postsurgical aortocoronary bypass status 7/26/2016    Status post redo 3-V CABG in [place] 12/2015: SVG-acute marginal, SVG sequential to LAD, and OM     Postsurgical aortocoronary bypass status 7/26/2016    Status post redo 3-V CABG in [place] 12/2015: SVG-acute marginal, SVG sequential to LAD, and OM     Renal disorder     Hx of Acute renal failure r/t medication/statins    S/P aortic valve replacement with bioprosthetic valve 7/26/2016    #23 [place] Magna AVR (bioprosthetic)     Statin intolerance 7/26/2016    Status post cardiac revascularization with bypass aortocoronary anastomosis of five coronary vessels 7/26/2016    5-V CABG done in 1998 (done in [place] at [facility]), patent LIMA to LAD, occluded SVG-OM, occluded SVG-RCA by cardiac catheterization 11/15/2007 with other vein grafts not identified at that time, poor targets for revascularization and declined repeat CABG in 2007 by [facility]. No ischemic was identified by MPI 11/17/07 with an EF of 50%.      Status post cardiac revascularization with bypass aortocoronary anastomosis of five coronary vessels 7/26/2016    5-V CABG done in 1998 (done in [place] at [facility]), patent LIMA to LAD, occluded SVG-OM, occluded SVG-RCA by cardiac catheterization 11/15/2007 with other vein grafts not identified at that time, poor targets for revascularization and declined repeat CABG in 2007 by [facility]l. No ischemic was identified by MPI 11/17/07 with an EF of 50%.         PSH:  Past Surgical History:   Procedure Laterality Date    LUMBAR LAMINECTOMY DISKECTOMY  11/7/2022    Procedure: L2-SI LAMINECTOMY;  Surgeon: Adam Sparks M.D.;  Location: SURGERY [facility];  Service: Orthopedics    SHOULDER DECOMPRESSION  ARTHROSCOPIC Right 9/5/2017    Procedure: SHOULDER DECOMPRESSION ARTHROSCOPIC SUBACROMIAL;  Surgeon: Mg Campos M.D.;  Location: [facility];  Service: Orthopedics    DEBRIDEMENT, LABRUM, HIP, ARTHROSCOPIC Right 9/5/2017    Procedure: ARTHROSCOPIC LABRAL DEBRIDEMENT;  Surgeon: Mg Campos M.D.;  Location: [facility];  Service: Orthopedics    SHOULDER ARTHROSCOPY W/ ROTATOR CUFF REPAIR Right 9/5/2017    Procedure: SHOULDER ARTHROSCOPY W/ ROTATOR CUFF REPAIR;  Surgeon: Mg Campos M.D.;  Location: [facility];  Service: Orthopedics    SHOULDER ARTHROSCOPY W/ BICIPITAL TENODESIS REPAIR Right 9/5/2017    Procedure: SHOULDER ARTHROSCOPY W/ BICIPITAL TENODESIS REPAIR;  Surgeon: Mg Campos M.D.;  Location: [facility];  Service: Orthopedics    SYNOVECTOMY Right 9/5/2017    Procedure: SYNOVECTOMY;  Surgeon: Mg Campos M.D.;  Location: [facility];  Service: Orthopedics    MULTIPLE CORONARY ARTERY BYPASS  12/08/2015    3 way bypass & Bovine valve    LAMINOTOMY  12/2004    Diskectomy L4-L5, L5-S1    MULTIPLE CORONARY ARTERY BYPASS  11/11/1998    5 way bypass    BIOPSY GENERAL      buttock lesion    EYE SURGERY      MITRAL VALVE REPLACE         FAMILY HX:  Family History   Problem Relation Age of Onset    Heart Attack Father         MI and CABG, unknown age    Hyperlipidemia Father     Cancer Mother         Breast    Heart Disease Brother     Arthritis Maternal Grandmother     Stroke Neg Hx     Diabetes Neg Hx     Lung Disease Neg Hx      Reviewed family history. No pertinent family history.     SOCIAL HX:  Social History     Socioeconomic History    Marital status:      Spouse name: Not on file    Number of children: Not on file    Years of education: Not on file    Highest education level: GED or equivalent   Occupational History    Not on file   Tobacco Use    Smoking status: Former      Packs/day: 3.00     Years: 20.00     Pack years: 60.00     Types: Cigarettes     Quit date: 1992     Years since quittin.8    Smokeless tobacco: Never   Vaping Use    Vaping Use: Never used   Substance and Sexual Activity    Alcohol use: Not Currently    Drug use: Yes     Types: Marijuana, Inhaled     Comment: Marijuana- Daily (4 times per day)    Sexual activity: Yes     Partners: Female   Other Topics Concern    Not on file   Social History Narrative    Not on file     Social Determinants of Health     Financial Resource Strain: Not on file   Food Insecurity: Not on file   Transportation Needs: Not on file   Physical Activity: Not on file   Stress: Not on file   Social Connections: Not on file   Intimate Partner Violence: Not on file   Housing Stability: Not on file     Social History     Tobacco Use   Smoking Status Former    Packs/day: 3.00    Years: 20.00    Pack years: 60.00    Types: Cigarettes    Quit date: 1992    Years since quittin.8   Smokeless Tobacco Never     Social History     Substance and Sexual Activity   Alcohol Use Not Currently       Allergies/Intolerances:  Allergies   Allergen Reactions    Morphine Vomiting, Nausea and Unspecified     violent    Fenofibrate Unspecified     Dehydration, severe muscle cramps     Gemfibrozil Unspecified     Dehydration,Severe Muscle cramps    Lipitor [Atorvastatin Calcium] Unspecified     Severe Muscle cramps, dehydration    Lovastatin Unspecified     Dehydration, severe muscle cramps    Tricor Unspecified     Dehydration, severe muscle cramps       History reviewed with the patient     Other Current Medications:    Current Facility-Administered Medications:     methocarbamol (ROBAXIN) tablet 750 mg, 750 mg, Oral, TID PRN, Scout Angulo M.D., 750 mg at 22 0841    gabapentin (NEURONTIN) capsule 600 mg, 600 mg, Oral, TID, Scout Angulo M.D., 600 mg at 22 0453    famotidine (PEPCID) injection 20 mg, 20 mg, Intravenous, BID, Scout  LOREE Angulo, 20 mg at 11/09/22 0453    Pharmacy Consult Request ...Pain Management Review 1 Each, 1 Each, Other, PHARMACY TO DOSE, Scout Angulo M.D.    ampicillin/sulbactam (UNASYN) 3 g in  mL IVPB, 3 g, Intravenous, Q6HRS, Scout Angulo M.D., Stopped at 11/09/22 0524    temazepam (Restoril) capsule 7.5 mg, 7.5 mg, Oral, QHS PRN, Scout Angulo M.D., 7.5 mg at 11/08/22 2229    lidocaine (LIDODERM) 5 % 2 Patch, 2 Patch, Transdermal, Q24HR, Barbara Javier A.P.R.N.    HYDROmorphone (DILAUDID) 0.2 mg/mL in 50 mL NS (PCA), , Intravenous, Continuous, Barbara Javier A.P.R.N., Rate Verify at 11/09/22 0652    MD ALERT...DO NOT ADMINISTER NSAIDS or ASPIRIN unless ORDERED By Neurosurgery 1 Each, 1 Each, Other, PRN, Adam Sparks M.D.    docusate sodium (COLACE) capsule 100 mg, 100 mg, Oral, BID, Adam Sparks M.D., 100 mg at 11/09/22 0453    senna-docusate (PERICOLACE or SENOKOT S) 8.6-50 MG per tablet 1 Tablet, 1 Tablet, Oral, Nightly, Adam Sparks M.D.    senna-docusate (PERICOLACE or SENOKOT S) 8.6-50 MG per tablet 1 Tablet, 1 Tablet, Oral, Q24HRS PRN, Adam Sparks M.D.    polyethylene glycol/lytes (MIRALAX) PACKET 1 Packet, 1 Packet, Oral, BID PRN, Adam Sparks M.D.    magnesium hydroxide (MILK OF MAGNESIA) suspension 30 mL, 30 mL, Oral, QDAY PRN, Adam Sparks M.D.    bisacodyl (DULCOLAX) suppository 10 mg, 10 mg, Rectal, Q24HRS PRN, Adam Sparks M.D.    sodium phosphate (Fleet) enema 133 mL, 1 Each, Rectal, Once PRN, Adam Sparks M.D.    acetaminophen (TYLENOL) tablet 1,000 mg, 1,000 mg, Oral, Q6HRS, 1,000 mg at 11/09/22 0452 **FOLLOWED BY** [START ON 11/12/2022] acetaminophen (TYLENOL) tablet 1,000 mg, 1,000 mg, Oral, Q6HRS PRN, Adam Sparks M.D.    ondansetron (ZOFRAN) syringe/vial injection 4 mg, 4 mg, Intravenous, Q4HRS PRN, Adam Sparks M.D.    ondansetron (ZOFRAN ODT) dispertab 4 mg, 4 mg, Oral, Q4HRS PRN, Adam Sparks M.D. MD Alert...Vancomycin per Pharmacy, , Other, PHARMACY TO DOSE, Adam Sparks M.D.    vancomycin  "(VANCOCIN) 1,250 mg in  mL IVPB, 1,250 mg, Intravenous, Q12HR, Adam Sparks M.D., Last Rate: 125 mL/hr at 22 0844, 1,250 mg at 22 08    Nozin nasal  swab, 1 Applicator, Each Nostril, BID, Adam Sparks M.D., 1 Applicator at 22 0459    allopurinol (ZYLOPRIM) tablet 100 mg, 100 mg, Oral, DAILY, Ronald Love M.D., 100 mg at 22 0453    amLODIPine (NORVASC) tablet 5 mg, 5 mg, Oral, DAILY, Ronald Love M.D., 5 mg at 22 0453    aspirin EC (ECOTRIN) tablet 81 mg, 81 mg, Oral, DAILY, Ronald Love M.D., 81 mg at 22 0841    clopidogrel (PLAVIX) tablet 75 mg, 75 mg, Oral, DAILY, Ronald Love M.D., 75 mg at 22 0842    isosorbide mononitrate SR (IMDUR) tablet 120 mg, 120 mg, Oral, QAM, Ronald Love M.D., 120 mg at 22 0453    lisinopril (PRINIVIL) tablet 20 mg, 20 mg, Oral, DAILY, Ronald Love M.D., 20 mg at 22 0453    metoprolol SR (TOPROL XL) tablet 100 mg, 100 mg, Oral, DAILY, Ronald Love M.D., 100 mg at 22 0453    nitroglycerin (NITROSTAT) tablet 0.4 mg, 0.4 mg, Sublingual, Q5 MIN PRN, Ronald Love M.D.    labetalol (NORMODYNE/TRANDATE) injection 10 mg, 10 mg, Intravenous, Q4HRS PRN, Ronald Love M.D., 10 mg at 22 1530    diazePAM (VALIUM) tablet 5 mg, 5 mg, Oral, Q8HRS PRN, Ronald Love M.D., 5 mg at 22 1614    hydrALAZINE (APRESOLINE) injection 20 mg, 20 mg, Intravenous, Q4HRS PRN, Ronald Love M.D., 20 mg at 22 1536    enalaprilat (Vasotec) injection 1.25 mg 1 mL, 1.25 mg, Intravenous, Q4HRS PRN, Ronald Love M.D.    dexamethasone (DECADRON) injection 4 mg, 4 mg, Intravenous, Q6HRS, Ronald Love M.D., 4 mg at 22 3063  [unfilled]    Most Recent Vital Signs:  BP (!) 146/79   Pulse 64   Temp 36.7 °C (98 °F) (Temporal)   Resp 16   Ht 1.676 m (5' 6\")   Wt 98.7 kg (217 lb 9.5 oz)   SpO2 94%   BMI 35.12 kg/m²   Temp  Av.7 °C (98 °F)  Min: 36.1 °C " (96.9 °F)  Max: 37.5 °C (99.5 °F)    Physical Exam:  Physical Exam  Constitutional:       Appearance: Normal appearance.   HENT:      Head: Normocephalic and atraumatic.      Right Ear: External ear normal.      Left Ear: External ear normal.      Nose: Nose normal.      Mouth/Throat:      Mouth: Mucous membranes are dry.      Pharynx: Oropharynx is clear.   Eyes:      Extraocular Movements: Extraocular movements intact.      Conjunctiva/sclera: Conjunctivae normal.      Pupils: Pupils are equal, round, and reactive to light.   Cardiovascular:      Rate and Rhythm: Normal rate and regular rhythm.      Heart sounds: Murmur heard.   Pulmonary:      Effort: Pulmonary effort is normal.      Breath sounds: Normal breath sounds.   Abdominal:      General: Abdomen is flat. Bowel sounds are normal.      Palpations: Abdomen is soft.   Musculoskeletal:      Cervical back: Normal range of motion and neck supple.      Comments: Surgical bandaging in place as well as 2 drains from lumbar spine   Skin:     General: Skin is warm and dry.   Neurological:      General: No focal deficit present.      Mental Status: He is alert and oriented to person, place, and time.   Psychiatric:         Mood and Affect: Mood normal.         Behavior: Behavior normal.         Pertinent Lab Results:  Recent Labs     11/07/22  0406 11/08/22  1105 11/09/22  0425   WBC 13.0* 18.2* 14.3*      Recent Labs     11/07/22  0406 11/08/22  1105 11/09/22  0425   HEMOGLOBIN 16.4 14.7 14.6   HEMATOCRIT 49.8 42.8 43.3   MCV 91.7 92.4 92.1   MCH 30.2 31.7 31.1   PLATELETCT 228 223 201         Recent Labs     11/07/22  0406 11/08/22  1105 11/09/22  0425   SODIUM 135 136 134*   POTASSIUM 4.4 4.2 4.2   CHLORIDE 102 102 103   CO2 20 22 20   CREATININE 0.90 0.87 0.75        Recent Labs     11/07/22 0406   ALBUMIN 4.3        Pertinent Micro:  Results       Procedure Component Value Units Date/Time    Anaerobic Culture [[id number]] Collected: 11/07/22 1741    Order  Status: Completed Specimen: Wound Updated: 11/08/22 1426     Significant Indicator NEG     Source WND     Site L2-L3 Epidural Abscess     Culture Result Culture in progress.    Narrative:      Surgery - swabs received    CULTURE WOUND W/ GRAM STAIN [[id number]] Collected: 11/07/22 1741    Order Status: Completed Specimen: Wound Updated: 11/08/22 1426     Significant Indicator NEG     Source WND     Site L2-L3 Epidural Abscess     Culture Result No growth at 24 hours.     Gram Stain Result Rare WBCs.  No organisms seen.      Narrative:      Surgery - swabs received    Anaerobic Culture [[id number]] Collected: 11/07/22 1743    Order Status: Completed Specimen: Wound Updated: 11/08/22 1426     Significant Indicator NEG     Source WND     Site L2-L3 Epidural Abscess     Culture Result Culture in progress.    Narrative:      Surgery - swabs received    CULTURE WOUND W/ GRAM STAIN [[id number]] Collected: 11/07/22 1743    Order Status: Completed Specimen: Wound Updated: 11/08/22 1426     Significant Indicator NEG     Source WND     Site L2-L3 Epidural Abscess     Culture Result No growth at 24 hours.     Gram Stain Result Rare WBCs.  No organisms seen.      Narrative:      Surgery - swabs received    GRAM STAIN [[id number]] Collected: 11/07/22 1743    Order Status: Completed Specimen: Wound Updated: 11/07/22 2158     Significant Indicator .     Source WND     Site L2-L3 Epidural Abscess     Gram Stain Result Rare WBCs.  No organisms seen.      Narrative:      Surgery - swabs received    GRAM STAIN [[id number]] Collected: 11/07/22 1741    Order Status: Completed Specimen: Wound Updated: 11/07/22 2157     Significant Indicator .     Source WND     Site L2-L3 Epidural Abscess     Gram Stain Result Rare WBCs.  No organisms seen.      Narrative:      Surgery - swabs received    MRSA By PCR (Amp) [[id number]] Collected: 11/06/22 0755    Order Status: Completed Specimen: Respirate from Nares Updated: 11/06/22 1327     MRSA by PCR  "Negative    Narrative:      Collected By: [id number] NAIN LOPEZ    BLOOD CULTURE [[id number]] Collected: 11/04/22 2233    Order Status: Completed Specimen: Blood from Peripheral Updated: 11/05/22 0809     Significant Indicator NEG     Source BLD     Site PERIPHERAL     Culture Result No Growth  Note: Blood cultures are incubated for 5 days and  are monitored continuously.Positive blood cultures  are called to the RN and reported as soon as  they are identified.      Narrative:      Per Hospital Policy: Only change Specimen Src: to \"Line\" if  specified by physician order.  Right Hand    BLOOD CULTURE [[id number]] Collected: 11/04/22 2231    Order Status: Completed Specimen: Blood from Peripheral Updated: 11/05/22 0809     Significant Indicator NEG     Source BLD     Site PERIPHERAL     Culture Result No Growth  Note: Blood cultures are incubated for 5 days and  are monitored continuously.Positive blood cultures  are called to the RN and reported as soon as  they are identified.      Narrative:      Per Hospital Policy: Only change Specimen Src: to \"Line\" if  specified by physician order.  Left Hand    MRSA By PCR (Amp) [[id number]]     Order Status: Canceled Specimen: Respirate from Nares           Blood Culture   Date Value Ref Range Status   08/21/2018   Final    No growth after 5 days of incubation.  Blood culture testing and Gram stain, if indicated, are  performed at [facility], [address] [place] [address].  Positive blood cultures are  sent to [facility], [address] [place] [address], for organism identification and  susceptibility testing.          Studies:  CT-HEAD W/O    Result Date: 11/4/2022 11/4/2022 4:10 PM HISTORY/REASON FOR EXAM:  headache high blood pressure, r/o bleed. TECHNIQUE/EXAM DESCRIPTION AND NUMBER OF VIEWS: CT of the head without contrast. The study was performed on a helical multidetector CT scanner. Contiguous axial sections were " obtained from the skull base through the vertex. Up to date radiation dose reduction adjustments have been utilized to meet ALARA standards for radiation dose reduction. COMPARISON:  Head CT 2/16/2019 FINDINGS: The calvariae and skull base are unremarkable. There are no extraaxial fluid collections. The ventricular system and basal cisterns are within normal limits. There are no areas of abnormal density in the brain substance. There are no hemorrhagic lesions.  There are no mass effects or shift of midline structures. The brainstem and posterior fossa structures are unremarkable. There is atherosclerotic vascular calcification in the vertebrobasilar and juxtasellar carotid arteries. Paranasal sinuses in the field of view are unremarkable. Mastoids in the field of view are unremarkable.     1.  Head CT without contrast within normal limits. No evidence of acute cerebral infarction, hemorrhage or mass lesion. 2.  Atherosclerotic vascular calcification.     [id number] VIEW    Result Date: 11/7/2022 11/7/2022 5:56 PM HISTORY/REASON FOR EXAM:  Instrument count. TECHNIQUE/EXAM DESCRIPTION AND NUMBER OF VIEWS:  1 view(s) of the abdomen. COMPARISON: 4/9/2022 FINDINGS: Exam limited due to the intraoperative technique with overlying artifact in the field of view. No gross evidence of a radiopaque surgical instrument or needle device. There are sternotomy wires in the lower thorax.     1.  Negative single view abdomen without gross evidence of a radiopaque surgical device.    DX-SPINE-ANY ONE VIEW    Result Date: 11/8/2022 11/7/2022 4:07 PM HISTORY/REASON FOR EXAM:  Intraoperative images for procedural navigation. TECHNIQUE/EXAM DESCRIPTION AND NUMBER OF VIEWS:  1 view(s) of the lumbar spine.     Fluoroscopic image(s) obtained during lumbar spine instrumentation. Please see the patient's chart for full procedural details. Fluoroscopy time 4 seconds.     MR-LUMBAR SPINE-WITH & W/O    Result Date: 11/4/2022 11/4/2022 6:52  PM HISTORY/REASON FOR EXAM:  Chronic back pain. TECHNIQUE/EXAM DESCRIPTION: MRI of the lumbar spine without and with contrast. The study was performed on a [employer] 1.5 Zuleika MRI scanner. T1 sagittal, T2 fast spin-echo sagittal, and T2 axial images were obtained of the lumbar spine. T1 post-contrast fat-suppressed sagittal images were obtained. 20 mL ProHance contrast was administered intravenously. COMPARISON:  12/29/2020 FINDINGS: There is peripherally enhancing posterior epidural fluid collection noted at the level of L2-3. It measures an approximately 10 x 6 x 19 mm in size. The lumbar spine maintains normal height and alignment. There is no fracture or dislocation. There is no pathologic marrow infiltration. There are degenerative changes as evidenced by a reduced intervertebral disc height, loss of disc T2 signal and degenerative marrow signal changes. There is no focal osseous lesion. At the level of L5-S1,there are combinations of diffuse disc bulge and facet joint arthropathy causing severe bilateral neural foraminal stenosis. There is mild central canal stenosis. There is moderate bilateral lateral recess stenosis. At the level of L4-5,there are combinations of diffuse disc bulge and facet joint arthropathy causing severe central canal stenosis. There is mild to moderate bilateral neural foraminal stenosis. At the level of L3-4,there are combinations of diffuse disc bulge and facet joint arthropathy causing severe central canal stenosis. There is mild to moderate bilateral neural foraminal stenosis. At the level of L2-3,there are combinations of posterior epidural fluid collection, left paracentral disc protrusion and facet joint arthropathy causing severe central canal stenosis. There is severe left lateral recess stenosis. There is moderate left neural foraminal stenosis. At the level of L1-2, there is minimal disc bulge without significant spinal or neural foraminal stenosis. The conus terminates at  the level of L1. There is an approximately 1.9 cm sized T2 hypointense lesion in the right kidney likely representing complicated cyst. There is also a simple cyst in the right kidney.     1.  There is an approximately 10 x 6 x 19 mm sized posterior epidural abscess at the level of L2-3. This is new since the previous study. There is severe central canal stenosis at this level. 2.  Severe degenerative central canal stenosis at L3-4 and L4-5. 3.  Degenerative disease as described above. 4.  There is an approximately 1.9 cm sized T2 hypointense lesion in the right kidney likely representing complicated cyst. There is also a simple cyst in the right kidney. This is unchanged since the previous study.    DX-PORTABLE FLUORO > 1 HOUR    Result Date: 2022 4:07 PM HISTORY/REASON FOR EXAM:  Lumbar laminectomy TECHNIQUE/EXAM DESCRIPTION AND NUMBER OF VIEWS: Portable fluoroscopy for greater than one hour in OR. FINDINGS: The portable fluoroscopy unit was obligated to the procedure for greater than one hour. Actual fluoro time was 4 seconds.     Portable fluoroscopy utilized for 4 seconds. INTERPRETING LOCATION: [address] [place] [address], [address] [place] [address]-CARDIAC STRESS TEST    Result Date: 2022   Myocardial Perfusion  Report  NUCLEAR IMAGING INTERPRETATION  There is a small tomoderate sized focus of ischemia involving the lateral  wall within the cardiac base and midzone.  Normal left ventricular wall motion.  LV ejection fraction = 56%.  CATHERINE MORELOS  MRN:    [id number]         Gender:    M  Exam Date: 2022 06:40  Exam Location:      Inpatient  Ordering Phys:     CANDACE LEAL  NucMed Tech:       Sharmin Valverde RT  Age:    66    :    1955        Ht (in):     66  Wt (lb):     218    BMI:    34.97       Radiologist  Risk Factors:             Hypertension, Family history of coronary disease,                            Obesity  Indications:              Encounter for other  preprocedural examination  ICD Codes:                Z01.818  Cardiac History:          Previous MI, S/p PTCA/stent, S/p CABG, Dyspnea  Cardiac Meds:  Meds Past 24 hrs:  Pretest Chest Pain:       No symptoms  STRESS TEST      Pharmacologkirby Hermanan       Dose: 0.4 mg  ol:  Post-Injection Exercise:        No exercise followed the intravenous injection  Resting HR (bpm):      84  Peak HR (bpm):         101  Resting BP (mmHg):       142    /   87  Peak BP (mmHg):       145   /   77  MaxPHR:     155     Target HR (bpm):       132  % MaxPHR:     65  Double Product:       [id number]  BP Response:  Stress Termination:       Protocol completed  Stress Symptoms:  Chest pain 9/10  ECG  Resting ECG:  Stress ECG:  IMAGE PROTOCOL      Rest/Stress 1                      Day          RadiopharmaceuticalDose (mCi)   Imaging  Date      Imaging  Time         Inj to Img Time (min)  Rest:   Tc-99m             7.3          05-Nov-2022        08:32                 30  Stress: Tc-99m             27           05-Nov-2022        09:23                 30  Rest:  Administration Site:       Left antecubital                             fossa  Administered by:      Sharmin Valverde RT  Stress:  Administration Site:       Left antecubital                             fossa  Administered by:      Sharmin Valverde RT  % Percent HR Achieved:  SPECT RESULTS  Technical Quality:       Good  Raw Data Analysis:  Summed Stress Score:    8  Summed Rest Score:    4  Summed Difference Score:        7  PERFUSION:  There is a small to moderate sized focus of ischemia involving the lateral  wall within the cardiac base and midzone.  FUNCTIONAL RESULTS (calculated via Gated SPECT)  Stress Image LV EF:        56     %  Upper Normal Limit  Stress EDV:      120    ml   EDVI:    58      ml/m2  Stress ESV:      53     ml   ESVI:    26      ml/m2  TID:    1      TID - 1.19      TID (ed) - 1.23  LV Function:  Normal left ventricular wall motion.  LV ejection  fraction = 56%.  Trent Chang  (Electronically Signed)  Final Date:      2022                   10:29    EC-ECHOCARDIOGRAM COMPLETE W/O CONT    Result Date: 2022  Transthoracic Echo Report Echocardiography Laboratory CONCLUSIONS Compared to the prior study on 20, velocity across aortic valve is slightly worse. The left ventricular ejection fraction is visually estimated to be 65%. Known bioprosthetic aortic valve . Moderate aortic valve stenosis. Vmax is 3.8 m/s. Aortic valve area calculated from the continuity equation is 1.3 cm2. CATHERINE MORELOS Exam Date:         2022                    13:47 Exam Location:     Inpatient Priority:          Routine Ordering Physician:        BECKY SNOWDEN Referring Physician:       [id number] Sharma Sonographer:               Andrew MARTIN Age:    66     Gender:    M MRN:    [id number] :    1955 BSA:    2.06   Ht (in):    66     Wt (lb):    215 Exam Type:     Complete Indications:     Cardiac murmur, unspecified ICD Codes:       R01.1 CPT Codes:       34877 BP:   165    /   85     HR: Technical Quality:       Fair MEASUREMENTS  (Male / Female) Normal Values 2D ECHO LVOT Diameter                     2 cm                  Estimated LV Ejection Fraction    65 %                  LV Ejection Fraction MOD 4C       63.1 %                LV Ejection Fraction 4C AL        66.9 %                LA Volume Index                   28.5 cm3/m2           16 - 28 cm3/m2 DOPPLER AV Peak Velocity                  3.7 m/s               AV Peak Gradient                  53.4 mmHg             AV Mean Gradient                  30.2 mmHg             AV Acceleration Time              56 ms                 LVOT Peak Velocity                1.5 m/s               AV Area Cont Eq vti               1.4 cm2               Mitral E Point Velocity           0.8 m/s               Mitral E to A Ratio               0.67                  MV Pressure Half Time             50 ms                  MV Area PHT                       4.4 cm2               MV Deceleration Time              172 ms                PV Peak Velocity                  1.6 m/s               PV Peak Gradient                  10.6 mmHg             RVOT Peak Velocity                1.2 m/s               LV E' Lateral Velocity            10.7 cm/s             Mitral E to LV E' Lateral Ratio   7.5                   LV E' Septal Velocity             5.4 cm/s              Mitral E to LV E' Septal Ratio    14.7                  * Indicates values subject to auto-interpretation LV EF:  65    % FINDINGS Left Ventricle The left ventricle is normal in size. Mild concentric left ventricular hypertrophy. Normal left ventricular systolic function. The left ventricular ejection fraction is visually estimated to be 65%. No regional wall motion abnormalities. Normal diastolic function. Right Ventricle The right ventricle is normal in size and systolic function. Right Atrium The right atrium is normal in size. Normal inferior vena cava size and inspiratory collapse. Left Atrium The left atrium is normal in size. Left atrial volume index is 27 mL/sq m. Mitral Valve Structurally normal mitral valve without significant stenosis. Mild mitral regurgitation. Aortic Valve Known bioprosthetic aortic valve . Moderate aortic valve stenosis. Vmax is 3.8 m/s. Transvalvular gradients are - Peak: 58  mmHg, Mean: 30 mmHg. Aortic valve area calculated from the continuity equation is 1.3 cm2. Acceleration time is 56 ms. Tricuspid Valve Structurally normal tricuspid valve without significant stenosis or regurgitation. Pulmonic Valve Structurally normal pulmonic valve without significant stenosis or regurgitation. Pericardium Normal pericardium without effusion. Aorta Normal aortic root for body surface area. The ascending aorta diameter is 3.1 cm. Oumar Mello MD (Electronically Signed) Final Date:     04 November 2022                  16:30      ASSESSMENT/PLAN:     66 y.o.  admitted 11/4/2022. Pt has a past medical history of aortic valve replacement 2016, gout, L3-L5 severe spinal stenosis with left foot drop and chronic back pain.     Hospital Course:   MRI lumbar spine 11/4 with a 10 x 6 x 19 mm posterior epidural abscess at the level of L2-L3 which is new.  Also noted severe central canal stenosis at this level and severe degenerative canal stenosis at L3-L5.  Patient went to the OR with orthopedic surgery on 11/7 for posterior lumbar decompression/laminectomy at levels L2-S1.  Epidural collection was found which was reported as thick without a fluid component.  This was sent for culture.  Patient states he was on no antibiotics prior to admit.  Started on broad-spectrum antibiotics on 11/4 with surgery on 11/7 which will likely decrease culture yield.     Problem List    Lumbar spine epidural abscess  Degenerative central canal stenosis  -Status post OR as described above  History of aortic valve replacement  History of gout    Plan     --- Continue vancomycin and Unasyn for now  --- Follow-up OR cultures, NGTD  --- Follow-up blood cultures, NGTD   --- Monitor labs       Dispo: Awaiting culture results and work-up as above   PICC: TBD      Plan of care discussed with ESTHELA Angulo M.D.. Will continue to follow    Willa Chris M.D.

## 2022-11-09 NOTE — CARE PLAN
The patient is Stable - Low risk of patient condition declining or worsening    Shift Goals  Clinical Goals: Pain Management; Antibiotic Therapy; Safety; Mobility  Patient Goals: Pain Management; Rest  Family Goals: pain control, comfort, rest    Progress made toward(s) clinical / shift goals:    Problem: Knowledge Deficit - Standard  Goal: Patient and family/care givers will demonstrate understanding of plan of care, disease process/condition, diagnostic tests and medications  Outcome: Progressing   Plan of care discussed with patient, verbalizes understanding. Encouraged to voice feelings or concerns.     Problem: Pain - Standard  Goal: Alleviation of pain or a reduction in pain to the patient’s comfort goal  Outcome: Progressing   Available pain medications reviewed with patient. Pain managed by PCA and scheduled medications. Encouraged to call if pain is no longer managed.     Problem: Fall Risk  Goal: Patient will remain free from falls  Outcome: Progressing   Fall precautions in place. Patient rounded on hourly. Clutter-free environment maintained. Bed alarm on, bed locked and in lowest position. Call light in reach.     Problem: Neuro Status  Goal: Neuro status will remain stable or improve  Outcome: Progressing     Problem: Surgical Drain Management  Goal: Proper management/care of surgical drains will be maintained  Outcome: Progressing

## 2022-11-09 NOTE — THERAPY
Physical Therapy   Daily Treatment     Patient Name: Abimael Hamilton  Age:  66 y.o., Sex:  male  Medical Record #: [id number]  Today's Date: 2022     Precautions  Precautions: Fall Risk;Spinal / Back Precautions   Comments: no brace per orders    Assessment    PT tx order received, chart reviewed, RN cleared pt for PT tx. Pt was ID via name and  and was agreeable to PT tx. Pt tolerated tx as per above tx grid with good effort. See grid below for details. All precautions were maintianed throughout PT tx today. Pt improving well with therapy, still presents with Lt foot drop, pt demo the strength to potentially ambulate farther distances however due to elevated BP this was deferred by therapist. See grid below for details. Upon completion pt was left up in chair, personal needs met, andres WOOD to Rn completed, chair alarm ON and activated    Plan    Continue current treatment plan.    DC Equipment Recommendations: Front-Wheel Walker  Discharge Recommendations: Other - (DC rec: post acute care. Pt will likely tolerate 3 hours of therapy/ x 5 days week. However if functional goals are met then recommending home with HHPT and family)       Objective       22 1054   Charge Group   Charges  Yes   PT Gait Training 1   PT Therapeutic Activities 1   Total Time Spent   PT Total Time Yes   PT Gait Training Time Spent (Mins) 15   PT Therapeutic Activities Time Spent (Mins) 14   PT Total Time Spent (Calculated) 29   Precautions   Precautions Fall Risk;Spinal / Back Precautions    Comments no brace per orders   Vitals   Pulse 70   Patient BP Position Sitting   Blood Pressure  (!) 178/99   O2 (LPM) 0   O2 Delivery Device None - Room Air   Vitals Comments seated up in chair RN notified. Post activity BP: 175/93, Rn aware   Pain 0 - 10 Group   Therapist Pain Assessment Prior to Activity;7  (Pt has PCA pump)   Cognition    Cognition / Consciousness WDL   Level of Consciousness Alert   Balance   Sitting Balance (Static)  Fair +   Sitting Balance (Dynamic) Fair +   Standing Balance (Static) Fair -   Standing Balance (Dynamic) Fair -   Weight Shift Sitting Fair   Weight Shift Standing Fair   Skilled Intervention Verbal Cuing;Tactile Cuing;Postural Facilitation;Compensatory Strategies   Comments standing with FWW   Gait Analysis   Gait Level Of Assist Contact Guard Assist   Assistive Device Front Wheel Walker   Distance (Feet) 20   # of Times Distance was Traveled 1   # of Stairs Climbed 0   Skilled Intervention Verbal Cuing;Postural Facilitation;Tactile Cuing;Compensatory Strategies   Comments Pt improving well with distance, ambulated ~ 20' with FWW CGA for balance adn steadying, Lt foot drop noted but compensates with steppage pattern, no LOB/buckling however BP elevated post activity 175/93 deferred further distances/stairs due to elevated BP Rn notified   Bed Mobility    Supine to Sit Standby Assist   Rolling Supervised   Skilled Intervention Verbal Cuing   Comments supv for safety via log roll technique, all precautions maintianed.   Functional Mobility   Sit to Stand Contact Guard Assist   Bed, Chair, Wheelchair Transfer Minimal Assist   Transfer Method Stand Step   Skilled Intervention Verbal Cuing;Tactile Cuing;Postural Facilitation;Compensatory Strategies   Comments CGA for balance and steadying/safety with FWW   How much difficulty does the patient currently have...   Turning over in bed (including adjusting bedclothes, sheets and blankets)? 3   Sitting down on and standing up from a chair with arms (e.g., wheelchair, bedside commode, etc.) 3   Moving from lying on back to sitting on the side of the bed? 3   How much help from another person does the patient currently need...   Moving to and from a bed to a chair (including a wheelchair)? 3   Need to walk in a hospital room? 3   Climbing 3-5 steps with a railing? 2   6 clicks Mobility Score 17   Activity Tolerance   Sitting in Chair left UIC upon completion   Standing FWW    Patient / Family Goals    Patient / Family Goal #1 to have pain resolved   Goal #1 Outcome Goal not met   Short Term Goals    Short Term Goal # 1 pt will perform supine <> sit via log roll with SPV in 6 visits   Goal Outcome # 1 Goal met   Short Term Goal # 2 pt will perform sit <> stand and functional transfers with LRAD and SPV to improve mobility independence in 6 visits   Goal Outcome # 2 Progressing as expected   Short Term Goal # 3 pt will ambulate > 200 ft with LRAD and SPV to access community in 6 visit   Goal Outcome # 3 Progressing slower than expected   Short Term Goal # 4 pt will negotiate 2 steps with LRAD and SPV to access home environment in 6 visits   Goal Outcome # 4 Goal not met   Short Term Goal # 5 pt will demonstrate independent understanding of spine precautions during mobility in 6 visits   Goal Outcome # 5 Goal met   Education Group   Education Provided Role of Physical Therapist   Spine Precautions Patient Response Patient;Acceptance;Explanation;Handout;Verbal Demonstration   Role of Physical Therapist Patient Response Acceptance;Explanation;Verbal Demonstration;Family;Patient   Additional Comments all precautions maintained throughout session   Anticipated Discharge Equipment and Recommendations   DC Equipment Recommendations Front-Wheel Walker   Discharge Recommendations Other -  (DC rec: post acute care. Pt will likely tolerate 3 hours of therapy/ x 5 days week. However if functional goals are met then recommending home with HHPT and family)   Interdisciplinary Plan of Care Collaboration   IDT Collaboration with  Nursing   Patient Position at End of Therapy Seated;Chair Alarm On;Call Light within Reach;Tray Table within Reach;Phone within Reach   Collaboration Comments handoff to RN completed, chair alarm ON and activated, RN at bedside   Session Information   Date / Session Number  11/9-2(2/5,11/14)

## 2022-11-09 NOTE — DISCHARGE PLANNING
Spoke with spouse Ana, She states that she is aware that ABX will be needed to be given at home, after patient is discharged from [facility].  She stated the plan is to use Home Health, they will teach her how administer and she is fine and willing to do that.  I educated her on IR[facility], visiting , Covid policy.  Need for therapies and probable length of stay.  They live In a one story home with two steps to enter.  Advised we needed to wait for infectious disease to see patient and a medical clearance, for possible admission to [facility].  Answered questions, left phone number in case she had any questions or concerns.

## 2022-11-09 NOTE — CARE PLAN
The patient is Stable - Low risk of patient condition declining or worsening    Shift Goals  Clinical Goals: Pain Management, Mobility  Patient Goals: Pain management  Family Goals: pain control, comfort, rest    Progress made toward(s) clinical / shift goals:    Problem: Pain - Post Surgery  Goal: Alleviation or reduction of pain post surgery  Outcome: Progressing  Note: PCA and muscle relaxers in use.      Problem: Surgical Drain Management  Goal: Proper management/care of surgical drains will be maintained  Outcome: Progressing       Patient is not progressing towards the following goals:

## 2022-11-09 NOTE — PROGRESS NOTES
"Spine Surgery Progress Note    66 y.o. male sp L2-S1 laminectomy on 11/7/2022 for lumbar stenosis and likely epidural abscess    S: Doing well overnight. ERIBERTO.  Pain improved compared to preop continues to have a left foot drop.    O:  BP (!) 145/90   Pulse 67   Temp 36.7 °C (98 °F) (Temporal)   Resp 20   Ht 1.676 m (5' 6\")   Wt 98.7 kg (217 lb 9.5 oz)   SpO2 93%   BMI 35.12 kg/m²     Gen: WNWD NAD  Breathing comfortably    Dressing CDI    1 out of 5 left tib ant EHL, otherwise 5 out of 5 bilateral lower extremity primarily pain limited exam  Sensation grossly intact bilateral lower extremities    Lab Results   Component Value Date/Time    WBC 14.3 (H) 11/09/2022 04:25 AM    RBC 4.70 11/09/2022 04:25 AM    HEMOGLOBIN 14.6 11/09/2022 04:25 AM    HEMATOCRIT 43.3 11/09/2022 04:25 AM    MCV 92.1 11/09/2022 04:25 AM    MCH 31.1 11/09/2022 04:25 AM    MCHC 33.7 11/09/2022 04:25 AM    MPV 9.8 11/09/2022 04:25 AM    NEUTSPOLYS 85.00 (H) 11/06/2022 01:13 AM    LYMPHOCYTES 9.90 (L) 11/06/2022 01:13 AM    MONOCYTES 4.30 11/06/2022 01:13 AM    EOSINOPHILS 0.00 11/06/2022 01:13 AM    BASOPHILS 0.10 11/06/2022 01:13 AM      Lab Results   Component Value Date/Time    SODIUM 134 (L) 11/09/2022 04:25 AM    POTASSIUM 4.2 11/09/2022 04:25 AM    CHLORIDE 103 11/09/2022 04:25 AM    CO2 20 11/09/2022 04:25 AM    GLUCOSE 141 (H) 11/09/2022 04:25 AM    BUN 25 (H) 11/09/2022 04:25 AM    CREATININE 0.75 11/09/2022 04:25 AM    BUNCREATRAT 16 07/29/2016 10:22 AM          AP: 66 y.o. male sp L2-S1 laminectomy on 11/7/2022 for possible epidural abscess.  Intraoperative cultures pending    - Recommend ID consult for Abx management  - Recommend Vanc/Zosyn until final abx plan established  - Continue drain  -- Cont PT/OT  "

## 2022-11-10 NOTE — CARE PLAN
The patient is Stable - Low risk of patient condition declining or worsening    Shift Goals  Clinical Goals: Pain Control, Drain removal  Patient Goals: Pain Control  Family Goals: pain control, comfort, rest    Progress made toward(s) clinical / shift goals:    Problem: Early Mobilization - Post Surgery  Goal: Early mobilization post surgery  Outcome: Progressing     Problem: Bowel Elimination - Post Surgical  Goal: Patient will resume regular bowel sounds and function with no discomfort or distention  Outcome: Progressing     Problem: Surgical Drain Management  Goal: Proper management/care of surgical drains will be maintained  Outcome: Progressing  Note: Drains removed. Pt tolerated well.        Patient is not progressing towards the following goals: NA

## 2022-11-10 NOTE — THERAPY
Physical Therapy   Daily Treatment     Patient Name: Abimael Hamilton  Age:  66 y.o., Sex:  male  Medical Record #: [id number]  Today's Date: 11/10/2022     Precautions  Precautions: Fall Risk;Spinal / Back Precautions   Comments: no brace per orders    Assessment    Pt seen for PT treatment session, reports significantly improved pain and feeling overall very good today. Pt demonstrated considerable improvement with functional mobility, gait and ability to negotiate 2 steps today. Required CGA - Min A on stairs with progressive improvement with repetition. Pt very self aware of deficits and plans to follow up with OP PT when medically cleared by MD. Overall, pt's progress demonstrates capability of DC home when medically cleared, PT will continue to follow.     Plan    Continue current treatment plan.    DC Equipment Recommendations: (P) Front-Wheel Walker  Discharge Recommendations: (P) Recommend outpatient physical therapy services to address higher level deficits (pt has been working with a PT preoperatively)         11/10/22 1205   Precautions   Precautions Fall Risk;Spinal / Back Precautions    Comments no brace per orders   Vitals   O2 Delivery Device None - Room Air   Pain 0 - 10 Group   Therapist Pain Assessment During Activity;Nurse Notified  (minimal pain, PCA pump has been DCed)   Cognition    Cognition / Consciousness WDL   Level of Consciousness Alert   Comments very receptive to PT, spouse present for session. feeling significantly better   Active ROM Lower Body    Active ROM Lower Body  WDL   Strength Lower Body   Lower Body Strength  WDL   Comments improving strength and activity tolerance with pain management   Home Exercise Program   Comments provided pt with supine and seated HEP handouts to work on as tolerated. emphasized consistent work on ankle DF to address foot drop   Balance   Sitting Balance (Static) Good   Sitting Balance (Dynamic) Good   Standing Balance (Static) Fair +   Standing Balance  (Dynamic) Fair   Weight Shift Sitting Good   Weight Shift Standing Fair   Skilled Intervention Verbal Cuing   Comments w/ FWW   Gait Analysis   Gait Level Of Assist Standby Assist   Assistive Device Front Wheel Walker   Distance (Feet) 300   # of Times Distance was Traveled 1   Deviation Bradykinetic;Decreased Heel Strike;Decreased Toe Off   # of Stairs Climbed 2  (x2 trials, 1x with B railing an 1x with FWW.)   Level of Assist with Stairs Contact Guard Assist   Weight Bearing Status no restrictions   Skilled Intervention Verbal Cuing;Sequencing;Compensatory Strategies   Comments significant improvement with activity tolerance. receptive to stair strategy for safety and management of FWW - pt and spouse verbalized/demonstrated understanding   Bed Mobility    Supine to Sit Supervised  (with railing, HOB flat)   Sit to Supine   (seated in chair at end of session)   Scooting Supervised   Rolling Supervised   Skilled Intervention Verbal Cuing   Comments good awareness of log roll mechanics   Functional Mobility   Sit to Stand Supervised   Bed, Chair, Wheelchair Transfer Supervised   Transfer Method Stand Step   Skilled Intervention Verbal Cuing;Compensatory Strategies   How much difficulty does the patient currently have...   Turning over in bed (including adjusting bedclothes, sheets and blankets)? 4   Sitting down on and standing up from a chair with arms (e.g., wheelchair, bedside commode, etc.) 4   Moving from lying on back to sitting on the side of the bed? 4   How much help from another person does the patient currently need...   Moving to and from a bed to a chair (including a wheelchair)? 4   Need to walk in a hospital room? 3   Climbing 3-5 steps with a railing? 3   6 clicks Mobility Score 22   Activity Tolerance   Sitting in Chair up to chair at end of session   Sitting Edge of Bed 2 min   Standing 20 min   Patient / Family Goals    Patient / Family Goal #1 to have pain resolved   Goal #1 Outcome Progressing  as expected   Short Term Goals    Short Term Goal # 1 pt will perform supine <> sit via log roll with SPV in 6 visits   Goal Outcome # 1 Goal met   Short Term Goal # 2 pt will perform sit <> stand and functional transfers with LRAD and SPV to improve mobility independence in 6 visits   Goal Outcome # 2 Goal met   Short Term Goal # 3 pt will ambulate > 200 ft with LRAD and SPV to access community in 6 visit   Goal Outcome # 3 Progressing as expected   Short Term Goal # 4 pt will negotiate 2 steps with LRAD and SPV to access home environment in 6 visits   Goal Outcome # 4 Progressing as expected   Short Term Goal # 5 pt will demonstrate independent understanding of spine precautions during mobility in 6 visits   Goal Outcome # 5 Goal met   Anticipated Discharge Equipment and Recommendations   DC Equipment Recommendations Front-Wheel Walker   Discharge Recommendations Recommend outpatient physical therapy services to address higher level deficits  (pt has been working with a PT preoperatively)   Interdisciplinary Plan of Care Collaboration   IDT Collaboration with  Nursing;Family / Caregiver   Patient Position at End of Therapy Seated;Call Light within Reach;Tray Table within Reach;Family / Friend in Room   Collaboration Comments aware of PT session and progress   Session Information   Date / Session Number  11/10-3 (3/5, 11/14)

## 2022-11-10 NOTE — PROGRESS NOTES
Infectious Disease Progress Note    Author: Willa Chris M.D. Date & Time of service: 11/10/2022  11:19 AM    Chief Complaint:  Lumbar spine epidural abscess    Interval History:    Review of Systems:  Review of Systems   Respiratory:  Negative for cough.    Cardiovascular:  Negative for chest pain.   Gastrointestinal:  Negative for abdominal pain, blood in stool, constipation, diarrhea, nausea and vomiting.   Musculoskeletal:  Positive for back pain and myalgias.   Neurological:  Negative for focal weakness.   Psychiatric/Behavioral:  The patient is not nervous/anxious.      Hemodynamics:  Temp (24hrs), Av.6 °C (97.9 °F), Min:36.1 °C (97 °F), Max:36.9 °C (98.5 °F)  Temperature: 36.9 °C (98.4 °F)  Pulse  Av.6  Min: 60  Max: 113   Blood Pressure : (!) 153/88       Physical Exam:  Physical Exam  Cardiovascular:      Rate and Rhythm: Normal rate and regular rhythm.      Heart sounds: Normal heart sounds.   Pulmonary:      Effort: Pulmonary effort is normal.      Breath sounds: Normal breath sounds.   Abdominal:      General: Abdomen is flat. Bowel sounds are normal.      Palpations: Abdomen is soft.   Musculoskeletal:      Comments: Back incision with bandaging in place, drains have been removed, no tenderness to palpation   Skin:     General: Skin is warm and dry.   Neurological:      Mental Status: He is oriented to person, place, and time.      Comments: Patient moving all extremities and able to ambulate   Psychiatric:         Mood and Affect: Mood normal.         Behavior: Behavior normal.       Meds:    Current Facility-Administered Medications:     [START ON 2022] amLODIPine    cyclobenzaprine    gabapentin    HYDROmorphone    oxyCODONE immediate-release    enoxaparin (LOVENOX) injection    famotidine    Pharmacy Consult Request    ampicillin-sulbactam (UNASYN) IV    temazepam    lidocaine    MD ALERT...DO NOT ADMINISTER NSAIDS or ASPIRIN unless ORDERED By Neurosurgery    docusate sodium     senna-docusate    senna-docusate    polyethylene glycol/lytes    magnesium hydroxide    bisacodyl    sodium phosphate    acetaminophen **FOLLOWED BY** [START ON 11/12/2022] acetaminophen    ondansetron    ondansetron    MD Alert...Vancomycin per Pharmacy    vancomycin    Nozin nasal  swab    allopurinol    aspirin    clopidogrel    isosorbide mononitrate SR    lisinopril    metoprolol SR    nitroglycerin    labetalol    diazePAM    hydrALAZINE    enalaprilat    dexamethasone    Labs:  Recent Labs     11/08/22  1105 11/09/22  0425 11/10/22  0204   WBC 18.2* 14.3* 11.4*   RBC 4.63* 4.70 4.38*   HEMOGLOBIN 14.7 14.6 13.5*   HEMATOCRIT 42.8 43.3 39.9*   MCV 92.4 92.1 91.1   MCH 31.7 31.1 30.8   RDW 45.1 44.7 43.7   PLATELETCT 223 201 193   MPV 10.2 9.8 9.9     Recent Labs     11/08/22  1105 11/09/22  0425   SODIUM 136 134*   POTASSIUM 4.2 4.2   CHLORIDE 102 103   CO2 22 20   GLUCOSE 185* 141*   BUN 36* 25*     Recent Labs     11/08/22  1105 11/09/22  0425   CREATININE 0.87 0.75       Imaging:  CT-HEAD W/O    Result Date: 11/4/2022 11/4/2022 4:10 PM HISTORY/REASON FOR EXAM:  headache high blood pressure, r/o bleed. TECHNIQUE/EXAM DESCRIPTION AND NUMBER OF VIEWS: CT of the head without contrast. The study was performed on a helical multidetector CT scanner. Contiguous axial sections were obtained from the skull base through the vertex. Up to date radiation dose reduction adjustments have been utilized to meet ALARA standards for radiation dose reduction. COMPARISON:  Head CT 2/16/2019 FINDINGS: The calvariae and skull base are unremarkable. There are no extraaxial fluid collections. The ventricular system and basal cisterns are within normal limits. There are no areas of abnormal density in the brain substance. There are no hemorrhagic lesions.  There are no mass effects or shift of midline structures. The brainstem and posterior fossa structures are unremarkable. There is atherosclerotic vascular calcification  in the vertebrobasilar and juxtasellar carotid arteries. Paranasal sinuses in the field of view are unremarkable. Mastoids in the field of view are unremarkable.     1.  Head CT without contrast within normal limits. No evidence of acute cerebral infarction, hemorrhage or mass lesion. 2.  Atherosclerotic vascular calcification.     PW-ZRYMNME-5 VIEW    Result Date: 11/7/2022 11/7/2022 5:56 PM HISTORY/REASON FOR EXAM:  Instrument count. TECHNIQUE/EXAM DESCRIPTION AND NUMBER OF VIEWS:  1 view(s) of the abdomen. COMPARISON: 4/9/2022 FINDINGS: Exam limited due to the intraoperative technique with overlying artifact in the field of view. No gross evidence of a radiopaque surgical instrument or needle device. There are sternotomy wires in the lower thorax.     1.  Negative single view abdomen without gross evidence of a radiopaque surgical device.    DX-SPINE-ANY ONE VIEW    Result Date: 11/8/2022 11/7/2022 4:07 PM HISTORY/REASON FOR EXAM:  Intraoperative images for procedural navigation. TECHNIQUE/EXAM DESCRIPTION AND NUMBER OF VIEWS:  1 view(s) of the lumbar spine.     Fluoroscopic image(s) obtained during lumbar spine instrumentation. Please see the patient's chart for full procedural details. Fluoroscopy time 4 seconds.     MR-LUMBAR SPINE-WITH & W/O    Result Date: 11/4/2022 11/4/2022 6:52 PM HISTORY/REASON FOR EXAM:  Chronic back pain. TECHNIQUE/EXAM DESCRIPTION: MRI of the lumbar spine without and with contrast. The study was performed on a [employer] Signa 1.5 Zuleika MRI scanner. T1 sagittal, T2 fast spin-echo sagittal, and T2 axial images were obtained of the lumbar spine. T1 post-contrast fat-suppressed sagittal images were obtained. 20 mL ProHance contrast was administered intravenously. COMPARISON:  12/29/2020 FINDINGS: There is peripherally enhancing posterior epidural fluid collection noted at the level of L2-3. It measures an approximately 10 x 6 x 19 mm in size. The lumbar spine maintains normal height and  alignment. There is no fracture or dislocation. There is no pathologic marrow infiltration. There are degenerative changes as evidenced by a reduced intervertebral disc height, loss of disc T2 signal and degenerative marrow signal changes. There is no focal osseous lesion. At the level of L5-S1,there are combinations of diffuse disc bulge and facet joint arthropathy causing severe bilateral neural foraminal stenosis. There is mild central canal stenosis. There is moderate bilateral lateral recess stenosis. At the level of L4-5,there are combinations of diffuse disc bulge and facet joint arthropathy causing severe central canal stenosis. There is mild to moderate bilateral neural foraminal stenosis. At the level of L3-4,there are combinations of diffuse disc bulge and facet joint arthropathy causing severe central canal stenosis. There is mild to moderate bilateral neural foraminal stenosis. At the level of L2-3,there are combinations of posterior epidural fluid collection, left paracentral disc protrusion and facet joint arthropathy causing severe central canal stenosis. There is severe left lateral recess stenosis. There is moderate left neural foraminal stenosis. At the level of L1-2, there is minimal disc bulge without significant spinal or neural foraminal stenosis. The conus terminates at the level of L1. There is an approximately 1.9 cm sized T2 hypointense lesion in the right kidney likely representing complicated cyst. There is also a simple cyst in the right kidney.     1.  There is an approximately 10 x 6 x 19 mm sized posterior epidural abscess at the level of L2-3. This is new since the previous study. There is severe central canal stenosis at this level. 2.  Severe degenerative central canal stenosis at L3-4 and L4-5. 3.  Degenerative disease as described above. 4.  There is an approximately 1.9 cm sized T2 hypointense lesion in the right kidney likely representing complicated cyst. There is also a  simple cyst in the right kidney. This is unchanged since the previous study.    DX-PORTABLE FLUORO > 1 HOUR    Result Date: 2022 4:07 PM HISTORY/REASON FOR EXAM:  Lumbar laminectomy TECHNIQUE/EXAM DESCRIPTION AND NUMBER OF VIEWS: Portable fluoroscopy for greater than one hour in OR. FINDINGS: The portable fluoroscopy unit was obligated to the procedure for greater than one hour. Actual fluoro time was 4 seconds.     Portable fluoroscopy utilized for 4 seconds. INTERPRETING LOCATION: [address] [place] [address], [address] [place] [address]-CARDIAC STRESS TEST    Result Date: 2022   Myocardial Perfusion  Report  NUCLEAR IMAGING INTERPRETATION  There is a small tomoderate sized focus of ischemia involving the lateral  wall within the cardiac base and midzone.  Normal left ventricular wall motion.  LV ejection fraction = 56%.  CATHERINE MORELOS  MRN:    [id number]         Gender:    M  Exam Date: 2022 06:40  Exam Location:      Inpatient  Ordering Phys:     CANDACE LEAL  NucMed Tech:       Sharmin Valverde RT  Age:    66    :    1955        Ht (in):     66  Wt (lb):     218    BMI:    34.97       Radiologist  Risk Factors:             Hypertension, Family history of coronary disease,                            Obesity  Indications:              Encounter for other preprocedural examination  ICD Codes:                Z01.818  Cardiac History:          Previous MI, S/p PTCA/stent, S/p CABG, Dyspnea  Cardiac Meds:  Meds Past 24 hrs:  Pretest Chest Pain:       No symptoms  STRESS TEST      Pharmacologi                   ras Ernst   Lexiscan       Dose: 0.4 mg  ol:  Post-Injection Exercise:        No exercise followed the intravenous injection  Resting HR (bpm):      84  Peak HR (bpm):         101  Resting BP (mmHg):       142    /   87  Peak BP (mmHg):       145   /   77  MaxPHR:     155     Target HR (bpm):       132  % MaxPHR:     65  Double Product:       68069  BP Response:  Stress  Termination:       Protocol completed  Stress Symptoms:  Chest pain 9/10  ECG  Resting ECG:  Stress ECG:  IMAGE PROTOCOL      Rest/Stress 1                      Day          RadiopharmaceuticalDose (mCi)   Imaging  Date      Imaging  Time         Inj to Img Time (min)  Rest:   Tc-99m             7.3          05-Nov-2022        08:32                 30  Stress: Tc-99m             27           05-Nov-2022        09:23                 30  Rest:  Administration Site:       Left antecubital                             fossa  Administered by:      Sharminstephanie Valverde RT  Stress:  Administration Site:       Left antecubital                             fossa  Administered by:      Sharmin Valverde RT  % Percent HR Achieved:  SPECT RESULTS  Technical Quality:       Good  Raw Data Analysis:  Summed Stress Score:    8  Summed Rest Score:    4  Summed Difference Score:        7  PERFUSION:  There is a small to moderate sized focus of ischemia involving the lateral  wall within the cardiac base and midzone.  FUNCTIONAL RESULTS (calculated via Gated SPECT)  Stress Image LV EF:        56     %  Upper Normal Limit  Stress EDV:      120    ml   EDVI:    58      ml/m2  Stress ESV:      53     ml   ESVI:    26      ml/m2  TID:    1      TID - 1.19      TID (ed) - 1.23  LV Function:  Normal left ventricular wall motion.  LV ejection fraction = 56%.  Trent Chang  (Electronically Signed)  Final Date:      05 November 2022                   10:29    EC-ECHOCARDIOGRAM COMPLETE W/O CONT    Result Date: 11/4/2022  Transthoracic Echo Report Echocardiography Laboratory CONCLUSIONS Compared to the prior study on 12-2-20, velocity across aortic valve is slightly worse. The left ventricular ejection fraction is visually estimated to be 65%. Known bioprosthetic aortic valve . Moderate aortic valve stenosis. Vmax is 3.8 m/s. Aortic valve area calculated from the continuity equation is 1.3 cm2. CATHERINE MORELOS Exam Date:         11/04/2022                    13:47  Exam Location:     Inpatient Priority:          Routine Ordering Physician:        BECKY SNOWDEN Referring Physician:       [id number] Sharma Sonographer:               Andrew MARTIN Age:    66     Gender:    M MRN:    [id number] :    1955 BSA:    2.06   Ht (in):    66     Wt (lb):    215 Exam Type:     Complete Indications:     Cardiac murmur, unspecified ICD Codes:       R01.1 CPT Codes:       08818 BP:   165    /   85     HR: Technical Quality:       Fair MEASUREMENTS  (Male / Female) Normal Values 2D ECHO LVOT Diameter                     2 cm                  Estimated LV Ejection Fraction    65 %                  LV Ejection Fraction MOD 4C       63.1 %                LV Ejection Fraction 4C AL        66.9 %                LA Volume Index                   28.5 cm3/m2           16 - 28 cm3/m2 DOPPLER AV Peak Velocity                  3.7 m/s               AV Peak Gradient                  53.4 mmHg             AV Mean Gradient                  30.2 mmHg             AV Acceleration Time              56 ms                 LVOT Peak Velocity                1.5 m/s               AV Area Cont Eq vti               1.4 cm2               Mitral E Point Velocity           0.8 m/s               Mitral E to A Ratio               0.67                  MV Pressure Half Time             50 ms                 MV Area PHT                       4.4 cm2               MV Deceleration Time              172 ms                PV Peak Velocity                  1.6 m/s               PV Peak Gradient                  10.6 mmHg             RVOT Peak Velocity                1.2 m/s               LV E' Lateral Velocity            10.7 cm/s             Mitral E to LV E' Lateral Ratio   7.5                   LV E' Septal Velocity             5.4 cm/s              Mitral E to LV E' Septal Ratio    14.7                  * Indicates values subject to auto-interpretation LV EF:  65    % FINDINGS Left Ventricle The left ventricle is  normal in size. Mild concentric left ventricular hypertrophy. Normal left ventricular systolic function. The left ventricular ejection fraction is visually estimated to be 65%. No regional wall motion abnormalities. Normal diastolic function. Right Ventricle The right ventricle is normal in size and systolic function. Right Atrium The right atrium is normal in size. Normal inferior vena cava size and inspiratory collapse. Left Atrium The left atrium is normal in size. Left atrial volume index is 27 mL/sq m. Mitral Valve Structurally normal mitral valve without significant stenosis. Mild mitral regurgitation. Aortic Valve Known bioprosthetic aortic valve . Moderate aortic valve stenosis. Vmax is 3.8 m/s. Transvalvular gradients are - Peak: 58  mmHg, Mean: 30 mmHg. Aortic valve area calculated from the continuity equation is 1.3 cm2. Acceleration time is 56 ms. Tricuspid Valve Structurally normal tricuspid valve without significant stenosis or regurgitation. Pulmonic Valve Structurally normal pulmonic valve without significant stenosis or regurgitation. Pericardium Normal pericardium without effusion. Aorta Normal aortic root for body surface area. The ascending aorta diameter is 3.1 cm. Oumar Mello MD (Electronically Signed) Final Date:     04 November 2022                 16:30      Micro:  Results       Procedure Component Value Units Date/Time    Anaerobic Culture [[id number]] Collected: 11/07/22 1743    Order Status: Completed Specimen: Wound Updated: 11/10/22 1000     Significant Indicator NEG     Source WND     Site L2-L3 Epidural Abscess     Culture Result Culture in progress.    Narrative:      Surgery - swabs received    CULTURE WOUND W/ GRAM STAIN [[id number]] Collected: 11/07/22 1743    Order Status: Completed Specimen: Wound Updated: 11/10/22 1000     Significant Indicator NEG     Source WND     Site L2-L3 Epidural Abscess     Culture Result No growth at 72 hours.     Gram Stain Result Rare  "WBCs.  No organisms seen.      Narrative:      Surgery - swabs received    Anaerobic Culture [[id number]] Collected: 11/07/22 1741    Order Status: Completed Specimen: Wound Updated: 11/10/22 1000     Significant Indicator NEG     Source WND     Site L2-L3 Epidural Abscess     Culture Result Culture in progress.    Narrative:      Surgery - swabs received    CULTURE WOUND W/ GRAM STAIN [[id number]] Collected: 11/07/22 1741    Order Status: Completed Specimen: Wound Updated: 11/10/22 1000     Significant Indicator NEG     Source WND     Site L2-L3 Epidural Abscess     Culture Result No growth at 72 hours.     Gram Stain Result Rare WBCs.  No organisms seen.      Narrative:      Surgery - swabs received    BLOOD CULTURE [[id number]] Collected: 11/04/22 2231    Order Status: Completed Specimen: Blood from Peripheral Updated: 11/09/22 2300     Significant Indicator NEG     Source BLD     Site PERIPHERAL     Culture Result No growth after 5 days of incubation.    Narrative:      Per Hospital Policy: Only change Specimen Src: to \"Line\" if  specified by physician order.  Left Hand    BLOOD CULTURE [[id number]] Collected: 11/04/22 2233    Order Status: Completed Specimen: Blood from Peripheral Updated: 11/09/22 2300     Significant Indicator NEG     Source BLD     Site PERIPHERAL     Culture Result No growth after 5 days of incubation.    Narrative:      Per Hospital Policy: Only change Specimen Src: to \"Line\" if  specified by physician order.  Right Hand    GRAM STAIN [[id number]] Collected: 11/07/22 1743    Order Status: Completed Specimen: Wound Updated: 11/07/22 2158     Significant Indicator .     Source WND     Site L2-L3 Epidural Abscess     Gram Stain Result Rare WBCs.  No organisms seen.      Narrative:      Surgery - swabs received    GRAM STAIN [[id number]] Collected: 11/07/22 1741    Order Status: Completed Specimen: Wound Updated: 11/07/22 2157     Significant Indicator .     Source WND     Site L2-L3 Epidural " Abscess     Gram Stain Result Rare WBCs.  No organisms seen.      Narrative:      Surgery - swabs received    MRSA By PCR (Amp) [[id number]] Collected: 11/06/22 0755    Order Status: Completed Specimen: Respirate from Nares Updated: 11/06/22 1327     MRSA by PCR Negative    Narrative:      Collected By: [id number] NAIN LOPEZ    MRSA By PCR (Amp) [[id number]]     Order Status: Canceled Specimen: Respirate from Nares             Assessment:  Active Hospital Problems    Diagnosis     *Spinal stenosis of lumbar region (L3-5) with neurogenic claudication and L foot drop/weakness, valvular heart disease [M48.062]     Epidural abscess [G06.2]     Hypertensive urgency [I16.0]     Degenerative lumbar spinal stenosis [M48.061]     Obesity (BMI 30-39.9) [E66.9]     Dyslipidemia [E78.5]     Coronary artery disease of native artery of native heart with stable angina pectoris (HCC) [I25.118]      Interval 24 hours:      AF, O2 RA  Labs reviewed,, vanco peak 27/8, trough <4  Imaging personally reviewed both images and report.   Micro reviewed    Patient doing well on able to ambulate up and down the hallway and go up the stairs.  Some mild ongoing back pain but overall improved.  Continued on antibiotics as below.    Assessment:  66 y.o.  admitted 11/4/2022. Pt has a past medical history of aortic valve replacement 2016, gout, L3-L5 severe spinal stenosis with left foot drop and chronic back pain.      Hospital Course:   MRI lumbar spine 11/4 with a 10 x 6 x 19 mm posterior epidural abscess at the level of L2-L3 which is new.  Also noted severe central canal stenosis at this level and severe degenerative canal stenosis at L3-L5.  Patient went to the OR with orthopedic surgery on 11/7 for posterior lumbar decompression/laminectomy at levels L2-S1.  Epidural collection was found which was reported as thick without a fluid component.  This was sent for culture.  Patient states he was on no antibiotics prior to admit.  Started on  broad-spectrum antibiotics on 11/4 with surgery on 11/7 which will likely decrease culture yield.      Problem List     Lumbar spine epidural abscess  Degenerative central canal stenosis  -Status post OR as described above  History of aortic valve replacement  History of gout     Plan      --- Continue vancomycin with pharmacy to dose and will transition Unasyn to ceftriaxone.  Cultures remain negative but concern for spinal infection based on symptoms, imaging and Intra-Op findings.  Recommend a 6-week IV antibiotic course and on discharge can transition to daptomycin 6 mg/kg daily continue ceftriaxone 2 g daily with an end date of 12/19/22.  The patient would like to pursue home infusion antibiotics and orders were written and scanned into epic under media tab.  --- Follow-up OR cultures, no growth at 72 hours and requested micro lab holding for 14 days.  Cultures were swabs only so unable to send them out to [facility] for PCR testing  --- Follow-up blood cultures, NGTD   --- Monitor labs   --- Follow-up in ID clinic at the end of antibiotic course        Dispo: Orders written for home infusion and scanned into epic under media tab, if unable to obtain insurance approval notify ID and will place orders for infusion center    PICC: Yes, ordered        Plan of care discussed with case management. Will continue to follow

## 2022-11-10 NOTE — PROGRESS NOTES
[facility] Daily Progress Note    Date of Service  11/10/2022    Chief Complaint  Abimael Hamilton is a 66 y.o. male admitted 11/4/2022 with worsening back pain    Hospital Course  This is a 66 -year-old male with a past medical history significant for hypertension, hyperlipidemia, CAD status post CABG in 1998, 2016, aortic valve replacement in 2016, post stent in 4/2022, morbid obesity, gout, L3 L5 severe spinal stenosis with left foot drop, chronic back pain was admitted on 11/4/2022 with worsening of lower back pain.    MRI of the lumbar spine  on 11/04 noted 10 x 6 x 19 mm posterior epidural abscess at the level of L2-L3 which is new from previous study. There is severe central canal stenosis at this level.  Severe degenerative central canal stenosis at L3-L4 and L4-5.    Cardiology consulted for preop clearance given patient significant cardiac history, nuclear stress test noted small area of ischemia involving the lateral wall.  Patient cleared by cardiology, moderate risk for the surgery.     Surgery was consulted, patient went to the OR with Dr. Sparks on 11/7/2022.  Biopsy was negative, patient was initially started on IV Rocephin.  Considering patient had epidural abscess, will continue vancomycin and Unaysn Culture pending.ID called and will follow their recs    Interval events:  -- No acute events overnight, medicine has been stable, heart rate 61-88, blood pressure 131/82, saturating 91% on 3 to of oxygen.  Patient is alert and oriented, answering questions appropriately.  He continued to complain of back pain.  He stated that his oral pain medication did not control his pain.  This was a started on IV PCA pump for pain control.  --He was started on muscle relaxant, gabapentin, scheduled Tylenol  --Continue IV vancomycin and Zosyn.  Will consult ID tomorrow  --WBC count is elevated at 18.2, monitor  --Patient had  Surgery yesterday, will hold aspirin and Plavix, will discuss with neurosurgery when  we can start aspirin Plavix considering patient having stent placement in 4/2022 11/09:  -- No acute events overnight, medicine has been stable, heart rate 61-1 13, blood pressure 150/90, saturating 94% on room air.  Patient is alert and oriented, answering questions appropriately.  He has a very high threshold for pain.  He was taking oxycodone 20 mg every 6 hours as an outpatient.  During the stay in the hospital, he was started initially on IV PCA pump for Dilaudid.  -- Currently patient was started on oxycodone 20 every 4 hours along with 0.5 mg of IV Dilaudid every 3 hours  --Continue Muscle relaxant   -- His blood pressure is elevated likely secondary to pain, currently patient is on lisinopril 20 mg daily, Toprol-XL 1 mg daily, amlodipine 5 mg p.o. daily, Imdur 120 mg p.o. daily.    -- Patient is  on decadron as per NS , will continue  Cx, currently patient is on IV vancomycin and Unasyn.  ID following, will follow the recommendation  Discussed with neurosurgery Dr. Sparks who recommended starting the patient  on ASA/Plavicx     11/10: Patient seen and examined, afebrile, no overnight events now on IV abx as per ID rec.  Also neurosurgery following appreciate rec.  Cont pain management     I have discussed this patient's plan of care and discharge plan at IDT rounds today with Case Management, Nursing, Nursing leadership, and other members of the IDT team.    Consultants/Specialty  cardiology and neurosurgery    Code Status  Full Code    Disposition  Patient is not medically cleared for discharge.   Anticipate discharge to  HH vs SNF vs IRF .  I have placed the appropriate orders for post-discharge needs.    Review of Systems  Review of Systems   Constitutional:  Positive for malaise/fatigue. Negative for fever.   HENT:  Negative for congestion and sore throat.    Eyes:  Negative for blurred vision and double vision.   Respiratory:  Negative for hemoptysis, sputum production and shortness of breath.     Cardiovascular:  Negative for chest pain, palpitations, orthopnea and leg swelling.   Gastrointestinal:  Negative for abdominal pain, heartburn, nausea and vomiting.   Musculoskeletal:  Positive for back pain and myalgias.   Neurological:  Positive for weakness. Negative for headaches.   Psychiatric/Behavioral:  Negative for depression. The patient is nervous/anxious.    All other systems reviewed and are negative.     Physical Exam  Temp:  [36.1 °C (97 °F)-36.9 °C (98.5 °F)] 36.9 °C (98.4 °F)  Pulse:  [] 69  Resp:  [16-19] 17  BP: (146-160)/(77-99) 153/88  SpO2:  [95 %-96 %] 96 %    Physical Exam  Vitals and nursing note reviewed.   Constitutional:       Appearance: Normal appearance.   HENT:      Head: Normocephalic and atraumatic.   Eyes:      Extraocular Movements: Extraocular movements intact.      Pupils: Pupils are equal, round, and reactive to light.   Cardiovascular:      Rate and Rhythm: Normal rate and regular rhythm.      Pulses: Normal pulses.   Pulmonary:      Effort: Pulmonary effort is normal. No respiratory distress.      Breath sounds: Normal breath sounds. No wheezing, rhonchi or rales.   Abdominal:      General: Bowel sounds are normal. There is no distension.      Palpations: Abdomen is soft.      Tenderness: There is no abdominal tenderness.   Musculoskeletal:         General: No tenderness.      Cervical back: Neck supple. No muscular tenderness.      Right lower leg: No edema.      Left lower leg: No edema.      Comments: Left foot drop   Skin:     General: Skin is warm and dry.      Findings: No bruising, erythema or rash.   Neurological:      Mental Status: He is alert and oriented to person, place, and time.   Psychiatric:         Mood and Affect: Mood normal.       Fluids    Intake/Output Summary (Last 24 hours) at 11/10/2022 1208  Last data filed at 11/10/2022 0500  Gross per 24 hour   Intake 196.42 ml   Output 1875 ml   Net -1678.58 ml         Laboratory  Recent Labs      11/08/22  1105 11/09/22  0425 11/10/22  0204   WBC 18.2* 14.3* 11.4*   RBC 4.63* 4.70 4.38*   HEMOGLOBIN 14.7 14.6 13.5*   HEMATOCRIT 42.8 43.3 39.9*   MCV 92.4 92.1 91.1   MCH 31.7 31.1 30.8   MCHC 34.3 33.7 33.8   RDW 45.1 44.7 43.7   PLATELETCT 223 201 193   MPV 10.2 9.8 9.9       Recent Labs     11/08/22  1105 11/09/22  0425   SODIUM 136 134*   POTASSIUM 4.2 4.2   CHLORIDE 102 103   CO2 22 20   GLUCOSE 185* 141*   BUN 36* 25*   CREATININE 0.87 0.75   CALCIUM 8.5 8.5                       Imaging  DX-SPINE-ANY ONE VIEW   Final Result      Fluoroscopic image(s) obtained during lumbar spine instrumentation. Please see the patient's chart for full procedural details.      Fluoroscopy time 4 seconds.         DX-PORTABLE FLUORO > 1 HOUR   Final Result      Portable fluoroscopy utilized for 4 seconds.         INTERPRETING LOCATION: [address] [place] [address], [facility]      YA-NGIILAV-6 VIEW   Final Result      1.  Negative single view abdomen without gross evidence of a radiopaque surgical device.      NM-CARDIAC STRESS TEST   Final Result      MR-LUMBAR SPINE-WITH & W/O   Final Result      1.  There is an approximately 10 x 6 x 19 mm sized posterior epidural abscess at the level of L2-3. This is new since the previous study. There is severe central canal stenosis at this level.   2.  Severe degenerative central canal stenosis at L3-4 and L4-5.   3.  Degenerative disease as described above.   4.  There is an approximately 1.9 cm sized T2 hypointense lesion in the right kidney likely representing complicated cyst. There is also a simple cyst in the right kidney. This is unchanged since the previous study.      CT-HEAD W/O   Final Result      1.  Head CT without contrast within normal limits. No evidence of acute cerebral infarction, hemorrhage or mass lesion.   2.  Atherosclerotic vascular calcification.         EC-ECHOCARDIOGRAM COMPLETE W/O CONT   Final Result             Assessment/Plan  * Spinal stenosis of lumbar region  (L3-5) with neurogenic claudication and L foot drop/weakness, valvular heart disease  Assessment & Plan  Pain control and muscle relaxers  MRI LS spine done was notable for 10 x 6 x 19 mm posterior epidural abscess at the level of L2-L3 which is new from previous study.  There is severe central canal stenosis at this level.  Severe degenerative central canal stenosis at L3-L4 and L4-5.    -- Had surgery  On 11/07, culture pending, will continue broad-spectrum antibiotics including vancomycin and unasyn  --will obtain PT and OT    - ID following   Pt and ot    Difficulty in managing the patient's pain.  Patient has a very high tolerance for pain.  He does take oxycodone 20 mg every 6 hours as an outpatient.  We will continue oxycodone 20 mg every 4 hours along with IV Dilaudid 0.5 mg every 3 hours as needed.  On muscle relaxant, scheduled Tylenol, gabapentin    Epidural abscess- (present on admission)  Assessment & Plan  See above    Hypertensive urgency  Assessment & Plan  PRN IV antihypertensives  Continue amlodipine    Obesity (BMI 30-39.9)- (present on admission)  Assessment & Plan  Lifestyle modification including diet exercise and weight loss as an outpatient      Dyslipidemia- (present on admission)  Assessment & Plan  Hold home evolocumab    Coronary artery disease of native artery of native heart with stable angina pectoris (HCC)- (present on admission)  Assessment & Plan  ;C/w metoprolol and lisinopril, Imdur    Stress test- a small tomoderate sized focus of ischemia involving the lateral wall within the cardiac base and midzone. Findings were discussed with cardiology Dr. Dozier - no further inpatient cardiology evaluation/procedure recommended. Pt is a moderate risk patient.    -- History of aspirin and Plavix was held considering patient undergoing surgery.  I discussed with Dr. Sparks  stated okay starting the patient on aspirin and Plavix.  Started 11/09/2022.           VTE prophylaxis: SCDs/TEDs    I have  performed a physical exam and reviewed and updated ROS and Plan today (11/10/2022). In review of yesterday's note (11/9/2022), there are no changes except as documented above.

## 2022-11-10 NOTE — PROGRESS NOTES
Pharmacy Vancomycin Kinetics Note for 11/10/2022     66 y.o. male on Vancomycin Day # 7     Vancomycin Indication (Two level/Trough based Dosing): Epidural Abscess (goal trough 15-20)    Provider specified end date:  (TBD, ID Consulting)    Active Antibiotics (From admission, onward)      Ordered     Ordering Provider       Thu Nov 10, 2022 12:33 PM    11/10/22 1233  vancomycin (VANCOCIN) 1,750 mg in  mL IVPB  EVERY 12 HOURS        Note to Pharmacy: Per P&T Kinetics Protocol    Tracey Gomez M.D.       Thu Nov 10, 2022 11:23 AM    11/10/22 1123  cefTRIAXone (Rocephin) syringe 2,000 mg  EVERY 24 HOURS         Willa Chris M.D.       Mon Nov 7, 2022  6:52 PM    11/07/22 1852  MD Alert...Vancomycin per Pharmacy  PHARMACY TO DOSE        Question:  Indication(s) for vancomycin?  Answer:  Epidural abscess/vertebral osteomyelitis    Adam Sparks M.D.            Dosing Weight: 98.7 kg (217 lb 9.5 oz)      Admission History: Admitted on 11/4/2022 for Degenerative lumbar spinal stenosis [M48.061]  Epidural abscess [G06.2]  Pertinent history: Patient with worsening back pain found to have epidural abscess on MRI. Vanco therapy was stopped due to MRSA nares being negative. Post-operatively vanco and zosyn are being started by the srugical team pending culture results.    Allergies:     Morphine, Fenofibrate, Gemfibrozil, Lipitor [atorvastatin calcium], Lovastatin, and Tricor     Pertinent cultures to date:     Results       Procedure Component Value Units Date/Time    Anaerobic Culture [[id number]] Collected: 11/07/22 1743    Order Status: Completed Specimen: Wound Updated: 11/10/22 1000     Significant Indicator NEG     Source WND     Site L2-L3 Epidural Abscess     Culture Result Culture in progress.    Narrative:      Surgery - swabs received    CULTURE WOUND W/ GRAM STAIN [[id number]] Collected: 11/07/22 1743    Order Status: Completed Specimen: Wound Updated: 11/10/22 1000     Significant Indicator NEG     Source  "WND     Site L2-L3 Epidural Abscess     Culture Result No growth at 72 hours.     Gram Stain Result Rare WBCs.  No organisms seen.      Narrative:      Surgery - swabs received    Anaerobic Culture [[id number]] Collected: 11/07/22 1741    Order Status: Completed Specimen: Wound Updated: 11/10/22 1000     Significant Indicator NEG     Source WND     Site L2-L3 Epidural Abscess     Culture Result Culture in progress.    Narrative:      Surgery - swabs received    CULTURE WOUND W/ GRAM STAIN [[id number]] Collected: 11/07/22 1741    Order Status: Completed Specimen: Wound Updated: 11/10/22 1000     Significant Indicator NEG     Source WND     Site L2-L3 Epidural Abscess     Culture Result No growth at 72 hours.     Gram Stain Result Rare WBCs.  No organisms seen.      Narrative:      Surgery - swabs received    BLOOD CULTURE [[id number]] Collected: 11/04/22 2231    Order Status: Completed Specimen: Blood from Peripheral Updated: 11/09/22 2300     Significant Indicator NEG     Source BLD     Site PERIPHERAL     Culture Result No growth after 5 days of incubation.    Narrative:      Per Hospital Policy: Only change Specimen Src: to \"Line\" if  specified by physician order.  Left Hand    BLOOD CULTURE [[id number]] Collected: 11/04/22 2233    Order Status: Completed Specimen: Blood from Peripheral Updated: 11/09/22 2300     Significant Indicator NEG     Source BLD     Site PERIPHERAL     Culture Result No growth after 5 days of incubation.    Narrative:      Per Hospital Policy: Only change Specimen Src: to \"Line\" if  specified by physician order.  Right Hand    GRAM STAIN [[id number]] Collected: 11/07/22 1743    Order Status: Completed Specimen: Wound Updated: 11/07/22 2158     Significant Indicator .     Source WND     Site L2-L3 Epidural Abscess     Gram Stain Result Rare WBCs.  No organisms seen.      Narrative:      Surgery - swabs received    GRAM STAIN [[id number]] Collected: 11/07/22 1741    Order Status: Completed " "Specimen: Wound Updated: 22     Significant Indicator .     Source WND     Site L2-L3 Epidural Abscess     Gram Stain Result Rare WBCs.  No organisms seen.      Narrative:      Surgery - swabs received    MRSA By PCR (Amp) [[id number]] Collected: 22 0755    Order Status: Completed Specimen: Respirate from Nares Updated: 22 1327     MRSA by PCR Negative    Narrative:      Collected By: [id number] NAIN LOPEZ    MRSA By PCR (Amp) [[id number]]     Order Status: Canceled Specimen: Respirate from Nares             Labs:     Estimated Creatinine Clearance: 108.1 mL/min (by C-G formula based on SCr of 0.75 mg/dL).  Recent Labs     22  1105 11/09/22  0425 11/10/22  020   WBC 18.2* 14.3* 11.4*     Recent Labs     22   BUN 36* 25*   CREATININE 0.87 0.75       Intake/Output Summary (Last 24 hours) at 11/10/2022 1234  Last data filed at 11/10/2022 0500  Gross per 24 hour   Intake 196.42 ml   Output 1875 ml   Net -1678.58 ml      BP (!) 153/88   Pulse 69   Temp 36.9 °C (98.4 °F) (Temporal)   Resp 17   Ht 1.676 m (5' 6\")   Wt 98.7 kg (217 lb 9.5 oz)   SpO2 96%  Temp (24hrs), Av.6 °C (97.9 °F), Min:36.1 °C (97 °F), Max:36.9 °C (98.5 °F)      List concerns for Vancomycin clearance:     Age;Obesity;BUN/Scr ratio greater than 20:1;Receipt of contrast dye    Pharmacokinetics:     Two level kinetics:   Ke (hr ^-1): 0.2983  Half life: 2.3  Vd: Steady state Vd : 27.303  Calculated AUC: 300 mg·hr/L    Trough kinetics:   Recent Labs     11/10/22  0204 11/10/22  0832   VANCOTROUGH  --  <4.0*   VANCOPEAK 27.8  --        A/P:     -  Vancomycin dose: Increased from 1,250 mg (12mg/kg) IV q12h to 1,750 (17 mg/kg) IV q12h     -  Next vancomycin level(s):  Plan for repeat trough at steady state of new dosing regimen (not yet ordered)     -  Predicted vancomycin AUC from two level test calculator: 420 mg·hr/L    Chantell Garcia PharmD, BCPS      "

## 2022-11-10 NOTE — CARE PLAN
Problem: Pain - Standard  Goal: Alleviation of pain or a reduction in pain to the patient’s comfort goal  Outcome: Progressing   The patient is Stable - Low risk of patient condition declining or worsening    Shift Goals  Clinical Goals: Pain Control, Drain removal  Patient Goals: Pain Control  Family Goals: pain control, comfort, rest    Progress made toward(s) clinical / shift goals:      Patient is not progressing towards the following goals:n/a

## 2022-11-11 NOTE — FACE TO FACE
Face to Face Supporting Documentation - Home Health    The encounter with this patient was in whole or in part the primary reason for home health admission.    Date of encounter:   Patient:                    MRN:                       YOB: 2021  Abimael Hamilton  [id number]  1955     Home health to see patient for:  Skilled Nursing care for assessment, interventions & education, Home health aide, Physical Therapy evaluation and treatment, and Occupational therapy evaluation and treatment    Skilled need for:  Medication Management med management , iv abx    Skilled nursing interventions to include:  Home IV infusion therapy    Homebound status evidenced by:  Need the aid of supportive devices such as crutches, canes, wheelchairs or walkers. Leaving home requires a considerable and taxing effort. There is a normal inability to leave the home.    Community Physician to provide follow up care: Raul Jimenez M.D.     Optional Interventions? No      I certify the face to face encounter for this home health care referral meets the CMS requirements and the encounter/clinical assessment with the patient was, in whole, or in part, for the medical condition(s) listed above, which is the primary reason for home health care. Based on my clinical findings: the service(s) are medically necessary, support the need for home health care, and the homebound criteria are met.  I certify that this patient has had a face to face encounter by myself.  Scout Angulo M.D. - NPI: [id number]

## 2022-11-11 NOTE — DISCHARGE PLANNING
Patient does not meet criteria for IPR, anticipate dc home with iv abx. TCC will no longer follow.

## 2022-11-11 NOTE — PROGRESS NOTES
[facility] Medicine Daily Progress Note    Date of Service  11/11/2022    Chief Complaint  Abimael Hamilton is a 66 y.o. male admitted 11/4/2022 with worsening back pain    Hospital Course  This is a 66 -year-old male with a past medical history significant for hypertension, hyperlipidemia, CAD status post CABG in 1998, 2016, aortic valve replacement in 2016, post stent in 4/2022, morbid obesity, gout, L3 L5 severe spinal stenosis with left foot drop, chronic back pain was admitted on 11/4/2022 with worsening of lower back pain.    MRI of the lumbar spine  on 11/04 noted 10 x 6 x 19 mm posterior epidural abscess at the level of L2-L3 which is new from previous study. There is severe central canal stenosis at this level.  Severe degenerative central canal stenosis at L3-L4 and L4-5.    Cardiology consulted for preop clearance given patient significant cardiac history, nuclear stress test noted small area of ischemia involving the lateral wall.  Patient cleared by cardiology, moderate risk for the surgery.     Surgery was consulted, patient went to the OR with Dr. Sparks on 11/7/2022.  Biopsy was negative, patient was initially started on IV Rocephin.  Considering patient had epidural abscess, will continue vancomycin and Unaysn Culture pending.ID called and will follow their recs    Interval events:  -- No acute events overnight, medicine has been stable, heart rate 61-88, blood pressure 131/82, saturating 91% on 3 to of oxygen.  Patient is alert and oriented, answering questions appropriately.  He continued to complain of back pain.  He stated that his oral pain medication did not control his pain.  This was a started on IV PCA pump for pain control.  --He was started on muscle relaxant, gabapentin, scheduled Tylenol  --Continue IV vancomycin and Zosyn.  Will consult ID tomorrow  --WBC count is elevated at 18.2, monitor  --Patient had  Surgery yesterday, will hold aspirin and Plavix, will discuss with neurosurgery when  we can start aspirin Plavix considering patient having stent placement in 4/2022 11/09:  -- No acute events overnight, medicine has been stable, heart rate 61-1 13, blood pressure 150/90, saturating 94% on room air.  Patient is alert and oriented, answering questions appropriately.  He has a very high threshold for pain.  He was taking oxycodone 20 mg every 6 hours as an outpatient.  During the stay in the hospital, he was started initially on IV PCA pump for Dilaudid.  -- Currently patient was started on oxycodone 20 every 4 hours along with 0.5 mg of IV Dilaudid every 3 hours  --Continue Muscle relaxant   -- His blood pressure is elevated likely secondary to pain, currently patient is on lisinopril 20 mg daily, Toprol-XL 1 mg daily, amlodipine 5 mg p.o. daily, Imdur 120 mg p.o. daily.    -- Patient is  on decadron as per NS , will continue  Cx, currently patient is on IV vancomycin and Unasyn.  ID following, will follow the recommendation  Discussed with neurosurgery Dr. Spraks who recommended starting the patient  on ASA/Plavicx     11/10: Patient seen and examined, afebrile, no overnight events now on IV abx as per ID rec.  Also neurosurgery following appreciate rec.  Cont pain management     11/11:  --No acute events Overnight, medicine administered, heart rate 71-2, blood pressure 179, saturating well on room air.  Patient is alert and oriented mother was appropriately.  Patient said that he is back pain is better but it still hurts.  ID continue to follow the patient, no antibiotics at infected's recommendation, neurosurgery continue to follow the patient.  Monitor patient's neurochecks considering patient was started on aspirin/Plavix    I have discussed this patient's plan of care and discharge plan at IDT rounds today with Case Management, Nursing, Nursing leadership, and other members of the IDT team.    Consultants/Specialty  cardiology and neurosurgery    Code Status  Full Code    Disposition  Patient  is not medically cleared for discharge.   Anticipate discharge to  HH vs SNF vs IRF .  I have placed the appropriate orders for post-discharge needs.    Review of Systems  Review of Systems   Constitutional:  Positive for malaise/fatigue. Negative for fever.   HENT:  Negative for congestion and sore throat.    Eyes:  Negative for blurred vision and double vision.   Respiratory:  Negative for hemoptysis, sputum production and shortness of breath.    Cardiovascular:  Negative for chest pain, palpitations, orthopnea and leg swelling.   Gastrointestinal:  Negative for abdominal pain, heartburn, nausea and vomiting.   Musculoskeletal:  Positive for back pain and myalgias.   Neurological:  Positive for weakness. Negative for headaches.   Psychiatric/Behavioral:  Negative for depression. The patient is nervous/anxious.    All other systems reviewed and are negative.     Physical Exam  Temp:  [36.2 °C (97.2 °F)-37.2 °C (98.9 °F)] 36.9 °C (98.5 °F)  Pulse:  [71-86] 71  Resp:  [17-18] 18  BP: (146-167)/() 149/89  SpO2:  [93 %-97 %] 96 %    Physical Exam  Vitals and nursing note reviewed.   Constitutional:       Appearance: Normal appearance.   HENT:      Head: Normocephalic and atraumatic.   Eyes:      Extraocular Movements: Extraocular movements intact.      Pupils: Pupils are equal, round, and reactive to light.   Cardiovascular:      Rate and Rhythm: Normal rate and regular rhythm.      Pulses: Normal pulses.   Pulmonary:      Effort: Pulmonary effort is normal. No respiratory distress.      Breath sounds: Normal breath sounds. No wheezing, rhonchi or rales.   Abdominal:      General: Bowel sounds are normal. There is no distension.      Palpations: Abdomen is soft.      Tenderness: There is no abdominal tenderness.   Musculoskeletal:         General: No tenderness.      Cervical back: Neck supple. No muscular tenderness.      Right lower leg: No edema.      Left lower leg: No edema.      Comments: Left foot drop    Skin:     General: Skin is warm and dry.      Findings: No bruising, erythema or rash.   Neurological:      Mental Status: He is alert and oriented to person, place, and time.   Psychiatric:         Mood and Affect: Mood normal.       Fluids    Intake/Output Summary (Last 24 hours) at 11/11/2022 1539  Last data filed at 11/11/2022 0900  Gross per 24 hour   Intake --   Output 1200 ml   Net -1200 ml         Laboratory  Recent Labs     11/09/22  0425 11/10/22  0204 11/11/22  0244   WBC 14.3* 11.4* 12.4*   RBC 4.70 4.38* 4.59*   HEMOGLOBIN 14.6 13.5* 14.3   HEMATOCRIT 43.3 39.9* 42.0   MCV 92.1 91.1 91.5   MCH 31.1 30.8 31.2   MCHC 33.7 33.8 34.0   RDW 44.7 43.7 44.4   PLATELETCT 201 193 202   MPV 9.8 9.9 9.9       Recent Labs     11/09/22 0425 11/11/22  0244   SODIUM 134* 139   POTASSIUM 4.2 3.8   CHLORIDE 103 102   CO2 20 24   GLUCOSE 141* 178*   BUN 25* 26*   CREATININE 0.75 0.89   CALCIUM 8.5 8.7                       Imaging  DX-SPINE-ANY ONE VIEW   Final Result      Fluoroscopic image(s) obtained during lumbar spine instrumentation. Please see the patient's chart for full procedural details.      Fluoroscopy time 4 seconds.         DX-PORTABLE FLUORO > 1 HOUR   Final Result      Portable fluoroscopy utilized for 4 seconds.         INTERPRETING LOCATION: [address] [place] [address], [facility]      AK-DIVINCC-9 VIEW   Final Result      1.  Negative single view abdomen without gross evidence of a radiopaque surgical device.      NM-CARDIAC STRESS TEST   Final Result      MR-LUMBAR SPINE-WITH & W/O   Final Result      1.  There is an approximately 10 x 6 x 19 mm sized posterior epidural abscess at the level of L2-3. This is new since the previous study. There is severe central canal stenosis at this level.   2.  Severe degenerative central canal stenosis at L3-4 and L4-5.   3.  Degenerative disease as described above.   4.  There is an approximately 1.9 cm sized T2 hypointense lesion in the right kidney likely representing  complicated cyst. There is also a simple cyst in the right kidney. This is unchanged since the previous study.      CT-HEAD W/O   Final Result      1.  Head CT without contrast within normal limits. No evidence of acute cerebral infarction, hemorrhage or mass lesion.   2.  Atherosclerotic vascular calcification.         EC-ECHOCARDIOGRAM COMPLETE W/O CONT   Final Result      IR-PICC LINE PLACEMENT W/ GUIDANCE > AGE 5    (Results Pending)          Assessment/Plan  * Spinal stenosis of lumbar region (L3-5) with neurogenic claudication and L foot drop/weakness, valvular heart disease  Assessment & Plan  Pain control and muscle relaxers  MRI LS spine done was notable for 10 x 6 x 19 mm posterior epidural abscess at the level of L2-L3 which is new from previous study.  There is severe central canal stenosis at this level.  Severe degenerative central canal stenosis at L3-L4 and L4-5.    -- Had surgery  On 11/07, culture pending, will continue broad-spectrum antibiotics including vancomycin and unasyn  --will obtain PT and OT    - ID following   Pt and ot    Difficulty in managing the patient's pain.  Patient has a very high tolerance for pain.  He does take oxycodone 20 mg every 6 hours as an outpatient.  We will continue oxycodone 20 mg every 4 hours along with IV Dilaudid 0.5 mg every 3 hours as needed.  On muscle relaxant, scheduled Tylenol, gabapentin    Obesity (BMI 30-39.9)- (present on admission)  Assessment & Plan  Lifestyle modification including diet exercise and weight loss as an outpatient      Dyslipidemia- (present on admission)  Assessment & Plan  Hold home evolocumab    Coronary artery disease of native artery of native heart with stable angina pectoris (HCC)- (present on admission)  Assessment & Plan  ;C/w metoprolol and lisinopril, Imdur    Stress test- a small tomoderate sized focus of ischemia involving the lateral wall within the cardiac base and midzone. Findings were discussed with cardiology   Tasia - no further inpatient cardiology evaluation/procedure recommended. Pt is a moderate risk patient.    -- History of aspirin and Plavix was held considering patient undergoing surgery.  I discussed with Dr. Sparks  stated okay starting the patient on aspirin and Plavix.  Started 11/09/2022.   0-- monitor leg strength       Epidural abscess- (present on admission)  Assessment & Plan  See above    Hypertensive urgency  Assessment & Plan  PRN IV antihypertensives  Continue amlodipine       VTE prophylaxis: lovenox

## 2022-11-11 NOTE — PROGRESS NOTES
"Spine Surgery Progress Note    66 y.o. male sp L2-S1 laminectomy on 11/7/2022 for lumbar stenosis and likely epidural abscess    S: Drains discontinued this morning.  Doing well overnight. ERIBERTO.  Pain improved compared to preop continues to have a left foot drop.    O:  BP (!) 153/88   Pulse 69   Temp 36.9 °C (98.4 °F) (Temporal)   Resp 17   Ht 1.676 m (5' 6\")   Wt 98.7 kg (217 lb 9.5 oz)   SpO2 96%   BMI 35.12 kg/m²     Gen: WNWD NAD  Breathing comfortably    Dressing CDI    1 out of 5 left tib ant EHL, otherwise 5 out of 5 bilateral lower extremity primarily pain limited exam  Sensation grossly intact bilateral lower extremities    Lab Results   Component Value Date/Time    WBC 11.4 (H) 11/10/2022 02:04 AM    RBC 4.38 (L) 11/10/2022 02:04 AM    HEMOGLOBIN 13.5 (L) 11/10/2022 02:04 AM    HEMATOCRIT 39.9 (L) 11/10/2022 02:04 AM    MCV 91.1 11/10/2022 02:04 AM    MCH 30.8 11/10/2022 02:04 AM    MCHC 33.8 11/10/2022 02:04 AM    MPV 9.9 11/10/2022 02:04 AM    NEUTSPOLYS 85.00 (H) 11/06/2022 01:13 AM    LYMPHOCYTES 9.90 (L) 11/06/2022 01:13 AM    MONOCYTES 4.30 11/06/2022 01:13 AM    EOSINOPHILS 0.00 11/06/2022 01:13 AM    BASOPHILS 0.10 11/06/2022 01:13 AM      Lab Results   Component Value Date/Time    SODIUM 134 (L) 11/09/2022 04:25 AM    POTASSIUM 4.2 11/09/2022 04:25 AM    CHLORIDE 103 11/09/2022 04:25 AM    CO2 20 11/09/2022 04:25 AM    GLUCOSE 141 (H) 11/09/2022 04:25 AM    BUN 25 (H) 11/09/2022 04:25 AM    CREATININE 0.75 11/09/2022 04:25 AM    BUNCREATRAT 16 07/29/2016 10:22 AM          AP: 66 y.o. male sp L2-S1 laminectomy on 11/7/2022 for possible epidural abscess.  Intraoperative cultures pending.  Appreciate infectious disease recommendations.  Overall patient is doing quite well.  Okay to discharge from spine standpoint as soon as final antibiotics game plan is established.  -- Cont PT/OT  -Follow-up with me in 2 weeks.  "

## 2022-11-11 NOTE — CARE PLAN
The patient is Stable - Low risk of patient condition declining or worsening    Shift Goals  Clinical Goals: Pain control, safety  Patient Goals: Pain control, rest  Family Goals: not present    Progress made toward(s) clinical / shift goals:  Plan of care discussed with patient, patient verbalizes understanding. Patient verbalizes understanding of fall protocol. Patient verbalizes pain control using PRN medications.     Patient is not progressing towards the following goals:

## 2022-11-11 NOTE — DISCHARGE PLANNING
Case Management Discharge Planning    Admission Date: 11/4/2022  GMLOS: 5.2  ALOS: 6    6-Clicks ADL Score: 16  6-Clicks Mobility Score: 22  PT and/or OT Eval ordered: Yes  Post-acute Referrals Ordered: No  Post-acute Choice Obtained: No  Has referral(s) been sent to post-acute provider:  No      Anticipated Discharge Dispo: Discharge Disposition: Disch to  rehab facility or distinct part unit (62)  Discharge Address: [address] [place] [address]  [id number]  Discharge Contact Phone Number: [phone]    DME Needed: Yes    DME Ordered: Yes    Action(s) Taken: Choice obtained    SW met with patient and obtained choice for Home Health, In-home infusions, and DME.     Escalations Completed: None    Medically Clear: No    Next Steps: SW will follow-up with patient as needed.     Barriers to Discharge: None    Is the patient up for discharge tomorrow: No

## 2022-11-11 NOTE — CARE PLAN
Problem: Knowledge Deficit - Standard  Goal: Patient and family/care givers will demonstrate understanding of plan of care, disease process/condition, diagnostic tests and medications  Outcome: Progressing     Problem: Pain - Standard  Goal: Alleviation of pain or a reduction in pain to the patient’s comfort goal  Outcome: Progressing   The patient is Stable - Low risk of patient condition declining or worsening    Shift Goals  Clinical Goals: pain control, iv abx  Patient Goals: pain control, rest  Family Goals: n/a    Progress made toward(s) clinical / shift goals:  a+o x 4, 9/10 lower back pain - treated with oxycodone and valium with good effect, dressing saturated and coming off - replaced with new dressing, supervision oob with fww - steady gait, iv abx a/o, vss, afebrile, no needs at this time, wctm    Patient is not progressing towards the following goals: n/a    Fall precautions/hourly rounding maintained, call light within reach and functioning, all items within reach.  Patient encouraged to call for assistance, poc reviewed with patient, ?'s/concerns answered.

## 2022-11-11 NOTE — PROGRESS NOTES
"    Physical Medicine and Rehabilitation Consultation              Date of initial consultation: 11/9/2022  Consulting provider: Adam Sparks M.D.  Reason for consultation: assess for acute inpatient rehab appropriateness  LOS: 5 Day(s)    Chief complaint: Back pain    HPI: The patient is a 66 y.o. right hand dominant male with a past medical history of L3-5 severe spinal stenosis, left foot drop, chronic low back pain, CAD status post bypass with stenting;  who presented on 11/4/2022  8:19 AM with low back pain, brought in by [facility] with worsening low back pain and falls at home.  Patient denies bowel or bladder involvement, no saddle anesthesia.  Per patient, he was supposed to have surgery but had \"heart valve issues\" His cardiologist left, and he never followed up.     Patient was seen by orthopedic spine surgeon Dr. Adam Sparks who assessed patient to have bilateral lumbar radiculopathy and ordered updated imaging.  MRI L-spine found new 10 x 6 x 19 mm posterior epidural abscess at the level of L2-3 causing severe central canal stenosis. Patient was seen by cardiology on this determined to be a moderate risk surgery.  Patient was taken to the OR by Dr. Adam Sparks MD on 11/7/2022 for L2-S1 laminectomy, evacuation of epidural abscess, and intraoperative cultures.  Patient was started on Vanco and Zosyn pending ID consult.    The patient currently reports severe lower back pain, with radiculopathy down the left leg.  Patient reports left foot weakness consistent with foot drop.  Patient denies any bowel or bladder involvement.  Patient has a history of chronic back pain, reports he takes oxycodone 20 mg 4 times daily, prescribed by Dr. Cortez.  Patient also endorses gabapentin and Flexeril use at home.  Patient has good support from his spouse.  Patient is interested in IPR.     11/10/2022  Patient seen in follow-up for neuropathic pain.  He reports his numbness and tingling is much improved.  Patient was able to " "walk 300 feet with front wheel walker at standby assist, climb 2 stairs today at contact-guard assist.  Patient was seen by ID who recommends \"a 6-week IV antibiotic course and on discharge can transition to daptomycin 6 mg/kg daily continue ceftriaxone 2 g daily with an end date of 12/19/22\".  Patient would like to go home.    ROS  Pertinent positives are mentioned in the HPI, all others reviewed and are negative.    Social Hx:  1 SH  2 BEN  With: Spouse    Employment: Not working  Tobacco: Denies  Alcohol: Denies  Drugs: Endorses marijuana    THERAPY:  Restrictions: Fall risk, spinal/back precautions  PT: Functional mobility   11/8: Walking 3 feet with front wheel walker at min assist  11/10: Walking 300 feet with front wheel walker at standby assist, climb 2 stairs at contact-guard assist    OT: ADLs  11/8: Max assist lower body dressing and toileting    SLP:   None    IMAGING:  MRI L-spine 11/4/2022  1.  There is an approximately 10 x 6 x 19 mm sized posterior epidural abscess at the level of L2-3. This is new since the previous study. There is severe central canal stenosis at this level.  2.  Severe degenerative central canal stenosis at L3-4 and L4-5.  3.  Degenerative disease as described above.  4.  There is an approximately 1.9 cm sized T2 hypointense lesion in the right kidney likely representing complicated cyst. There is also a simple cyst in the right kidney. This is unchanged since the previous study.    PROCEDURES:  Adam Sparks 11/7/2022  Posterior lumbar decompression using laminectomy type approach, Levels: L2-L3-L4 L5-S1    PMH:  Past Medical History:   Diagnosis Date    Aortic stenosis 12/1/2015    Arthritis     Benign hypertensive heart disease without heart failure 11/14/2011    CAD (coronary artery disease)     Congestive heart failure (HCC)     Coronary atherosclerosis of autologous vein bypass graft 11/14/2011    Essential hypertension 4/25/2019    Gout     Heart valve disease     High " cholesterol     History of pancreatitis     History of pancreatitis     Hypertension     Migraine 2/16/2019    Muscle cramps 10/4/2011    Myocardial infarct (HCC)     Multiple MI's, 1998, 2015    Obesity 11/14/2011    Pain     Joints    Postsurgical aortocoronary bypass status 7/26/2016    Status post redo 3-V CABG in [place] 12/2015: SVG-acute marginal, SVG sequential to LAD, and OM     Postsurgical aortocoronary bypass status 7/26/2016    Status post redo 3-V CABG in [place] 12/2015: SVG-acute marginal, SVG sequential to LAD, and OM     Renal disorder     Hx of Acute renal failure r/t medication/statins    S/P aortic valve replacement with bioprosthetic valve 7/26/2016    #23 [place] Magna AVR (bioprosthetic)     Statin intolerance 7/26/2016    Status post cardiac revascularization with bypass aortocoronary anastomosis of five coronary vessels 7/26/2016    5-V CABG done in 1998 (done in [place] at [facility]), patent LIMA to LAD, occluded SVG-OM, occluded SVG-RCA by cardiac catheterization 11/15/2007 with other vein grafts not identified at that time, poor targets for revascularization and declined repeat CABG in 2007 by [facility]. No ischemic was identified by MPI 11/17/07 with an EF of 50%.      Status post cardiac revascularization with bypass aortocoronary anastomosis of five coronary vessels 7/26/2016    5-V CABG done in 1998 (done in [place] at [facility]), patent LIMA to LAD, occluded SVG-OM, occluded SVG-RCA by cardiac catheterization 11/15/2007 with other vein grafts not identified at that time, poor targets for revascularization and declined repeat CABG in 2007 by [facility]l. No ischemic was identified by MPI 11/17/07 with an EF of 50%.         PSH:  Past Surgical History:   Procedure Laterality Date    LUMBAR LAMINECTOMY DISKECTOMY  11/7/2022    Procedure: L2-SI LAMINECTOMY;  Surgeon: Adam Sparks M.D.;  Location: SURGERY [facility];  Service: Orthopedics    SHOULDER DECOMPRESSION ARTHROSCOPIC Right 9/5/2017     Procedure: SHOULDER DECOMPRESSION ARTHROSCOPIC SUBACROMIAL;  Surgeon: Mg Campos M.D.;  Location: SURGERY [facility] [place] [facility];  Service: Orthopedics    DEBRIDEMENT, LABRUM, HIP, ARTHROSCOPIC Right 9/5/2017    Procedure: ARTHROSCOPIC LABRAL DEBRIDEMENT;  Surgeon: Mg Campos M.D.;  Location: [facility];  Service: Orthopedics    SHOULDER ARTHROSCOPY W/ ROTATOR CUFF REPAIR Right 9/5/2017    Procedure: SHOULDER ARTHROSCOPY W/ ROTATOR CUFF REPAIR;  Surgeon: Mg Campos M.D.;  Location: [facility];  Service: Orthopedics    SHOULDER ARTHROSCOPY W/ BICIPITAL TENODESIS REPAIR Right 9/5/2017    Procedure: SHOULDER ARTHROSCOPY W/ BICIPITAL TENODESIS REPAIR;  Surgeon: Mg Campos M.D.;  Location: [facility];  Service: Orthopedics    SYNOVECTOMY Right 9/5/2017    Procedure: SYNOVECTOMY;  Surgeon: Mg Campos M.D.;  Location: [facility];  Service: Orthopedics    MULTIPLE CORONARY ARTERY BYPASS  12/08/2015    3 way bypass & Bovine valve    LAMINOTOMY  12/2004    Diskectomy L4-L5, L5-S1    MULTIPLE CORONARY ARTERY BYPASS  11/11/1998    5 way bypass    BIOPSY GENERAL      buttock lesion    EYE SURGERY      MITRAL VALVE REPLACE         FHX:  Family History   Problem Relation Age of Onset    Heart Attack Father         MI and CABG, unknown age    Hyperlipidemia Father     Cancer Mother         Breast    Heart Disease Brother     Arthritis Maternal Grandmother     Stroke Neg Hx     Diabetes Neg Hx     Lung Disease Neg Hx        Medications:  Current Facility-Administered Medications   Medication Dose    [START ON 11/11/2022] amLODIPine (NORVASC) tablet 10 mg  10 mg    cefTRIAXone (Rocephin) syringe 2,000 mg  2,000 mg    vancomycin (VANCOCIN) 1,750 mg in  mL IVPB  17 mg/kg    cyclobenzaprine (Flexeril) tablet 10 mg  10 mg    gabapentin (NEURONTIN) capsule 600 mg  600 mg    HYDROmorphone (Dilaudid) injection 0.5 mg  0.5 mg     oxyCODONE immediate release (ROXICODONE) tablet 20 mg  20 mg    enoxaparin (Lovenox) inj 40 mg  40 mg    famotidine (PEPCID) injection 20 mg  20 mg    Pharmacy Consult Request ...Pain Management Review 1 Each  1 Each    temazepam (Restoril) capsule 7.5 mg  7.5 mg    lidocaine (LIDODERM) 5 % 2 Patch  2 Patch    MD ALERT...DO NOT ADMINISTER NSAIDS or ASPIRIN unless ORDERED By Neurosurgery 1 Each  1 Each    docusate sodium (COLACE) capsule 100 mg  100 mg    senna-docusate (PERICOLACE or SENOKOT S) 8.6-50 MG per tablet 1 Tablet  1 Tablet    senna-docusate (PERICOLACE or SENOKOT S) 8.6-50 MG per tablet 1 Tablet  1 Tablet    polyethylene glycol/lytes (MIRALAX) PACKET 1 Packet  1 Packet    magnesium hydroxide (MILK OF MAGNESIA) suspension 30 mL  30 mL    bisacodyl (DULCOLAX) suppository 10 mg  10 mg    sodium phosphate (Fleet) enema 133 mL  1 Each    acetaminophen (TYLENOL) tablet 1,000 mg  1,000 mg    Followed by    [START ON 11/12/2022] acetaminophen (TYLENOL) tablet 1,000 mg  1,000 mg    ondansetron (ZOFRAN) syringe/vial injection 4 mg  4 mg    ondansetron (ZOFRAN ODT) dispertab 4 mg  4 mg    MD Alert...Vancomycin per Pharmacy      Nozin nasal  swab  1 Applicator    allopurinol (ZYLOPRIM) tablet 100 mg  100 mg    aspirin EC (ECOTRIN) tablet 81 mg  81 mg    clopidogrel (PLAVIX) tablet 75 mg  75 mg    isosorbide mononitrate SR (IMDUR) tablet 120 mg  120 mg    lisinopril (PRINIVIL) tablet 20 mg  20 mg    metoprolol SR (TOPROL XL) tablet 100 mg  100 mg    nitroglycerin (NITROSTAT) tablet 0.4 mg  0.4 mg    labetalol (NORMODYNE/TRANDATE) injection 10 mg  10 mg    diazePAM (VALIUM) tablet 5 mg  5 mg    hydrALAZINE (APRESOLINE) injection 20 mg  20 mg    enalaprilat (Vasotec) injection 1.25 mg 1 mL  1.25 mg    dexamethasone (DECADRON) injection 4 mg  4 mg       Allergies:  Allergies   Allergen Reactions    Morphine Vomiting, Nausea and Unspecified     violent    Fenofibrate Unspecified     Dehydration, severe muscle  "cramps     Gemfibrozil Unspecified     Dehydration,Severe Muscle cramps    Lipitor [Atorvastatin Calcium] Unspecified     Severe Muscle cramps, dehydration    Lovastatin Unspecified     Dehydration, severe muscle cramps    Tricor Unspecified     Dehydration, severe muscle cramps         Physical Exam:  Vitals: BP (!) 154/84   Pulse 77   Temp 36.2 °C (97.2 °F) (Temporal)   Resp 17   Ht 1.676 m (5' 6\")   Wt 98.7 kg (217 lb 9.5 oz)   SpO2 97%   Gen: NAD  Head: NC/AT  Eyes/ Nose/ Mouth: PERRLA, moist mucous membranes  Cardio: RRR, good distal perfusion, warm extremities  Pulm: normal respiratory effort, no cyanosis   Abd: Soft NTND, negative borborygmi   Ext: No peripheral edema. No calf tenderness. No clubbing.    Labs: Reviewed and significant for   Recent Labs     11/08/22  1105 11/09/22  0425 11/10/22  0204   RBC 4.63* 4.70 4.38*   HEMOGLOBIN 14.7 14.6 13.5*   HEMATOCRIT 42.8 43.3 39.9*   PLATELETCT 223 201 193     Recent Labs     11/08/22  1105 11/09/22  0425   SODIUM 136 134*   POTASSIUM 4.2 4.2   CHLORIDE 102 103   CO2 22 20   GLUCOSE 185* 141*   BUN 36* 25*   CREATININE 0.87 0.75   CALCIUM 8.5 8.5     Recent Results (from the past 24 hour(s))   VANCOMYCIN PEAK    Collection Time: 11/10/22  2:04 AM   Result Value Ref Range    Vancomycin Peak 27.8 20.0 - 40.0 ug/mL   CBC WITHOUT DIFFERENTIAL    Collection Time: 11/10/22  2:04 AM   Result Value Ref Range    WBC 11.4 (H) 4.8 - 10.8 K/uL    RBC 4.38 (L) 4.70 - 6.10 M/uL    Hemoglobin 13.5 (L) 14.0 - 18.0 g/dL    Hematocrit 39.9 (L) 42.0 - 52.0 %    MCV 91.1 81.4 - 97.8 fL    MCH 30.8 27.0 - 33.0 pg    MCHC 33.8 33.7 - 35.3 g/dL    RDW 43.7 35.9 - 50.0 fL    Platelet Count 193 164 - 446 K/uL    MPV 9.9 9.0 - 12.9 fL   VANCOMYCIN TROUGH    Collection Time: 11/10/22  8:32 AM   Result Value Ref Range    Vancomycin Trough <4.0 (L) 10.0 - 20.0 ug/mL         ASSESSMENT:  Patient is a 66 y.o. male admitted with severe lower back pain, radiculopathy, and found to have " lumbar epidural abscess now s/p L2-S1 laminectomy for evacuation of epidural abscess by Dr. Sparks on 11/7/2022    [facility] Code / Diagnosis to Support: 0008.9 - Orthopaedic Disorders: Other Orthopaedic    Rehabilitation: Impaired ADLs and mobility  Patient no longer requires IPR    Disposition recommendations:  -Plan for DC home  -PMR will sign off, please reconsult or reach out via Voalte if further evaluation or medical management is requested      Medical Complexity:    Lumbar epidural abscess  -Status postevacuation by Dr. Adam Sparks on 11/7/2022 with L2-S1 laminectomy  -Chronic pain patient, follows with Dr. Cortez  -Per ID,  6-week IV antibiotic course of daptomycin 6 mg/kg daily and ceftriaxone 2 g daily with an end date of 12/19/22  -Continue PT/OT while in-house  -Plan for discharge home    Pain  -Tylenol 1000 mg every 6 hours  - Allopurinol 100 mg daily  - Diazepam 5 mg 3 times daily as needed  -Continue gabapentin 600 mg 4 times daily.  High risk medication, labs reviewed, creatinine clearance 108.1,   -Lidocaine patch (2) Q 24  -Continue flexeril 10 mg 3 times daily (preferred home med)       Hypertension  -Amlodipine 5 mg daily  -Lisinopril 20 mg daily  -Metoprolol 100 mg daily    CAD  -Aspirin 81 mg daily  -Plavix 75 mg daily    Insomnia  -Temazepam 7.5 mg nightly    DVT PPX: SCDs      Thank you for allowing us to participate in the care of this patient.     Patient was seen for 28 minutes on unit/floor of which > 50% of time was spent on counseling and coordination of care regarding the above, including prognosis, risk reduction, benefits of treatment, and options for next stage of care.      Cirilo Lezama, DO   Physical Medicine and Rehabilitation     Please note that this dictation was created using voice recognition software. I have made every reasonable attempt to correct obvious errors, but there may be errors of grammar and possibly content that I did not discover before finalizing the  note.

## 2022-11-12 NOTE — PROCEDURES
Vascular Access Team    Date of Insertion: 11/11/22  Arm Circumference: 34  Internal length: 45  External Length: 1  Vein Occupancy %: 26   Reason for PICC: Antibiotic Therapy  Labs: WBC 12.4, , BUN 26, Cr 0.89, GFR 95, INR 1.04 on 11/7/22     Consents confirmed, vessel patency confirmed with ultrasound. Risks and benefits of procedure explained to patient and education regarding central line associated bloodstream infections provided. Questions answered.      PICC placed in RUE per licensed provider order with ultrasound guidance.  4 Fr, single lumen PICC placed in basilic vein after 1 attempt(s). 2 mL of 1% lidocaine injected intradermally at the insertion site. A 21 gauge microintroducer needle was visualized entering the vein and modified Seldinger technique was used to obtain access to the vein. 45 cm catheter inserted and brisk blood return was observed from each lumen upon aspiration. Line secured at the 1 cm marker. TCS stylet removed and observed to be fully intact. Each lumen flushed using pulsatile method without resistance with 10 mL 0.9% normal saline. PICC line secured with Biopatch and Tegaderm.     PICC tip placement location is confirmed by nurse to be in the Superior Vena Cava (SVC) utilizing 3CG technology. PICC line is appropriate for use at this time. Patient tolerated procedure well, without complications.  Patient condition relayed to primary RN or ordering physician via this post procedure note in the EMR.      Ultrasound images uploaded to PACS and viewable in the EMR - yes  Ultrasound imaged printed and placed in paper chart - no     [employer] Power PICC ref # [id number], Lot # [id number], Expiration Date 9/30/23

## 2022-11-12 NOTE — PROGRESS NOTES
[facility] Medicine Daily Progress Note    Date of Service  11/12/2022    Chief Complaint  Abimael Hamilton is a 66 y.o. male admitted 11/4/2022 with worsening back pain    Hospital Course  This is a 66 -year-old male with a past medical history significant for hypertension, hyperlipidemia, CAD status post CABG in 1998, 2016, aortic valve replacement in 2016, post stent in 4/2022, morbid obesity, gout, L3 L5 severe spinal stenosis with left foot drop, chronic back pain was admitted on 11/4/2022 with worsening of lower back pain.    MRI of the lumbar spine  on 11/04 noted 10 x 6 x 19 mm posterior epidural abscess at the level of L2-L3 which is new from previous study. There is severe central canal stenosis at this level.  Severe degenerative central canal stenosis at L3-L4 and L4-5.    Cardiology consulted for preop clearance given patient significant cardiac history, nuclear stress test noted small area of ischemia involving the lateral wall.  Patient cleared by cardiology, moderate risk for the surgery.     Surgery was consulted, patient went to the OR with Dr. Sparks on 11/7/2022.  Biopsy was negative, patient was initially started on IV Rocephin.  Considering patient had epidural abscess, will continue vancomycin and Unaysn Culture pending.ID called and will follow their recs    Interval events:  -- No acute events overnight, medicine has been stable, heart rate 61-88, blood pressure 131/82, saturating 91% on 3 to of oxygen.  Patient is alert and oriented, answering questions appropriately.  He continued to complain of back pain.  He stated that his oral pain medication did not control his pain.  This was a started on IV PCA pump for pain control.  --He was started on muscle relaxant, gabapentin, scheduled Tylenol  --Continue IV vancomycin and Zosyn.  Will consult ID tomorrow  --WBC count is elevated at 18.2, monitor  --Patient had  Surgery yesterday, will hold aspirin and Plavix, will discuss with neurosurgery when  we can start aspirin Plavix considering patient having stent placement in 4/2022 11/09:  -- No acute events overnight, medicine has been stable, heart rate 61-1 13, blood pressure 150/90, saturating 94% on room air.  Patient is alert and oriented, answering questions appropriately.  He has a very high threshold for pain.  He was taking oxycodone 20 mg every 6 hours as an outpatient.  During the stay in the hospital, he was started initially on IV PCA pump for Dilaudid.  -- Currently patient was started on oxycodone 20 every 4 hours along with 0.5 mg of IV Dilaudid every 3 hours  --Continue Muscle relaxant   -- His blood pressure is elevated likely secondary to pain, currently patient is on lisinopril 20 mg daily, Toprol-XL 1 mg daily, amlodipine 5 mg p.o. daily, Imdur 120 mg p.o. daily.    -- Patient is  on decadron as per NS , will continue  Cx, currently patient is on IV vancomycin and Unasyn.  ID following, will follow the recommendation  Discussed with neurosurgery Dr. Sparks who recommended starting the patient  on ASA/Plavicx     11/10: Patient seen and examined, afebrile, no overnight events now on IV abx as per ID rec.  Also neurosurgery following appreciate rec.  Cont pain management     11/11:  --No acute events Overnight, medicine administered, heart rate 71-2, blood pressure 179, saturating well on room air.  Patient is alert and oriented mother was appropriately.  Patient said that he is back pain is better but it still hurts.  ID continue to follow the patient, no antibiotics at infected's recommendation, neurosurgery continue to follow the patient.  Monitor patient's neurochecks considering patient was started on aspirin/Plavix    11/12:  --No acute events overnight, vital sign has been stable heart rate 60-74, blood pressure 130  90, sats 90% on room air.  Patient is alert, awake, answering questions appropriately.  --Sodium at 134, monitor, WBC elevated at 13.9, monitor  --ID continue to follow the  patient, ID recommended Rocephin and vancomycin while inpatient.  Patient can be discharged on daptomycin along with Rocephin.  ID has been ordered with home infusion, PICC line place  -- Case management working    I have discussed this patient's plan of care and discharge plan at IDT rounds today with Case Management, Nursing, Nursing leadership, and other members of the IDT team.    Consultants/Specialty  cardiology and neurosurgery    Code Status  Full Code    Disposition  Patient is not medically cleared for discharge.   Anticipate discharge to  HH vs SNF vs IRF .  I have placed the appropriate orders for post-discharge needs.    Review of Systems  Review of Systems   Constitutional:  Positive for malaise/fatigue. Negative for fever.   HENT:  Negative for congestion and sore throat.    Eyes:  Negative for blurred vision and double vision.   Respiratory:  Negative for hemoptysis, sputum production and shortness of breath.    Cardiovascular:  Negative for chest pain, palpitations, orthopnea and leg swelling.   Gastrointestinal:  Negative for abdominal pain, heartburn, nausea and vomiting.   Musculoskeletal:  Positive for back pain and myalgias.   Neurological:  Positive for weakness. Negative for headaches.   Psychiatric/Behavioral:  Negative for depression. The patient is nervous/anxious.    All other systems reviewed and are negative.     Physical Exam  Temp:  [36.4 °C (97.5 °F)-36.7 °C (98.1 °F)] 36.7 °C (98 °F)  Pulse:  [68-81] 73  Resp:  [17-18] 17  BP: (138-167)/() 154/90  SpO2:  [93 %-96 %] 93 %    Physical Exam  Vitals and nursing note reviewed.   Constitutional:       Appearance: Normal appearance.   HENT:      Head: Normocephalic and atraumatic.   Eyes:      Extraocular Movements: Extraocular movements intact.      Pupils: Pupils are equal, round, and reactive to light.   Cardiovascular:      Rate and Rhythm: Normal rate and regular rhythm.      Pulses: Normal pulses.   Pulmonary:      Effort:  Pulmonary effort is normal. No respiratory distress.      Breath sounds: Normal breath sounds. No wheezing, rhonchi or rales.   Abdominal:      General: Bowel sounds are normal. There is no distension.      Palpations: Abdomen is soft.      Tenderness: There is no abdominal tenderness.   Musculoskeletal:         General: No tenderness.      Cervical back: Neck supple. No muscular tenderness.      Right lower leg: No edema.      Left lower leg: No edema.      Comments: Left foot drop   Skin:     General: Skin is warm and dry.      Findings: No bruising, erythema or rash.   Neurological:      Mental Status: He is alert and oriented to person, place, and time.   Psychiatric:         Mood and Affect: Mood normal.       Fluids    Intake/Output Summary (Last 24 hours) at 11/12/2022 1310  Last data filed at 11/12/2022 1004  Gross per 24 hour   Intake 360 ml   Output 0 ml   Net 360 ml         Laboratory  Recent Labs     11/10/22  0204 11/11/22  0244 11/12/22  0050   WBC 11.4* 12.4* 13.9*   RBC 4.38* 4.59* 4.40*   HEMOGLOBIN 13.5* 14.3 13.8*   HEMATOCRIT 39.9* 42.0 40.8*   MCV 91.1 91.5 92.7   MCH 30.8 31.2 31.4   MCHC 33.8 34.0 33.8   RDW 43.7 44.4 45.3   PLATELETCT 193 202 237   MPV 9.9 9.9 9.7       Recent Labs     11/11/22  0244 11/12/22  0050   SODIUM 139 134*   POTASSIUM 3.8 3.9   CHLORIDE 102 101   CO2 24 22   GLUCOSE 178* 236*   BUN 26* 28*   CREATININE 0.89 0.91   CALCIUM 8.7 8.7                       Imaging  IR-PICC LINE PLACEMENT W/ GUIDANCE > AGE 5   Final Result                  Ultrasound-guided PICC placement performed by qualified nursing staff as    above.          DX-SPINE-ANY ONE VIEW   Final Result      Fluoroscopic image(s) obtained during lumbar spine instrumentation. Please see the patient's chart for full procedural details.      Fluoroscopy time 4 seconds.         DX-PORTABLE FLUORO > 1 HOUR   Final Result      Portable fluoroscopy utilized for 4 seconds.         INTERPRETING LOCATION: [address] [place] [address]  NASRIN HAUSER, [id number]      UO-LMEJBYR-3 VIEW   Final Result      1.  Negative single view abdomen without gross evidence of a radiopaque surgical device.      NM-CARDIAC STRESS TEST   Final Result      MR-LUMBAR SPINE-WITH & W/O   Final Result      1.  There is an approximately 10 x 6 x 19 mm sized posterior epidural abscess at the level of L2-3. This is new since the previous study. There is severe central canal stenosis at this level.   2.  Severe degenerative central canal stenosis at L3-4 and L4-5.   3.  Degenerative disease as described above.   4.  There is an approximately 1.9 cm sized T2 hypointense lesion in the right kidney likely representing complicated cyst. There is also a simple cyst in the right kidney. This is unchanged since the previous study.      CT-HEAD W/O   Final Result      1.  Head CT without contrast within normal limits. No evidence of acute cerebral infarction, hemorrhage or mass lesion.   2.  Atherosclerotic vascular calcification.         EC-ECHOCARDIOGRAM COMPLETE W/O CONT   Final Result             Assessment/Plan  * Spinal stenosis of lumbar region (L3-5) with neurogenic claudication and L foot drop/weakness, valvular heart disease  Assessment & Plan  Pain control and muscle relaxers  MRI LS spine done was notable for 10 x 6 x 19 mm posterior epidural abscess at the level of L2-L3 which is new from previous study.  There is severe central canal stenosis at this level.  Severe degenerative central canal stenosis at L3-L4 and L4-5.    -- Had surgery  On 11/07, culture pending, will continue broad-spectrum antibiotics including vancomycin and unasyn; ID recs switching to Rocephin plus vancomycin.    Pt and ot recommends home with home health assessment ordered  Per neurosurgery recommendation          Obesity (BMI 30-39.9)- (present on admission)  Assessment & Plan  Lifestyle modification including diet exercise and weight loss as an outpatient      Dyslipidemia- (present on  admission)  Assessment & Plan  Hold home evolocumab    Coronary artery disease of native artery of native heart with stable angina pectoris (HCC)- (present on admission)  Assessment & Plan  ;C/w metoprolol and lisinopril, Imdur    Stress test- a small tomoderate sized focus of ischemia involving the lateral wall within the cardiac base and midzone. Findings were discussed with cardiology Dr. Dozier - no further inpatient cardiology evaluation/procedure recommended. Pt is a moderate risk patient.    -- History of aspirin and Plavix was held considering patient undergoing surgery.  I discussed with Dr. Sparks  stated okay starting the patient on aspirin and Plavix.  Started 11/09/2022.   -- monitor leg strength           Epidural abscess- (present on admission)  Assessment & Plan  See above    Hypertensive urgency  Assessment & Plan  PRN IV antihypertensives  Continue amlodipine       VTE prophylaxis: lovenox  I have performed a physical exam and reviewed and updated ROS and Plan today (11/12/2022). In review of yesterday's note (11/11/2022), there are no changes except as documented above.

## 2022-11-12 NOTE — PROGRESS NOTES
Patient has had trouble eating due to limited choices on cardiac menu d/w nutrition and dietary throughout the day d/w Dr. Angulo and now on regular diet.

## 2022-11-12 NOTE — CARE PLAN
The patient is Stable - Low risk of patient condition declining or worsening    Shift Goals  Clinical Goals: pain control, antibiotics, dc planning, BP control  Patient Goals: pain control, speak with dietian, dc plan  Family Goals: not present    Progress made toward(s) clinical / shift goals: Pain controlled with prn medication, afebrile on IV antibiotics, dc planning followed by CM, BP improved with oral and IV antihypertensives.      Patient is not progressing towards the following goals:

## 2022-11-12 NOTE — CARE PLAN
The patient is Stable - Low risk of patient condition declining or worsening    Shift Goals  Clinical Goals: Pain control, safety  Patient Goals: Pain control, rest  Family Goals: not present    Progress made toward(s) clinical / shift goals:  Plan of care discussed with patient, patient verbalizes understanding. Patient verbalizes understanding of fall protocol. Patient verbalizes pain control using PRN oxy.     Patient is not progressing towards the following goals:

## 2022-11-13 NOTE — PROGRESS NOTES
ID Brief Note    Reviewed chart including vitals and labs.  No growth on cultures at 72 hours.  Cultures ongoing as I requested they be held for 14 days.  See progress note from 11/11 for full assessment and plan.  Orders were written and uploaded into epic under media tab for home infusion with ceftriaxone and daptomycin.  Incision vancomycin to daptomycin today in anticipation of potential discharge tomorrow.    ID will sign off.  If patient is unable to obtain insurance approval for home infusion notify ID for alternative orders.    Willa Chris MD

## 2022-11-13 NOTE — PROGRESS NOTES
Patient has gauze and tegaderm dressing over back that was lifting due to saturation with serous fluid.  I changed the dressing and notified Dr. Sparks and Dr. Angulo.  There are no concern of redness or purulent drainage.  Afebrile, on antibiotics.  No increase in pain (pain is decreased he states).  He did have two drains prior it appears that are likely where the drainage is coming from, the primary incision has sutures.

## 2022-11-13 NOTE — THERAPY
"Physical Therapy   Daily Treatment     Patient Name: Abimael Hamilton  Age:  66 y.o., Sex:  male  Medical Record #: [id number]  Today's Date: 11/13/2022     Precautions  Precautions: Fall Risk;Spinal / Back Precautions   Comments: no brace per orders    Assessment    Pt seen for PT treatment session. Pt reports increased pain today, possible related to decreased mobility yesterday. Overall, pt demonstrates good functional progress and tolerated ambulation and stair negotiation at SPV to SBA level (detailed below). Pt has met all acute PT goals at this time, no further needs. Continue to ambulate in hallway 2-3x/day and sit in chair for all meals.     Plan    Discharge secondary to goals met.    DC Equipment Recommendations:  (obtained FWW)  Discharge Recommendations: Recommend outpatient physical therapy services to address higher level deficits      Subjective    \"I don't want my wife to do the infusions, she's not good with any first aide. I'd rather go to OP infusion.\"        11/13/22 1057   Precautions   Precautions Fall Risk;Spinal / Back Precautions    Comments no brace per orders   Vitals   O2 Delivery Device None - Room Air   Pain 0 - 10 Group   Therapist Pain Assessment During Activity;Nurse Notified  (reports more pain today, but hasn't ambulated consistently or been sitting in chair for meals)   Cognition    Cognition / Consciousness WDL   Level of Consciousness Alert   Comments pleasant and cooperative. Reports he does NOT feel comfortable with spouse doing infusions upon DC home and would prefer to follow up with OP infusion 2x/day   Balance   Sitting Balance (Static) Good   Sitting Balance (Dynamic) Good   Standing Balance (Static) Fair +   Standing Balance (Dynamic) Fair +   Weight Shift Sitting Good   Weight Shift Standing Good   Skilled Intervention Verbal Cuing;Compensatory Strategies   Comments w/ FWW   Gait Analysis   Gait Level Of Assist Supervised   Assistive Device Front Wheel Walker   Distance " (Feet) 300   # of Times Distance was Traveled 1   Deviation Bradykinetic;Decreased Heel Strike;Decreased Toe Off  (fwd flexed posture)   # of Stairs Climbed 2   Level of Assist with Stairs Standby Assist  (VC only for sequencing but pt able to recall proper sequencing 85% of stair negotiation)   Weight Bearing Status No restrictions   Skilled Intervention Verbal Cuing;Sequencing   Comments L DF improving   Bed Mobility    Supine to Sit   (received sitting EOB)   Sit to Supine   (returned to bedside chair at end of session)   Scooting Supervised   Skilled Intervention Verbal Cuing   Functional Mobility   Sit to Stand Supervised   Bed, Chair, Wheelchair Transfer Supervised   Transfer Method Stand Step   Skilled Intervention Verbal Cuing   How much difficulty does the patient currently have...   Turning over in bed (including adjusting bedclothes, sheets and blankets)? 4   Sitting down on and standing up from a chair with arms (e.g., wheelchair, bedside commode, etc.) 4   Moving from lying on back to sitting on the side of the bed? 4   How much help from another person does the patient currently need...   Moving to and from a bed to a chair (including a wheelchair)? 4   Need to walk in a hospital room? 4   Climbing 3-5 steps with a railing? 3   6 clicks Mobility Score 23   Patient / Family Goals    Patient / Family Goal #1 to have pain resolved   Goal #1 Outcome Progressing as expected   Short Term Goals    Short Term Goal # 1 pt will perform supine <> sit via log roll with SPV in 6 visits   Goal Outcome # 1   (not assessed this session)   Short Term Goal # 2 pt will perform sit <> stand and functional transfers with LRAD and SPV to improve mobility independence in 6 visits   Goal Outcome # 2 Goal met   Short Term Goal # 3 pt will ambulate > 200 ft with LRAD and SPV to access community in 6 visit   Goal Outcome # 3 Goal met   Short Term Goal # 4 pt will negotiate 2 steps with LRAD and SPV to access home environment in  6 visits   Goal Outcome # 4 Progressing as expected   Short Term Goal # 5 pt will demonstrate independent understanding of spine precautions during mobility in 6 visits   Goal Outcome # 5 Goal met   Anticipated Discharge Equipment and Recommendations   DC Equipment Recommendations   (obtained FWW)   Discharge Recommendations Recommend outpatient physical therapy services to address higher level deficits   Interdisciplinary Plan of Care Collaboration   IDT Collaboration with  Nursing;Family / Caregiver   Patient Position at End of Therapy Seated;Call Light within Reach;Tray Table within Reach;Phone within Reach   Collaboration Comments aware of PT session   Session Information   Date / Session Number  11/13- 4 (DC, goals met)

## 2022-11-13 NOTE — PROGRESS NOTES
Orders were placed per Dr. Sparks for wound care consult and placement of wound vac, I did call and d/w wound care team.  Wound care will round and make an assessment, per wound instructed to keep current dressing in place.

## 2022-11-13 NOTE — CARE PLAN
The patient is Stable - Low risk of patient condition declining or worsening    Shift Goals  Clinical Goals: Pain Management, Safety  Patient Goals: Pain Management, Safety  Family Goals: ELSIE    Progress made toward(s) clinical / shift goals: Pain controlled with prn medication, patient declines to call staff for assistance to mobilized scored low on falls risk using DME per therapy clear to ambulate himself, he is aware we are happy to assist him with mobilization.     Patient is not progressing towards the following goals:

## 2022-11-13 NOTE — CARE PLAN
The patient is Stable - Low risk of patient condition declining or worsening    Shift Goals  Clinical Goals: Pain Management, Safety  Patient Goals: Pain Management, Safety  Family Goals: ELSIE    Progress made toward(s) clinical / shift goals:    Problem: Pain - Standard  Goal: Alleviation of pain or a reduction in pain to the patient’s comfort goal  Outcome: Progressing     Problem: Fall Risk  Goal: Patient will remain free from falls  Outcome: Progressing     Problem: Neuro Status  Goal: Neuro status will remain stable or improve  Outcome: Progressing

## 2022-11-13 NOTE — PROGRESS NOTES
[facility] Medicine Daily Progress Note    Date of Service  11/13/2022    Chief Complaint  Abimael Hamilton is a 66 y.o. male admitted 11/4/2022 with worsening back pain    Hospital Course  This is a 66 -year-old male with a past medical history significant for hypertension, hyperlipidemia, CAD status post CABG in 1998, 2016, aortic valve replacement in 2016, post stent in 4/2022, morbid obesity, gout, L3 L5 severe spinal stenosis with left foot drop, chronic back pain was admitted on 11/4/2022 with worsening of lower back pain.    MRI of the lumbar spine  on 11/04 noted 10 x 6 x 19 mm posterior epidural abscess at the level of L2-L3 which is new from previous study. There is severe central canal stenosis at this level.  Severe degenerative central canal stenosis at L3-L4 and L4-5.    Cardiology consulted for preop clearance given patient significant cardiac history, nuclear stress test noted small area of ischemia involving the lateral wall.  Patient cleared by cardiology, moderate risk for the surgery.     Surgery was consulted, patient went to the OR with Dr. Sparks on 11/7/2022.  Biopsy was negative, patient was initially started on IV Rocephin.  Considering patient had epidural abscess, will continue vancomycin and Unaysn Culture pending.ID called and will follow their recs    Interval events:  -- No acute events overnight, medicine has been stable, heart rate 61-88, blood pressure 131/82, saturating 91% on 3 to of oxygen.  Patient is alert and oriented, answering questions appropriately.  He continued to complain of back pain.  He stated that his oral pain medication did not control his pain.  This was a started on IV PCA pump for pain control.  --He was started on muscle relaxant, gabapentin, scheduled Tylenol  --Continue IV vancomycin and Zosyn.  Will consult ID tomorrow  --WBC count is elevated at 18.2, monitor  --Patient had  Surgery yesterday, will hold aspirin and Plavix, will discuss with neurosurgery when  we can start aspirin Plavix considering patient having stent placement in 4/2022 11/09:  -- No acute events overnight, medicine has been stable, heart rate 61-1 13, blood pressure 150/90, saturating 94% on room air.  Patient is alert and oriented, answering questions appropriately.  He has a very high threshold for pain.  He was taking oxycodone 20 mg every 6 hours as an outpatient.  During the stay in the hospital, he was started initially on IV PCA pump for Dilaudid.  -- Currently patient was started on oxycodone 20 every 4 hours along with 0.5 mg of IV Dilaudid every 3 hours  --Continue Muscle relaxant   -- His blood pressure is elevated likely secondary to pain, currently patient is on lisinopril 20 mg daily, Toprol-XL 1 mg daily, amlodipine 5 mg p.o. daily, Imdur 120 mg p.o. daily.    -- Patient is  on decadron as per NS , will continue  Cx, currently patient is on IV vancomycin and Unasyn.  ID following, will follow the recommendation  Discussed with neurosurgery Dr. Sparks who recommended starting the patient  on ASA/Plavicx     11/10: Patient seen and examined, afebrile, no overnight events now on IV abx as per ID rec.  Also neurosurgery following appreciate rec.  Cont pain management     11/11:  --No acute events Overnight, medicine administered, heart rate 71-2, blood pressure 179, saturating well on room air.  Patient is alert and oriented mother was appropriately.  Patient said that he is back pain is better but it still hurts.  ID continue to follow the patient, no antibiotics at infected's recommendation, neurosurgery continue to follow the patient.  Monitor patient's neurochecks considering patient was started on aspirin/Plavix    11/12:  --No acute events overnight, vital sign has been stable heart rate 60-74, blood pressure 130  90, sats 90% on room air.  Patient is alert, awake, answering questions appropriately.  --Sodium at 134, monitor, WBC elevated at 13.9, monitor  --ID continue to follow the  patient, ID recommended Rocephin and vancomycin while inpatient.  Patient can be discharged on daptomycin along with Rocephin.  ID has been ordered with home infusion, PICC line place  -- Case management working    11/13:  -- No acute events overnight, patient has been stable, heart rate 71, blood pressure 130/83, saturating 90% on room air.  Patient is alert and oriented, answering questions appropriately.  He reports he has back pain.  ID continue to follow, neurosurgery continue to follow.  Patient will continue daptomycin along with Rocephin as per ID recommendation.    --Patient would like to get antibiotics as an outpatient, antibiotics has been written by ID, case management working  -- Na at 134, monitor  -- WBC at 13.9 , monitor    I have discussed this patient's plan of care and discharge plan at IDT rounds today with Case Management, Nursing, Nursing leadership, and other members of the IDT team.    Consultants/Specialty  cardiology and neurosurgery    Code Status  Full Code    Disposition  Patient is not medically cleared for discharge.   Anticipate discharge to  HH vs SNF vs IRF .  I have placed the appropriate orders for post-discharge needs.    Review of Systems  Review of Systems   Constitutional:  Positive for malaise/fatigue. Negative for fever.   HENT:  Negative for congestion and sore throat.    Eyes:  Negative for blurred vision and pain.   Respiratory:  Negative for hemoptysis, sputum production and shortness of breath.    Cardiovascular:  Negative for palpitations, orthopnea and leg swelling.   Gastrointestinal:  Negative for abdominal pain, constipation, heartburn and vomiting.   Musculoskeletal:  Positive for back pain and myalgias.   Neurological:  Positive for weakness. Negative for headaches.   Psychiatric/Behavioral:  Negative for depression. The patient is nervous/anxious.    All other systems reviewed and are negative.     Physical Exam  Temp:  [36.5 °C (97.7 °F)-36.8 °C (98.3 °F)]  36.8 °C (98.2 °F)  Pulse:  [71-88] 71  Resp:  [17-96] 17  BP: (132-163)/() 132/83  SpO2:  [91 %-97 %] 93 %    Physical Exam  Vitals and nursing note reviewed.   Constitutional:       Appearance: Normal appearance.   HENT:      Head: Normocephalic and atraumatic.   Eyes:      Extraocular Movements: Extraocular movements intact.      Pupils: Pupils are equal, round, and reactive to light.   Cardiovascular:      Rate and Rhythm: Normal rate and regular rhythm.      Pulses: Normal pulses.   Pulmonary:      Effort: Pulmonary effort is normal. No respiratory distress.      Breath sounds: Normal breath sounds. No wheezing, rhonchi or rales.   Abdominal:      General: Bowel sounds are normal. There is no distension.      Palpations: Abdomen is soft.      Tenderness: There is no abdominal tenderness.   Musculoskeletal:         General: No tenderness.      Cervical back: Neck supple. No muscular tenderness.      Right lower leg: No edema.      Left lower leg: No edema.      Comments: Left foot drop   Skin:     General: Skin is warm and dry.      Findings: No bruising, erythema or rash.   Neurological:      Mental Status: He is alert and oriented to person, place, and time.   Psychiatric:         Mood and Affect: Mood normal.       Fluids    Intake/Output Summary (Last 24 hours) at 11/13/2022 1400  Last data filed at 11/13/2022 0909  Gross per 24 hour   Intake 2729.55 ml   Output 1200 ml   Net 1529.55 ml         Laboratory  Recent Labs     11/11/22 0244 11/12/22  0050   WBC 12.4* 13.9*   RBC 4.59* 4.40*   HEMOGLOBIN 14.3 13.8*   HEMATOCRIT 42.0 40.8*   MCV 91.5 92.7   MCH 31.2 31.4   MCHC 34.0 33.8   RDW 44.4 45.3   PLATELETCT 202 237   MPV 9.9 9.7       Recent Labs     11/11/22 0244 11/12/22  0050   SODIUM 139 134*   POTASSIUM 3.8 3.9   CHLORIDE 102 101   CO2 24 22   GLUCOSE 178* 236*   BUN 26* 28*   CREATININE 0.89 0.91   CALCIUM 8.7 8.7                       Imaging  IR-PICC LINE PLACEMENT W/ GUIDANCE > AGE 5    Final Result                  Ultrasound-guided PICC placement performed by qualified nursing staff as    above.          DX-SPINE-ANY ONE VIEW   Final Result      Fluoroscopic image(s) obtained during lumbar spine instrumentation. Please see the patient's chart for full procedural details.      Fluoroscopy time 4 seconds.         DX-PORTABLE FLUORO > 1 HOUR   Final Result      Portable fluoroscopy utilized for 4 seconds.         INTERPRETING LOCATION: [address] [place] [address], [place], [id number]      [id number] VIEW   Final Result      1.  Negative single view abdomen without gross evidence of a radiopaque surgical device.      NM-CARDIAC STRESS TEST   Final Result      MR-LUMBAR SPINE-WITH & W/O   Final Result      1.  There is an approximately 10 x 6 x 19 mm sized posterior epidural abscess at the level of L2-3. This is new since the previous study. There is severe central canal stenosis at this level.   2.  Severe degenerative central canal stenosis at L3-4 and L4-5.   3.  Degenerative disease as described above.   4.  There is an approximately 1.9 cm sized T2 hypointense lesion in the right kidney likely representing complicated cyst. There is also a simple cyst in the right kidney. This is unchanged since the previous study.      CT-HEAD W/O   Final Result      1.  Head CT without contrast within normal limits. No evidence of acute cerebral infarction, hemorrhage or mass lesion.   2.  Atherosclerotic vascular calcification.         EC-ECHOCARDIOGRAM COMPLETE W/O CONT   Final Result             Assessment/Plan  * Spinal stenosis of lumbar region (L3-5) with neurogenic claudication and L foot drop/weakness, valvular heart disease  Assessment & Plan  Pain control and muscle relaxers  MRI LS spine done was notable for 10 x 6 x 19 mm posterior epidural abscess at the level of L2-L3 which is new from previous study.  There is severe central canal stenosis at this level.  Severe degenerative central canal stenosis at L3-L4 and  L4-5.    -- Had surgery  On 11/07, culture pending, will continue broad-spectrum antibiotics including vancomycin and unasyn; ID recs switching to Rocephin plus vancomycin.    Pt and ot recommends home with home health assessment ordered  Per neurosurgery recommendation          Obesity (BMI 30-39.9)- (present on admission)  Assessment & Plan  Lifestyle modification including diet exercise and weight loss as an outpatient      Dyslipidemia- (present on admission)  Assessment & Plan  Hold home evolocumab    Coronary artery disease of native artery of native heart with stable angina pectoris (HCC)- (present on admission)  Assessment & Plan  ;C/w metoprolol and lisinopril, Imdur    Stress test- a small tomoderate sized focus of ischemia involving the lateral wall within the cardiac base and midzone. Findings were discussed with cardiology Dr. Dozier - no further inpatient cardiology evaluation/procedure recommended. Pt is a moderate risk patient.    -- History of aspirin and Plavix was held considering patient undergoing surgery.  I discussed with Dr. Sparks  stated okay starting the patient on aspirin and Plavix.  Started 11/09/2022.   -- monitor leg strength           Epidural abscess- (present on admission)  Assessment & Plan  See above    Hypertensive urgency  Assessment & Plan  PRN IV antihypertensives  Continue amlodipine       VTE prophylaxis: lovenox  I have performed a physical exam and reviewed and updated ROS and Plan today (11/13/2022). In review of yesterday's note (11/12/2022), there are no changes except as documented above.

## 2022-11-13 NOTE — PROGRESS NOTES
Received bedside report from MO Ng, pt care assumed. VSS on RA, pt assessment complete. Pt A&Ox4,  patient resting comfortably. POC discussed with pt and verbalizes no questions. Pt denies any additional needs at this time. Bed locked and in lowest position, bed alarm refused. Pt educated on fall risk and verbalized understanding, call light within reach, hourly rounding initiated.

## 2022-11-14 PROBLEM — R73.9 HYPERGLYCEMIA: Status: ACTIVE | Noted: 2022-01-01

## 2022-11-14 NOTE — PROGRESS NOTES
Infectious Disease Progress Note    Author: Mary Jo Paulino M.D. Date & Time of service: 2022  1:54 PM    Chief Complaint:  Lumbar spine epidural abscess    Interval History:  66 y.o.  admitted 2022. Pt has a past medical history of aortic valve replacement 2016, gout, L3-L5 severe spinal stenosis with left foot drop and chronic back pain.   AF WBC 14.8 c/o persistent and unimproved back pain +radiculopathy ID reconsulted for [facility] infusion orders    Review of Systems:  Review of Systems   Constitutional:  Negative for fever.   Respiratory:  Negative for cough.    Cardiovascular:  Negative for chest pain.   Gastrointestinal:  Negative for abdominal pain, blood in stool, constipation, diarrhea, nausea and vomiting.   Musculoskeletal:  Positive for back pain and myalgias.   Neurological:  Negative for focal weakness.   Psychiatric/Behavioral:  The patient is not nervous/anxious.    All other systems reviewed and are negative.    Hemodynamics:  Temp (24hrs), Av.7 °C (98 °F), Min:36.4 °C (97.5 °F), Max:37.1 °C (98.7 °F)  Temperature: 36.7 °C (98.1 °F)  Pulse  Av.4  Min: 60  Max: 113   Blood Pressure : (!) 172/106 (Rn notified)       Physical Exam:  Physical Exam  Vitals and nursing note reviewed.   Constitutional:       General: He is not in acute distress.     Appearance: He is not ill-appearing, toxic-appearing or diaphoretic.   Eyes:      General: No scleral icterus.     Extraocular Movements: Extraocular movements intact.      Pupils: Pupils are equal, round, and reactive to light.   Cardiovascular:      Rate and Rhythm: Normal rate.   Pulmonary:      Effort: Pulmonary effort is normal. No respiratory distress.      Breath sounds: No wheezing.   Abdominal:      General: There is no distension.      Tenderness: There is no abdominal tenderness. There is no guarding.   Musculoskeletal:      Cervical back: Neck supple. No rigidity.      Comments: RUE PICC nontender   Skin:     General:  Skin is warm.      Coloration: Skin is not jaundiced.      Comments: tattoos   Neurological:      General: No focal deficit present.      Mental Status: He is alert and oriented to person, place, and time.      Comments: Patient moving all extremities and able to ambulate   Psychiatric:         Mood and Affect: Mood normal.         Behavior: Behavior normal.       Meds:    Current Facility-Administered Medications:     [START ON 11/15/2022] dexamethasone    hydrALAZINE    DAPTOmycin    insulin regular **AND** POC blood glucose manual result **AND** NOTIFY MD and PharmD **AND** Administer 20 grams of glucose (approximately 8 ounces of fruit juice) every 15 minutes PRN FSBG less than 70 mg/dL **AND** dextrose bolus    amLODIPine    cefTRIAXone (ROCEPHIN) IV    famotidine    cyclobenzaprine    gabapentin    HYDROmorphone    oxyCODONE immediate-release    enoxaparin (LOVENOX) injection    Pharmacy Consult Request    temazepam    lidocaine    MD ALERT...DO NOT ADMINISTER NSAIDS or ASPIRIN unless ORDERED By Neurosurgery    docusate sodium    senna-docusate    senna-docusate    polyethylene glycol/lytes    magnesium hydroxide    bisacodyl    sodium phosphate    [] acetaminophen **FOLLOWED BY** acetaminophen    ondansetron    ondansetron    Nozin nasal  swab    allopurinol    aspirin    clopidogrel    isosorbide mononitrate SR    lisinopril    metoprolol SR    nitroglycerin    labetalol    diazePAM    hydrALAZINE    enalaprilat    Labs:  Recent Labs     22  0205   WBC 13.9* 14.8*   RBC 4.40* 4.30*   HEMOGLOBIN 13.8* 13.5*   HEMATOCRIT 40.8* 39.5*   MCV 92.7 91.9   MCH 31.4 31.4   RDW 45.3 44.8   PLATELETCT 237 224   MPV 9.7 9.7       Recent Labs     22  0050 22  0840 22  0205   SODIUM 134*  --  137   POTASSIUM 3.9  --  3.7   CHLORIDE 101  --  102   CO2 22  --  24   GLUCOSE 236*  --  200*   BUN 28*  --  28*   CPKTOTAL  --  130  --        Recent Labs     22  11/14/22  0205   ALBUMIN 3.7 3.8   CREATININE 0.91 0.82         Imaging:  CT-HEAD W/O    Result Date: 11/4/2022 11/4/2022 4:10 PM HISTORY/REASON FOR EXAM:  headache high blood pressure, r/o bleed. TECHNIQUE/EXAM DESCRIPTION AND NUMBER OF VIEWS: CT of the head without contrast. The study was performed on a helical multidetector CT scanner. Contiguous axial sections were obtained from the skull base through the vertex. Up to date radiation dose reduction adjustments have been utilized to meet ALARA standards for radiation dose reduction. COMPARISON:  Head CT 2/16/2019 FINDINGS: The calvariae and skull base are unremarkable. There are no extraaxial fluid collections. The ventricular system and basal cisterns are within normal limits. There are no areas of abnormal density in the brain substance. There are no hemorrhagic lesions.  There are no mass effects or shift of midline structures. The brainstem and posterior fossa structures are unremarkable. There is atherosclerotic vascular calcification in the vertebrobasilar and juxtasellar carotid arteries. Paranasal sinuses in the field of view are unremarkable. Mastoids in the field of view are unremarkable.     1.  Head CT without contrast within normal limits. No evidence of acute cerebral infarction, hemorrhage or mass lesion. 2.  Atherosclerotic vascular calcification.     JG-[id number] VIEW    Result Date: 11/7/2022 11/7/2022 5:56 PM HISTORY/REASON FOR EXAM:  Instrument count. TECHNIQUE/EXAM DESCRIPTION AND NUMBER OF VIEWS:  1 view(s) of the abdomen. COMPARISON: 4/9/2022 FINDINGS: Exam limited due to the intraoperative technique with overlying artifact in the field of view. No gross evidence of a radiopaque surgical instrument or needle device. There are sternotomy wires in the lower thorax.     1.  Negative single view abdomen without gross evidence of a radiopaque surgical device.    DX-SPINE-ANY ONE VIEW    Result Date: 11/8/2022 11/7/2022 4:07 PM HISTORY/REASON  FOR EXAM:  Intraoperative images for procedural navigation. TECHNIQUE/EXAM DESCRIPTION AND NUMBER OF VIEWS:  1 view(s) of the lumbar spine.     Fluoroscopic image(s) obtained during lumbar spine instrumentation. Please see the patient's chart for full procedural details. Fluoroscopy time 4 seconds.     MR-LUMBAR SPINE-WITH & W/O    Result Date: 11/4/2022 11/4/2022 6:52 PM HISTORY/REASON FOR EXAM:  Chronic back pain. TECHNIQUE/EXAM DESCRIPTION: MRI of the lumbar spine without and with contrast. The study was performed on a [employer] 1.5 Zuleika MRI scanner. T1 sagittal, T2 fast spin-echo sagittal, and T2 axial images were obtained of the lumbar spine. T1 post-contrast fat-suppressed sagittal images were obtained. 20 mL ProHance contrast was administered intravenously. COMPARISON:  12/29/2020 FINDINGS: There is peripherally enhancing posterior epidural fluid collection noted at the level of L2-3. It measures an approximately 10 x 6 x 19 mm in size. The lumbar spine maintains normal height and alignment. There is no fracture or dislocation. There is no pathologic marrow infiltration. There are degenerative changes as evidenced by a reduced intervertebral disc height, loss of disc T2 signal and degenerative marrow signal changes. There is no focal osseous lesion. At the level of L5-S1,there are combinations of diffuse disc bulge and facet joint arthropathy causing severe bilateral neural foraminal stenosis. There is mild central canal stenosis. There is moderate bilateral lateral recess stenosis. At the level of L4-5,there are combinations of diffuse disc bulge and facet joint arthropathy causing severe central canal stenosis. There is mild to moderate bilateral neural foraminal stenosis. At the level of L3-4,there are combinations of diffuse disc bulge and facet joint arthropathy causing severe central canal stenosis. There is mild to moderate bilateral neural foraminal stenosis. At the level of L2-3,there are  combinations of posterior epidural fluid collection, left paracentral disc protrusion and facet joint arthropathy causing severe central canal stenosis. There is severe left lateral recess stenosis. There is moderate left neural foraminal stenosis. At the level of L1-2, there is minimal disc bulge without significant spinal or neural foraminal stenosis. The conus terminates at the level of L1. There is an approximately 1.9 cm sized T2 hypointense lesion in the right kidney likely representing complicated cyst. There is also a simple cyst in the right kidney.     1.  There is an approximately 10 x 6 x 19 mm sized posterior epidural abscess at the level of L2-3. This is new since the previous study. There is severe central canal stenosis at this level. 2.  Severe degenerative central canal stenosis at L3-4 and L4-5. 3.  Degenerative disease as described above. 4.  There is an approximately 1.9 cm sized T2 hypointense lesion in the right kidney likely representing complicated cyst. There is also a simple cyst in the right kidney. This is unchanged since the previous study.    DX-PORTABLE FLUORO > 1 HOUR    Result Date: 11/8/2022 11/7/2022 4:07 PM HISTORY/REASON FOR EXAM:  Lumbar laminectomy TECHNIQUE/EXAM DESCRIPTION AND NUMBER OF VIEWS: Portable fluoroscopy for greater than one hour in OR. FINDINGS: The portable fluoroscopy unit was obligated to the procedure for greater than one hour. Actual fluoro time was 4 seconds.     Portable fluoroscopy utilized for 4 seconds. INTERPRETING LOCATION: [address] [place] [address], [id number]-CARDIAC STRESS TEST    Result Date: 11/5/2022   Myocardial Perfusion  Report  NUCLEAR IMAGING INTERPRETATION  There is a small tomoderate sized focus of ischemia involving the lateral  wall within the cardiac base and midzone.  Normal left ventricular wall motion.  LV ejection fraction = 56%.  CATHERINE MORELOS  MRN:    [id number]         Gender:    M  Exam Date: 11/05/2022 06:40  Exam Location:       Inpatient  Ordering Phys:     CANDACE LEAL  NucMed Tech:       Sharmin Valverde RT  Age:    66    :    1955        Ht (in):     66  Wt (lb):     218    BMI:    34.97       Radiologist  Risk Factors:             Hypertension, Family history of coronary disease,                            Obesity  Indications:              Encounter for other preprocedural examination  ICD Codes:                Z01.818  Cardiac History:          Previous MI, S/p PTCA/stent, S/p CABG, Dyspnea  Cardiac Meds:  Meds Past 24 hrs:  Pretest Chest Pain:       No symptoms  STRESS TEST      Pharmacologi                   c  Protoc   Lexiscan       Dose: 0.4 mg  ol:  Post-Injection Exercise:        No exercise followed the intravenous injection  Resting HR (bpm):      84  Peak HR (bpm):         101  Resting BP (mmHg):       142    /   87  Peak BP (mmHg):       145   /   77  MaxPHR:     155     Target HR (bpm):       132  % MaxPHR:     65  Double Product:       77729  BP Response:  Stress Termination:       Protocol completed  Stress Symptoms:  Chest pain 9/10  ECG  Resting ECG:  Stress ECG:  IMAGE PROTOCOL      Rest/Stress 1                      Day          RadiopharmaceuticalDose (mCi)   Imaging  Date      Imaging  Time         Inj to Img Time (min)  Rest:   Tc-99m             7.3          2022        08:32                 30  Stress: Tc-99m             27           2022        09:23                 30  Rest:  Administration Site:       Left antecubital                             fossa  Administered by:      Sharmin DAVILA  Stress:  Administration Site:       Left antecubital                             fossa  Administered by:      Sharmin Valverde RT  % Percent HR Achieved:  SPECT RESULTS  Technical Quality:       Good  Raw Data Analysis:  Summed Stress Score:    8  Summed Rest Score:    4  Summed Difference Score:        7  PERFUSION:  There is a small to moderate sized focus of ischemia involving the  lateral  wall within the cardiac base and midzone.  FUNCTIONAL RESULTS (calculated via Gated SPECT)  Stress Image LV EF:        56     %  Upper Normal Limit  Stress EDV:      120    ml   EDVI:    58      ml/m2  Stress ESV:      53     ml   ESVI:    26      ml/m2  TID:    1      TID - 1.19      TID (ed) - 1.23  LV Function:  Normal left ventricular wall motion.  LV ejection fraction = 56%.  Trent Chang  (Electronically Signed)  Final Date:      2022                   10:29    EC-ECHOCARDIOGRAM COMPLETE W/O CONT    Result Date: 2022  Transthoracic Echo Report Echocardiography Laboratory CONCLUSIONS Compared to the prior study on 20, velocity across aortic valve is slightly worse. The left ventricular ejection fraction is visually estimated to be 65%. Known bioprosthetic aortic valve . Moderate aortic valve stenosis. Vmax is 3.8 m/s. Aortic valve area calculated from the continuity equation is 1.3 cm2. CATHERINE MORELOS Exam Date:         2022                    13:47 Exam Location:     Inpatient Priority:          Routine Ordering Physician:        BECKY SNOWDEN Referring Physician:       [id number] Sharma Sonographer:               Andrew MARTIN Age:    66     Gender:    M MRN:    [id number] :    1955 BSA:    2.06   Ht (in):    66     Wt (lb):    215 Exam Type:     Complete Indications:     Cardiac murmur, unspecified ICD Codes:       R01.1 CPT Codes:       77302 BP:   165    /   85     HR: Technical Quality:       Fair MEASUREMENTS  (Male / Female) Normal Values 2D ECHO LVOT Diameter                     2 cm                  Estimated LV Ejection Fraction    65 %                  LV Ejection Fraction MOD 4C       63.1 %                LV Ejection Fraction 4C AL        66.9 %                LA Volume Index                   28.5 cm3/m2           16 - 28 cm3/m2 DOPPLER AV Peak Velocity                  3.7 m/s               AV Peak Gradient                  53.4 mmHg             AV  Mean Gradient                  30.2 mmHg             AV Acceleration Time              56 ms                 LVOT Peak Velocity                1.5 m/s               AV Area Cont Eq vti               1.4 cm2               Mitral E Point Velocity           0.8 m/s               Mitral E to A Ratio               0.67                  MV Pressure Half Time             50 ms                 MV Area PHT                       4.4 cm2               MV Deceleration Time              172 ms                PV Peak Velocity                  1.6 m/s               PV Peak Gradient                  10.6 mmHg             RVOT Peak Velocity                1.2 m/s               LV E' Lateral Velocity            10.7 cm/s             Mitral E to LV E' Lateral Ratio   7.5                   LV E' Septal Velocity             5.4 cm/s              Mitral E to LV E' Septal Ratio    14.7                  * Indicates values subject to auto-interpretation LV EF:  65    % FINDINGS Left Ventricle The left ventricle is normal in size. Mild concentric left ventricular hypertrophy. Normal left ventricular systolic function. The left ventricular ejection fraction is visually estimated to be 65%. No regional wall motion abnormalities. Normal diastolic function. Right Ventricle The right ventricle is normal in size and systolic function. Right Atrium The right atrium is normal in size. Normal inferior vena cava size and inspiratory collapse. Left Atrium The left atrium is normal in size. Left atrial volume index is 27 mL/sq m. Mitral Valve Structurally normal mitral valve without significant stenosis. Mild mitral regurgitation. Aortic Valve Known bioprosthetic aortic valve . Moderate aortic valve stenosis. Vmax is 3.8 m/s. Transvalvular gradients are - Peak: 58  mmHg, Mean: 30 mmHg. Aortic valve area calculated from the continuity equation is 1.3 cm2. Acceleration time is 56 ms. Tricuspid Valve Structurally normal tricuspid valve without significant  stenosis or regurgitation. Pulmonic Valve Structurally normal pulmonic valve without significant stenosis or regurgitation. Pericardium Normal pericardium without effusion. Aorta Normal aortic root for body surface area. The ascending aorta diameter is 3.1 cm. Oumar Mello MD (Electronically Signed) Final Date:     04 November 2022                 16:30      Micro:  Results       Procedure Component Value Units Date/Time    Anaerobic Culture [[id number]] Collected: 11/07/22 1743    Order Status: Completed Specimen: Wound Updated: 11/10/22 1000     Significant Indicator NEG     Source WND     Site L2-L3 Epidural Abscess     Culture Result Culture in progress.    Narrative:      Surgery - swabs received    CULTURE WOUND W/ GRAM STAIN [[id number]] Collected: 11/07/22 1743    Order Status: Completed Specimen: Wound Updated: 11/10/22 1000     Significant Indicator NEG     Source WND     Site L2-L3 Epidural Abscess     Culture Result No growth at 72 hours.     Gram Stain Result Rare WBCs.  No organisms seen.      Narrative:      Surgery - swabs received    Anaerobic Culture [[id number]] Collected: 11/07/22 1741    Order Status: Completed Specimen: Wound Updated: 11/10/22 1000     Significant Indicator NEG     Source WND     Site L2-L3 Epidural Abscess     Culture Result Culture in progress.    Narrative:      Surgery - swabs received    CULTURE WOUND W/ GRAM STAIN [[id number]] Collected: 11/07/22 1741    Order Status: Completed Specimen: Wound Updated: 11/10/22 1000     Significant Indicator NEG     Source WND     Site L2-L3 Epidural Abscess     Culture Result No growth at 72 hours.     Gram Stain Result Rare WBCs.  No organisms seen.      Narrative:      Surgery - swabs received    BLOOD CULTURE [[id number]] Collected: 11/04/22 2231    Order Status: Completed Specimen: Blood from Peripheral Updated: 11/09/22 2300     Significant Indicator NEG     Source BLD     Site PERIPHERAL     Culture Result No growth after 5 days  "of incubation.    Narrative:      Per Hospital Policy: Only change Specimen Src: to \"Line\" if  specified by physician order.  Left Hand    BLOOD CULTURE [[id number]] Collected: 11/04/22 2233    Order Status: Completed Specimen: Blood from Peripheral Updated: 11/09/22 2300     Significant Indicator NEG     Source BLD     Site PERIPHERAL     Culture Result No growth after 5 days of incubation.    Narrative:      Per Hospital Policy: Only change Specimen Src: to \"Line\" if  specified by physician order.  Right Hand    GRAM STAIN [[id number]] Collected: 11/07/22 1743    Order Status: Completed Specimen: Wound Updated: 11/07/22 2158     Significant Indicator .     Source WND     Site L2-L3 Epidural Abscess     Gram Stain Result Rare WBCs.  No organisms seen.      Narrative:      Surgery - swabs received    GRAM STAIN [[id number]] Collected: 11/07/22 1741    Order Status: Completed Specimen: Wound Updated: 11/07/22 2157     Significant Indicator .     Source WND     Site L2-L3 Epidural Abscess     Gram Stain Result Rare WBCs.  No organisms seen.      Narrative:      Surgery - swabs received            Assessment:  Active Hospital Problems    Diagnosis     *Spinal stenosis of lumbar region (L3-5) with neurogenic claudication and L foot drop/weakness, valvular heart disease [M48.062]     Epidural abscess [G06.2]     Hypertensive urgency [I16.0]     Degenerative lumbar spinal stenosis [M48.061]     Obesity (BMI 30-39.9) [E66.9]     Dyslipidemia [E78.5]     Coronary artery disease of native artery of native heart with stable angina pectoris (HCC) [I25.118]        Assessment:  66 y.o.  admitted 11/4/2022. Pt has a past medical history of aortic valve replacement 2016, gout, L3-L5 severe spinal stenosis with left foot drop and chronic back pain.      Hospital Course:   MRI lumbar spine 11/4 with a 10 x 6 x 19 mm posterior epidural abscess at the level of L2-L3 which is new.  Also noted severe central canal stenosis at this level " and severe degenerative canal stenosis at L3-L5.  Patient went to the OR with orthopedic surgery on 11/7 for posterior lumbar decompression/laminectomy at levels L2-S1.  Epidural collection was found which was reported as thick without a fluid component.  This was sent for culture.  Patient states he was on no antibiotics prior to admit.  Started on broad-spectrum antibiotics on 11/4 with surgery on 11/7 which will likely decrease culture yield.      Problem List   Leukocytosis, increased  Lumbar spine epidural abscess  -s/p OR 11/7  Degenerative central canal stenosis  -Status post OR as described above  History of aortic valve replacement  History of gout     Plan   - Continue vancomycin with pharmacy to dose and ceftriaxone.    -Cultures remain negative but concern for worsening spinal infection based on symptoms, imaging, increased WBC     -Recommend a 6-week IV antibiotic course and on discharge can transition to daptomycin 6-8 mg/kg daily continue ceftriaxone 2 g daily with an end date of 12/19/22.  The patient initially planned for home infusion antibiotics and orders were written and scanned into epic under media tab. Now wants Infusion center but persistent back pain and increasing WBC-recommend repeat MRI including C/T/L spine  Last CRP normal-recheck  - Follow-up OR cultures, no growth at 72 hours and requested micro lab holding for 14 days.  Cultures were swabs only so unable to send them out to [facility] for PCR testing  -Blood cultures, NGTD           Dispo: Orders written for home infusion and scanned into epic under media tab    PICC: Yes, in place        Plan of care discussed with Dr Arredondo

## 2022-11-14 NOTE — THERAPY
Occupational Therapy Contact Note      Patient Name: Abimael Hamilton  Age:  66 y.o., Sex:  male  Medical Record #: [id number]  Today's Date: 11/14/2022 11/14/22 1350   Precautions   Precautions Fall Risk;Spinal / Back Precautions    Interdisciplinary Plan of Care Collaboration   Collaboration Comments Discussed role of therapy and reviewed precautions. Pt refusing mobility at this time stating he's waiting for food and has a lot going on. Reviewed spinal precautions, pt reported he did not recall these precautions. Provided review of log roll and mobility. Will re attempt for therapy as able/appropriate.

## 2022-11-14 NOTE — PROGRESS NOTES
"Spine Surgery Progress Note    66 y.o. male sp L2-S1 laminectomy on 11/7/2022 for lumbar stenosis and likely epidural abscess    S: Increased drainage from surgical site yesterday, slowed this morning, overal LE pain significantly improved compared to preop. Drains discontinued.   O:  BP (!) 172/106 Comment: Rn notified  Pulse 71   Temp 36.7 °C (98.1 °F) (Temporal)   Resp 16   Ht 1.676 m (5' 6\")   Wt 98.7 kg (217 lb 9.5 oz)   SpO2 95%   BMI 35.12 kg/m²     Gen: WNWD NAD  Breathing comfortably    Dressing CDI    1 out of 5 left tib ant EHL, otherwise 5 out of 5 bilateral lower extremity primarily pain limited exam  Sensation grossly intact bilateral lower extremities    Lab Results   Component Value Date/Time    WBC 14.8 (H) 11/14/2022 02:05 AM    RBC 4.30 (L) 11/14/2022 02:05 AM    HEMOGLOBIN 13.5 (L) 11/14/2022 02:05 AM    HEMATOCRIT 39.5 (L) 11/14/2022 02:05 AM    MCV 91.9 11/14/2022 02:05 AM    MCH 31.4 11/14/2022 02:05 AM    MCHC 34.2 11/14/2022 02:05 AM    MPV 9.7 11/14/2022 02:05 AM    NEUTSPOLYS 85.00 (H) 11/06/2022 01:13 AM    LYMPHOCYTES 9.90 (L) 11/06/2022 01:13 AM    MONOCYTES 4.30 11/06/2022 01:13 AM    EOSINOPHILS 0.00 11/06/2022 01:13 AM    BASOPHILS 0.10 11/06/2022 01:13 AM      Lab Results   Component Value Date/Time    SODIUM 137 11/14/2022 02:05 AM    POTASSIUM 3.7 11/14/2022 02:05 AM    CHLORIDE 102 11/14/2022 02:05 AM    CO2 24 11/14/2022 02:05 AM    GLUCOSE 200 (H) 11/14/2022 02:05 AM    BUN 28 (H) 11/14/2022 02:05 AM    CREATININE 0.82 11/14/2022 02:05 AM    BUNCREATRAT 16 07/29/2016 10:22 AM          AP: 66 y.o. male sp L2-S1 laminectomy on 11/7/2022 for possible epidural abscess.  Intraoperative cultures NGTD.   - Prevena wound vac applied, continue x 7 days  - Abx per ID  -- Cont PT/OT  -Follow-up with me in 2 weeks.  "

## 2022-11-14 NOTE — DISCHARGE PLANNING
Agency/Facility Name: [facility]   Spoke To: [facility]   Outcome: Stated that the pt's referral was not received on Friday with orders. DPA resent referral and [facility] will follow up once it has been reviewed.

## 2022-11-14 NOTE — DISCHARGE PLANNING
Case Management Discharge Planning    Admission Date: 11/4/2022  GMLOS: 5.2  ALOS: 9    6-Clicks ADL Score: 16  6-Clicks Mobility Score: 23  PT and/or OT Eval ordered: No  Post-acute Referrals Ordered: No  Post-acute Choice Obtained: No  Has referral(s) been sent to post-acute provider:  NA      Anticipated Discharge Dispo: Discharge Disposition: Disch to  rehab facility or distinct part unit (62)  Discharge Address: [address] [place] [id number]  Discharge Contact Phone Number: [phone]    DME Needed: No    Action(s) Taken: Updated Provider/Nurse on Discharge Plan    Escalations Completed: None    Medically Clear: No    Next Steps: Pt desires [facility] rather tahn home infusion.  messaged Dr. Paulino for [facility] orders. Pt not medically cleared today.    Barriers to Discharge: Medical clearance    Is the patient up for discharge tomorrow: No

## 2022-11-14 NOTE — WOUND TEAM
Prevena 20cm placed to lumbar spine incision.  Prevena wound vacs stay in place over a closed incision for 7 days, at day 7 the prevena wound vac machine will stop functioning. Changing batteries will not make machine turn back on. At that time prevena can be removed and discarded or stay in place as there is a white layer beneath the foam that is imbedded with silver.  VAC orders placed.

## 2022-11-14 NOTE — PROGRESS NOTES
Hospital Medicine Daily Progress Note    Date of Service  11/14/2022    Chief Complaint  Abimael [place] is a 66 y.o. male admitted 11/4/2022 with worsening back pain    Hospital Course  This is a 66 -year-old male with a past medical history significant for hypertension, hyperlipidemia, CAD status post CABG in 1998, 2016, aortic valve replacement in 2016, post stent in 4/2022, morbid obesity, gout, L3 L5 severe spinal stenosis with left foot drop, chronic back pain was admitted on 11/4/2022 with worsening of lower back pain.    MRI of the lumbar spine  on 11/04 noted 10 x 6 x 19 mm posterior epidural abscess at the level of L2-L3 which is new from previous study. There is severe central canal stenosis at this level.  Severe degenerative central canal stenosis at L3-L4 and L4-5.    Cardiology consulted for preop clearance given patient significant cardiac history, nuclear stress test noted small area of ischemia involving the lateral wall.  Patient cleared by cardiology, moderate risk for the surgery.     Surgery was consulted, patient went to the OR with Dr. Sparks on 11/7/2022.  Biopsy was negative, patient was initially started on IV Rocephin.  Considering patient had epidural abscess, will continue vancomycin and Unaysn Culture NGTD. ID called  and recommended continue IV daptomycin and Rocephin till 12/19/2022.    Patient initially wanted home infusion later decided to get IV antibiotics at infusion center.  He started to feel better we stated that patient medically cleared considering elevated WBC count.    Interval events:  -- No acute events overnight, medicine has been stable, heart rate 61-88, blood pressure 131/82, saturating 91% on 3 to of oxygen.  Patient is alert and oriented, answering questions appropriately.  He continued to complain of back pain.  He stated that his oral pain medication did not control his pain.  This was a started on IV PCA pump for pain control.  --He was started on muscle  relaxant, gabapentin, scheduled Tylenol  --Continue IV vancomycin and Zosyn.  Will consult ID tomorrow  --WBC count is elevated at 18.2, monitor  --Patient had  Surgery yesterday, will hold aspirin and Plavix, will discuss with neurosurgery when we can start aspirin Plavix considering patient having stent placement in 4/2022 11/09:  -- No acute events overnight, medicine has been stable, heart rate 61-1 13, blood pressure 150/90, saturating 94% on room air.  Patient is alert and oriented, answering questions appropriately.  He has a very high threshold for pain.  He was taking oxycodone 20 mg every 6 hours as an outpatient.  During the stay in the hospital, he was started initially on IV PCA pump for Dilaudid.  -- Currently patient was started on oxycodone 20 every 4 hours along with 0.5 mg of IV Dilaudid every 3 hours  --Continue Muscle relaxant   -- His blood pressure is elevated likely secondary to pain, currently patient is on lisinopril 20 mg daily, Toprol-XL 1 mg daily, amlodipine 5 mg p.o. daily, Imdur 120 mg p.o. daily.    -- Patient is  on decadron as per NS , will continue  Cx, currently patient is on IV vancomycin and Unasyn.  ID following, will follow the recommendation  Discussed with neurosurgery Dr. Sparks who recommended starting the patient  on ASA/Plavicx     11/10: Patient seen and examined, afebrile, no overnight events now on IV abx as per ID rec.  Also neurosurgery following appreciate rec.  Cont pain management     11/11:  --No acute events Overnight, medicine administered, heart rate 71-2, blood pressure 179, saturating well on room air.  Patient is alert and oriented mother was appropriately.  Patient said that he is back pain is better but it still hurts.  ID continue to follow the patient, no antibiotics at infected's recommendation, neurosurgery continue to follow the patient.  Monitor patient's neurochecks considering patient was started on aspirin/Plavix    11/12:  --No acute events  overnight, vital sign has been stable heart rate 60-74, blood pressure 130  90, sats 90% on room air.  Patient is alert, awake, answering questions appropriately.  --Sodium at 134, monitor, WBC elevated at 13.9, monitor  --ID continue to follow the patient, ID recommended Rocephin and vancomycin while inpatient.  Patient can be discharged on daptomycin along with Rocephin.  ID has been ordered with home infusion, PICC line place  -- Case management working    11/13:  -- No acute events overnight, patient has been stable, heart rate 71, blood pressure 130/83, saturating 90% on room air.  Patient is alert and oriented, answering questions appropriately.  He reports he has back pain.  ID continue to follow, neurosurgery continue to follow.  Patient will continue daptomycin along with Rocephin as per ID recommendation.    --Patient would like to get antibiotics as an outpatient, antibiotics has been written by ID, case management working  -- Na at 134, monitor  -- WBC at 13.9 , monitor      11/14:  --No acute events overnight, medicine has been stable, patient is hypertensive, and was angry that he was not able to get the good foot.  Today's blood pressure noted to be 172/106.  Patient is currently taking lisinopril 20 mg daily, amlodipine 10 mg p.o. daily, metoprolol 100 mg p.o. daily, will add hydralazine 50 3 times daily.  --Patient WBC count is noted to be 13.9 yesterday,  today at 14.9 considering patient continued increasing WBC count, see stated that patient not medically cleared for the discharge.  We will continue IV antibiotics as provided by the infectious disease at this time; continue ceftriaxone and daptomycin.      Patient stated that he would like to get IV antibiotics in an infusion centre, discussed with case management    -- ID to write order for [facility]     Neurosurgery continue to follow the patient, wound care evaluate the patient, placed Prevena wound vac.  Home health has been in place     I have  discussed this patient's plan of care and discharge plan at IDT rounds today with Case Management, Nursing, Nursing leadership, and other members of the IDT team.    Consultants/Specialty  cardiology and neurosurgery    Code Status  Full Code    Disposition  Patient is not medically cleared for discharge.   Anticipate discharge to  HH vs SNF vs IRF .  I have placed the appropriate orders for post-discharge needs.    Review of Systems  Review of Systems   Constitutional:  Positive for malaise/fatigue. Negative for fever.   HENT:  Negative for congestion and sore throat.    Eyes:  Negative for blurred vision and pain.   Respiratory:  Negative for hemoptysis, sputum production and shortness of breath.    Cardiovascular:  Negative for palpitations, orthopnea and leg swelling.   Gastrointestinal:  Negative for abdominal pain, constipation, heartburn and vomiting.   Musculoskeletal:  Positive for back pain and myalgias.   Neurological:  Positive for weakness. Negative for headaches.   Psychiatric/Behavioral:  Negative for depression. The patient is nervous/anxious.    All other systems reviewed and are negative.     Physical Exam  Temp:  [36.4 °C (97.5 °F)-37.1 °C (98.7 °F)] 36.7 °C (98.1 °F)  Pulse:  [71-90] 71  Resp:  [16-17] 16  BP: (117-172)/() 172/106  SpO2:  [94 %-98 %] 95 %    Physical Exam  Vitals and nursing note reviewed.   Constitutional:       Appearance: Normal appearance.   HENT:      Head: Normocephalic and atraumatic.   Eyes:      Extraocular Movements: Extraocular movements intact.      Pupils: Pupils are equal, round, and reactive to light.   Cardiovascular:      Rate and Rhythm: Normal rate and regular rhythm.      Pulses: Normal pulses.   Pulmonary:      Effort: Pulmonary effort is normal. No respiratory distress.      Breath sounds: Normal breath sounds. No wheezing, rhonchi or rales.   Abdominal:      General: Bowel sounds are normal. There is no distension.      Palpations: Abdomen is soft.       Tenderness: There is no abdominal tenderness.   Musculoskeletal:         General: No tenderness.      Cervical back: Neck supple. No muscular tenderness.      Right lower leg: No edema.      Left lower leg: No edema.      Comments: Left foot drop   Skin:     General: Skin is warm and dry.      Findings: No bruising, erythema or rash.   Neurological:      Mental Status: He is alert and oriented to person, place, and time.   Psychiatric:         Mood and Affect: Mood normal.       Fluids    Intake/Output Summary (Last 24 hours) at 11/14/2022 1330  Last data filed at 11/13/2022 1816  Gross per 24 hour   Intake 47.68 ml   Output --   Net 47.68 ml         Laboratory  Recent Labs     11/12/22  0050 11/14/22  0205   WBC 13.9* 14.8*   RBC 4.40* 4.30*   HEMOGLOBIN 13.8* 13.5*   HEMATOCRIT 40.8* 39.5*   MCV 92.7 91.9   MCH 31.4 31.4   MCHC 33.8 34.2   RDW 45.3 44.8   PLATELETCT 237 224   MPV 9.7 9.7       Recent Labs     11/12/22 0050 11/14/22  0205   SODIUM 134* 137   POTASSIUM 3.9 3.7   CHLORIDE 101 102   CO2 22 24   GLUCOSE 236* 200*   BUN 28* 28*   CREATININE 0.91 0.82   CALCIUM 8.7 8.4*                       Imaging  IR-PICC LINE PLACEMENT W/ GUIDANCE > AGE 5   Final Result                  Ultrasound-guided PICC placement performed by qualified nursing staff as    above.          DX-SPINE-ANY ONE VIEW   Final Result      Fluoroscopic image(s) obtained during lumbar spine instrumentation. Please see the patient's chart for full procedural details.      Fluoroscopy time 4 seconds.         DX-PORTABLE FLUORO > 1 HOUR   Final Result      Portable fluoroscopy utilized for 4 seconds.         INTERPRETING LOCATION: [address] [place] [address], [id number]      [id number] VIEW   Final Result      1.  Negative single view abdomen without gross evidence of a radiopaque surgical device.      NM-CARDIAC STRESS TEST   Final Result      MR-LUMBAR SPINE-WITH & W/O   Final Result      1.  There is an approximately 10 x 6 x 19 mm sized  posterior epidural abscess at the level of L2-3. This is new since the previous study. There is severe central canal stenosis at this level.   2.  Severe degenerative central canal stenosis at L3-4 and L4-5.   3.  Degenerative disease as described above.   4.  There is an approximately 1.9 cm sized T2 hypointense lesion in the right kidney likely representing complicated cyst. There is also a simple cyst in the right kidney. This is unchanged since the previous study.      CT-HEAD W/O   Final Result      1.  Head CT without contrast within normal limits. No evidence of acute cerebral infarction, hemorrhage or mass lesion.   2.  Atherosclerotic vascular calcification.         EC-ECHOCARDIOGRAM COMPLETE W/O CONT   Final Result             Assessment/Plan  * Spinal stenosis of lumbar region (L3-5) with neurogenic claudication and L foot drop/weakness, valvular heart disease  Assessment & Plan  Pain control and muscle relaxers  MRI LS spine done was notable for 10 x 6 x 19 mm posterior epidural abscess at the level of L2-L3 which is new from previous study.  There is severe central canal stenosis at this level.  Severe degenerative central canal stenosis at L3-L4 and L4-5.    -- Had surgery  On 11/07, culture pending, will continue broad-spectrum antibiotics including vancomycin and unasyn; ID recs switching to Rocephin plus  daptomycin till 12/19/2022.  While patient was on IV antibiotics, need CBC, CMP, ESR, CRP, CPK.    Pt and ot recommends home with home health assessment ordered  Per neurosurgery recommendation  ID Dr Paulino that considering patient continued increased wbc count; stated that  Patient hasnt been medically cleared for discharge. Patient has been receiving decadron          Hyperglycemia  Assessment & Plan  Continue iss    Obesity (BMI 30-39.9)- (present on admission)  Assessment & Plan  Lifestyle modification including diet exercise and weight loss as an outpatient      Dyslipidemia- (present on  admission)  Assessment & Plan  Hold home evolocumab    Coronary artery disease of native artery of native heart with stable angina pectoris (HCC)- (present on admission)  Assessment & Plan  ;C/w metoprolol and lisinopril, Imdur    Stress test- a small tomoderate sized focus of ischemia involving the lateral wall within the cardiac base and midzone. Findings were discussed with cardiology Dr. Dozier - no further inpatient cardiology evaluation/procedure recommended. Pt is a moderate risk patient.    -- History of aspirin and Plavix was held considering patient undergoing surgery.  I discussed with Dr. Sparks  stated okay starting the patient on aspirin and Plavix.  Started 11/09/2022.   -- monitor leg strength           Epidural abscess- (present on admission)  Assessment & Plan  See above    Hypertensive urgency  Assessment & Plan  PRN IV antihypertensives  Continue amlodipine       VTE prophylaxis: lovenox  I have performed a physical exam and reviewed and updated ROS and Plan today (11/14/2022). In review of yesterday's note (11/13/2022), there are no changes except as documented above.

## 2022-11-14 NOTE — CARE PLAN
Problem: Pain - Standard  Goal: Alleviation of pain or a reduction in pain to the patient’s comfort goal  Outcome: Progressing     Problem: Pain - Post Surgery  Goal: Alleviation or reduction of pain post surgery  Outcome: Progressing       The patient is Stable - Low risk of patient condition declining or worsening    Shift Goals  Clinical Goals: Pain control,  Patient Goals: Pain control, rest  Family Goals: not present    Progress made toward(s) clinical / shift goals:  Taught pt 0-10 pain scale and non-pharmacological method of pain management, encouraged to verbalize when in pain. Administered PRN pain meds as needed.      Patient is not progressing towards the following goals:

## 2022-11-15 NOTE — THERAPY
"Occupational Therapy  Daily Treatment     Patient Name: Abimael [place]  Age:  66 y.o., Sex:  male  Medical Record #: [id number]  Today's Date: 11/15/2022     Precautions  Precautions: Fall Risk, Spinal / Back Precautions   Comments: no brace per orders    Assessment    Pt seen for OT tx focused on LBD, toilet transfers, g/h standing at sink and functional mobility. Pt demo's bed mobility, ADLs, and household distance functional mobility at a modified independent level with FWW. Pt is able to manage LBD tasks in seated-figure four. Ambulated in room and down the hallway with FWW, should continue to ambulate with nursing to maintain strength/endurance. Reports he does not wish to have further acute OT services and has met all goals. No further acute OT needs at this time but would benefit from home health therapy if an option for home safety eval and DME recommendations.     Plan    Discharge secondary to goals met.       Discharge Recommendations: (P) Recommend home health for continued occupational therapy services    Subjective    \"I can do this all on my own.\"      Objective     11/15/22 1419   Bed Mobility    Supine to Sit Modified Independent   Sit to Supine Modified Independent   Scooting Modified Independent   Rolling Modified Independent   Activities of Daily Living   Grooming Modified Independent;Standing   Lower Body Dressing Modified Independent  (seated figure-four EOB for socks, from toilet for underwear)   Toileting Modified Independent   How much help from another person does the patient currently need...   Putting on and taking off regular lower body clothing? 4   Bathing (including washing, rinsing, and drying)? 4   Toileting, which includes using a toilet, bedpan, or urinal? 4   Putting on and taking off regular upper body clothing? 4   Taking care of personal grooming such as brushing teeth? 4   Eating meals? 4   6 Clicks Daily Activity Score 24   Functional Mobility   Sit to Stand Modified " Independent  (FWW)   Bed, Chair, Wheelchair Transfer Modified Independent  (FWW)   Toilet Transfers Modified Independent  (FWW)   Mobility in room bed <> toilet and hallway distances   Activity Tolerance   Standing 10 min   Short Term Goals   Short Term Goal # 1 supervised with UB dressing   Goal Outcome # 1 Goal met  (not addressed but ROM sufficient to complete)   Short Term Goal # 2 min A with LB dressing   Goal Outcome # 2 Goal met   Short Term Goal # 3 min A with ADL txfs   Goal Outcome # 3 Goal met   Anticipated Discharge Equipment and Recommendations   Discharge Recommendations Recommend home health for continued occupational therapy services   Interdisciplinary Plan of Care Collaboration   IDT Collaboration with  Nursing   Patient Position at End of Therapy In Bed;Call Light within Reach;Tray Table within Reach;Phone within Reach   Collaboration Comments RN in at end of session, updated on CLOF

## 2022-11-15 NOTE — CARE PLAN
The patient is Stable - Low risk of patient condition declining or worsening    Shift Goals  Clinical Goals: Pain control,  Patient Goals: Pain control, rest  Family Goals: not present    Progress made toward(s) clinical / shift goals:    Problem: Pain - Standard  Goal: Alleviation of pain or a reduction in pain to the patient’s comfort goal  Outcome: Progressing     Problem: Fall Risk  Goal: Patient will remain free from falls  Outcome: Progressing     Problem: Skin Integrity  Goal: Skin integrity is maintained or improved  Outcome: Progressing     Problem: Lumbar/Thoracic Spine Surgery  Goal: Post-Operative Lumbar/Thoracic Spine Surgery: Patient will achieve optimal post-surgical outcomes  Outcome: Progressing     Problem: Neuro Status  Goal: Neuro status will remain stable or improve  Outcome: Progressing     Problem: Pain - Post Surgery  Goal: Alleviation or reduction of pain post surgery  Outcome: Progressing     Problem: Incision Care  Goal: Optimal post surgical incision care  Outcome: Progressing       Patient is not progressing towards the following goals:

## 2022-11-15 NOTE — PROGRESS NOTES
Infectious Disease Progress Note    Author: Mary Jo Paulino M.D. Date & Time of service: 11/15/2022  12:50 PM    Chief Complaint:  Lumbar spine epidural abscess    Interval History:  66 y.o.  admitted 2022. Pt has a past medical history of aortic valve replacement , gout, L3-L5 severe spinal stenosis with left foot drop and chronic back pain.   AF WBC 14.8 c/o persistent and unimproved back pain +radiculopathy ID reconsulted for R-OPIC infusion orders  11/15 AF states pain pain and nerve pain worse today-having difficulty moving LLE    Review of Systems:  Review of Systems   Constitutional:  Negative for fever.   Respiratory:  Negative for cough.    Cardiovascular:  Negative for chest pain.   Gastrointestinal:  Negative for abdominal pain, blood in stool, constipation, diarrhea, nausea and vomiting.   Musculoskeletal:  Positive for back pain and myalgias.        Worse   Neurological:  Negative for focal weakness.   Psychiatric/Behavioral:  The patient is not nervous/anxious.    All other systems reviewed and are negative.    Hemodynamics:  Temp (24hrs), Av.4 °C (97.5 °F), Min:36.1 °C (97 °F), Max:36.7 °C (98 °F)  Temperature: 36.1 °C (97 °F)  Pulse  Av.3  Min: 60  Max: 113   Blood Pressure : (!) 124/99       Physical Exam:  Physical Exam  Vitals and nursing note reviewed.   Constitutional:       General: He is not in acute distress.     Appearance: He is not ill-appearing, toxic-appearing or diaphoretic.   Eyes:      General: No scleral icterus.     Extraocular Movements: Extraocular movements intact.      Pupils: Pupils are equal, round, and reactive to light.   Cardiovascular:      Rate and Rhythm: Normal rate.   Pulmonary:      Effort: Pulmonary effort is normal. No respiratory distress.      Breath sounds: No wheezing.   Abdominal:      General: There is no distension.      Tenderness: There is no abdominal tenderness. There is no guarding.   Musculoskeletal:      Cervical back: Neck  supple. No rigidity.      Comments: RUE PICC nontender   Skin:     General: Skin is warm.      Coloration: Skin is not jaundiced.      Comments: tattoos   Neurological:      General: No focal deficit present.      Mental Status: He is alert and oriented to person, place, and time.      Cranial Nerves: No cranial nerve deficit.   Psychiatric:         Mood and Affect: Mood normal.         Behavior: Behavior normal.       Meds:    Current Facility-Administered Medications:     tamsulosin    LORazepam    dexamethasone    hydrALAZINE    DAPTOmycin    insulin regular **AND** POC blood glucose manual result **AND** NOTIFY MD and PharmD **AND** Administer 20 grams of glucose (approximately 8 ounces of fruit juice) every 15 minutes PRN FSBG less than 70 mg/dL **AND** dextrose bolus    amLODIPine    cefTRIAXone (ROCEPHIN) IV    famotidine    cyclobenzaprine    gabapentin    HYDROmorphone    oxyCODONE immediate-release    enoxaparin (LOVENOX) injection    Pharmacy Consult Request    temazepam    lidocaine    MD ALERT...DO NOT ADMINISTER NSAIDS or ASPIRIN unless ORDERED By Neurosurgery    docusate sodium    senna-docusate    senna-docusate    polyethylene glycol/lytes    magnesium hydroxide    bisacodyl    sodium phosphate    [] acetaminophen **FOLLOWED BY** acetaminophen    ondansetron    ondansetron    Nozin nasal  swab    allopurinol    aspirin    clopidogrel    isosorbide mononitrate SR    lisinopril    metoprolol SR    nitroglycerin    labetalol    diazePAM    hydrALAZINE    enalaprilat    Labs:  Recent Labs     11/14/22  0205 11/15/22  0553   WBC 14.8* 18.8*   RBC 4.30* 4.38*   HEMOGLOBIN 13.5* 13.6*   HEMATOCRIT 39.5* 40.4*   MCV 91.9 92.2   MCH 31.4 31.1   RDW 44.8 45.8   PLATELETCT 224 264   MPV 9.7 9.7       Recent Labs     22  0840 11/14/22  0205 11/15/22  0553   SODIUM  --  137 137   POTASSIUM  --  3.7 4.1   CHLORIDE  --  102 102   CO2  --  24 26   GLUCOSE  --  200* 120*   BUN  --  28* 28*    CPKTOTAL 130  --   --        Recent Labs     11/14/22  0205 11/15/22  0553   ALBUMIN 3.8 3.5   CREATININE 0.82 0.75         Imaging:  CT-HEAD W/O    Result Date: 11/4/2022 11/4/2022 4:10 PM HISTORY/REASON FOR EXAM:  headache high blood pressure, r/o bleed. TECHNIQUE/EXAM DESCRIPTION AND NUMBER OF VIEWS: CT of the head without contrast. The study was performed on a helical multidetector CT scanner. Contiguous axial sections were obtained from the skull base through the vertex. Up to date radiation dose reduction adjustments have been utilized to meet ALARA standards for radiation dose reduction. COMPARISON:  Head CT 2/16/2019 FINDINGS: The calvariae and skull base are unremarkable. There are no extraaxial fluid collections. The ventricular system and basal cisterns are within normal limits. There are no areas of abnormal density in the brain substance. There are no hemorrhagic lesions.  There are no mass effects or shift of midline structures. The brainstem and posterior fossa structures are unremarkable. There is atherosclerotic vascular calcification in the vertebrobasilar and juxtasellar carotid arteries. Paranasal sinuses in the field of view are unremarkable. Mastoids in the field of view are unremarkable.     1.  Head CT without contrast within normal limits. No evidence of acute cerebral infarction, hemorrhage or mass lesion. 2.  Atherosclerotic vascular calcification.     KS-[id number]-3 VIEW    Result Date: 11/7/2022 11/7/2022 5:56 PM HISTORY/REASON FOR EXAM:  Instrument count. TECHNIQUE/EXAM DESCRIPTION AND NUMBER OF VIEWS:  1 view(s) of the abdomen. COMPARISON: 4/9/2022 FINDINGS: Exam limited due to the intraoperative technique with overlying artifact in the field of view. No gross evidence of a radiopaque surgical instrument or needle device. There are sternotomy wires in the lower thorax.     1.  Negative single view abdomen without gross evidence of a radiopaque surgical device.    DX-SPINE-ANY ONE  VIEW    Result Date: 11/8/2022 11/7/2022 4:07 PM HISTORY/REASON FOR EXAM:  Intraoperative images for procedural navigation. TECHNIQUE/EXAM DESCRIPTION AND NUMBER OF VIEWS:  1 view(s) of the lumbar spine.     Fluoroscopic image(s) obtained during lumbar spine instrumentation. Please see the patient's chart for full procedural details. Fluoroscopy time 4 seconds.     MR-LUMBAR SPINE-WITH & W/O    Result Date: 11/4/2022 11/4/2022 6:52 PM HISTORY/REASON FOR EXAM:  Chronic back pain. TECHNIQUE/EXAM DESCRIPTION: MRI of the lumbar spine without and with contrast. The study was performed on a [employer] 1.5 Zuleika MRI scanner. T1 sagittal, T2 fast spin-echo sagittal, and T2 axial images were obtained of the lumbar spine. T1 post-contrast fat-suppressed sagittal images were obtained. 20 mL ProHance contrast was administered intravenously. COMPARISON:  12/29/2020 FINDINGS: There is peripherally enhancing posterior epidural fluid collection noted at the level of L2-3. It measures an approximately 10 x 6 x 19 mm in size. The lumbar spine maintains normal height and alignment. There is no fracture or dislocation. There is no pathologic marrow infiltration. There are degenerative changes as evidenced by a reduced intervertebral disc height, loss of disc T2 signal and degenerative marrow signal changes. There is no focal osseous lesion. At the level of L5-S1,there are combinations of diffuse disc bulge and facet joint arthropathy causing severe bilateral neural foraminal stenosis. There is mild central canal stenosis. There is moderate bilateral lateral recess stenosis. At the level of L4-5,there are combinations of diffuse disc bulge and facet joint arthropathy causing severe central canal stenosis. There is mild to moderate bilateral neural foraminal stenosis. At the level of L3-4,there are combinations of diffuse disc bulge and facet joint arthropathy causing severe central canal stenosis. There is mild to moderate bilateral  neural foraminal stenosis. At the level of L2-3,there are combinations of posterior epidural fluid collection, left paracentral disc protrusion and facet joint arthropathy causing severe central canal stenosis. There is severe left lateral recess stenosis. There is moderate left neural foraminal stenosis. At the level of L1-2, there is minimal disc bulge without significant spinal or neural foraminal stenosis. The conus terminates at the level of L1. There is an approximately 1.9 cm sized T2 hypointense lesion in the right kidney likely representing complicated cyst. There is also a simple cyst in the right kidney.     1.  There is an approximately 10 x 6 x 19 mm sized posterior epidural abscess at the level of L2-3. This is new since the previous study. There is severe central canal stenosis at this level. 2.  Severe degenerative central canal stenosis at L3-4 and L4-5. 3.  Degenerative disease as described above. 4.  There is an approximately 1.9 cm sized T2 hypointense lesion in the right kidney likely representing complicated cyst. There is also a simple cyst in the right kidney. This is unchanged since the previous study.    DX-PORTABLE FLUORO > 1 HOUR    Result Date: 11/8/2022 11/7/2022 4:07 PM HISTORY/REASON FOR EXAM:  Lumbar laminectomy TECHNIQUE/EXAM DESCRIPTION AND NUMBER OF VIEWS: Portable fluoroscopy for greater than one hour in OR. FINDINGS: The portable fluoroscopy unit was obligated to the procedure for greater than one hour. Actual fluoro time was 4 seconds.     Portable fluoroscopy utilized for 4 seconds. INTERPRETING LOCATION: [address] [place] [address], [address] [place] [address]-CARDIAC STRESS TEST    Result Date: 11/5/2022   Myocardial Perfusion  Report  NUCLEAR IMAGING INTERPRETATION  There is a small tomoderate sized focus of ischemia involving the lateral  wall within the cardiac base and midzone.  Normal left ventricular wall motion.  LV ejection fraction = 56%.  CATHERINE MORELOS  MRN:    [id number]         Gender:     M  Exam Date: 2022 06:40  Exam Location:      Inpatient  Ordering Phys:     CANDACE LEAL  NucMed Tech:       Sharmin Valverde RT  Age:    66    :    1955        Ht (in):     66  Wt (lb):     218    BMI:    34.97       Radiologist  Risk Factors:             Hypertension, Family history of coronary disease,                            Obesity  Indications:              Encounter for other preprocedural examination  ICD Codes:                Z01.818  Cardiac History:          Previous MI, S/p PTCA/stent, S/p CABG, Dyspnea  Cardiac Meds:  Meds Past 24 hrs:  Pretest Chest Pain:       No symptoms  STRESS TEST      Pharmacologi                   c  Sujata   Lexiscan       Dose: 0.4 mg  ol:  Post-Injection Exercise:        No exercise followed the intravenous injection  Resting HR (bpm):      84  Peak HR (bpm):         101  Resting BP (mmHg):       142    /   87  Peak BP (mmHg):       145   /   77  MaxPHR:     155     Target HR (bpm):       132  % MaxPHR:     65  Double Product:       60353  BP Response:  Stress Termination:       Protocol completed  Stress Symptoms:  Chest pain 9/10  ECG  Resting ECG:  Stress ECG:  IMAGE PROTOCOL      Rest/Stress 1                      Day          RadiopharmaceuticalDose (mCi)   Imaging  Date      Imaging  Time         Inj to Img Time (min)  Rest:   Tc-99m             7.3          2022        08:32                 30  Stress: Tc-99m             27           2022        09:23                 30  Rest:  Administration Site:       Left antecubital                             fossa  Administered by:      Sharmin Valverde RT  Stress:  Administration Site:       Left antecubital                             fossa  Administered by:      Sharmin Valverde RT  % Percent HR Achieved:  SPECT RESULTS  Technical Quality:       Good  Raw Data Analysis:  Summed Stress Score:    8  Summed Rest Score:    4  Summed Difference Score:        7  PERFUSION:  There is a small  to moderate sized focus of ischemia involving the lateral  wall within the cardiac base and midzone.  FUNCTIONAL RESULTS (calculated via Gated SPECT)  Stress Image LV EF:        56     %  Upper Normal Limit  Stress EDV:      120    ml   EDVI:    58      ml/m2  Stress ESV:      53     ml   ESVI:    26      ml/m2  TID:    1      TID - 1.19      TID (ed) - 1.23  LV Function:  Normal left ventricular wall motion.  LV ejection fraction = 56%.  Trent Chang  (Electronically Signed)  Final Date:      2022                   10:29    EC-ECHOCARDIOGRAM COMPLETE W/O CONT    Result Date: 2022  Transthoracic Echo Report Echocardiography Laboratory CONCLUSIONS Compared to the prior study on 20, velocity across aortic valve is slightly worse. The left ventricular ejection fraction is visually estimated to be 65%. Known bioprosthetic aortic valve . Moderate aortic valve stenosis. Vmax is 3.8 m/s. Aortic valve area calculated from the continuity equation is 1.3 cm2. CATHERINE MORELOS Exam Date:         2022                    13:47 Exam Location:     Inpatient Priority:          Routine Ordering Physician:        BECKY SNOWDEN Referring Physician:       [id number] Gomez Sonographer:               Andrew MARTIN Age:    66     Gender:    M MRN:    [id number] :    1955 BSA:    2.06   Ht (in):    66     Wt (lb):    215 Exam Type:     Complete Indications:     Cardiac murmur, unspecified ICD Codes:       R01.1 CPT Codes:       58140 BP:   165    /   85     HR: Technical Quality:       Fair MEASUREMENTS  (Male / Female) Normal Values 2D ECHO LVOT Diameter                     2 cm                  Estimated LV Ejection Fraction    65 %                  LV Ejection Fraction MOD 4C       63.1 %                LV Ejection Fraction 4C AL        66.9 %                LA Volume Index                   28.5 cm3/m2           16 - 28 cm3/m2 DOPPLER AV Peak Velocity                  3.7 m/s               AV Peak  Gradient                  53.4 mmHg             AV Mean Gradient                  30.2 mmHg             AV Acceleration Time              56 ms                 LVOT Peak Velocity                1.5 m/s               AV Area Cont Eq vti               1.4 cm2               Mitral E Point Velocity           0.8 m/s               Mitral E to A Ratio               0.67                  MV Pressure Half Time             50 ms                 MV Area PHT                       4.4 cm2               MV Deceleration Time              172 ms                PV Peak Velocity                  1.6 m/s               PV Peak Gradient                  10.6 mmHg             RVOT Peak Velocity                1.2 m/s               LV E' Lateral Velocity            10.7 cm/s             Mitral E to LV E' Lateral Ratio   7.5                   LV E' Septal Velocity             5.4 cm/s              Mitral E to LV E' Septal Ratio    14.7                  * Indicates values subject to auto-interpretation LV EF:  65    % FINDINGS Left Ventricle The left ventricle is normal in size. Mild concentric left ventricular hypertrophy. Normal left ventricular systolic function. The left ventricular ejection fraction is visually estimated to be 65%. No regional wall motion abnormalities. Normal diastolic function. Right Ventricle The right ventricle is normal in size and systolic function. Right Atrium The right atrium is normal in size. Normal inferior vena cava size and inspiratory collapse. Left Atrium The left atrium is normal in size. Left atrial volume index is 27 mL/sq m. Mitral Valve Structurally normal mitral valve without significant stenosis. Mild mitral regurgitation. Aortic Valve Known bioprosthetic aortic valve . Moderate aortic valve stenosis. Vmax is 3.8 m/s. Transvalvular gradients are - Peak: 58  mmHg, Mean: 30 mmHg. Aortic valve area calculated from the continuity equation is 1.3 cm2. Acceleration time is 56 ms. Tricuspid Valve  Structurally normal tricuspid valve without significant stenosis or regurgitation. Pulmonic Valve Structurally normal pulmonic valve without significant stenosis or regurgitation. Pericardium Normal pericardium without effusion. Aorta Normal aortic root for body surface area. The ascending aorta diameter is 3.1 cm. Oumar Mello MD (Electronically Signed) Final Date:     04 November 2022                 16:30      Micro:  Results       Procedure Component Value Units Date/Time    Anaerobic Culture [[id number]] Collected: 11/07/22 1743    Order Status: Completed Specimen: Wound Updated: 11/10/22 1000     Significant Indicator NEG     Source WND     Site L2-L3 Epidural Abscess     Culture Result Culture in progress.    Narrative:      Surgery - swabs received    CULTURE WOUND W/ GRAM STAIN [[id number]] Collected: 11/07/22 1743    Order Status: Completed Specimen: Wound Updated: 11/10/22 1000     Significant Indicator NEG     Source WND     Site L2-L3 Epidural Abscess     Culture Result No growth at 72 hours.     Gram Stain Result Rare WBCs.  No organisms seen.      Narrative:      Surgery - swabs received    Anaerobic Culture [[id number]] Collected: 11/07/22 1741    Order Status: Completed Specimen: Wound Updated: 11/10/22 1000     Significant Indicator NEG     Source WND     Site L2-L3 Epidural Abscess     Culture Result Culture in progress.    Narrative:      Surgery - swabs received    CULTURE WOUND W/ GRAM STAIN [[id number]] Collected: 11/07/22 1741    Order Status: Completed Specimen: Wound Updated: 11/10/22 1000     Significant Indicator NEG     Source WND     Site L2-L3 Epidural Abscess     Culture Result No growth at 72 hours.     Gram Stain Result Rare WBCs.  No organisms seen.      Narrative:      Surgery - swabs received    BLOOD CULTURE [[id number]] Collected: 11/04/22 2231    Order Status: Completed Specimen: Blood from Peripheral Updated: 11/09/22 2300     Significant Indicator NEG     Source BLD      "Site PERIPHERAL     Culture Result No growth after 5 days of incubation.    Narrative:      Per Hospital Policy: Only change Specimen Src: to \"Line\" if  specified by physician order.  Left Hand    BLOOD CULTURE [[id number]] Collected: 11/04/22 2233    Order Status: Completed Specimen: Blood from Peripheral Updated: 11/09/22 2300     Significant Indicator NEG     Source BLD     Site PERIPHERAL     Culture Result No growth after 5 days of incubation.    Narrative:      Per Hospital Policy: Only change Specimen Src: to \"Line\" if  specified by physician order.  Right Hand            Assessment:  Active Hospital Problems    Diagnosis     *Spinal stenosis of lumbar region (L3-5) with neurogenic claudication and L foot drop/weakness, valvular heart disease [M48.062]     Epidural abscess [G06.2]     Hypertensive urgency [I16.0]     Degenerative lumbar spinal stenosis [M48.061]     Obesity (BMI 30-39.9) [E66.9]     Dyslipidemia [E78.5]     Coronary artery disease of native artery of native heart with stable angina pectoris (HCC) [I25.118]        Assessment:  66 y.o.  admitted 11/4/2022. Pt has a past medical history of aortic valve replacement 2016, gout, L3-L5 severe spinal stenosis with left foot drop and chronic back pain.      Hospital Course:   MRI lumbar spine 11/4 with a 10 x 6 x 19 mm posterior epidural abscess at the level of L2-L3 which is new.  Also noted severe central canal stenosis at this level and severe degenerative canal stenosis at L3-L5.  Patient went to the OR with orthopedic surgery on 11/7 for posterior lumbar decompression/laminectomy at levels L2-S1.  Epidural collection was found which was reported as thick without a fluid component.  This was sent for culture.  Patient states he was on no antibiotics prior to admit.  Started on broad-spectrum antibiotics on 11/4 with surgery on 11/7 which will likely decrease culture yield.      Problem List   Leukocytosis, increased  Lumbar spine epidural " abscess  -s/p OR 11/7  Degenerative central canal stenosis  -Status post OR as described above  History of aortic valve replacement  History of gout     Plan   - Blood cultures neg  -Cultures remain negative but concern for worsening spinal infection based on symptoms, imaging, increased WBC     -recommend repeat MRI including C/T/L spine-ordered  Last CRP normal-rechecked and remains normal  - Follow-up OR cultures, no growth at 72 hours and requested micro lab holding for 14 days.  Cultures were swabs only so unable to send them out to [facility] for PCR testing  -Continue vancomycin with pharmacy to dose and ceftriaxone.    Recommend a 6-week IV antibiotic course and on discharge can transition to daptomycin 6-8 mg/kg daily continue ceftriaxone 2 g daily with an end date of 12/19/22 if no worsening abscess or complication          Dispo: Orders for home infusion and scanned into epic under media tab  Now plan is for R-OPIC    PICC: Yes, in place        Plan of care discussed with Hunt

## 2022-11-15 NOTE — CARE PLAN
Problem: Pain - Standard  Goal: Alleviation of pain or a reduction in pain to the patient’s comfort goal  Outcome: Progressing     Problem: Fall Risk  Goal: Patient will remain free from falls  Outcome: Progressing   The patient is Stable - Low risk of patient condition declining or worsening    Shift Goals  Clinical Goals: Pain control,  Patient Goals: Pain control, rest  Family Goals: not present    Progress made toward(s) clinical / shift goals:  yes    Patient is not progressing towards the following goals: n/a

## 2022-11-15 NOTE — NON-PROVIDER
Hospital Medicine Daily Progress Note    Date of service:  11/15/2022     Chief Complaint:  Chief Complaint   Patient presents with    Low Back Pain     BIB [facility], patient has chronic back pain from spinal stenosis that he takes oxy and gabapentin for. He has had worsening pain the last two weeks that has had 4 falls at home, no head trauma involved. He increased his pain med doses yesterday but still has severe pain. Patient does also have a kidney stone and cyst on right side        Hospital Course:  Mr. Abimael Hamilton is a 66 y.o. male admitted 11/4/2022 with a past medical history significant for hypertension, hyperlipidemia, CAD status post CABG in 1998, 2016, aortic valve replacement in 2016, post stent in 4/2022, morbid obesity, gout, L3 L5 severe spinal stenosis with left foot drop, chronic back pain was admitted on 11/4/2022 with worsening of lower back pain over the past 2 weeks. He reports pain radiates down the buttock posterior thigh, lateral thigh, lateral calf. He denies any  groin numbness or incontinence.     In the emergency department, MRI of lumbar spine noted new 10 x 6 x1 9mm posterior epidural abscess at the level of L2-L3. There is severe central canal stenosis at this level.  Severe degenerative central canal stenosis at L3-L4 and L4-5.    Surgery was consulted, patient underwent posterior lumbar decompression using laminectomy type approach on 11/07/2022 with Dr. Sparks. He was started on IV ceftriaxone. He was started on vancomycin and unasyn for coverage of epidural abscess. ID consulted and recommended IV Daptomycin and ceftriaxone for 6 weeks with end date 12/19/2022. Patient states he prefers to go to outpatient infusion center for IV antibiotic therapy vs. home infusion. Concern for worsening spinal infection based on increasing leukocytosis, WBCs now 18.8k. MRI cervical, thoracic, and lumbar spine ordered and pending.     Interval History:  11/15/2022: Patient remained afebrile overnight.  Prevena wound vac in place to posterior spine, dressing CDI. Patient reports chronic back pain, unchanged from baseline and managed with oxycodone 20mg. Patient reports difficulty urinating despite having the urge, will trial Flomax. One-time dose of Ativan ordered to be given prior to MRI as patient is claustrophobic.     Consults/Specialty:  Dr. Sparks - Neurosurgery  Dr. Paulino - Infectious Disease    CODE STATUS  Full Code    Disposition  Patient is not medically cleared for discharge.   Anticipate discharge to  home health vs. SNF vs. Inpatient rehab .  I have placed the appropriate orders for post-discharge needs.    ROS  Review of Systems   Constitutional:  Negative for chills, fever and malaise/fatigue.   HENT:  Negative for congestion and hearing loss.    Eyes:  Negative for blurred vision and double vision.   Respiratory:  Negative for cough, sputum production and shortness of breath.    Cardiovascular:  Positive for leg swelling. Negative for chest pain and palpitations.   Gastrointestinal:  Negative for abdominal pain, diarrhea, nausea and vomiting.   Genitourinary:  Negative for dysuria, frequency and urgency.   Musculoskeletal:  Positive for back pain and myalgias. Negative for joint pain.   Skin:  Negative for itching and rash.   Neurological:  Positive for weakness. Negative for dizziness and headaches.        LLE foot drop   Psychiatric/Behavioral: Negative.         Physical Exam  Vitals:    11/15/22 1423   BP: (!) 140/84   Pulse: 85   Resp:    Temp:    SpO2:       Physical Exam  Constitutional:       General: He is not in acute distress.     Appearance: He is obese. He is ill-appearing.   HENT:      Head: Normocephalic and atraumatic.      Nose: No congestion or rhinorrhea.      Mouth/Throat:      Mouth: Mucous membranes are moist.   Eyes:      Extraocular Movements: Extraocular movements intact.      Pupils: Pupils are equal, round, and reactive to light.   Cardiovascular:      Rate and Rhythm:  Normal rate and regular rhythm.      Heart sounds: Murmur heard.     No gallop.   Pulmonary:      Effort: Pulmonary effort is normal.      Breath sounds: Normal breath sounds. No wheezing, rhonchi or rales.   Abdominal:      General: Bowel sounds are normal. There is no distension.      Palpations: Abdomen is soft.      Tenderness: There is no abdominal tenderness.   Musculoskeletal:      Cervical back: Normal range of motion. No rigidity or tenderness.      Right lower leg: Edema present.      Left lower leg: Edema present.      Comments: LLE foot drop   Skin:     General: Skin is warm and dry.      Comments: Prevena wound vac to midline thoracic/lumbar spine.   Neurological:      Mental Status: He is alert and oriented to person, place, and time. Mental status is at baseline.   Psychiatric:         Mood and Affect: Mood normal.         Behavior: Behavior normal.       FLUID STATUS     Intake/Output Summary (Last 24 hours) at 11/15/2022 1451  Last data filed at 11/15/2022 0600  Gross per 24 hour   Intake --   Output 800 ml   Net -800 ml       Diagnostics  Laboratory  Recent Labs     11/14/22  0205 11/15/22  0553   WBC 14.8* 18.8*   RBC 4.30* 4.38*   HEMOGLOBIN 13.5* 13.6*   HEMATOCRIT 39.5* 40.4*   MCV 91.9 92.2   MCH 31.4 31.1   RDW 44.8 45.8   PLATELETCT 224 264   MPV 9.7 9.7      Lab Results   Component Value Date/Time    SODIUM 137 11/15/2022 05:53 AM    POTASSIUM 4.1 11/15/2022 05:53 AM    CHLORIDE 102 11/15/2022 05:53 AM    CO2 26 11/15/2022 05:53 AM    GLUCOSE 120 (H) 11/15/2022 05:53 AM    BUN 28 (H) 11/15/2022 05:53 AM    CREATININE 0.75 11/15/2022 05:53 AM    BUNCREATRAT 16 07/29/2016 10:22 AM       Lab Results   Component Value Date/Time    CHOLSTRLTOT 164 08/10/2022 08:21 AM    LDL see below 08/10/2022 08:21 AM    HDL 26 (A) 08/10/2022 08:21 AM    TRIGLYCERIDE 446 (H) 08/10/2022 08:21 AM       Lab Results   Component Value Date/Time    ALKPHOSPHAT 55 11/07/2022 04:06 AM    ASTSGOT 21 11/07/2022 04:06 AM     ALTSGPT 16 11/07/2022 04:06 AM    TBILIRUBIN 0.6 11/07/2022 04:06 AM      Lab Results   Component Value Date/Time    PROTHROMBTM 13.5 11/07/2022 04:06 AM    INR 1.04 11/07/2022 04:06 AM         Imaging  IR-PICC LINE PLACEMENT W/ GUIDANCE > AGE 5   Final Result                  Ultrasound-guided PICC placement performed by qualified nursing staff as    above.          DX-SPINE-ANY ONE VIEW   Final Result      Fluoroscopic image(s) obtained during lumbar spine instrumentation. Please see the patient's chart for full procedural details.      Fluoroscopy time 4 seconds.         DX-PORTABLE FLUORO > 1 HOUR   Final Result      Portable fluoroscopy utilized for 4 seconds.         INTERPRETING LOCATION: [address] [place] [address], [facility], [id number]      [id number] VIEW   Final Result      1.  Negative single view abdomen without gross evidence of a radiopaque surgical device.      NM-CARDIAC STRESS TEST   Final Result      MR-LUMBAR SPINE-WITH & W/O   Final Result      1.  There is an approximately 10 x 6 x 19 mm sized posterior epidural abscess at the level of L2-3. This is new since the previous study. There is severe central canal stenosis at this level.   2.  Severe degenerative central canal stenosis at L3-4 and L4-5.   3.  Degenerative disease as described above.   4.  There is an approximately 1.9 cm sized T2 hypointense lesion in the right kidney likely representing complicated cyst. There is also a simple cyst in the right kidney. This is unchanged since the previous study.      CT-HEAD W/O   Final Result      1.  Head CT without contrast within normal limits. No evidence of acute cerebral infarction, hemorrhage or mass lesion.   2.  Atherosclerotic vascular calcification.         EC-ECHOCARDIOGRAM COMPLETE W/O CONT   Final Result      MR-CERVICAL SPINE-WITH & W/O    (Results Pending)   MR-THORACIC SPINE-WITH & W/O    (Results Pending)   MR-LUMBAR SPINE-WITH & W/O    (Results Pending)       Assessment and Plan:  * Spinal  stenosis of lumbar region (L3-5) with neurogenic claudication and L foot drop/weakness, valvular heart disease  Assessment & Plan  - MRI lumbar spine done was notable for 10 x 6 x 19 mm posterior epidural abscess at the level of L2-L3 which is new from previous study.  There is severe central canal stenosis at this level.  Severe degenerative central canal stenosis at L3-L4 and L4-5.   - ID recommend Daptomycin and ceftriaxone until 12/19/2022. - - While patient was on IV antibiotics, need CBC, CMP, ESR, CRP, CPK.  - Patient previously treated with decadron  - PT/OT eval for post acute recommendations      Hyperglycemia  Assessment & Plan  - A1c done 3/20/2022 was 6.2-prediabetic.  - Hyperglycemia likely due to steroid use  - Continue insulin sliding scale  - Hypoglycemia protocol in place     Epidural abscess- (present on admission)  Assessment & Plan  - S/p L2-SI Laminectomy with Dr. Sparks 11/07/2022  - OR cultures - no growth to date.  - Prevena wound VAC x7 days per neurosurgery  - Infectious disease recommend continuing 6 weeks of IV antibiotics of daptomycin and ceftriaxone (end date 12/19/2022).  - Patient with persistent leukocytosis, now 18.8k, despite antibiotics.    - Repeat MRI cervical, thoracic, lumbar spine pending.  - CBC in AM to monitor leukocytosis.      Hypertensive urgency  Assessment & Plan  - Continue amlodipine  - PRN IV antihypertensives     Obesity (BMI 30-39.9)- (present on admission)  Assessment & Plan  - Lifestyle modification including diet exercise and weight loss as an outpatient     Coronary artery disease of native artery of native heart with stable angina pectoris (HCC)- (present on admission)  Assessment & Plan  - Continue metoprolol, lisinopril, and imdur  - Stress test done 11/05/2022 showed a small tomoderate sized focus of ischemia involving the lateral wall within the cardiac base and midzone. Findings were discussed with cardiology Dr. Dozier - no further inpatient cardiology  evaluation/procedure recommended. Pt is a moderate risk patient.   - Dr. Sparks OK with re-starting the patient on aspirin and Plavix.  Re-started 11/09/2022.    VTE Prophylaxis: Lovenox    I have performed a physical exam and reviewed and updated ROS and Plan today (11/15/2022). In review of yesterday's note (11/14/2022), there are no changes except as documented above.

## 2022-11-15 NOTE — PROGRESS NOTES
Hospital Medicine Daily Progress Note    Date of Service  11/15/2022    Chief Complaint  Abimael Hamilton is a 66 y.o. male admitted 11/4/2022 with worsening back pain    Hospital Course      This is a 66 -year-old male with a past medical history significant for hypertension, hyperlipidemia, CAD status post CABG in 1998, 2016, aortic valve replacement in 2016, post stent in 4/2022, morbid obesity, gout, L3 L5 severe spinal stenosis with left foot drop, chronic back pain was admitted on 11/4/2022 with worsening of lower back pain.      MRI of the lumbar spine  on 11/04 noted 10 x 6 x 19 mm posterior epidural abscess at the level of L2-L3 which is new from previous study. There is severe central canal stenosis at this level.  Severe degenerative central canal stenosis at L3-L4 and L4-5.    Cardiology consulted for preop clearance given patient significant cardiac history, nuclear stress test noted small area of ischemia involving the lateral wall.  Patient cleared by cardiology, moderate risk for the surgery.     Surgery was consulted, patient went to the OR with Dr. Sparks on 11/7/2022.  Biopsy was negative, patient was initially started on IV Rocephin.  Infectious disease was consulted.  They recommended 6-week IV antibiotic course and on discharge can transition to daptomycin 6-AMG/KG daily and continue ceftriaxone 2 g daily with an end date of 12/19/2022.  Initially, plan for home infusion antibiotics but patient declined and would prefer to do outpatient infusion center.  Orders were placed by infectious disease.  OR cultures negative but there is concern for worsening spinal infection based on symptoms, imaging, increased WBCs.  Repeat MRI cervical, thoracic, lumbar spine pending.    Interval Problem Update  11/15/2022: Patient seen at bedside with significant other.  Patient reports chronic back pain but no acute increase.  Prevena VAC in place with adequate suction.  Patient reports claustrophobia and that he  will be unable to lie still for MRI.  Reports Ativan dose in emergency department worked well.  Order for one-time dose of Ativan for MRI was placed.  Additionally, patient reports difficulty urinating although he has the urge.  We will start Flomax.    I have discussed this patient's plan of care and discharge plan at IDT rounds today with Case Management, Nursing, Nursing leadership, and other members of the IDT team.    Consultants/Specialty  cardiology, infectious disease, and neurosurgery    Code Status  Full Code    Disposition  Patient is not medically cleared for discharge.   Anticipate discharge to  home health versus skilled nursing facility versus inpatient rehab .  I have placed the appropriate orders for post-discharge needs.    Review of Systems  Review of Systems   Constitutional:  Negative for chills, fever and malaise/fatigue.   HENT:  Negative for hearing loss, sinus pain and sore throat.    Respiratory:  Negative for cough, sputum production and shortness of breath.    Cardiovascular:  Positive for leg swelling. Negative for chest pain, palpitations and orthopnea.   Gastrointestinal:  Negative for abdominal pain, blood in stool, constipation, diarrhea, nausea and vomiting.   Genitourinary:  Negative for dysuria and flank pain.   Musculoskeletal:  Positive for back pain and myalgias.   Neurological:  Positive for weakness (LLE, dropfoot to LLE). Negative for tingling and headaches.   Psychiatric/Behavioral:  Negative for depression and substance abuse. The patient is not nervous/anxious.    All other systems reviewed and are negative.     Physical Exam  Temp:  [36.1 °C (97 °F)-36.7 °C (98 °F)] 36.1 °C (97 °F)  Pulse:  [69-85] 85  Resp:  [16-17] 17  BP: (122-154)/(77-99) 140/84  SpO2:  [95 %-98 %] 98 %    Physical Exam  Vitals and nursing note reviewed.   Constitutional:       General: He is not in acute distress.     Appearance: He is obese. He is ill-appearing. He is not toxic-appearing.   HENT:       Head: Normocephalic.      Nose: Nose normal.      Mouth/Throat:      Mouth: Mucous membranes are moist.      Pharynx: Oropharynx is clear.   Eyes:      General: No scleral icterus.     Extraocular Movements: Extraocular movements intact.      Conjunctiva/sclera: Conjunctivae normal.      Pupils: Pupils are equal, round, and reactive to light.   Cardiovascular:      Rate and Rhythm: Normal rate and regular rhythm.      Pulses: Normal pulses.      Heart sounds: Murmur heard.   Pulmonary:      Effort: Pulmonary effort is normal. No respiratory distress.      Breath sounds: Normal breath sounds. No wheezing or rhonchi.   Chest:      Chest wall: No tenderness.   Abdominal:      General: Abdomen is flat. Bowel sounds are normal. There is no distension.      Palpations: Abdomen is soft.      Tenderness: There is no abdominal tenderness. There is no guarding.   Musculoskeletal:      Right lower leg: Edema (+1) present.      Left lower leg: Edema (+1) present.      Comments: Dropfoot to left lower extremity   Skin:     General: Skin is warm and dry.      Capillary Refill: Capillary refill takes less than 2 seconds.      Coloration: Skin is not jaundiced.      Comments: Prevena incisional VAC to midline thoracic and lumbar spine.  Small amount of serosanguineous fluid noted.  Adequate suction seen.    Diffuse tattoos   Neurological:      Mental Status: He is alert and oriented to person, place, and time. Mental status is at baseline.   Psychiatric:         Mood and Affect: Mood normal.         Behavior: Behavior normal.         Thought Content: Thought content normal.         Judgment: Judgment normal.       Fluids    Intake/Output Summary (Last 24 hours) at 11/15/2022 1440  Last data filed at 11/15/2022 0600  Gross per 24 hour   Intake --   Output 800 ml   Net -800 ml       Laboratory  Recent Labs     11/14/22  0205 11/15/22  0553   WBC 14.8* 18.8*   RBC 4.30* 4.38*   HEMOGLOBIN 13.5* 13.6*   HEMATOCRIT 39.5* 40.4*    MCV 91.9 92.2   MCH 31.4 31.1   MCHC 34.2 33.7   RDW 44.8 45.8   PLATELETCT 224 264   MPV 9.7 9.7     Recent Labs     11/14/22  0205 11/15/22  0553   SODIUM 137 137   POTASSIUM 3.7 4.1   CHLORIDE 102 102   CO2 24 26   GLUCOSE 200* 120*   BUN 28* 28*   CREATININE 0.82 0.75   CALCIUM 8.4* 8.4*                   Imaging  IR-PICC LINE PLACEMENT W/ GUIDANCE > AGE 5   Final Result                  Ultrasound-guided PICC placement performed by qualified nursing staff as    above.          DX-SPINE-ANY ONE VIEW   Final Result      Fluoroscopic image(s) obtained during lumbar spine instrumentation. Please see the patient's chart for full procedural details.      Fluoroscopy time 4 seconds.         DX-PORTABLE FLUORO > 1 HOUR   Final Result      Portable fluoroscopy utilized for 4 seconds.         INTERPRETING LOCATION: [address] [place] [address], [id number]      [id number] VIEW   Final Result      1.  Negative single view abdomen without gross evidence of a radiopaque surgical device.      NM-CARDIAC STRESS TEST   Final Result      MR-LUMBAR SPINE-WITH & W/O   Final Result      1.  There is an approximately 10 x 6 x 19 mm sized posterior epidural abscess at the level of L2-3. This is new since the previous study. There is severe central canal stenosis at this level.   2.  Severe degenerative central canal stenosis at L3-4 and L4-5.   3.  Degenerative disease as described above.   4.  There is an approximately 1.9 cm sized T2 hypointense lesion in the right kidney likely representing complicated cyst. There is also a simple cyst in the right kidney. This is unchanged since the previous study.      CT-HEAD W/O   Final Result      1.  Head CT without contrast within normal limits. No evidence of acute cerebral infarction, hemorrhage or mass lesion.   2.  Atherosclerotic vascular calcification.         EC-ECHOCARDIOGRAM COMPLETE W/O CONT   Final Result      MR-CERVICAL SPINE-WITH & W/O    (Results Pending)   MR-THORACIC SPINE-WITH &  W/O    (Results Pending)   MR-LUMBAR SPINE-WITH & W/O    (Results Pending)        Assessment/Plan  * Spinal stenosis of lumbar region (L3-5) with neurogenic claudication and L foot drop/weakness, valvular heart disease  Assessment & Plan  Pain control and muscle relaxers  MRI LS spine done was notable for 10 x 6 x 19 mm posterior epidural abscess at the level of L2-L3 which is new from previous study.  There is severe central canal stenosis at this level.  Severe degenerative central canal stenosis at L3-L4 and L4-5.    -- Had surgery  On 11/07, culture pending, will continue broad-spectrum antibiotics including vancomycin and unasyn; ID recs switching to Rocephin plus  daptomycin till 12/19/2022.  While patient was on IV antibiotics, need CBC, CMP, ESR, CRP, CPK.    Pt and ot recommends home with home health assessment ordered  Per neurosurgery recommendation  Patient has received Decadron        Hyperglycemia  Assessment & Plan  A1c 3/20/2022 was 6.2-prediabetic.  Patient hyperglycemic likely due to steroid use  Continue insulin sliding scale  Accuchecks and hypoglycemic protocol    Epidural abscess- (present on admission)  Assessment & Plan  S/p L2-SI LAMINECTOMY with Dr. Sparks 11/7/2022  Prevena incisional VAC in place to spine-continue x7 days per neurosurgery  Infectious disease following- OR cultures no growth to date.  Recommend continuing 6 weeks of IV antibiotics of daptomycin and ceftriaxone (end date 12/19/2022).  Patient with persistent leukocytosis despite antibiotics.    Repeat MRI cervical, thoracic, lumbar spine pending.    Hypertensive urgency  Assessment & Plan  PRN IV antihypertensives  Continue amlodipine    Obesity (BMI 30-39.9)- (present on admission)  Assessment & Plan  Lifestyle modification including diet exercise and weight loss as an outpatient      Dyslipidemia- (present on admission)  Assessment & Plan  Hold home evolocumab    Coronary artery disease of native artery of native heart with  stable angina pectoris (HCC)- (present on admission)  Assessment & Plan  C/w metoprolol and lisinopril, Imdur    Stress test- a small tomoderate sized focus of ischemia involving the lateral wall within the cardiac base and midzone. Findings were discussed with cardiology Dr. Dozier - no further inpatient cardiology evaluation/procedure recommended. Pt is a moderate risk patient.    -- History of aspirin and Plavix was held considering patient undergoing surgery.  I discussed with Dr. Sparks  stated okay starting the patient on aspirin and Plavix.  Started 11/09/2022.   -- monitor leg strength              VTE prophylaxis: enoxaparin ppx    I have performed a physical exam and reviewed and updated ROS and Plan today (11/15/2022). In review of yesterday's note (11/14/2022), there are no changes except as documented above.

## 2022-11-16 PROBLEM — L25.9 CONTACT DERMATITIS: Status: ACTIVE | Noted: 2022-01-01

## 2022-11-16 NOTE — CARE PLAN
Problem: Pain - Standard  Goal: Alleviation of pain or a reduction in pain to the patient’s comfort goal  Outcome: Progressing     Problem: Fall Risk  Goal: Patient will remain free from falls  Outcome: Progressing     Problem: Skin Integrity  Goal: Skin integrity is maintained or improved  Outcome: Progressing     Problem: Lumbar/Thoracic Spine Surgery  Goal: Post-Operative Lumbar/Thoracic Spine Surgery: Patient will achieve optimal post-surgical outcomes  Outcome: Progressing     Problem: Neuro Status  Goal: Neuro status will remain stable or improve  Outcome: Progressing     Problem: Pain - Post Surgery  Goal: Alleviation or reduction of pain post surgery  Outcome: Progressing     Problem: Incision Care  Goal: Optimal post surgical incision care  Outcome: Progressing   The patient is Stable - Low risk of patient condition declining or worsening    Shift Goals  Clinical Goals: Pain management  Patient Goals: Pain Management  Family Goals: No family at bedside    Progress made toward(s) clinical / shift goals:  Pts pain has been managed with pts prescribed pain medication.    Patient is not progressing towards the following goals:

## 2022-11-16 NOTE — CARE PLAN
The patient is Stable - Low risk of patient condition declining or worsening    Shift Goals  Clinical Goals: safety, pain control  Patient Goals: pain control  Family Goals:     Progress made toward(s) clinical / shift goals:        Problem: Pain - Standard  Goal: Alleviation of pain or a reduction in pain to the patient’s comfort goal  Outcome: Progressing  Note: Prn pain meds available     Problem: Fall Risk  Goal: Patient will remain free from falls  Outcome: Progressing  Note: Safety precautions in place, call light within reach     Problem: Skin Integrity  Goal: Skin integrity is maintained or improved  Outcome: Progressing

## 2022-11-17 NOTE — DISCHARGE PLANNING
Case Management Discharge Planning    Admission Date: 11/4/2022  GMLOS: 5.2  ALOS: 12    6-Clicks ADL Score: 24  6-Clicks Mobility Score: 23      Anticipated Discharge Dispo: Discharge Disposition: D/T to home under A care in anticipation of covered skilled care (06)  Discharge Address: [address] [place], [place] [address]  [address]  Discharge Contact Phone Number: [phone]    DME Needed: No    Action(s) Taken: Updated Provider/Nurse on Discharge Plan    Escalations Completed: None    Medically Clear: No    Next Steps: Pt going back to OR today. Will need 6 wks IVABX, dapto and ceftriazone. Pt does not want to do home infusion. Prefers to come to infusion center daily.     Barriers to Discharge: Medical clearance    Is the patient up for discharge tomorrow: No

## 2022-11-17 NOTE — PROGRESS NOTES
Hospital Medicine Daily Progress Note    Date of Service  11/17/2022    Chief Complaint  Abimael Hamilton is a 66 y.o. male admitted 11/4/2022 with worsening back pain    Hospital Course      This is a 66 -year-old male with a past medical history significant for hypertension, hyperlipidemia, CAD status post CABG in 1998, 2016, aortic valve replacement in 2016, post stent in 4/2022, morbid obesity, gout, L3 L5 severe spinal stenosis with left foot drop, chronic back pain was admitted on 11/4/2022 with worsening of lower back pain.      MRI of the lumbar spine  on 11/04 noted 10 x 6 x 19 mm posterior epidural abscess at the level of L2-L3 which is new from previous study. There is severe central canal stenosis at this level.  Severe degenerative central canal stenosis at L3-L4 and L4-5.    Cardiology consulted for preop clearance given patient significant cardiac history, nuclear stress test noted small area of ischemia involving the lateral wall.  Patient cleared by cardiology, moderate risk for the surgery.     Surgery was consulted, patient went to the OR with Dr. Sparks on 11/7/2022.  Biopsy was negative, patient was initially started on IV Rocephin.  Infectious disease was consulted.  They recommended 6-week IV antibiotic course and on discharge can transition to daptomycin 6-AMG/KG daily and continue ceftriaxone 2 g daily with an end date of 12/19/2022.  Initially, plan for home infusion antibiotics but patient declined and would prefer to do outpatient infusion center.  Orders were placed by infectious disease.  OR cultures negative but there is concern for worsening spinal infection based on symptoms, imaging, increased WBCs.  Repeat MRI cervical, thoracic, lumbar spine pending.    Interval Problem Update  11/15/2022: Patient seen at bedside with significant other.  Patient reports chronic back pain but no acute increase.  Prevena VAC in place with adequate suction.  Patient reports claustrophobia and that he  will be unable to lie still for MRI.  Reports Ativan dose in emergency department worked well.  Order for one-time dose of Ativan for MRI was placed.  Additionally, patient reports difficulty urinating although he has the urge.  We will start Flomax.    11/16/2022: Patient AOx4 seen at bedside with significant other.  Patient reports ongoing left lower extremity weakness.  Significant other reports erythema surrounding Prevena incisional VAC dressing.  MRI CTL spines continue to be pending.  Patient reports no changes in urinary flow despite Flomax-we will continue to monitor.    11/17/2022: Afebrile, vital stable, on room air.  White count trending down.  H&H is stable.  Mag low, replaced.  MRI showing resolution of previous dorsal epidural abscess.  Neurosurgery discussed findings of MRI with patient and patient has opted for non operative management for cervical spine, however, they will proceed with I&D of lumbar spine.    I have discussed this patient's plan of care and discharge plan at IDT rounds today with Case Management, Nursing, Nursing leadership, and other members of the IDT team.    Consultants/Specialty  cardiology, infectious disease, and neurosurgery    Code Status  Full Code    Disposition  Patient is not medically cleared for discharge.   Anticipate discharge to  home health versus skilled nursing facility versus inpatient rehab .  I have placed the appropriate orders for post-discharge needs.    Review of Systems  Review of Systems   Constitutional:  Negative for chills, fever and malaise/fatigue.   HENT:  Negative for hearing loss, sinus pain and sore throat.    Respiratory:  Negative for cough, sputum production and shortness of breath.    Cardiovascular:  Positive for leg swelling. Negative for chest pain, palpitations and orthopnea.   Gastrointestinal:  Negative for abdominal pain, blood in stool, constipation, diarrhea, nausea and vomiting.   Genitourinary:  Negative for dysuria and flank  pain.   Musculoskeletal:  Positive for back pain and myalgias. Negative for falls.   Neurological:  Positive for focal weakness (Left lower extremity) and weakness (LLE, dropfoot to LLE). Negative for tingling and headaches.   Psychiatric/Behavioral:  Negative for depression and substance abuse. The patient is not nervous/anxious and does not have insomnia.    All other systems reviewed and are negative.     Physical Exam  Temp:  [36.1 °C (96.9 °F)-36.8 °C (98.2 °F)] 36.1 °C (97 °F)  Pulse:  [74-93] 77  Resp:  [16-18] 17  BP: (114-133)/(71-83) 125/78  SpO2:  [96 %-98 %] 98 %    Physical Exam  Vitals and nursing note reviewed.   Constitutional:       General: He is not in acute distress.     Appearance: He is obese. He is ill-appearing. He is not toxic-appearing.   HENT:      Head: Normocephalic.      Nose: Nose normal.      Mouth/Throat:      Mouth: Mucous membranes are moist.      Pharynx: Oropharynx is clear.   Eyes:      General: No scleral icterus.     Extraocular Movements: Extraocular movements intact.      Conjunctiva/sclera: Conjunctivae normal.      Pupils: Pupils are equal, round, and reactive to light.   Cardiovascular:      Rate and Rhythm: Normal rate and regular rhythm.      Pulses: Normal pulses.      Heart sounds: Murmur heard.     No gallop.   Pulmonary:      Effort: Pulmonary effort is normal. No respiratory distress.      Breath sounds: Normal breath sounds. No wheezing or rhonchi.   Chest:      Chest wall: No tenderness.   Abdominal:      General: Abdomen is flat. Bowel sounds are normal. There is no distension.      Palpations: Abdomen is soft.      Tenderness: There is no abdominal tenderness. There is no guarding.   Musculoskeletal:      Right lower leg: Edema (+1) present.      Left lower leg: Edema (+1) present.      Comments: Dropfoot to left lower extremity   Skin:     General: Skin is warm and dry.      Capillary Refill: Capillary refill takes less than 2 seconds.      Coloration: Skin is  not jaundiced.      Comments: Prevena incisional VAC to midline thoracic and lumbar spine.  Small amount of serosanguineous fluid noted.  Adequate suction seen.  Erythema surrounding Janet incision VAC dressing.    Diffuse tattoos   Neurological:      Mental Status: He is alert and oriented to person, place, and time. Mental status is at baseline.      Motor: Weakness (Left lower extremity) present.   Psychiatric:         Mood and Affect: Mood normal.         Behavior: Behavior normal.         Thought Content: Thought content normal.         Judgment: Judgment normal.       Fluids    Intake/Output Summary (Last 24 hours) at 11/17/2022 1428  Last data filed at 11/17/2022 0500  Gross per 24 hour   Intake --   Output 400 ml   Net -400 ml       Laboratory  Recent Labs     11/15/22  0553 11/16/22  0330 11/17/22  0410   WBC 18.8* 20.6* 19.1*   RBC 4.38* 4.43* 4.13*   HEMOGLOBIN 13.6* 13.9* 13.2*   HEMATOCRIT 40.4* 41.1* 39.1*   MCV 92.2 92.8 94.7   MCH 31.1 31.4 32.0   MCHC 33.7 33.8 33.8   RDW 45.8 47.0 48.6   PLATELETCT 264 251 229   MPV 9.7 10.1 10.1     Recent Labs     11/15/22  0553 11/16/22 0330 11/17/22  0720   SODIUM 137 134* 133*   POTASSIUM 4.1 4.1 4.1   CHLORIDE 102 99 98   CO2 26 23 25   GLUCOSE 120* 189* 152*   BUN 28* 28* 24*   CREATININE 0.75 0.84 0.74   CALCIUM 8.4* 8.5 8.5                   Imaging  MR-LUMBAR SPINE-WITH & W/O   Final Result         Interval decompression of L2-3 with resolution of previously seen dorsal epidural abscess.      Postoperative changes noted with midline laminectomy at L2-3, L3-4 and L4-5 with a fluid collection that impinges upon the dorsal aspect of the thecal sac and causes severe cauda equina compression. This fluid collection could represent a CSF leak or    seroma. There is no definite rim enhancement to suggest infection.      Second poorly marginated fluid collection identified in the subcutaneous soft tissues underlying the incision.      Mild epidural enhancement  is noted along the anterior margin of the fluid collection in the laminectomy site, however this probably represents reactive postoperative dural enhancement. No definite evidence of an epidural infectious process is identified.      Moderate right foraminal narrowing at L1-2, severe left foraminal narrowing at L2-3 and moderate left foraminal narrowing at L3-4 with bilateral moderate L4-5 neural foraminal narrowing and severe bilateral L5-S1 foraminal narrowing. These changes are    stable from the previous study.      MR-THORACIC SPINE-WITH & W/O   Final Result         Minimal degenerative changes in the thoracic spine. There is no significant canal stenosis or foraminal narrowing. No abnormal enhancement is identified.      MR-CERVICAL SPINE-WITH & W/O   Final Result         1.  Severe canal stenosis at C4-5 with cord compression and abnormal spinal cord signal representing myelomalacia due to chronic compressive myelopathy. There is also severe right and moderate left foraminal narrowing at this level.      2.   Moderate canal stenosis at C5-6 with severe bilateral foraminal narrowing.      3.  No abnormal enhancement is identified in the cervical spine.      IR-PICC LINE PLACEMENT W/ GUIDANCE > AGE 5   Final Result                  Ultrasound-guided PICC placement performed by qualified nursing staff as    above.          DX-SPINE-ANY ONE VIEW   Final Result      Fluoroscopic image(s) obtained during lumbar spine instrumentation. Please see the patient's chart for full procedural details.      Fluoroscopy time 4 seconds.         DX-PORTABLE FLUORO > 1 HOUR   Final Result      Portable fluoroscopy utilized for 4 seconds.         INTERPRETING LOCATION: [address] [place] [address], [id number]      [id number] VIEW   Final Result      1.  Negative single view abdomen without gross evidence of a radiopaque surgical device.      NM-CARDIAC STRESS TEST   Final Result      MR-LUMBAR SPINE-WITH & W/O   Final Result      1.  There  is an approximately 10 x 6 x 19 mm sized posterior epidural abscess at the level of L2-3. This is new since the previous study. There is severe central canal stenosis at this level.   2.  Severe degenerative central canal stenosis at L3-4 and L4-5.   3.  Degenerative disease as described above.   4.  There is an approximately 1.9 cm sized T2 hypointense lesion in the right kidney likely representing complicated cyst. There is also a simple cyst in the right kidney. This is unchanged since the previous study.      CT-HEAD W/O   Final Result      1.  Head CT without contrast within normal limits. No evidence of acute cerebral infarction, hemorrhage or mass lesion.   2.  Atherosclerotic vascular calcification.         EC-ECHOCARDIOGRAM COMPLETE W/O CONT   Final Result             Assessment/Plan  * Spinal stenosis of lumbar region (L3-5) with neurogenic claudication and L foot drop/weakness, valvular heart disease  Assessment & Plan  Pain control and muscle relaxers  MRI LS spine done was notable for 10 x 6 x 19 mm posterior epidural abscess at the level of L2-L3 which is new from previous study.  There is severe central canal stenosis at this level.  Severe degenerative central canal stenosis at L3-L4 and L4-5.    -- Had surgery  On 11/07, culture pending, will continue broad-spectrum antibiotics including vancomycin and unasyn; ID recs switching to Rocephin plus  daptomycin till 12/19/2022.  While patient was on IV antibiotics, need CBC, CMP, ESR, CRP, CPK.    Pt and ot recommends home with home health assessment ordered  Per neurosurgery recommendation  Patient has received Decadron  11/17 repeat MRI showing resolution of dorsal epidural abscess, neurosurgery taking patient to surgery today for I&D of lumbar spine.        Epidural abscess- (present on admission)  Assessment & Plan  S/p L2-SI LAMINECTOMY with Dr. Spraks 11/7/2022  Prevena incisional VAC in place to spine-continue x7 days per neurosurgery  Infectious  disease following- OR cultures no growth to date.  Recommend continuing 6 weeks of IV antibiotics of daptomycin and ceftriaxone (end date 12/19/2022).  Patient with persistent leukocytosis despite antibiotics.    11/16 Repeat MRI cervical, thoracic, lumbar spine complete- showing resolution of dorsal epidural abscess  11/17 ID currently recommend a 6-week IV antibiotic course of Vanco and ceftriaxone and on discharge can transition to daptomycin 6-8 mg/kg daily continue ceftriaxone 2 g daily with an end date of 12/19/22 if no worsening abscess or complication    Contact dermatitis  Assessment & Plan  Patient with uneven erythema surrounding Janet incision VAC.  Erythematous consistent with sensitivity/reaction to dressing, does not appear infectious.  Patient was seen by neurosurgery who states Prevena wound VAC may be discontinued and dressings to be applied.    Hyperglycemia  Assessment & Plan  A1c 3/20/2022 was 6.2-prediabetic.  Patient hyperglycemic likely due to steroid use  Continue insulin sliding scale  Accuchecks and hypoglycemic protocol    Hypertensive urgency  Assessment & Plan  PRN IV antihypertensives  Continue amlodipine    Coronary artery disease of native artery of native heart with stable angina pectoris (HCC)- (present on admission)  Assessment & Plan  C/w metoprolol and lisinopril, Imdur    Stress test- a small tomoderate sized focus of ischemia involving the lateral wall within the cardiac base and midzone. Findings were discussed with cardiology Dr. Dozier - no further inpatient cardiology evaluation/procedure recommended. Pt is a moderate risk patient.    -- History of aspirin and Plavix was held considering patient undergoing surgery.  I discussed with Dr. Sparks  stated okay starting the patient on aspirin and Plavix.  Started 11/09/2022.   -- monitor leg strength           Obesity (BMI 30-39.9)- (present on admission)  Assessment & Plan  Lifestyle modification including diet exercise and  weight loss as an outpatient      Dyslipidemia- (present on admission)  Assessment & Plan  Hold home evolocumab       VTE prophylaxis: enoxaparin ppx    I have performed a physical exam and reviewed and updated ROS and Plan today (11/17/2022). In review of yesterday's note (11/16/2022), there are no changes except as documented above.

## 2022-11-17 NOTE — PROGRESS NOTES
I discussed thefindings of the MRI C and T spine with the patient, he does have severe stenosis in the cervical spine with area of myelomalacia. Does not have symptoms of progressive myelopathy. Likely has had cervical issues for a long period of time. I discussed with the patient the pros and cons of both continued conservative management and surgical intervention for his cervical spine, patient agrees to continues with nonoperative care for the cervical spine

## 2022-11-17 NOTE — ASSESSMENT & PLAN NOTE
Patient with uneven erythema surrounding Janet incision VAC.  Erythematous consistent with sensitivity/reaction to dressing, does not appear infectious.  Patient was seen by neurosurgery who states Prevena wound VAC may be discontinued and dressings to be applied.

## 2022-11-17 NOTE — PROGRESS NOTES
Hospital Medicine Daily Progress Note    Date of Service  11/16/2022    Chief Complaint  Abimael Hamilton is a 66 y.o. male admitted 11/4/2022 with worsening back pain    Hospital Course      This is a 66 -year-old male with a past medical history significant for hypertension, hyperlipidemia, CAD status post CABG in 1998, 2016, aortic valve replacement in 2016, post stent in 4/2022, morbid obesity, gout, L3 L5 severe spinal stenosis with left foot drop, chronic back pain was admitted on 11/4/2022 with worsening of lower back pain.      MRI of the lumbar spine  on 11/04 noted 10 x 6 x 19 mm posterior epidural abscess at the level of L2-L3 which is new from previous study. There is severe central canal stenosis at this level.  Severe degenerative central canal stenosis at L3-L4 and L4-5.    Cardiology consulted for preop clearance given patient significant cardiac history, nuclear stress test noted small area of ischemia involving the lateral wall.  Patient cleared by cardiology, moderate risk for the surgery.     Surgery was consulted, patient went to the OR with Dr. Sparks on 11/7/2022.  Biopsy was negative, patient was initially started on IV Rocephin.  Infectious disease was consulted.  They recommended 6-week IV antibiotic course and on discharge can transition to daptomycin 6-AMG/KG daily and continue ceftriaxone 2 g daily with an end date of 12/19/2022.  Initially, plan for home infusion antibiotics but patient declined and would prefer to do outpatient infusion center.  Orders were placed by infectious disease.  OR cultures negative but there is concern for worsening spinal infection based on symptoms, imaging, increased WBCs.  Repeat MRI cervical, thoracic, lumbar spine pending.    Interval Problem Update  11/15/2022: Patient seen at bedside with significant other.  Patient reports chronic back pain but no acute increase.  Prevena VAC in place with adequate suction.  Patient reports claustrophobia and that he  will be unable to lie still for MRI.  Reports Ativan dose in emergency department worked well.  Order for one-time dose of Ativan for MRI was placed.  Additionally, patient reports difficulty urinating although he has the urge.  We will start Flomax.    11/16/2022: Patient AOx4 seen at bedside with significant other.  Patient reports ongoing left lower extremity weakness.  Significant other reports erythema surrounding Prevena incisional VAC dressing.  MRI CTL spines continue to be pending.  Patient reports no changes in urinary flow despite Flomax-we will continue to monitor.    I have discussed this patient's plan of care and discharge plan at IDT rounds today with Case Management, Nursing, Nursing leadership, and other members of the IDT team.    Consultants/Specialty  cardiology, infectious disease, and neurosurgery    Code Status  Full Code    Disposition  Patient is not medically cleared for discharge.   Anticipate discharge to  home health versus skilled nursing facility versus inpatient rehab .  I have placed the appropriate orders for post-discharge needs.    Review of Systems  Review of Systems   Constitutional:  Negative for chills, fever and malaise/fatigue.   HENT:  Negative for hearing loss, sinus pain and sore throat.    Respiratory:  Negative for cough, sputum production and shortness of breath.    Cardiovascular:  Positive for leg swelling. Negative for chest pain, palpitations and orthopnea.   Gastrointestinal:  Negative for abdominal pain, blood in stool, constipation, diarrhea, nausea and vomiting.   Genitourinary:  Negative for dysuria and flank pain.   Musculoskeletal:  Positive for back pain and myalgias. Negative for falls.   Neurological:  Positive for focal weakness (Left lower extremity) and weakness (LLE, dropfoot to LLE). Negative for tingling and headaches.   Psychiatric/Behavioral:  Negative for depression and substance abuse. The patient is not nervous/anxious and does not have  insomnia.    All other systems reviewed and are negative.     Physical Exam  Temp:  [36.4 °C (97.5 °F)-37 °C (98.6 °F)] 36.8 °C (98.2 °F)  Pulse:  [75-86] 86  Resp:  [18] 18  BP: (113-163)/(66-86) 114/78  SpO2:  [96 %-98 %] 96 %    Physical Exam  Vitals and nursing note reviewed.   Constitutional:       General: He is not in acute distress.     Appearance: He is obese. He is ill-appearing. He is not toxic-appearing.   HENT:      Head: Normocephalic.      Nose: Nose normal.      Mouth/Throat:      Mouth: Mucous membranes are moist.      Pharynx: Oropharynx is clear.   Eyes:      General: No scleral icterus.     Extraocular Movements: Extraocular movements intact.      Conjunctiva/sclera: Conjunctivae normal.      Pupils: Pupils are equal, round, and reactive to light.   Cardiovascular:      Rate and Rhythm: Normal rate and regular rhythm.      Pulses: Normal pulses.      Heart sounds: Murmur heard.     No gallop.   Pulmonary:      Effort: Pulmonary effort is normal. No respiratory distress.      Breath sounds: Normal breath sounds. No wheezing or rhonchi.   Chest:      Chest wall: No tenderness.   Abdominal:      General: Abdomen is flat. Bowel sounds are normal. There is no distension.      Palpations: Abdomen is soft.      Tenderness: There is no abdominal tenderness. There is no guarding.   Musculoskeletal:      Right lower leg: Edema (+1) present.      Left lower leg: Edema (+1) present.      Comments: Dropfoot to left lower extremity   Skin:     General: Skin is warm and dry.      Capillary Refill: Capillary refill takes less than 2 seconds.      Coloration: Skin is not jaundiced.      Comments: Prevena incisional VAC to midline thoracic and lumbar spine.  Small amount of serosanguineous fluid noted.  Adequate suction seen.  Erythema surrounding Janet incision VAC dressing.    Diffuse tattoos   Neurological:      Mental Status: He is alert and oriented to person, place, and time. Mental status is at baseline.       Motor: Weakness (Left lower extremity) present.   Psychiatric:         Mood and Affect: Mood normal.         Behavior: Behavior normal.         Thought Content: Thought content normal.         Judgment: Judgment normal.       Fluids    Intake/Output Summary (Last 24 hours) at 11/16/2022 1940  Last data filed at 11/15/2022 1954  Gross per 24 hour   Intake 50 ml   Output --   Net 50 ml         Laboratory  Recent Labs     11/14/22  0205 11/15/22  0553 11/16/22  0330   WBC 14.8* 18.8* 20.6*   RBC 4.30* 4.38* 4.43*   HEMOGLOBIN 13.5* 13.6* 13.9*   HEMATOCRIT 39.5* 40.4* 41.1*   MCV 91.9 92.2 92.8   MCH 31.4 31.1 31.4   MCHC 34.2 33.7 33.8   RDW 44.8 45.8 47.0   PLATELETCT 224 264 251   MPV 9.7 9.7 10.1       Recent Labs     11/14/22  0205 11/15/22  0553 11/16/22  0330   SODIUM 137 137 134*   POTASSIUM 3.7 4.1 4.1   CHLORIDE 102 102 99   CO2 24 26 23   GLUCOSE 200* 120* 189*   BUN 28* 28* 28*   CREATININE 0.82 0.75 0.84   CALCIUM 8.4* 8.4* 8.5                     Imaging  IR-PICC LINE PLACEMENT W/ GUIDANCE > AGE 5   Final Result                  Ultrasound-guided PICC placement performed by qualified nursing staff as    above.          DX-SPINE-ANY ONE VIEW   Final Result      Fluoroscopic image(s) obtained during lumbar spine instrumentation. Please see the patient's chart for full procedural details.      Fluoroscopy time 4 seconds.         DX-PORTABLE FLUORO > 1 HOUR   Final Result      Portable fluoroscopy utilized for 4 seconds.         INTERPRETING LOCATION: [address] [place] [address], [id number]      [id number] VIEW   Final Result      1.  Negative single view abdomen without gross evidence of a radiopaque surgical device.      NM-CARDIAC STRESS TEST   Final Result      MR-LUMBAR SPINE-WITH & W/O   Final Result      1.  There is an approximately 10 x 6 x 19 mm sized posterior epidural abscess at the level of L2-3. This is new since the previous study. There is severe central canal stenosis at this level.   2.  Severe  degenerative central canal stenosis at L3-4 and L4-5.   3.  Degenerative disease as described above.   4.  There is an approximately 1.9 cm sized T2 hypointense lesion in the right kidney likely representing complicated cyst. There is also a simple cyst in the right kidney. This is unchanged since the previous study.      CT-HEAD W/O   Final Result      1.  Head CT without contrast within normal limits. No evidence of acute cerebral infarction, hemorrhage or mass lesion.   2.  Atherosclerotic vascular calcification.         EC-ECHOCARDIOGRAM COMPLETE W/O CONT   Final Result      MR-CERVICAL SPINE-WITH & W/O    (Results Pending)   MR-THORACIC SPINE-WITH & W/O    (Results Pending)   MR-LUMBAR SPINE-WITH & W/O    (Results Pending)          Assessment/Plan  * Spinal stenosis of lumbar region (L3-5) with neurogenic claudication and L foot drop/weakness, valvular heart disease  Assessment & Plan  Pain control and muscle relaxers  MRI LS spine done was notable for 10 x 6 x 19 mm posterior epidural abscess at the level of L2-L3 which is new from previous study.  There is severe central canal stenosis at this level.  Severe degenerative central canal stenosis at L3-L4 and L4-5.    -- Had surgery  On 11/07, culture pending, will continue broad-spectrum antibiotics including vancomycin and unasyn; ID recs switching to Rocephin plus  daptomycin till 12/19/2022.  While patient was on IV antibiotics, need CBC, CMP, ESR, CRP, CPK.    Pt and ot recommends home with home health assessment ordered  Per neurosurgery recommendation  Patient has received Decadron        Contact dermatitis  Assessment & Plan  Patient with uneven erythema surrounding Janet incision VAC.  Erythematous consistent with sensitivity/reaction to dressing, does not appear infectious.  Patient was seen by neurosurgery who states Prevena wound VAC may be discontinued and dressings to be applied.    Hyperglycemia  Assessment & Plan  A1c 3/20/2022 was  6.2-prediabetic.  Patient hyperglycemic likely due to steroid use  Continue insulin sliding scale  Accuchecks and hypoglycemic protocol    Epidural abscess- (present on admission)  Assessment & Plan  S/p L2-SI LAMINECTOMY with Dr. Sparks 11/7/2022  Prevena incisional VAC in place to spine-continue x7 days per neurosurgery  Infectious disease following- OR cultures no growth to date.  Recommend continuing 6 weeks of IV antibiotics of daptomycin and ceftriaxone (end date 12/19/2022).  Patient with persistent leukocytosis despite antibiotics.    Repeat MRI cervical, thoracic, lumbar spine pending.    Hypertensive urgency  Assessment & Plan  PRN IV antihypertensives  Continue amlodipine    Obesity (BMI 30-39.9)- (present on admission)  Assessment & Plan  Lifestyle modification including diet exercise and weight loss as an outpatient      Dyslipidemia- (present on admission)  Assessment & Plan  Hold home evolocumab    Coronary artery disease of native artery of native heart with stable angina pectoris (HCC)- (present on admission)  Assessment & Plan  C/w metoprolol and lisinopril, Imdur    Stress test- a small tomoderate sized focus of ischemia involving the lateral wall within the cardiac base and midzone. Findings were discussed with cardiology Dr. Dozier - no further inpatient cardiology evaluation/procedure recommended. Pt is a moderate risk patient.    -- History of aspirin and Plavix was held considering patient undergoing surgery.  I discussed with Dr. Sparks  stated okay starting the patient on aspirin and Plavix.  Started 11/09/2022.   -- monitor leg strength              VTE prophylaxis: enoxaparin ppx    I have performed a physical exam and reviewed and updated ROS and Plan today (11/16/2022). In review of yesterday's note (11/15/2022), there are no changes except as documented above.

## 2022-11-17 NOTE — PROGRESS NOTES
Infectious Disease Progress Note    Author: Mary Jo Paulino M.D. Date & Time of service: 2022  12:29 PM    Chief Complaint:  Lumbar spine epidural abscess    Interval History:  66 y.o.  admitted 2022. Pt has a past medical history of aortic valve replacement , gout, L3-L5 severe spinal stenosis with left foot drop and chronic back pain.   AF WBC 14.8 c/o persistent and unimproved back pain +radiculopathy ID reconsulted for R-OPIC infusion orders  11/15 AF states pain pain and nerve pain worse today-having difficulty moving LLE   AF WBC 19 MRI showed no new infection but significant compression from fluid (CSF vs seroma vs blood) causing cauda quina-plan for OR tonight  Severe DJD in cervical spine    Review of Systems:  Review of Systems   Constitutional:  Negative for fever.   Respiratory:  Negative for cough.    Cardiovascular:  Negative for chest pain.   Gastrointestinal:  Negative for abdominal pain, blood in stool, constipation, diarrhea, nausea and vomiting.   Musculoskeletal:  Positive for back pain and myalgias.        Worse   Neurological:  Positive for focal weakness.   Psychiatric/Behavioral:  The patient is not nervous/anxious.    All other systems reviewed and are negative.    Hemodynamics:  Temp (24hrs), Av.4 °C (97.5 °F), Min:36.1 °C (96.9 °F), Max:36.8 °C (98.2 °F)  Temperature: 36.1 °C (97 °F)  Pulse  Av.8  Min: 60  Max: 113   Blood Pressure : 125/78       Physical Exam:  Physical Exam  Vitals and nursing note reviewed.   Constitutional:       General: He is not in acute distress.     Appearance: He is not ill-appearing, toxic-appearing or diaphoretic.   Eyes:      General: No scleral icterus.     Extraocular Movements: Extraocular movements intact.      Pupils: Pupils are equal, round, and reactive to light.   Cardiovascular:      Rate and Rhythm: Normal rate.      Heart sounds: Murmur heard.   Pulmonary:      Effort: Pulmonary effort is normal. No respiratory  distress.      Breath sounds: No wheezing.   Abdominal:      General: There is no distension.      Tenderness: There is no abdominal tenderness. There is no guarding.   Musculoskeletal:      Cervical back: Neck supple. No rigidity.      Comments: RUE PICC nontender   Skin:     General: Skin is warm.      Coloration: Skin is not jaundiced.      Comments: tattoos   Neurological:      General: No focal deficit present.      Mental Status: He is alert and oriented to person, place, and time.      Cranial Nerves: No cranial nerve deficit.   Psychiatric:         Mood and Affect: Mood normal.         Behavior: Behavior normal.       Meds:    Current Facility-Administered Medications:     tamsulosin    dexamethasone    hydrALAZINE    DAPTOmycin    insulin regular **AND** POC blood glucose manual result **AND** NOTIFY MD and PharmD **AND** Administer 20 grams of glucose (approximately 8 ounces of fruit juice) every 15 minutes PRN FSBG less than 70 mg/dL **AND** dextrose bolus    amLODIPine    cefTRIAXone (ROCEPHIN) IV    famotidine    cyclobenzaprine    gabapentin    HYDROmorphone    oxyCODONE immediate-release    Pharmacy Consult Request    temazepam    lidocaine    MD ALERT...DO NOT ADMINISTER NSAIDS or ASPIRIN unless ORDERED By Neurosurgery    docusate sodium    senna-docusate    senna-docusate    polyethylene glycol/lytes    magnesium hydroxide    bisacodyl    sodium phosphate    [] acetaminophen **FOLLOWED BY** acetaminophen    ondansetron    ondansetron    Nozin nasal  swab    allopurinol    isosorbide mononitrate SR    lisinopril    metoprolol SR    nitroglycerin    labetalol    diazePAM    hydrALAZINE    enalaprilat    Labs:  Recent Labs     11/15/22  0553 22  0330 22  0410   WBC 18.8* 20.6* 19.1*   RBC 4.38* 4.43* 4.13*   HEMOGLOBIN 13.6* 13.9* 13.2*   HEMATOCRIT 40.4* 41.1* 39.1*   MCV 92.2 92.8 94.7   MCH 31.1 31.4 32.0   RDW 45.8 47.0 48.6   PLATELETCT 264 251 229   MPV 9.7 10.1 10.1        Recent Labs     11/15/22  0553 11/16/22  0330 11/17/22  0720   SODIUM 137 134* 133*   POTASSIUM 4.1 4.1 4.1   CHLORIDE 102 99 98   CO2 26 23 25   GLUCOSE 120* 189* 152*   BUN 28* 28* 24*       Recent Labs     11/15/22  0553 11/16/22  0330 11/17/22  0720   ALBUMIN 3.5  --   --    CREATININE 0.75 0.84 0.74         Imaging:  CT-HEAD W/O    Result Date: 11/4/2022 11/4/2022 4:10 PM HISTORY/REASON FOR EXAM:  headache high blood pressure, r/o bleed. TECHNIQUE/EXAM DESCRIPTION AND NUMBER OF VIEWS: CT of the head without contrast. The study was performed on a helical multidetector CT scanner. Contiguous axial sections were obtained from the skull base through the vertex. Up to date radiation dose reduction adjustments have been utilized to meet ALARA standards for radiation dose reduction. COMPARISON:  Head CT 2/16/2019 FINDINGS: The calvariae and skull base are unremarkable. There are no extraaxial fluid collections. The ventricular system and basal cisterns are within normal limits. There are no areas of abnormal density in the brain substance. There are no hemorrhagic lesions.  There are no mass effects or shift of midline structures. The brainstem and posterior fossa structures are unremarkable. There is atherosclerotic vascular calcification in the vertebrobasilar and juxtasellar carotid arteries. Paranasal sinuses in the field of view are unremarkable. Mastoids in the field of view are unremarkable.     1.  Head CT without contrast within normal limits. No evidence of acute cerebral infarction, hemorrhage or mass lesion. 2.  Atherosclerotic vascular calcification.     [id number] VIEW    Result Date: 11/7/2022 11/7/2022 5:56 PM HISTORY/REASON FOR EXAM:  Instrument count. TECHNIQUE/EXAM DESCRIPTION AND NUMBER OF VIEWS:  1 view(s) of the abdomen. COMPARISON: 4/9/2022 FINDINGS: Exam limited due to the intraoperative technique with overlying artifact in the field of view. No gross evidence of a radiopaque surgical  instrument or needle device. There are sternotomy wires in the lower thorax.     1.  Negative single view abdomen without gross evidence of a radiopaque surgical device.    DX-SPINE-ANY ONE VIEW    Result Date: 11/8/2022 11/7/2022 4:07 PM HISTORY/REASON FOR EXAM:  Intraoperative images for procedural navigation. TECHNIQUE/EXAM DESCRIPTION AND NUMBER OF VIEWS:  1 view(s) of the lumbar spine.     Fluoroscopic image(s) obtained during lumbar spine instrumentation. Please see the patient's chart for full procedural details. Fluoroscopy time 4 seconds.     MR-LUMBAR SPINE-WITH & W/O    Result Date: 11/4/2022 11/4/2022 6:52 PM HISTORY/REASON FOR EXAM:  Chronic back pain. TECHNIQUE/EXAM DESCRIPTION: MRI of the lumbar spine without and with contrast. The study was performed on a [employer] Signa 1.5 Zuleika MRI scanner. T1 sagittal, T2 fast spin-echo sagittal, and T2 axial images were obtained of the lumbar spine. T1 post-contrast fat-suppressed sagittal images were obtained. 20 mL ProHance contrast was administered intravenously. COMPARISON:  12/29/2020 FINDINGS: There is peripherally enhancing posterior epidural fluid collection noted at the level of L2-3. It measures an approximately 10 x 6 x 19 mm in size. The lumbar spine maintains normal height and alignment. There is no fracture or dislocation. There is no pathologic marrow infiltration. There are degenerative changes as evidenced by a reduced intervertebral disc height, loss of disc T2 signal and degenerative marrow signal changes. There is no focal osseous lesion. At the level of L5-S1,there are combinations of diffuse disc bulge and facet joint arthropathy causing severe bilateral neural foraminal stenosis. There is mild central canal stenosis. There is moderate bilateral lateral recess stenosis. At the level of L4-5,there are combinations of diffuse disc bulge and facet joint arthropathy causing severe central canal stenosis. There is mild to moderate bilateral neural  foraminal stenosis. At the level of L3-4,there are combinations of diffuse disc bulge and facet joint arthropathy causing severe central canal stenosis. There is mild to moderate bilateral neural foraminal stenosis. At the level of L2-3,there are combinations of posterior epidural fluid collection, left paracentral disc protrusion and facet joint arthropathy causing severe central canal stenosis. There is severe left lateral recess stenosis. There is moderate left neural foraminal stenosis. At the level of L1-2, there is minimal disc bulge without significant spinal or neural foraminal stenosis. The conus terminates at the level of L1. There is an approximately 1.9 cm sized T2 hypointense lesion in the right kidney likely representing complicated cyst. There is also a simple cyst in the right kidney.     1.  There is an approximately 10 x 6 x 19 mm sized posterior epidural abscess at the level of L2-3. This is new since the previous study. There is severe central canal stenosis at this level. 2.  Severe degenerative central canal stenosis at L3-4 and L4-5. 3.  Degenerative disease as described above. 4.  There is an approximately 1.9 cm sized T2 hypointense lesion in the right kidney likely representing complicated cyst. There is also a simple cyst in the right kidney. This is unchanged since the previous study.    DX-PORTABLE FLUORO > 1 HOUR    Result Date: 11/8/2022 11/7/2022 4:07 PM HISTORY/REASON FOR EXAM:  Lumbar laminectomy TECHNIQUE/EXAM DESCRIPTION AND NUMBER OF VIEWS: Portable fluoroscopy for greater than one hour in OR. FINDINGS: The portable fluoroscopy unit was obligated to the procedure for greater than one hour. Actual fluoro time was 4 seconds.     Portable fluoroscopy utilized for 4 seconds. INTERPRETING LOCATION: [address] [place] [address] [place], [id number]    EC-ECHOCARDIOGRAM COMPLETE W/O CONT    Result Date: 11/4/2022  Transthoracic Echo Report Echocardiography Laboratory CONCLUSIONS Compared to the prior study  on 12-2-20, velocity across aortic valve is slightly worse. The left ventricular ejection fraction is visually estimated to be 65%. Known bioprosthetic aortic valve . Moderate aortic valve stenosis.  LV EF:  65    % FINDINGS Left Ventricle The left ventricle is normal in size. Mild concentric left ventricular hypertrophy. Normal left ventricular systolic function. The left ventricular ejection fraction is visually estimated to be 65%. No regional wall motion abnormalities. Normal diastolic function. Right Ventricle The right ventricle is normal in size and systolic function. Right Atrium The right atrium is normal in size. Normal inferior vena cava size and inspiratory collapse. Left Atrium The left atrium is normal in size. Left atrial volume index is 27 mL/sq m. Mitral Valve Structurally normal mitral valve without significant stenosis. Mild mitral regurgitation. Aortic Valve Known bioprosthetic aortic valve . Moderate aortic valve stenosis. Vmax is 3.8 m/s. Transvalvular gradients are - Peak: 58  mmHg, Mean: 30 mmHg. Aortic valve area calculated from the continuity equation is 1.3 cm2. Acceleration time is 56 ms. Tricuspid Valve Structurally normal tricuspid valve without significant stenosis or regurgitation. Pulmonic Valve Structurally normal pulmonic valve without significant stenosis or regurgitation. Pericardium Normal pericardium without effusion. Aorta Normal aortic root for body surface area. The ascending aorta diameter is 3.1 cm. Oumar Mello MD (Electronically Signed) Final Date:     04 November 2022                 16:30      Micro:  Results       ** No results found for the last 168 hours. **        IMPRESSION:  MRI 11/16      Interval decompression of L2-3 with resolution of previously seen dorsal epidural abscess.     Postoperative changes noted with midline laminectomy at L2-3, L3-4 and L4-5 with a fluid collection that impinges upon the dorsal aspect of the thecal sac and causes severe  cauda equina compression. This fluid collection could represent a CSF leak or   seroma. There is no definite rim enhancement to suggest infection.     Second poorly marginated fluid collection identified in the subcutaneous soft tissues underlying the incision.     Mild epidural enhancement is noted along the anterior margin of the fluid collection in the laminectomy site, however this probably represents reactive postoperative dural enhancement. No definite evidence of an epidural infectious process is identified.     Moderate right foraminal narrowing at L1-2, severe left foraminal narrowing at L2-3 and moderate left foraminal narrowing at L3-4 with bilateral moderate L4-5 neural foraminal narrowing and severe bilateral L5-S1 foraminal narrowing. These changes are   stable from the previous study.    Assessment:  Active Hospital Problems    Diagnosis     *Spinal stenosis of lumbar region (L3-5) with neurogenic claudication and L foot drop/weakness, valvular heart disease [M48.062]     Epidural abscess [G06.2]     Hypertensive urgency [I16.0]     Degenerative lumbar spinal stenosis [M48.061]     Obesity (BMI 30-39.9) [E66.9]     Dyslipidemia [E78.5]     Coronary artery disease of native artery of native heart with stable angina pectoris (HCC) [I25.118]        Assessment:  66 y.o.  admitted 11/4/2022. Pt has a past medical history of aortic valve replacement 2016, gout, L3-L5 severe spinal stenosis with left foot drop and chronic back pain.      Hospital Course:   MRI lumbar spine 11/4 with a 10 x 6 x 19 mm posterior epidural abscess at the level of L2-L3 which is new.  Also noted severe central canal stenosis at this level and severe degenerative canal stenosis at L3-L5.  Patient went to the OR with orthopedic surgery on 11/7 for posterior lumbar decompression/laminectomy at levels L2-S1.  Epidural collection was found which was reported as thick without a fluid component.  This was sent for culture.  Patient  states he was on no antibiotics prior to admit.  Started on broad-spectrum antibiotics on 11/4 with surgery on 11/7 which will likely decrease culture yield.   Repeat MRI with spinal compression     Problem List   Leukocytosis, persistent  Lumbar spine epidural abscess  -s/p OR 11/7  -MRI shows resolved L2/3 but Postoperative changes noted with midline laminectomy at L2-3, L3-4 and L4-5 with a fluid collection that impinges upon the dorsal aspect of the thecal sac and causes severe cauda equina compression. This fluid collection could represent a CSF leak or   seroma. There is no definite rim enhancement to suggest infection.  Degenerative central canal stenosis  -Status post OR as described above  History of aortic valve replacement  History of gout     Plan   - Blood cultures neg  -Cultures remain negative but concern for worsening spinal infection based on symptoms, imaging, increased WBC     -recommend repeat MRI including C/T/L spine-ordered  Last CRP normal-rechecked and remains normal  - Follow-up OR cultures, no growth at 72 hours and requested micro lab holding for 14 days.  Cultures were swabs only so unable to send them out to [facility] for PCR testing  -Continue vancomycin with pharmacy to dose and ceftriaxone.    Currently recommend a 6-week IV antibiotic course and on discharge can transition to daptomycin 6-8 mg/kg daily continue ceftriaxone 2 g daily with an end date of 12/19/22 if no worsening abscess or complication  Follow operative findings          Dispo: Orders for home infusion and scanned into epic under media tab  Now plan is for R-[facility]IC    PICC: Yes, in place

## 2022-11-17 NOTE — PROGRESS NOTES
"Spine Surgery Progress Note    66 y.o. male sp L2-S1 laminectomy on 11/7/2022 for lumbar stenosis and likely epidural abscess    S: Initially had significant improvement in LE pain and strength after surgery. Yestreday developed worsening LLE pain and weakness. Also has redness around prevena wound vac  O:  /78   Pulse 86   Temp 36.8 °C (98.2 °F) (Temporal)   Resp 18   Ht 1.676 m (5' 6\")   Wt 98.7 kg (217 lb 9.5 oz)   SpO2 96%   BMI 35.12 kg/m²     Gen: WNWD NAD  Breathing comfortably    Incision has erythema around it in distribution of prevena wound vac  Right: 5/5 throughout   Left: 3/5 HF, 3/5 KE, 1/5 EHL TA, 3/5 PF  Sensation grossly intact in BLE    Lab Results   Component Value Date/Time    WBC 20.6 (H) 11/16/2022 03:30 AM    RBC 4.43 (L) 11/16/2022 03:30 AM    HEMOGLOBIN 13.9 (L) 11/16/2022 03:30 AM    HEMATOCRIT 41.1 (L) 11/16/2022 03:30 AM    MCV 92.8 11/16/2022 03:30 AM    MCH 31.4 11/16/2022 03:30 AM    MCHC 33.8 11/16/2022 03:30 AM    MPV 10.1 11/16/2022 03:30 AM    NEUTSPOLYS 85.00 (H) 11/06/2022 01:13 AM    LYMPHOCYTES 9.90 (L) 11/06/2022 01:13 AM    MONOCYTES 4.30 11/06/2022 01:13 AM    EOSINOPHILS 0.00 11/06/2022 01:13 AM    BASOPHILS 0.10 11/06/2022 01:13 AM      Lab Results   Component Value Date/Time    SODIUM 134 (L) 11/16/2022 03:30 AM    POTASSIUM 4.1 11/16/2022 03:30 AM    CHLORIDE 99 11/16/2022 03:30 AM    CO2 23 11/16/2022 03:30 AM    GLUCOSE 189 (H) 11/16/2022 03:30 AM    BUN 28 (H) 11/16/2022 03:30 AM    CREATININE 0.84 11/16/2022 03:30 AM    BUNCREATRAT 16 07/29/2016 10:22 AM          AP: 66 y.o. male sp L2-S1 laminectomy on 11/7/2022 for possible epidural abscess.  Erythema around incision, seems more consistent with allergic reaction to dressing  - Recommend DC prevena, start daily dressing chagnes with gauze, abd, paper tape  - New LLE weakness, recommend MRI L spine    - Abx per ID  -- Cont PT/OT    -Follow-up with me in 2 weeks.  "

## 2022-11-17 NOTE — PROGRESS NOTES
"Spine Surgery Progress Note    66 y.o. male sp L2-S1 laminectomy on 11/7/2022 for lumbar stenosis and likely epidural abscess, recurrent weakness 2 days ago, MRI demonstrated fluid collection with significant stenosis     S: Initially had significant improvement in LE pain and strength after surgery. 2 days ago developed worsening LLE pain and weakness. MRI demosntrates compessive fluid collection  O:  /78   Pulse 77   Temp 36.1 °C (97 °F) (Temporal)   Resp 17   Ht 1.676 m (5' 6\")   Wt 98.7 kg (217 lb 9.5 oz)   SpO2 98%   BMI 35.12 kg/m²     Gen: WNWD NAD  Breathing comfortably    Incision has erythema around it in distribution of prevena wound vac  Right: 5/5 throughout   Left: 3/5 HF, 3/5 KE, 1/5 EHL TA, 3/5 PF  Sensation grossly intact in BLE    Lab Results   Component Value Date/Time    WBC 19.1 (H) 11/17/2022 04:10 AM    RBC 4.13 (L) 11/17/2022 04:10 AM    HEMOGLOBIN 13.2 (L) 11/17/2022 04:10 AM    HEMATOCRIT 39.1 (L) 11/17/2022 04:10 AM    MCV 94.7 11/17/2022 04:10 AM    MCH 32.0 11/17/2022 04:10 AM    MCHC 33.8 11/17/2022 04:10 AM    MPV 10.1 11/17/2022 04:10 AM    NEUTSPOLYS 85.00 (H) 11/06/2022 01:13 AM    LYMPHOCYTES 9.90 (L) 11/06/2022 01:13 AM    MONOCYTES 4.30 11/06/2022 01:13 AM    EOSINOPHILS 0.00 11/06/2022 01:13 AM    BASOPHILS 0.10 11/06/2022 01:13 AM      Lab Results   Component Value Date/Time    SODIUM 133 (L) 11/17/2022 07:20 AM    POTASSIUM 4.1 11/17/2022 07:20 AM    CHLORIDE 98 11/17/2022 07:20 AM    CO2 25 11/17/2022 07:20 AM    GLUCOSE 152 (H) 11/17/2022 07:20 AM    BUN 24 (H) 11/17/2022 07:20 AM    CREATININE 0.74 11/17/2022 07:20 AM    BUNCREATRAT 16 07/29/2016 10:22 AM          AP: 66 y.o. male sp L2-S1 laminectomy on 11/7/2022 for possible epidural abscess.  Fluid collection in Lumbar spine on MRI appears to be compressive in nature. Due to the presence of recurrent weakness and compressive fluid I recommend irrigation debriement lumbar wound.    I discussed the surgical " procedure, goals alternatives, risks and potential complications in detail. Risks of a general anaesthetic include but are not limited to death, cardiorespiratory compromise, MI, DVT, PE and potential anaesthetic related problems to be discussed with anaesthesisology preoperatively. It was explained the risks of surgery included but was not limited to the list below. Discussed the possibility of continued symptoms and possible need of further surgery. I discussed risk of nerve root injury transient or permanent, remote risk of paralysis, infection, CSF leak, bowel and bladder difficulties, hemorrhage and possible need for blood tranfusion were discussed. I discussed the risks of iatrogenic destabilization.     - NPO  - ID lumbar wound today  - Hold anticagulation

## 2022-11-17 NOTE — CARE PLAN
The patient is Stable - Low risk of patient condition declining or worsening    Shift Goals  Clinical Goals: pain control, MRI  Patient Goals: pain control, MRI results  Family Goals: not present    Progress made toward(s) clinical / shift goals:      Problem: Pain - Standard  Goal: Alleviation of pain or a reduction in pain to the patient’s comfort goal  Outcome: Progressing  Note: Pt states pain 8/10. Medications administered per MAR, ice pack applied, pillows for comfort and repositioning.       Problem: Fall Risk  Goal: Patient will remain free from falls  Outcome: Progressing  Note: Bed is locked and in lowest position, treaded socks on, call light and belongings within reach, pt calls appropriately for assistance, hourly rounding in place.     Problem: Lumbar/Thoracic Spine Surgery  Goal: Post-Operative Lumbar/Thoracic Spine Surgery: Patient will achieve optimal post-surgical outcomes  Outcome: Progressing  Note: Q4h neuro checks with no new changes, dressing in place c/d/i, pain managed with current regimen, pt able to mobilize ad winifred.       Patient is not progressing towards the following goals:

## 2022-11-18 NOTE — THERAPY
Occupational Therapy  Daily Treatment     Patient Name: Abimael Hamilton  Age:  66 y.o., Sex:  male  Medical Record #: [id number]  Today's Date: 11/18/2022     Precautions  Precautions: Fall Risk, Spinal / Back Precautions   Comments: no brace per orders         11/18/22 [id number]   Interdisciplinary Plan of Care Collaboration   Collaboration Comments Attempted to see pt for re-eval, pt refufed get up for eval.  Will try again as able.

## 2022-11-18 NOTE — PROGRESS NOTES
Infectious Disease Progress Note    Author: Mary Jo Paulino M.D. Date & Time of service: 2022  11:57 AM    Chief Complaint:  Lumbar spine epidural abscess    Interval History:  66 y.o.  admitted 2022. Pt has a past medical history of aortic valve replacement , gout, L3-L5 severe spinal stenosis with left foot drop and chronic back pain.   AF WBC 14.8 c/o persistent and unimproved back pain +radiculopathy ID reconsulted for R-OPIC infusion orders  11/15 AF states pain pain and nerve pain worse today-having difficulty moving LLE   AF WBC 19 MRI showed no new infection but significant compression from fluid (CSF vs seroma vs blood) causing cauda quina-plan for OR tonight  Severe DJD in cervical spine   AF WBC 18 Op notes reviewed-significant hematoma seen.  Pt declined PT and OT this morning noted    Review of Systems:  Review of Systems   Unable to perform ROS: Other   Constitutional:  Negative for fever.     Hemodynamics:  Temp (24hrs), Av.4 °C (97.6 °F), Min:36.2 °C (97.2 °F), Max:37.4 °C (99.3 °F)  Temperature: 36.2 °C (97.2 °F)  Pulse  Av.3  Min: 60  Max: 113   Blood Pressure : 110/68       Physical Exam:  Physical Exam  Vitals and nursing note reviewed.   Constitutional:       General: He is not in acute distress.     Appearance: He is not ill-appearing.   Eyes:      General: No scleral icterus.  Cardiovascular:      Rate and Rhythm: Normal rate.   Pulmonary:      Effort: Pulmonary effort is normal. No respiratory distress.   Skin:     Coloration: Skin is not jaundiced.       Meds:    Current Facility-Administered Medications:     Pharmacy Consult Request    MD ALERT...DO NOT ADMINISTER NSAIDS or ASPIRIN unless ORDERED By Neurosurgery    docusate sodium    senna-docusate    senna-docusate    polyethylene glycol/lytes    magnesium hydroxide    bisacodyl    sodium phosphate    NS    acetaminophen **FOLLOWED BY** [START ON 2022] acetaminophen    oxyCODONE  immediate-release **OR** oxyCODONE immediate-release **OR** HYDROmorphone    tamsulosin    dexamethasone    hydrALAZINE    DAPTOmycin    insulin regular **AND** POC blood glucose manual result **AND** NOTIFY MD and PharmD **AND** Administer 20 grams of glucose (approximately 8 ounces of fruit juice) every 15 minutes PRN FSBG less than 70 mg/dL **AND** dextrose bolus    amLODIPine    cefTRIAXone (ROCEPHIN) IV    famotidine    cyclobenzaprine    gabapentin    temazepam    lidocaine    ondansetron    ondansetron    Nozin nasal  swab    allopurinol    isosorbide mononitrate SR    lisinopril    metoprolol SR    nitroglycerin    labetalol    diazePAM    hydrALAZINE    enalaprilat    Labs:  Recent Labs     11/16/22 0330 11/17/22  0410 11/18/22  0528   WBC 20.6* 19.1* 18.0*   RBC 4.43* 4.13* 3.76*   HEMOGLOBIN 13.9* 13.2* 11.7*   HEMATOCRIT 41.1* 39.1* 35.9*   MCV 92.8 94.7 95.5   MCH 31.4 32.0 31.1   RDW 47.0 48.6 49.6   PLATELETCT 251 229 205   MPV 10.1 10.1 9.4       Recent Labs     11/16/22  0330 11/17/22  0720 11/18/22  0528   SODIUM 134* 133* 135   POTASSIUM 4.1 4.1 4.1   CHLORIDE 99 98 102   CO2 23 25 25   GLUCOSE 189* 152* 127*   BUN 28* 24* 25*       Recent Labs     11/16/22  0330 11/17/22  0720 11/18/22  0528   CREATININE 0.84 0.74 0.74         Imaging:  CT-HEAD W/O    Result Date: 11/4/2022 11/4/2022 4:10 PM HISTORY/REASON FOR EXAM:  headache high blood pressure, r/o bleed. TECHNIQUE/EXAM DESCRIPTION AND NUMBER OF VIEWS: CT of the head without contrast. The study was performed on a helical multidetector CT scanner. Contiguous axial sections were obtained from the skull base through the vertex. Up to date radiation dose reduction adjustments have been utilized to meet ALARA standards for radiation dose reduction. COMPARISON:  Head CT 2/16/2019 FINDINGS: The calvariae and skull base are unremarkable. There are no extraaxial fluid collections. The ventricular system and basal cisterns are within normal  limits. There are no areas of abnormal density in the brain substance. There are no hemorrhagic lesions.  There are no mass effects or shift of midline structures. The brainstem and posterior fossa structures are unremarkable. There is atherosclerotic vascular calcification in the vertebrobasilar and juxtasellar carotid arteries. Paranasal sinuses in the field of view are unremarkable. Mastoids in the field of view are unremarkable.     1.  Head CT without contrast within normal limits. No evidence of acute cerebral infarction, hemorrhage or mass lesion. 2.  Atherosclerotic vascular calcification.     [id number] VIEW    Result Date: 11/7/2022 11/7/2022 5:56 PM HISTORY/REASON FOR EXAM:  Instrument count. TECHNIQUE/EXAM DESCRIPTION AND NUMBER OF VIEWS:  1 view(s) of the abdomen. COMPARISON: 4/9/2022 FINDINGS: Exam limited due to the intraoperative technique with overlying artifact in the field of view. No gross evidence of a radiopaque surgical instrument or needle device. There are sternotomy wires in the lower thorax.     1.  Negative single view abdomen without gross evidence of a radiopaque surgical device.    DX-SPINE-ANY ONE VIEW    Result Date: 11/8/2022 11/7/2022 4:07 PM HISTORY/REASON FOR EXAM:  Intraoperative images for procedural navigation. TECHNIQUE/EXAM DESCRIPTION AND NUMBER OF VIEWS:  1 view(s) of the lumbar spine.     Fluoroscopic image(s) obtained during lumbar spine instrumentation. Please see the patient's chart for full procedural details. Fluoroscopy time 4 seconds.     MR-LUMBAR SPINE-WITH & W/O    Result Date: 11/4/2022 11/4/2022 6:52 PM HISTORY/REASON FOR EXAM:  Chronic back pain. TECHNIQUE/EXAM DESCRIPTION: MRI of the lumbar spine without and with contrast. The study was performed on a [employer] 1.5 Zuleika MRI scanner. T1 sagittal, T2 fast spin-echo sagittal, and T2 axial images were obtained of the lumbar spine. T1 post-contrast fat-suppressed sagittal images were obtained. 20 mL  ProHance contrast was administered intravenously. COMPARISON:  12/29/2020 FINDINGS: There is peripherally enhancing posterior epidural fluid collection noted at the level of L2-3. It measures an approximately 10 x 6 x 19 mm in size. The lumbar spine maintains normal height and alignment. There is no fracture or dislocation. There is no pathologic marrow infiltration. There are degenerative changes as evidenced by a reduced intervertebral disc height, loss of disc T2 signal and degenerative marrow signal changes. There is no focal osseous lesion. At the level of L5-S1,there are combinations of diffuse disc bulge and facet joint arthropathy causing severe bilateral neural foraminal stenosis. There is mild central canal stenosis. There is moderate bilateral lateral recess stenosis. At the level of L4-5,there are combinations of diffuse disc bulge and facet joint arthropathy causing severe central canal stenosis. There is mild to moderate bilateral neural foraminal stenosis. At the level of L3-4,there are combinations of diffuse disc bulge and facet joint arthropathy causing severe central canal stenosis. There is mild to moderate bilateral neural foraminal stenosis. At the level of L2-3,there are combinations of posterior epidural fluid collection, left paracentral disc protrusion and facet joint arthropathy causing severe central canal stenosis. There is severe left lateral recess stenosis. There is moderate left neural foraminal stenosis. At the level of L1-2, there is minimal disc bulge without significant spinal or neural foraminal stenosis. The conus terminates at the level of L1. There is an approximately 1.9 cm sized T2 hypointense lesion in the right kidney likely representing complicated cyst. There is also a simple cyst in the right kidney.     1.  There is an approximately 10 x 6 x 19 mm sized posterior epidural abscess at the level of L2-3. This is new since the previous study. There is severe central canal  stenosis at this level. 2.  Severe degenerative central canal stenosis at L3-4 and L4-5. 3.  Degenerative disease as described above. 4.  There is an approximately 1.9 cm sized T2 hypointense lesion in the right kidney likely representing complicated cyst. There is also a simple cyst in the right kidney. This is unchanged since the previous study.    DX-PORTABLE FLUORO > 1 HOUR    Result Date: 11/8/2022 11/7/2022 4:07 PM HISTORY/REASON FOR EXAM:  Lumbar laminectomy TECHNIQUE/EXAM DESCRIPTION AND NUMBER OF VIEWS: Portable fluoroscopy for greater than one hour in OR. FINDINGS: The portable fluoroscopy unit was obligated to the procedure for greater than one hour. Actual fluoro time was 4 seconds.     Portable fluoroscopy utilized for 4 seconds. INTERPRETING LOCATION: [address] [place] [address], [facility]    EC-ECHOCARDIOGRAM COMPLETE W/O CONT    Result Date: 11/4/2022  Transthoracic Echo Report Echocardiography Laboratory CONCLUSIONS Compared to the prior study on 12-2-20, velocity across aortic valve is slightly worse. The left ventricular ejection fraction is visually estimated to be 65%. Known bioprosthetic aortic valve . Moderate aortic valve stenosis.  LV EF:  65    % FINDINGS Left Ventricle The left ventricle is normal in size. Mild concentric left ventricular hypertrophy. Normal left ventricular systolic function. The left ventricular ejection fraction is visually estimated to be 65%. No regional wall motion abnormalities. Normal diastolic function. Right Ventricle The right ventricle is normal in size and systolic function. Right Atrium The right atrium is normal in size. Normal inferior vena cava size and inspiratory collapse. Left Atrium The left atrium is normal in size. Left atrial volume index is 27 mL/sq m. Mitral Valve Structurally normal mitral valve without significant stenosis. Mild mitral regurgitation. Aortic Valve Known bioprosthetic aortic valve . Moderate aortic valve stenosis. Vmax is 3.8 m/s.  Transvalvular gradients are - Peak: 58  mmHg, Mean: 30 mmHg. Aortic valve area calculated from the continuity equation is 1.3 cm2. Acceleration time is 56 ms. Tricuspid Valve Structurally normal tricuspid valve without significant stenosis or regurgitation. Pulmonic Valve Structurally normal pulmonic valve without significant stenosis or regurgitation. Pericardium Normal pericardium without effusion. Aorta Normal aortic root for body surface area. The ascending aorta diameter is 3.1 cm. Oumar Mello MD (Electronically Signed) Final Date:     04 November 2022                 16:30      Micro:  Results       Procedure Component Value Units Date/Time    GRAM STAIN [[id number]] Collected: 11/17/22 1750    Order Status: Completed Specimen: Wound Updated: 11/18/22 0649     Significant Indicator .     Source WND     Site Epidural Hematoma 2     Gram Stain Result Few WBCs.  No organisms seen.      Narrative:      Surgery - swabs received    GRAM STAIN [[id number]] Collected: 11/17/22 1750    Order Status: Completed Specimen: Wound Updated: 11/18/22 0649     Significant Indicator .     Source WND     Site Epidural Hematoma 1     Gram Stain Result Few WBCs.  No organisms seen.      Narrative:      Surgery - swabs received    CULTURE WOUND W/ GRAM STAIN [[id number]] Collected: 11/17/22 1750    Order Status: No result Specimen: Wound Updated: 11/18/22 0649     Significant Indicator NEG     Source WND     Site Epidural Hematoma 1     Culture Result -     Gram Stain Result Few WBCs.  No organisms seen.      Narrative:      Surgery - swabs received    Anaerobic Culture [[id number]] Collected: 11/17/22 1750    Order Status: No result Specimen: Wound Updated: 11/18/22 0649     Significant Indicator NEG     Source WND     Site Epidural Hematoma 1     Culture Result -    Narrative:      Surgery - swabs received    CULTURE WOUND W/ GRAM STAIN [[id number]] Collected: 11/17/22 1750    Order Status: No result Specimen: Wound Updated:  11/18/22 0649     Significant Indicator NEG     Source WND     Site Epidural Hematoma 2     Culture Result -     Gram Stain Result Few WBCs.  No organisms seen.      Narrative:      Surgery - swabs received    Anaerobic Culture [[id number]] Collected: 11/17/22 1750    Order Status: No result Specimen: Wound Updated: 11/18/22 0649     Significant Indicator NEG     Source WND     Site Epidural Hematoma 2     Culture Result -    Narrative:      Surgery - swabs received        IMPRESSION:  MRI 11/16      Interval decompression of L2-3 with resolution of previously seen dorsal epidural abscess.     Postoperative changes noted with midline laminectomy at L2-3, L3-4 and L4-5 with a fluid collection that impinges upon the dorsal aspect of the thecal sac and causes severe cauda equina compression. This fluid collection could represent a CSF leak or   seroma. There is no definite rim enhancement to suggest infection.     Second poorly marginated fluid collection identified in the subcutaneous soft tissues underlying the incision.     Mild epidural enhancement is noted along the anterior margin of the fluid collection in the laminectomy site, however this probably represents reactive postoperative dural enhancement. No definite evidence of an epidural infectious process is identified.     Moderate right foraminal narrowing at L1-2, severe left foraminal narrowing at L2-3 and moderate left foraminal narrowing at L3-4 with bilateral moderate L4-5 neural foraminal narrowing and severe bilateral L5-S1 foraminal narrowing. These changes are   stable from the previous study.    Assessment:  Active Hospital Problems    Diagnosis     *Spinal stenosis of lumbar region (L3-5) with neurogenic claudication and L foot drop/weakness, valvular heart disease [M48.062]     Epidural abscess [G06.2]     Hypertensive urgency [I16.0]     Degenerative lumbar spinal stenosis [M48.061]     Obesity (BMI 30-39.9) [E66.9]     Dyslipidemia [E78.5]      Coronary artery disease of native artery of native heart with stable angina pectoris ([facility]) [I25.118]        Assessment:  66 y.o.  admitted 11/4/2022. Pt has a past medical history of aortic valve replacement 2016, gout, L3-L5 severe spinal stenosis with left foot drop and chronic back pain.      Hospital Course:   MRI lumbar spine 11/4 with a 10 x 6 x 19 mm posterior epidural abscess at the level of L2-L3 which is new.  Also noted severe central canal stenosis at this level and severe degenerative canal stenosis at L3-L5.  Patient went to the OR with orthopedic surgery on 11/7 for posterior lumbar decompression/laminectomy at levels L2-S1.  Epidural collection was found which was reported as thick without a fluid component.  This was sent for culture.  Patient states he was on no antibiotics prior to admit.  Started on broad-spectrum antibiotics on 11/4 with surgery on 11/7 which will likely decrease culture yield.   Repeat MRI with spinal compression     Problem List   Leukocytosis, persistent  Lumbar spine epidural abscess  -s/p OR 11/7  -MRI shows resolved L2/3 but Postoperative changes noted with midline laminectomy at L2-3, L3-4 and L4-5 with a fluid collection that impinges upon the dorsal aspect of the thecal sac and causes severe cauda equina compression. Fluid collection CSF leak or hematoma or seroma. No definite rim enhancement to suggest infection.  -s/p OR 11/17 Hematoma found  Degenerative central canal stenosis  -Status post OR as described above  History of aortic valve replacement  History of gout     Plan   - Blood cultures neg  -Cultures negative to date  - Follow-up OR cultures, no growth at 72 hours and requested micro lab holding for 14 days.    -Fu 11/17 op cultures as well  -Continue vancomycin with pharmacy to dose and ceftriaxone for now  Currently recommend a 6-week IV antibiotic course and on discharge can transition to daptomycin 6-8 mg/kg daily continue ceftriaxone 2 g daily with an end  date of 12/31/22           Dispo: Orders for home infusion and scanned into epic under media tab  Now plan is for [id number] current op cxs    PICC: Yes, in place

## 2022-11-18 NOTE — OR NURSING
1842- Pt arrives to PACU from OR on 8L of oxygen via mask. Report received. Dressing CDI. Pt medicated for pain by anesthesia upon arrival.     1918- Pt wife Ana called and updated on pt status and POC. Pt medicated for pain per MAR.

## 2022-11-18 NOTE — OP REPORT
PREOPERATIVE DIAGNOSIS(ES): Lumbar wound hematoma     POSTOPERATIVE DIAGNOSIS(ES): Same     PROCEDURE: Irrigation Debridement, Deep, Lumbar wound      ANESTHESIA: General endotracheal.     SURGEON(S): Adam Sparks MD     ASSISTANT: None     ESTIMATED BLOOD LOSS: 50 cc.     TRANSFUSIONS: None.     IMPLANTS: none    DRAINS: DENISE x1.     SPECIMENS: Epidural hematoma #1 #2     COMPLICATIONS: None.     DISPOSITION: The patient was transferred to the recovery room at the end of the case in stable condition.     INDICATIONS: 66-year-old male that underwent an L2-S1 laminectomy on 11/7/2022, was recovering well, on 11/15/2022 was noted to have increasing lower extremity weakness.  Repeat MRI demonstrated fluid collection with significant stenosis.  I discussed the risk benefits and alternatives to an irrigation debridement lumbar wound patient elected to proceed forward.    DESCRIPTION OF PROCEDURE:         The patient was taken to the Operating Room and after identification of the patient and the operative site, and completion of anesthesia, the patient was prepped and draped in the standard fashion. During this time and the entire operation, care was taken to maintain appropriate perfusion pressures during anesthesia.  All bony protuberances and soft tissues were well padded in the standard fashion. At the conclusion of the procedure the sponge and needle count were correct x 2.       The previous incision was identified and sharply re-opened, skin that looked grossly unhealthy was excised using a scalpel. Dissection was carried down through the subcutaneous tissue down to the level of the deep fascia.  No obvious fluid collection was noted superficial to the fascia.     Next the fascia was sharply re-opened.  A deep collection consistent with hematoma was noted.  The hematoma was evacuated.  No significant compressive pathology was evident in the operative field.  I sent to cultures from the hematoma.  After the wound was  thoroughly irrigated with 6 liters of antibiotic impregnated fluid and all bleeding was stopped, and a subfascial drain was placed, closure was completed with interrupted sutures in the deep fascia, interrupted sutures in the superficial fascia, interrupted   sutures in the subdermal layer followed by a running skin suture.  Sponge and instrument counts were correct times two.

## 2022-11-18 NOTE — THERAPY
PT Contact Note    PT follow-up tx attempted post-op. Pt refusing OOB activity at this time stating he just got up with nsg. Plan to follow-up later to complete PT eval as able.    Elly Duffy, PT, DPT  Ext. [phone]

## 2022-11-18 NOTE — CARE PLAN
The patient is Stable - Low risk of patient condition declining or worsening    Shift Goals  Clinical Goals: pain control, safety  Patient Goals: pain control, rest  Family Goals: not present    Progress made toward(s) clinical / shift goals:    Problem: Pain - Standard  Goal: Alleviation of pain or a reduction in pain to the patient’s comfort goal  Outcome: Progressing   Pt educated on 0-10 pain scale, pt medicated per MAR.     Problem: Fall Risk  Goal: Patient will remain free from falls  Outcome: Progressing   Pt remained free from falls during shift. Pt educated on fall precautions.    Problem: Skin Integrity  Goal: Skin integrity is maintained or improved  Outcome: Progressing   Pt able to ambulate and reposition himself, pt sacrum remained free of redness.     Patient is not progressing towards the following goals:

## 2022-11-18 NOTE — PROGRESS NOTES
Hospital Medicine Daily Progress Note    Date of Service  11/18/2022    Chief Complaint  Abimael Hamilton is a 66 y.o. male admitted 11/4/2022 with worsening back pain    Hospital Course      This is a 66 -year-old male with a past medical history significant for hypertension, hyperlipidemia, CAD status post CABG in 1998, 2016, aortic valve replacement in 2016, post stent in 4/2022, morbid obesity, gout, L3 L5 severe spinal stenosis with left foot drop, chronic back pain was admitted on 11/4/2022 with worsening of lower back pain.      MRI of the lumbar spine  on 11/04 noted 10 x 6 x 19 mm posterior epidural abscess at the level of L2-L3 which is new from previous study. There is severe central canal stenosis at this level.  Severe degenerative central canal stenosis at L3-L4 and L4-5.    Cardiology consulted for preop clearance given patient significant cardiac history, nuclear stress test noted small area of ischemia involving the lateral wall.  Patient cleared by cardiology, moderate risk for the surgery.     Surgery was consulted, patient went to the OR with Dr. Sparks on 11/7/2022.  Biopsy was negative, patient was initially started on IV Rocephin.  Infectious disease was consulted.  They recommended 6-week IV antibiotic course and on discharge can transition to daptomycin 6-AMG/KG daily and continue ceftriaxone 2 g daily with an end date of 12/19/2022.  Initially, plan for home infusion antibiotics but patient declined and would prefer to do outpatient infusion center.  Orders were placed by infectious disease.  OR cultures negative but there is concern for worsening spinal infection based on symptoms, imaging, increased WBCs.  Repeat MRI cervical, thoracic, lumbar spine pending.    Interval Problem Update  Patient was seen and examined at bedside.  No acute events overnight. Patient is resting comfortably in bed and in no acute distress.     11/7 Posterior lumbar decompression using laminectomy type approach,  Levels: L2-L3-L4 L5-S1  11/17 Lumbar I&D  Rocephin/Dapto  ID recs  Ortho recs    I have discussed this patient's plan of care and discharge plan at IDT rounds today with Case Management, Nursing, Nursing leadership, and other members of the IDT team.    Consultants/Specialty  cardiology, infectious disease, and neurosurgery    Code Status  Full Code    Disposition  Patient is not medically cleared for discharge.   Anticipate discharge to  home health versus skilled nursing facility versus inpatient rehab .  I have placed the appropriate orders for post-discharge needs.    Review of Systems  Review of Systems   Constitutional:  Negative for chills, fever and malaise/fatigue.   HENT:  Negative for hearing loss, sinus pain and sore throat.    Respiratory:  Negative for cough, sputum production and shortness of breath.    Cardiovascular:  Positive for leg swelling. Negative for chest pain, palpitations and orthopnea.   Gastrointestinal:  Negative for abdominal pain, diarrhea, nausea and vomiting.   Genitourinary:  Negative for dysuria and flank pain.   Musculoskeletal:  Positive for back pain and myalgias. Negative for falls.   Neurological:  Positive for focal weakness (Left lower extremity) and weakness (LLE, dropfoot to LLE). Negative for tingling and headaches.   Psychiatric/Behavioral:  Negative for depression and substance abuse. The patient is not nervous/anxious and does not have insomnia.    All other systems reviewed and are negative.     Physical Exam  Temp:  [36.2 °C (97.1 °F)-37.4 °C (99.3 °F)] 36.2 °C (97.1 °F)  Pulse:  [72-93] 87  Resp:  [12-19] 18  BP: (102-135)/(63-92) 122/72  SpO2:  [90 %-99 %] 95 %    Physical Exam  Vitals and nursing note reviewed.   Constitutional:       General: He is not in acute distress.     Appearance: He is obese. He is ill-appearing. He is not toxic-appearing.   HENT:      Head: Normocephalic.      Right Ear: External ear normal.      Left Ear: External ear normal.      Nose:  Nose normal. No congestion.      Mouth/Throat:      Mouth: Mucous membranes are moist.      Pharynx: No oropharyngeal exudate.   Eyes:      General: No scleral icterus.     Pupils: Pupils are equal, round, and reactive to light.   Cardiovascular:      Rate and Rhythm: Normal rate and regular rhythm.      Pulses: Normal pulses.      Heart sounds: Murmur heard.     No gallop.   Pulmonary:      Effort: Pulmonary effort is normal.      Breath sounds: Normal breath sounds. No wheezing.   Abdominal:      General: Abdomen is flat. Bowel sounds are normal.      Palpations: Abdomen is soft.      Tenderness: There is no abdominal tenderness. There is no guarding or rebound.   Musculoskeletal:      Right lower leg: Edema (+1) present.      Left lower leg: Edema (+1) present.      Comments: Dropfoot to left lower extremity   Skin:     General: Skin is warm and dry.      Capillary Refill: Capillary refill takes less than 2 seconds.      Findings: No bruising or erythema.      Comments: Prevena incisional VAC to midline thoracic and lumbar spine.  Small amount of serosanguineous fluid noted.  Adequate suction seen.  Erythema surrounding Janet incision VAC dressing.    Diffuse tattoos   Neurological:      Mental Status: He is alert and oriented to person, place, and time. Mental status is at baseline.      Motor: Weakness (Left lower extremity) present.   Psychiatric:         Mood and Affect: Mood normal.         Behavior: Behavior normal.       Fluids    Intake/Output Summary (Last 24 hours) at 11/18/2022 1537  Last data filed at 11/18/2022 0717  Gross per 24 hour   Intake 1434.11 ml   Output 255 ml   Net 1179.11 ml       Laboratory  Recent Labs     11/16/22  0330 11/17/22  0410 11/18/22  0528   WBC 20.6* 19.1* 18.0*   RBC 4.43* 4.13* 3.76*   HEMOGLOBIN 13.9* 13.2* 11.7*   HEMATOCRIT 41.1* 39.1* 35.9*   MCV 92.8 94.7 95.5   MCH 31.4 32.0 31.1   MCHC 33.8 33.8 32.6*   RDW 47.0 48.6 49.6   PLATELETCT 251 229 205   MPV 10.1 10.1  9.4     Recent Labs     11/16/22  0330 11/17/22  0720 11/18/22  0528   SODIUM 134* 133* 135   POTASSIUM 4.1 4.1 4.1   CHLORIDE 99 98 102   CO2 23 25 25   GLUCOSE 189* 152* 127*   BUN 28* 24* 25*   CREATININE 0.84 0.74 0.74   CALCIUM 8.5 8.5 8.1*                   Imaging  MR-LUMBAR SPINE-WITH & W/O   Final Result         Interval decompression of L2-3 with resolution of previously seen dorsal epidural abscess.      Postoperative changes noted with midline laminectomy at L2-3, L3-4 and L4-5 with a fluid collection that impinges upon the dorsal aspect of the thecal sac and causes severe cauda equina compression. This fluid collection could represent a CSF leak or    seroma. There is no definite rim enhancement to suggest infection.      Second poorly marginated fluid collection identified in the subcutaneous soft tissues underlying the incision.      Mild epidural enhancement is noted along the anterior margin of the fluid collection in the laminectomy site, however this probably represents reactive postoperative dural enhancement. No definite evidence of an epidural infectious process is identified.      Moderate right foraminal narrowing at L1-2, severe left foraminal narrowing at L2-3 and moderate left foraminal narrowing at L3-4 with bilateral moderate L4-5 neural foraminal narrowing and severe bilateral L5-S1 foraminal narrowing. These changes are    stable from the previous study.      MR-THORACIC SPINE-WITH & W/O   Final Result         Minimal degenerative changes in the thoracic spine. There is no significant canal stenosis or foraminal narrowing. No abnormal enhancement is identified.      MR-CERVICAL SPINE-WITH & W/O   Final Result         1.  Severe canal stenosis at C4-5 with cord compression and abnormal spinal cord signal representing myelomalacia due to chronic compressive myelopathy. There is also severe right and moderate left foraminal narrowing at this level.      2.   Moderate canal stenosis at  C5-6 with severe bilateral foraminal narrowing.      3.  No abnormal enhancement is identified in the cervical spine.      IR-PICC LINE PLACEMENT W/ GUIDANCE > AGE 5   Final Result                  Ultrasound-guided PICC placement performed by qualified nursing staff as    above.          DX-SPINE-ANY ONE VIEW   Final Result      Fluoroscopic image(s) obtained during lumbar spine instrumentation. Please see the patient's chart for full procedural details.      Fluoroscopy time 4 seconds.         DX-PORTABLE FLUORO > 1 HOUR   Final Result      Portable fluoroscopy utilized for 4 seconds.         INTERPRETING LOCATION: [address] [place] [address], [facility], [facility]      XF-KYOGNES-0 VIEW   Final Result      1.  Negative single view abdomen without gross evidence of a radiopaque surgical device.      NM-CARDIAC STRESS TEST   Final Result      MR-LUMBAR SPINE-WITH & W/O   Final Result      1.  There is an approximately 10 x 6 x 19 mm sized posterior epidural abscess at the level of L2-3. This is new since the previous study. There is severe central canal stenosis at this level.   2.  Severe degenerative central canal stenosis at L3-4 and L4-5.   3.  Degenerative disease as described above.   4.  There is an approximately 1.9 cm sized T2 hypointense lesion in the right kidney likely representing complicated cyst. There is also a simple cyst in the right kidney. This is unchanged since the previous study.      CT-HEAD W/O   Final Result      1.  Head CT without contrast within normal limits. No evidence of acute cerebral infarction, hemorrhage or mass lesion.   2.  Atherosclerotic vascular calcification.         EC-ECHOCARDIOGRAM COMPLETE W/O CONT   Final Result             Assessment/Plan  * Spinal stenosis of lumbar region (L3-5) with neurogenic claudication and L foot drop/weakness, valvular heart disease  Assessment & Plan  Pain control and muscle relaxers  MRI LS spine done was notable for 10 x 6 x 19 mm posterior epidural abscess at  the level of L2-L3 which is new from previous study.  There is severe central canal stenosis at this level.  Severe degenerative central canal stenosis at L3-L4 and L4-5.    -- Had surgery  On 11/07, culture pending, will continue broad-spectrum antibiotics including vancomycin and unasyn; ID recs switching to Rocephin plus  daptomycin till 12/19/2022.  While patient was on IV antibiotics, need CBC, CMP, ESR, CRP, CPK.    Pt and ot recommends home with home health assessment ordered  Per neurosurgery recommendation  Patient has received Decadron  11/17 repeat MRI showing resolution of dorsal epidural abscess, neurosurgery taking patient to surgery today for I&D of lumbar spine.        Contact dermatitis  Assessment & Plan  Patient with uneven erythema surrounding Janet incision VAC.  Erythematous consistent with sensitivity/reaction to dressing, does not appear infectious.  Patient was seen by neurosurgery who states Prevena wound VAC may be discontinued and dressings to be applied.    Hyperglycemia  Assessment & Plan  A1c 3/20/2022 was 6.2-prediabetic.  Patient hyperglycemic likely due to steroid use  Continue insulin sliding scale  Accuchecks and hypoglycemic protocol    Epidural abscess- (present on admission)  Assessment & Plan  S/p L2-SI LAMINECTOMY with Dr. Sparks 11/7/2022  Prevena incisional VAC in place to spine-continue x7 days per neurosurgery  Infectious disease following- OR cultures no growth to date.  Recommend continuing 6 weeks of IV antibiotics of daptomycin and ceftriaxone (end date 12/19/2022).  Patient with persistent leukocytosis despite antibiotics.    11/16 Repeat MRI cervical, thoracic, lumbar spine complete- showing resolution of dorsal epidural abscess  11/17 ID currently recommend a 6-week IV antibiotic course of Vanco and ceftriaxone and on discharge can transition to daptomycin 6-8 mg/kg daily continue ceftriaxone 2 g daily with an end date of 12/19/22 if no worsening abscess or  complication    Hypertensive urgency  Assessment & Plan  PRN IV antihypertensives  Continue amlodipine    Obesity (BMI 30-39.9)- (present on admission)  Assessment & Plan  Lifestyle modification including diet exercise and weight loss as an outpatient      Dyslipidemia- (present on admission)  Assessment & Plan  Hold home evolocumab    Coronary artery disease of native artery of native heart with stable angina pectoris (HCC)- (present on admission)  Assessment & Plan  C/w metoprolol and lisinopril, Imdur    Stress test- a small tomoderate sized focus of ischemia involving the lateral wall within the cardiac base and midzone. Findings were discussed with cardiology Dr. Dozier - no further inpatient cardiology evaluation/procedure recommended. Pt is a moderate risk patient.    -- History of aspirin and Plavix was held considering patient undergoing surgery.  I discussed with Dr. Sparks  stated okay starting the patient on aspirin and Plavix.  Started 11/09/2022.   -- monitor leg strength              VTE prophylaxis: enoxaparin ppx    I have performed a physical exam and reviewed and updated ROS and Plan today (11/18/2022). In review of yesterday's note (11/17/2022), there are no changes except as documented above.

## 2022-11-18 NOTE — ANESTHESIA PROCEDURE NOTES
Airway    Date/Time: 11/17/2022 5:29 PM  Performed by: Emre Nagy M.D.  Authorized by: Emre Nagy M.D.     Location:  OR  Urgency:  Elective  Indications for Airway Management:  Anesthesia      Spontaneous Ventilation: absent    Sedation Level:  Deep  Preoxygenated: Yes    Patient Position:  Sniffing  Final Airway Type:  Endotracheal airway  Final Endotracheal Airway:  ETT  Cuffed: Yes    Technique Used for Successful ETT Placement:  Direct laryngoscopy  Devices/Methods Used in Placement:  Cricoid pressure and intubating stylet    Insertion Site:  Oral  Blade Type:  [place]  Laryngoscope Blade/Videolaryngoscope Blade Size:  2  ETT Size (mm):  8.0  Measured from:  Teeth  ETT to Teeth (cm):  22  Placement Verified by: auscultation and capnometry    Cormack-Lehane Classification:  Grade I - full view of glottis  Number of Attempts at Approach:  1

## 2022-11-18 NOTE — ANESTHESIA POSTPROCEDURE EVALUATION
Patient: Abimael Hamilton    Procedure Summary     Date: 11/17/22 Room / Location: [place] [address] / SURGERY [facility]    Anesthesia Start: 1718 Anesthesia Stop: 1844    Procedure: IRRIGATION AND DEBRIDEMENT, WOUND, AFTER PROCEDURE INVOLVING LUMBAR SPINE BY POSTERIOR APPROACH (Spine Lumbar) Diagnosis: (epidural abscess)    Surgeons: Adam Sparks M.D. Responsible Provider: Emre Nagy M.D.    Anesthesia Type: general ASA Status: 3 - Emergent          Final Anesthesia Type: general  Last vitals  BP   Blood Pressure : 135/67    Temp   36.3 °C (97.3 °F)    Pulse   73   Resp   13    SpO2   97 %      Anesthesia Post Evaluation    Patient location during evaluation: PACU  Patient participation: complete - patient participated  Level of consciousness: awake and alert    Airway patency: patent  Anesthetic complications: no  Cardiovascular status: hemodynamically stable  Respiratory status: acceptable  Hydration status: euvolemic    PONV: none          No notable events documented.     Nurse Pain Score: 5 (NPRS)

## 2022-11-18 NOTE — PROGRESS NOTES
"Spine Surgery Progress Note    66 y.o. male sp L2-S1 laminectomy on 11/7/2022 for lumbar stenosis and likely epidural abscess, recurrent weakness 2 days ago, MRI demonstrated fluid collection with significant stenosis. Sp I&D lumbar spine for epidural hematoma on 11/17    S:  ERIBERTO overnight, sig improvement in LE pain and strength since surgery.  O:  /68   Pulse 74   Temp 36.2 °C (97.2 °F) (Temporal)   Resp 17   Ht 1.676 m (5' 6\")   Wt 98.7 kg (217 lb 9.5 oz)   SpO2 90%   BMI 35.12 kg/m²     Gen: WNWD NAD  Breathing comfortably    Incision has erythema around it in distribution of prevena wound vac  Right: 5/5 throughout   Left: 4/5 HF, 4/5 KE, 3/5 EHL TA, 5/5 PF  Sensation grossly intact in BLE, decresed left L4 L5 distribution    Lab Results   Component Value Date/Time    WBC 18.0 (H) 11/18/2022 05:28 AM    RBC 3.76 (L) 11/18/2022 05:28 AM    HEMOGLOBIN 11.7 (L) 11/18/2022 05:28 AM    HEMATOCRIT 35.9 (L) 11/18/2022 05:28 AM    MCV 95.5 11/18/2022 05:28 AM    MCH 31.1 11/18/2022 05:28 AM    MCHC 32.6 (L) 11/18/2022 05:28 AM    MPV 9.4 11/18/2022 05:28 AM    NEUTSPOLYS 85.00 (H) 11/06/2022 01:13 AM    LYMPHOCYTES 9.90 (L) 11/06/2022 01:13 AM    MONOCYTES 4.30 11/06/2022 01:13 AM    EOSINOPHILS 0.00 11/06/2022 01:13 AM    BASOPHILS 0.10 11/06/2022 01:13 AM      Lab Results   Component Value Date/Time    SODIUM 135 11/18/2022 05:28 AM    POTASSIUM 4.1 11/18/2022 05:28 AM    CHLORIDE 102 11/18/2022 05:28 AM    CO2 25 11/18/2022 05:28 AM    GLUCOSE 127 (H) 11/18/2022 05:28 AM    BUN 25 (H) 11/18/2022 05:28 AM    CREATININE 0.74 11/18/2022 05:28 AM    BUNCREATRAT 16 07/29/2016 10:22 AM          AP: 66 y.o. male sp L2-S1 laminectomy on 11/7/2022 for possible epidural abscess, sp I&D lumbar spine 11/17 for epidural hematoma.     - Recommend holding anticoagulation and antiplatelete x 7 days (11/24)  - Recommend continued drain x 7 days (11/24)  - PT/OT  - Reg diet  - Abx per primary and ID  "

## 2022-11-18 NOTE — PROGRESS NOTES
4 Eyes Skin Assessment Completed by Rebecca RN and MO Cifuentes.    Head WDL  Ears WDL  Nose WDL  Mouth WDL  Neck WDL  Breast/Chest WDL  Shoulder Blades WDL  Spine Incision  (R) Arm/Elbow/Hand WDL  (L) Arm/Elbow/Hand WDL  Abdomen WDL  Groin WDL  Scrotum/Coccyx/Buttocks WDL  (R) Leg WDL  (L) Leg WDL  (R) Heel/Foot/Toe WDL  (L) Heel/Foot/Toe WDL          Devices In Places Pulse Ox DENISE drain      Interventions In Place Pillows and Pressure Redistribution Mattress SCDs    Possible Skin Injury No    Pictures Uploaded Into Epic N/A  Wound Consult Placed N/A  RN Wound Prevention Protocol Ordered No

## 2022-11-18 NOTE — ANESTHESIA PROCEDURE NOTES
Arterial Line  Performed by: Emre Nagy M.D.  Authorized by: Emre Nagy M.D.     Start Time:  11/17/2022 5:22 PM  End Time:  11/17/2022 5:24 PM  Localization: surface landmarks    Patient Location:  OR  Indication: continuous blood pressure monitoring        Catheter Size:  20 G  Seldinger Technique?: Yes    Laterality:  Right  Site:  Radial artery  Line Secured:  Antimicrobial disc, tape and transparent dressing  Events: patient tolerated procedure well with no complications

## 2022-11-18 NOTE — CARE PLAN
Problem: Pain - Standard  Goal: Alleviation of pain or a reduction in pain to the patient’s comfort goal  Outcome: Progressing   The patient is Stable - Low risk of patient condition declining or worsening    Shift Goals  Clinical Goals: Pain control, safety  Patient Goals: Pain control  Family Goals: not present    Progress made toward(s) clinical / shift goals:  yes    Patient is not progressing towards the following goals: n/a

## 2022-11-18 NOTE — PROGRESS NOTES
1932 Report received from Herlinda HASSAN.     1954 Pt arrived back to unit from PACU. Pt AAOx4, VSS. Surgical site to back CDI with dressing in place. Pt updated on plan of care, verbalizes understanding. Bed locked and in lowest position. Call light and belongings within reach.

## 2022-11-18 NOTE — ANESTHESIA TIME REPORT
Anesthesia Start and Stop Event Times     Date Time Event    11/17/2022 1713 Ready for Procedure     1718 Anesthesia Start     1844 Anesthesia Stop        Responsible Staff  11/17/22    Name Role Begin End    Emre Nagy M.D. Anesth 1718 1849        Overtime Reason:  overtime    Comments:

## 2022-11-18 NOTE — ANESTHESIA PREPROCEDURE EVALUATION
Case: [id number] Date/Time: 11/17/22 1715    Procedure: IRRIGATION AND DEBRIDEMENT, WOUND, AFTER PROCEDURE INVOLVING LUMBAR SPINE BY POSTERIOR APPROACH (Spine Lumbar)    Anesthesia type: General    Location: [facility] OR 15 / SURGERY [facility]    Surgeons: Adam Sparks M.D.      Epidural abscess  Moderate AS- has prosthetic valve from 2016,  NL LVEf  Increase BMIO    Relevant Problems   CARDIAC   (positive) Bilateral carotid artery occlusion   (positive) Coronary artery disease of native artery of native heart with stable angina pectoris (HCC)   (positive) Coronary atherosclerosis of autologous vein bypass graft   (positive) Hypertensive urgency         (positive) Cyst of right kidney   (positive) Kidney stone on right side       Physical Exam    Airway   Mallampati: II  TM distance: >3 FB  Neck ROM: full       Cardiovascular - normal exam  Rhythm: regular  Rate: normal  (-) murmur     Dental - normal exam        Very poor dentition  Facial Hair   Pulmonary - normal exam  Breath sounds clear to auscultation     Abdominal    Neurological - normal exam         Other findings: Missing some teeth, beard            Anesthesia Plan    ASA 3- EMERGENT   ASA physical status 3 criteria: CAD/stents (> 3 months) and MI or angina - history (> 3 months)ASA physical status emergent criteria: acutely contaminated wound or identified infection source    Plan - general       Airway plan will be ETT          Induction: intravenous    Postoperative Plan: Postoperative administration of opioids is intended.    Pertinent diagnostic labs and testing reviewed    Informed Consent:    Anesthetic plan and risks discussed with patient.    Use of blood products discussed with: patient whom consented to blood products.

## 2022-11-19 NOTE — THERAPY
Physical Therapy Contact Note    Attempted to see pt for PT re-evaluation. Pt reported again having just gotten back to bed after being up during the afternoon. Will follow up as able and appropriate.    Deena Valencia, PT, DPT

## 2022-11-19 NOTE — PROGRESS NOTES
"Spine Surgery Progress Note    66 y.o. male sp L2-S1 laminectomy on 11/7/2022 for lumbar stenosis and likely epidural abscess, recurrent weakness 2 days ago, MRI demonstrated fluid collection with significant stenosis. Sp I&D lumbar spine for epidural hematoma on 11/17    S:  ERIBERTO overnight, sig improvement in LE pain and strength since surgery though continues with weakness in left leg  Drain remains in place    O:  /76   Pulse 81   Temp 36.8 °C (98.2 °F) (Temporal)   Resp 16   Ht 1.676 m (5' 6\")   Wt 98.7 kg (217 lb 9.5 oz)   SpO2 98%   BMI 35.12 kg/m²     Gen: WNWD NAD  Breathing comfortably    Incision has erythema around it in distribution of prevena wound vac  Right: 5/5 throughout   Left: 4/5 HF, 4/5 KE, 3/5 EHL TA, 5/5 PF  Sensation grossly intact in BLE, decresed left L4 L5 distribution  Drain output decreasing    Lab Results   Component Value Date/Time    WBC 15.7 (H) 11/19/2022 04:25 AM    RBC 3.69 (L) 11/19/2022 04:25 AM    HEMOGLOBIN 11.5 (L) 11/19/2022 04:25 AM    HEMATOCRIT 34.8 (L) 11/19/2022 04:25 AM    MCV 94.3 11/19/2022 04:25 AM    MCH 31.2 11/19/2022 04:25 AM    MCHC 33.0 (L) 11/19/2022 04:25 AM    MPV 9.7 11/19/2022 04:25 AM    NEUTSPOLYS 85.00 (H) 11/06/2022 01:13 AM    LYMPHOCYTES 9.90 (L) 11/06/2022 01:13 AM    MONOCYTES 4.30 11/06/2022 01:13 AM    EOSINOPHILS 0.00 11/06/2022 01:13 AM    BASOPHILS 0.10 11/06/2022 01:13 AM      Lab Results   Component Value Date/Time    SODIUM 137 11/19/2022 04:25 AM    POTASSIUM 3.9 11/19/2022 04:25 AM    CHLORIDE 102 11/19/2022 04:25 AM    CO2 27 11/19/2022 04:25 AM    GLUCOSE 191 (H) 11/19/2022 04:25 AM    BUN 24 (H) 11/19/2022 04:25 AM    CREATININE 0.81 11/19/2022 04:25 AM    BUNCREATRAT 16 07/29/2016 10:22 AM          AP: 66 y.o. male sp L2-S1 laminectomy on 11/7/2022 for possible epidural abscess, sp I&D lumbar spine 11/17 for epidural hematoma.     Overall improvement today  Continue plan:    - Recommend holding anticoagulation and " antiplatelete x 7 days (11/24)  - Recommend continued drain x 7 days (11/24)  - PT/OT  - Reg diet  - Abx per primary and ID

## 2022-11-19 NOTE — PROGRESS NOTES
Infectious Disease Progress Note    Author: Mary Jo Paulino M.D. Date & Time of service: 2022  12:28 PM    Chief Complaint:  Lumbar spine epidural abscess    Interval History:  66 y.o.  admitted 2022. Pt has a past medical history of aortic valve replacement , gout, L3-L5 severe spinal stenosis with left foot drop and chronic back pain.   AF WBC 14.8 c/o persistent and unimproved back pain +radiculopathy ID reconsulted for R-OPIC infusion orders  11/15 AF states pain pain and nerve pain worse today-having difficulty moving LLE   AF WBC 19 MRI showed no new infection but significant compression from fluid (CSF vs seroma vs blood) causing cauda quina-plan for OR tonight  Severe DJD in cervical spine   AF WBC 18 Op notes reviewed-significant hematoma seen.  Pt declined PT and OT this morning noted   AF WBC 15 sitting up on side of bed-planning for using strength band today Back pain and weakness improved. States blood thinner on hold and plan for repeat MRI next week as continues to have significant drain output and no obvious source found    Review of Systems:  Review of Systems   Constitutional:  Negative for fever.   Musculoskeletal:  Positive for back pain.   Neurological:  Positive for weakness.     Hemodynamics:  Temp (24hrs), Av.7 °C (98 °F), Min:36.2 °C (97.1 °F), Max:37.1 °C (98.8 °F)  Temperature: 36.8 °C (98.2 °F)  Pulse  Av.7  Min: 60  Max: 113   Blood Pressure : 122/76       Physical Exam:  Physical Exam  Vitals and nursing note reviewed.   Constitutional:       General: He is not in acute distress.     Appearance: He is not ill-appearing or toxic-appearing.   HENT:      Mouth/Throat:      Pharynx: No oropharyngeal exudate.   Eyes:      General: No scleral icterus.     Extraocular Movements: Extraocular movements intact.      Pupils: Pupils are equal, round, and reactive to light.   Cardiovascular:      Rate and Rhythm: Normal rate.   Pulmonary:      Effort:  Pulmonary effort is normal. No respiratory distress.   Abdominal:      General: There is no distension.      Palpations: Abdomen is soft.   Skin:     Coloration: Skin is not jaundiced.      Findings: Bruising and erythema present.      Comments: Erythematous rash around prir bandage site back drain inplace-serosanguinous   Neurological:      General: No focal deficit present.      Mental Status: He is alert and oriented to person, place, and time.   Psychiatric:         Mood and Affect: Mood normal.         Behavior: Behavior normal.       Meds:    Current Facility-Administered Medications:     Pharmacy Consult Request    MD ALERT...DO NOT ADMINISTER NSAIDS or ASPIRIN unless ORDERED By Neurosurgery    docusate sodium    senna-docusate    senna-docusate    polyethylene glycol/lytes    magnesium hydroxide    bisacodyl    sodium phosphate    NS    acetaminophen **FOLLOWED BY** [START ON 11/22/2022] acetaminophen    oxyCODONE immediate-release **OR** oxyCODONE immediate-release **OR** HYDROmorphone    tamsulosin    dexamethasone    hydrALAZINE    DAPTOmycin    insulin regular **AND** POC blood glucose manual result **AND** NOTIFY MD and PharmD **AND** Administer 20 grams of glucose (approximately 8 ounces of fruit juice) every 15 minutes PRN FSBG less than 70 mg/dL **AND** dextrose bolus    amLODIPine    cefTRIAXone (ROCEPHIN) IV    famotidine    cyclobenzaprine    gabapentin    temazepam    lidocaine    ondansetron    ondansetron    Nozin nasal  swab    allopurinol    isosorbide mononitrate SR    lisinopril    metoprolol SR    nitroglycerin    labetalol    diazePAM    hydrALAZINE    enalaprilat    Labs:  Recent Labs     11/17/22  0410 11/18/22  0528 11/19/22  0425   WBC 19.1* 18.0* 15.7*   RBC 4.13* 3.76* 3.69*   HEMOGLOBIN 13.2* 11.7* 11.5*   HEMATOCRIT 39.1* 35.9* 34.8*   MCV 94.7 95.5 94.3   MCH 32.0 31.1 31.2   RDW 48.6 49.6 49.1   PLATELETCT 229 205 190   MPV 10.1 9.4 9.7       Recent Labs      11/17/22  0720 11/18/22  0528 11/19/22  0425   SODIUM 133* 135 137   POTASSIUM 4.1 4.1 3.9   CHLORIDE 98 102 102   CO2 25 25 27   GLUCOSE 152* 127* 191*   BUN 24* 25* 24*       Recent Labs     11/17/22  0720 11/18/22  0528 11/19/22  0425   CREATININE 0.74 0.74 0.81         Imaging:  CT-HEAD W/O    Result Date: 11/4/2022 11/4/2022 4:10 PM HISTORY/REASON FOR EXAM:  headache high blood pressure, r/o bleed. TECHNIQUE/EXAM DESCRIPTION AND NUMBER OF VIEWS: CT of the head without contrast. The study was performed on a helical multidetector CT scanner. Contiguous axial sections were obtained from the skull base through the vertex. Up to date radiation dose reduction adjustments have been utilized to meet ALARA standards for radiation dose reduction. COMPARISON:  Head CT 2/16/2019 FINDINGS: The calvariae and skull base are unremarkable. There are no extraaxial fluid collections. The ventricular system and basal cisterns are within normal limits. There are no areas of abnormal density in the brain substance. There are no hemorrhagic lesions.  There are no mass effects or shift of midline structures. The brainstem and posterior fossa structures are unremarkable. There is atherosclerotic vascular calcification in the vertebrobasilar and juxtasellar carotid arteries. Paranasal sinuses in the field of view are unremarkable. Mastoids in the field of view are unremarkable.     1.  Head CT without contrast within normal limits. No evidence of acute cerebral infarction, hemorrhage or mass lesion. 2.  Atherosclerotic vascular calcification.     RI-[id number] VIEW    Result Date: 11/7/2022 11/7/2022 5:56 PM HISTORY/REASON FOR EXAM:  Instrument count. TECHNIQUE/EXAM DESCRIPTION AND NUMBER OF VIEWS:  1 view(s) of the abdomen. COMPARISON: 4/9/2022 FINDINGS: Exam limited due to the intraoperative technique with overlying artifact in the field of view. No gross evidence of a radiopaque surgical instrument or needle device. There are  sternotomy wires in the lower thorax.     1.  Negative single view abdomen without gross evidence of a radiopaque surgical device.    DX-SPINE-ANY ONE VIEW    Result Date: 11/8/2022 11/7/2022 4:07 PM HISTORY/REASON FOR EXAM:  Intraoperative images for procedural navigation. TECHNIQUE/EXAM DESCRIPTION AND NUMBER OF VIEWS:  1 view(s) of the lumbar spine.     Fluoroscopic image(s) obtained during lumbar spine instrumentation. Please see the patient's chart for full procedural details. Fluoroscopy time 4 seconds.     MR-LUMBAR SPINE-WITH & W/O    Result Date: 11/4/2022 11/4/2022 6:52 PM HISTORY/REASON FOR EXAM:  Chronic back pain. TECHNIQUE/EXAM DESCRIPTION: MRI of the lumbar spine without and with contrast. The study was performed on a Digital H2O Signa 1.5 [employer] MRI scanner. T1 sagittal, T2 fast spin-echo sagittal, and T2 axial images were obtained of the lumbar spine. T1 post-contrast fat-suppressed sagittal images were obtained. 20 mL ProHance contrast was administered intravenously. COMPARISON:  12/29/2020 FINDINGS: There is peripherally enhancing posterior epidural fluid collection noted at the level of L2-3. It measures an approximately 10 x 6 x 19 mm in size. The lumbar spine maintains normal height and alignment. There is no fracture or dislocation. There is no pathologic marrow infiltration. There are degenerative changes as evidenced by a reduced intervertebral disc height, loss of disc T2 signal and degenerative marrow signal changes. There is no focal osseous lesion. At the level of L5-S1,there are combinations of diffuse disc bulge and facet joint arthropathy causing severe bilateral neural foraminal stenosis. There is mild central canal stenosis. There is moderate bilateral lateral recess stenosis. At the level of L4-5,there are combinations of diffuse disc bulge and facet joint arthropathy causing severe central canal stenosis. There is mild to moderate bilateral neural foraminal stenosis. At the level of  L3-4,there are combinations of diffuse disc bulge and facet joint arthropathy causing severe central canal stenosis. There is mild to moderate bilateral neural foraminal stenosis. At the level of L2-3,there are combinations of posterior epidural fluid collection, left paracentral disc protrusion and facet joint arthropathy causing severe central canal stenosis. There is severe left lateral recess stenosis. There is moderate left neural foraminal stenosis. At the level of L1-2, there is minimal disc bulge without significant spinal or neural foraminal stenosis. The conus terminates at the level of L1. There is an approximately 1.9 cm sized T2 hypointense lesion in the right kidney likely representing complicated cyst. There is also a simple cyst in the right kidney.     1.  There is an approximately 10 x 6 x 19 mm sized posterior epidural abscess at the level of L2-3. This is new since the previous study. There is severe central canal stenosis at this level. 2.  Severe degenerative central canal stenosis at L3-4 and L4-5. 3.  Degenerative disease as described above. 4.  There is an approximately 1.9 cm sized T2 hypointense lesion in the right kidney likely representing complicated cyst. There is also a simple cyst in the right kidney. This is unchanged since the previous study.    DX-PORTABLE FLUORO > 1 HOUR    Result Date: 11/8/2022 11/7/2022 4:07 PM HISTORY/REASON FOR EXAM:  Lumbar laminectomy TECHNIQUE/EXAM DESCRIPTION AND NUMBER OF VIEWS: Portable fluoroscopy for greater than one hour in OR. FINDINGS: The portable fluoroscopy unit was obligated to the procedure for greater than one hour. Actual fluoro time was 4 seconds.     Portable fluoroscopy utilized for 4 seconds. INTERPRETING LOCATION: [address] [place] [address] [place], [id number]    EC-ECHOCARDIOGRAM COMPLETE W/O CONT    Result Date: 11/4/2022  Transthoracic Echo Report Echocardiography Laboratory CONCLUSIONS Compared to the prior study on 12-2-20, velocity across aortic  valve is slightly worse. The left ventricular ejection fraction is visually estimated to be 65%. Known bioprosthetic aortic valve . Moderate aortic valve stenosis.  LV EF:  65    % FINDINGS Left Ventricle The left ventricle is normal in size. Mild concentric left ventricular hypertrophy. Normal left ventricular systolic function. The left ventricular ejection fraction is visually estimated to be 65%. No regional wall motion abnormalities. Normal diastolic function. Right Ventricle The right ventricle is normal in size and systolic function. Right Atrium The right atrium is normal in size. Normal inferior vena cava size and inspiratory collapse. Left Atrium The left atrium is normal in size. Left atrial volume index is 27 mL/sq m. Mitral Valve Structurally normal mitral valve without significant stenosis. Mild mitral regurgitation. Aortic Valve Known bioprosthetic aortic valve . Moderate aortic valve stenosis. Vmax is 3.8 m/s. Transvalvular gradients are - Peak: 58  mmHg, Mean: 30 mmHg. Aortic valve area calculated from the continuity equation is 1.3 cm2. Acceleration time is 56 ms. Tricuspid Valve Structurally normal tricuspid valve without significant stenosis or regurgitation. Pulmonic Valve Structurally normal pulmonic valve without significant stenosis or regurgitation. Pericardium Normal pericardium without effusion. Aorta Normal aortic root for body surface area. The ascending aorta diameter is 3.1 cm. Oumar Mello MD (Electronically Signed) Final Date:     04 November 2022                 16:30      Micro:  Results       Procedure Component Value Units Date/Time    CULTURE WOUND W/ GRAM STAIN [[id number]] Collected: 11/17/22 1750    Order Status: Completed Specimen: Wound Updated: 11/19/22 1010     Significant Indicator NEG     Source WND     Site Epidural Hematoma 2     Culture Result No growth at 48 hours.     Gram Stain Result Few WBCs.  No organisms seen.      Narrative:      Surgery - swabs  received    Anaerobic Culture [[id number]] Collected: 11/17/22 1750    Order Status: Completed Specimen: Wound Updated: 11/19/22 1010     Significant Indicator NEG     Source WND     Site Epidural Hematoma 2     Culture Result Culture in progress.    Narrative:      Surgery - swabs received    CULTURE WOUND W/ GRAM STAIN [[id number]] Collected: 11/17/22 1750    Order Status: Completed Specimen: Wound Updated: 11/19/22 1009     Significant Indicator NEG     Source WND     Site Epidural Hematoma 1     Culture Result Culture in progress.     Gram Stain Result Few WBCs.  No organisms seen.      Narrative:      Surgery - swabs received    Anaerobic Culture [[id number]] Collected: 11/17/22 1750    Order Status: Completed Specimen: Wound Updated: 11/19/22 1009     Significant Indicator NEG     Source WND     Site Epidural Hematoma 1     Culture Result Culture in progress.    Narrative:      Surgery - swabs received    GRAM STAIN [[id number]] Collected: 11/17/22 1750    Order Status: Completed Specimen: Wound Updated: 11/18/22 0649     Significant Indicator .     Source WND     Site Epidural Hematoma 2     Gram Stain Result Few WBCs.  No organisms seen.      Narrative:      Surgery - swabs received    GRAM STAIN [[id number]] Collected: 11/17/22 1750    Order Status: Completed Specimen: Wound Updated: 11/18/22 0649     Significant Indicator .     Source WND     Site Epidural Hematoma 1     Gram Stain Result Few WBCs.  No organisms seen.      Narrative:      Surgery - swabs received        IMPRESSION:  MRI 11/16      Interval decompression of L2-3 with resolution of previously seen dorsal epidural abscess.     Postoperative changes noted with midline laminectomy at L2-3, L3-4 and L4-5 with a fluid collection that impinges upon the dorsal aspect of the thecal sac and causes severe cauda equina compression. This fluid collection could represent a CSF leak or   seroma. There is no definite rim enhancement to suggest infection.      Second poorly marginated fluid collection identified in the subcutaneous soft tissues underlying the incision.     Mild epidural enhancement is noted along the anterior margin of the fluid collection in the laminectomy site, however this probably represents reactive postoperative dural enhancement. No definite evidence of an epidural infectious process is identified.     Moderate right foraminal narrowing at L1-2, severe left foraminal narrowing at L2-3 and moderate left foraminal narrowing at L3-4 with bilateral moderate L4-5 neural foraminal narrowing and severe bilateral L5-S1 foraminal narrowing. These changes are   stable from the previous study.    Assessment:  Active Hospital Problems    Diagnosis     *Spinal stenosis of lumbar region (L3-5) with neurogenic claudication and L foot drop/weakness, valvular heart disease [M48.062]     Epidural abscess [G06.2]     Hypertensive urgency [I16.0]     Degenerative lumbar spinal stenosis [M48.061]     Obesity (BMI 30-39.9) [E66.9]     Dyslipidemia [E78.5]     Coronary artery disease of native artery of native heart with stable angina pectoris (HCC) [I25.118]        Assessment:  66 y.o.  admitted 11/4/2022. Pt has a past medical history of aortic valve replacement 2016, gout, L3-L5 severe spinal stenosis with left foot drop and chronic back pain.      Hospital Course:   MRI lumbar spine 11/4 with a 10 x 6 x 19 mm posterior epidural abscess at the level of L2-L3 which is new.  Also noted severe central canal stenosis at this level and severe degenerative canal stenosis at L3-L5.  Patient went to the OR with orthopedic surgery on 11/7 for posterior lumbar decompression/laminectomy at levels L2-S1.  Epidural collection was found which was reported as thick without a fluid component.  This was sent for culture.  Patient states he was on no antibiotics prior to admit.  Started on broad-spectrum antibiotics on 11/4 with surgery on 11/7 which will likely decrease culture  yield.   Repeat MRI 11/16 with spinal compression from hematoma     Problem List   Leukocytosis, persistent  Lumbar spine epidural abscess  -s/p OR 11/7  -MRI shows resolved L2/3 but Postoperative changes noted with midline laminectomy at L2-3, L3-4 and L4-5 with a fluid collection that impinges upon the dorsal aspect of the thecal sac and causes severe cauda equina compression. Fluid collection CSF leak or hematoma or seroma. No definite rim enhancement to suggest infection.  -s/p OR 11/17 Hematoma found-cxs neg to date  Degenerative central canal stenosis  -Status post OR as described above  History of aortic valve replacement  History of gout     Plan   - Blood cultures neg  -Cultures negative to date  - Follow-up OR cultures, no growth at 72 hours and requested micro lab holding for 14 days.    -Fu 11/17 op cultures as well  -Continue vancomycin with pharmacy to dose and ceftriaxone for now  Currently recommend a 6-week IV antibiotic course and on discharge can transition to daptomycin 6-8 mg/kg daily continue ceftriaxone 2 g daily with an end date of 12/31/22 unless new positive culture  -Plan for repeat MRI next week  -Encouraged PT/OT        Dispo: Orders for home infusion and scanned into epic under media tab  Now plan is for R-[facility] once cleared for discharge    PICC: Yes, in place

## 2022-11-19 NOTE — CARE PLAN
The patient is Stable - Low risk of patient condition declining or worsening    Shift Goals  Clinical Goals: Pain control, Safety  Patient Goals: Pain control  Family Goals: Not present    Progress made toward(s) clinical / shift goals:    Problem: Pain - Standard  Goal: Alleviation of pain or a reduction in pain to the patient’s comfort goal  Outcome: Progressing  Note: Patient educated on pain scale and to notify RN of pain. Medicated per MAR And repositioned.       Problem: Fall Risk  Goal: Patient will remain free from falls  Outcome: Progressing     Problem: Skin Integrity  Goal: Skin integrity is maintained or improved  Outcome: Progressing       Patient is not progressing towards the following goals:

## 2022-11-19 NOTE — PROGRESS NOTES
Hospital Medicine Daily Progress Note    Date of Service  11/19/2022    Chief Complaint  Abimael Hamilton is a 66 y.o. male admitted 11/4/2022 with worsening back pain    Hospital Course      This is a 66 -year-old male with a past medical history significant for hypertension, hyperlipidemia, CAD status post CABG in 1998, 2016, aortic valve replacement in 2016, post stent in 4/2022, morbid obesity, gout, L3 L5 severe spinal stenosis with left foot drop, chronic back pain was admitted on 11/4/2022 with worsening of lower back pain.      MRI of the lumbar spine  on 11/04 noted 10 x 6 x 19 mm posterior epidural abscess at the level of L2-L3 which is new from previous study. There is severe central canal stenosis at this level.  Severe degenerative central canal stenosis at L3-L4 and L4-5.    Cardiology consulted for preop clearance given patient significant cardiac history, nuclear stress test noted small area of ischemia involving the lateral wall.  Patient cleared by cardiology, moderate risk for the surgery.     Surgery was consulted, patient went to the OR with Dr. Sparks on 11/7/2022.  Biopsy was negative, patient was initially started on IV Rocephin.  Infectious disease was consulted.  They recommended 6-week IV antibiotic course and on discharge can transition to daptomycin 6-AMG/KG daily and continue ceftriaxone 2 g daily with an end date of 12/19/2022.  Initially, plan for home infusion antibiotics but patient declined and would prefer to do outpatient infusion center.  Orders were placed by infectious disease.  OR cultures negative but there is concern for worsening spinal infection based on symptoms, imaging, increased WBCs.  Repeat MRI cervical, thoracic, lumbar spine pending.    Interval Problem Update  Patient was seen and examined at bedside.  No acute events overnight. Patient is resting comfortably in bed and in no acute distress.     11/7 Posterior lumbar decompression using laminectomy type approach,  Levels: L2-L3-L4 L5-S1  11/17 Lumbar I&D  Rocephin/Dapto  ID recs  Ortho recs - maintian drain til 11/24, no anticoagulation til 11/24    I have discussed this patient's plan of care and discharge plan at IDT rounds today with Case Management, Nursing, Nursing leadership, and other members of the IDT team.    Consultants/Specialty  cardiology, infectious disease, and neurosurgery    Code Status  Full Code    Disposition  Patient is not medically cleared for discharge.   Anticipate discharge to  home health versus skilled nursing facility versus inpatient rehab .  I have placed the appropriate orders for post-discharge needs.    Review of Systems  Review of Systems   Constitutional:  Negative for chills, fever and malaise/fatigue.   HENT:  Negative for hearing loss, sinus pain and sore throat.    Respiratory:  Negative for cough, sputum production and shortness of breath.    Cardiovascular:  Positive for leg swelling. Negative for chest pain, palpitations and orthopnea.   Gastrointestinal:  Negative for abdominal pain, diarrhea, nausea and vomiting.   Genitourinary:  Negative for dysuria and flank pain.   Musculoskeletal:  Positive for back pain and myalgias. Negative for falls.   Neurological:  Positive for focal weakness (Left lower extremity) and weakness (LLE, dropfoot to LLE). Negative for tingling and headaches.   Psychiatric/Behavioral:  Negative for depression and substance abuse. The patient is not nervous/anxious and does not have insomnia.    All other systems reviewed and are negative.     Physical Exam  Temp:  [36.2 °C (97.1 °F)-37.1 °C (98.8 °F)] 36.8 °C (98.2 °F)  Pulse:  [77-87] 80  Resp:  [16-18] 16  BP: (112-136)/(63-76) 116/76  SpO2:  [95 %-98 %] 98 %    Physical Exam  Vitals and nursing note reviewed.   Constitutional:       General: He is not in acute distress.     Appearance: He is obese. He is ill-appearing. He is not toxic-appearing.   HENT:      Head: Normocephalic.      Right Ear: External  ear normal.      Left Ear: External ear normal.      Nose: Nose normal. No congestion.      Mouth/Throat:      Mouth: Mucous membranes are moist.      Pharynx: No oropharyngeal exudate.   Eyes:      General: No scleral icterus.     Pupils: Pupils are equal, round, and reactive to light.   Cardiovascular:      Rate and Rhythm: Normal rate and regular rhythm.      Pulses: Normal pulses.      Heart sounds: Murmur heard.     No gallop.   Pulmonary:      Effort: Pulmonary effort is normal.      Breath sounds: Normal breath sounds. No wheezing.   Abdominal:      General: Abdomen is flat. Bowel sounds are normal.      Palpations: Abdomen is soft.      Tenderness: There is no abdominal tenderness. There is no guarding or rebound.   Musculoskeletal:      Right lower leg: Edema (+1) present.      Left lower leg: Edema (+1) present.      Comments: Dropfoot to left lower extremity   Skin:     General: Skin is warm and dry.      Capillary Refill: Capillary refill takes less than 2 seconds.      Findings: No bruising or erythema.      Comments: Prevena incisional VAC to midline thoracic and lumbar spine.  Small amount of serosanguineous fluid noted.  Adequate suction seen.  Erythema surrounding Janet incision VAC dressing.    Diffuse tattoos   Neurological:      Mental Status: He is alert and oriented to person, place, and time. Mental status is at baseline.      Motor: Weakness (Left lower extremity) present.   Psychiatric:         Mood and Affect: Mood normal.         Behavior: Behavior normal.       Fluids    Intake/Output Summary (Last 24 hours) at 11/19/2022 1405  Last data filed at 11/19/2022 0400  Gross per 24 hour   Intake --   Output 30 ml   Net -30 ml       Laboratory  Recent Labs     11/17/22  0410 11/18/22  0528 11/19/22  0425   WBC 19.1* 18.0* 15.7*   RBC 4.13* 3.76* 3.69*   HEMOGLOBIN 13.2* 11.7* 11.5*   HEMATOCRIT 39.1* 35.9* 34.8*   MCV 94.7 95.5 94.3   MCH 32.0 31.1 31.2   MCHC 33.8 32.6* 33.0*   RDW 48.6 49.6  49.1   PLATELETCT 229 205 190   MPV 10.1 9.4 9.7     Recent Labs     11/17/22  0720 11/18/22  0528 11/19/22  0425   SODIUM 133* 135 137   POTASSIUM 4.1 4.1 3.9   CHLORIDE 98 102 102   CO2 25 25 27   GLUCOSE 152* 127* 191*   BUN 24* 25* 24*   CREATININE 0.74 0.74 0.81   CALCIUM 8.5 8.1* 8.2*                   Imaging  MR-LUMBAR SPINE-WITH & W/O   Final Result         Interval decompression of L2-3 with resolution of previously seen dorsal epidural abscess.      Postoperative changes noted with midline laminectomy at L2-3, L3-4 and L4-5 with a fluid collection that impinges upon the dorsal aspect of the thecal sac and causes severe cauda equina compression. This fluid collection could represent a CSF leak or    seroma. There is no definite rim enhancement to suggest infection.      Second poorly marginated fluid collection identified in the subcutaneous soft tissues underlying the incision.      Mild epidural enhancement is noted along the anterior margin of the fluid collection in the laminectomy site, however this probably represents reactive postoperative dural enhancement. No definite evidence of an epidural infectious process is identified.      Moderate right foraminal narrowing at L1-2, severe left foraminal narrowing at L2-3 and moderate left foraminal narrowing at L3-4 with bilateral moderate L4-5 neural foraminal narrowing and severe bilateral L5-S1 foraminal narrowing. These changes are    stable from the previous study.      MR-THORACIC SPINE-WITH & W/O   Final Result         Minimal degenerative changes in the thoracic spine. There is no significant canal stenosis or foraminal narrowing. No abnormal enhancement is identified.      MR-CERVICAL SPINE-WITH & W/O   Final Result         1.  Severe canal stenosis at C4-5 with cord compression and abnormal spinal cord signal representing myelomalacia due to chronic compressive myelopathy. There is also severe right and moderate left foraminal narrowing at this  level.      2.   Moderate canal stenosis at C5-6 with severe bilateral foraminal narrowing.      3.  No abnormal enhancement is identified in the cervical spine.      IR-PICC LINE PLACEMENT W/ GUIDANCE > AGE 5   Final Result                  Ultrasound-guided PICC placement performed by qualified nursing staff as    above.          DX-SPINE-ANY ONE VIEW   Final Result      Fluoroscopic image(s) obtained during lumbar spine instrumentation. Please see the patient's chart for full procedural details.      Fluoroscopy time 4 seconds.         DX-PORTABLE FLUORO > 1 HOUR   Final Result      Portable fluoroscopy utilized for 4 seconds.         INTERPRETING LOCATION: [address] [place] [address], [place], [id number]      [id number] VIEW   Final Result      1.  Negative single view abdomen without gross evidence of a radiopaque surgical device.      NM-CARDIAC STRESS TEST   Final Result      MR-LUMBAR SPINE-WITH & W/O   Final Result      1.  There is an approximately 10 x 6 x 19 mm sized posterior epidural abscess at the level of L2-3. This is new since the previous study. There is severe central canal stenosis at this level.   2.  Severe degenerative central canal stenosis at L3-4 and L4-5.   3.  Degenerative disease as described above.   4.  There is an approximately 1.9 cm sized T2 hypointense lesion in the right kidney likely representing complicated cyst. There is also a simple cyst in the right kidney. This is unchanged since the previous study.      CT-HEAD W/O   Final Result      1.  Head CT without contrast within normal limits. No evidence of acute cerebral infarction, hemorrhage or mass lesion.   2.  Atherosclerotic vascular calcification.         EC-ECHOCARDIOGRAM COMPLETE W/O CONT   Final Result             Assessment/Plan  * Epidural abscess- (present on admission)  Assessment & Plan  S/p L2-SI LAMINECTOMY with Dr. Sparks 11/7/2022 11/7 Posterior lumbar decompression using laminectomy type approach, Levels: L2-L3-L4 L5-S1  11/17  Lumbar I&D  Rocephin/Dapto  ID recs  Ortho recs - maintian drain til 11/24, no anticoagulation til 11/24    daptomycin 6-8 mg/kg daily continue ceftriaxone 2 g daily with an end date of 12/19/22 if no worsening abscess or complication      Hypertensive urgency  Assessment & Plan  PRN IV antihypertensives  Continue amlodipine    Spinal stenosis of lumbar region (L3-5) with neurogenic claudication and L foot drop/weakness, valvular heart disease  Assessment & Plan  Pain control and muscle relaxers  MRI LS spine done was notable for 10 x 6 x 19 mm posterior epidural abscess at the level of L2-L3 which is new from previous study.  There is severe central canal stenosis at this level.  Severe degenerative central canal stenosis at L3-L4 and L4-5.    -- Had surgery  On 11/07, culture pending, will continue broad-spectrum antibiotics including vancomycin and unasyn; ID recs switching to Rocephin plus  daptomycin till 12/19/2022.  While patient was on IV antibiotics, need CBC, CMP, ESR, CRP, CPK.    Pt and ot recommends home with home health assessment ordered  Per neurosurgery recommendation  Patient has received Decadron  11/17 repeat MRI showing resolution of dorsal epidural abscess, neurosurgery taking patient to surgery today for I&D of lumbar spine.        Obesity (BMI 30-39.9)- (present on admission)  Assessment & Plan  Lifestyle modification including diet exercise and weight loss as an outpatient      Contact dermatitis  Assessment & Plan  Patient with uneven erythema surrounding Janet incision VAC.  Erythematous consistent with sensitivity/reaction to dressing, does not appear infectious.  Patient was seen by neurosurgery who states Prevena wound VAC may be discontinued and dressings to be applied.    Hyperglycemia  Assessment & Plan  A1c 3/20/2022 was 6.2-prediabetic.  Patient hyperglycemic likely due to steroid use  Continue insulin sliding scale  Accuchecks and hypoglycemic protocol    Dyslipidemia- (present  on admission)  Assessment & Plan  Hold home evolocumab    Coronary artery disease of native artery of native heart with stable angina pectoris (HCC)- (present on admission)  Assessment & Plan  C/w metoprolol and lisinopril, Imdur    Stress test- a small tomoderate sized focus of ischemia involving the lateral wall within the cardiac base and midzone. Findings were discussed with cardiology Dr. Dozier - no further inpatient cardiology evaluation/procedure recommended. Pt is a moderate risk patient.    -- History of aspirin and Plavix was held considering patient undergoing surgery.  I discussed with Dr. Sparks  stated okay starting the patient on aspirin and Plavix.  Started 11/09/2022.   -- monitor leg strength        VTE prophylaxis: SCDs/TEDs    I have performed a physical exam and reviewed and updated ROS and Plan today (11/19/2022). In review of yesterday's note (11/18/2022), there are no changes except as documented above.

## 2022-11-19 NOTE — THERAPY
Missed Therapy     Patient Name: Abimael Hamilton  Age:  66 y.o., Sex:  male  Medical Record #: [id number]  Today's Date: 11/19/2022    Attempted to re-eval pt today, he refused tx due to sleeping. Would like for PT to follow up another time. Will plan to attempt again another day.        11/19/22 7175   Initial Contact Note    Initial Contact Note Order Received and Verified, Physical Therapy Evaluation in Progress with Full Report to Follow.   Interdisciplinary Plan of Care Collaboration   IDT Collaboration with  Nursing   Collaboration Comments Attempted to see pt for PT re eval. Pt reported he was up all day and doesn't want to participate in PT. He requested to continue sleeping. Will attempt again another day.   Session Information   Date / Session Number  11/19 post op follow up attempted x3

## 2022-11-19 NOTE — CARE PLAN
The patient is Stable - Low risk of patient condition declining or worsening    Shift Goals  Clinical Goals: Pain control, safety  Patient Goals: Pain control  Family Goals: Not present    Progress made toward(s) clinical / shift goals:    Problem: Pain - Standard  Goal: Alleviation of pain or a reduction in pain to the patient’s comfort goal  Outcome: Progressing  Note: Patient states pain is relieved with current pain management regimen. Patient medicated per MAR. Patient educated on nonpharmacologic methods to relieve pain such as cold packs, distraction, and deep breathing.      Problem: Skin Integrity  Goal: Skin integrity is maintained or improved  Outcome: Progressing  Note: Patient shows no signs of new or worsening skin integrity. Patient educated on importance of ambulation with supervision.      Problem: Neuro Status  Goal: Neuro status will remain stable or improve  Outcome: Progressing

## 2022-11-20 NOTE — PROGRESS NOTES
"Spine Surgery Progress Note    66 y.o. male sp L2-S1 laminectomy on 11/7/2022 for lumbar stenosis and likely epidural abscess, recurrent weakness 2 days ago, MRI demonstrated fluid collection with significant stenosis. Sp I&D lumbar spine for epidural hematoma on 11/17    S:  ERIBERTO overnight, sig improvement in LE pain and strength since surgery though continues with weakness in left leg  No changes overnight  Drain remains in place     O:  /76   Pulse 82   Temp 36.8 °C (98.2 °F) (Temporal)   Resp 17   Ht 1.676 m (5' 6\")   Wt 98.7 kg (217 lb 9.5 oz)   SpO2 96%   BMI 35.12 kg/m²     Gen: WNWD NAD  Breathing comfortably  VSS  Incision has erythema around it in distribution of prevena wound vac  Right: 5/5 throughout   Left: 4/5 HF, 4/5 KE, 3/5 EHL TA, 5/5 PF  Sensation grossly intact in BLE, decresed left L4 L5 distribution  Drain output decreasing    Lab Results   Component Value Date/Time    WBC 15.7 (H) 11/19/2022 04:25 AM    RBC 3.69 (L) 11/19/2022 04:25 AM    HEMOGLOBIN 11.5 (L) 11/19/2022 04:25 AM    HEMATOCRIT 34.8 (L) 11/19/2022 04:25 AM    MCV 94.3 11/19/2022 04:25 AM    MCH 31.2 11/19/2022 04:25 AM    MCHC 33.0 (L) 11/19/2022 04:25 AM    MPV 9.7 11/19/2022 04:25 AM    NEUTSPOLYS 85.00 (H) 11/06/2022 01:13 AM    LYMPHOCYTES 9.90 (L) 11/06/2022 01:13 AM    MONOCYTES 4.30 11/06/2022 01:13 AM    EOSINOPHILS 0.00 11/06/2022 01:13 AM    BASOPHILS 0.10 11/06/2022 01:13 AM      Lab Results   Component Value Date/Time    SODIUM 137 11/19/2022 04:25 AM    POTASSIUM 3.9 11/19/2022 04:25 AM    CHLORIDE 102 11/19/2022 04:25 AM    CO2 27 11/19/2022 04:25 AM    GLUCOSE 191 (H) 11/19/2022 04:25 AM    BUN 24 (H) 11/19/2022 04:25 AM    CREATININE 0.81 11/19/2022 04:25 AM    BUNCREATRAT 16 07/29/2016 10:22 AM          AP: 66 y.o. male sp L2-S1 laminectomy on 11/7/2022 for possible epidural abscess, sp I&D lumbar spine 11/17 for epidural hematoma.     Overall improvement today  Continue with Drain today  Continue " plan:    - Recommend holding anticoagulation and antiplatelete x 7 days (11/24)  - Recommend continued drain x 7 days (11/24)  - PT/OT  - Reg diet  - Abx per primary and ID

## 2022-11-20 NOTE — CARE PLAN
The patient is Stable - Low risk of patient condition declining or worsening    Shift Goals  Clinical Goals: pain control, safety  Patient Goals: pain control, rest  Family Goals: not present    Progress made toward(s) clinical / shift goals:    Problem: Pain - Standard  Goal: Alleviation of pain or a reduction in pain to the patient’s comfort goal  Outcome: Progressing     Problem: Fall Risk  Goal: Patient will remain free from falls  Outcome: Progressing     Problem: Skin Integrity  Goal: Skin integrity is maintained or improved  Outcome: Progressing       Patient is not progressing towards the following goals:

## 2022-11-20 NOTE — CARE PLAN
The patient is Stable - Low risk of patient condition declining or worsening    Shift Goals  Clinical Goals: pain control, antibiotics, wound care  Patient Goals: pain control, rest  Family Goals: not present    Progress made toward(s) clinical / shift goals: Pain controlled with IV and oral medication IV only once after therapy.  Afebrile on IV antibiotics as ordered. Dressing changed due to peeling up upon initial assessment replaced gauze and the island dressings that were there.  Has DENISE with outuput see flow sheets.     Patient is not progressing towards the following goals:

## 2022-11-20 NOTE — PROGRESS NOTES
Hospital Medicine Daily Progress Note    Date of Service  11/20/2022    Chief Complaint  Abimael Hamilton is a 66 y.o. male admitted 11/4/2022 with worsening back pain    Hospital Course      This is a 66 -year-old male with a past medical history significant for hypertension, hyperlipidemia, CAD status post CABG in 1998, 2016, aortic valve replacement in 2016, post stent in 4/2022, morbid obesity, gout, L3 L5 severe spinal stenosis with left foot drop, chronic back pain was admitted on 11/4/2022 with worsening of lower back pain.      MRI of the lumbar spine  on 11/04 noted 10 x 6 x 19 mm posterior epidural abscess at the level of L2-L3 which is new from previous study. There is severe central canal stenosis at this level.  Severe degenerative central canal stenosis at L3-L4 and L4-5.    Cardiology consulted for preop clearance given patient significant cardiac history, nuclear stress test noted small area of ischemia involving the lateral wall.  Patient cleared by cardiology, moderate risk for the surgery.     Surgery was consulted, patient went to the OR with Dr. Sparks on 11/7/2022.  Biopsy was negative, patient was initially started on IV Rocephin.  Infectious disease was consulted.  They recommended 6-week IV antibiotic course and on discharge can transition to daptomycin 6-AMG/KG daily and continue ceftriaxone 2 g daily with an end date of 12/19/2022.  Initially, plan for home infusion antibiotics but patient declined and would prefer to do outpatient infusion center.  Orders were placed by infectious disease.  OR cultures negative but there is concern for worsening spinal infection based on symptoms, imaging, increased WBCs.  Repeat MRI cervical, thoracic, lumbar spine pending.    Interval Problem Update  Patient was seen and examined at bedside.  No acute events overnight. Patient is resting comfortably in bed and in no acute distress.     11/7 Posterior lumbar decompression using laminectomy type approach,  Levels: L2-L3-L4 L5-S1  11/17 Lumbar I&D  Rocephin/Dapto  ID recs  Ortho recs - maintian drain til 11/24, no anticoagulation til 11/24    I have discussed this patient's plan of care and discharge plan at IDT rounds today with Case Management, Nursing, Nursing leadership, and other members of the IDT team.    Consultants/Specialty  cardiology, infectious disease, and neurosurgery    Code Status  Full Code    Disposition  Patient is not medically cleared for discharge.   Anticipate discharge to  home health versus skilled nursing facility versus inpatient rehab .  I have placed the appropriate orders for post-discharge needs.    Review of Systems  Review of Systems   Constitutional:  Negative for chills, fever and malaise/fatigue.   HENT:  Negative for hearing loss, sinus pain and sore throat.    Respiratory:  Negative for cough, sputum production and shortness of breath.    Cardiovascular:  Positive for leg swelling. Negative for chest pain, palpitations and orthopnea.   Gastrointestinal:  Negative for abdominal pain, diarrhea, nausea and vomiting.   Genitourinary:  Negative for dysuria and flank pain.   Musculoskeletal:  Positive for back pain and myalgias. Negative for falls.   Neurological:  Positive for focal weakness (Left lower extremity) and weakness (LLE, dropfoot to LLE). Negative for tingling and headaches.   Psychiatric/Behavioral:  Negative for depression and substance abuse. The patient is not nervous/anxious and does not have insomnia.    All other systems reviewed and are negative.     Physical Exam  Temp:  [36.8 °C (98.2 °F)-37 °C (98.6 °F)] 36.8 °C (98.2 °F)  Pulse:  [80-87] 80  Resp:  [16-18] 17  BP: (115-138)/(76-98) 127/88  SpO2:  [94 %-96 %] 96 %    Physical Exam  Vitals and nursing note reviewed.   Constitutional:       General: He is not in acute distress.     Appearance: He is obese. He is ill-appearing. He is not toxic-appearing.   HENT:      Head: Normocephalic.      Right Ear: External ear  normal.      Left Ear: External ear normal.      Nose: Nose normal. No congestion.      Mouth/Throat:      Mouth: Mucous membranes are moist.      Pharynx: No oropharyngeal exudate.   Eyes:      General: No scleral icterus.     Pupils: Pupils are equal, round, and reactive to light.   Cardiovascular:      Rate and Rhythm: Normal rate and regular rhythm.      Pulses: Normal pulses.      Heart sounds: Murmur heard.     No gallop.   Pulmonary:      Effort: Pulmonary effort is normal.      Breath sounds: Normal breath sounds. No wheezing.   Abdominal:      General: Abdomen is flat. Bowel sounds are normal.      Palpations: Abdomen is soft.      Tenderness: There is no abdominal tenderness. There is no guarding or rebound.   Musculoskeletal:      Right lower leg: Edema (+1) present.      Left lower leg: Edema (+1) present.      Comments: Dropfoot to left lower extremity   Skin:     General: Skin is warm and dry.      Capillary Refill: Capillary refill takes less than 2 seconds.      Findings: No bruising or erythema.      Comments: Prevena incisional VAC to midline thoracic and lumbar spine.  Small amount of serosanguineous fluid noted.  Adequate suction seen.  Erythema surrounding Janet incision VAC dressing.    Diffuse tattoos   Neurological:      Mental Status: He is alert and oriented to person, place, and time. Mental status is at baseline.      Motor: Weakness (Left lower extremity) present.   Psychiatric:         Mood and Affect: Mood normal.         Behavior: Behavior normal.   Patient was seen and examined at bedside. No changes in physical exam from prior evaluation.      Fluids    Intake/Output Summary (Last 24 hours) at 11/20/2022 1535  Last data filed at 11/20/2022 1505  Gross per 24 hour   Intake 960 ml   Output 60 ml   Net 900 ml       Laboratory  Recent Labs     11/18/22  0528 11/19/22  0425   WBC 18.0* 15.7*   RBC 3.76* 3.69*   HEMOGLOBIN 11.7* 11.5*   HEMATOCRIT 35.9* 34.8*   MCV 95.5 94.3   MCH 31.1  31.2   MCHC 32.6* 33.0*   RDW 49.6 49.1   PLATELETCT 205 190   MPV 9.4 9.7     Recent Labs     11/18/22  0528 11/19/22  0425   SODIUM 135 137   POTASSIUM 4.1 3.9   CHLORIDE 102 102   CO2 25 27   GLUCOSE 127* 191*   BUN 25* 24*   CREATININE 0.74 0.81   CALCIUM 8.1* 8.2*                   Imaging  MR-LUMBAR SPINE-WITH & W/O   Final Result         Interval decompression of L2-3 with resolution of previously seen dorsal epidural abscess.      Postoperative changes noted with midline laminectomy at L2-3, L3-4 and L4-5 with a fluid collection that impinges upon the dorsal aspect of the thecal sac and causes severe cauda equina compression. This fluid collection could represent a CSF leak or    seroma. There is no definite rim enhancement to suggest infection.      Second poorly marginated fluid collection identified in the subcutaneous soft tissues underlying the incision.      Mild epidural enhancement is noted along the anterior margin of the fluid collection in the laminectomy site, however this probably represents reactive postoperative dural enhancement. No definite evidence of an epidural infectious process is identified.      Moderate right foraminal narrowing at L1-2, severe left foraminal narrowing at L2-3 and moderate left foraminal narrowing at L3-4 with bilateral moderate L4-5 neural foraminal narrowing and severe bilateral L5-S1 foraminal narrowing. These changes are    stable from the previous study.      MR-THORACIC SPINE-WITH & W/O   Final Result         Minimal degenerative changes in the thoracic spine. There is no significant canal stenosis or foraminal narrowing. No abnormal enhancement is identified.      MR-CERVICAL SPINE-WITH & W/O   Final Result         1.  Severe canal stenosis at C4-5 with cord compression and abnormal spinal cord signal representing myelomalacia due to chronic compressive myelopathy. There is also severe right and moderate left foraminal narrowing at this level.      2.    Moderate canal stenosis at C5-6 with severe bilateral foraminal narrowing.      3.  No abnormal enhancement is identified in the cervical spine.      IR-PICC LINE PLACEMENT W/ GUIDANCE > AGE 5   Final Result                  Ultrasound-guided PICC placement performed by qualified nursing staff as    above.          DX-SPINE-ANY ONE VIEW   Final Result      Fluoroscopic image(s) obtained during lumbar spine instrumentation. Please see the patient's chart for full procedural details.      Fluoroscopy time 4 seconds.         DX-PORTABLE FLUORO > 1 HOUR   Final Result      Portable fluoroscopy utilized for 4 seconds.         INTERPRETING LOCATION: [address] [place] [address], [facility], [id number]      [id number] VIEW   Final Result      1.  Negative single view abdomen without gross evidence of a radiopaque surgical device.      NM-CARDIAC STRESS TEST   Final Result      MR-LUMBAR SPINE-WITH & W/O   Final Result      1.  There is an approximately 10 x 6 x 19 mm sized posterior epidural abscess at the level of L2-3. This is new since the previous study. There is severe central canal stenosis at this level.   2.  Severe degenerative central canal stenosis at L3-4 and L4-5.   3.  Degenerative disease as described above.   4.  There is an approximately 1.9 cm sized T2 hypointense lesion in the right kidney likely representing complicated cyst. There is also a simple cyst in the right kidney. This is unchanged since the previous study.      CT-HEAD W/O   Final Result      1.  Head CT without contrast within normal limits. No evidence of acute cerebral infarction, hemorrhage or mass lesion.   2.  Atherosclerotic vascular calcification.         EC-ECHOCARDIOGRAM COMPLETE W/O CONT   Final Result             Assessment/Plan  * Epidural abscess- (present on admission)  Assessment & Plan  S/p L2-SI LAMINECTOMY with Dr. Sparks 11/7/2022 11/7 Posterior lumbar decompression using laminectomy type approach, Levels: L2-L3-L4 L5-S1  11/17 Lumbar  I&D  Rocephin/Dapto  ID recs  Ortho recs - maintian drain til 11/24, no anticoagulation til 11/24    daptomycin 6-8 mg/kg daily continue ceftriaxone 2 g daily with an end date of 12/19/22 if no worsening abscess or complication      Spinal stenosis of lumbar region (L3-5) with neurogenic claudication and L foot drop/weakness, valvular heart disease  Assessment & Plan  Pain control and muscle relaxers  MRI LS spine done was notable for 10 x 6 x 19 mm posterior epidural abscess at the level of L2-L3 which is new from previous study.  There is severe central canal stenosis at this level.  Severe degenerative central canal stenosis at L3-L4 and L4-5.    -- Had surgery  On 11/07, culture pending, will continue broad-spectrum antibiotics including vancomycin and unasyn; ID recs switching to Rocephin plus  daptomycin till 12/19/2022.  While patient was on IV antibiotics, need CBC, CMP, ESR, CRP, CPK.    Pt and ot recommends home with home health assessment ordered  Per neurosurgery recommendation  Patient has received Decadron  11/17 repeat MRI showing resolution of dorsal epidural abscess, neurosurgery taking patient to surgery today for I&D of lumbar spine.        Hyperglycemia  Assessment & Plan  A1c 3/20/2022 was 6.2-prediabetic.  Patient hyperglycemic likely due to steroid use  Continue insulin sliding scale  Accuchecks and hypoglycemic protocol    Repeat A1C  Add lantus in setting of AM hyperglycemia    Hypertensive urgency  Assessment & Plan  PRN IV antihypertensives  Continue amlodipine    Coronary artery disease of native artery of native heart with stable angina pectoris (HCC)- (present on admission)  Assessment & Plan  C/w metoprolol and lisinopril, Imdur    Stress test- a small tomoderate sized focus of ischemia involving the lateral wall within the cardiac base and midzone. Findings were discussed with cardiology Dr. Dozier - no further inpatient cardiology evaluation/procedure recommended. Pt is a moderate  risk patient.    -- History of aspirin and Plavix was held considering patient undergoing surgery.  I discussed with Dr. Sparks  stated okay starting the patient on aspirin and Plavix.  Started 11/09/2022.   -- monitor leg strength     Contact dermatitis  Assessment & Plan  Patient with uneven erythema surrounding Janet incision VAC.  Erythematous consistent with sensitivity/reaction to dressing, does not appear infectious.  Patient was seen by neurosurgery who states Prevena wound VAC may be discontinued and dressings to be applied.    Obesity (BMI 30-39.9)- (present on admission)  Assessment & Plan  Lifestyle modification including diet exercise and weight loss as an outpatient      Dyslipidemia- (present on admission)  Assessment & Plan  Hold home evolocumab       VTE prophylaxis: SCDs/TEDs    I have performed a physical exam and reviewed and updated ROS and Plan today (11/20/2022). In review of yesterday's note (11/19/2022), there are no changes except as documented above.

## 2022-11-21 NOTE — THERAPY
Physical Therapy   Re-Assessment     Patient Name: Abimael Hamilton  Age:  66 y.o., Sex:  male  Medical Record #: [id number]  Today's Date: 11/20/2022    Precautions: Fall Risk;Spinal / Back Precautions   Comments: no brace    Assessment  PT re-consulted now s/p I&D lumbar wound on 11/17. Pt initially seen by PT 11/8 - 11/13 s/p L2-S1 laminectomy. Pt seen for re-assessment at this time, continues to demo mobility without assist, ambulated with FWW, no LOB. Pt presents with decr L DF and slight decr weight acceptance with gait. Pt would benefit from OPPT when cleared by surgeon. Pt appears functionally capable of return home. Patient will not be actively followed for acute physical therapy services at this time, however may be seen if requested by physician for 1 more visit within 30 days to address any discharge or equipment needs.    Plan  DC Equipment Recommendations: FWW already delivered  Discharge Recommendations: Recommend outpatient physical therapy services to address higher level deficits (when cleared by surgeon)       11/20/22 1159   Cognition    Level of Consciousness Alert   Comments able to recall post-op spine precautions from first sx, receptive to edu regarding appropriate activity progression with return home and completing daily tasks while maintaining precautions   Balance   Sitting Balance (Static) Good   Sitting Balance (Dynamic) Fair +   Standing Balance (Static) Fair +   Standing Balance (Dynamic) Fair   Weight Shift Sitting Good   Weight Shift Standing Fair   Comments standing with FWW   Gait Analysis   Gait Level Of Assist Supervised   Assistive Device Front Wheel Walker   Distance (Feet) 300   Deviation Bradykinetic;Antalgic;Decreased Heel Strike   Weight Bearing Status no restrictions   Skilled Intervention Verbal Cuing;Compensatory Strategies   Comments cues for heel strike/foot flat on L, no LOB ambulating, able to step up/down scale without difficulty   Bed Mobility    Comments up  pre/post, reports completes log roll independently and states no concerns for DC   Functional Mobility   Sit to Stand Supervised

## 2022-11-21 NOTE — CARE PLAN
Problem: Pain - Standard  Goal: Alleviation of pain or a reduction in pain to the patient’s comfort goal  Outcome: Progressing   The patient is Stable - Low risk of patient condition declining or worsening    Shift Goals  Clinical Goals: Pain control  Patient Goals: pain control, rest  Family Goals: not present    Progress made toward(s) clinical / shift goals:  Will continue with pain assessment and management.     Patient is not progressing towards the following goals:n/a

## 2022-11-22 NOTE — CARE PLAN
The patient is Stable - Low risk of patient condition declining or worsening    Shift Goals  Clinical Goals: Pain control  Patient Goals: Rest  Family Goals: not present    Progress made toward(s) clinical / shift goals:    Problem: Pain - Standard  Goal: Alleviation of pain or a reduction in pain to the patient’s comfort goal  Outcome: Progressing     Problem: Fall Risk  Goal: Patient will remain free from falls  Outcome: Progressing     Problem: Skin Integrity  Goal: Skin integrity is maintained or improved  Outcome: Progressing     Problem: Lumbar/Thoracic Spine Surgery  Goal: Post-Operative Lumbar/Thoracic Spine Surgery: Patient will achieve optimal post-surgical outcomes  Outcome: Progressing     Problem: Pain - Post Surgery  Goal: Alleviation or reduction of pain post surgery  Outcome: Progressing     Problem: Incision Care  Goal: Optimal post surgical incision care  Outcome: Progressing

## 2022-11-22 NOTE — PROGRESS NOTES
Hospital Medicine Daily Progress Note    Date of Service  11/22/2022    Chief Complaint  Abimael Hamilton is a 66 y.o. male admitted 11/4/2022 with worsening back pain    Hospital Course    This is a 66 -year-old male with a past medical history significant for hypertension, hyperlipidemia, CAD status post CABG in 1998, 2016, aortic valve replacement in 2016, post stent in 4/2022, morbid obesity, gout, L3 L5 severe spinal stenosis with left foot drop, chronic back pain was admitted on 11/4/2022 with worsening of lower back pain.    MRI of the lumbar spine  on 11/04 noted 10 x 6 x 19 mm posterior epidural abscess at the level of L2-L3 which is new from previous study. There is severe central canal stenosis at this level.  Severe degenerative central canal stenosis at L3-L4 and L4-5.    Cardiology consulted for preop clearance given patient significant cardiac history, nuclear stress test noted small area of ischemia involving the lateral wall.  Patient cleared by cardiology, moderate risk for the surgery.     Surgery was consulted, patient went to the OR with Dr. Sparks on 11/7/2022.  Biopsy was negative, patient was initially started on IV Rocephin.  Infectious disease was consulted.  They recommended 6-week IV antibiotic course and on discharge can transition to daptomycin 6MG/KG daily and continue ceftriaxone 2 g daily with an end date of 12/19/2022.      MR C/T/L-spine was ordered with request of Dr. 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  Postop changes at L2-L3, L3-L4, L4-L5 with a fluid collection that impinges upon the dorsal aspect of thecal sac and causing severe cauda equina compression.  Fluid collection could represent CSF leak or seroma.  Dr. Sparks was contacted, patient underwent irrigation debridement of deep lumbar wound on 11/20/2022.  Cultures no growth to date.    Continue  to follow the patient, recommended continuing IV vancomycin and Rocephin while being in the hospital.  Switching to daptomycin plus Rocephin at the time of discharge with the last day being 12/31/2022.    Interval events:  -- No acute events overnight, medicine has been stable, heart rate 86, blood pressure 116/71 satting 97% on room air.  Patient is alert, awake, answering questions appropriately.  Patient underwent surgery 11/70/2022, does have drain in place till 11/24/2022.  Patient will be needing IV antibiotics as an outpatient.  ID will be writing orders to go to the infusion center.  --In the meantime continue IV daptomycin plus Rocephin  --Culture from the surgery growing Staph hominis, staph epi; will reach out to ID zoey tomorrow   Of note WBC count continues to remain elevated at 17.8      I have discussed this patient's plan of care and discharge plan at IDT rounds today with Case Management, Nursing, Nursing leadership, and other members of the IDT team.    Consultants/Specialty  cardiology, infectious disease, and neurosurgery    Code Status  Full Code    Disposition  Patient is not medically cleared for discharge.   Anticipate discharge to  home health versus skilled nursing facility versus inpatient rehab .  I have placed the appropriate orders for post-discharge needs.    Review of Systems  Review of Systems   Constitutional:  Negative for fever and malaise/fatigue.   HENT:  Negative for hearing loss and sore throat.    Eyes:  Negative for blurred vision and double vision.   Respiratory:  Negative for cough, sputum production and shortness of breath.    Cardiovascular:  Positive for leg swelling. Negative for chest pain, palpitations and orthopnea.   Gastrointestinal:  Negative for abdominal pain, diarrhea and nausea.   Genitourinary:  Negative for dysuria and flank pain.   Musculoskeletal:  Positive for back pain and myalgias. Negative for falls.   Neurological:  Positive for focal weakness (Left  lower extremity) and weakness (LLE, dropfoot to LLE). Negative for headaches.   Psychiatric/Behavioral:  Negative for depression and substance abuse.    All other systems reviewed and are negative.     Physical Exam  Temp:  [36.9 °C (98.5 °F)-37.1 °C (98.7 °F)] 37.1 °C (98.7 °F)  Pulse:  [85-89] 86  Resp:  [16-17] 16  BP: (108-136)/(70-80) 116/70  SpO2:  [96 %-97 %] 97 %    Physical Exam  Vitals and nursing note reviewed.   Constitutional:       General: He is not in acute distress.     Appearance: He is obese. He is ill-appearing.   HENT:      Head: Normocephalic.      Mouth/Throat:      Pharynx: No oropharyngeal exudate.   Eyes:      Pupils: Pupils are equal, round, and reactive to light.   Cardiovascular:      Rate and Rhythm: Normal rate and regular rhythm.      Pulses: Normal pulses.      Heart sounds: Murmur heard.   Pulmonary:      Effort: Pulmonary effort is normal.      Breath sounds: Normal breath sounds. No wheezing.   Abdominal:      General: Bowel sounds are normal.      Palpations: Abdomen is soft.   Musculoskeletal:      Cervical back: Neck supple.      Right lower leg: Edema present.      Left lower leg: Edema present.      Comments: Dropfoot to left lower extremity   Skin:     General: Skin is warm.      Comments: Back incision with drain in place   Neurological:      Mental Status: He is alert and oriented to person, place, and time.      Motor: Weakness (Left lower extremity) present.   Psychiatric:         Mood and Affect: Mood normal.         Behavior: Behavior normal.     Patient was seen and examined at bedside. No changes in physical exam from prior evaluation.      Fluids    Intake/Output Summary (Last 24 hours) at 11/22/2022 1402  Last data filed at 11/22/2022 1241  Gross per 24 hour   Intake 75.08 ml   Output 50 ml   Net 25.08 ml         Laboratory  Recent Labs     11/21/22  0400   WBC 17.8*   RBC 4.09*   HEMOGLOBIN 12.8*   HEMATOCRIT 38.5*   MCV 94.1   MCH 31.3   MCHC 33.2*   RDW 49.0    PLATELETCT 221   MPV 9.6       Recent Labs     11/21/22  0400   SODIUM 139   POTASSIUM 3.9   CHLORIDE 100   CO2 26   GLUCOSE 200*   BUN 25*   CREATININE 0.89   CALCIUM 8.9                     Imaging  MR-LUMBAR SPINE-WITH & W/O   Final Result         Interval decompression of L2-3 with resolution of previously seen dorsal epidural abscess.      Postoperative changes noted with midline laminectomy at L2-3, L3-4 and L4-5 with a fluid collection that impinges upon the dorsal aspect of the thecal sac and causes severe cauda equina compression. This fluid collection could represent a CSF leak or    seroma. There is no definite rim enhancement to suggest infection.      Second poorly marginated fluid collection identified in the subcutaneous soft tissues underlying the incision.      Mild epidural enhancement is noted along the anterior margin of the fluid collection in the laminectomy site, however this probably represents reactive postoperative dural enhancement. No definite evidence of an epidural infectious process is identified.      Moderate right foraminal narrowing at L1-2, severe left foraminal narrowing at L2-3 and moderate left foraminal narrowing at L3-4 with bilateral moderate L4-5 neural foraminal narrowing and severe bilateral L5-S1 foraminal narrowing. These changes are    stable from the previous study.      MR-THORACIC SPINE-WITH & W/O   Final Result         Minimal degenerative changes in the thoracic spine. There is no significant canal stenosis or foraminal narrowing. No abnormal enhancement is identified.      MR-CERVICAL SPINE-WITH & W/O   Final Result         1.  Severe canal stenosis at C4-5 with cord compression and abnormal spinal cord signal representing myelomalacia due to chronic compressive myelopathy. There is also severe right and moderate left foraminal narrowing at this level.      2.   Moderate canal stenosis at C5-6 with severe bilateral foraminal narrowing.      3.  No abnormal  enhancement is identified in the cervical spine.      IR-PICC LINE PLACEMENT W/ GUIDANCE > AGE 5   Final Result                  Ultrasound-guided PICC placement performed by qualified nursing staff as    above.          DX-SPINE-ANY ONE VIEW   Final Result      Fluoroscopic image(s) obtained during lumbar spine instrumentation. Please see the patient's chart for full procedural details.      Fluoroscopy time 4 seconds.         DX-PORTABLE FLUORO > 1 HOUR   Final Result      Portable fluoroscopy utilized for 4 seconds.         INTERPRETING LOCATION: [facility], [place], [address]      [id number] VIEW   Final Result      1.  Negative single view abdomen without gross evidence of a radiopaque surgical device.      NM-CARDIAC STRESS TEST   Final Result      MR-LUMBAR SPINE-WITH & W/O   Final Result      1.  There is an approximately 10 x 6 x 19 mm sized posterior epidural abscess at the level of L2-3. This is new since the previous study. There is severe central canal stenosis at this level.   2.  Severe degenerative central canal stenosis at L3-4 and L4-5.   3.  Degenerative disease as described above.   4.  There is an approximately 1.9 cm sized T2 hypointense lesion in the right kidney likely representing complicated cyst. There is also a simple cyst in the right kidney. This is unchanged since the previous study.      CT-HEAD W/O   Final Result      1.  Head CT without contrast within normal limits. No evidence of acute cerebral infarction, hemorrhage or mass lesion.   2.  Atherosclerotic vascular calcification.         EC-ECHOCARDIOGRAM COMPLETE W/O CONT   Final Result             Assessment/Plan  * Epidural abscess- (present on admission)  Assessment & Plan  S/p L2-SI LAMINECTOMY with Dr. Sparks 11/7/2022 11/7 Posterior lumbar decompression using laminectomy type approach, Levels: L2-L3-L4 L5-S1  11/17 Lumbar I&D  Rocephin/Dapto  ID recs  Ortho recs - maintian drain til 11/24, no anticoagulation til  11/24    daptomycin 6-8 mg/kg daily continue ceftriaxone 2 g daily with an end date of 12/31/22 if no worsening abscess or complication      Hyperglycemia  Assessment & Plan  Continue iss, hypoglycemia protocol, Accu-Cheks ACH S    Spinal stenosis of lumbar region (L3-5) with neurogenic claudication and L foot drop/weakness, valvular heart disease  Assessment & Plan  Pain control and muscle relaxers  MRI LS spine done was notable for 10 x 6 x 19 mm posterior epidural abscess at the level of L2-L3 which is new from previous study.  There is severe central canal stenosis at this level.  Severe degenerative central canal stenosis at L3-L4 and L4-5.    -- Had surgery  On 11/07, culture pending, will continue broad-spectrum antibiotics including vancomycin and unasyn; ID recs switching to Rocephin plus  daptomycin till 12/19/2022.    Considering continued elevation in WBC count, ID recommended MRI CTL spine.  Patient underwent surgery on 11/17/2022, culture growing staph hominis     Obesity (BMI 30-39.9)- (present on admission)  Assessment & Plan  Lifestyle modification including diet exercise and weight loss as an outpatient      Dyslipidemia- (present on admission)  Assessment & Plan  Hold home evolocumab    Coronary artery disease of native artery of native heart with stable angina pectoris (HCC)- (present on admission)  Assessment & Plan  C/w metoprolol and lisinopril, Imdur    Stress test- a small tomoderate sized focus of ischemia involving the lateral wall within the cardiac base and midzone. Findings were discussed with cardiology Dr. Dozier - no further inpatient cardiology evaluation/procedure recommended. Pt is a moderate risk patient.    -- History of aspirin and Plavix was held considering patient undergoing surgery.  I discussed with Dr. Sparks  stated okay starting the patient on aspirin and Plavix.  Started 11/09/2022.   -- monitor leg strength     It looks like Plavix has been discontinued on 11/17, will  restart once okay by neurosurgery    Contact dermatitis  Assessment & Plan  Patient with uneven erythema surrounding Janet incision VAC.  Erythematous consistent with sensitivity/reaction to dressing, does not appear infectious.  Patient was seen by neurosurgery who states Prevena wound VAC may be discontinued and dressings to be applied.    Hypertensive urgency  Assessment & Plan  PRN IV antihypertensives  Continue amlodipine       VTE prophylaxis: SCDs/TEDs

## 2022-11-22 NOTE — CARE PLAN
Problem: Pain - Standard  Goal: Alleviation of pain or a reduction in pain to the patient’s comfort goal  Outcome: Progressing     Problem: Fall Risk  Goal: Patient will remain free from falls  Outcome: Progressing   The patient is Stable - Low risk of patient condition declining or worsening    Shift Goals  Clinical Goals: pain control  Patient Goals: pain control  Family Goals: not present    Progress made toward(s) clinical / shift goals:  medicating per MAR.  Pt remains free from falls.    Patient is not progressing towards the following goals:

## 2022-11-22 NOTE — PROGRESS NOTES
"Spine Surgery Progress Note    66 y.o. male sp L2-S1 laminectomy on 11/7/2022 for lumbar stenosis and likely epidural abscess, recurrent weakness 2 days ago, MRI demonstrated fluid collection with significant stenosis. Sp I&D lumbar spine for epidural hematoma on 11/17    S:  ERIBERTO overnight, continued  improvement in LE pain and strength since surgery though continues with weakness in left leg  No changes overnight  Drain remains in place     O:  /70   Pulse 86   Temp 37.1 °C (98.7 °F) (Temporal)   Resp 16   Ht 1.676 m (5' 6\")   Wt 98.7 kg (217 lb 9.5 oz)   SpO2 97%   BMI 35.12 kg/m²     Gen: WNWD NAD  Breathing comfortably  VSS  Incision has erythema around it in distribution of prevena wound vac  Right: 5/5 throughout   Left: 4/5 HF, 4/5 KE, 3/5 EHL TA, 5/5 PF  Sensation grossly intact in BLE, decresed left L4 L5 distribution  Drain output decreasing    Lab Results   Component Value Date/Time    WBC 17.8 (H) 11/21/2022 04:00 AM    RBC 4.09 (L) 11/21/2022 04:00 AM    HEMOGLOBIN 12.8 (L) 11/21/2022 04:00 AM    HEMATOCRIT 38.5 (L) 11/21/2022 04:00 AM    MCV 94.1 11/21/2022 04:00 AM    MCH 31.3 11/21/2022 04:00 AM    MCHC 33.2 (L) 11/21/2022 04:00 AM    MPV 9.6 11/21/2022 04:00 AM    NEUTSPOLYS 75.00 (H) 11/21/2022 04:00 AM    LYMPHOCYTES 16.70 (L) 11/21/2022 04:00 AM    MONOCYTES 6.10 11/21/2022 04:00 AM    EOSINOPHILS 0.60 11/21/2022 04:00 AM    BASOPHILS 0.20 11/21/2022 04:00 AM      Lab Results   Component Value Date/Time    SODIUM 139 11/21/2022 04:00 AM    POTASSIUM 3.9 11/21/2022 04:00 AM    CHLORIDE 100 11/21/2022 04:00 AM    CO2 26 11/21/2022 04:00 AM    GLUCOSE 200 (H) 11/21/2022 04:00 AM    BUN 25 (H) 11/21/2022 04:00 AM    CREATININE 0.89 11/21/2022 04:00 AM    BUNCREATRAT 16 07/29/2016 10:22 AM          AP: 66 y.o. male sp L2-S1 laminectomy on 11/7/2022 for possible epidural abscess, sp I&D lumbar spine 11/17 for epidural hematoma.     Overall improvement today  Continue with Drain " today  Continue plan:    - Recommend holding anticoagulation and antiplatelete x 7 days (11/24)  - Recommend continued drain x 7 days (11/24)  - PT/OT  - Reg diet  - Abx per primary and ID

## 2022-11-22 NOTE — CARE PLAN
Problem: Pain - Standard  Goal: Alleviation of pain or a reduction in pain to the patient’s comfort goal  Outcome: Progressing     Problem: Incision Care  Goal: Optimal post surgical incision care  Outcome: Progressing   The patient is Stable - Low risk of patient condition declining or worsening    Shift Goals  Clinical Goals: pain control  Patient Goals: pain control  Family Goals: N/A    Progress made toward(s) clinical / shift goals:    Problem: Incision Care  Goal: Optimal post surgical incision care  Outcome: Progressing       Patient is not progressing towards the following goals: N/A

## 2022-11-22 NOTE — DISCHARGE PLANNING
Case Management Discharge Planning    Admission Date: 11/4/2022  GMLOS: 5.2  ALOS: 17    6-Clicks ADL Score: 24  6-Clicks Mobility Score: 21      Anticipated Discharge Dispo: Discharge Disposition: D/T to home under A care in anticipation of covered skilled care (06)  Discharge Address: [address] [place], [place] [address]  [address]  Discharge Contact Phone Number: [phone]    DME Needed: No    Action(s) Taken: Updated Provider/Nurse on Discharge Plan    Escalations Completed: None    Medically Clear: No    Next Steps: Pt has drain and needs to stay Inpt until drain d/c'd on 11/24. Will need long term IVABXthru 12/13 and wants to do [facility], not home infusion.    Barriers to Discharge: Medical clearance    Is the patient up for discharge tomorrow: No

## 2022-11-22 NOTE — PROGRESS NOTES
Hospital Medicine Daily Progress Note    Date of Service  11/21/2022    Chief Complaint  Abimael Hamilton is a 66 y.o. male admitted 11/4/2022 with worsening back pain    Hospital Course      This is a 66 -year-old male with a past medical history significant for hypertension, hyperlipidemia, CAD status post CABG in 1998, 2016, aortic valve replacement in 2016, post stent in 4/2022, morbid obesity, gout, L3 L5 severe spinal stenosis with left foot drop, chronic back pain was admitted on 11/4/2022 with worsening of lower back pain.      MRI of the lumbar spine  on 11/04 noted 10 x 6 x 19 mm posterior epidural abscess at the level of L2-L3 which is new from previous study. There is severe central canal stenosis at this level.  Severe degenerative central canal stenosis at L3-L4 and L4-5.    Cardiology consulted for preop clearance given patient significant cardiac history, nuclear stress test noted small area of ischemia involving the lateral wall.  Patient cleared by cardiology, moderate risk for the surgery.     Surgery was consulted, patient went to the OR with Dr. Sparks on 11/7/2022.  Biopsy was negative, patient was initially started on IV Rocephin.  Infectious disease was consulted.  They recommended 6-week IV antibiotic course and on discharge can transition to daptomycin 6-AMG/KG daily and continue ceftriaxone 2 g daily with an end date of 12/19/2022.  Initially, plan for home infusion antibiotics but patient declined and would prefer to do outpatient infusion center.  Orders were placed by infectious disease.  OR cultures negative but there is concern for worsening spinal infection based on symptoms, imaging, increased WBCs.  Repeat MRI cervical, thoracic, lumbar spine pending.    Interval Problem Update  Patient was seen and examined at bedside.  No acute events overnight. Patient is resting comfortably in bed and in no acute distress.      11/7 Posterior lumbar decompression using laminectomy type approach,  Levels: L2-L3-L4 L5-S1  11/17 Lumbar I&D  Rocephin/Dapto  ID recs  Neurosurgery recs  Ortho recs - maintian drain til 11/24, no anticoagulation til 11/24    I have discussed this patient's plan of care and discharge plan at IDT rounds today with Case Management, Nursing, Nursing leadership, and other members of the IDT team.    Consultants/Specialty  cardiology, infectious disease, and neurosurgery    Code Status  Full Code    Disposition  Patient is not medically cleared for discharge.   Anticipate discharge to  home health versus skilled nursing facility versus inpatient rehab .  I have placed the appropriate orders for post-discharge needs.    Review of Systems  Review of Systems   Constitutional:  Negative for chills, fever and malaise/fatigue.   HENT:  Negative for hearing loss, sinus pain and sore throat.    Respiratory:  Negative for cough, sputum production and shortness of breath.    Cardiovascular:  Positive for leg swelling. Negative for chest pain, palpitations and orthopnea.   Gastrointestinal:  Negative for abdominal pain, diarrhea, nausea and vomiting.   Genitourinary:  Negative for dysuria and flank pain.   Musculoskeletal:  Positive for back pain and myalgias. Negative for falls.   Neurological:  Positive for focal weakness (Left lower extremity) and weakness (LLE, dropfoot to LLE). Negative for tingling and headaches.   Psychiatric/Behavioral:  Negative for depression and substance abuse. The patient is not nervous/anxious and does not have insomnia.    All other systems reviewed and are negative.     Physical Exam  Temp:  [36.6 °C (97.9 °F)-36.9 °C (98.5 °F)] 36.9 °C (98.5 °F)  Pulse:  [84-93] 85  Resp:  [16-18] 16  BP: (108-131)/(4-86) 108/71  SpO2:  [95 %-97 %] 96 %    Physical Exam  Vitals and nursing note reviewed.   Constitutional:       General: He is not in acute distress.     Appearance: He is obese. He is ill-appearing. He is not toxic-appearing.   HENT:      Head: Normocephalic.       Right Ear: External ear normal.      Left Ear: External ear normal.      Nose: Nose normal. No congestion.      Mouth/Throat:      Mouth: Mucous membranes are moist.      Pharynx: No oropharyngeal exudate.   Eyes:      General: No scleral icterus.     Pupils: Pupils are equal, round, and reactive to light.   Cardiovascular:      Rate and Rhythm: Normal rate and regular rhythm.      Pulses: Normal pulses.      Heart sounds: Murmur heard.     No gallop.   Pulmonary:      Effort: Pulmonary effort is normal.      Breath sounds: Normal breath sounds. No wheezing.   Abdominal:      General: Abdomen is flat. Bowel sounds are normal.      Palpations: Abdomen is soft.      Tenderness: There is no abdominal tenderness. There is no guarding or rebound.   Musculoskeletal:      Right lower leg: Edema (+1) present.      Left lower leg: Edema (+1) present.      Comments: Dropfoot to left lower extremity   Skin:     General: Skin is warm and dry.      Capillary Refill: Capillary refill takes less than 2 seconds.      Findings: No bruising or erythema.      Comments: Prevena incisional VAC to midline thoracic and lumbar spine.  Small amount of serosanguineous fluid noted.  Adequate suction seen.  Erythema surrounding Janet incision VAC dressing.    Diffuse tattoos   Neurological:      Mental Status: He is alert and oriented to person, place, and time. Mental status is at baseline.      Motor: Weakness (Left lower extremity) present.   Psychiatric:         Mood and Affect: Mood normal.         Behavior: Behavior normal.     Patient was seen and examined at bedside. No changes in physical exam from prior evaluation.      Fluids    Intake/Output Summary (Last 24 hours) at 11/21/2022 1624  Last data filed at 11/21/2022 1200  Gross per 24 hour   Intake 302.32 ml   Output 20 ml   Net 282.32 ml       Laboratory  Recent Labs     11/19/22  0425 11/21/22  0400   WBC 15.7* 17.8*   RBC 3.69* 4.09*   HEMOGLOBIN 11.5* 12.8*   HEMATOCRIT 34.8*  38.5*   MCV 94.3 94.1   MCH 31.2 31.3   MCHC 33.0* 33.2*   RDW 49.1 49.0   PLATELETCT 190 221   MPV 9.7 9.6     Recent Labs     11/19/22  0425 11/21/22  0400   SODIUM 137 139   POTASSIUM 3.9 3.9   CHLORIDE 102 100   CO2 27 26   GLUCOSE 191* 200*   BUN 24* 25*   CREATININE 0.81 0.89   CALCIUM 8.2* 8.9                   Imaging  MR-LUMBAR SPINE-WITH & W/O   Final Result         Interval decompression of L2-3 with resolution of previously seen dorsal epidural abscess.      Postoperative changes noted with midline laminectomy at L2-3, L3-4 and L4-5 with a fluid collection that impinges upon the dorsal aspect of the thecal sac and causes severe cauda equina compression. This fluid collection could represent a CSF leak or    seroma. There is no definite rim enhancement to suggest infection.      Second poorly marginated fluid collection identified in the subcutaneous soft tissues underlying the incision.      Mild epidural enhancement is noted along the anterior margin of the fluid collection in the laminectomy site, however this probably represents reactive postoperative dural enhancement. No definite evidence of an epidural infectious process is identified.      Moderate right foraminal narrowing at L1-2, severe left foraminal narrowing at L2-3 and moderate left foraminal narrowing at L3-4 with bilateral moderate L4-5 neural foraminal narrowing and severe bilateral L5-S1 foraminal narrowing. These changes are    stable from the previous study.      MR-THORACIC SPINE-WITH & W/O   Final Result         Minimal degenerative changes in the thoracic spine. There is no significant canal stenosis or foraminal narrowing. No abnormal enhancement is identified.      MR-CERVICAL SPINE-WITH & W/O   Final Result         1.  Severe canal stenosis at C4-5 with cord compression and abnormal spinal cord signal representing myelomalacia due to chronic compressive myelopathy. There is also severe right and moderate left foraminal narrowing  at this level.      2.   Moderate canal stenosis at C5-6 with severe bilateral foraminal narrowing.      3.  No abnormal enhancement is identified in the cervical spine.      IR-PICC LINE PLACEMENT W/ GUIDANCE > AGE 5   Final Result                  Ultrasound-guided PICC placement performed by qualified nursing staff as    above.          DX-SPINE-ANY ONE VIEW   Final Result      Fluoroscopic image(s) obtained during lumbar spine instrumentation. Please see the patient's chart for full procedural details.      Fluoroscopy time 4 seconds.         DX-PORTABLE FLUORO > 1 HOUR   Final Result      Portable fluoroscopy utilized for 4 seconds.         INTERPRETING LOCATION: [address] [place] [address], [place], [address]      [id number] VIEW   Final Result      1.  Negative single view abdomen without gross evidence of a radiopaque surgical device.      NM-CARDIAC STRESS TEST   Final Result      MR-LUMBAR SPINE-WITH & W/O   Final Result      1.  There is an approximately 10 x 6 x 19 mm sized posterior epidural abscess at the level of L2-3. This is new since the previous study. There is severe central canal stenosis at this level.   2.  Severe degenerative central canal stenosis at L3-4 and L4-5.   3.  Degenerative disease as described above.   4.  There is an approximately 1.9 cm sized T2 hypointense lesion in the right kidney likely representing complicated cyst. There is also a simple cyst in the right kidney. This is unchanged since the previous study.      CT-HEAD W/O   Final Result      1.  Head CT without contrast within normal limits. No evidence of acute cerebral infarction, hemorrhage or mass lesion.   2.  Atherosclerotic vascular calcification.         EC-ECHOCARDIOGRAM COMPLETE W/O CONT   Final Result             Assessment/Plan  * Epidural abscess- (present on admission)  Assessment & Plan  S/p L2-SI LAMINECTOMY with Dr. Sparks 11/7/2022 11/7 Posterior lumbar decompression using laminectomy type approach, Levels: L2-L3-L4  L5-S1  11/17 Lumbar I&D  Rocephin/Dapto  ID recs  Ortho recs - maintian drain til 11/24, no anticoagulation til 11/24    daptomycin 6-8 mg/kg daily continue ceftriaxone 2 g daily with an end date of 12/19/22 if no worsening abscess or complication      Spinal stenosis of lumbar region (L3-5) with neurogenic claudication and L foot drop/weakness, valvular heart disease  Assessment & Plan  Pain control and muscle relaxers  MRI LS spine done was notable for 10 x 6 x 19 mm posterior epidural abscess at the level of L2-L3 which is new from previous study.  There is severe central canal stenosis at this level.  Severe degenerative central canal stenosis at L3-L4 and L4-5.    -- Had surgery  On 11/07, culture pending, will continue broad-spectrum antibiotics including vancomycin and unasyn; ID recs switching to Rocephin plus  daptomycin till 12/19/2022.  While patient was on IV antibiotics, need CBC, CMP, ESR, CRP, CPK.    Pt and ot recommends home with home health assessment ordered  Per neurosurgery recommendation  Patient has received Decadron  11/17 repeat MRI showing resolution of dorsal epidural abscess, neurosurgery taking patient to surgery today for I&D of lumbar spine.        Hyperglycemia  Assessment & Plan  A1c 3/20/2022 was 6.2-prediabetic.  Patient hyperglycemic likely due to steroid use  Continue insulin sliding scale  Accuchecks and hypoglycemic protocol    Repeat A1C  Add lantus in setting of AM hyperglycemia    Hypertensive urgency  Assessment & Plan  PRN IV antihypertensives  Continue amlodipine    Coronary artery disease of native artery of native heart with stable angina pectoris (HCC)- (present on admission)  Assessment & Plan  C/w metoprolol and lisinopril, Imdur    Stress test- a small tomoderate sized focus of ischemia involving the lateral wall within the cardiac base and midzone. Findings were discussed with cardiology Dr. Dozier - no further inpatient cardiology evaluation/procedure recommended.  Pt is a moderate risk patient.    -- History of aspirin and Plavix was held considering patient undergoing surgery.  I discussed with Dr. Sparks  stated okay starting the patient on aspirin and Plavix.  Started 11/09/2022.   -- monitor leg strength     Contact dermatitis  Assessment & Plan  Patient with uneven erythema surrounding Janet incision VAC.  Erythematous consistent with sensitivity/reaction to dressing, does not appear infectious.  Patient was seen by neurosurgery who states Prevena wound VAC may be discontinued and dressings to be applied.    Obesity (BMI 30-39.9)- (present on admission)  Assessment & Plan  Lifestyle modification including diet exercise and weight loss as an outpatient      Dyslipidemia- (present on admission)  Assessment & Plan  Hold home evolocumab       VTE prophylaxis: SCDs/TEDs    I have performed a physical exam and reviewed and updated ROS and Plan today (11/21/2022). In review of yesterday's note (11/20/2022), there are no changes except as documented above.

## 2022-11-22 NOTE — PROGRESS NOTES
Pt is oriented x 4 with complaints of pain.  Medicated per MAR.  Drain in place.  Call light within reach.

## 2022-11-23 NOTE — PROGRESS NOTES
"1530 Pt verbalizing chest pain after ambulating to bathroom. VSS. Pt medicated with nitroglycerin for chest pain. Hospitalist informed. Pt stated he does not want a rapid team or EKG in his room. Per patient, \"I take nitroglycerin at home and chest pain goes away. I don't need 10 people in here\".   Hospitalist at bedside. Per patient, \"I won't tell you anymore if my chest hurt if all this people are going to show up. I might bring my own nitroglycerin and not tell anyone.\"  "

## 2022-11-23 NOTE — PROGRESS NOTES
"NOC HOSPITALIST CROSS COVER      RAPID RESPONSE NOTE: Rapid response called for chest pain.  Patient seen and evaluated at bedside.  Patient is sitting up in bed in no acute distress speaking in full sentences.  Patient reports that he is having mild burning over his chest wall that he will rates as a 3 out of 10.  Patient did receive nitroglycerin prior to arrival and reports that this did help his pain.  Patient states that he regularly takes nitroglycerin almost daily at home.  Patient does report significant past  cardiac history including multiple stents and heart valve replacement.  On exam patient has regular rate and rhythm with appreciable systolic murmur.  Lungs clear throughout bilaterally with no rales or rhonchi.  Patient states that this chest pain does not feel like prior MIs and states that he has had pains like this since been being in the hospital.  Stat work-up initiated including EKG, troponin, chest x-ray.  Troponin pending, EKG appears relatively unchanged from prior studies, chest x-ray is unremarkable for any cardiopulmonary process. /77   Pulse 88   Temp 36.8 °C (98.2 °F)   Resp 20   Ht 1.676 m (5' 6\")   Wt 98.7 kg (217 lb 9.5 oz)   SpO2 97%   BMI 35.12 kg/m²      A/P:  #Chest pain  -Patient has significant cardiac history  -Pain improved significantly with oral nitro  -Vital signs remained stable, patient in no acute distress  -EKG relatively unchanged, normal chest x-ray  -Troponin pending    -----------------------------------------------------------------------------------------------------------    Electronically signed by:  ROSINA Barksdale PA-C  Hospitalist Services         "

## 2022-11-23 NOTE — PROGRESS NOTES
Bedside report received. Assumed care of patient this PM. Assessment completed      Patient is A&O x 4, pt calls for assistance appropriately  Reports 8 /10 pain, prn medication administered.  Pt is on r/a   Mobility up self wit FWW   Bed alarm in off.  Voiding +  Flatus +            Plan of care reviewed with the patient. Bed is locked and in the lowest position. Call light is within reach. Patient encouraged to voice needs and concerns, all needs met at this time. Hourly rounding in place.

## 2022-11-23 NOTE — DISCHARGE PLANNING
Received call from Linnette MIRAMONTES/IDALIA  . She is available for discharge planning assistance but will be out of office for holiday until 11/28.

## 2022-11-23 NOTE — CARE PLAN
The patient is Stable - Low risk of patient condition declining or worsening    Shift Goals  Clinical Goals: Pain control  Patient Goals: Pain control  Family Goals: N/A    Progress made toward(s) clinical / shift goals:  Pain medication administered per MAR for pain control, pt shows an understanding of pain rating scale and uses it appropriately to rate and reassess pain; pt free from falls this shift    Problem: Pain - Standard  Goal: Alleviation of pain or a reduction in pain to the patient’s comfort goal  Outcome: Progressing     Problem: Fall Risk  Goal: Patient will remain free from falls  Outcome: Progressing       Patient is not progressing towards the following goals:

## 2022-11-23 NOTE — DISCHARGE INSTR - CASE MGT
First Outpatient infusion appointment is scheduled for 5pm on Friday 11/25. Located in [facility].

## 2022-11-23 NOTE — PROGRESS NOTES
Patient reports of chest pain, burning and tightness. /54 . Administered PRN nitro X1. Rapid response initiated.

## 2022-11-23 NOTE — DISCHARGE PLANNING
Spoke to Nehemiah at [facility]. Pt's first appt is scheduloed for 11/25 Friday at 5 pm.

## 2022-11-24 NOTE — PROGRESS NOTES
Hospital Medicine Daily Progress Note    Date of Service  11/23/2022    Chief Complaint  Abimael Hamilton is a 66 y.o. male admitted 11/4/2022 with worsening back pain    Hospital Course    This is a 66 -year-old male with a past medical history significant for hypertension, hyperlipidemia, CAD status post CABG in 1998, 2016, aortic valve replacement in 2016, post stent in 4/2022, morbid obesity, gout, L3 L5 severe spinal stenosis with left foot drop, chronic back pain was admitted on 11/4/2022 with worsening of lower back pain.    MRI of the lumbar spine  on 11/04 noted 10 x 6 x 19 mm posterior epidural abscess at the level of L2-L3 which is new from previous study. There is severe central canal stenosis at this level.  Severe degenerative central canal stenosis at L3-L4 and L4-5.    Cardiology consulted for preop clearance given patient significant cardiac history, nuclear stress test noted small area of ischemia involving the lateral wall.  Patient cleared by cardiology, moderate risk for the surgery.     Surgery was consulted, patient went to the OR with Dr. Sparks on 11/7/2022.  Biopsy was negative, patient was initially started on IV Rocephin.  Infectious disease was consulted.  They recommended 6-week IV antibiotic course and on discharge can transition to daptomycin 6MG/KG daily and continue ceftriaxone 2 g daily with an end date of 12/19/2022.      MR C/T/L-spine was ordered with request of Dr. 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  Postop changes at L2-L3, L3-L4, L4-L5 with a fluid collection that impinges upon the dorsal aspect of thecal sac and causing severe cauda equina compression.  Fluid collection could represent CSF leak or seroma.  Dr. Sparks was contacted, patient underwent irrigation debridement of deep lumbar wound on 11/20/2022.  Cultures no growth to date.    Continue  to follow the patient, recommended continuing IV vancomycin and Rocephin while being in the hospital.  Switching to daptomycin plus Rocephin at the time of discharge with the last day being 12/31/2022.    Interval events:  -- No acute events overnight, medicine has been stable, heart rate 86, blood pressure 116/71 satting 97% on room air.  Patient is alert, awake, answering questions appropriately.  Patient underwent surgery 11/70/2022, does have drain in place till 11/24/2022.  Patient will be needing IV antibiotics as an outpatient.  ID will be writing orders to go to the infusion center.  --In the meantime continue IV daptomycin plus Rocephin  --Culture from the surgery growing Staph hominis, staph epi; will reach out to ID zoey tomorrow   Of note WBC count continues to remain elevated at 17.8    11/23:   Patient complained of the chest pain, troponin EKG obtained, no ST and T wave suggestive of ischemia.  Troponin x2; 67 and 57.  -Patient is noted to have chest pain while he was going to bathroom cardiology was contacted, cardiology came and evaluate the patient.   --  today, he stated okay to start aspirin and Plavix considering patient is having chest pain.  --We will continue cardiac medication  Patient will be removed on 11/24  Continue IV antibiotics as per infectious disease recommendation    Was transferred to telemetry, patient declined to be transferred to telemetry.  He stated that he will want to be transferred to telemetry if he was restrained.  Patient is alert and oriented, can make his own medical decision.  Telemetry order cancelled     I have discussed this patient's plan of care and discharge plan at IDT rounds today with Case Management, Nursing, Nursing leadership, and other members of the IDT team.    Consultants/Specialty  cardiology, infectious disease, and neurosurgery    Code Status  Full Code    Disposition  Patient is not medically cleared for discharge.   Anticipate discharge to   home health versus skilled nursing facility versus inpatient rehab .  I have placed the appropriate orders for post-discharge needs.    Review of Systems  Review of Systems   Constitutional:  Negative for fever and malaise/fatigue.   HENT:  Negative for congestion and sore throat.    Eyes:  Negative for blurred vision and photophobia.   Respiratory:  Negative for cough, sputum production and shortness of breath.    Cardiovascular:  Positive for chest pain and leg swelling. Negative for palpitations and orthopnea.   Gastrointestinal:  Negative for abdominal pain, diarrhea and nausea.   Genitourinary:  Negative for dysuria and flank pain.   Musculoskeletal:  Positive for back pain and myalgias. Negative for falls.   Neurological:  Positive for focal weakness (Left lower extremity) and weakness (LLE, dropfoot to LLE). Negative for headaches.   Psychiatric/Behavioral:  Negative for depression and substance abuse.    All other systems reviewed and are negative.     Physical Exam  Temp:  [36.7 °C (98.1 °F)-36.9 °C (98.5 °F)] 36.9 °C (98.5 °F)  Pulse:  [84-96] 90  Resp:  [17-19] 19  BP: (116-137)/(71-88) 137/74  SpO2:  [91 %-97 %] 97 %    Physical Exam  Vitals and nursing note reviewed.   Constitutional:       General: He is not in acute distress.     Appearance: He is obese.   HENT:      Head: Normocephalic.      Mouth/Throat:      Pharynx: No oropharyngeal exudate.   Eyes:      Pupils: Pupils are equal, round, and reactive to light.   Cardiovascular:      Rate and Rhythm: Normal rate and regular rhythm.      Pulses: Normal pulses.      Heart sounds: Murmur heard.   Pulmonary:      Effort: Pulmonary effort is normal.      Breath sounds: Normal breath sounds. No wheezing.   Abdominal:      General: Bowel sounds are normal.      Palpations: Abdomen is soft.   Musculoskeletal:      Cervical back: Neck supple.      Right lower leg: Edema present.      Left lower leg: Edema present.      Comments: Dropfoot to left lower  extremity   Skin:     Comments: Back incision with drain in place   Neurological:      Mental Status: He is alert and oriented to person, place, and time.      Motor: Weakness (Left lower extremity) present.   Psychiatric:         Mood and Affect: Mood normal.         Behavior: Behavior normal.     Patient was seen and examined at bedside. No changes in physical exam from prior evaluation.      Fluids    Intake/Output Summary (Last 24 hours) at 11/24/2022 1312  Last data filed at 11/24/2022 0905  Gross per 24 hour   Intake 530.21 ml   Output 1515 ml   Net -984.79 ml         Laboratory  Recent Labs     11/23/22  0602   WBC 19.5*   RBC 3.99*   HEMOGLOBIN 12.5*   HEMATOCRIT 37.8*   MCV 94.7   MCH 31.3   MCHC 33.1*   RDW 50.9*   PLATELETCT 226   MPV 9.6       Recent Labs     11/23/22  0602   SODIUM 138   POTASSIUM 4.1   CHLORIDE 102   CO2 25   GLUCOSE 139*   BUN 27*   CREATININE 0.87   CALCIUM 8.9                     Imaging  DX-CHEST-PORTABLE (1 VIEW)   Final Result         1.  No acute cardiopulmonary disease.   2.  Cardiomegaly      MR-LUMBAR SPINE-WITH & W/O   Final Result         Interval decompression of L2-3 with resolution of previously seen dorsal epidural abscess.      Postoperative changes noted with midline laminectomy at L2-3, L3-4 and L4-5 with a fluid collection that impinges upon the dorsal aspect of the thecal sac and causes severe cauda equina compression. This fluid collection could represent a CSF leak or    seroma. There is no definite rim enhancement to suggest infection.      Second poorly marginated fluid collection identified in the subcutaneous soft tissues underlying the incision.      Mild epidural enhancement is noted along the anterior margin of the fluid collection in the laminectomy site, however this probably represents reactive postoperative dural enhancement. No definite evidence of an epidural infectious process is identified.      Moderate right foraminal narrowing at L1-2, severe  left foraminal narrowing at L2-3 and moderate left foraminal narrowing at L3-4 with bilateral moderate L4-5 neural foraminal narrowing and severe bilateral L5-S1 foraminal narrowing. These changes are    stable from the previous study.      MR-THORACIC SPINE-WITH & W/O   Final Result         Minimal degenerative changes in the thoracic spine. There is no significant canal stenosis or foraminal narrowing. No abnormal enhancement is identified.      MR-CERVICAL SPINE-WITH & W/O   Final Result         1.  Severe canal stenosis at C4-5 with cord compression and abnormal spinal cord signal representing myelomalacia due to chronic compressive myelopathy. There is also severe right and moderate left foraminal narrowing at this level.      2.   Moderate canal stenosis at C5-6 with severe bilateral foraminal narrowing.      3.  No abnormal enhancement is identified in the cervical spine.      IR-PICC LINE PLACEMENT W/ GUIDANCE > AGE 5   Final Result                  Ultrasound-guided PICC placement performed by qualified nursing staff as    above.          DX-SPINE-ANY ONE VIEW   Final Result      Fluoroscopic image(s) obtained during lumbar spine instrumentation. Please see the patient's chart for full procedural details.      Fluoroscopy time 4 seconds.         DX-PORTABLE FLUORO > 1 HOUR   Final Result      Portable fluoroscopy utilized for 4 seconds.         INTERPRETING LOCATION: [address] [place] [address], [facility]      PI-VVUUXRO-1 VIEW   Final Result      1.  Negative single view abdomen without gross evidence of a radiopaque surgical device.      NM-CARDIAC STRESS TEST   Final Result      MR-LUMBAR SPINE-WITH & W/O   Final Result      1.  There is an approximately 10 x 6 x 19 mm sized posterior epidural abscess at the level of L2-3. This is new since the previous study. There is severe central canal stenosis at this level.   2.  Severe degenerative central canal stenosis at L3-4 and L4-5.   3.  Degenerative disease as  described above.   4.  There is an approximately 1.9 cm sized T2 hypointense lesion in the right kidney likely representing complicated cyst. There is also a simple cyst in the right kidney. This is unchanged since the previous study.      CT-HEAD W/O   Final Result      1.  Head CT without contrast within normal limits. No evidence of acute cerebral infarction, hemorrhage or mass lesion.   2.  Atherosclerotic vascular calcification.         EC-ECHOCARDIOGRAM COMPLETE W/O CONT   Final Result             Assessment/Plan  * Epidural abscess- (present on admission)  Assessment & Plan  S/p L2-SI LAMINECTOMY with Dr. Sparks 11/7/2022 11/7 Posterior lumbar decompression using laminectomy type approach, Levels: L2-L3-L4 L5-S1  11/17 Lumbar I&D  Rocephin/Dapto  ID recs  Ortho recs - maintian drain til 11/24, no anticoagulation til 11/24    daptomycin 6-8 mg/kg daily continue ceftriaxone 2 g daily with an end date of 12/31/22 if no worsening abscess or complication      Hyperglycemia  Assessment & Plan  Continue iss, hypoglycemia protocol, Accu-Cheks ACH S    Spinal stenosis of lumbar region (L3-5) with neurogenic claudication and L foot drop/weakness, valvular heart disease  Assessment & Plan  Pain control and muscle relaxers  MRI LS spine done was notable for 10 x 6 x 19 mm posterior epidural abscess at the level of L2-L3 which is new from previous study.  There is severe central canal stenosis at this level.  Severe degenerative central canal stenosis at L3-L4 and L4-5.    -- Had surgery  On 11/07, culture pending, will continue broad-spectrum antibiotics including vancomycin and unasyn; ID recs switching to Rocephin plus  daptomycin till 12/19/2022.    Considering continued elevation in WBC count, ID recommended MRI CTL spine.  Patient underwent surgery on 11/17/2022, culture growing staph hominis     Obesity (BMI 30-39.9)- (present on admission)  Assessment & Plan  Lifestyle modification including diet exercise and  weight loss as an outpatient      Dyslipidemia- (present on admission)  Assessment & Plan  Hold home evolocumab    Coronary artery disease of native artery of native heart with stable angina pectoris (HCC)- (present on admission)  Assessment & Plan  C/w metoprolol and lisinopril, Imdur    Stress test- a small tomoderate sized focus of ischemia involving the lateral wall within the cardiac base and midzone. Findings were discussed with cardiology Dr. Dozier - no further inpatient cardiology evaluation/procedure recommended. Pt is a moderate risk patient.    -- History of aspirin and Plavix was held considering patient undergoing surgery.  I discussed with Dr. Sparks  stated okay starting the patient on aspirin and Plavix.  Started 11/09/2022.   -- monitor leg strength     It looks like Plavix has been discontinued on 11/17, will restart once okay by neurosurgery    11/23: Patient is noted to have chest pain, I did talk to neurosurgery, who recommended okay to start aspirin and Plavix.    Cards called  Transfer to tele ordered but  Later discontinued as patient didn't wanted to go to tele     Contact dermatitis  Assessment & Plan  Patient with uneven erythema surrounding Janet incision VAC.  Erythematous consistent with sensitivity/reaction to dressing, does not appear infectious.  Patient was seen by neurosurgery who states Prevena wound VAC may be discontinued and dressings to be applied.    Hypertensive urgency  Assessment & Plan  PRN IV antihypertensives  Continue amlodipine       VTE prophylaxis: SCDs/TEDs

## 2022-11-24 NOTE — PROGRESS NOTES
Cardiology Follow Up Progress Note    Date of Service  2022    Attending Physician  Scout Angulo M.D.    Chief Complaint   Cardiology consultation for evaluation of chest pain, patient has been off of DAPT due to recent back surgery ( 22 ) , patient has history of chronic angina specifically when off of DAPT.    HPI  Abimael Hamilton is a 66 y.o. male with chronic angina, CABG  x5v (), redo CABG  x3 v (2015) as well as bioprosthetic aortic valve replacement  in [facility] 2018 in [place] showed all grafts occluded with exception of LIMA to LAD which fills LCx, PCI with MOE to LIMA  9 [place] ), reports chest pain when taken off of DAPT, hypertension, hyperlipidemia , kidney stone and cyst on right side admitted 2022 with acute on chronic back pain.  Patient underwent posterior laminectomy 2022.    Interim Events  No overnight cardiac events.  Currently denies chest pain, shortness of breath.  BP appropriate   Aspirin and Plavix were resumed.  Review of Systems  Review of Systems   Respiratory:  Negative for apnea, cough, choking, chest tightness, shortness of breath, wheezing and stridor.      Vital signs in last 24 hours  Temp:  [36.7 °C (98.1 °F)-36.9 °C (98.5 °F)] 36.9 °C (98.5 °F)  Pulse:  [84-96] 90  Resp:  [17-19] 19  BP: (113-136)/(71-88) 124/77  SpO2:  [91 %-97 %] 97 %    Physical Exam  Physical Exam  Cardiovascular:      Rate and Rhythm: Normal rate and regular rhythm.      Heart sounds: Murmur heard.   Systolic murmur is present.   Pulmonary:      Effort: Pulmonary effort is normal.   Musculoskeletal:      Right lower le+ Edema present.      Left lower le+ Edema present.   Skin:     General: Skin is warm.      Comments: DENISE drain   Neurological:      Mental Status: He is alert. Mental status is at baseline.   Psychiatric:         Mood and Affect: Mood normal.       Lab Review  Lab Results   Component Value Date/Time    WBC 19.5 (H) 2022  06:02 AM    RBC 3.99 (L) 11/23/2022 06:02 AM    HEMOGLOBIN 12.5 (L) 11/23/2022 06:02 AM    HEMATOCRIT 37.8 (L) 11/23/2022 06:02 AM    MCV 94.7 11/23/2022 06:02 AM    MCH 31.3 11/23/2022 06:02 AM    MCHC 33.1 (L) 11/23/2022 06:02 AM    MPV 9.6 11/23/2022 06:02 AM      Lab Results   Component Value Date/Time    SODIUM 138 11/23/2022 06:02 AM    POTASSIUM 4.1 11/23/2022 06:02 AM    CHLORIDE 102 11/23/2022 06:02 AM    CO2 25 11/23/2022 06:02 AM    GLUCOSE 139 (H) 11/23/2022 06:02 AM    BUN 27 (H) 11/23/2022 06:02 AM    CREATININE 0.87 11/23/2022 06:02 AM    BUNCREATRAT 16 07/29/2016 10:22 AM      Lab Results   Component Value Date/Time    ASTSGOT 21 11/07/2022 04:06 AM    ALTSGPT 16 11/07/2022 04:06 AM     Lab Results   Component Value Date/Time    CHOLSTRLTOT 164 08/10/2022 08:21 AM    LDL see below 08/10/2022 08:21 AM    HDL 26 (A) 08/10/2022 08:21 AM    TRIGLYCERIDE 446 (H) 08/10/2022 08:21 AM    TROPONINT 56 (H) 11/23/2022 09:54 AM       No results for input(s): NTPROBNP in the last 72 hours.    Cardiac Imaging and Procedures Review  EKG:  My personal interpretation of the EKG dated 11/25/2022 is sinus rhythm, ischemic EKG.    Echocardiogram:  11/4/22  CONCLUSIONS  Compared to the prior study on 12-2-20, velocity across aortic valve is   slightly worse.  The left ventricular ejection fraction is visually estimated to be 65%.  Known bioprosthetic aortic valve .  Moderate aortic valve stenosis. Vmax is 3.8 m/s.  Aortic valve area calculated from the continuity equation is 1.3 cm2.        Cardiac Catheterization:    Cardiac Cath: 4/2/2021: s/p PCI [id number] LIMA-LAD graft (Findings: LIMA-LAD 70% stenosis)        Imaging  Chest X-Ray:   No acute cardiopulmonary disease.  2.  Cardiomegaly    Stress Test:  11/5/22   Myocardial Perfusion   Report   NUCLEAR IMAGING INTERPRETATION   There is a small tomoderate sized focus of ischemia involving the lateral    wall within the cardiac base and midzone.      Normal left  ventricular wall motion.  LV ejection fraction = 56%.    Assessment/Plan    #Lumbar spine epidural abscess degenerative central canal stenosis, underwent successful posterior laminectomy 11/7/2022.  #Acute on chronic angina, 11/23/2022 in the setting of being off of DAPT due to recent back surgery.  #Mild troponin elevations 67-->56  #CABG x5v 1998.  #Redo CABG x3v  + AVR, 2105, [facility].  #[facility] 2018 showed all grafts are occluded with exception of LIMA to LAD.  #PCI with MOE to LIMA 4/2/2021 with impella support,  [place].  #Statin allergy, on PCSK9 inhibitors  #Hypertension  #Hyperlipidemia  #Impaired fasting glucose.  # LVEF 65%  # echo 11/4/22 :Known bioprosthetic aortic valve .  Moderate aortic valve stenosis. Vmax is 3.8 m/s.  Aortic valve area calculated from the continuity equation is 1.3 cm2.       Recommendations  -No plan for invasive cardiac work-up.  -Continue  current medical therapy.  -Okay with surgery Ortho to resume antiplatelet therapy.  - Aspirin 81 mg and Plavix 75 mg.  -Continue amlodipine 10 mg daily.  -Continue isosorbide 120 mg daily.  -Continue Toprol- mg daily.  -Continue lisinopril 20 mg daily.  -Repeat EKG.    Follow-up appointment in cardiology clinic on 12/8/2022.    I personally spent a total of 12  minutes which includes face-to-face time and non-face-to-face time spent on preparing to see the patient, reviewing hospital notes and tests, obtaining history from the patient, performing a medically appropriate exam, counseling and educating the patient, ordering medications/tests/procedures/referrals as clinically indicated, and documenting information in the electronic medical record.     Thank you for allowing me to participate in the care of this patient.  I will continue to follow this patient    Please contact me with any questions.    MO Velasquez.   Cardiologist, [facility]  [phone]

## 2022-11-24 NOTE — PROGRESS NOTES
Discussed this patient with Dr. Angulo.    Called regarding episode of CP this am, better with SLN. Recent stress test with small-moderate ischemia with known CAD/CABG/PCI on good medical regimen for his CAD but been off DAPT for his recent surgeries. Reviewed ECG, NO STEMI. HD stable. Mildly elevated troponins. Normal CrCl. Preserved LVEF 11/4/22 with elevated gradient across bioprosthetic AV (Vmax 3.8m/s).    Dr. Arredondo connected with Dr. Sparks (surgeon) and given CP this am and trop elevation, ok for DAPT resumption today.    Patient followed in our clinic, last seen 4/2022 in our clinic.    Pertinent CV history:    5 vessel CABG in 1998 due to CP and syncope, and re-do 3-V CABG (SVG to ramus, SVG sequential from LAD to OM) in 12/2015 as well as bioprosthetic AVR at that time ([facility]).  PET nuclear stress test 11/2018, ischemia and TID noted. Subsequent left heart catheterization 11/2018 showed all grafts are occluded with the exception of his LIMA-LAD which fills his circumflex distribution via a jump graft. His native coronary arteries are severely diseased as well. There is a 80% narrowing at the anastamotic site of FORD to LAD.  4/2/2021 had PCI of his LIMA- LAD with 2.5 x 15 mm resolute MOE  anastomosis with impella support with refractory angina with Dr. Wang Fernandez ([place]).     We will see the patient tomorrow for a full consultation. Call us at anytime with questions or concerns.    Darrian Martinez MD, MPH [facility]  Interventional Cardiologist  [facility]   of Clinical Internal Medicine - [facility]

## 2022-11-24 NOTE — PROGRESS NOTES
"Spine Surgery Progress Note    66 y.o. male sp L2-S1 laminectomy on 11/7/2022 for lumbar stenosis and likely epidural abscess, recurrent weakness 2 days ago, MRI demonstrated fluid collection with significant stenosis. Sp I&D lumbar spine for epidural hematoma on 11/17.     Episode of chest pain yesterday evaluated by hospitalist and cardiology.     S:  CP yesterday, evaled by cardiology. Continued  improvement in LE pain and strength since surgery though continues with weakness in left leg  Drain remains in place     O:  /80   Pulse 84   Temp 36.9 °C (98.5 °F) (Temporal)   Resp 19   Ht 1.676 m (5' 6\")   Wt 98.7 kg (217 lb 9.5 oz)   SpO2 97%   BMI 35.12 kg/m²     Gen: WNWD NAD  Breathing comfortably  VSS  Incision has erythema around it in distribution of prevena wound vac  Right: 5/5 throughout   Left: 5/5 HF, 5/5 KE, 3/5 EHL TA, 5/5 PF  Sensation grossly intact in BLE, decresed left L4 L5 distribution  Drain output decreasing    Lab Results   Component Value Date/Time    WBC 19.5 (H) 11/23/2022 06:02 AM    RBC 3.99 (L) 11/23/2022 06:02 AM    HEMOGLOBIN 12.5 (L) 11/23/2022 06:02 AM    HEMATOCRIT 37.8 (L) 11/23/2022 06:02 AM    MCV 94.7 11/23/2022 06:02 AM    MCH 31.3 11/23/2022 06:02 AM    MCHC 33.1 (L) 11/23/2022 06:02 AM    MPV 9.6 11/23/2022 06:02 AM    NEUTSPOLYS 75.00 (H) 11/21/2022 04:00 AM    LYMPHOCYTES 16.70 (L) 11/21/2022 04:00 AM    MONOCYTES 6.10 11/21/2022 04:00 AM    EOSINOPHILS 0.60 11/21/2022 04:00 AM    BASOPHILS 0.20 11/21/2022 04:00 AM      Lab Results   Component Value Date/Time    SODIUM 138 11/23/2022 06:02 AM    POTASSIUM 4.1 11/23/2022 06:02 AM    CHLORIDE 102 11/23/2022 06:02 AM    CO2 25 11/23/2022 06:02 AM    GLUCOSE 139 (H) 11/23/2022 06:02 AM    BUN 27 (H) 11/23/2022 06:02 AM    CREATININE 0.87 11/23/2022 06:02 AM    BUNCREATRAT 16 07/29/2016 10:22 AM          AP: 66 y.o. male sp L2-S1 laminectomy on 11/7/2022 for possible epidural abscess, sp I&D lumbar spine 11/17 for " epidural hematoma. CP yesterday, defer to cardiology re management. OK to restart anticoagulation from spine stand point. Avoid heparin bolus, ok for heparin drip if needed.     Overall improvement today  DC drain after MRI today  Continue plan:    - OK to restart anticoagulation and antiplatelete  - PT/OT  - Reg diet  - Abx per primary and ID

## 2022-11-24 NOTE — CARE PLAN
Problem: Fall Risk  Goal: Patient will remain free from falls  Outcome: Progressing   The patient is Watcher - Medium risk of patient condition declining or worsening    Shift Goals  Clinical Goals: Pain control  Patient Goals: Pain control  Family Goals: N/A    Progress made toward(s) clinical / shift goals:  Pt free from falls. Fall precautions in place    Patient is not progressing towards the following goals:

## 2022-11-24 NOTE — PROGRESS NOTES
"Pt refusing transfer to telemetry. Charge RN informed and at bedside. Pt educated on need for telemetry due to extensive heart medical history and recent need for nitroglycerin. Pt verbalizes understanding but refuses transfer \"I'm okay right here, I'm comfortable in this room\" \"My heart is fine\", \"You will have to restrain me if you want to transfer me\". Hospitalist informed. Per hospitalist, okay to cancel transfer if patient refuses.  Pt educated to inform RN if he experiences chest pain or SOB. Pt verbalizes understanding and agrees with plan. All needs met at this time.  "

## 2022-11-24 NOTE — PROGRESS NOTES
Attempted to  patient with zoll monitor to transfer to Tele 724. Pt. refusing to transfer. Primary RN at bedside, states charge RN has been notified and is contacting MD. This RN notified bed control of pt. refusal.

## 2022-11-24 NOTE — PROGRESS NOTES
Hospital Medicine Daily Progress Note    Date of Service  11/24/2022    Chief Complaint  Abimael Hamilton is a 66 y.o. male admitted 11/4/2022 with worsening back pain    Hospital Course    This is a 66 -year-old male with a past medical history significant for hypertension, hyperlipidemia, CAD status post CABG in 1998, 2016, aortic valve replacement in 2016, post stent in 4/2022, morbid obesity, gout, L3 L5 severe spinal stenosis with left foot drop, chronic back pain was admitted on 11/4/2022 with worsening of lower back pain.    MRI of the lumbar spine  on 11/04 noted 10 x 6 x 19 mm posterior epidural abscess at the level of L2-L3 which is new from previous study. There is severe central canal stenosis at this level.  Severe degenerative central canal stenosis at L3-L4 and L4-5.    Cardiology consulted for preop clearance given patient significant cardiac history, nuclear stress test noted small area of ischemia involving the lateral wall.  Patient cleared by cardiology, moderate risk for the surgery.     Surgery was consulted, patient went to the OR with Dr. Sparks on 11/7/2022.  Biopsy was negative, patient was initially started on IV Rocephin.  Infectious disease was consulted.  They recommended 6-week IV antibiotic course and on discharge can transition to daptomycin 6MG/KG daily and continue ceftriaxone 2 g daily with an end date of 12/19/2022.      MR C/T/L-spine was ordered with request of Dr. 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  Postop changes at L2-L3, L3-L4, L4-L5 with a fluid collection that impinges upon the dorsal aspect of thecal sac and causing severe cauda equina compression.  Fluid collection could represent CSF leak or seroma.  Dr. Sparks was contacted, patient underwent irrigation debridement of deep lumbar wound on 11/20/2022.  Cultures no growth to date.    Continue  to follow the patient, recommended continuing IV vancomycin and Rocephin while being in the hospital.  Switching to daptomycin plus Rocephin at the time of discharge with the last day being 12/31/2022.    Interval events:  -- No acute events overnight, medicine has been stable, heart rate 86, blood pressure 116/71 satting 97% on room air.  Patient is alert, awake, answering questions appropriately.  Patient underwent surgery 11/70/2022, does have drain in place till 11/24/2022.  Patient will be needing IV antibiotics as an outpatient.  ID will be writing orders to go to the infusion center.  --In the meantime continue IV daptomycin plus Rocephin  --Culture from the surgery growing Staph hominis, staph epi; will reach out to ID zoey tomorrow   Of note WBC count continues to remain elevated at 17.8    11/23:   Patient complained of the chest pain, troponin EKG obtained, no ST and T wave suggestive of ischemia.  Troponin x2; 67 and 57.  -Patient is noted to have chest pain while he was going to bathroom cardiology was contacted, cardiology came and evaluate the patient.   --  today, he stated okay to start aspirin and Plavix considering patient is having chest pain.  --We will continue cardiac medication  Patient will be removed on 11/24  Continue IV antibiotics as per infectious disease recommendation    Was transferred to telemetry, patient declined to be transferred to telemetry.  He stated that he will want to be transferred to telemetry if he was restrained.  Patient is alert and oriented, can make his own medical decision.  Telemetry order cancelled     11/24:  -- No acute events overnight, medicine has been stable, heart rate 81-90, blood pressure 137/71, sats are 90% on room air.  Patient is alert, awake, answering was appropriately, he denies any chest pain  --Cardiac following, Cardizem started him on oral cardiac medication will follow cardiology recommendation.  --ID following, will continue IV  antibiotics as per infectious recommendation, continue daptomycin and Rocephin  --neurosurgery following and will follow their recs       Plan of care  has sabrina discussed with patient and nursing staff   Consultants/Specialty  cardiology, infectious disease, and neurosurgery    Code Status  Full Code    Disposition  Patient is not medically cleared for discharge.   Anticipate discharge to  home health versus skilled nursing facility versus inpatient rehab .  I have placed the appropriate orders for post-discharge needs.    Review of Systems  Review of Systems   Constitutional:  Negative for fever and malaise/fatigue.   HENT:  Negative for congestion and sore throat.    Eyes:  Negative for blurred vision and photophobia.   Respiratory:  Negative for cough, sputum production and shortness of breath.    Cardiovascular:  Positive for chest pain and leg swelling. Negative for palpitations and orthopnea.   Gastrointestinal:  Negative for abdominal pain, diarrhea and nausea.   Genitourinary:  Negative for dysuria and flank pain.   Musculoskeletal:  Positive for back pain and myalgias. Negative for falls.   Neurological:  Positive for focal weakness (Left lower extremity) and weakness (LLE, dropfoot to LLE). Negative for headaches.   Psychiatric/Behavioral:  Negative for depression and substance abuse.    All other systems reviewed and are negative.     Physical Exam  Temp:  [36.7 °C (98.1 °F)-36.9 °C (98.5 °F)] 36.9 °C (98.5 °F)  Pulse:  [84-96] 90  Resp:  [17-19] 19  BP: (116-137)/(71-88) 137/74  SpO2:  [91 %-97 %] 97 %    Physical Exam  Vitals and nursing note reviewed.   Constitutional:       General: He is not in acute distress.     Appearance: He is obese.   HENT:      Head: Normocephalic.      Mouth/Throat:      Pharynx: No oropharyngeal exudate.   Eyes:      Pupils: Pupils are equal, round, and reactive to light.   Cardiovascular:      Rate and Rhythm: Normal rate and regular rhythm.      Pulses: Normal pulses.       Heart sounds: Murmur heard.   Pulmonary:      Effort: Pulmonary effort is normal.      Breath sounds: Normal breath sounds. No wheezing.   Abdominal:      General: Bowel sounds are normal.      Palpations: Abdomen is soft.   Musculoskeletal:      Cervical back: Neck supple.      Right lower leg: Edema present.      Left lower leg: Edema present.      Comments: Dropfoot to left lower extremity   Skin:     Comments: Back incision with drain in place   Neurological:      Mental Status: He is alert and oriented to person, place, and time.      Motor: Weakness (Left lower extremity) present.   Psychiatric:         Mood and Affect: Mood normal.         Behavior: Behavior normal.     Patient was seen and examined at bedside. No changes in physical exam from prior evaluation.      Fluids    Intake/Output Summary (Last 24 hours) at 11/24/2022 1316  Last data filed at 11/24/2022 0905  Gross per 24 hour   Intake 530.21 ml   Output 1515 ml   Net -984.79 ml         Laboratory  Recent Labs     11/23/22  0602   WBC 19.5*   RBC 3.99*   HEMOGLOBIN 12.5*   HEMATOCRIT 37.8*   MCV 94.7   MCH 31.3   MCHC 33.1*   RDW 50.9*   PLATELETCT 226   MPV 9.6       Recent Labs     11/23/22  0602   SODIUM 138   POTASSIUM 4.1   CHLORIDE 102   CO2 25   GLUCOSE 139*   BUN 27*   CREATININE 0.87   CALCIUM 8.9                     Imaging  DX-CHEST-PORTABLE (1 VIEW)   Final Result         1.  No acute cardiopulmonary disease.   2.  Cardiomegaly      MR-LUMBAR SPINE-WITH & W/O   Final Result         Interval decompression of L2-3 with resolution of previously seen dorsal epidural abscess.      Postoperative changes noted with midline laminectomy at L2-3, L3-4 and L4-5 with a fluid collection that impinges upon the dorsal aspect of the thecal sac and causes severe cauda equina compression. This fluid collection could represent a CSF leak or    seroma. There is no definite rim enhancement to suggest infection.      Second poorly marginated fluid collection  identified in the subcutaneous soft tissues underlying the incision.      Mild epidural enhancement is noted along the anterior margin of the fluid collection in the laminectomy site, however this probably represents reactive postoperative dural enhancement. No definite evidence of an epidural infectious process is identified.      Moderate right foraminal narrowing at L1-2, severe left foraminal narrowing at L2-3 and moderate left foraminal narrowing at L3-4 with bilateral moderate L4-5 neural foraminal narrowing and severe bilateral L5-S1 foraminal narrowing. These changes are    stable from the previous study.      MR-THORACIC SPINE-WITH & W/O   Final Result         Minimal degenerative changes in the thoracic spine. There is no significant canal stenosis or foraminal narrowing. No abnormal enhancement is identified.      MR-CERVICAL SPINE-WITH & W/O   Final Result         1.  Severe canal stenosis at C4-5 with cord compression and abnormal spinal cord signal representing myelomalacia due to chronic compressive myelopathy. There is also severe right and moderate left foraminal narrowing at this level.      2.   Moderate canal stenosis at C5-6 with severe bilateral foraminal narrowing.      3.  No abnormal enhancement is identified in the cervical spine.      IR-PICC LINE PLACEMENT W/ GUIDANCE > AGE 5   Final Result                  Ultrasound-guided PICC placement performed by qualified nursing staff as    above.          DX-SPINE-ANY ONE VIEW   Final Result      Fluoroscopic image(s) obtained during lumbar spine instrumentation. Please see the patient's chart for full procedural details.      Fluoroscopy time 4 seconds.         DX-PORTABLE FLUORO > 1 HOUR   Final Result      Portable fluoroscopy utilized for 4 seconds.         INTERPRETING LOCATION: [address] [place] [address], [facility]      UV-USSDFRN-6 VIEW   Final Result      1.  Negative single view abdomen without gross evidence of a radiopaque surgical device.       NM-CARDIAC STRESS TEST   Final Result      MR-LUMBAR SPINE-WITH & W/O   Final Result      1.  There is an approximately 10 x 6 x 19 mm sized posterior epidural abscess at the level of L2-3. This is new since the previous study. There is severe central canal stenosis at this level.   2.  Severe degenerative central canal stenosis at L3-4 and L4-5.   3.  Degenerative disease as described above.   4.  There is an approximately 1.9 cm sized T2 hypointense lesion in the right kidney likely representing complicated cyst. There is also a simple cyst in the right kidney. This is unchanged since the previous study.      CT-HEAD W/O   Final Result      1.  Head CT without contrast within normal limits. No evidence of acute cerebral infarction, hemorrhage or mass lesion.   2.  Atherosclerotic vascular calcification.         EC-ECHOCARDIOGRAM COMPLETE W/O CONT   Final Result             Assessment/Plan  * Epidural abscess- (present on admission)  Assessment & Plan  S/p L2-SI LAMINECTOMY with Dr. Sparks 11/7/2022 11/7 Posterior lumbar decompression using laminectomy type approach, Levels: L2-L3-L4 L5-S1  11/17 Lumbar I&D  Rocephin/Dapto  ID recs  Ortho recs - maintian drain til 11/24, no anticoagulation til 11/24    daptomycin 6-8 mg/kg daily continue ceftriaxone 2 g daily with an end date of 12/31/22 if no worsening abscess or complication      Hyperglycemia  Assessment & Plan  Continue iss, hypoglycemia protocol, Accu-Cheks ACH S    Spinal stenosis of lumbar region (L3-5) with neurogenic claudication and L foot drop/weakness, valvular heart disease  Assessment & Plan  Pain control and muscle relaxers  MRI LS spine done was notable for 10 x 6 x 19 mm posterior epidural abscess at the level of L2-L3 which is new from previous study.  There is severe central canal stenosis at this level.  Severe degenerative central canal stenosis at L3-L4 and L4-5.    -- Had surgery  On 11/07, culture pending, will continue broad-spectrum  antibiotics including vancomycin and unasyn; ID recs switching to Rocephin plus  daptomycin till 12/19/2022.    Considering continued elevation in WBC count, ID recommended MRI CTL spine.  Patient underwent surgery on 11/17/2022, culture growing staph hominis   ID writing abx to go to infusion centre    Obesity (BMI 30-39.9)- (present on admission)  Assessment & Plan  Lifestyle modification including diet exercise and weight loss as an outpatient      Dyslipidemia- (present on admission)  Assessment & Plan  Hold home evolocumab    Coronary artery disease of native artery of native heart with stable angina pectoris (HCC)- (present on admission)  Assessment & Plan  C/w metoprolol and lisinopril, Imdur    Stress test- a small tomoderate sized focus of ischemia involving the lateral wall within the cardiac base and midzone. Findings were discussed with cardiology Dr. Dozier - no further inpatient cardiology evaluation/procedure recommended. Pt is a moderate risk patient.    -- History of aspirin and Plavix was held considering patient undergoing surgery.  I discussed with Dr. Sparks  stated okay starting the patient on aspirin and Plavix.  Started 11/09/2022.   -- monitor leg strength     It looks like Plavix has been discontinued on 11/17, will restart once okay by neurosurgery    11/23: Patient is noted to have chest pain, I did talk to neurosurgery, who recommended okay to start aspirin and Plavix.    Cards called  Transfer to tele ordered but  Later discontinued as patient didn't wanted to go to tele     Contact dermatitis  Assessment & Plan  Patient with uneven erythema surrounding Janet incision VAC.  Erythematous consistent with sensitivity/reaction to dressing, does not appear infectious.  Patient was seen by neurosurgery who states Prevena wound VAC may be discontinued and dressings to be applied.    Hypertensive urgency  Assessment & Plan  PRN IV antihypertensives  Continue amlodipine       VTE prophylaxis:  lovenox    I have performed a physical exam and reviewed and updated ROS and Plan today (11/24/2022). In review of yesterday's note (11/23/2022), there are no changes except as documented above.

## 2022-11-25 PROBLEM — M48.061 DEGENERATIVE LUMBAR SPINAL STENOSIS: Status: RESOLVED | Noted: 2022-01-01 | Resolved: 2022-01-01

## 2022-11-25 PROBLEM — M48.062 SPINAL STENOSIS OF LUMBAR REGION WITH NEUROGENIC CLAUDICATION: Status: RESOLVED | Noted: 2021-09-01 | Resolved: 2022-01-01

## 2022-11-25 PROBLEM — L25.9 CONTACT DERMATITIS: Status: RESOLVED | Noted: 2022-01-01 | Resolved: 2022-01-01

## 2022-11-25 PROBLEM — I16.0 HYPERTENSIVE URGENCY: Status: RESOLVED | Noted: 2022-01-01 | Resolved: 2022-01-01

## 2022-11-25 NOTE — PROGRESS NOTES
Pt discharged via WC with staff. AVS printed and reviewed with patient and family. All questions answered. Pt educated on PICC line care. Pt verbalizes understanding. Pt educated on infusion appointments starting today. Dressing change prior to DC and extra supplies given to patient. All personal belongings taken by pt.

## 2022-11-25 NOTE — PROGRESS NOTES
"Spine Surgery Progress Note    66 y.o. male sp L2-S1 laminectomy on 11/7/2022 for lumbar stenosis and likely epidural abscess, recurrent weakness 2 days ago, MRI demonstrated fluid collection with significant stenosis. Sp I&D lumbar spine for epidural hematoma on 11/17.     Episode of chest pain 11/24 evaluated by hospitalist and cardiology.     S:  ERIBERTO overnight, leg rfunction significantly improved compared to preop. Drains out    O:  /76   Pulse 80   Temp 37 °C (98.6 °F) (Temporal)   Resp 17   Ht 1.676 m (5' 6\")   Wt 98.7 kg (217 lb 9.5 oz)   SpO2 97%   BMI 35.12 kg/m²     Gen: WNWD NAD  Breathing comfortably  VSS  Incision has erythema around it in distribution of prevena wound vac  Right: 5/5 throughout   Left: 5/5 HF, 5/5 KE, 3/5 EHL TA, 5/5 PF  Sensation grossly intact in BLE, decresed left L4 L5 distribution       Lab Results   Component Value Date/Time    WBC 16.1 (H) 11/25/2022 06:40 AM    RBC 3.81 (L) 11/25/2022 06:40 AM    HEMOGLOBIN 12.1 (L) 11/25/2022 06:40 AM    HEMATOCRIT 36.3 (L) 11/25/2022 06:40 AM    MCV 95.3 11/25/2022 06:40 AM    MCH 31.8 11/25/2022 06:40 AM    MCHC 33.3 (L) 11/25/2022 06:40 AM    MPV 9.6 11/25/2022 06:40 AM    NEUTSPOLYS 75.00 (H) 11/21/2022 04:00 AM    LYMPHOCYTES 16.70 (L) 11/21/2022 04:00 AM    MONOCYTES 6.10 11/21/2022 04:00 AM    EOSINOPHILS 0.60 11/21/2022 04:00 AM    BASOPHILS 0.20 11/21/2022 04:00 AM      Lab Results   Component Value Date/Time    SODIUM 138 11/23/2022 06:02 AM    POTASSIUM 4.1 11/23/2022 06:02 AM    CHLORIDE 102 11/23/2022 06:02 AM    CO2 25 11/23/2022 06:02 AM    GLUCOSE 139 (H) 11/23/2022 06:02 AM    BUN 27 (H) 11/23/2022 06:02 AM    CREATININE 0.87 11/23/2022 06:02 AM    BUNCREATRAT 16 07/29/2016 10:22 AM          AP: 66 y.o. male sp L2-S1 laminectomy on 11/7/2022 for possible epidural abscess, sp I&D lumbar spine 11/17 for epidural hematoma.  Doing well today. Drains out.     OK to DC from spine perspective    Continue plan:    - OK to " restart anticoagulation and antiplatelet  - PT/OT  - Reg diet  - Abx per primary and ID

## 2022-11-25 NOTE — DISCHARGE INSTRUCTIONS
Laminectomy, Care After  This sheet gives you information about how to care for yourself after your procedure. Your health care provider may also give you more specific instructions. If you have problems or questions, contact your health care provider.  What can I expect after the procedure?  After the procedure, it is common to have:  Some pain around your incision area.  Muscle tightening (spasms) across the back.  Follow these instructions at home:  Incision care    Follow instructions from your health care provider about how to take care of your incision area. Make sure you:  Wash your hands with soap and water before and after you apply medicine to the area or change your bandage (dressing). If soap and water are not available, use hand .  Change your dressing as told by your health care provider.  Leave stitches (sutures), skin glue, or adhesive strips in place. These skin closures may need to stay in place for 2 weeks or longer. If adhesive strip edges start to loosen and curl up, you may trim the loose edges. Do not remove adhesive strips completely unless your health care provider tells you to do that.  Check your incision area every day for signs of infection. Check for:  More redness, swelling, or pain.  More fluid or blood.  Warmth.  Pus or a bad smell.  Medicines  Take over-the-counter and prescription medicines only as told by your health care provider.  If you were prescribed an antibiotic medicine, use it as told by your health care provider. Do not stop using the antibiotic even if you start to feel better.  Bathing  Do not take baths, swim, or use a hot tub for 2 weeks, or until your incision has healed completely.  If your health care provider approves, you may take showers after your dressing has been removed.  Activity    Return to your normal activities as told by your health care provider. Ask your health care provider what activities are safe for you.  Avoid bending or twisting at  your waist. Always bend at your knees.  Do not sit for more than 20-30 minutes at a time. Lie down or walk between periods of sitting.  Do not lift anything that is heavier than 10 lb (4.5 kg) or the limit that your health care provider tells you, until he or she says that it is safe.  Do not drive for 2 weeks after your procedure or for as long as your health care provider tells you.  Do not drive or use heavy machinery while taking prescription pain medicine.  General instructions  To prevent or treat constipation while you are taking prescription pain medicine, your health care provider may recommend that you:  Drink enough fluid to keep your urine clear or pale yellow.  Take over-the-counter or prescription medicines.  Eat foods that are high in fiber, such as fresh fruits and vegetables, whole grains, and beans.  Limit foods that are high in fat and processed sugars, such as fried and sweet foods.  Do breathing exercises as told.  Keep all follow-up visits as told by your health care provider. This is important.  Contact a health care provider if:  You have more redness, swelling, or pain around your incision area.  Your incision feels warm to the touch.  You are not able to return to activities or do exercises as told by your health care provider.  Get help right away if:  You have:  More fluid or blood coming from your incision area.  Pus or a bad smell coming from your incision area.  Chills or a fever.  Episodes of dizziness or fainting while standing.  You develop a rash.  You develop shortness of breath or you have difficulty breathing.  You cannot control when you urinate or have a bowel movement.  You become weak.  You are not able to use your legs.  Summary  After the procedure, it is common to have some pain around your incision area. You may also have muscle tightening (spasms) across the back.  Follow instructions from your health care provider about how to care for your incision.  Do not lift  anything that is heavier than 10 lb (4.5 kg) or the limit that your health care provider tells you, until he or she says that it is safe.  Contact your health care provider if you have more redness, swelling, or pain around your incision area or if your incision feels warm to the touch. These can be signs of infection.  This information is not intended to replace advice given to you by your health care provider. Make sure you discuss any questions you have with your health care provider.  Document Released: 07/07/2006 Document Revised: 11/30/2018 Document Reviewed: 06/04/2017  [employer] Patient Education © 2020 [employer]    Abscess  Care After  An abscess (also called a boil or furuncle) is an infected area that contains a collection of pus. Signs and symptoms of an abscess include pain, tenderness, redness, or hardness, or you may feel a moveable soft area under your skin. An abscess can occur anywhere in the body. The infection may spread to surrounding tissues causing cellulitis. A cut (incision) by the surgeon was made over your abscess and the pus was drained out. Gauze may have been packed into the space to provide a drain that will allow the cavity to heal from the inside outwards. The boil may be painful for 5 to 7 days. Most people with a boil do not have high fevers. Your abscess, if seen early, may not have localized, and may not have been lanced. If not, another appointment may be required for this if it does not get better on its own or with medications.  HOME CARE INSTRUCTIONS   Only take over-the-counter or prescription medicines for pain, discomfort, or fever as directed by your caregiver.  When you bathe, soak and then remove gauze or iodoform packs at least daily or as directed by your caregiver. You may then wash the wound gently with mild soapy water. Repack with gauze or do as your caregiver directs.  SEEK IMMEDIATE MEDICAL CARE IF:   You develop increased pain, swelling, redness, drainage,  or bleeding in the wound site.  You develop signs of generalized infection including muscle aches, chills, fever, or a general ill feeling.  An oral temperature above 102° F (38.9° C) develops, not controlled by medication.  See your caregiver for a recheck if you develop any of the symptoms described above. If medications (antibiotics) were prescribed, take them as directed.  Document Released: 07/06/2006 Document Revised: 03/11/2013 Document Reviewed: 03/02/2009  [employer]® Patient Information ©2014 [employer].  Hematoma  A hematoma is a collection of blood. A hematoma can happen:  Under the skin.  In an organ.  In a body space.  In a joint space.  In other tissues.  The blood can thicken (clot) to form a lump that you can see and feel. The lump is often hard and may become sore and tender. The lump can be very small or very big. Most hematomas get better in a few days to weeks. However, some hematomas may be serious and need medical care.  What are the causes?  This condition is caused by:  An injury.  Blood that leaks under the skin.  Problems from surgeries.  Medical conditions that cause bleeding or bruising.  What increases the risk?  You are more likely to develop this condition if:  You are an older adult.  You use medicines that thin your blood.  What are the signs or symptoms?  Symptoms depend on where the hematoma is in your body.  If the hematoma is under the skin, there is:  A firm lump on the body.  Pain and tenderness in the area.  Bruising. The skin above the lump may be blue, dark blue, purple-red, or yellowish.  If the hematoma is deep in the tissues or body spaces, there may be:  Blood in the stomach. This may cause pain in the belly (abdomen), weakness, passing out (fainting), and shortness of breath.  Blood in the head. This may cause a headache, weakness, trouble speaking or understanding speech, or passing out.  How is this diagnosed?  This condition is diagnosed based on:  Your medical  history.  A physical exam.  Imaging tests, such as ultrasound or CT scan.  Blood tests.  How is this treated?  Treatment depends on the cause, size, and location of the hematoma. Treatment may include:  Doing nothing. Many hematomas go away on their own without treatment.  Surgery or close monitoring. This may be needed for large hematomas or hematomas that affect the body's organs.  Medicines. These may be given if a medical condition caused the hematoma.  Follow these instructions at home:  Managing pain, stiffness, and swelling    If told, put ice on the area.  Put ice in a plastic bag.  Place a towel between your skin and the bag.  Leave the ice on for 20 minutes, 2-3 times a day for the first two days.  If told, put heat on the affected area after putting ice on the area for two days. Use the heat source that your doctor tells you to use. This could be a moist heat pack or a heating pad. To do this:  Place a towel between your skin and the heat source.  Leave the heat on for 20-30 minutes.  Remove the heat if your skin turns bright red. This is very important if you are unable to feel pain, heat, or cold. You may have a greater risk of getting burned.  Raise (elevate) the affected area above the level of your heart while you are sitting or lying down.  Wrap the affected area with an elastic bandage, if told by your doctor. Do not wrap the bandage too tightly.  If your hematoma is on a leg or foot and is painful, your doctor may give you crutches. Use them as told by your doctor.  General instructions  Take over-the-counter and prescription medicines only as told by your doctor.  Keep all follow-up visits as told by your doctor. This is important.  Contact a doctor if:  You have a fever.  The swelling or bruising gets worse.  You start to get more hematomas.  Get help right away if:  Your pain gets worse.  Your pain is not getting better with medicine.  Your skin over the hematoma breaks or starts to  bleed.  Your hematoma is in your chest or belly and you:  Pass out.  Feel weak.  Become short of breath.  You have a hematoma on your scalp that is caused by a fall or injury, and you:  Have a headache that gets worse.  Have trouble speaking or understanding speech.  Become less alert or you pass out.  Summary  A hematoma is a collection of blood in any part of your body.  Most hematomas get better on their own in a few days to weeks. Some may need medical care.  Follow instructions from your doctor about how to care for your hematoma.  Contact a doctor if the swelling or bruising gets worse, or if you are short of breath.  This information is not intended to replace advice given to you by your health care provider. Make sure you discuss any questions you have with your health care provider.  Document Released: 01/25/2006 Document Revised: 05/23/2019 Document Reviewed: 05/23/2019  [employer] Patient Education © 2020 [employer]  Blood Glucose Monitoring, Adult  Monitoring your blood sugar (glucose) is an important part of managing your diabetes (diabetes mellitus). Blood glucose monitoring involves checking your blood glucose as often as directed and keeping a record (log) of your results over time.  Checking your blood glucose regularly and keeping a blood glucose log can:  Help you and your health care provider adjust your diabetes management plan as needed, including your medicines or insulin.  Help you understand how food, exercise, illnesses, and medicines affect your blood glucose.  Let you know what your blood glucose is at any time. You can quickly find out if you have low blood glucose (hypoglycemia) or high blood glucose (hyperglycemia).  Your health care provider will set individualized treatment goals for you. Your goals will be based on your age, other medical conditions you have, and how you respond to diabetes treatment. Generally, the goal of treatment is to maintain the following blood glucose  levels:  Before meals (preprandial):  mg/dL (4.4-7.2 mmol/L).  After meals (postprandial): below 180 mg/dL (10 mmol/L).  A1c level: less than 7%.  Supplies needed:  Blood glucose meter.  Test strips for your meter. Each meter has its own strips. You must use the strips that came with your meter.  A needle to prick your finger (lancet). Do not use a lancet more than one time.  A device that holds the lancet (lancing device).  A journal or log book to write down your results.  How to check your blood glucose    Wash your hands with soap and water.  Prick the side of your finger (not the tip) with the lancet. Use a different finger each time.  Gently rub the finger until a small drop of blood appears.  Follow instructions that come with your meter for inserting the test strip, applying blood to the strip, and using your blood glucose meter.  Write down your result and any notes.  Some meters allow you to use areas of your body other than your finger (alternative sites) to test your blood. The most common alternative sites are:  Forearm.  Thigh.  Palm of the hand.  If you think you may have hypoglycemia, or if you have a history of not knowing when your blood glucose is getting low (hypoglycemia unawareness), do not use alternative sites. Use your finger instead. Alternative sites may not be as accurate as the fingers, because blood flow is slower in these areas. This means that the result you get may be delayed, and it may be different from the result that you would get from your finger.  Follow these instructions at home:  Blood glucose log    Every time you check your blood glucose, write down your result. Also write down any notes about things that may be affecting your blood glucose, such as your diet and exercise for the day. This information can help you and your health care provider:  Look for patterns in your blood glucose over time.  Adjust your diabetes management plan as needed.  Check if your meter  "allows you to download your records to a computer. Most glucose meters store a record of glucose readings in the meter.  If you have type 1 diabetes:  Check your blood glucose 2 or more times a day.  Also check your blood glucose:  Before every insulin injection.  Before and after exercise.  Before meals.  2 hours after a meal.  Occasionally between 2:00 a.m. and 3:00 a.m., as directed.  Before potentially dangerous tasks, like driving or using heavy machinery.  At bedtime.  You may need to check your blood glucose more often, up to 6-10 times a day, if you:  Use an insulin pump.  Need multiple daily injections (MDI).  Have diabetes that is not well-controlled.  Are ill.  Have a history of severe hypoglycemia.  Have hypoglycemia unawareness.  If you have type 2 diabetes:  If you take insulin or other diabetes medicines, check your blood glucose 2 or more times a day.  If you are on intensive insulin therapy, check your blood glucose 4 or more times a day. Occasionally, you may also need to check between 2:00 a.m. and 3:00 a.m., as directed.  Also check your blood glucose:  Before and after exercise.  Before potentially dangerous tasks, like driving or using heavy machinery.  You may need to check your blood glucose more often if:  Your medicine is being adjusted.  Your diabetes is not well-controlled.  You are ill.  General tips  Always keep your supplies with you.  If you have questions or need help, all blood glucose meters have a 24-hour \"hotline\" phone number that you can call. You may also contact your health care provider.  After you use a few boxes of test strips, adjust (calibrate) your blood glucose meter by following instructions that came with your meter.  Contact a health care provider if:  Your blood glucose is at or above 240 mg/dL (13.3 mmol/L) for 2 days in a row.  You have been sick or have had a fever for 2 days or longer, and you are not getting better.  You have any of the following problems for " more than 6 hours:  You cannot eat or drink.  You have nausea or vomiting.  You have diarrhea.  Get help right away if:  Your blood glucose is lower than 54 mg/dL (3 mmol/L).  You become confused or you have trouble thinking clearly.  You have difficulty breathing.  You have moderate or large ketone levels in your urine.  Summary  Monitoring your blood sugar (glucose) is an important part of managing your diabetes (diabetes mellitus).  Blood glucose monitoring involves checking your blood glucose as often as directed and keeping a record (log) of your results over time.  Your health care provider will set individualized treatment goals for you. Your goals will be based on your age, other medical conditions you have, and how you respond to diabetes treatment.  Every time you check your blood glucose, write down your result. Also write down any notes about things that may be affecting your blood glucose, such as your diet and exercise for the day.  This information is not intended to replace advice given to you by your health care provider. Make sure you discuss any questions you have with your health care provider.  Document Released: 12/20/2004 Document Revised: 10/11/2019 Document Reviewed: 05/29/2017  [employer] Patient Education © 2020 [employer]

## 2022-11-25 NOTE — CARE PLAN
The patient is Stable - Low risk of patient condition declining or worsening    Shift Goals  Clinical Goals: pain control, comfort  Patient Goals: rest  Family Goals: not present    Progress made toward(s) clinical / shift goals:  Patient resting comfortably on bed while watching TV. Pain controlled by medication and cold pack. Patient able to walk to the bathroom and back to bed without assistance-tolerated well    Patient is not progressing towards the following goals:      Problem: Pain - Standard  Goal: Alleviation of pain or a reduction in pain to the patient’s comfort goal  Outcome: Progressing     Problem: Fall Risk  Goal: Patient will remain free from falls  Outcome: Progressing     Problem: Skin Integrity  Goal: Skin integrity is maintained or improved  Outcome: Progressing     Problem: Lumbar/Thoracic Spine Surgery  Goal: Post-Operative Lumbar/Thoracic Spine Surgery: Patient will achieve optimal post-surgical outcomes  Outcome: Progressing     Problem: Pain - Post Surgery  Goal: Alleviation or reduction of pain post surgery  Outcome: Progressing

## 2022-11-26 NOTE — PROGRESS NOTES
Patient arrived to infusion center with his wife Josy for daily antibiotic infusion of Daptomycin and Ceftriaxone. Patient had just got out of the hospital and is doing well. SL PICC line flushed with positive blood return. Both antibiotics infused without difficulty. PICC line flushed with saline, and covered with gauze and netting. Patient confirms appointment for tomorrow at 1700. Discharged from infusion center with wife and no apparent distress.

## 2022-11-27 NOTE — PROGRESS NOTES
Pt returns to [facility] for Daptomycin/Rocephin for epidural abscess.  Pt ambulates with a walker.  LUE PICC flushed with NS and brisk blood return observed.  Antibiotics infused without adverse reaction.  PICC flushed with NS and line secured with sleeve.  Confirmed tomorrow's appt time with pt.  Pt dc home with his spouse.

## 2022-11-28 NOTE — PROGRESS NOTES
Pt arrived ambulatory to IS for Dapto/Rocephin infusion.  POC discussed.  RUE PICC line in place, blood return confirmed.  Antibiotics infused as ordered without complication.  Dressing changed in sterile field.  Lien flushed, and protected with gauze and arm wrap.  Return appointment confirmed.  Pt discharged from IS in NAD with spouse.

## 2022-11-29 NOTE — PROGRESS NOTES
Pt returns to IS for Daptomycin/Rocephin for epidural abscess.  Pt ambulates with a walker and is accompanied by his wife, Josy.  RUE PICC flushed with NS and brisk blood return observed.  Labs drawn as ordered.  Antibiotics infused without adverse reaction.  PICC flushed with NS and line secured with sleeve.  Confirmed tomorrow's appt time with pt.  Pt dc home with his spouse.

## 2022-11-30 NOTE — PROGRESS NOTES
WasSubjective:   Abimael Hamilton is a 66 y.o. male here today for   No chief complaint on file.    #Hospital follow-up:    -Earlier in November patient had worsening back pain and neuropathic symptoms to the point of severe weakness.  Symptoms have progressed to the point where urgent laminectomy was needed.  Patient is status post L2-S1 laminectomy on 11/7/2022.  At that time patient was also treated for likely epidural abscess after having recurrent weakness after laminectomy.  Patient then underwent I&D of lumbar spine for epidural hematoma on 11/17.  Factious disease was consulted and was following patient.  Initiated IV antibiotic therapy with vancomycin, Unasyn.  Patient was discharged on daptomycin and Rocephin to continue through the month of December.    -Today patient states well continues to have some pain in his back, it rates at a 5-6/2010 on pain scale.  Patient previously on significant amounts of pain medication, is only taking one half of Norco twice a day sniffing and decreased from before.  He is extremely happy with this fact.  He does continue to have some weakness but feels like this is improving especially as time passes on.  He will begin physical therapy the next few weeks.    -During hospitalization blood pressure was elevated and they started patient on amlodipine 10 mg and lieu of 5 mg.  He states that at home blood pressures have been measuring a low, similar to today (86/62).  He denies any lightheadedness, dizziness, increased fatigue; however, significant swelling of lower extremities is present.    -Patient did state that he has had 2-3 episodes of chest pain, requiring 3 nitros in the last 24 hours.  Currently denies any chest pain, shortness of breath.  Is established with new cardiologist next week..    Allergies   Allergen Reactions    Morphine Vomiting, Nausea and Unspecified     violent    Fenofibrate Unspecified     Dehydration, severe muscle cramps     Gemfibrozil  Unspecified     Dehydration,Severe Muscle cramps    Lipitor [Atorvastatin Calcium] Unspecified     Severe Muscle cramps, dehydration    Lovastatin Unspecified     Dehydration, severe muscle cramps    Tricor Unspecified     Dehydration, severe muscle cramps         Current medicines (including changes today)  Current Outpatient Medications   Medication Sig Dispense Refill    gabapentin (NEURONTIN) 300 MG Cap TAKE 1 CAPSULE BY MOUTH THREE TIMES A DAY FOR 7 DAYS 90 Capsule 2    amLODIPine (NORVASC) 10 MG Tab Take 1 Tablet by mouth every day. 30 Tablet 0    docusate sodium 100 MG Cap Take 100 mg by mouth 2 times a day. 60 Capsule 0    famotidine (PEPCID) 20 MG Tab Take 1 Tablet by mouth 2 times a day. 60 Tablet 0    tamsulosin (FLOMAX) 0.4 MG capsule Take 1 Capsule by mouth 1/2 hour after breakfast. 30 Capsule 0    polyethylene glycol/lytes (MIRALAX) 17 g Pack Take 1 Packet by mouth 2 times a day as needed (if sennosides and/or docusate ineffective or not ordered). 15 Each 3    metFORMIN (GLUCOPHAGE) 850 MG Tab Take 1 Tablet by mouth 2 times a day with meals. 60 Tablet 0    Blood Glucose Meter Kit Test blood sugar as recommended by provider. True Metrix blood glucose monitoring kit. 1 Kit 0    Blood Glucose Test Strips Use one True Metrix strip to test blood sugar once daily early morning before first meal. 100 Strip 0    Lancets Use one True Metrix lancet to test blood sugar once daily early morning before first meal. 100 Each 0    Alcohol Swabs Wipe site with prep pad prior to injection. 100 Each 0    indomethacin (INDOCIN) 25 MG Cap Take 25 mg by mouth every day. Indications: Acute Joint Inflammation in Gout      acetaminophen (TYLENOL) 500 MG Tab Take 500-1,000 mg by mouth every 6 hours as needed. Indications: Pain      metoprolol SR (TOPROL XL) 100 MG TABLET SR 24 HR Take 1 Tablet by mouth every day. 90 Tablet 3    allopurinol (ZYLOPRIM) 100 MG Tab Take 1 Tablet by mouth every day for 360 days. allopurinol 100 mg  tablet 90 Tablet 3    isosorbide mononitrate (IMDUR) 120 MG CR tablet Take 1 Tablet by mouth every morning. 90 Tablet 3    lisinopril (PRINIVIL) 20 MG Tab Take 1 Tablet by mouth every day. 90 Tablet 2    clopidogrel (PLAVIX) 75 MG Tab Take 1 Tablet by mouth every day. 90 Tablet 3    cyclobenzaprine (FLEXERIL) 10 mg Tab TAKE ONE TABLET BY MOUTH THREE TIMES DAILY FOR 7 DAYS (Patient taking differently: Take 10 mg by mouth 3 times a day as needed for Muscle Spasms. TAKE ONE TABLET BY MOUTH THREE TIMES DAILY FOR 7 DAYS) 30 Tablet 2    Evolocumab, REPATHA, (REPATHA SURECLICK) 140 MG/ML Solution Auto-injector Inject 1 Each under the skin every 14 days. 2 mL 11    Oxycodone HCl 20 MG Tab Take 20 mg by mouth every 6 hours as needed (Pain).      nitroglycerin (NITROSTAT) 0.4 MG SL Tab PLACE 1TAB UNDER TONGUE AS NEEDED FOR CHEST PAIN(1TAB EVERY 5 MINS,MAX 3DOSES AS NEEDED)IF NO RELIEF CALL 911 (Patient taking differently: Place 0.4 mg under the tongue as needed for Chest Pain.) 25 Tablet 1    aspirin 81 MG EC tablet Take 1 Tab by mouth every day. 30 Tab 11     No current facility-administered medications for this visit.     He  has a past medical history of Aortic stenosis (12/1/2015), Arthritis, Benign hypertensive heart disease without heart failure (11/14/2011), CAD (coronary artery disease), Congestive heart failure (HCC), Coronary atherosclerosis of autologous vein bypass graft (11/14/2011), Essential hypertension (4/25/2019), Gout, Heart valve disease, High cholesterol, History of pancreatitis, History of pancreatitis, Hypertension, Migraine (2/16/2019), Muscle cramps (10/4/2011), Myocardial infarct (HCC), Obesity (11/14/2011), Pain, Postsurgical aortocoronary bypass status (7/26/2016), Postsurgical aortocoronary bypass status (7/26/2016), Renal disorder, S/P aortic valve replacement with bioprosthetic valve (7/26/2016), Statin intolerance (7/26/2016), Status post cardiac revascularization with bypass aortocoronary  anastomosis of five coronary vessels (7/26/2016), and Status post cardiac revascularization with bypass aortocoronary anastomosis of five coronary vessels (7/26/2016).    ROS   Review of Systems   Constitutional:  Negative for chills and fever.   Respiratory:  Negative for shortness of breath and wheezing.    Cardiovascular:  Negative for chest pain and palpitations.   Gastrointestinal:  Negative for abdominal pain, diarrhea, nausea and vomiting.   Musculoskeletal:  Positive for back pain.   Neurological:  Positive for weakness. Negative for sensory change.        Objective:     Physical Exam:  Pulse 86   Temp 36.8 °C (98.2 °F) (Temporal)   Resp 14   SpO2 96%  There is no height or weight on file to calculate BMI.   Constitutional: Alert, no distress.  Skin: Warm, dry, good turgor, no rashes in visible areas.  Back incision examined, clean, dry, intact.  Eye: Equal, round and reactive, conjunctiva clear, lids normal.  Respiratory: Unlabored respiratory effort, lungs clear to auscultation, no wheezes, no rhonchi.  Cardiovascular: Normal S1, S2, no murmur, +2 nonpitting edema noted in lower extremities  Psych: Alert and oriented x3, normal affect and mood.    Assessment and Plan:     1. Epidural abscess  -Reviewed previous notes from inpatient infectious disease team.  -Secondary to laminectomy for treatment of severe spinal stenosis.  Patient will continue on regimen of daily infusions of daptomycin, Rocephin for the next month.  Wound is healing well with no concerns.  He will continue to follow-up with spinal surgeon.  Continue to follow-up with infusion centers for injections.  Strict return precautions were given.    2. Benign hypertensive heart disease without heart failure  -Reviewed notes from inpatient hospitalist team.  -At this time patient's blood pressure is well controlled.  While it is very low, he is asymptomatic with the exception of the lower extremity swelling.  Because of this I do think we can  discontinue amlodipine 10 mg and return to amlodipine 5 mg to help with the lower extremity swelling and still preserve appropriate blood pressure.  Patient continue checking blood pressures regularly and follow-up if blood pressure goes above 120/80.     3. Coronary artery disease of native artery of native heart with stable angina pectoris (HCC)  -Patient has a significant cardiac history.  I am concerned that he has had 3 episodes of chest pain in the last 24 to 48 hours.  I did mention to patient that he needs to follow-up with cardiology immediately.  I also mentioned that if he continues to have these episodes of chest pain that he is to follow-up in urgent care or emergency room for further evaluation to assure appropriate cardiac health.    My total time spent caring for the patient on the day of the encounter was 30 minutes.   This does not include time spent on separately billable procedures/tests.      Followup: No follow-ups on file.         PLEASE NOTE: This dictation was created using voice recognition software. I have made every reasonable attempt to correct obvious errors, but I expect that there are errors of grammar and possibly content that I did not discover before finalizing the note.

## 2022-11-30 NOTE — PROGRESS NOTES
Pt presents to [facility] for daily Daptomycin/Rocephin. L UE PICC line in place; brisk blood return observed and pt tolerated well. Daptomycin/Rocephin infused with no s/s of adverse reactions. Brisk blood return observed from PICC line before flushed, capped and wrapped in gauze for tomorrow. Next appointment confirmed and education provided. Pt discharged to self care with all personal belongings and in NAD.

## 2022-12-01 NOTE — PROGRESS NOTES
Abimael is here for Dapto/Rocephin. Right PICC line in place. Brisk blood return noted. IV antibiotics given per MAR. PICC flushed per protocol. PICC line secured with gauze/sleeve. Next appointment scheduled. Discharged to self care; no apparent distress noted.

## 2022-12-02 NOTE — PROGRESS NOTES
returns for IVABX for Epidural abscess. Reports tolerating past treatment well. Daptomycin / Rocephin infused as ordered. Abimael tolerated well and without incident. PICC line in good condition and has positive blood return. PICC line flushed with saline per protocol. Abimael DC'd home in good condition and in NAD. Returns daily. Appointment confirm for next treatment.

## 2022-12-03 NOTE — PROGRESS NOTES
Pt returns to [facility] for Daptomycin/Rocephin for epidural abscess.  Pt ambulates with a walker and is accompanied by his wife, Mike  SHAMIKA PICC flushed with NS and brisk blood return observed.  PICC clave changed today.  Antibiotics infused without adverse reaction.  PICC flushed with NS and line secured with sleeve.  Confirmed tomorrow's appt time with pt.  Pt dc home with his spouse.

## 2022-12-04 NOTE — PROGRESS NOTES
[facility] D[facility] and Rocephin infusions for his epidural abscess.  Pt ambulates with a front wheel walker and is accompanied by his wife, Josy.  MARVE PICC flushed with NS and brisk blood return observed.  Antibiotics infused without adverse reaction. Blood return noted after antibiotic infusion.  Line flushed easily and briskly with NS.  Pt. Stated he is not currently wearing the sleeve as he has some dependent edema noted to his upper arms and bilateral lower legs. Confirmed tomorrow's appt time with pt.  Pt dc home with his spouse.

## 2022-12-04 NOTE — DISCHARGE SUMMARY
Discharge Summary    CHIEF COMPLAINT ON ADMISSION  Chief Complaint   Patient presents with    Low Back Pain     BIB [facility], patient has chronic back pain from spinal stenosis that he takes oxy and gabapentin for. He has had worsening pain the last two weeks that has had 4 falls at home, no head trauma involved. He increased his pain med doses yesterday but still has severe pain. Patient does also have a kidney stone and cyst on right side       Reason for Admission  EMS     Admission Date  11/4/2022    CODE STATUS  Prior    HPI & HOSPITAL COURSE  This is a 66 -year-old male with a past medical history significant for hypertension, hyperlipidemia, CAD status post CABG in 1998, 2016, aortic valve replacement in 2016, post stent in 4/2022, morbid obesity, gout, L3 L5 severe spinal stenosis with left foot drop, chronic back pain was admitted on 11/4/2022 with worsening of lower back pain.     MRI of the lumbar spine  on 11/04 noted 10 x 6 x 19 mm posterior epidural abscess at the level of L2-L3 which is new from previous study. There is severe central canal stenosis at this level.  Severe degenerative central canal stenosis at L3-L4 and L4-5.     Cardiology consulted for preop clearance given patient significant cardiac history, nuclear stress test noted small area of ischemia involving the lateral wall.  Patient cleared by cardiology, moderate risk for the surgery.      Surgery was consulted, patient went to the OR with Dr. Sparks on 11/7/2022.  Biopsy was negative, patient was initially started on IV Rocephin.  Infectious disease was consulted.  They recommended 6-week IV antibiotic course and on discharge can transition to daptomycin 6MG/KG daily and continue ceftriaxone 2 g daily with an end date of 12/19/2022.       MR C/T/L-spine was ordered with request of Dr. 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  Postop changes at L2-L3, L3-L4, L4-L5 with a fluid collection that impinges upon the dorsal aspect of thecal sac and causing severe cauda equina compression.  Fluid collection could represent CSF leak or seroma.  Dr. Sparks was contacted, patient underwent irrigation debridement of deep lumbar wound on 11/20/2022.  Cultures no growth to date.     Continue to follow the patient, recommended continuing IV vancomycin and Rocephin while being in the hospital.  Switching to daptomycin plus Rocephin at the time of discharge with the last day being 12/31/2022.    During the stay in the hospital, patient was having intermittent chest pain, I reached out to spine surgery Dr. Lamar along with cardiology, patient was started on aspirin, Plavix will continue Imdur 120 mg p.o. daily, Toprol-XL 1 mg p.o. daily, lisinopril 20 mg p.o. daily, amlodipine 10 mg p.o. daily.  He will follow-up with cardiology as an outpatient, follow-up appointment on 12/8/2022.    Patient does have an appointment at infusion center for continuation of IV antibiotics.  At this time patient medically stable to be discharged home    Therefore, he is discharged in good and stable condition to home with organized home healthcare and close outpatient follow-up.    The patient met 2-midnight criteria for an inpatient stay at the time of discharge.    Discharge Date  11/25/2022    FOLLOW UP ITEMS POST DISCHARGE  Raul Jimenez M.D.  Please follow up with  spine surgery as an op     DISCHARGE DIAGNOSES  Principal Problem:    Epidural abscess POA: Yes  Active Problems:    Coronary artery disease of native artery of native heart with stable angina pectoris (HCC) POA: Yes      Overview: Extensive history of CAD with 5 vessel CABG in 1998, redo three-vessel       CABG in 2015, stent placement in 2018.  This is complicated by history of       familial hyper lipidemia with statin intolerance and currently treating       with Repatha as well as a  history of hypertension.    Dyslipidemia POA: Yes    Obesity (BMI 30-39.9) POA: Yes    Hyperglycemia POA: Unknown  Resolved Problems:    Spinal stenosis of lumbar region (L3-5) with neurogenic claudication and L foot drop/weakness, valvular heart disease POA: Unknown    Degenerative lumbar spinal stenosis POA: Yes    Hypertensive urgency POA: Unknown    Contact dermatitis POA: Unknown      FOLLOW UP  Future Appointments   Date Time Provider Department Center   12/4/2022  5:00 PM [facility]   12/5/2022  5:00 PM [facility]   12/6/2022  2:00 PM Tona Soriano P.A.-C. [facility] [address]   12/6/2022  5:00 PM [facility]   12/7/2022  5:00 PM [facility]   12/8/2022  8:40 AM Darrian Martinez M.D. [facility] None   12/8/2022  5:00 PM [facility]   12/9/2022  5:00 PM [facility]   12/10/2022  5:00 PM [facility]   12/11/2022  5:00 PM [facility]   12/12/2022  5:00 PM [facility]   12/13/2022  5:00 PM [facility]   12/14/2022  5:00 PM [facility]   12/15/2022  5:00 PM [facility]   12/16/2022  5:00 PM [facility]   12/17/2022  5:00 PM [facility]   12/18/2022  5:00 PM [facility]   12/19/2022  5:00 PM [facility]   12/20/2022  5:00 PM [facility]   12/21/2022  5:00 PM [facility]   12/22/2022  5:00 PM [facility]   12/23/2022  5:00 PM [facility]   12/24/2022  5:00 PM [facility]   12/25/2022 11:00 AM [facility]   12/26/2022  5:00 PM [facility]   12/27/2022  5:00 PM [facility]   12/28/2022  2:40 PM  Frank Otto M.D. [facility] None   12/28/2022  5:00 PM [facility] [address]   12/29/2022  5:00 PM [facility] [address]   12/30/2022  5:15 PM [facility] [address]   12/31/2022  5:00 PM [facility] [address]   2/22/2023  7:30 AM [facility] EXAM 5 [facility] None     [facility] [place]  [address]  [place] [address]  [phone]        Adam Sparks M.D.  [address]  [place] [id number] [phone]    Follow up      Raul Jimenez M.D.  [address]  [place] [id number] [phone]            MEDICATIONS ON DISCHARGE     Medication List        START taking these medications        Instructions   Alcohol Swabs   Doctor's comments: Per formulary preference. ICD-10 code: E11.9 Controlled type 2 Diabetes Mellitus  Wipe site with prep pad prior to injection.     * Blood Glucose Meter Kit   Doctor's comments: Or per formulary preference. ICD-10 code: E11.9 Controlled type 2 Diabetes Mellitus  Test blood sugar as recommended by provider. True Metrix blood glucose monitoring kit.     * Blood Glucose Test Strips   Doctor's comments: Or per formulary preference. ICD-10 code: E11.9 Controlled type 2 Diabetes Mellitus  Use one True Metrix strip to test blood sugar once daily early morning before first meal.     docusate sodium 100 MG Caps   Take 100 mg by mouth 2 times a day.  Dose: 100 mg     famotidine 20 MG Tabs  Commonly known as: PEPCID   Take 1 Tablet by mouth 2 times a day.  Dose: 20 mg     Lancets   Doctor's comments: Or per formulary preference. ICD-10 code: E11.9 Controlled type 2 Diabetes Mellitus  Use one True Metrix lancet to test blood sugar once daily early morning before first meal.     metFORMIN 850 MG Tabs  Commonly known as: GLUCOPHAGE   Take 1 Tablet by mouth 2 times a day with meals.  Dose: 850 mg     polyethylene glycol/lytes 17 g Pack  Commonly known as: MIRALAX   Take 1 Packet by mouth 2 times a day as needed  (if sennosides and/or docusate ineffective or not ordered).  Dose: 17 g     tamsulosin 0.4 MG capsule  Commonly known as: FLOMAX   Take 1 Capsule by mouth 1/2 hour after breakfast.  Dose: 0.4 mg           * This list has 2 medication(s) that are the same as other medications prescribed for you. Read the directions carefully, and ask your doctor or other care provider to review them with you.                CHANGE how you take these medications        Instructions   amLODIPine 10 MG Tabs  What changed:   medication strength  how much to take  Commonly known as: NORVASC   Take 1 Tablet by mouth every day.  Dose: 10 mg     cyclobenzaprine 10 mg Tabs  What changed:   how much to take  how to take this  when to take this  reasons to take this  Commonly known as: Flexeril   TAKE ONE TABLET BY MOUTH THREE TIMES DAILY FOR 7 DAYS            CONTINUE taking these medications        Instructions   acetaminophen 500 MG Tabs  Commonly known as: TYLENOL   Take 500-1,000 mg by mouth every 6 hours as needed. Indications: Pain  Dose: 500-1,000 mg     allopurinol 100 MG Tabs  Commonly known as: ZYLOPRIM   Take 1 Tablet by mouth every day for 360 days. allopurinol 100 mg tablet  Dose: 100 mg     aspirin 81 MG EC tablet   Take 1 Tab by mouth every day.  Dose: 81 mg     clopidogrel 75 MG Tabs  Commonly known as: PLAVIX   Take 1 Tablet by mouth every day.  Dose: 75 mg     isosorbide mononitrate 120 MG CR tablet  Commonly known as: IMDUR   Take 1 Tablet by mouth every morning.  Dose: 120 mg     lisinopril 20 MG Tabs  Commonly known as: PRINIVIL   Take 1 Tablet by mouth every day.  Dose: 20 mg     metoprolol  MG Tb24  Commonly known as: TOPROL XL   Take 1 Tablet by mouth every day.  Dose: 100 mg     Oxycodone HCl 20 MG Tabs   Take 20 mg by mouth every 6 hours as needed (Pain).  Dose: 20 mg     Repatha SureClick 140 MG/ML Soaj  Generic drug: Evolocumab (REPATHA)   Inject 1 Each under the skin every 14 days.  Dose: 1 mL            STOP  taking these medications      indomethacin 50 MG Caps  Commonly known as: INDOCIN            ASK your doctor about these medications        Instructions   HYDROcodone-acetaminophen 5-325 MG Tabs per tablet  Commonly known as: Norco  Ask about: Should I take this medication?   Take 1 Tablet by mouth every 8 hours as needed (severe pain) for up to 3 days.  Dose: 1 Tablet              Allergies  Allergies   Allergen Reactions    Morphine Vomiting, Nausea and Unspecified     violent    Fenofibrate Unspecified     Dehydration, severe muscle cramps     Gemfibrozil Unspecified     Dehydration,Severe Muscle cramps    Lipitor [Atorvastatin Calcium] Unspecified     Severe Muscle cramps, dehydration    Lovastatin Unspecified     Dehydration, severe muscle cramps    Tricor Unspecified     Dehydration, severe muscle cramps       DIET  No orders of the defined types were placed in this encounter.      ACTIVITY  As tolerated.  Weight bearing as tolerated    CONSULTATIONS  Spine surgery  ID    PROCEDURES  On 11/07Posterior lumbar decompression using laminectomy type approach, Levels: L2-L3-L4 L5-S1    11/17:Irrigation Debridement, Deep, Lumbar wound     LABORATORY  Lab Results   Component Value Date    SODIUM 136 11/28/2022    POTASSIUM 4.3 11/28/2022    CHLORIDE 102 11/28/2022    CO2 23 11/28/2022    GLUCOSE 145 (H) 11/28/2022    BUN 26 (H) 11/28/2022    CREATININE 1.03 11/28/2022        Lab Results   Component Value Date    WBC 9.0 11/28/2022    HEMOGLOBIN 11.2 (L) 11/28/2022    HEMATOCRIT 34.4 (L) 11/28/2022    PLATELETCT 167 11/28/2022        Total time of the discharge process exceeds 36 minutes.

## 2022-12-05 NOTE — PROGRESS NOTES
Abimael arrives to [facility] for daily Daptomycin/Rocephin for an epidural abscess. Wife at chairside for physical and emotional support. Patient denies major side effects from IV abx. Primarily c/o fatigue and ongoing back/leg pain. SHAMIKA  PICC line flushes easily and has brisk blood return. Dressing changed per sterile protocol. Antibiotics infused concurrently per MAR without issues. Patient to return tomorrow. Discharged home to self/family care in no apparent distress.

## 2022-12-06 NOTE — TELEPHONE ENCOUNTER
1. Caller Name: Ana Hamilton      Call Back Number: [phone]      How would the patient prefer to be contacted with a response: Phone call do NOT leave a detailed message    Patient's wife called very upset because his prescription for nitroglycerin (NITROSTAT) 0.4 MG  was sent to the wrong pharmacy. Patient's wife said this needed to be fixed ASAP.

## 2022-12-06 NOTE — TELEPHONE ENCOUNTER
[employer] message sent to patient advising that the refill request has been received and routed to PCP to have this medication sent to the correct pharmacy. Apologized for the confusion with pharmacy locations.

## 2022-12-06 NOTE — PROGRESS NOTES
Pt presents to [facility] for daily Daptomycin/Rocephin. R UE PICC line in place; brisk blood return observed, Monday labs drawn. Daptomycin/Rocephin infused with no s/s of adverse reactions. Brisk blood return observed from PICC line before flushed, capped and wrapped in gauze for tomorrow. Next appointment confirmed and education provided. Pt discharged to self care with all personal belongings and in NAD.

## 2022-12-07 NOTE — PROGRESS NOTES
Patient arrived ambulatory to [facility] for daily Rocephin/Daptomycin.  Patient denies any acute changes.  RUE PICC line flushes well with good blood return noted.  Antibiotics infused per order, patient tolerated well.  PICC line flushed.  Confirmed next appointment and patient ambulated out of clinic in no apparent distress.      CPK level 347 from lab draw yesterday.  Result reported to Dr. Otto.  MD acknowledged and stated to recheck level next Monday as ordered.  Continue Daptomycin.  Patient denies any muscle pain/soreness.

## 2022-12-08 PROBLEM — I44.7 INCOMPLETE LEFT BUNDLE BRANCH BLOCK (LBBB): Status: ACTIVE | Noted: 2022-01-01

## 2022-12-08 PROBLEM — I21.4 NSTEMI (NON-ST ELEVATED MYOCARDIAL INFARCTION) (HCC): Status: ACTIVE | Noted: 2022-01-01

## 2022-12-08 PROBLEM — T82.897A: Status: ACTIVE | Noted: 2022-01-01

## 2022-12-08 NOTE — PROGRESS NOTES
Abimael arrives ambulatory to [facility] using walker for daily antibiotic therapy.  He denies any acute changes over night.  RUE PICC intact, flushes easily, + blood return observed.  Rocephin and Daptomycin administered per MAR, Abimael tolerated well.  PICC flushed with NS, + blood return again observed.  Abimael declines protective sleeve.  Discharged in NAD, next appointment confirmed.

## 2022-12-08 NOTE — PROGRESS NOTES
CARDIOLOGY Follow Up PATIENT:    PCP: Raul Jimenez M.D.    1. Coronary artery disease involving native coronary artery of native heart with unstable angina pectoris (HCC)    2. Dyslipidemia    3. S/P aortic valve replacement with bioprosthetic valve    4. Abnormal nuclear cardiac imaging test    5. Essential hypertension    6. Incomplete left bundle branch block (LBBB)    7. Heart valve prosthesis-patient mismatch    8. Statin intolerance    9. Chronic heart failure with preserved ejection fraction (HFpEF) (HCC)    10. Lower extremity edema    11. On potassium wasting diuretic therapy        Abimael Hamilton is here for follow-up for history of coronary artery disease and bioprosthetic aortic valve.    Chief Complaint   Patient presents with    Coronary Artery Disease     F/V Dx: Coronary artery disease of native artery of native heart with stable angina pectoris (HCC)      Dyslipidemia       History: Abimael Hamilton is a 67 y.o. male who returns today for complex heart care including CAD/CABG/chronic angina, bioprosthetic AVR, HTN/LVH, possible CVA, statin intolerance on repatha, nephrolithiasis, mixed HLP, chronic HFpEFnot on diuretics.    5v CABG in 1998 (CP and syncope), and re-do 3-V CABG in 12/2015 as well as bioprosthetic AVR at that time ([facility]). Subsequent left heart catheterization 11/2018 showed all grafts are occluded with the exception of his LIMA-LAD which fills his circumflex distribution via a jump graft. Had PCI with MOE to LIMA 4-2-2021 at [place]. Has been on chronic DAPT.    Recent lower back surgeries (Plavix stopped):  11/7/22: laminectomy and abscess  11/17/22: hematoma    In the hospital he had an episode of angina, resolved with SLN, RRT called and we got consulted. He was at his baseline stable angina at that time and we resumed his DAPT with this clinic follow up.    Wife is Ana, here today.    Since he got home from surgery, needing 1-2 SLN daily for rest pains  "almost daily, resolves with SLN. Getting Abx until end of Dec 2022 through right upper arm PICC line. Bilateral JONA since his recent surgery, stable. Not very active yet but eager to return to the gym.    Denies significant exertional shortness of breath, orthopnea, PND, syncope or presyncope.  Denies palpitations, lightheadedness or dizziness.    TTE 12/1/22: Personally reviewed  Normal left ventricular size, wall thickness, and systolic function.  The right ventricle is normal in size and systolic function.  Mildly dilated left atrium.  Mild mitral regurgitation.  Known bioprosthetic aortic valve that is functioning normally with   increased transvalvular gradients. EOMi 0.6cm2/m2 suggestive PPM. DVI 0.42 with AT<100  No pericardial effusion.  Compared to the prior study on 11-4-22, similar findings.    TTE 11/4/22:  Compared to the prior study on 12-2-20, velocity across aortic valve is   slightly worse.  The left ventricular ejection fraction is visually estimated to be 65%.  Known bioprosthetic aortic valve .  Moderate aortic valve stenosis. Vmax is 3.8 m/s.  Aortic valve area calculated from the continuity equation is 1.3 cm2.    NM cardiac stress 11/5/22: Personally reviewed   NUCLEAR IMAGING INTERPRETATION   There is a small to moderate sized focus of ischemia involving the lateral    wall within the cardiac base and midzone.   Normal left ventricular wall motion.  LV ejection fraction = 56%.  Normal TID      PE:  /70 (BP Location: Left arm, Patient Position: Sitting, BP Cuff Size: Adult)   Pulse (!) 112   Resp (!) 22   Ht 1.676 m (5' 6\")   Wt 99.8 kg (220 lb)   SpO2 88%   BMI 35.51 kg/m²     Gen: no acute distress, no conversational dyspnea  HEENT: Symmetric face. Anicteric sclerae. Moist mucus membranes  NECK: + JVD. No lymphadenopathy  CARDIAC: Regular, Normal S1, S2, +systolic murmur  RESP: Clear to auscultation bilaterally  ABD: Soft, non-tender, non-distended  EXT: 3+ lower extremity edema, " no clubbing or cyanosis.  Has a PICC line in his right upper arm without any surrounding erythema, tenderness or swelling.  Soft to palpation.  SKIN: Warm and dry  NEURO: No gross deficits  PSYCH: Appropriate affect, participates in conversation  CDI dressings over his lower back incisions.    The ASCVD Risk score (Dave DK, et al., 2019) failed to calculate.    Past Medical History:   Diagnosis Date    Aortic stenosis 12/1/2015    Arthritis     Benign hypertensive heart disease without heart failure 11/14/2011    CAD (coronary artery disease)     Congestive heart failure (HCC)     Coronary atherosclerosis of autologous vein bypass graft 11/14/2011    Essential hypertension 4/25/2019    Gout     Heart valve disease     High cholesterol     History of pancreatitis     History of pancreatitis     Hypertension     Migraine 2/16/2019    Muscle cramps 10/4/2011    Myocardial infarct (HCC)     Multiple MI's, 1998, 2015    Obesity 11/14/2011    Pain     Joints    Postsurgical aortocoronary bypass status 7/26/2016    Status post redo 3-V CABG in [place] 12/2015: SVG-acute marginal, SVG sequential to LAD, and OM     Postsurgical aortocoronary bypass status 7/26/2016    Status post redo 3-V CABG in [place] 12/2015: SVG-acute marginal, SVG sequential to LAD, and OM     Renal disorder     Hx of Acute renal failure r/t medication/statins    S/P aortic valve replacement with bioprosthetic valve 7/26/2016    #23 [place] Magn[place] AVR (bioprosthetic)     Statin intolerance 7/26/2016    Status post cardiac revascularization with bypass aortocoronary anastomosis of five coronary vessels 7/26/2016    5-V CABG done in 1998 (done in [place] at [facility]), patent LIMA to LAD, occluded SVG-OM, occluded SVG-RCA by cardiac catheterization 11/15/2007 with other vein grafts not identified at that time, poor targets for revascularization and declined repeat CABG in 2007 by [facility]. No ischemic was identified by MPI 11/17/07 with an EF of 50%.       Status post cardiac revascularization with bypass aortocoronary anastomosis of five coronary vessels 7/26/2016    5-V CABG done in 1998 (done in [place] at [facility]), patent LIMA to LAD, occluded SVG-OM, occluded SVG-RCA by cardiac catheterization 11/15/2007 with other vein grafts not identified at that time, poor targets for revascularization and declined repeat CABG in 2007 by [facility]. No ischemic was identified by MPI 11/17/07 with an EF of 50%.       Past Surgical History:   Procedure Laterality Date    IRRIGATION AND DEBRIDEMENT, WOUND, AFTER PROCEDURE INVOH  11/17/2022    Procedure: IRRIGATION AND DEBRIDEMENT, WOUND, AFTER PROCEDURE INVOLVING LUMBAR SPINE BY POSTERIOR APPROACH;  Surgeon: Adam Sparks M.D.;  Location: [facility];  Service: Orthopedics    LUMBAR LAMINECTOMY DISKECTOMY  11/7/2022    Procedure: L2-SI LAMINECTOMY;  Surgeon: Adam Sparks M.D.;  Location: [facility];  Service: Orthopedics    SHOULDER DECOMPRESSION ARTHROSCOPIC Right 9/5/2017    Procedure: SHOULDER DECOMPRESSION ARTHROSCOPIC SUBACROMIAL;  Surgeon: Mg Campos M.D.;  Location: [facility];  Service: Orthopedics    DEBRIDEMENT, LABRUM, HIP, ARTHROSCOPIC Right 9/5/2017    Procedure: ARTHROSCOPIC LABRAL DEBRIDEMENT;  Surgeon: Mg Campos M.D.;  Location: [facility];  Service: Orthopedics    SHOULDER ARTHROSCOPY W/ ROTATOR CUFF REPAIR Right 9/5/2017    Procedure: SHOULDER ARTHROSCOPY W/ ROTATOR CUFF REPAIR;  Surgeon: Mg Campos M.D.;  Location: [facility];  Service: Orthopedics    SHOULDER ARTHROSCOPY W/ BICIPITAL TENODESIS REPAIR Right 9/5/2017    Procedure: SHOULDER ARTHROSCOPY W/ BICIPITAL TENODESIS REPAIR;  Surgeon: Mg Campos M.D.;  Location: [facility];  Service: Orthopedics    SYNOVECTOMY Right 9/5/2017    Procedure: SYNOVECTOMY;  Surgeon: Mg Campos M.D.;  Location: [facility];  Service:  Orthopedics    MULTIPLE CORONARY ARTERY BYPASS  12/08/2015    3 way bypass & Bovine valve    LAMINOTOMY  12/2004    Diskectomy L4-L5, L5-S1    MULTIPLE CORONARY ARTERY BYPASS  11/11/1998    5 way bypass    BIOPSY GENERAL      buttock lesion    EYE SURGERY      MITRAL VALVE REPLACE       Allergies   Allergen Reactions    Morphine Vomiting, Nausea and Unspecified     violent    Fenofibrate Unspecified     Dehydration, severe muscle cramps     Gemfibrozil Unspecified     Dehydration,Severe Muscle cramps    Lipitor [Atorvastatin Calcium] Unspecified     Severe Muscle cramps, dehydration    Lovastatin Unspecified     Dehydration, severe muscle cramps    Tricor Unspecified     Dehydration, severe muscle cramps     Outpatient Encounter Medications as of 12/8/2022   Medication Sig Dispense Refill    furosemide (LASIX) 20 MG Tab Take 1 Tablet by mouth every day. 90 Tablet 3    ranolazine (RANEXA) 500 MG TABLET SR 12 HR Take 1 Tablet by mouth 2 times a day. 180 Tablet 3    potassium chloride SA (KDUR) 20 MEQ Tab CR Take 1 Tablet by mouth every day. 90 Tablet 3    nitroglycerin (NITROSTAT) 0.4 MG SL Tab Place 1 Tablet under the tongue as needed for Chest Pain for up to 360 days. 30 Tablet 0    gabapentin (NEURONTIN) 300 MG Cap TAKE 1 CAPSULE BY MOUTH THREE TIMES A DAY FOR 7 DAYS (Patient taking differently: Take 300 mg by mouth 3 times a day.) 90 Capsule 2    amLODIPine (NORVASC) 10 MG Tab Take 1 Tablet by mouth every day. (Patient taking differently: Take 5 mg by mouth every day.) 30 Tablet 0    docusate sodium 100 MG Cap Take 100 mg by mouth 2 times a day. 60 Capsule 0    famotidine (PEPCID) 20 MG Tab Take 1 Tablet by mouth 2 times a day. 60 Tablet 0    tamsulosin (FLOMAX) 0.4 MG capsule Take 1 Capsule by mouth 1/2 hour after breakfast. 30 Capsule 0    polyethylene glycol/lytes (MIRALAX) 17 g Pack Take 1 Packet by mouth 2 times a day as needed (if sennosides and/or docusate ineffective or not ordered). 15 Each 3    metFORMIN  (GLUCOPHAGE) 850 MG Tab Take 1 Tablet by mouth 2 times a day with meals. 60 Tablet 0    Blood Glucose Test Strips Use one True Metrix strip to test blood sugar once daily early morning before first meal. 100 Strip 0    Lancets Use one True Metrix lancet to test blood sugar once daily early morning before first meal. 100 Each 0    indomethacin (INDOCIN) 25 MG Cap Take 25 mg by mouth as needed. Indications: Acute Joint Inflammation in Gout      acetaminophen (TYLENOL) 500 MG Tab Take 500-1,000 mg by mouth every 6 hours as needed. Indications: Pain      metoprolol SR (TOPROL XL) 100 MG TABLET SR 24 HR Take 1 Tablet by mouth every day. 90 Tablet 3    allopurinol (ZYLOPRIM) 100 MG Tab Take 1 Tablet by mouth every day for 360 days. allopurinol 100 mg tablet 90 Tablet 3    isosorbide mononitrate (IMDUR) 120 MG CR tablet Take 1 Tablet by mouth every morning. 90 Tablet 3    lisinopril (PRINIVIL) 20 MG Tab Take 1 Tablet by mouth every day. 90 Tablet 2    clopidogrel (PLAVIX) 75 MG Tab Take 1 Tablet by mouth every day. 90 Tablet 3    cyclobenzaprine (FLEXERIL) 10 mg Tab TAKE ONE TABLET BY MOUTH THREE TIMES DAILY FOR 7 DAYS (Patient taking differently: Take 10 mg by mouth 3 times a day as needed for Muscle Spasms. TAKE ONE TABLET BY MOUTH THREE TIMES DAILY FOR 7 DAYS) 30 Tablet 2    Evolocumab, REPATHA, (REPATHA SURECLICK) 140 MG/ML Solution Auto-injector Inject 1 Each under the skin every 14 days. 2 mL 11    Oxycodone HCl 20 MG Tab Take 20 mg by mouth every 6 hours as needed (Pain).      aspirin 81 MG EC tablet Take 1 Tab by mouth every day. 30 Tab 11    [DISCONTINUED] Blood Glucose Meter Kit Test blood sugar as recommended by provider. True Metrix blood glucose monitoring kit. 1 Kit 0    [DISCONTINUED] Alcohol Swabs Wipe site with prep pad prior to injection. (Patient not taking: Reported on 12/8/2022) 100 Each 0     No facility-administered encounter medications on file as of 12/8/2022.     Social History     Socioeconomic  History    Marital status:      Spouse name: Not on file    Number of children: Not on file    Years of education: Not on file    Highest education level: GED or equivalent   Occupational History    Not on file   Tobacco Use    Smoking status: Former     Packs/day: 3.00     Years: 20.00     Pack years: 60.00     Types: Cigarettes     Quit date: 1992     Years since quittin.9    Smokeless tobacco: Never   Vaping Use    Vaping Use: Never used   Substance and Sexual Activity    Alcohol use: Not Currently    Drug use: Yes     Types: Marijuana, Inhaled     Comment: Marijuana- Daily (4 times per day)    Sexual activity: Yes     Partners: Female   Other Topics Concern    Not on file   Social History Narrative    Not on file     Social Determinants of Health     Financial Resource Strain: Not on file   Food Insecurity: Not on file   Transportation Needs: Not on file   Physical Activity: Not on file   Stress: Not on file   Social Connections: Not on file   Intimate Partner Violence: Not on file   Housing Stability: Not on file     Family History   Problem Relation Age of Onset    Heart Attack Father         MI and CABG, unknown age    Hyperlipidemia Father     Cancer Mother         Breast    Heart Disease Brother     Arthritis Maternal Grandmother     Stroke Neg Hx     Diabetes Neg Hx     Lung Disease Neg Hx          Studies    Lab Results   Component Value Date/Time    TSHULTRASEN 1.960 2019 1010      No results found for: FREET4   Lab Results   Component Value Date/Time    HBA1C 6.3 (H) 2022 04:00 AM     Lab Results   Component Value Date/Time    CHOLSTRLTOT 164 08/10/2022 08:21 AM    LDL see below 08/10/2022 08:21 AM    HDL 26 (A) 08/10/2022 08:21 AM    TRIGLYCERIDE 446 (H) 08/10/2022 08:21 AM       Lab Results   Component Value Date/Time    SODIUM 139 2022 05:05 PM    POTASSIUM 4.2 2022 05:05 PM    CHLORIDE 102 2022 05:05 PM    CO2 24 2022 05:05 PM    GLUCOSE 137 (H)  2022 05:05 PM    BUN 10 2022 05:05 PM    CREATININE 0.95 2022 05:05 PM    BUNCREATRAT 16 2016 10:22 AM     Lab Results   Component Value Date/Time    ALKPHOSPHAT 80 2022 05:05 PM    ASTSGOT 23 2022 05:05 PM    ALTSGPT 30 2022 05:05 PM    TBILIRUBIN 0.3 2022 05:05 PM        Echocardiogram:  Results for orders placed or performed during the hospital encounter of 16   Echocardiogram Comp w/o Cont   Result Value Ref Range    Eject.Frac. MOD BP 61.26     Eject.Frac. MOD 4C 51.37     Eject.Frac. MOD 2C 69.32     Left Ventrical Ejection Fraction 60        EC22 personally reviewed and interpreted  Sinus tachycardia HR 105bpm   Incomplete left bundle branch block QRS 105ms   Probable LVH with secondary repol abnrm, or anterolateral ischemia   Baseline wander in several leads   Compared to ECG 2022 10:42:38   QRS is wider   HR is faster   Possible ischemia present but similar to ECG 22   Electronically Signed On 2022 8:59:33 PST by Darrian Martinez MD     Assessment and Recommendations:    Problem List Items Addressed This Visit       Coronary artery disease of native artery of native heart with stable angina pectoris (HCC) - Primary    Relevant Medications    furosemide (LASIX) 20 MG Tab    ranolazine (RANEXA) 500 MG TABLET SR 12 HR    Abnormal nuclear cardiac imaging test    Essential hypertension    Relevant Medications    furosemide (LASIX) 20 MG Tab    ranolazine (RANEXA) 500 MG TABLET SR 12 HR    Incomplete left bundle branch block (LBBB)    Relevant Medications    furosemide (LASIX) 20 MG Tab    ranolazine (RANEXA) 500 MG TABLET SR 12 HR    Heart valve prosthesis-patient mismatch    Relevant Medications    furosemide (LASIX) 20 MG Tab    ranolazine (RANEXA) 500 MG TABLET SR 12 HR    Dyslipidemia    Statin intolerance    S/P aortic valve replacement with bioprosthetic valve     Other Visit Diagnoses       Chronic heart failure with preserved  ejection fraction (HFpEF) (HCC)        Relevant Medications    furosemide (LASIX) 20 MG Tab    ranolazine (RANEXA) 500 MG TABLET SR 12 HR    Lower extremity edema        Relevant Medications    furosemide (LASIX) 20 MG Tab    On potassium wasting diuretic therapy        Relevant Medications    potassium chloride SA (KDUR) 20 MEQ Tab CR          His recent unstable angina with heart failure concerns are probably related to the recent stress from 2 back surgeries with known severe coronary artery disease including bypass graft disease.    I recommended he checks his morning daily weights and start lasix 40mg daily with KCL 20meq daily for 1 week, followed by 20mg of lasix daily with 20meq KCL daily.  He gets blood work every Monday at the infusion center due to chronic antibiotics.  I advised him to send me a [employer] message that he had his blood work done and with his weight log on 12/14/2022 to further finalize the plan for his Lasix and potassium supplementation.    I also advised him to start ranolazine 500 mg twice a day for more antianginal effect unfortunately he has only small to moderate ischemia on his recent stress testing which we will try to manage medically as much as possible before committing to angiography and PCI given his anatomy.    Regarding his worsening bioprosthetic aortic valve patient prosthesis mismatch, stable dyspnea on 2 echocardiograms, I will consult with my structural cardiology colleagues regarding the utility and feasibility of TAVR in his case, especially given now decompensated chronic HFpEF concerns and ongoing angina.    Plan to continue DAPT indefinitely.    Blood pressure is controlled    Will order repeat lipid panel to be done with his infusion labs on Monday 12/12/22.  Target triglyceride less than 150 and LDL less than 70.  Not at goal as of August 22.  Accordingly we will decide if we need to add Vascepa to his extensive medical regimen.  Continue Repatha for now.   Intolerant to multiple statins in the past.    Thank you for the opportunity to be involved in Abimael Hamilton 's care; and please reach out with any questions or concerns.    Return in about 3 months (around 3/8/2023).    Darrian Martinez MD, MPH [facility]  Interventional Cardiologist  [facility]   of Clinical Internal Medicine - [facility]    ~ Portions of this note were completed using voice recognition software (Dragon Naturally speaking software) . Occasional transcription errors may have escaped proof reading. I have made every reasonable attempt to correct obvious errors, but I expect that there are errors of grammar and possibly content that I did not discover before finalizing the note. ~

## 2022-12-08 NOTE — PATIENT INSTRUCTIONS
For 1 week: take lasix 40mg in the am with the potassium pill 20meq daily. Then after 1 week, take lasix 20mg a day with the potassium 20meq daily  Start ranolazine (ranexa) 500mg twice a day (am and pm)  Check your daily am standing weight and keep a log  Send me a [employer] message with weights on 12/14/22  Wear compressions socks  Try to limit water / fluid intake to less robles 80 ounces a day  Try to limit sodium intake to less than 2500mg a day

## 2022-12-09 PROBLEM — Z86.718 ENCOUNTER FOR FOLLOW-UP OF ACUTE DEEP VEIN THROMBOSIS (DVT) OF RIGHT LOWER EXTREMITY: Status: ACTIVE | Noted: 2022-01-01

## 2022-12-09 PROBLEM — Z09 ENCOUNTER FOR FOLLOW-UP OF ACUTE DEEP VEIN THROMBOSIS (DVT) OF RIGHT LOWER EXTREMITY: Status: ACTIVE | Noted: 2022-01-01

## 2022-12-09 PROBLEM — N17.9 AKI (ACUTE KIDNEY INJURY) (HCC): Status: ACTIVE | Noted: 2022-01-01

## 2022-12-09 PROBLEM — R55 SYNCOPE: Status: ACTIVE | Noted: 2022-01-01

## 2022-12-09 PROBLEM — J96.01 ACUTE RESPIRATORY FAILURE WITH HYPOXIA (HCC): Status: ACTIVE | Noted: 2022-01-01

## 2022-12-09 NOTE — H&P
Hospital Medicine History & Physical Note    Date of Service  12/8/2022    Primary Care Physician  Raul Jimenez M.D.    Consultants  Cardiology Dr. Vinson    Code Status  Full Code    Chief Complaint  Chief Complaint   Patient presents with    Syncope    Chest Pain       History of Presenting Illness  Abimael Hamilton is a 67 y.o. male who presented 12/8/2022 with wife present at bedside.  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  He had 30 seconds of loss of consciousness and brought in by ambulance subsequently.  He has significant history for coronary artery disease status post CABG and attempted PCI last year with hematoma from femoral access site after Impella pump as well.  He does not complain of any chest pain or shortness of breath at this time.  He states that he does get lower extremity edema at times and has history of heart failure.  He denies being vaccinated for COVID-19 with boosters but believes he does have original first COVID shot at the beginning of COVID and denies any signs or symptoms such as anosmia, ageusia, cough with sputum production, myalgias or diarrheal illness.  He was noted to be hypoxic requiring 6 L O2 requirement via nasal cannula and also noted to be tachycardic and tachypneic raising some concern for pulmonary emboli.  He does have elevated troponin greater than 300 and cardiology was consulted and recommended heparin infusion, CTA pulmonary embolism study has been canceled secondary to heparin infusion initiated and VQ scan has been ordered secondary to GENE.  He otherwise denies any constipation, diarrhea, melena, hematochezia, dysuria, hematuria, fevers, chills, incoordination but does admit to syncopal episode or loss of consciousness.    Patient is reluctant to have cardiac catheterization after cardiology consult and states that he  wishes for his new cardiologist to perform this Dr. Darrian Gomez.  Would recommend daytime hospitalist consult with the above physician for potential cardiac catheterization during this hospitalization.  He was also recommended to remain on anticoagulation with heparin infusion, continue chronic cardiac medications, repeat echocardiogram as well as LIDIA to assess aortic valve and to continue trending troponins per cardiology recommendations.    Vital signs in ER were as follows upon presentation 97.7, 110, 34, 95/64, 94% 6 L nasal cannula.    Chest x-ray performed reveals possible multifocal pneumonia versus interstitial edema with more focal consolidation of right lower lobe possible for developing pneumonia.    Blood culture obtained x2, lactic acid initially 1.7, proBNP 3073 and D-dimer 5.12 raising some level of concern for pulmonary emboli, VQ scan subsequently ordered secondary to his GENE.  Influenza A/B, RSV and COVID-19 were tested for negative.  Magnesium level of 1.3 and subsequently magnesium sulfate ordered for infusion.  CMP reveals GENE with creatinine 1.49, otherwise relatively unremarkable with exception of mild anion gap metabolic acidosis.  CBC with differential performed and reveals leukocytosis 11.6 without neutrophilia or immature granulocytosis, moderate normocytic anemia and otherwise relatively unremarkable.  Urinalysis reveals dehydration otherwise relatively unremarkable, specific gravity 1.020.  No signs of infection.    Hospitalist paged for admission.  Patient agreeable to inpatient hospitalization for syncopal episode, acute respiratory failure and possible heart failure exacerbation versus pulmonary emboli.  We will continue his outpatient antibiotics via his right upper extremity PICC line with daptomycin/Rocephin for spinal epidural abscess.  Patient will be optimized to ED subsequently transferred to medical paige floor with cardiac monitoring for further optimization medical  management.    I discussed the plan of care with patient, family, and bedside RN.    Review of Systems  Review of Systems   Constitutional:  Negative for chills and fever.   HENT:  Negative for congestion.    Eyes:  Negative for blurred vision and double vision.   Respiratory:  Positive for shortness of breath. Negative for cough and wheezing.    Cardiovascular:  Positive for leg swelling. Negative for chest pain and palpitations.   Gastrointestinal:  Negative for abdominal pain, blood in stool, constipation, diarrhea, heartburn, melena, nausea and vomiting.   Genitourinary:  Negative for dysuria and frequency.   Musculoskeletal:  Positive for falls. Negative for back pain and neck pain.   Skin:  Negative for itching and rash.   Neurological:  Positive for loss of consciousness. Negative for focal weakness, weakness and headaches.   Endo/Heme/Allergies:  Negative for environmental allergies. Does not bruise/bleed easily.   Psychiatric/Behavioral:  Negative for depression. The patient does not have insomnia.      Past Medical History   has a past medical history of Aortic stenosis (12/1/2015), Arthritis, Benign hypertensive heart disease without heart failure (11/14/2011), CAD (coronary artery disease), Congestive heart failure (HCC), Coronary atherosclerosis of autologous vein bypass graft (11/14/2011), Essential hypertension (4/25/2019), Gout, Heart valve disease, High cholesterol, History of pancreatitis, History of pancreatitis, Hypertension, Migraine (2/16/2019), Muscle cramps (10/4/2011), Myocardial infarct ([facility]), Obesity (11/14/2011), Pain, Postsurgical aortocoronary bypass status (7/26/2016), Postsurgical aortocoronary bypass status (7/26/2016), Renal disorder, S/P aortic valve replacement with bioprosthetic valve (7/26/2016), Statin intolerance (7/26/2016), Status post cardiac revascularization with bypass aortocoronary anastomosis of five coronary vessels (7/26/2016), and Status post cardiac  revascularization with bypass aortocoronary anastomosis of five coronary vessels (7/26/2016).    Surgical History   has a past surgical history that includes laminotomy (12/2004); biopsy general; multiple coronary artery bypass (11/11/1998); multiple coronary artery bypass (12/08/2015); shoulder decompression arthroscopic (Right, 9/5/2017); debridement, labrum, hip, arthroscopic (Right, 9/5/2017); shoulder arthroscopy w/ rotator cuff repair (Right, 9/5/2017); shoulder arthroscopy w/ bicipital tenodesis repair (Right, 9/5/2017); synovectomy (Right, 9/5/2017); eye surgery; mitral valve replace; lumbar laminectomy diskectomy (11/7/2022); and irrigation and debridement, wound, after procedure invoh (11/17/2022).     Family History  family history includes Arthritis in his maternal grandmother; Cancer in his mother; Heart Attack in his father; Heart Disease in his brother; Hyperlipidemia in his father.   Family history reviewed with patient. There is no family history that is pertinent to the chief complaint.     Social History   reports that he quit smoking about 30 years ago. His smoking use included cigarettes. He has a 60.00 pack-year smoking history. He has never used smokeless tobacco. He reports that he does not currently use alcohol. He reports current drug use. Drugs: Marijuana and Inhaled.    Allergies  Allergies   Allergen Reactions    Morphine Vomiting, Nausea and Unspecified     Violent nightmares    Fenofibrate Unspecified     Dehydration, severe muscle cramps     Gemfibrozil Unspecified     Dehydration,Severe Muscle cramps    Lipitor [Atorvastatin Calcium] Unspecified     Severe Muscle cramps, dehydration    Lovastatin Unspecified     Dehydration, severe muscle cramps    Tricor Unspecified     Dehydration, severe muscle cramps       Medications  Prior to Admission Medications   Prescriptions Last Dose Informant Patient Reported? Taking?   Blood Glucose Test Strips SUPPLIES at SUPPLIES Significant Other No  No   Sig: Use one True Metrix strip to test blood sugar once daily early morning before first meal.   Evolocumab, REPATHA, (REPATHA SURECLICK) 140 MG/ML Solution Auto-injector 11/29/2022 at UNK Significant Other No No   Sig: Inject 1 Each under the skin every 14 days.   Lancets SUPPLIES at SUPPLIES Significant Other No No   Sig: Use one True Metrix lancet to test blood sugar once daily early morning before first meal.   Oxycodone HCl 20 MG Tab 12/8/2022 at AFTERNOON Significant Other Yes No   Sig: Take 20 mg by mouth every 6 hours as needed (Pain).   acetaminophen (TYLENOL) 500 MG Tab 12/8/2022 at AFTERNOON Significant Other Yes No   Sig: Take 500-1,000 mg by mouth every 6 hours as needed. Indications: Pain   allopurinol (ZYLOPRIM) 100 MG Tab 12/8/2022 at AM Significant Other No No   Sig: Take 1 Tablet by mouth every day for 360 days. allopurinol 100 mg tablet   amLODIPine (NORVASC) 5 MG Tab 12/8/2022 at AM Significant Other Yes Yes   Sig: Take 5 mg by mouth every day.   aspirin 81 MG EC tablet 12/8/2022 at AM Significant Other No No   Sig: Take 1 Tab by mouth every day.   clopidogrel (PLAVIX) 75 MG Tab 12/8/2022 at AM Significant Other No No   Sig: Take 1 Tablet by mouth every day.   cyclobenzaprine (FLEXERIL) 10 mg Tab 12/8/2022 at AM Significant Other Yes Yes   Sig: Take 10 mg by mouth 3 times a day as needed for Muscle Spasms.   docusate sodium 100 MG Cap 12/8/2022 at AM Significant Other No No   Sig: Take 100 mg by mouth 2 times a day.   famotidine (PEPCID) 20 MG Tab 12/8/2022 at AM Significant Other No No   Sig: Take 1 Tablet by mouth 2 times a day.   furosemide (LASIX) 20 MG Tab NEW RX at NOT STARTED Significant Other Yes Yes   Sig: Take 20 mg by mouth 2 times a day.   gabapentin (NEURONTIN) 300 MG Cap 12/8/2022 at AFTERNOON Significant Other No No   Sig: TAKE 1 CAPSULE BY MOUTH THREE TIMES A DAY FOR 7 DAYS   Patient taking differently: Take 300 mg by mouth 3 times a day.   indomethacin (INDOCIN) 25 MG Cap  UNK at PRN Significant Other Yes No   Sig: Take 25 mg by mouth as needed. Indications: Acute Joint Inflammation in Gout   isosorbide mononitrate (IMDUR) 120 MG CR tablet 12/8/2022 at AM Significant Other No No   Sig: Take 1 Tablet by mouth every morning.   lisinopril (PRINIVIL) 20 MG Tab 12/8/2022 at AM Significant Other No No   Sig: Take 1 Tablet by mouth every day.   metFORMIN (GLUCOPHAGE) 850 MG Tab 12/8/2022 at AM Significant Other No No   Sig: Take 1 Tablet by mouth 2 times a day with meals.   metoprolol SR (TOPROL XL) 100 MG TABLET SR 24 HR 12/8/2022 at AM Significant Other No No   Sig: Take 1 Tablet by mouth every day.   nitroglycerin (NITROSTAT) 0.4 MG SL Tab PRN at PRN Significant Other No No   Sig: Place 1 Tablet under the tongue as needed for Chest Pain for up to 360 days.   polyethylene glycol/lytes (MIRALAX) 17 g Pack PRN at PRN Significant Other No No   Sig: Take 1 Packet by mouth 2 times a day as needed (if sennosides and/or docusate ineffective or not ordered).   potassium chloride SA (KDUR) 20 MEQ Tab CR NEW RX at NOT STARTED Significant Other No No   Sig: Take 1 Tablet by mouth every day.   ranolazine (RANEXA) 500 MG TABLET SR 12 HR NEW RX at NOT STARTED Significant Other No No   Sig: Take 1 Tablet by mouth 2 times a day.   tamsulosin (FLOMAX) 0.4 MG capsule 12/8/2022 at AM Significant Other No No   Sig: Take 1 Capsule by mouth 1/2 hour after breakfast.      Facility-Administered Medications: None       Physical Exam  Temp:  [36.5 °C (97.7 °F)-36.8 °C (98.2 °F)] 36.8 °C (98.2 °F)  Pulse:  [] 94  Resp:  [18-34] 18  BP: ()/(58-79) 113/63  SpO2:  [75 %-95 %] 94 %  Blood Pressure : 113/63   Temperature: 36.8 °C (98.2 °F)   Pulse: 94   Respiration: 18   Pulse Oximetry: 94 %       Physical Exam  Vitals reviewed.   Constitutional:       Appearance: Normal appearance. He is obese. He is not toxic-appearing or diaphoretic.   HENT:      Head: Normocephalic and atraumatic.      Mouth/Throat:       Mouth: Mucous membranes are moist.      Pharynx: Oropharynx is clear. No oropharyngeal exudate or posterior oropharyngeal erythema.   Eyes:      General: No scleral icterus.     Extraocular Movements: Extraocular movements intact.      Conjunctiva/sclera: Conjunctivae normal.      Pupils: Pupils are equal, round, and reactive to light.   Neck:      Vascular: No carotid bruit.   Cardiovascular:      Rate and Rhythm: Regular rhythm. Tachycardia present.      Pulses: Normal pulses.      Heart sounds: Normal heart sounds. No murmur heard.    No friction rub. No gallop.   Pulmonary:      Effort: Pulmonary effort is normal.      Breath sounds: Normal breath sounds. No wheezing, rhonchi or rales.   Abdominal:      General: Bowel sounds are normal. There is no distension.      Palpations: Abdomen is soft. There is no mass.      Tenderness: There is no abdominal tenderness. There is no guarding or rebound.      Hernia: No hernia is present.   Musculoskeletal:         General: Normal range of motion.      Cervical back: Normal range of motion and neck supple. No muscular tenderness.      Right lower leg: No edema.      Left lower leg: No edema.      Comments: Right upper extremity PICC line without signs of infection   Lymphadenopathy:      Cervical: No cervical adenopathy.   Skin:     General: Skin is warm and dry.      Capillary Refill: Capillary refill takes less than 2 seconds.      Coloration: Skin is not pale.      Findings: No erythema or rash.      Comments: Tattoos bilateral upper extremities   Neurological:      General: No focal deficit present.      Mental Status: He is alert and oriented to person, place, and time. Mental status is at baseline.      Cranial Nerves: No cranial nerve deficit.      Sensory: No sensory deficit.      Motor: No weakness.   Psychiatric:         Mood and Affect: Mood normal.         Behavior: Behavior normal.         Thought Content: Thought content normal.         Judgment: Judgment  normal.       Laboratory:  Recent Labs     12/08/22  1730   WBC 11.6*   RBC 3.23*   HEMOGLOBIN 10.0*   HEMATOCRIT 31.0*   MCV 96.0   MCH 31.0   MCHC 32.3*   RDW 51.8*   PLATELETCT 320   MPV 9.2     Recent Labs     12/08/22  1730   SODIUM 135   POTASSIUM 4.0   CHLORIDE 97   CO2 21   GLUCOSE 165*   BUN 15   CREATININE 1.49*   CALCIUM 9.1     Recent Labs     12/08/22  1730   ALTSGPT 26   ASTSGOT 25   ALKPHOSPHAT 79   TBILIRUBIN 0.4   GLUCOSE 165*     Recent Labs     12/08/22 2007   APTT 34.8   INR 1.23*     Recent Labs     12/08/22  1830   NTPROBNP 3073*         Recent Labs     12/08/22  1730 12/08/22 2007   TROPONINT 312* 333*     I have reviewed and interpreted the above twelve-lead EKG and do appreciate sinus tachycardia with ST segment depressions in lateral leads as seen above.  QTc within normal limits.    Imaging:  DX-CHEST-PORTABLE (1 VIEW)   Final Result      1.  Features most consistent with multifocal pneumonia. Interstitial edema could have a similar appearance.   2.  More focal consolidation of the right lower lung concerning for developing pneumonia.      EC-ECHOCARDIOGRAM COMPLETE W/O CONT    (Results Pending)   NM-LUNG PERFUSION IMAGING    (Results Pending)   EC-LIDIA W/O CONT    (Results Pending)     I reviewed and interpreted the above chest x-ray do appreciate pulmonary edema with cardiomegaly and sternotomy wires as seen above.    Assessment/Plan:  Justification for Admission Status  I anticipate this patient will require at least two midnights for appropriate medical management, necessitating inpatient admission because He will need optimization for his HF, NSTEMI, and syncopal episode.         * NSTEMI (non-ST elevated myocardial infarction) (HCC)- (present on admission)  Assessment & Plan  Trend troponins  No indication for n.p.o. status as patient request that he has his personal cardiologist physician (Darrian Gomez MD) perform cardiac catheterization and recommend daytime hospitalist to consult  with the above physician to try to arrange cardiac catheterization.  Cardiology has been consulted recommends echocardiogram possible LIDIA for aortic valve insufficiency secondary to his syncopal event  Continue heparin infusion  Continue home medication regiment for his cardiac meds  Twelve-lead EKG as seen above    GENE (acute kidney injury) (HCC)- (present on admission)  Assessment & Plan  Cautious with fluid resuscitation secondary to pulmonary edema and oxygen requirement  CMP in a.m.  Avoid nephrotoxic agents    Syncope- (present on admission)  Assessment & Plan  Echocardiogram ordered and pending  LIDIA ordered and pending  Fall precautions  D-dimer elevated on heparin infusion, elevated proBNP and hypoxia, tachycardia, tachypnea and hypotension on arrival can consider pulmonary emboli however VQ scan appropriate as GENE is evident    Acute respiratory failure with hypoxia (HCC)- (present on admission)  Assessment & Plan  Due to patient's syncopal episode, D-dimer, tachycardia, hypotension and oxygen requirement, consider pulmonary embolism however due to his GENE we will perform VQ scan and echocardiogram currently he is on heparin infusion at this time.  Continue supplemental oxygen to maintain SPO2 greater than 88%  Chest x-ray as seen above with possible pulmonary edema versus atypical infection however more likely to be pulmonary edema with elevated BNP    Epidural abscess- (present on admission)  Assessment & Plan  Continue daptomycin/Rocephin via PICC line  Analgesics for pain control  Fall precautions  Continue gabapentin 300 mg p.o. 3 times daily    Essential hypertension- (present on admission)  Assessment & Plan  Continue home medication regiment    Obesity (BMI 30-39.9)- (present on admission)  Assessment & Plan  Strong recommendation for follow-up outpatient PCP for lifestyle modification including diet and exercise regiment of at least 30 min of brisk cardiovascular exercise 3-5 times weekly.  Lipid  panel ordered and pending  Hemoglobin A1c ordered and pending  Noted to be prediabetic however on metformin we will hold this secondary to possible contrast during this admission, no indication for low SSI unless hemoglobin A1c greater than 6.5 at this time.      VTE prophylaxis: therapeutic anticoagulation with heparin infusion

## 2022-12-09 NOTE — ED NOTES
Pt brought to [facility] by code assist team.  Pt was at infusion center for scheduled appt for abx infusion for epidural abscess.  Pt was talking with wife and was attempting to take an nitro d/t chest pain and passed out pt has been having chest pain for the last wk and has been taking 2-3 nitro's/day.  Pt was seen by cardiologist today and started on new medications.  Pt was on NRB on arrival to room.  Pt 85% on RA.  Pt A/o x4 on arrival to room.  Chest pain 5/10 at this time.  Pt recently admitted for 23 days.  ERP to see.

## 2022-12-09 NOTE — CARE PLAN
The patient is Stable - Low risk of patient condition declining or worsening    Shift Goals  Clinical Goals: Cath procedure  Patient Goals: Rest , Comfort, Procedure education    Progress made toward(s) clinical / shift goals:      Problem: Pain - Standard  Goal: Alleviation of pain or a reduction in pain to the patient’s comfort goal  Outcome: Progressing     Problem: Knowledge Deficit - Standard  Goal: Patient and family/care givers will demonstrate understanding of plan of care, disease process/condition, diagnostic tests and medications  Outcome: Progressing     Problem: Fall Risk  Goal: Patient will remain free from falls  Outcome: Progressing       Patient is not progressing towards the following goals:

## 2022-12-09 NOTE — PROGRESS NOTES
4 Eyes Skin Assessment Completed by Vashti, RN and Rikki, RN.    Head WDL  Ears Redness and Blanching  Nose WDL  Mouth WDL  Neck WDL  Breast/Chest Scar  Shoulder Blades WDL  Spine Incision- healing. Dressing CDI  (R) Arm/Elbow/Hand WDL  (L) Arm/Elbow/Hand WDL  Abdomen WDL  Groin WDL  Scrotum/Coccyx/Buttocks WDL  (R) Leg Edema  (L) Leg Edema  (R) Heel/Foot/Toe Blanching and Boggy  (L) Heel/Foot/Toe Blanching and Boggy          Devices In Places Tele Box, Pulse Ox, and Nasal Cannula      Interventions In Place Gray Ear Foams, Pillows, and Pressure Redistribution Mattress    Possible Skin Injury No    Pictures Uploaded Into Epic N/A  Wound Consult Placed N/A  RN Wound Prevention Protocol Ordered No

## 2022-12-09 NOTE — PROCEDURES
Cardiac Catheterization report    12/9/2022  10:39 AM    Referring MD: Dr. Vinson    Primary Care Provider: Raul Jimenez M.D.    Indication for procedure: Non-ST elevation myocardial infarction    Procedures performed:   Coronary and bypass graft arteriograms  Left subclavian artery angiogram       Final impression:  Patent LIMA to LAD, known occluded vein grafts.  No revascularizable targets      Findings:  Left main is calcified, moderate disease.  Left anterior descending artery has calcified 70% disease in proximal portion, occluded in midportion.  Proximal LAD supplies large septal branches, diagonal branch.  Septal branches gives collaterals to RCA.  Left circumflex artery proximally occluded.  Right coronary artery proximally occluded, large atrial branch collateralizes left circumflex artery.  Also antegrade collaterals to distal RCA.  All vein grafts are known to be occluded, not injected.  LIMA to LAD widely patent, previously placed stent is patent with 30% in-stent restenosis.    Was difficult to advance the catheter through left subclavian artery, left subclavian artery angiogram was performed, left subclavian artery was tortuous with 50% stenosis, no significant gradient across stenosis.        EBL: <10 CC    Specimens: None    Procedure details:  The risks and benefits of cardiac catheterization and possible intervention were explained to the patient including death, heart attack, stroke, and emergency surgery.  The patient verbalized understanding and wished to proceed.  The patient was brought to the cardiac catheterization laboratory in the fasting state and prepped and draped in the usual sterile fashion.  Timeout was performed.  The left wrist was locally anesthetized with lidocaine and the left distal radial artery was cannulated with 5/6-Wallisian equipment and standard radial cocktail was given.  Coronary angiography was performed using JR 4 and JL 4.5 diagnostic catheters in the usual  fashion, results mentioned below.  Left subclavian artery angiogram, LIMA angiogram was performed using RONEL catheter.    Once all the views were obtained, all wires and catheters were removed from the patient without difficulty.  A Vasc-Band was placed over the left radial artery and the radial artery sheath was removed without difficulty.      Complications: None    Contrast: 40cc     Sedation time:  I supervised moderate sedation over a trained independent nursing staff,  Sedation Start time:  10:01   Sedation Stop time: 10:34      COLLETTE Serna  [facility]

## 2022-12-09 NOTE — PROGRESS NOTES
Cardiology Progress Note    Name:   Abimael Hamilton   YOB: 1955  Age:   67 y.o.  male   MRN:   [id number]    Attending Cardiologist: Dr Barry  Outpatient Cardiologist: Dr Martinez    CC: Syncope and Chest Pain       History of Present Illness  66 yo male with redo CABG 2015 (LIMA to LAD- PCI to anastomosis with 2.5 stent, jump to Cx, Occluded native coronaries), bioprosthetic AVR 2015 now with moderate AS presents with worsening angina in the last 3 weeks.     Abimael had laminectomy by Dr Sparks 11/7/22 for epidural abscess which was complicated by epidural hematoma which was evacuated by Dr. Sparks 11/17.  Since then has been restarted on DAPT and receiving outpatient ABX infusions. Has been very short of breath with associated chest pressure, using nitro tabs up to 3 times per day (prior to November was not having any anginal symptoms). Yesterday 12/8 he walked into the infusion center and was very short of breath and had a witnessed loss of consciousness (wife estimates about 30s). At that point rapid response was called and he was transported to the ER. His HS troponin was 312, rising to 459. NTproBNP 3073. Possible consolidation vs interstitial edema on CXR.     Interim Events   Abimael had no chest pain overnight. Describes significant dyspea on exertion with some LE swelling in the last month. Recently started on diuretics as outpatient. Denies orthopnea but can't lay flat due to back pain.       Review of Systems   Constitutional:  Negative for chills and fever.   Respiratory:  Positive for shortness of breath.    Cardiovascular:  Positive for chest pain and leg swelling. Negative for palpitations and orthopnea.   Musculoskeletal:  Positive for back pain.   Neurological:  Positive for loss of consciousness.     Medical History  Past Surgical History:   Procedure Laterality Date    IRRIGATION AND DEBRIDEMENT, WOUND, AFTER PROCEDURE INVOH  11/17/2022    Procedure: IRRIGATION AND DEBRIDEMENT,  WOUND, AFTER PROCEDURE INVOLVING LUMBAR SPINE BY POSTERIOR APPROACH;  Surgeon: Adam Sparks M.D.;  Location: [facility];  Service: Orthopedics    LUMBAR LAMINECTOMY DISKECTOMY  11/7/2022    Procedure: L2-SI LAMINECTOMY;  Surgeon: Adam Sparks M.D.;  Location: [facility];  Service: Orthopedics    SHOULDER DECOMPRESSION ARTHROSCOPIC Right 9/5/2017    Procedure: SHOULDER DECOMPRESSION ARTHROSCOPIC SUBACROMIAL;  Surgeon: Mg Campos M.D.;  Location: [facility];  Service: Orthopedics    DEBRIDEMENT, LABRUM, HIP, ARTHROSCOPIC Right 9/5/2017    Procedure: ARTHROSCOPIC LABRAL DEBRIDEMENT;  Surgeon: Mg Campos M.D.;  Location: [facility];  Service: Orthopedics    SHOULDER ARTHROSCOPY W/ ROTATOR CUFF REPAIR Right 9/5/2017    Procedure: SHOULDER ARTHROSCOPY W/ ROTATOR CUFF REPAIR;  Surgeon: Mg Campos M.D.;  Location: [facility];  Service: Orthopedics    SHOULDER ARTHROSCOPY W/ BICIPITAL TENODESIS REPAIR Right 9/5/2017    Procedure: SHOULDER ARTHROSCOPY W/ BICIPITAL TENODESIS REPAIR;  Surgeon: Mg Campos M.D.;  Location: [facility];  Service: Orthopedics    SYNOVECTOMY Right 9/5/2017    Procedure: SYNOVECTOMY;  Surgeon: Mg Campos M.D.;  Location: [facility];  Service: Orthopedics    MULTIPLE CORONARY ARTERY BYPASS  12/08/2015    3 way bypass & Bovine valve    LAMINOTOMY  12/2004    Diskectomy L4-L5, L5-S1    MULTIPLE CORONARY ARTERY BYPASS  11/11/1998    5 way bypass    BIOPSY GENERAL      buttock lesion    EYE SURGERY      MITRAL VALVE REPLACE         Family History   Problem Relation Age of Onset    Heart Attack Father         MI and CABG, unknown age    Hyperlipidemia Father     Cancer Mother         Breast    Heart Disease Brother     Arthritis Maternal Grandmother     Stroke Neg Hx     Diabetes Neg Hx     Lung Disease Neg Hx        Social History     Tobacco Use   Smoking Status  "Former    Packs/day: 3.00    Years: 20.00    Pack years: 60.00    Types: Cigarettes    Quit date: 1992    Years since quittin.9   Smokeless Tobacco Never       Allergies   Allergen Reactions    Morphine Vomiting, Nausea and Unspecified     Violent nightmares    Fenofibrate Unspecified     Dehydration, severe muscle cramps     Gemfibrozil Unspecified     Dehydration,Severe Muscle cramps    Lipitor [Atorvastatin Calcium] Unspecified     Severe Muscle cramps, dehydration    Lovastatin Unspecified     Dehydration, severe muscle cramps    Tricor Unspecified     Dehydration, severe muscle cramps         Medications   Scheduled Medications   Medication Dose Frequency    furosemide  80 mg BID DIURETIC    aspirin EC  81 mg DAILY    clopidogrel  75 mg DAILY    amLODIPine  5 mg DAILY    famotidine  20 mg DAILY    gabapentin  300 mg TID    isosorbide mononitrate SR  120 mg QAM    metoprolol SR  100 mg DAILY    ranolazine  500 mg BID    tamsulosin  0.4 mg AFTER BREAKFAST    senna-docusate  2 Tablet BID    cefTRIAXone (ROCEPHIN) IV  2,000 mg Q24HR    DAPTOmycin  6 mg/kg Q24HR           Physical Exam  /64   Pulse 95   Temp 36.6 °C (97.9 °F) (Temporal)   Resp 18   Ht 1.676 m (5' 6\")   Wt 100 kg (220 lb 14.4 oz)   SpO2 92%     Physical Exam  Vitals reviewed.   Constitutional:       Appearance: Normal appearance.   HENT:      Head: Normocephalic and atraumatic.   Cardiovascular:      Rate and Rhythm: Normal rate and regular rhythm.   Pulmonary:      Effort: Pulmonary effort is normal.      Breath sounds: Rales (bilateral bases) present.   Musculoskeletal:      Right lower leg: Edema present.      Left lower leg: Edema present.   Skin:     General: Skin is warm and dry.   Neurological:      Mental Status: He is alert.   Psychiatric:         Judgment: Judgment normal.         Labs (personally reviewed):     Lab Results   Component Value Date/Time    SODIUM 136 2022 03:19 AM    POTASSIUM 3.5 (L) 2022 " 03:19 AM    CHLORIDE 97 12/09/2022 03:19 AM    CO2 27 12/09/2022 03:19 AM    GLUCOSE 159 (H) 12/09/2022 03:19 AM    BUN 15 12/09/2022 03:19 AM    CREATININE 1.35 12/09/2022 03:19 AM    BUNCREATRAT 16 07/29/2016 10:22 AM     Lab Results   Component Value Date/Time    CHOLSTRLTOT 164 08/10/2022 08:21 AM    LDL see below 08/10/2022 08:21 AM    HDL 26 (A) 08/10/2022 08:21 AM    TRIGLYCERIDE 446 (H) 08/10/2022 08:21 AM     Lab Results   Component Value Date/Time    BNPBTYPENAT 26 02/16/2019 10:10 AM         Cardiac Imaging and Procedures Review      Personal Telemetry Review  Sinus tach  Personal EKG Interpretation  12/8/22 sinus with incomplete LBBB, lateral ST depression, minimal aVR elevation  Echo 12/1/22  CONCLUSIONS  Normal left ventricular size, wall thickness, and systolic function.  The right ventricle is normal in size and systolic function.  Mildly dilated left atrium.  Mild mitral regurgitation.  Known bioprosthetic aortic valve that is functioning normally with   increased transvalvular gradients. EOMi 0.6cm2/m2 suggestive PPM.  No pericardial effusion.     Compared to the prior study on 11-4-22, similar findings.       Assessment and Medical Decision Making:  #. NSTEMI              Hx of redo CABG 2015 - Most recent coronary anatomy with LIMA to LAD- PCI to anastomosis with 2.5 stent, jump to Cx, Occluded native coronaries  - Lat ischemic recently on nuc stress test  - Hep ggt  - aspirin, plavix  - intolerance to statins, has been on Repatha as outpatient  - plan for [facility] today with Left wrist access if possible. Discussed may need to use femoral access site, ongoing discussion with interventionalist    #CHF  - continue IV lasix for likely heart failure given his CXR, PE and BNP findings  - limited TTE for EF evaluation     #. Degenerative bioprosthetic AVR 2015- unknown valve type  - recent echo with MG 36, Some degree of AI, not well characterized. MG 15 mmHg in 2016  - Work up as outpatient, no LIDIA this  admission     #. Elevated DDimer  - this in combination with his increased dyspnea, syncope, hypoxia, tachycardia, recent surgery is concerning for PE. He is on a heparin drip. Case discussed with the hospitalist as well. Held off CTA due to GENE and contrast from [facility]. If no obstructive disease on cath then restart heparin drip and treat for presumed PE with imaging per hospitalist.      #. Recent spine surgery for abscess with epidural hematoma requiring reoperation 11/17 - now back on DAPT, transient interruption     ADDENDUM: no targets for revascularization on coronary angiogram. Continue heparin drip. Work up for PE per hospitalist team.    Please see Dr Barry's attestation for additions and further recommendations.    Mery Fuentes PA-C  [facility]    I personally spent a total of 18 minutes which includes face-to-face time and non-face-to-face time spent on preparing to see the patient, reviewing hospital notes and tests, obtaining history from the patient, performing a medically appropriate exam, counseling and educating the patient, ordering medications/tests/procedures/referrals as clinically indicated, and documenting information in the electronic medical record.

## 2022-12-09 NOTE — PROGRESS NOTES
Patient and wife arrive for IVABX for epidural abscess. Upon checking-in, Abimael became diaphoretic, pale, and lost consciousness for a reported 30s or more. Code assist initiated. Abimael consented to go to ER after wife called son and was tearful. Abimael discharged to ER with medical staff and wife.

## 2022-12-09 NOTE — ASSESSMENT & PLAN NOTE
Cautious with fluid resuscitation secondary to pulmonary edema and oxygen requirement  CMP in a.m.  Avoid nephrotoxic agents

## 2022-12-09 NOTE — PROGRESS NOTES
Handoff report received from night shift nurse. Pt care assumed. Pt is currently resting in bed. POC discussed with Pt and Pt verbalizes no questions at this time. Pt is AAOx4, on 2L NC, on Tele monitoring, and VSS. Call light and belongings within reach, bed in lowest and locked position, and Pt educated on use of call light.

## 2022-12-09 NOTE — ED NOTES
from Lab called with critical result of Troponin 312 at 1815. Critical lab result read back to .   Dr. Fernandez notified of critical lab result at 1820.  Critical lab result read back by Dr. Fernandez.

## 2022-12-09 NOTE — ASSESSMENT & PLAN NOTE
Echocardiogram ordered and pending  LIDIA ordered and pending  Fall precautions  D-dimer elevated on heparin infusion, elevated proBNP and hypoxia, tachycardia, tachypnea and hypotension on arrival can consider pulmonary emboli however VQ scan appropriate as GENE is evident

## 2022-12-09 NOTE — CONSULTS
CARDIAC CONSULTATION    Requesting Provider: Jose Fernandez M.D.  Reason for consultation: NSTEMI    Impressions:  #. NSTEMI   Hx of redo CABG 2015 - Most recent coronary anatomy with LIMA to LAD- PCI to anastomosis with 2.5 stent, jump to Cx, Occluded native coronaries   - Lat ischemic recently    #. Degenerative bioprosthetic AVR 2015- unknown valve type   - recent echo with MG 36, Some degree of AI, not well characterized. MG 15 mmHg in 2016    #. Acute on chronic HFpEF    #. Recent spine surgery for abscess with epidural hematoma requiring reoperation 11/17 - now back on DAPT, transient interruption    Recommendations:  Heparin anticoagulation, trend troponins.     Advised catheterization- patient will think about it - prior bad experience with femoral access for impella PCI last year    Repeat echo, consider LIDIA to further assess aortic valve.     Continue chronic cardiac meds    Will follow    History: Abimael Hamilton is a 67 y.o. male with a past medical history of remote CABG with redo in 2015 along with bioprosthetic aortic valve replacement at [facility] and subsequent PCI of the LIMA anastomosis in 2021 at [facility] presenting for assessment of angina and fall.  The reports increasing use of nitroglycerin over the past month and recent interruption of antiplatelet therapy while being treated for a epidural abscess complicated by epidural hematoma requiring surgical debridement.  He has been back on the antiplatelet agents for a few weeks now but angina continues.  Today was presenting to the infusion center for antibiotics and had a syncopal spell and was found to be diaphoretic.  He was transferred to the emergency department where he was found to have a troponin elevation of 300 and a BNP level of 3000.  No historic NT proBNP is available but the troponin is markedly increased over historic norms.    The patient is reluctant to have invasive procedures done as in 2021 he had an Impella  "supported PCI of the LIMA anastomosis which had very difficult time with the femoral access point with recurrent bleeding afterwards.    ROS:   10 point review systems is otherwise negative except as per the HPI    PE:  /58   Pulse (!) 108   Temp 36.5 °C (97.7 °F) (Temporal)   Resp (!) 21   Ht 1.676 m (5' 6\")   Wt 99.8 kg (220 lb)   SpO2 93%   BMI 35.51 kg/m²   Gen: Well appearing  HEENT: Symmetric face. Anicteric sclerae. Moist mucus membranes  NECK: No JVD. No lymphadenopathy  CARDIAC: Normal PMI, regular, normal S1, S2. with a systolic murmur    VASCULATURE: Normal carotid amplitude without bruit.   RESP: Clear to auscultation bilaterally  ABD: Soft, non-tender, non-distended  EXT: 1+ lower extremity edema, no clubbing or cyanosis  SKIN: Warm and dry  NEURO: No gross deficits   PSYCH: Appropriate affect, participates in conversation      Past Medical History:   Diagnosis Date    Aortic stenosis 12/1/2015    Arthritis     Benign hypertensive heart disease without heart failure 11/14/2011    CAD (coronary artery disease)     Congestive heart failure (HCC)     Coronary atherosclerosis of autologous vein bypass graft 11/14/2011    Essential hypertension 4/25/2019    Gout     Heart valve disease     High cholesterol     History of pancreatitis     History of pancreatitis     Hypertension     Migraine 2/16/2019    Muscle cramps 10/4/2011    Myocardial infarct (HCC)     Multiple MI's, 1998, 2015    Obesity 11/14/2011    Pain     Joints    Postsurgical aortocoronary bypass status 7/26/2016    Status post redo 3-V CABG in [place] 12/2015: SVG-acute marginal, SVG sequential to LAD, and OM     Postsurgical aortocoronary bypass status 7/26/2016    Status post redo 3-V CABG in [place] 12/2015: SVG-acute marginal, SVG sequential to LAD, and OM     Renal disorder     Hx of Acute renal failure r/t medication/statins    S/P aortic valve replacement with bioprosthetic valve 7/26/2016    #23 [place] AVR " (bioprosthetic)     Statin intolerance 7/26/2016    Status post cardiac revascularization with bypass aortocoronary anastomosis of five coronary vessels 7/26/2016    5-V CABG done in 1998 (done in [facility]), patent LIMA to LAD, occluded SVG-OM, occluded SVG-RCA by cardiac catheterization 11/15/2007 with other vein grafts not identified at that time, poor targets for revascularization and declined repeat CABG in 2007 by [facility]. No ischemic was identified by MPI 11/17/07 with an EF of 50%.      Status post cardiac revascularization with bypass aortocoronary anastomosis of five coronary vessels 7/26/2016    5-V CABG done in 1998 (done in [facility]), patent LIMA to LAD, occluded SVG-OM, occluded SVG-RCA by cardiac catheterization 11/15/2007 with other vein grafts not identified at that time, poor targets for revascularization and declined repeat CABG in 2007 by [facility]. No ischemic was identified by MPI 11/17/07 with an EF of 50%.       Past Surgical History:   Procedure Laterality Date    IRRIGATION AND DEBRIDEMENT, WOUND, AFTER PROCEDURE INVOH  11/17/2022    Procedure: IRRIGATION AND DEBRIDEMENT, WOUND, AFTER PROCEDURE INVOLVING LUMBAR SPINE BY POSTERIOR APPROACH;  Surgeon: Adam Sparks M.D.;  Location: [facility];  Service: Orthopedics    LUMBAR LAMINECTOMY DISKECTOMY  11/7/2022    Procedure: L2-SI LAMINECTOMY;  Surgeon: Adam Sparks M.D.;  Location: [facility];  Service: Orthopedics    SHOULDER DECOMPRESSION ARTHROSCOPIC Right 9/5/2017    Procedure: SHOULDER DECOMPRESSION ARTHROSCOPIC SUBACROMIAL;  Surgeon: Mg Campos M.D.;  Location: [facility];  Service: Orthopedics    DEBRIDEMENT, LABRUM, HIP, ARTHROSCOPIC Right 9/5/2017    Procedure: ARTHROSCOPIC LABRAL DEBRIDEMENT;  Surgeon: Mg Campos M.D.;  Location: [facility];  Service: Orthopedics    SHOULDER ARTHROSCOPY W/ ROTATOR CUFF REPAIR Right 9/5/2017    Procedure: SHOULDER ARTHROSCOPY  W/ ROTATOR CUFF REPAIR;  Surgeon: Mg Camops M.D.;  Location: [facility];  Service: Orthopedics    SHOULDER ARTHROSCOPY W/ BICIPITAL TENODESIS REPAIR Right 9/5/2017    Procedure: SHOULDER ARTHROSCOPY W/ BICIPITAL TENODESIS REPAIR;  Surgeon: Mg Campos M.D.;  Location: [facility];  Service: Orthopedics    SYNOVECTOMY Right 9/5/2017    Procedure: SYNOVECTOMY;  Surgeon: Mg Campos M.D.;  Location: [facility];  Service: Orthopedics    MULTIPLE CORONARY ARTERY BYPASS  12/08/2015    3 way bypass & Bovine valve    LAMINOTOMY  12/2004    Diskectomy L4-L5, L5-S1    MULTIPLE CORONARY ARTERY BYPASS  11/11/1998    5 way bypass    BIOPSY GENERAL      buttock lesion    EYE SURGERY      MITRAL VALVE REPLACE         Allergies   Allergen Reactions    Morphine Vomiting, Nausea and Unspecified     Violent nightmares    Fenofibrate Unspecified     Dehydration, severe muscle cramps     Gemfibrozil Unspecified     Dehydration,Severe Muscle cramps    Lipitor [Atorvastatin Calcium] Unspecified     Severe Muscle cramps, dehydration    Lovastatin Unspecified     Dehydration, severe muscle cramps    Tricor Unspecified     Dehydration, severe muscle cramps       Current Facility-Administered Medications:     cefTRIAXone (Rocephin) syringe 2,000 mg, 2,000 mg, Intravenous, Once, Jose Fernandez M.D.    DAPTOmycin (CUBICIN) 620 mg in NS 50 mL IVPB, 6.2 mg/kg, Intravenous, Q24HRS, Jose Fernandez M.D.    Current Outpatient Medications:     furosemide (LASIX) 20 MG Tab, Take 1 Tablet by mouth every day., Disp: 90 Tablet, Rfl: 3    ranolazine (RANEXA) 500 MG TABLET SR 12 HR, Take 1 Tablet by mouth 2 times a day., Disp: 180 Tablet, Rfl: 3    potassium chloride SA (KDUR) 20 MEQ Tab CR, Take 1 Tablet by mouth every day., Disp: 90 Tablet, Rfl: 3    nitroglycerin (NITROSTAT) 0.4 MG SL Tab, Place 1 Tablet under the tongue as needed for Chest Pain for up to 360 days., Disp: 30 Tablet,  Rfl: 0    gabapentin (NEURONTIN) 300 MG Cap, TAKE 1 CAPSULE BY MOUTH THREE TIMES A DAY FOR 7 DAYS (Patient taking differently: Take 300 mg by mouth 3 times a day.), Disp: 90 Capsule, Rfl: 2    amLODIPine (NORVASC) 10 MG Tab, Take 1 Tablet by mouth every day. (Patient taking differently: Take 5 mg by mouth every day.), Disp: 30 Tablet, Rfl: 0    docusate sodium 100 MG Cap, Take 100 mg by mouth 2 times a day., Disp: 60 Capsule, Rfl: 0    famotidine (PEPCID) 20 MG Tab, Take 1 Tablet by mouth 2 times a day., Disp: 60 Tablet, Rfl: 0    tamsulosin (FLOMAX) 0.4 MG capsule, Take 1 Capsule by mouth 1/2 hour after breakfast., Disp: 30 Capsule, Rfl: 0    polyethylene glycol/lytes (MIRALAX) 17 g Pack, Take 1 Packet by mouth 2 times a day as needed (if sennosides and/or docusate ineffective or not ordered)., Disp: 15 Each, Rfl: 3    metFORMIN (GLUCOPHAGE) 850 MG Tab, Take 1 Tablet by mouth 2 times a day with meals., Disp: 60 Tablet, Rfl: 0    Blood Glucose Test Strips, Use one True Metrix strip to test blood sugar once daily early morning before first meal., Disp: 100 Strip, Rfl: 0    Lancets, Use one True Metrix lancet to test blood sugar once daily early morning before first meal., Disp: 100 Each, Rfl: 0    indomethacin (INDOCIN) 25 MG Cap, Take 25 mg by mouth as needed. Indications: Acute Joint Inflammation in Gout, Disp: , Rfl:     acetaminophen (TYLENOL) 500 MG Tab, Take 500-1,000 mg by mouth every 6 hours as needed. Indications: Pain, Disp: , Rfl:     metoprolol SR (TOPROL XL) 100 MG TABLET SR 24 HR, Take 1 Tablet by mouth every day., Disp: 90 Tablet, Rfl: 3    allopurinol (ZYLOPRIM) 100 MG Tab, Take 1 Tablet by mouth every day for 360 days. allopurinol 100 mg tablet, Disp: 90 Tablet, Rfl: 3    isosorbide mononitrate (IMDUR) 120 MG CR tablet, Take 1 Tablet by mouth every morning., Disp: 90 Tablet, Rfl: 3    lisinopril (PRINIVIL) 20 MG Tab, Take 1 Tablet by mouth every day., Disp: 90 Tablet, Rfl: 2    clopidogrel (PLAVIX)  75 MG Tab, Take 1 Tablet by mouth every day., Disp: 90 Tablet, Rfl: 3    cyclobenzaprine (FLEXERIL) 10 mg Tab, TAKE ONE TABLET BY MOUTH THREE TIMES DAILY FOR 7 DAYS (Patient taking differently: Take 10 mg by mouth 3 times a day as needed for Muscle Spasms. TAKE ONE TABLET BY MOUTH THREE TIMES DAILY FOR 7 DAYS), Disp: 30 Tablet, Rfl: 2    Evolocumab, REPATHA, (REPATHA SURECLICK) 140 MG/ML Solution Auto-injector, Inject 1 Each under the skin every 14 days., Disp: 2 mL, Rfl: 11    Oxycodone HCl 20 MG Tab, Take 20 mg by mouth every 6 hours as needed (Pain)., Disp: , Rfl:     aspirin 81 MG EC tablet, Take 1 Tab by mouth every day., Disp: 30 Tab, Rfl: 11  (Not in a hospital admission)       Social History     Socioeconomic History    Marital status:      Spouse name: Not on file    Number of children: Not on file    Years of education: Not on file    Highest education level: GED or equivalent   Occupational History    Not on file   Tobacco Use    Smoking status: Former     Packs/day: 3.00     Years: 20.00     Pack years: 60.00     Types: Cigarettes     Quit date: 1992     Years since quittin.9    Smokeless tobacco: Never   Vaping Use    Vaping Use: Never used   Substance and Sexual Activity    Alcohol use: Not Currently    Drug use: Yes     Types: Marijuana, Inhaled     Comment: Marijuana- Daily (4 times per day)    Sexual activity: Yes     Partners: Female   Other Topics Concern    Not on file   Social History Narrative    Not on file     Social Determinants of Health     Financial Resource Strain: Not on file   Food Insecurity: Not on file   Transportation Needs: Not on file   Physical Activity: Not on file   Stress: Not on file   Social Connections: Not on file   Intimate Partner Violence: Not on file   Housing Stability: Not on file       Family History   Problem Relation Age of Onset    Heart Attack Father         MI and CABG, unknown age    Hyperlipidemia Father     Cancer Mother         Breast     Heart Disease Brother     Arthritis Maternal Grandmother     Stroke Neg Hx     Diabetes Neg Hx     Lung Disease Neg Hx           Studies  Lab Results   Component Value Date/Time    CHOLSTRLTOT 164 08/10/2022 08:21 AM    LDL see below 08/10/2022 08:21 AM    HDL 26 (A) 08/10/2022 08:21 AM    TRIGLYCERIDE 446 (H) 08/10/2022 08:21 AM       Lab Results   Component Value Date/Time    SODIUM 135 12/08/2022 05:30 PM    POTASSIUM 4.0 12/08/2022 05:30 PM    CHLORIDE 97 12/08/2022 05:30 PM    CO2 21 12/08/2022 05:30 PM    GLUCOSE 165 (H) 12/08/2022 05:30 PM    BUN 15 12/08/2022 05:30 PM    CREATININE 1.49 (H) 12/08/2022 05:30 PM    BUNCREATRAT 16 07/29/2016 10:22 AM     Lab Results   Component Value Date/Time    ALKPHOSPHAT 79 12/08/2022 05:30 PM    ASTSGOT 25 12/08/2022 05:30 PM    ALTSGPT 26 12/08/2022 05:30 PM    TBILIRUBIN 0.4 12/08/2022 05:30 PM      Lab Results   Component Value Date/Time    BNPBTYPENAT 26 02/16/2019 10:10 AM       Medical records were reviewed for this encounter    For this encounter I directly reviewed ECG tracings and Echo images    Case discussed with Dr. Fernandez

## 2022-12-09 NOTE — ASSESSMENT & PLAN NOTE
Trend troponins  No indication for n.p.o. status as patient request that he has his personal cardiologist physician (Darrian Gomez MD) perform cardiac catheterization and recommend daytime hospitalist to consult with the above physician to try to arrange cardiac catheterization.  Cardiology has been consulted recommends echocardiogram possible LIDIA for aortic valve insufficiency secondary to his syncopal event  Continue heparin infusion  Continue home medication regiment for his cardiac meds  Twelve-lead EKG as seen above

## 2022-12-09 NOTE — DISCHARGE PLANNING
Care Transition Team Assessment    RN ERICK spoke with patient at bedside. Wife present. Role of CM explained. Patient lives in [place] with wife and is IADL's. Utilizes walker due to prior back surgeries. Patient is currently going to the [facility] thru 12/31/22 for IV infusion (appt time 5pm). No CM needs identified at this time. Wife to transport home upon discharge.    Information Source  Orientation Level: Oriented X4  Information Given By: Patient  Who is responsible for making decisions for patient? : Patient    Elopement Risk  Legal Hold: No    Interdisciplinary Discharge Planning  Lives with - Patient's Self Care Capacity: Significant Other  Patient or legal guardian wants to designate a caregiver: Yes  Caregiver name: Ana Hamilton  Caregiver contact info: [phone]  Support Systems: Spouse / Significant Other  Housing / Facility: [address]  Able to Return to Previous ADL's: Yes  Mobility Issues: Yes  Prior Services: Home-Independent  Patient Prefers to be Discharged to:: Home  Assistance Needed: No  Durable Medical Equipment: Walker    Discharge Preparedness  What is your plan after discharge?: Home with help  Prior Functional Level: Uses Walker  Difficulity with ADLs: Walking  Difficulty with ADLs Comment: Utilizes walker due to prior back surgeries    Functional Assesment  Prior Functional Level: Uses Walker    Vision / Hearing Impairment  Right Eye Vision: Impaired, Wears Glasses  Left Eye Vision: Impaired, Wears Glasses    Domestic Abuse  Have you ever been the victim of abuse or violence?: No  Physical Abuse or Sexual Abuse: No  Verbal Abuse or Emotional Abuse: No  Possible Abuse/Neglect Reported to:: Not Applicable    Anticipated Discharge Information  Discharge Disposition: Discharged to home/self care (01)

## 2022-12-09 NOTE — ASSESSMENT & PLAN NOTE
Due to patient's syncopal episode, D-dimer, tachycardia, hypotension and oxygen requirement, consider pulmonary embolism however due to his GENE we will perform VQ scan and echocardiogram currently he is on heparin infusion at this time.  Continue supplemental oxygen to maintain SPO2 greater than 88%  Chest x-ray as seen above with possible pulmonary edema versus atypical infection however more likely to be pulmonary edema with elevated BNP

## 2022-12-09 NOTE — ASSESSMENT & PLAN NOTE
Continue daptomycin/Rocephin via PICC line  Analgesics for pain control  Fall precautions  Continue gabapentin 300 mg p.o. 3 times daily

## 2022-12-09 NOTE — ED PROVIDER NOTES
ED Provider Note    CHIEF COMPLAINT  Chief Complaint   Patient presents with    Syncope    Chest Pain       HPI  Abimael Hamilton is a 67 y.o. male with a very complex medical history who presents with a syncopal event.  He had just arrived at the transfusion center for an infusion of daptomycin and ceftriaxone.  He had recent diagnosis of epidural abscess.  Was just discharged on the 25th of last month after having surgery on 11/17/2022 by Dr. Sparks.  This was following a prior surgery on 11/7/2022 for lumbar stenosis and epidural abscess with neurologic deficit.    Patient is known to have significant cardiac disease at baseline with multiple cardiac surgeries in the past including multivessel CABG, valve replacement, multiple stents.  He states that he had recurrent chest pain since his prior hospitalization.  Was seen by cardiology as an outpatient earlier today for recurrent chest pain.  Diagnosed with recent unstable angina with heart failure and was recommended that he continue with diuresis.  Patient states that he has been taking nitroglycerin repeatedly over the past several days with improvement in his chest pain    REVIEW OF SYSTEMS  See HPI for further details. All other systems are negative.   .  PAST MEDICAL HISTORY   has a past medical history of Aortic stenosis (12/1/2015), Arthritis, Benign hypertensive heart disease without heart failure (11/14/2011), CAD (coronary artery disease), Congestive heart failure (HCC), Coronary atherosclerosis of autologous vein bypass graft (11/14/2011), Essential hypertension (4/25/2019), Gout, Heart valve disease, High cholesterol, History of pancreatitis, History of pancreatitis, Hypertension, Migraine (2/16/2019), Muscle cramps (10/4/2011), Myocardial infarct ([facility]), Obesity (11/14/2011), Pain, Postsurgical aortocoronary bypass status (7/26/2016), Postsurgical aortocoronary bypass status (7/26/2016), Renal disorder, S/P aortic valve replacement with bioprosthetic  valve (2016), Statin intolerance (2016), Status post cardiac revascularization with bypass aortocoronary anastomosis of five coronary vessels (2016), and Status post cardiac revascularization with bypass aortocoronary anastomosis of five coronary vessels (2016).    SOCIAL HISTORY  Social History     Tobacco Use    Smoking status: Former     Packs/day: 3.00     Years: 20.00     Pack years: 60.00     Types: Cigarettes     Quit date: 1992     Years since quittin.9    Smokeless tobacco: Never   Vaping Use    Vaping Use: Never used   Substance and Sexual Activity    Alcohol use: Not Currently    Drug use: Yes     Types: Marijuana, Inhaled     Comment: Marijuana- Daily (4 times per day)    Sexual activity: Yes     Partners: Female       SURGICAL HISTORY   has a past surgical history that includes laminotomy (2004); biopsy general; multiple coronary artery bypass (1998); multiple coronary artery bypass (2015); shoulder decompression arthroscopic (Right, 2017); debridement, labrum, hip, arthroscopic (Right, 2017); shoulder arthroscopy w/ rotator cuff repair (Right, 2017); shoulder arthroscopy w/ bicipital tenodesis repair (Right, 2017); synovectomy (Right, 2017); eye surgery; mitral valve replace; lumbar laminectomy diskectomy (2022); and irrigation and debridement, wound, after procedure invoh (2022).    CURRENT MEDICATIONS  Home Medications    **Home medications have not yet been reviewed for this encounter**         ALLERGIES  Allergies   Allergen Reactions    Morphine Vomiting, Nausea and Unspecified     violent    Fenofibrate Unspecified     Dehydration, severe muscle cramps     Gemfibrozil Unspecified     Dehydration,Severe Muscle cramps    Lipitor [Atorvastatin Calcium] Unspecified     Severe Muscle cramps, dehydration    Lovastatin Unspecified     Dehydration, severe muscle cramps    Tricor Unspecified     Dehydration, severe muscle cramps  "      PHYSICAL EXAM  VITAL SIGNS: /59   Pulse (!) 108   Temp 36.5 °C (97.7 °F) (Temporal)   Resp (!) 24   Ht 1.676 m (5' 6\")   Wt 99.8 kg (220 lb)   SpO2 95%   BMI 35.51 kg/m²   Pulse ox interpretation: I interpret this pulse ox as normal.  Constitutional: Alert in no apparent distress.  HENT: No signs of trauma, Bilateral external ears normal, Nose normal.   Eyes: Pupils are equal and reactive, Conjunctiva normal, Non-icteric.   Neck: Normal range of motion, Supple, No stridor.   Cardiovascular: Regular rate and rhythm.   Thorax & Lungs: Normal breath sounds, No respiratory distress, No wheezing, No chest tenderness.   Abdomen: Bowel sounds normal, Soft, No tenderness, No masses, No pulsatile masses. No peritoneal signs.  Skin: Warm, Dry, No erythema, No rash.   Back: No bony tenderness, No CVA tenderness.   Extremities: Intact distal pulses, mild lower extremity edema, No tenderness, No cyanosis  Musculoskeletal: Good range of motion in all major joints. No major deformities noted.   Neurologic: Alert      DIAGNOSTIC STUDIES / PROCEDURES    EKG - Physician interpretation  Results for orders placed or performed during the hospital encounter of 22   EKG   Result Value Ref Range    Report       [facility].    Test Date:  2022  Pt Name:    CATHERINE MORELOS                   Department: ER  MRN:        [id number]                      Room:       [facility]  Gender:     Male                         Technician: [id number]  :        1955                   Requested By:ER TRIAGE PROTOCOL  Order #:    [id number]                    Reading MD: KACEY CONDON MD    Measurements  Intervals                                Axis  Rate:       110                          P:          54  ND:         176                          QRS:        7  QRSD:       115                          T:          149  QT:         336  QTc:        455    Interpretive Statements  Sinus tachycardia  Incomplete " left bundle branch block  Anterior Q waves, possibly due to ILBBB  ST depression, consider ischemia, lateral lds  Compared to ECG 2022 08:53:33  Q waves now present  ST (T wave) deviation now present  Possible ischemia still present  Electronically Signed On 2022 0:43:12  PST by KACEY CONDON MD     EKG   Result Value Ref Range    Report       [facility] Emergency Dept.    Test Date:  2022  Pt Name:    CATHERINE MORELOS                   Department: ER  MRN:        [id number]                      Room:       RD 04  Gender:     Male                         Technician:  :        1955                   Requested By:KACEY CONDON  Order #:    [id number]                    Reading MD:    Measurements  Intervals                                Axis  Rate:       109                          P:          44  UT:         172                          QRS:        2  QRSD:       113                          T:          150  QT:         351  QTc:        473    Interpretive Statements  Sinus tachycardia  Incomplete left bundle branch block  ST depression, consider ischemia, lateral lds  Borderline ST elevation, anterior leads  Compared to ECG 2022 17:29:30  Q waves no longer present  ST (T wave) deviation still present  Possible ischemia still present         LABS  Labs Reviewed   CBC WITH DIFFERENTIAL - Abnormal; Notable for the following components:       Result Value    WBC 11.6 (*)     RBC 3.23 (*)     Hemoglobin 10.0 (*)     Hematocrit 31.0 (*)     MCHC 32.3 (*)     RDW 51.8 (*)     Lymphocytes 18.00 (*)     Neutrophils (Absolute) 7.84 (*)     Monos (Absolute) 1.11 (*)     All other components within normal limits    Narrative:     Biotin intake of greater than 5 mg per day may interfere with  troponin levels, causing false low values.   COMP METABOLIC PANEL - Abnormal; Notable for the following components:    Anion Gap 17.0 (*)     Glucose 165 (*)     Creatinine 1.49 (*)     All other  components within normal limits    Narrative:     Biotin intake of greater than 5 mg per day may interfere with  troponin levels, causing false low values.   TROPONIN - Abnormal; Notable for the following components:    Troponin T 312 (*)     All other components within normal limits    Narrative:     Biotin intake of greater than 5 mg per day may interfere with  troponin levels, causing false low values.   TROPONIN - Abnormal; Notable for the following components:    Troponin T 333 (*)     All other components within normal limits    Narrative:     Biotin intake of greater than 5 mg per day may interfere with  troponin levels, causing false low values.   URINALYSIS - Abnormal; Notable for the following components:    Occult Blood Small (*)     All other components within normal limits    Narrative:     Indication for culture:->Evaluation for sepsis without a  clear source of infection   D-DIMER - Abnormal; Notable for the following components:    D-Dimer Screen 5.12 (*)     All other components within normal limits   ESTIMATED GFR - Abnormal; Notable for the following components:    GFR (CKD-EPI) 51 (*)     All other components within normal limits    Narrative:     Biotin intake of greater than 5 mg per day may interfere with  troponin levels, causing false low values.   PROBRAIN NATRIURETIC PEPTIDE, NT - Abnormal; Notable for the following components:    NT-proBNP 3073 (*)     All other components within normal limits   PROTHROMBIN TIME - Abnormal; Notable for the following components:    PT 15.3 (*)     INR 1.23 (*)     All other components within normal limits    Narrative:     Indicate which anticoagulants the patient is on:->HEPARIN  Cant add, sample too old to add on Hepxu.   MAGNESIUM - Abnormal; Notable for the following components:    Magnesium 1.3 (*)     All other components within normal limits   URINE MICROSCOPIC (W/UA) - Abnormal; Notable for the following components:    WBC 0-2 (*)     RBC 0-2 (*)   "   All other components within normal limits    Narrative:     Indication for culture:->Evaluation for sepsis without a  clear source of infection   LACTIC ACID   COV-2, FLU A/B, AND RSV BY PCR (CEPHEID)   APTT    Narrative:     Indicate which anticoagulants the patient is on:->HEPARIN  Cant add, sample too old to add on Hepxu.   HEPARIN XA (UNFRACTIONATED)    Narrative:     Indicate which anticoagulants the patient is on:->HEPARIN  Cant add, sample too old to add on Hepxu.   LACTIC ACID    Narrative:     Biotin intake of greater than 5 mg per day may interfere with  troponin levels, causing false low values.   LACTIC ACID   URINE CULTURE(NEW)    Narrative:     Indication for culture:->Evaluation for sepsis without a  clear source of infection   BLOOD CULTURE    Narrative:     Per Hospital Policy: Only change Specimen Src: to \"Line\" if  specified by physician order.   BLOOD CULTURE    Narrative:     Per Hospital Policy: Only change Specimen Src: to \"Line\" if  specified by physician order.   CBC WITH DIFFERENTIAL   COMP METABOLIC PANEL   HEPARIN XA (UNFRACTIONATED)         RADIOLOGY  DX-CHEST-PORTABLE (1 VIEW)   Final Result      1.  Features most consistent with multifocal pneumonia. Interstitial edema could have a similar appearance.   2.  More focal consolidation of the right lower lung concerning for developing pneumonia.      EC-ECHOCARDIOGRAM COMPLETE W/O CONT    (Results Pending)         COURSE & MEDICAL DECISION MAKING    Medications   furosemide (LASIX) injection 80 mg (80 mg Intravenous Given 12/8/22 2028)   aspirin EC (ECOTRIN) tablet 81 mg (has no administration in time range)   clopidogrel (PLAVIX) tablet 75 mg (has no administration in time range)   heparin infusion 25,000 units in 500 mL 0.45% NACL (12 Units/kg/hr × 78.2 kg (Adjusted) Intravenous Rate Verify 12/8/22 2300)   heparin injection 2,000 Units (has no administration in time range)   amLODIPine (NORVASC) tablet 5 mg (has no administration in " time range)   cyclobenzaprine (Flexeril) tablet 10 mg (10 mg Oral Given 12/8/22 2337)   famotidine (PEPCID) tablet 20 mg (has no administration in time range)   gabapentin (NEURONTIN) capsule 300 mg (300 mg Oral Given 12/8/22 2225)   isosorbide mononitrate SR (IMDUR) tablet 120 mg (has no administration in time range)   metoprolol SR (TOPROL XL) tablet 100 mg (has no administration in time range)   nitroglycerin (NITROSTAT) tablet 0.4 mg (has no administration in time range)   oxyCODONE immediate release (ROXICODONE) tablet 20 mg (20 mg Oral Given 12/8/22 2337)   ranolazine (RANEXA) 500 MG SR tablet TB12 500 mg (has no administration in time range)   tamsulosin (FLOMAX) capsule 0.4 mg (has no administration in time range)   senna-docusate (PERICOLACE or SENOKOT S) 8.6-50 MG per tablet 2 Tablet (2 Tablets Oral Refused 12/8/22 2115)     And   polyethylene glycol/lytes (MIRALAX) PACKET 1 Packet (has no administration in time range)     And   magnesium hydroxide (MILK OF MAGNESIA) suspension 30 mL (has no administration in time range)     And   bisacodyl (DULCOLAX) suppository 10 mg (has no administration in time range)   acetaminophen (Tylenol) tablet 650 mg (has no administration in time range)   HYDROmorphone (Dilaudid) injection 1 mg (1 mg Intravenous Given 12/8/22 2225)   labetalol (NORMODYNE/TRANDATE) injection 10 mg (has no administration in time range)   ondansetron (ZOFRAN) syringe/vial injection 4 mg (4 mg Intravenous Given 12/8/22 2225)   ondansetron (ZOFRAN ODT) dispertab 4 mg (has no administration in time range)   guaiFENesin dextromethorphan (ROBITUSSIN DM) 100-10 MG/5ML syrup 10 mL (has no administration in time range)   cefTRIAXone (Rocephin) syringe 2,000 mg (has no administration in time range)   DAPTOmycin (CUBICIN) 600 mg in NS 50 mL IVPB (has no administration in time range)   cefTRIAXone (Rocephin) syringe 2,000 mg (2,000 mg Intravenous Given 12/8/22 2003)   DAPTOmycin (CUBICIN) 620 mg in NS 50 mL  IVPB ( Intravenous Stopped 12/8/22 2234)   iohexol (OMNIPAQUE) 350 mg/mL (IV) (50 mL Intravenous Given 12/8/22 1905)   heparin injection 4,000 Units (4,000 Units Intravenous Given 12/8/22 2101)       Pertinent Labs & Imaging studies reviewed. (See chart for details)  67 y.o. male presents with a syncopal event while going to the infusion center to receive daptomycin and ceftriaxone for recent diagnosis of epidural abscess status postsurgical intervention.  He has a very complex underlying cardiac history with history of CABG, valve replacement, stents.  He notes recurrent chest pain symptoms over the past several days that has responded to nitro.  He was just discharged from the hospital on the 25th after surgical intervention on the 17th.  Since then he states that he has rather regular chest pain symptoms.  He was seen by his cardiologist earlier today with some changes in his regimen.  He proceeded to go to the infusion center to receive IV antibiotics when he had a syncopal event.  No trauma.  No headache.  Has hypoxia here with x-ray concerning for multifocal pneumonia versus interstitial edema.  Patient is already on IV antibiotics.  Given his significant cardiac history, concern for heart failure exacerbation.  Viral swab testing is negative.    Patient does have a slightly elevated D-dimer.  Troponin is elevated at 312 from 56 just a few weeks ago.  I did speak with cardiology given the patient's underlying complex cardiac history.  EKG showing signs of ST depressions throughout except for aVR which appears to be elevated.  This is consistent with global ischemia.  May be related to hypoxia and shortness of breath versus another cardiac event.  The patient has a heart score of 9.  Dr. Vinson is recommending against CT pulmonary angiogram at this time given elevation in creatinine and concerns that he may need an emergency cardiac catheterization for further work-up.  Do not want to give him further IV  "contrast load.  He will be started on IV heparin drip.    Patient was admitted to the hospitalist service, Dr. Soria who is agreeable to the hospitalization    The total critical care time on this patient is 35 minutes, resuscitating patient, speaking with admitting physician, and deciphering test results. This 35 minutes is exclusive of separately billable procedures.      /63   Pulse 94   Temp 36.8 °C (98.2 °F) (Temporal)   Resp 18   Ht 1.676 m (5' 6\")   Wt 100 kg (220 lb 14.4 oz)   SpO2 94%   BMI 35.65 kg/m²         FINAL IMPRESSION  Syncope  NSTEMI  Hypoxia and respiratory distress  History of epidural abscess      Electronically signed by: Jose Fernandez M.D., 12/8/2022 6:02 PM    "

## 2022-12-09 NOTE — ASSESSMENT & PLAN NOTE
Strong recommendation for follow-up outpatient PCP for lifestyle modification including diet and exercise regiment of at least 30 min of brisk cardiovascular exercise 3-5 times weekly.  Lipid panel ordered and pending  Hemoglobin A1c ordered and pending  Noted to be prediabetic however on metformin we will hold this secondary to possible contrast during this admission, no indication for low SSI unless hemoglobin A1c greater than 6.5 at this time.

## 2022-12-10 NOTE — ASSESSMENT & PLAN NOTE
DVT found right lower extremity patient continues on heparin drip we will transition patient novel oral anticoagulant morning of 12/10/2022.

## 2022-12-10 NOTE — DISCHARGE PLANNING
RNCM received notice of need for oxygen; pt insurance contracted with [employer]; RNCM to send referral.    1200: oxygen delivered

## 2022-12-10 NOTE — PROGRESS NOTES
STAT EKG ordered for new onset of chest tightness, pressure, and BP of 81/64. Patient also had taken Nasal Canula off and was desaturated down to mid 50's. Oxygen of 5LNC reapplied. ARGELIA Barksdale notified and reviewed the EKG results. No new orders placed.     @ 2055, BP stabilized at 123/83 as well as chest pain and tightness subsiding.

## 2022-12-10 NOTE — PROGRESS NOTES
Handoff report received from night shift nurse. Pt care assumed. Pt is currently resting in bed. POC discussed with Pt and Pt verbalizes no questions at this time. Pt is AAOx4, on 5L NC, on Tele monitoring, and VSS. Call light and belongings within reach, bed in lowest and locked position, and Pt educated on use of call light.

## 2022-12-10 NOTE — PROGRESS NOTES
Discussed redraw Xa with ARGELIA Barksdale and orders for redraw were given. RN concerned Heparin rate changes and bolus' not effective to increase patient's Xa. Spoke with Galina in pharmacy to verify proper protocol is being followed. Most recent Xa at 0007 is   < 0.10 for the 4th Xa draw. Following protocol per pharmacy recommendation for both rate change and bolus.

## 2022-12-10 NOTE — PROGRESS NOTES
Hospital Medicine Daily Progress Note    Date of Service  12/9/2022    Chief Complaint  Abimael Hamilton is a 67 y.o. male admitted 12/8/2022 with shortness of breath syncopal episodes    Hospital Course  Abimael Hamilton is a 67 y.o. male who presented 12/8/2022 with wife present at bedside.  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  He had 30 seconds of loss of consciousness and brought in by ambulance subsequently.  He has significant history for coronary artery disease status post CABG and attempted PCI last year with hematoma from femoral access site after Impella pump as well.  He does not complain of any chest pain or shortness of breath at this time.  He states that he does get lower extremity edema at times and has history of heart failure.  He denies being vaccinated for COVID-19 with boosters but believes he does have original first COVID shot at the beginning of COVID and denies any signs or symptoms such as anosmia, ageusia, cough with sputum production, myalgias or diarrheal illness.  He was noted to be hypoxic requiring 6 L O2 requirement via nasal cannula and also noted to be tachycardic and tachypneic raising some concern for pulmonary emboli.  He does have elevated troponin greater than 300 and cardiology was consulted and recommended heparin infusion, CTA pulmonary embolism study has been canceled secondary to heparin infusion initiated and VQ scan has been ordered secondary to GENE.  He otherwise denies any constipation, diarrhea, melena, hematochezia, dysuria, hematuria, fevers, chills, incoordination but does admit to syncopal episode or loss of consciousness.     Patient is reluctant to have cardiac catheterization after cardiology consult and states that he wishes for his new cardiologist to perform this Dr. Darrian Gomez.  Would recommend daytime hospitalist  consult with the above physician for potential cardiac catheterization during this hospitalization.  He was also recommended to remain on anticoagulation with heparin infusion, continue chronic cardiac medications, repeat echocardiogram as well as LIDIA to assess aortic valve and to continue trending troponins per cardiology recommendations.    Interval Problem Update  12/9/2022:  Should be noted the patient had elevated D-dimer addition tachycardia shortness of breath requiring oxygenation therapy to maintain appropriate saturations.  Patient has multiple comorbidities which may contribute to DVT/pulmonary emboli.  Patient does have elevated troponins.  Patient went for cardiac catheterization today was found nonobstructive coronary artery disease.  Patient does also have borderline kidney function patient has CKD 2/3 unclear at present what his baseline is.  Given patient got a load of contrast intraoperatively during cardiac catheterization patient will undergo fluid resuscitation in the form of D5W with 150 mEq of bicarb for 10 hours and subsequently undergo CTA on 12/10/2022.  Patient had bilateral ultrasound lower extremities which demonstrated DVT.  Patient will continue on heparin drip at present.  Reevaluation of kidney function will be made on morning of 12/10/2020 to determine necessity of further therapy.  To avoid further kidney injury it may be prudent to hold off on further contrast load and treat patient empirically for the appropriate amount of time with novel oral anticoagulant for presumed pulmonary emboli.    I have discussed this patient's plan of care and discharge plan at IDT rounds today with Case Management, Nursing, Nursing leadership, and other members of the IDT team.    Consultants/Specialty  cardiology    Code Status  Full Code    Disposition  Patient is medically cleared for discharge.   Anticipate discharge to to home with close outpatient follow-up.  I have placed the appropriate  orders for post-discharge needs.    Review of Systems  Review of Systems   Constitutional:  Negative for chills and fever.   HENT:  Negative for congestion.    Eyes:  Negative for blurred vision and double vision.   Respiratory:  Negative for cough, shortness of breath and wheezing.    Cardiovascular:  Positive for leg swelling. Negative for chest pain and palpitations.   Gastrointestinal:  Negative for abdominal pain, blood in stool, constipation, diarrhea, heartburn, melena, nausea and vomiting.   Genitourinary:  Negative for dysuria and frequency.   Musculoskeletal:  Positive for falls. Negative for back pain and neck pain.   Skin:  Negative for itching and rash.   Neurological:  Positive for loss of consciousness. Negative for focal weakness, weakness and headaches.   Endo/Heme/Allergies:  Negative for environmental allergies. Does not bruise/bleed easily.   Psychiatric/Behavioral:  Negative for depression. The patient does not have insomnia.       Physical Exam  Temp:  [36.5 °C (97.7 °F)-36.8 °C (98.2 °F)] 36.6 °C (97.9 °F)  Pulse:  [] 96  Resp:  [17-34] 17  BP: ()/(58-79) 113/63  SpO2:  [75 %-96 %] 95 %    Physical Exam  Vitals reviewed.   Constitutional:       Appearance: Normal appearance. He is obese. He is not toxic-appearing or diaphoretic.   HENT:      Head: Normocephalic and atraumatic.      Mouth/Throat:      Mouth: Mucous membranes are moist.      Pharynx: Oropharynx is clear. No oropharyngeal exudate or posterior oropharyngeal erythema.   Eyes:      General: No scleral icterus.     Extraocular Movements: Extraocular movements intact.      Conjunctiva/sclera: Conjunctivae normal.      Pupils: Pupils are equal, round, and reactive to light.   Neck:      Vascular: No carotid bruit.   Cardiovascular:      Rate and Rhythm: Regular rhythm. Tachycardia present.      Pulses: Normal pulses.      Heart sounds: Normal heart sounds. No murmur heard.    No friction rub. No gallop.   Pulmonary:       Effort: Pulmonary effort is normal.      Breath sounds: Normal breath sounds. No wheezing, rhonchi or rales.   Abdominal:      General: Bowel sounds are normal. There is no distension.      Palpations: Abdomen is soft. There is no mass.      Tenderness: There is no abdominal tenderness. There is no guarding or rebound.      Hernia: No hernia is present.   Musculoskeletal:         General: Normal range of motion.      Cervical back: Normal range of motion and neck supple. No muscular tenderness.      Right lower leg: No edema.      Left lower leg: No edema.      Comments: Right upper extremity PICC line without signs of infection   Lymphadenopathy:      Cervical: No cervical adenopathy.   Skin:     General: Skin is warm and dry.      Capillary Refill: Capillary refill takes less than 2 seconds.      Coloration: Skin is not pale.      Findings: No erythema or rash.      Comments: Tattoos bilateral upper extremities   Neurological:      General: No focal deficit present.      Mental Status: He is alert and oriented to person, place, and time. Mental status is at baseline.      Cranial Nerves: No cranial nerve deficit.      Sensory: No sensory deficit.      Motor: No weakness.   Psychiatric:         Mood and Affect: Mood normal.         Behavior: Behavior normal.         Thought Content: Thought content normal.         Judgment: Judgment normal.       Fluids    Intake/Output Summary (Last 24 hours) at 12/9/2022 1729  Last data filed at 12/9/2022 0900  Gross per 24 hour   Intake 167.01 ml   Output 3500 ml   Net -3332.99 ml       Laboratory  Recent Labs     12/08/22  1730 12/09/22  0319   WBC 11.6* 9.3   RBC 3.23* 3.10*   HEMOGLOBIN 10.0* 9.6*   HEMATOCRIT 31.0* 29.3*   MCV 96.0 94.5   MCH 31.0 31.0   MCHC 32.3* 32.8*   RDW 51.8* 49.8   PLATELETCT 320 289   MPV 9.2 9.1     Recent Labs     12/08/22  1730 12/09/22  0319   SODIUM 135 136   POTASSIUM 4.0 3.5*   CHLORIDE 97 97   CO2 21 27   GLUCOSE 165* 159*   BUN 15 15    CREATININE 1.49* 1.35   CALCIUM 9.1 8.8     Recent Labs     12/08/22 2007   APTT 34.8   INR 1.23*               Imaging  US-EXTREMITY VENOUS LOWER BILAT   Final Result      EC-LIDIA W/O CONT         DX-CHEST-PORTABLE (1 VIEW)   Final Result      1.  Features most consistent with multifocal pneumonia. Interstitial edema could have a similar appearance.   2.  More focal consolidation of the right lower lung concerning for developing pneumonia.      EC-ECHOCARDIOGRAM LTD W/O CONT    (Results Pending)   CL-LEFT HEART CATHETERIZATION WITH POSSIBLE INTERVENTION    (Results Pending)        Assessment/Plan  * NSTEMI (non-ST elevated myocardial infarction) ([facility])- (present on admission)  Assessment & Plan  Trend troponins  No indication for n.p.o. status as patient request that he has his personal cardiologist physician (Darrian Gomez MD) perform cardiac catheterization and recommend daytime hospitalist to consult with the above physician to try to arrange cardiac catheterization.  Cardiology has been consulted recommends echocardiogram possible LIDIA for aortic valve insufficiency secondary to his syncopal event  Continue heparin infusion  Continue home medication regiment for his cardiac meds  Twelve-lead EKG as seen above    Encounter for follow-up of acute deep vein thrombosis (DVT) of right lower extremity  Assessment & Plan  DVT found right lower extremity patient continues on heparin drip we will transition patient novel oral anticoagulant morning of 12/10/2022.    GENE (acute kidney injury) ([facility])- (present on admission)  Assessment & Plan  Cautious with fluid resuscitation secondary to pulmonary edema and oxygen requirement  CMP in a.m.  Avoid nephrotoxic agents    Syncope- (present on admission)  Assessment & Plan  Echocardiogram ordered and pending  LIDIA ordered and pending  Fall precautions  D-dimer elevated on heparin infusion, elevated proBNP and hypoxia, tachycardia, tachypnea and hypotension on arrival can consider  pulmonary emboli however VQ scan appropriate as GENE is evident    Acute respiratory failure with hypoxia ([facility])- (present on admission)  Assessment & Plan  Due to patient's syncopal episode, D-dimer, tachycardia, hypotension and oxygen requirement, consider pulmonary embolism however due to his GENE we will perform VQ scan and echocardiogram currently he is on heparin infusion at this time.  Continue supplemental oxygen to maintain SPO2 greater than 88%  Chest x-ray as seen above with possible pulmonary edema versus atypical infection however more likely to be pulmonary edema with elevated BNP    Epidural abscess- (present on admission)  Assessment & Plan  Continue daptomycin/Rocephin via PICC line  Analgesics for pain control  Fall precautions  Continue gabapentin 300 mg p.o. 3 times daily    Essential hypertension- (present on admission)  Assessment & Plan  Continue home medication regiment    Obesity (BMI 30-39.9)- (present on admission)  Assessment & Plan  Strong recommendation for follow-up outpatient PCP for lifestyle modification including diet and exercise regiment of at least 30 min of brisk cardiovascular exercise 3-5 times weekly.  Lipid panel ordered and pending  Hemoglobin A1c ordered and pending  Noted to be prediabetic however on metformin we will hold this secondary to possible contrast during this admission, no indication for low SSI unless hemoglobin A1c greater than 6.5 at this time.     Please note that this dictation was created using voice recognition software. I have made every reasonable attempt to correct obvious errors, but I expect that there are errors of grammar and possibly context that I did not discover before finalizing the note.    VTE prophylaxis: SCDs/TEDs and therapeutic anticoagulation with heparin infusion    I have performed a physical exam and reviewed and updated ROS and Plan today (12/9/2022). In review of yesterday's note (12/8/2022), there are no changes except as  documented above.

## 2022-12-10 NOTE — PROGRESS NOTES
Discharge summary done with patient. RN provided education regarding follow up care, appointments, and medications. RN also provided education regarding when to call doctor and when to call 911.  PIV and tele box removed. Pt waiting on meds to beds and oxygen delivery.

## 2022-12-10 NOTE — HOSPITAL COURSE
Abimael Hamilton is a 67 y.o. male who presented 12/8/2022 with wife present at bedside.  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  He had 30 seconds of loss of consciousness and brought in by ambulance subsequently.  He has significant history for coronary artery disease status post CABG and attempted PCI last year with hematoma from femoral access site after Impella pump as well.  He does not complain of any chest pain or shortness of breath at this time.  He states that he does get lower extremity edema at times and has history of heart failure.  He denies being vaccinated for COVID-19 with boosters but believes he does have original first COVID shot at the beginning of COVID and denies any signs or symptoms such as anosmia, ageusia, cough with sputum production, myalgias or diarrheal illness.  He was noted to be hypoxic requiring 6 L O2 requirement via nasal cannula and also noted to be tachycardic and tachypneic raising some concern for pulmonary emboli.  He does have elevated troponin greater than 300 and cardiology was consulted and recommended heparin infusion, CTA pulmonary embolism study has been canceled secondary to heparin infusion initiated and VQ scan has been ordered secondary to GENE.  He otherwise denies any constipation, diarrhea, melena, hematochezia, dysuria, hematuria, fevers, chills, incoordination but does admit to syncopal episode or loss of consciousness.     Patient is reluctant to have cardiac catheterization after cardiology consult and states that he wishes for his new cardiologist to perform this Dr. Darrian Gomez.  Would recommend daytime hospitalist consult with the above physician for potential cardiac catheterization during this hospitalization.  He was also recommended to remain on anticoagulation with heparin infusion, continue chronic cardiac  medications, repeat echocardiogram as well as LIDIA to assess aortic valve and to continue trending troponins per cardiology recommendations.

## 2022-12-10 NOTE — FACE TO FACE
"Face to Face Note  -  Durable Medical Equipment    Julio Trinidad M.D. - NPI: [id number]  I certify that this patient is under my care and that they had a durable medical equipment(DME)face to face encounter by myself that meets the physician DME face-to-face encounter requirements with this patient on:    Date of encounter:   Patient:                    MRN:                       YOB: 2021  Abimael Hamilton  [id number]  1955     The encounter with the patient was in whole, or in part, for the following medical condition, which is the primary reason for durable medical equipment:  Other - pulmonary emboli    I certify that, based on my findings, the following durable medical equipment is medically necessary:    Oxygen   HOME O2 Saturation Measurements:(Values must be present for Home Oxygen orders)  Room air sat at rest: 83  Room air sat with amb: 79  With liters of O2: 5, O2 sat at rest with O2: 94  With Liters of O2: 5, O2 sat with amb with O2 : 92  Is the patient mobile?: Yes  If patient feels more short of breath, they can go up to 6 liters per minute and contact healthcare provider.    Patient attempted at 4 L ambulation but did not achieve oxygen saturations greater than 88%.  Therefore, patient needs 5 L to maintain acceptable saturation levels.  Supporting Symptoms: The patient requires supplemental oxygen, as the following interventions have been tried with limited or no improvement: \"Bronchodilators and/or steroid inhalers.    My Clinical findings support the need for the above equipment due to:  Hypoxia  "

## 2022-12-10 NOTE — PROGRESS NOTES
Received bedside report from RN, pt care assumed, VSS, pt assessment complete. Pt AAOx4, with 7/10 of pain at this time. No signs of acute distress noted at this time. Pt denies any additional needs at this time. Bed locked/in lowest position, pt educated on fall risk and verbalized understanding, call light within reach, hourly rounding initiated.

## 2022-12-10 NOTE — PROGRESS NOTES
Monitor Summary:     Rhythm: SR, ST  Rate:   Ectopy: (R) PVC, (R) PAC  Measurements: 0.16/0.06/0.34           12 Hour Chart Check

## 2022-12-10 NOTE — DISCHARGE SUMMARY
Discharge Summary    CHIEF COMPLAINT ON ADMISSION  Chief Complaint   Patient presents with    Syncope    Chest Pain       Reason for Admission  other     Admission Date  12/8/2022    CODE STATUS  Full Code    HPI & HOSPITAL COURSE  This is a 67 y.o. male here with syncope chest pain  Abimael Hamilton is a 67 y.o. male who presented 12/8/2022 with wife present at bedside.  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  He had 30 seconds of loss of consciousness and brought in by ambulance subsequently.  He has significant history for coronary artery disease status post CABG and attempted PCI last year with hematoma from femoral access site after Impella pump as well.  He does not complain of any chest pain or shortness of breath at this time.  He states that he does get lower extremity edema at times and has history of heart failure.  He denies being vaccinated for COVID-19 with boosters but believes he does have original first COVID shot at the beginning of COVID and denies any signs or symptoms such as anosmia, ageusia, cough with sputum production, myalgias or diarrheal illness.  He was noted to be hypoxic requiring 6 L O2 requirement via nasal cannula and also noted to be tachycardic and tachypneic raising some concern for pulmonary emboli.  He does have elevated troponin greater than 300 and cardiology was consulted and recommended heparin infusion, CTA pulmonary embolism study has been canceled secondary to heparin infusion initiated and VQ scan has been ordered secondary to GENE.  He otherwise denies any constipation, diarrhea, melena, hematochezia, dysuria, hematuria, fevers, chills, incoordination but does admit to syncopal episode or loss of consciousness.     Patient is reluctant to have cardiac catheterization after cardiology consult and states that he wishes for his new  cardiologist to perform this Dr. Darrian Gomez.  Would recommend daytime hospitalist consult with the above physician for potential cardiac catheterization during this hospitalization.  He was also recommended to remain on anticoagulation with heparin infusion, continue chronic cardiac medications, repeat echocardiogram as well as LIDIA to assess aortic valve and to continue trending troponins per cardiology recommendations.  Patient was admitted and subsequently underwent cardiac catheterization was found to have no areas amenable to stenting.  Patient did have bilateral lower extremity ultrasounds performed which was significant for right lower extremity deep vein thrombosis.  It should be noted that patient does have chronic kidney disease patient had received contrast during catheterization.  There was significant concern about exposing patient is continues to additional contrast load if we are to perform CT angiography of the chest.  These findings were explained in detail to the patient and his wife at bedside collectively is decided that patient will be initiated on Eliquis for DVT with the presumption that there may be a high chance he has pulmonary emboli.  Patient and his wife were in agreement with strategy to mitigate further exposure of his kidneys to contrast.  Should be noted that patient was requiring supplemental oxygen and this was provided to him on day of discharge.  Patient has close follow-up with his outpatient cardiologist.  Lastly, patient was experiencing mild gout flare in his bilateral great toes.  Patient was given 1 dose of corticosteroid while hospitalized and subsequently placed on colchicine therapy.  She be noted the patient is on baseline allopurinol therapy.  Patient was counseled about diet modifications including reduction of red meat.  All necessary medications were provided patient on day of discharge.  Should be noted that patient will continue on Plavix however he will not be  taking aspirin given the initiation of Eliquis.  This was conveyed in detail the patient with written notes and addition verbally    Therefore, he is discharged in good and stable condition to home with close outpatient follow-up.    The patient met 2-midnight criteria for an inpatient stay at the time of discharge.    Discharge Date  12/10/2022    FOLLOW UP ITEMS POST DISCHARGE  Take all medication as prescribed  Go to all follow-up appointments as indicated    DISCHARGE DIAGNOSES  Principal Problem:    NSTEMI (non-ST elevated myocardial infarction) ([facility]) POA: Yes  Active Problems:    Obesity (BMI 30-39.9) POA: Yes    Essential hypertension POA: Yes    Epidural abscess POA: Yes    Acute respiratory failure with hypoxia ([facility]) POA: Yes    Syncope POA: Yes    GENE (acute kidney injury) ([facility]) POA: Yes    Encounter for follow-up of acute deep vein thrombosis (DVT) of right lower extremity POA: Unknown  Resolved Problems:    * No resolved hospital problems. *      FOLLOW UP  Future Appointments   Date Time Provider Department Center   12[facility]10/2022  [facility]   12/11/2022  5:00 [facility]   12/12/2022  5:00 [facility]   12/13/2022  2:00 PM [facility]   12/13/2022  5:00 [facility]   12/14/2022  5:00 [facility]   12/15/2022  5:00 [facility]   12/16/2022  5:00 PM [facility]   12/17/2022  5:00 [facility]   12/18/2022  5:00 [facility]   12/19/2022  5:00 PM [facility]   12/20/2022  5:00 PM [facility]   12/21/2022  5[facility]00 PM [facility]   12/22/2022  5:00 [facility]   12/23/2022  5:00 [facility]   12/24/2022  5:00 [facility]    12/25/2022 11:00 AM [facility] ON[facility] [address]   12/26/2022  5:00 PM [facility] [address]   12/27/2022  5:00 PM [facility] [address]   12/28/2022  2:40 PM LOREE Smith None   12/28/2022  5:00 PM [facility] [address]   12/29/2022  5:00 PM [facility] [address]   12/30/2022  5:15 PM [facility] [address]   12/31/2022  5:00 PM [facility] [address]   2/22/2023  7:30 AM [facility] EXAM 5 [facility] None   3/13/2023 10:00 AM Darrian Martinez M.D. [facility] None     Raul Jimenez M.D.  [address] [address]  [phone]    Schedule an appointment as soon as possible for a visit  For hospital stay follow up appointment.      MEDICATIONS ON DISCHARGE     Medication List        START taking these medications        Instructions   * apixaban 5mg Tabs  Commonly known as: ELIQUIS   Take 2 Tablets by mouth 2 times a day. Indications: DVT/PE  Dose: 10 mg     * apixaban 5mg Tabs  Start taking on: December 17, 2022  Commonly known as: ELIQUIS   Take 2 Tablets by mouth 2 times a day, then 1 Tablet by mouth 2 times a day thereafter. Indications: DVT/PE  Dose: 5 mg     colchicine 0.6 MG Tabs  Commonly known as: COLCRYS   Take 1 Tablet by mouth every day for 30 days.  Dose: 0.6 mg           * This list has 2 medication(s) that are the same as other medications prescribed for you. Read the directions carefully, and ask your doctor or other care provider to review them with you.                CHANGE how you take these medications        Instructions   gabapentin 300 MG Caps  What changed:   when to take this  additional instructions  Commonly known as: NEURONTIN   TAKE 1 CAPSULE BY MOUTH THREE TIMES A DAY FOR 7 DAYS            CONTINUE taking these medications        Instructions   acetaminophen 500 MG Tabs  Commonly known as: TYLENOL   Take 500-1,000 mg by mouth every 6 hours as needed. Indications: Pain  Dose:  500-1,000 mg     allopurinol 100 MG Tabs  Commonly known as: ZYLOPRIM   Take 1 Tablet by mouth every day for 360 days. allopurinol 100 mg tablet  Dose: 100 mg     amLODIPine 5 MG Tabs  Commonly known as: NORVASC   Take 5 mg by mouth every day.  Dose: 5 mg     Blood Glucose Test Strips   Doctor's comments: Or per formulary preference. ICD-10 code: E11.9 Controlled type 2 Diabetes Mellitus  Use one True Metrix strip to test blood sugar once daily early morning before first meal.     clopidogrel 75 MG Tabs  Commonly known as: PLAVIX   Take 1 Tablet by mouth every day.  Dose: 75 mg     cyclobenzaprine 10 mg Tabs  Commonly known as: Flexeril   Take 10 mg by mouth 3 times a day as needed for Muscle Spasms.  Dose: 10 mg     docusate sodium 100 MG Caps   Take 100 mg by mouth 2 times a day.  Dose: 100 mg     famotidine 20 MG Tabs  Commonly known as: PEPCID   Take 1 Tablet by mouth 2 times a day.  Dose: 20 mg     furosemide 20 MG Tabs  Commonly known as: LASIX   Take 20 mg by mouth 2 times a day.  Dose: 20 mg     isosorbide mononitrate 120 MG CR tablet  Commonly known as: IMDUR   Take 1 Tablet by mouth every morning.  Dose: 120 mg     Lancets   Doctor's comments: Or per formulary preference. ICD-10 code: E11.9 Controlled type 2 Diabetes Mellitus  Use one True Metrix lancet to test blood sugar once daily early morning before first meal.     lisinopril 20 MG Tabs  Commonly known as: PRINIVIL   Take 1 Tablet by mouth every day.  Dose: 20 mg     metFORMIN 850 MG Tabs  Commonly known as: GLUCOPHAGE   Take 1 Tablet by mouth 2 times a day with meals.  Dose: 850 mg     metoprolol  MG Tb24  Commonly known as: TOPROL XL   Take 1 Tablet by mouth every day.  Dose: 100 mg     nitroglycerin 0.4 MG Subl  Commonly known as: NITROSTAT   Place 1 Tablet under the tongue as needed for Chest Pain for up to 360 days.  Dose: 0.4 mg     Oxycodone HCl 20 MG Tabs   Take 20 mg by mouth every 6 hours as needed (Pain).  Dose: 20 mg     polyethylene  glycol/lytes 17 g Pack  Commonly known as: MIRALAX   Take 1 Packet by mouth 2 times a day as needed (if sennosides and/or docusate ineffective or not ordered).  Dose: 17 g     potassium chloride SA 20 MEQ Tbcr  Commonly known as: Kdur   Take 1 Tablet by mouth every day.  Dose: 20 mEq     ranolazine 500 MG Tb12  Commonly known as: RANEXA   Take 1 Tablet by mouth 2 times a day.  Dose: 500 mg     Repatha SureClick 140 MG/ML Soaj  Generic drug: Evolocumab (REPATHA)   Inject 1 Each under the skin every 14 days.  Dose: 1 mL     tamsulosin 0.4 MG capsule  Commonly known as: FLOMAX   Take 1 Capsule by mouth 1/2 hour after breakfast.  Dose: 0.4 mg            STOP taking these medications      aspirin 81 MG EC tablet     indomethacin 25 MG Caps  Commonly known as: INDOCIN              Allergies  Allergies   Allergen Reactions    Morphine Vomiting, Nausea and Unspecified     Violent nightmares    Fenofibrate Unspecified     Dehydration, severe muscle cramps     Gemfibrozil Unspecified     Dehydration,Severe Muscle cramps    Lipitor [Atorvastatin Calcium] Unspecified     Severe Muscle cramps, dehydration    Lovastatin Unspecified     Dehydration, severe muscle cramps    Tricor Unspecified     Dehydration, severe muscle cramps       DIET  Orders Placed This Encounter   Procedures    Diet Order Diet: Cardiac     Standing Status:   Standing     Number of Occurrences:   1     Order Specific Question:   Diet:     Answer:   Cardiac [6]       ACTIVITY  As tolerated.  Weight bearing as tolerated    CONSULTATIONS  Cardiology  Interventional cardiology    PROCEDURES  12/9/2022:  Left heart cardiac catheterization with the following findings  Findings:  Left main is calcified, moderate disease.  Left anterior descending artery has calcified 70% disease in proximal portion, occluded in midportion.  Proximal LAD supplies large septal branches, diagonal branch.  Septal branches gives collaterals to RCA.  Left circumflex artery proximally  occluded.  Right coronary artery proximally occluded, large atrial branch collateralizes left circumflex artery.  Also antegrade collaterals to distal RCA.  All vein grafts are known to be occluded, not injected.  LIMA to LAD widely patent, previously placed stent is patent with 30% in-stent restenosis.     Was difficult to advance the catheter through left subclavian artery, left subclavian artery angiogram was performed, left subclavian artery was tortuous with 50% stenosis, no significant gradient across stenosis.    LABORATORY  Lab Results   Component Value Date    SODIUM 134 (L) 12/10/2022    POTASSIUM 4.0 12/10/2022    CHLORIDE 94 (L) 12/10/2022    CO2 29 12/10/2022    GLUCOSE 133 (H) 12/10/2022    BUN 19 12/10/2022    CREATININE 1.53 (H) 12/10/2022        Lab Results   Component Value Date    WBC 10.1 12/10/2022    HEMOGLOBIN 9.6 (L) 12/10/2022    HEMATOCRIT 29.3 (L) 12/10/2022    PLATELETCT 306 12/10/2022      Please note that this dictation was created using voice recognition software. I have made every reasonable attempt to correct obvious errors, but I expect that there are errors of grammar and possibly context that I did not discover before finalizing the note.    Total time of the discharge process exceeds 35 minutes.

## 2022-12-10 NOTE — PROGRESS NOTES
BS result of 35 is not for this patient. This RN charted BS in wrong chart. Please disregard BS of 35 at 0152 on 12/10/22. Point of Care specialists will be contacted to correct.

## 2022-12-11 PROBLEM — I46.9 PEA (PULSELESS ELECTRICAL ACTIVITY) (HCC): Status: ACTIVE | Noted: 2022-01-01

## 2022-12-11 PROBLEM — I25.10 CORONARY ARTERY DISEASE DUE TO CALCIFIED CORONARY LESION: Chronic | Status: ACTIVE | Noted: 2022-01-01

## 2022-12-11 PROBLEM — I25.84 CORONARY ARTERY DISEASE DUE TO CALCIFIED CORONARY LESION: Chronic | Status: ACTIVE | Noted: 2022-01-01

## 2022-12-12 ENCOUNTER — APPOINTMENT (OUTPATIENT)
Dept: ONCOLOGY | Facility: MEDICAL CENTER | Age: 67
End: 2022-12-12
Attending: INTERNAL MEDICINE
Payer: COMMERCIAL

## 2022-12-12 LAB
LV EJECT FRACT  99904: 55
LV EJECT FRACT MOD 2C 99903: 53.85
LV EJECT FRACT MOD 4C 99902: 45.16
LV EJECT FRACT MOD BP 99901: 48.59

## 2022-12-12 NOTE — DISCHARGE PLANNING
Medical Social Work    MSW received report from HONEY Comer about pt and family.  MSW met with pt's wife Ana outside of room and shortly after Ana's sister arrived.  Family was updated by MD's and provided with emotional support throughout code.  TOD was called and family was able to be at bedside.  Helpful Information brochure, ER SW number and Mortuary list were provided to pt's family.  All questions answered at this time.      Plan: SW will remain available for family support.

## 2022-12-12 NOTE — ED NOTES
Pt is a code blue from the parking lot, he was just at the infusion center for ABX r/t back abscess, wife said she gave nitro in the car as he got somewhat SOB, then he went unresponsive, pt was discharged yesterday from telemetry with PE/DVT, pt on plavix and eliquis.    Hx of CABG x 5 in the 1990s, CABG x 3 with AVR 5 years ago, angiogram with stent in 2021, and angiogram a couple of days ago.     1744 TOA to RED 3  1746 1 mg epi, PEA  1748 PEA  1749 1 mg epi  1750 ETCO2 27, PEA   1751 ROSC   1752 FSBG 132

## 2022-12-12 NOTE — ED NOTES
Patient unable to participate in [facility] rec interview at this time.   Patient's spouse is currently talking to physicians.   Patient's home pharmacy is currently closed at this time also.       Home pharmacy:  [facility]  [phone].

## 2022-12-12 NOTE — ED NOTES
Assist RN:     1856: ERP called to bedside. Patient became bradycardic and lost pulse. CPR initiated immediately. EPI administered.   1858: Pulse check.  PEA. CPR re-initiated.   1900: Pulse check. PEA. CPR re-initiated. Epi administered.   1902: Pulse check. PEA. CPR re-initiated.   1904: Pulse check. Bicarb administered. ROSC. . ERP remains at bedside.

## 2022-12-12 NOTE — CONSULTS
"Critical Care ED Consult    Date of Consult: 12/11/22    Reason for consult: Cardiac arrest     Chief Complaint:  Cardiac arrest upon arrival     HPI:   Abimael Hamilton is a  67 y.o. male presented this evening his cardiac arrest.  CODE BLUE alert was called at 5:37 PM.  The patient was outside the posterior tower on the ground.  His wife had brought him to that entrance in a motor vehicle.  He had recently been discharged from the hospital.  He was diagnosed with a DVT and a PE.  He was started on anticoagulation.  Additionally, he had a PICC line and was receiving antibiotics for a recently diagnosed epidural abscess.    Additional history obtained from the recent discharge summary in the chart:    \"Presented 12/8/2022 with wife present at bedside.  He presents after syncopal episode at [facility] center for his IV antibiotics after having spinal epidural abscess from laminectomy (treated with daptomycin/Rocephin daily).  He had 30 seconds of loss of consciousness and brought in by ambulance subsequently.  He has significant history for coronary artery disease status post CABG and attempted PCI last year with hematoma from femoral access site after Impella pump as well.  He does not complain of any chest pain or shortness of breath at this time.  He states that he does get lower extremity edema at times and has history of heart failure.  He denies being vaccinated for COVID-19 with boosters but believes he does have original first COVID shot at the beginning of COVID and denies any signs or symptoms such as anosmia, ageusia, cough with sputum production, myalgias or diarrheal illness.  He was noted to be hypoxic requiring 6 L O2 requirement via nasal cannula and also noted to be tachycardic and tachypneic raising some concern for pulmonary emboli.  He does have elevated troponin greater than 300 and cardiology was consulted and recommended heparin infusion, CTA pulmonary embolism study has been canceled secondary to " heparin infusion initiated and VQ scan has been ordered secondary to GENE.  He otherwise denies any constipation, diarrhea, melena, hematochezia, dysuria, hematuria, fevers, chills, incoordination but does admit to syncopal episode or loss of consciousness.     Patient is reluctant to have cardiac catheterization after cardiology consult and states that he wishes for his new cardiologist to perform this Dr. Darrian Gomez.  Would recommend daytime hospitalist consult with the above physician for potential cardiac catheterization during this hospitalization.  He was also recommended to remain on anticoagulation with heparin infusion, continue chronic cardiac medications, repeat echocardiogram as well as LIDIA to assess aortic valve and to continue trending troponins per cardiology recommendations.  Patient was admitted and subsequently underwent cardiac catheterization was found to have no areas amenable to stenting.  Patient did have bilateral lower extremity ultrasounds performed which was significant for right lower extremity deep vein thrombosis.  It should be noted that patient does have chronic kidney disease patient had received contrast during catheterization.  There was significant concern about exposing patient is continues to additional contrast load if we are to perform CT angiography of the chest.  These findings were explained in detail to the patient and his wife at bedside collectively is decided that patient will be initiated on Eliquis for DVT with the presumption that there may be a high chance he has pulmonary emboli.  Patient and his wife were in agreement with strategy to mitigate further exposure of his kidneys to contrast.  Should be noted that patient was requiring supplemental oxygen and this was provided to him on day of discharge.  Patient has close follow-up with his outpatient cardiologist.  Lastly, patient was experiencing mild gout flare in his bilateral great toes.  Patient was given 1 dose  "of corticosteroid while hospitalized and subsequently placed on colchicine therapy.  She be noted the patient is on baseline allopurinol therapy.  Patient was counseled about diet modifications including reduction of red meat.  All necessary medications were provided patient on day of discharge.  Should be noted that patient will continue on Plavix however he will not be taking aspirin given the initiation of Eliquis.  This was conveyed in detail the patient with written notes and addition verbally.\"    ICU responded to the initial CODE BLUE, which was on the sidewalk outside of [facility]. At the time of my arrival, the patient was receiving ACLS. Dr. Mcclendon was present. The patient was being loaded onto a gurney to be transferred to the ED.     6:12 ICU was updated regarding the patient's status. ROSC returned. Patient intubated and receiving pressors. No workup performed yet. ICU requested workup including imaging, EKG, labs and a call back when more information was obtained.     6:57: Notified that patient was again in cardiac arrest.     7:22: ICU notified that patient in 3rd cardiac arrest. Immediate assistance requested.     Arrived at bedside at approx 7:30. Patient in cardiac arrest. Dr. Mcclendon running the code. He introduced me to the patient's wife and her sister. I obtained additional information. They confirmed that the patient has a significant cardiac history with 5 bypass grafts. He is normally on Plavix and ASA but was told to hold the ASA and had started anticoagulation for his PE. His wife states he came in for his infusion today and that he had no complaints. They were on their way home and in their car when he became unresponsive and foamed at the mouth. She stated this was similar to his presentation before his PE diagnosis yesterday. She rushed him to the [facility] via car.     I discussed with the patient's wife, her sister, the ED physician, and ED pharmacy the pros and " "cons of lytic therapy. The code was running for approx 17 minutes at this point.  I was informed of his recent epidural surgery.  I discussed with his wife that typically we would not give tPA since her recent surgery and that the risk of giving tPA was potentially cord compression and paralysis.  However, as he was at an active cardiac arrest with suspected thrombotic event the rewards likely outweigh the risks.  She acknowledges she understood the risks and stated \"I want you to try everything\".  I recommended lytic therapy to Dr. Mcclendon.     Lytics were ordered and given. The code was continued until 8:16 pm. Most pulse checks revealed PEA. There were several brief episodes of weak pulses detectable with femoral doppler while the patient had a perfusing rhythm on telemetry. He also had several episodes of shockable rhythms.  He was given several rounds of epi, bicarb, calcium, atropine x1.  He never had an episode of sustained ROSC lasting longer than 20 to 30 seconds.  And another discussion with his wife and her sister at 8:14 p.m. I explained that it was looking less and less likely that he would survive the code. His wife requested that she be able to say goodbye. ALCS was stopped at 8:16 pm and the patient was declared dead.     I notified Dr. Mcclendon of the pronouncement.           MEDICATIONS ON Recent DISCHARGE      Medication List          START taking these medications         Instructions   * apixaban 5mg Tabs  Commonly known as: ELIQUIS    Take 2 Tablets by mouth 2 times a day. Indications: DVT/PE  Dose: 10 mg      * apixaban 5mg Tabs  Start taking on: December 17, 2022  Commonly known as: ELIQUIS    Take 2 Tablets by mouth 2 times a day, then 1 Tablet by mouth 2 times a day thereafter. Indications: DVT/PE  Dose: 5 mg      colchicine 0.6 MG Tabs  Commonly known as: COLCRYS    Take 1 Tablet by mouth every day for 30 days.  Dose: 0.6 mg              * This list has 2 medication(s) that are the same " as other medications prescribed for you. Read the directions carefully, and ask your doctor or other care provider to review them with you.                    CHANGE how you take these medications         Instructions   gabapentin 300 MG Caps  What changed:   when to take this  additional instructions  Commonly known as: NEURONTIN    TAKE 1 CAPSULE BY MOUTH THREE TIMES A DAY FOR 7 DAYS                CONTINUE taking these medications         Instructions   acetaminophen 500 MG Tabs  Commonly known as: TYLENOL    Take 500-1,000 mg by mouth every 6 hours as needed. Indications: Pain  Dose: 500-1,000 mg      allopurinol 100 MG Tabs  Commonly known as: ZYLOPRIM    Take 1 Tablet by mouth every day for 360 days. allopurinol 100 mg tablet  Dose: 100 mg      amLODIPine 5 MG Tabs  Commonly known as: NORVASC    Take 5 mg by mouth every day.  Dose: 5 mg      Blood Glucose Test Strips    Doctor's comments: Or per formulary preference. ICD-10 code: E11.9 Controlled type 2 Diabetes Mellitus  Use one True Metrix strip to test blood sugar once daily early morning before first meal.      clopidogrel 75 MG Tabs  Commonly known as: PLAVIX    Take 1 Tablet by mouth every day.  Dose: 75 mg      cyclobenzaprine 10 mg Tabs  Commonly known as: Flexeril    Take 10 mg by mouth 3 times a day as needed for Muscle Spasms.  Dose: 10 mg      docusate sodium 100 MG Caps    Take 100 mg by mouth 2 times a day.  Dose: 100 mg      famotidine 20 MG Tabs  Commonly known as: PEPCID    Take 1 Tablet by mouth 2 times a day.  Dose: 20 mg      furosemide 20 MG Tabs  Commonly known as: LASIX    Take 20 mg by mouth 2 times a day.  Dose: 20 mg      isosorbide mononitrate 120 MG CR tablet  Commonly known as: IMDUR    Take 1 Tablet by mouth every morning.  Dose: 120 mg      Lancets    Doctor's comments: Or per formulary preference. ICD-10 code: E11.9 Controlled type 2 Diabetes Mellitus  Use one True Metrix lancet to test blood sugar once daily early morning  before first meal.      lisinopril 20 MG Tabs  Commonly known as: PRINIVIL    Take 1 Tablet by mouth every day.  Dose: 20 mg      metFORMIN 850 MG Tabs  Commonly known as: GLUCOPHAGE    Take 1 Tablet by mouth 2 times a day with meals.  Dose: 850 mg      metoprolol  MG Tb24  Commonly known as: TOPROL XL    Take 1 Tablet by mouth every day.  Dose: 100 mg      nitroglycerin 0.4 MG Subl  Commonly known as: NITROSTAT    Place 1 Tablet under the tongue as needed for Chest Pain for up to 360 days.  Dose: 0.4 mg      Oxycodone HCl 20 MG Tabs    Take 20 mg by mouth every 6 hours as needed (Pain).  Dose: 20 mg      polyethylene glycol/lytes 17 g Pack  Commonly known as: MIRALAX    Take 1 Packet by mouth 2 times a day as needed (if sennosides and/or docusate ineffective or not ordered).  Dose: 17 g      potassium chloride SA 20 MEQ Tbcr  Commonly known as: Kdur    Take 1 Tablet by mouth every day.  Dose: 20 mEq      ranolazine 500 MG Tb12  Commonly known as: RANEXA    Take 1 Tablet by mouth 2 times a day.  Dose: 500 mg      Repatha SureClick 140 MG/ML Soaj  Generic drug: Evolocumab (REPATHA)    Inject 1 Each under the skin every 14 days.  Dose: 1 mL      tamsulosin 0.4 MG capsule  Commonly known as: FLOMAX    Take 1 Capsule by mouth 1/2 hour after breakfast.  Dose: 0.4 mg                STOP taking these medications       aspirin 81 MG EC tablet      indomethacin 25 MG Caps  Commonly known as: INDOCIN                   Allergies        Allergies   Allergen Reactions    Morphine Vomiting, Nausea and Unspecified       Violent nightmares    Fenofibrate Unspecified       Dehydration, severe muscle cramps     Gemfibrozil Unspecified       Dehydration,Severe Muscle cramps    Lipitor [Atorvastatin Calcium] Unspecified       Severe Muscle cramps, dehydration    Lovastatin Unspecified       Dehydration, severe muscle cramps    Tricor Unspecified       Dehydration, severe muscle cramps       Pmhx per DC summary:  NSTEMI   Obesity  (BMI 30-39.9)   Essential hypertension   Epidural abscess  Acute respiratory failure with hypoxia   Syncope POA: Yes  GENE (acute kidney injury)   Encounter for follow-up of acute deep vein thrombosis (DVT) of right lower extremity POA:     Pshx obtained from chart:   has a past surgical history that includes laminotomy (12/2004); biopsy general; multiple coronary artery bypass (11/11/1998); multiple coronary artery bypass (12/08/2015); shoulder decompression arthroscopic (Right, 9/5/2017); debridement, labrum, hip, arthroscopic (Right, 9/5/2017); shoulder arthroscopy w/ rotator cuff repair (Right, 9/5/2017); shoulder arthroscopy w/ bicipital tenodesis repair (Right, 9/5/2017); synovectomy (Right, 9/5/2017); eye surgery; mitral valve replace; lumbar laminectomy diskectomy (11/7/2022); and irrigation and debridement, wound, after procedure invoh (11/17/2022).     Social History     Socioeconomic History    Marital status: Not on file     Spouse name: Not on file    Number of children: Not on file    Years of education: Not on file    Highest education level: Not on file   Occupational History    Not on file   Tobacco Use    Smoking status: Not on file    Smokeless tobacco: Not on file   Substance and Sexual Activity    Alcohol use: Not on file    Drug use: Not on file    Sexual activity: Not on file   Other Topics Concern    Not on file   Social History Narrative    Not on file     Social Determinants of Health     Financial Resource Strain: Not on file   Food Insecurity: Not on file   Transportation Needs: Not on file   Physical Activity: Not on file   Stress: Not on file   Social Connections: Not on file   Intimate Partner Violence: Not on file   Housing Stability: Not on file          No family history on file.    No current facility-administered medications on file prior to encounter.     No current outpatient medications on file prior to encounter.       Allergies: Patient has no known allergies.      ROS: A 12  "point ROS was performed on intake and during my interview. ROS negative unless specifically noted in HPI.     Vitals:  BP (!) 120/95   Pulse (!) 133   Temp 36.4 °C (97.5 °F)   Resp 18   Ht 1.676 m (5' 6\")   Wt 99.8 kg (220 lb)   SpO2 95%     Critical care time = 55 minutes in directly providing and coordinating critical care and extensive data review.  No time overlap and excludes procedures.  __________  Daniel Porter, DO  Pulmonary and Critical Care Medicine  [facility]    Please note that this dictation was created using voice recognition software. The accuracy of the dictation is limited to the abilities of the software. I have made every reasonable attempt to correct obvious errors, but I expect that there are errors of grammar and possibly content that I did not discover before finalizing the note.     "

## 2022-12-12 NOTE — PROGRESS NOTES
Pt arrived ambulatory to IS for Dapto/Rocephin infusion.  POC discussed.  RUE PICC line in place, blood return confirmed.  Antibiotics infused as ordered without complication.  Dressing changed in sterile field.  Line flushed, and protected with gauze and arm wrap.  Return appointment confirmed.  Pt discharged from IS in NAD with spouse.

## 2022-12-12 NOTE — ED PROVIDER NOTES
ED Provider Note    CHIEF COMPLAINT  No chief complaint on file.      HPI  Jeremybrittani Jessica is a 67 y.o. male here for evaluation after cardiac arrest.  Pt brought in by family to day to receive his infusion of Daptomysin and rocephin for a alleged abscess in his lower back.  The pt had a cardiac arrest in the parking lot.  He was brought into room 3, where he was then intubated and resuscitated.  ROSC was achieved.  Labs ordered, ekg, and chest x ray.  It was reported that the pt was recently started on Eloquis for a dvt, and he has been compliant with this medication.  He has been receiving iv abx at the infusion center secondary to the said abscess.  No other history available, as wife is very distraught at this time.       ROS  See HPI for further details, o/w negative.     PAST MEDICAL HISTORY   Cabg, dvt, pe, epidural abscess (reported), acs,     SOCIAL HISTORY  Social History     Tobacco Use    Smoking status: Not on file    Smokeless tobacco: Not on file   Substance and Sexual Activity    Alcohol use: Not on file    Drug use: Not on file    Sexual activity: Not on file       Family History  No reported bleeding disorders     SURGICAL HISTORY  patient denies any surgical history    CURRENT MEDICATIONS  Home Medications       Reviewed by Liz Vargas (Pharmacy Tech) on 12/11/22 at 1931  Med List Status: Unable to Obtain     Medication Last Dose Status        Patient Adan Taking any Medications                           ALLERGIES  No Known Allergies    REVIEW OF SYSTEMS  See HPI for further details. Review of systems as above, otherwise all other systems are negative.     PHYSICAL EXAM  Constitutional: well nourished, severe distress.    HEENT: atraumatic. Posterior pharynx dry.   Eyes:  Normal sclera.  3mm non reactive.   Neck:  no obvious step off.  No deformity.   Chest/Pulmonary:   no crepitus.  Wall motion with ventilation.   Cardio:  asystole/ PEA  Abdomen: Soft,  No palpable masses.     Musculoskeletal: No deformity, no edema,   Neuro: GCS 3.  Down going toes  Skin;  warm, dry, no rash    PROCEDURES   Intubation Procedure    Indication: airway protection    Consent: Unable to be obtained due to the emergent nature of this procedure.    Medications Used: none, pt arrested     Procedure: The patient was placed in the appropriate position.  Cricoid pressure was not required.  Intubation was performed by direct laryngoscopy using a laryngoscope and an 8.0 cuffed endotracheal tube.  The cuff was then inflated and the tube was secured appropriately at a distance of 24 cm to the dental ridge.  Initial confirmation of placement included bilateral breath sounds, an end tidal CO2 detector, absence of sounds over the stomach, tube fogging, adequate chest rise, adequate pulse oximetry reading, and improved skin color.  A chest x-ray to verify correct placement of the tube showed appropriate tube position.    The patient tolerated the procedure well.     Complications: None      MEDICAL RECORD  I have reviewed patient's medical record and pertinent results are listed.    COURSE & MEDICAL DECISION MAKING  I have reviewed any medical record information, laboratory studies and radiographic results as noted above.    Results for orders placed or performed during the hospital encounter of 12/11/22   CBC w/ Differential   Result Value Ref Range    WBC 21.0 (H) 4.8 - 10.8 K/uL    RBC 3.34 (L) 4.70 - 6.10 M/uL    Hemoglobin 10.3 (L) 14.0 - 18.0 g/dL    Hematocrit 32.9 (L) 42.0 - 52.0 %    MCV 98.5 (H) 81.4 - 97.8 fL    MCH 30.8 27.0 - 33.0 pg    MCHC 31.3 (L) 33.7 - 35.3 g/dL    RDW 51.2 (H) 35.9 - 50.0 fL    Platelet Count 489 (H) 164 - 446 K/uL    MPV 9.4 9.0 - 12.9 fL    Neutrophils-Polys 77.70 (H) 44.00 - 72.00 %    Lymphocytes 14.30 (L) 22.00 - 41.00 %    Monocytes 5.30 0.00 - 13.40 %    Eosinophils 0.00 0.00 - 6.90 %    Basophils 0.00 0.00 - 1.80 %    Nucleated RBC 0.30 /100 WBC    Neutrophils (Absolute) 16.70  (H) 1.82 - 7.42 K/uL    Lymphs (Absolute) 3.00 1.00 - 4.80 K/uL    Monos (Absolute) 1.11 (H) 0.00 - 0.85 K/uL    Eos (Absolute) 0.00 0.00 - 0.51 K/uL    Baso (Absolute) 0.00 0.00 - 0.12 K/uL    NRBC (Absolute) 0.07 K/uL    Anisocytosis 1+     Macrocytosis 1+     Microcytosis 1+    Complete Metabolic Panel (CMP)   Result Value Ref Range    Sodium 138 135 - 145 mmol/L    Potassium 4.1 3.6 - 5.5 mmol/L    Chloride 93 (L) 96 - 112 mmol/L    Co2 26 20 - 33 mmol/L    Anion Gap 19.0 (H) 7.0 - 16.0    Glucose 195 (H) 65 - 99 mg/dL    Bun 34 (H) 8 - 22 mg/dL    Creatinine 1.57 (H) 0.50 - 1.40 mg/dL    Calcium 9.3 8.5 - 10.5 mg/dL    AST(SGOT) 119 (H) 12 - 45 U/L    ALT(SGPT) 106 (H) 2 - 50 U/L    Alkaline Phosphatase 112 (H) 30 - 99 U/L    Total Bilirubin 0.2 0.1 - 1.5 mg/dL    Albumin 3.5 3.2 - 4.9 g/dL    Total Protein 7.0 6.0 - 8.2 g/dL    Globulin 3.5 1.9 - 3.5 g/dL    A-G Ratio 1.0 g/dL   Troponin STAT   Result Value Ref Range    Troponin T 317 (H) 6 - 19 ng/L   PT/INR   Result Value Ref Range    PT 18.9 (H) 12.0 - 14.6 sec    INR 1.63 (H) 0.87 - 1.13   APTT   Result Value Ref Range    APTT 32.6 24.7 - 36.0 sec   ESTIMATED GFR   Result Value Ref Range    GFR (CKD-EPI) 48 (A) >60 mL/min/1.73 m 2   MORPHOLOGY   Result Value Ref Range    RBC Morphology Present     Polychromia 1+    PERIPHERAL SMEAR REVIEW   Result Value Ref Range    Peripheral Smear Review see below    DIFFERENTIAL MANUAL   Result Value Ref Range    Bands-Stabs 1.80 0.00 - 10.00 %    Myelocytes 0.90 %    Manual Diff Status PERFORMED    PLATELET ESTIMATE   Result Value Ref Range    Plt Estimation Increased    EKG   Result Value Ref Range    Report       [facility] Emergency Dept.    Test Date:  2022  Pt Name:    NANCY THIRTY-ONE            Department: ER  MRN:        [id number]                      Room:        03  Gender:     Male                         Technician: [id number]  :        [id number]                   Requested By:JARED  KELSEY LATIFSARY  Order #:    [id number]                    Reading MD:    Measurements  Intervals                                Axis  Rate:       104                          P:          54  NE:         172                          QRS:        34  QRSD:       134                          T:          158  QT:         343  QTc:        452    Interpretive Statements  Sinus tachycardia  Ventricular premature complex  Left bundle branch block  Baseline wander in lead(s) II,III,aVR,aVF,V1,V3,V5,V6  No previous ECG available for comparison     POCT arterial blood gas device results   Result Value Ref Range    Ph 7.336 (L) 7.400 - 7.500    Pco2 49.7 (H) 26.0 - 37.0 mmHg    Po2 162 (H) 64 - 87 mmHg    Tco2 28 20 - 33 mmol/L    S02 99 93 - 99 %    Hco3 26.6 (H) 17.0 - 25.0 mmol/L    BE 0 -4 - 3 mmol/L    Body Temp 34.8 C degrees    O2 Therapy 100 %    iPF Ratio 162     Ph Temp Manju 7.367 (L) 7.400 - 7.500    Pco2 Temp Co 45.2 (H) 26.0 - 37.0 mmHg    Po2 Temp Cor 150 (H) 64 - 87 mmHg    Specimen Arterial     DelSys Vent     End Tidal Carbon Dioxide 50 mmhg    Tidal Volume 460 mL    Peep End Expiratory Pressure 8 cmh20    Set Rate 26     Mode APV-CMV      DX-CHEST-PORTABLE (1 VIEW)   Final Result      1.  Well-positioned ETT and right PICC.   2.  Bilateral interstitial and alveolar opacities may represent pulmonary edema or multifocal pneumonia. No pleural effusions.   3.  Cardiomegaly.               Paged critical care after intubation and ROSC    6:20 pm  Critical care returned page. Dr. Porter would like more a work up before admitting the pt. He states he will come down and see, but not take upstairs yet, until labs / x ray are starting to come back.     Ekg; at 631 pm  Lbbb noted on ekg. Unsure if new or not.     645 pm  Lbbb ekg noted on previous     657 pm  Re paged critical care with 'pt is coding' in message. '  We will continue to resuscitate the pt.  Family present.  The pts wife would like us to continue.     916  pm  Critical care returned call through the transfer center and she told the  that  he would call back in 15 minutes, because he was directly admitting a pt.  He will be down.     No abx necessary, per pharmacy, as the pt just had a dose of Daptomycin and Rocephin prior to coding.     715 pm  Dr. Rinaldi returned page for cards.  He reviewed ekg. States the lbbb looks worse than previous ekg.  We discussed TNK, but he states pulm should weigh in on the decision and we need a ct chest first.  Also, pt with recent lumbar surgery.   Pt has poor rental function.   Still awaiting pulm to call back.  He would like a stat echo. Dr. Rinaldi will order     Repaged critical care.      7:18 PM  Pt coding again.    Dr. Porter returned page.  I asked him to come down to see the pt.     7:28 pm   Dr. Porter is here.  He will talk with wife regarding Alteplase administration based his the pts history of dvt and pe.     7:45 pm  Dr. Porter spoke to the family regarding the Alteplase.  She is in favor of trying.   He will be ordering alteplase via pharmacy, Tripp will give, and he (Dr. Porter states he will need to continue coding the pt through the process, is what he explained to the room.  Staff agrees.     8:20 pm  Dr. Porter states he will write a consult note from when he came down, but that the pt was not admitted to his service, so the pt has  in the ED.     CRITICAL CARE  The very real possibility of a deterioration of this patient's condition required the highest level of my preparedness for sudden, emergent intervention.  I provided critical care services, which included medication orders, frequent reevaluations of the patient's condition and response to treatment, ordering and reviewing test results, and discussing the case with various consultants.  The critical care time associated with the care of the patient was 50 minutes. Review chart for interventions. This time is exclusive of any other  billable procedures.       HYDRATION: Based on the patient's presentation of Dehydration the patient was given IV fluids. IV Hydration was used because oral hydration was not adequate alone. Upon recheck following hydration, the patient was improved.      FINAL IMPRESSION  Cardiac arrest   Critical care time 50 minutes.       Electronically signed by: Darren Mcclendon D.O., 12/11/2022 6:48 PM

## 2022-12-12 NOTE — CONSULTS
Reason for Consult:  Asked by Dr Darren Mcclendon D.O. to see this patient with   Patient's PCP: No primary care provider on file.    CC: Collapse and PEA arrest    HPI: This is a 67-year-old gentleman with complex medical history including CABG prior stenting recent hospitalization for syncope after back surgeries with unstable angina angiogram did not reveal targets for intervention he was found to have a DVT in the right leg on hospitalization and was started on full dose anticoagulation for DVT PE he had syncope in the parking lot when he was doing infusion therapy with antibiotics for his back PEA arrest he had ROSC I was called his EKG shows global ischemia without focal ST elevations.  Ordered a stat echocardiogram before he could get a stat echocardiogram and found him to be under CPR pulmonary critical care was discussed with his family the role of thrombolytics in the setting of recent surgery hematoma continued PEA arrest requiring CPR.  He was given systemic thrombolysis and continued CPR and ultimately .    Medications / Drug list prior to admission:  No current facility-administered medications on file prior to encounter.     No current outpatient medications on file prior to encounter.       Current list of administered Medications:    Current Facility-Administered Medications:     EPINEPHrine (Adrenalin) infusion 4 mg/250 mL (premix), 0-0.5 mcg/kg/min (Ideal), Intravenous, Continuous, Maria L Cruz R.N., Last Rate: 119.6 mL/hr at 22, 0.5 mcg/kg/min at 22    propofol (DIPRIVAN) 20mL vial injection (RWN), 200 mg, Intravenous, Once, Darren Mcclendon D.O.    norepinephrine (Levophed) 8 mg in 250 mL NS infusion (premix), 0-1 mcg/kg/min (Ideal), Intravenous, Continuous, ROSINA FloydODottie, Last Rate: 17.9 mL/hr at 22, 0.15 mcg/kg/min at 22    piperacillin-tazobactam (Zosyn) 4.5 g in  mL IVPB, 4.5 g, Intravenous, Once, Darren Mcclendon  ELVIRA.    MD Alert...Vancomycin per Pharmacy, 1 Each, Other, PHARMACY TO DOSE, Darren Mcclendon D.O.    azithromycin (ZITHROMAX) injection 500 mg, 500 mg, Intravenous, Once, Darren Mcclendon D.O.  No current outpatient medications on file.    Past Medical History   has a past medical history of Aortic stenosis (12/1/2015), Arthritis, Benign hypertensive heart disease without heart failure (11/14/2011), CAD (coronary artery disease), Congestive heart failure ([facility]), Coronary atherosclerosis of autologous vein bypass graft (11/14/2011), Essential hypertension (4/25/2019), Gout, Heart valve disease, High cholesterol, History of pancreatitis, History of pancreatitis, Hypertension, Migraine (2/16/2019), Muscle cramps (10/4/2011), Myocardial infarct ([facility]), Obesity (11/14/2011), Pain, Postsurgical aortocoronary bypass status (7/26/2016), Postsurgical aortocoronary bypass status (7/26/2016), Renal disorder, S/P aortic valve replacement with bioprosthetic valve (7/26/2016), Statin intolerance (7/26/2016), Status post cardiac revascularization with bypass aortocoronary anastomosis of five coronary vessels (7/26/2016), and Status post cardiac revascularization with bypass aortocoronary anastomosis of five coronary vessels (7/26/2016).     Surgical History   has a past surgical history that includes laminotomy (12/2004); biopsy general; multiple coronary artery bypass (11/11/1998); multiple coronary artery bypass (12/08/2015); shoulder decompression arthroscopic (Right, 9/5/2017); debridement, labrum, hip, arthroscopic (Right, 9/5/2017); shoulder arthroscopy w/ rotator cuff repair (Right, 9/5/2017); shoulder arthroscopy w/ bicipital tenodesis repair (Right, 9/5/2017); synovectomy (Right, 9/5/2017); eye surgery; mitral valve replace; lumbar laminectomy diskectomy (11/7/2022); and irrigation and debridement, wound, after procedure invoh (11/17/2022).      Family History  family history includes Arthritis in his maternal  grandmother; Cancer in his mother; Heart Attack in his father; Heart Disease in his brother; Hyperlipidemia in his father.   Family history reviewed with patient. There is no family history that is pertinent to the chief complaint.      Social History   reports that he quit smoking about 30 years ago. His smoking use included cigarettes. He has a 60.00 pack-year smoking history. He has never used smokeless tobacco. He reports that he does not currently use alcohol. He reports current drug use. Drugs: Marijuana and Inhaled.     Allergies        Allergies   Allergen Reactions    Morphine Vomiting, Nausea and Unspecified       Violent nightmares    Fenofibrate Unspecified       Dehydration, severe muscle cramps     Gemfibrozil Unspecified       Dehydration,Severe Muscle cramps    Lipitor [Atorvastatin Calcium] Unspecified       Severe Muscle cramps, dehydration    Lovastatin Unspecified       Dehydration, severe muscle cramps    Tricor Unspecified       Dehydration, severe muscle cramps    MEDICATIONS ON DISCHARGE      Medication List          START taking these medications         Instructions   * apixaban 5mg Tabs  Commonly known as: ELIQUIS    Take 2 Tablets by mouth 2 times a day. Indications: DVT/PE  Dose: 10 mg      * apixaban 5mg Tabs  Start taking on: December 17, 2022  Commonly known as: ELIQUIS    Take 2 Tablets by mouth 2 times a day, then 1 Tablet by mouth 2 times a day thereafter. Indications: DVT/PE  Dose: 5 mg      colchicine 0.6 MG Tabs  Commonly known as: COLCRYS    Take 1 Tablet by mouth every day for 30 days.  Dose: 0.6 mg              * This list has 2 medication(s) that are the same as other medications prescribed for you. Read the directions carefully, and ask your doctor or other care provider to review them with you.                    CHANGE how you take these medications         Instructions   gabapentin 300 MG Caps  What changed:   when to take this  additional instructions  Commonly known  as: NEURONTIN    TAKE 1 CAPSULE BY MOUTH THREE TIMES A DAY FOR 7 DAYS                CONTINUE taking these medications         Instructions   acetaminophen 500 MG Tabs  Commonly known as: TYLENOL    Take 500-1,000 mg by mouth every 6 hours as needed. Indications: Pain  Dose: 500-1,000 mg      allopurinol 100 MG Tabs  Commonly known as: ZYLOPRIM    Take 1 Tablet by mouth every day for 360 days. allopurinol 100 mg tablet  Dose: 100 mg      amLODIPine 5 MG Tabs  Commonly known as: NORVASC    Take 5 mg by mouth every day.  Dose: 5 mg      Blood Glucose Test Strips    Doctor's comments: Or per formulary preference. ICD-10 code: E11.9 Controlled type 2 Diabetes Mellitus  Use one True Metrix strip to test blood sugar once daily early morning before first meal.      clopidogrel 75 MG Tabs  Commonly known as: PLAVIX    Take 1 Tablet by mouth every day.  Dose: 75 mg      cyclobenzaprine 10 mg Tabs  Commonly known as: Flexeril    Take 10 mg by mouth 3 times a day as needed for Muscle Spasms.  Dose: 10 mg      docusate sodium 100 MG Caps    Take 100 mg by mouth 2 times a day.  Dose: 100 mg      famotidine 20 MG Tabs  Commonly known as: PEPCID    Take 1 Tablet by mouth 2 times a day.  Dose: 20 mg      furosemide 20 MG Tabs  Commonly known as: LASIX    Take 20 mg by mouth 2 times a day.  Dose: 20 mg      isosorbide mononitrate 120 MG CR tablet  Commonly known as: IMDUR    Take 1 Tablet by mouth every morning.  Dose: 120 mg      Lancets    Doctor's comments: Or per formulary preference. ICD-10 code: E11.9 Controlled type 2 Diabetes Mellitus  Use one True Metrix lancet to test blood sugar once daily early morning before first meal.      lisinopril 20 MG Tabs  Commonly known as: PRINIVIL    Take 1 Tablet by mouth every day.  Dose: 20 mg      metFORMIN 850 MG Tabs  Commonly known as: GLUCOPHAGE    Take 1 Tablet by mouth 2 times a day with meals.  Dose: 850 mg      metoprolol  MG Tb24  Commonly known as: TOPROL XL    Take 1  "Tablet by mouth every day.  Dose: 100 mg      nitroglycerin 0.4 MG Subl  Commonly known as: NITROSTAT    Place 1 Tablet under the tongue as needed for Chest Pain for up to 360 days.  Dose: 0.4 mg      Oxycodone HCl 20 MG Tabs    Take 20 mg by mouth every 6 hours as needed (Pain).  Dose: 20 mg      polyethylene glycol/lytes 17 g Pack  Commonly known as: MIRALAX    Take 1 Packet by mouth 2 times a day as needed (if sennosides and/or docusate ineffective or not ordered).  Dose: 17 g      potassium chloride SA 20 MEQ Tbcr  Commonly known as: Kdur    Take 1 Tablet by mouth every day.  Dose: 20 mEq      ranolazine 500 MG Tb12  Commonly known as: RANEXA    Take 1 Tablet by mouth 2 times a day.  Dose: 500 mg      Repatha SureClick 140 MG/ML Soaj  Generic drug: Evolocumab (REPATHA)    Inject 1 Each under the skin every 14 days.  Dose: 1 mL      tamsulosin 0.4 MG capsule  Commonly known as: FLOMAX    Take 1 Capsule by mouth 1/2 hour after breakfast.  Dose: 0.4 mg                STOP taking these medications       aspirin 81 MG EC tablet      indomethacin 25 MG Caps  Commonly known as: INDOCIN       AReview of systems:  A complete review of symptoms is unobtainable patient is undergoing CPR    Physical exam:  Patient Vitals for the past 24 hrs:   BP Temp Pulse Resp SpO2 Height Weight   12/11/22 1909 (!) 120/95 -- (!) 133 18 -- -- --   12/11/22 1846 (!) 88/53 -- (!) 104 16 95 % -- --   12/11/22 1841 (!) 83/53 -- (!) 104 13 97 % -- --   12/11/22 1836 (!) 87/53 -- (!) 104 12 98 % -- --   12/11/22 1831 110/68 -- (!) 105 (!) 11 97 % -- --   12/11/22 1826 118/78 -- (!) 119 -- 97 % -- --   12/11/22 1805 -- -- (!) 105 (!) 26 100 % -- --   12/11/22 1803 129/79 36.4 °C (97.5 °F) (!) 108 19 100 % -- --   12/11/22 2984 -- -- (!) 141 16 -- 1.676 m (5' 6\") 99.8 kg (220 lb)     General: Actively undergoing CPR high-quality compressions  EYES: Unable to assess  HEENT: ET tube  Neck: Unable to assess  CVS: Unable to assess he is actively " undergoing CPR a pulse check he had sinus bradycardia rhythm but no pulses  Resp: Actively undergoing CPR with ventilation  Abdomen: Obese distended  Skin: Cyanotic  Neurological: Comatose  Extremities: Mild lower extremity with cyanosis      Data:  Laboratory studies personally reviewed by me:  Recent Results (from the past 24 hour(s))   Complete Metabolic Panel (CMP)    Collection Time: 12/11/22  6:02 PM   Result Value Ref Range    Sodium 138 135 - 145 mmol/L    Potassium 4.1 3.6 - 5.5 mmol/L    Chloride 93 (L) 96 - 112 mmol/L    Co2 26 20 - 33 mmol/L    Anion Gap 19.0 (H) 7.0 - 16.0    Glucose 195 (H) 65 - 99 mg/dL    Bun 34 (H) 8 - 22 mg/dL    Creatinine 1.57 (H) 0.50 - 1.40 mg/dL    Calcium 9.3 8.5 - 10.5 mg/dL    AST(SGOT) 119 (H) 12 - 45 U/L    ALT(SGPT) 106 (H) 2 - 50 U/L    Alkaline Phosphatase 112 (H) 30 - 99 U/L    Total Bilirubin 0.2 0.1 - 1.5 mg/dL    Albumin 3.5 3.2 - 4.9 g/dL    Total Protein 7.0 6.0 - 8.2 g/dL    Globulin 3.5 1.9 - 3.5 g/dL    A-G Ratio 1.0 g/dL   Troponin STAT    Collection Time: 12/11/22  6:02 PM   Result Value Ref Range    Troponin T 317 (H) 6 - 19 ng/L   ESTIMATED GFR    Collection Time: 12/11/22  6:02 PM   Result Value Ref Range    GFR (CKD-EPI) 48 (A) >60 mL/min/1.73 m 2   POCT arterial blood gas device results    Collection Time: 12/11/22  6:16 PM   Result Value Ref Range    Ph 7.336 (L) 7.400 - 7.500    Pco2 49.7 (H) 26.0 - 37.0 mmHg    Po2 162 (H) 64 - 87 mmHg    Tco2 28 20 - 33 mmol/L    S02 99 93 - 99 %    Hco3 26.6 (H) 17.0 - 25.0 mmol/L    BE 0 -4 - 3 mmol/L    Body Temp 34.8 C degrees    O2 Therapy 100 %    iPF Ratio 162     Ph Temp Manju 7.367 (L) 7.400 - 7.500    Pco2 Temp Co 45.2 (H) 26.0 - 37.0 mmHg    Po2 Temp Cor 150 (H) 64 - 87 mmHg    Specimen Arterial     DelSys Vent     End Tidal Carbon Dioxide 50 mmhg    Tidal Volume 460 mL    Peep End Expiratory Pressure 8 cmh20    Set Rate 26     Mode APV-CMV    EKG    Collection Time: 12/11/22  6:31 PM   Result Value Ref  Range    Report       [facility] Emergency Dept.    Test Date:  2022  Pt Name:    NANCY SANDOVAL            Department: ER  MRN:        [id number]                      Room:       RD 03  Gender:     Male                         Technician: [id number]  :        1955                   Requested By:JARED WHELAN  Order #:    [id number]                    Reading MD:    Measurements  Intervals                                Axis  Rate:       104                          P:          54  NM:         172                          QRS:        34  QRSD:       134                          T:          158  QT:         343  QTc:        452    Interpretive Statements  Sinus tachycardia  Ventricular premature complex  Left bundle branch block  Baseline wander in lead(s) II,III,aVR,aVF,V1,V3,V5,V6  No previous ECG available for comparison         Imaging:  DX-CHEST-PORTABLE (1 VIEW)   Final Result      1.  Well-positioned ETT and right PICC.   2.  Bilateral interstitial and alveolar opacities may represent pulmonary edema or multifocal pneumonia. No pleural effusions.   3.  Cardiomegaly.         EC-ECHOCARDIOGRAM LTD W/O CONT    (Results Pending)           EKG tracings personally reviewed by me   sinus tachycardia with atypical left bundle branch block    Echocardiogram images personally reviewed by me show preserved ejection fraction normal right heart function from  with increased transvalvular gradients          Cath films reviewed from recent angiogram  PROCEDURES  2022:  Left heart cardiac catheterization with the following findings  Findings:  Left main is calcified, moderate disease.  Left anterior descending artery has calcified 70% disease in proximal portion, occluded in midportion.  Proximal LAD supplies large septal branches, diagonal branch.  Septal branches gives collaterals to RCA.  Left circumflex artery proximally occluded.  Right coronary artery proximally occluded,  large atrial branch collateralizes left circumflex artery.  Also antegrade collaterals to distal RCA.  All vein grafts are known to be occluded, not injected.  LIMA to LAD widely patent, previously placed stent is patent with 30% in-stent restenosis.     Was difficult to advance the catheter through left subclavian artery, left subclavian artery angiogram was performed, left subclavian artery was tortuous with 50% stenosis, no significant gradient across stenosis.  All pertinent features of laboratory and imaging reviewed including primary images where applicable      Active Problems:    PEA (Pulseless electrical activity) (HCC) POA: Yes    Coronary artery disease due to calcified coronary lesion (Chronic) POA: Yes  Resolved Problems:    * No resolved hospital problems. *      Assessment / Plan:  PEA arrest lisinopril recent diagnosis of DVT and his underlying comorbidities of multivessel coronary disease not amenable to revascularization recent spine surgery and complications we will try to obtain a stat echo but he was never stable enough to do that the suspicion is for massive PE being the most likely diagnosis his presentation is atypical for myocardial infarction.    Ultimately after carotid measures and prolonged CPR he succumbed to his illness.        I personally discussed his case with  CECY Haro.[email or web address]. and Dr. Daniel Amos who is at the bedside    It is my pleasure to participate in the care of Mr. Lopez.  Please do not hesitate to contact me with questions or concerns.    Bethel Rinaldi MD PhD [facility]  Cardiologist [facility]    12/11/2022    Please note that this dictation was created using voice recognition software. There may be errors I did not discover before finalizing the note.

## 2022-12-12 NOTE — DISCHARGE PLANNING
SW responded to a code blue from the parking lot. Spouse was present and presented the patient's medical history to ROSHAN and medical team. Patient's name is Abimael Hamilton [facility] [birth year]. Spouse's name is Ana Hamilton ([phone]). SW provided the spouse with the patient's alias name.

## 2022-12-13 ENCOUNTER — APPOINTMENT (OUTPATIENT)
Dept: ONCOLOGY | Facility: MEDICAL CENTER | Age: 67
End: 2022-12-13
Attending: INTERNAL MEDICINE
Payer: COMMERCIAL

## 2022-12-14 ENCOUNTER — TELEPHONE (OUTPATIENT)
Dept: CARDIOLOGY | Facility: MEDICAL CENTER | Age: 67
End: 2022-12-14
Payer: COMMERCIAL

## 2022-12-14 ENCOUNTER — APPOINTMENT (OUTPATIENT)
Dept: ONCOLOGY | Facility: MEDICAL CENTER | Age: 67
End: 2022-12-14
Attending: INTERNAL MEDICINE
Payer: COMMERCIAL

## 2022-12-14 LAB — GLUCOSE BLD STRIP.AUTO-MCNC: 132 MG/DL (ref 65–99)

## 2022-12-15 ENCOUNTER — APPOINTMENT (OUTPATIENT)
Dept: ONCOLOGY | Facility: MEDICAL CENTER | Age: 67
End: 2022-12-15
Attending: INTERNAL MEDICINE
Payer: COMMERCIAL

## 2022-12-16 ENCOUNTER — APPOINTMENT (OUTPATIENT)
Dept: ONCOLOGY | Facility: MEDICAL CENTER | Age: 67
End: 2022-12-16
Attending: INTERNAL MEDICINE
Payer: COMMERCIAL

## 2022-12-17 ENCOUNTER — APPOINTMENT (OUTPATIENT)
Dept: ONCOLOGY | Facility: MEDICAL CENTER | Age: 67
End: 2022-12-17
Attending: INTERNAL MEDICINE
Payer: COMMERCIAL

## 2022-12-18 ENCOUNTER — APPOINTMENT (OUTPATIENT)
Dept: ONCOLOGY | Facility: MEDICAL CENTER | Age: 67
End: 2022-12-18
Attending: INTERNAL MEDICINE
Payer: COMMERCIAL

## 2022-12-19 ENCOUNTER — APPOINTMENT (OUTPATIENT)
Dept: ONCOLOGY | Facility: MEDICAL CENTER | Age: 67
End: 2022-12-19
Attending: INTERNAL MEDICINE
Payer: COMMERCIAL

## 2022-12-20 ENCOUNTER — APPOINTMENT (OUTPATIENT)
Dept: ONCOLOGY | Facility: MEDICAL CENTER | Age: 67
End: 2022-12-20
Attending: INTERNAL MEDICINE
Payer: COMMERCIAL

## 2022-12-21 ENCOUNTER — APPOINTMENT (OUTPATIENT)
Dept: ONCOLOGY | Facility: MEDICAL CENTER | Age: 67
End: 2022-12-21
Attending: INTERNAL MEDICINE
Payer: COMMERCIAL

## 2022-12-22 ENCOUNTER — APPOINTMENT (OUTPATIENT)
Dept: ONCOLOGY | Facility: MEDICAL CENTER | Age: 67
End: 2022-12-22
Attending: INTERNAL MEDICINE
Payer: COMMERCIAL

## 2022-12-23 ENCOUNTER — APPOINTMENT (OUTPATIENT)
Dept: ONCOLOGY | Facility: MEDICAL CENTER | Age: 67
End: 2022-12-23
Attending: INTERNAL MEDICINE
Payer: COMMERCIAL

## 2022-12-24 ENCOUNTER — APPOINTMENT (OUTPATIENT)
Dept: ONCOLOGY | Facility: MEDICAL CENTER | Age: 67
End: 2022-12-24
Attending: INTERNAL MEDICINE
Payer: COMMERCIAL

## 2022-12-25 ENCOUNTER — APPOINTMENT (OUTPATIENT)
Dept: ONCOLOGY | Facility: MEDICAL CENTER | Age: 67
End: 2022-12-25
Attending: INTERNAL MEDICINE
Payer: COMMERCIAL

## 2022-12-26 ENCOUNTER — APPOINTMENT (OUTPATIENT)
Dept: ONCOLOGY | Facility: MEDICAL CENTER | Age: 67
End: 2022-12-26
Attending: INTERNAL MEDICINE
Payer: COMMERCIAL

## 2022-12-27 ENCOUNTER — APPOINTMENT (OUTPATIENT)
Dept: ONCOLOGY | Facility: MEDICAL CENTER | Age: 67
End: 2022-12-27
Attending: INTERNAL MEDICINE
Payer: COMMERCIAL

## 2024-04-30 NOTE — LETTER
Name:          Abimael Hamilton   YOB: 1955  Date:     05/14/2019      Martha Madrid M.D.  [address]  [place] [address]     Alejandro Fuentes MD  [address]  [place] [address]  Phone: [phone]  Back Line: [phone]  Fax: [phone]  E-mail: [email or web address]   Dear Dr. Madrid,    We had the pleasure of seeing your patient, Abimael Hamilton, in Cardiology Clinic at [facility] today.    As you know, he is a 63-year-old man followed in cardiology clinic for complex, multivessel coronary artery disease and difficult, statin intolerant dyslipidemia, status post bioprosthetic aortic valve replacement, and moderate carotid atherosclerosis (by ultrasound 2/17/2019).    Standpoint of his multivessel coronary artery disease, he has a history of 5 vessel CABG in 1998, and re-do 3-V CABG in 12/2005 as well as bioprosthetic AVR at that time. All grafts are occluded with the exception of his LIMA-LAD which fills his circumflex distribution via a jump graft by cardiac catheterization 11/2018. His native coronary arteries are severely diseased as well. He has a 4.5 cm thoracic aortic aneurysm, gout, hypertension, dyslipidemia, and obesity.    He is doing much better today, and has wonderful control of his cholesterol with a combination of Praluent and fish oil.  I encouraged him telling him that his prognosis could be very good with continued long-term excellent cholesterol control in addition to diet and exercise to reverse his very significant poly-vascular disease.  I made no changes to his medical regimen today nor ordered additional testing.    Return in about 6 months (around 11/14/2019).    Thank you for the referral and please do not hesitate to contact me at any time. My contact information is listed above.    This note was dictated using Dragon speech recognition software.     A full note including my physical examination and a full list of  Pt is due for a 5 yr colon recall. Hist of colon polyps. Family hist of Crohns disease with Dr Barriga. I lmom for pt to please call back to schedule. Will call again if do not hear back from her.    rectified medications is available in our medical record, and can be faxed as well.    Alejandro Fuentes MD  Cardiologist  [facility]

## 2024-06-10 NOTE — OR SURGEON
Immediate Post OP Note    PreOp Diagnosis: lumbar stenosis       PostOp Diagnosis: same      Procedure(s):  L2-SI LAMINECTOMY    Surgeon(s):  Adam Sparks M.D.    Anesthesiologist/Type of Anesthesia:  Anesthesiologist: Bettie Patterson M.D.; Marcello Otto M.D./* No anesthesia type entered *    Surgical Staff:  Circulator: Blanca Edward R.N.; Andrew Olivo R.N.; Wing Gonsalez R.N.; Nina Barkley R.N.  Relief Circulator: Rosemary Butler R.N.  Scrub Person: Wang Medina; Jayant Cruz  Radiology Technologist: KALE Sanchez; Kayleen Coleman    Specimens removed if any:  ID Type Source Tests Collected by Time Destination   1 : 1) L2-L3 EPIDURAL ABSCESS Body Fluid Abscess AEROBIC/ANAEROBIC CULTURE (SURGERY) Adam Sparks M.D. 11/7/2022  5:41 PM    2 : 2) L2-L3 EPIDURAL ABSCESS Body Fluid Abscess AEROBIC/ANAEROBIC CULTURE (SURGERY) Adam Sparks M.D. 11/7/2022  5:43 PM        Estimated Blood Loss: 200cc    Findings: loculated thick collection at L2-3, likely epidural abscess    Complications: none    Plan:  ID consult for abx management  Vanc/Zosyn until seen by ID  OK to restart anticoagulation on POD 1 given risk of cardiac event    11/7/2022 6:37 PM Adam Sparks M.D.  
No indicators present
